# Patient Record
Sex: FEMALE | Race: WHITE | NOT HISPANIC OR LATINO | Employment: FULL TIME
[De-identification: names, ages, dates, MRNs, and addresses within clinical notes are randomized per-mention and may not be internally consistent; named-entity substitution may affect disease eponyms.]

---

## 2017-05-27 ENCOUNTER — HEALTH MAINTENANCE LETTER (OUTPATIENT)
Age: 27
End: 2017-05-27

## 2021-12-24 ENCOUNTER — TRANSFERRED RECORDS (OUTPATIENT)
Dept: HEALTH INFORMATION MANAGEMENT | Facility: CLINIC | Age: 31
End: 2021-12-24

## 2021-12-27 ENCOUNTER — TRANSFERRED RECORDS (OUTPATIENT)
Dept: HEALTH INFORMATION MANAGEMENT | Facility: CLINIC | Age: 31
End: 2021-12-27

## 2022-03-31 ENCOUNTER — TRANSFERRED RECORDS (OUTPATIENT)
Dept: HEALTH INFORMATION MANAGEMENT | Facility: CLINIC | Age: 32
End: 2022-03-31

## 2022-09-29 ENCOUNTER — REFERRAL (OUTPATIENT)
Dept: TRANSPLANT | Facility: CLINIC | Age: 32
End: 2022-09-29

## 2022-09-29 DIAGNOSIS — Z01.818 PRE-TRANSPLANT EVALUATION FOR KIDNEY TRANSPLANT: ICD-10-CM

## 2022-09-29 DIAGNOSIS — N18.6 ESRD (END STAGE RENAL DISEASE) (H): Primary | ICD-10-CM

## 2022-09-29 DIAGNOSIS — Z76.82 ORGAN TRANSPLANT CANDIDATE: ICD-10-CM

## 2022-09-29 DIAGNOSIS — N18.6 END STAGE RENAL DISEASE (H): ICD-10-CM

## 2022-09-29 DIAGNOSIS — I10 ESSENTIAL HYPERTENSION: ICD-10-CM

## 2022-09-29 NOTE — LETTER
Glenna Spears  [address]  [place] [address]                October 6, 2022                                                                                        MEDICAL RECORDS REQUEST    [facility], Kidney Pancreas Transplant Program Records Request                      Facility: [facility]    Thank you for referring your patient to the [facility] Kidney, Kidney Pancreas Program, in order to process the referral we will need the following information;    4. 1910 form       Please call our office at [phone] if you have any questions or concerns.                Please fax all paper records to [phone] within 1-3 business days.      Thank you,   The SOT Referral Intake Team     [facility]  Solid Organ Transplant Office  [address] [facility], [address] [address]

## 2022-09-29 NOTE — LETTER
October 10, 2022    Glenna Spears  [address]  [place] [address]    Dear Glenna,    Thank you for your interest in the Transplant Center at [facility]. We look forward to being a part of your care team and assisting you through the transplant process.    As we discussed, your transplant coordinator is Ilana Dumont (Kidney).  You may call your coordinator at any time with questions or concerns.  Your first scheduled call will be on October 14, 2022.  If this needs to change, call [phone].    Please complete the following.    1. Fill out and return the enclosed forms    Authorization for Electronic Communication    Authorization to Discuss Protected Health Information    Authorization for Release of Protected Health Information    2. Sign up for:    [employer], access to your electronic medical record (see enclosed pamphlet)    [email or web address], a transplant education website    You can use these tools to learn more about your transplant, communicate with your care team, and track your medical details      Sincerely,      Solid Organ Transplant  [facility]    cc: Referring Physician

## 2022-10-05 ENCOUNTER — DOCUMENTATION ONLY (OUTPATIENT)
Dept: TRANSPLANT | Facility: CLINIC | Age: 32
End: 2022-10-05

## 2022-10-05 VITALS — WEIGHT: 293 LBS | HEIGHT: 68 IN | BODY MASS INDEX: 44.41 KG/M2

## 2022-10-05 NOTE — TELEPHONE ENCOUNTER
PCP: currently no PCP- previous @ [facility]   Referring Provider: Cruz Mar MD  Referring Diagnosis: ESRD    Is patient under the age of 65? yes  Is patient diabetic? no  Is patient on insulin? no  Was patient offered a pancreas transplant referral? No    BMI 48.7    Is patient in a group home/assisted living? no  Does patient have a guardian? no    Referral intake process completed.  Patient is aware that after financial approval is received, medical records will be requested.   Patient confirmed for a callback from transplant coordinator on October 14, 2022. (within 2 weeks)  Tentative evaluation date November 2, 2022 (within 4 weeks) if appointment is virtual, does patient have capabilities of setting this up?     Confirmed coordinator will discuss evaluation process in more detail at the time of their call.   Patient is aware of the need to arrange age appropriate cancer screening, vaccinations, and dental care.  Reminded patient to complete questionnaire, complete medical records release, and review packet prior to evaluation visit .  Assessed patient for special needs (ie-wheelchair, assistance, guardian, and ):  none   Patient instructed to call [phone] with questions.     Patient gave verbal consent during intake call to obtain medical records and documents outside of [facility]:  yes

## 2022-10-10 ENCOUNTER — HEALTH MAINTENANCE LETTER (OUTPATIENT)
Age: 32
End: 2022-10-10

## 2022-10-25 NOTE — TELEPHONE ENCOUNTER
Reviewed chart for purpose of pre kidney transplant evaluation planning. Patient has ESRD on dialysis since about 4 months ago. Per Dr. Cruz Mar's clinic note 05/23/2022 patient has chronic kidney disease with longstanding nephrotic range proteinuria with family history of the same. Note also mentions patient has history of illicit drug use, has not used cocaine for a few years, did use a recent stimulant that she does not know which kind and LSD as well. History of hypertension, excessive Excedrin use for many years, smoking x many years, obesity x many years. No mention of chronic heart or lung issues. BMI is 48.66 for a height of 5 foot 8 inches and weight of 320 pounds. Will need to start with tx surg/ nutri/ weight loss management appts only due to high BMI.     Contacted patient and introduced myself as their Transplant Coordinator, also introduced the role of the Transplant Coordinator in the transplant process.  Explained the purpose of this call including reviewing next steps and answering questions.  Patient confirmed she is interested in proceeding with kidney transplant evalulation.  Pt confirmed she understands she needs to lose weight to meet criteria for kidney transplant. I explained our upper BMI limit for kidney transplant is 35 which equals a weight of 230 pounds for her height of 5 foot 8 inches. I explained that we start with tx surg/ nutri/ weight loss management appts first to address weight loss.   Confirmed Referring Provider, Dialysis Center, and Primary Care Physician. Notified patient of the importance of continued communication with referring providers and primary care physicians.    Reviewed components of transplant evaluation process including necessary appointments, tests, and procedures.    Answered questions for patient regarding evaluation, provided my name and contact information and requested they call with any additional questions.    Determined that patient would like  additional information regarding transplant by:     Drop Down choices: Mail, Email, MyChart, Phone Call   Encourage MyChart   Notified patients that they will hear from a Transplant  to schedule mini eval with tx surg/ nutri/ weight loss management and that these are typically on a Monday afternoon.  Patient expressed very good understanding of all and was in good agreement with the plan.     Smart set orders into [facility] today for tx surg/ nutri/ weight loss management appts.

## 2022-11-03 NOTE — TELEPHONE ENCOUNTER
Called patient to schedule Wt Mgmt appts and due to Ibis SALINAS & Bobbi SALINAS being out or full schedules the 1st available opening is Mon, Jan 23, 2023.  Patient confirmed for this date, mycharting & emailing schedule to her.

## 2023-01-20 NOTE — PROGRESS NOTES
"[facility] Solid Organ Transplant  Outpatient MNT: Kidney Transplant Evaluation    Current BMI: 48.7 (HT 69 in,  lbs/150 kg)  BMI is outside recommendation of <35 for kidney transplant  Goal weight for kidney transplant <237 lbs (93 lb loss)     Time Spent: 30 minutes  Visit Type: Initial   Referring Physician: Jose  Pt accompanied by: her partner    History of previous txp: none   Dialysis: yes    Dialysis Info: HD 6/2022 6 am   Protein supplement: is offered one, but declines d/t dislike of taste     Nutrition Assessment  This RD visit can be counted as 1st required RD visit for bariatric surgery/weight management if pt were to proceed with this program    - Appetite: v hungry since she started on dialysis  - Meal prep & grocery shopping: SO cooks; pt uses microwave food    Vitamins, Supplements, Pertinent Meds: velphoro (this is the 3rd binder she has tried; reports taking binders more consistently, including taking to work more regularly)  Herbal Medicines/Supplements: not asked     Edema: yes    Weight hx: increased a few lbs, likely 2/2 fluid status     Diet Recall works 3 pm- 11 pm  Breakfast Not typical   Lunch 11a-12p: Joseph Call BF bowls with 2 pieces toast    Dinner Salad (6 pm- at work) w/ cheese, croutons, ranch + Greek yogurt    Snacks After work (11p-12a)- 1/2 frozen pizza, can of chili; partner may cook- spaghetti, chicken & veggies (40% meals made at home); reports she \"wants to cut that meal out\"    Snacks- when at home but did not disclose what types    Beverages Juice (8 oz or less/d), water, coffee (w/ creamer- reports not taking binder w/ this), was drinking more pop but less appealing w/ dialysis (8 oz or less/day- Mt Dew, but some Coke/Pepsi)   Alcohol None    Dining out 1x/week      Physical Activity  [employer]- plan is to go 3x/week for 1 h w/ sister     Labs  1/9/23 K 5 (per [facility] records)  Phos high in May, no recent labs; pt reports Phos is 11     Nutrition Diagnosis  Obesity " r/t positive energy balance and inadequate physical activity AEB BMI 48.73.    Nutrition Intervention  Nutrition education provided:  Discussed strategies for weight loss. Provided some suggestions, such as including more veggies as able, adding protein to salad (ie chicken- cook up large amount at one time), consider reducing caloric beverages (also required if she goes through weight loss surgery). Reviewed pt's thought to cut out late night meal and to substitute a protein-rich snack, such as a protein drink or bar that she could tolerate.   Reviewed high protein, lower carb diet and how this is helpful for weight loss and also what she will be instructed on if she proceeds with bariatric surgery. Also reviewed need to avoid caloric beverages, caffeine, carbonation, etc after bariatric surgery. Encouraged pt and her partner to think about 1-2 ideas we talked about today and what is most realistic to change given their daily routines.   Reviewed small goal to start at the gym, even 20 min 1x/week vs larger goal of 60 min 3x/week     Discussed sodium intake (low sodium foods and drinks, seasoning food without salt and tips for low sodium diet).  Reviewed that if she were to reduce/change processed food intake, she would lose water weight and this would make dialysis runs better. Reviewed K levels, high phos levels, which are likely high from highly processed food intake. Also encouraged her to take binder with coffee (having creamer).     Patient Understanding: Pt verbalized understanding of education provided.  Expected Engagement: Fair  Follow-Up Plans: PRN     Nutrition Goals  BMI <38 or <257 lbs for full kidney txp savannah Smith, RD, LD, CCTD

## 2023-01-23 ENCOUNTER — TELEPHONE (OUTPATIENT)
Dept: ENDOCRINOLOGY | Facility: CLINIC | Age: 33
End: 2023-01-23

## 2023-01-23 ENCOUNTER — OFFICE VISIT (OUTPATIENT)
Dept: TRANSPLANT | Facility: CLINIC | Age: 33
End: 2023-01-23
Attending: NURSE PRACTITIONER
Payer: COMMERCIAL

## 2023-01-23 VITALS
SYSTOLIC BLOOD PRESSURE: 149 MMHG | BODY MASS INDEX: 43.4 KG/M2 | BODY MASS INDEX: 43.4 KG/M2 | HEIGHT: 69 IN | OXYGEN SATURATION: 100 % | HEART RATE: 84 BPM | WEIGHT: 293 LBS | DIASTOLIC BLOOD PRESSURE: 82 MMHG | OXYGEN SATURATION: 100 % | DIASTOLIC BLOOD PRESSURE: 82 MMHG | WEIGHT: 293 LBS | SYSTOLIC BLOOD PRESSURE: 149 MMHG | HEART RATE: 84 BPM | HEIGHT: 69 IN

## 2023-01-23 DIAGNOSIS — Z01.818 PRE-TRANSPLANT EVALUATION FOR KIDNEY TRANSPLANT: ICD-10-CM

## 2023-01-23 DIAGNOSIS — I10 ESSENTIAL HYPERTENSION: ICD-10-CM

## 2023-01-23 DIAGNOSIS — Z76.82 ORGAN TRANSPLANT CANDIDATE: ICD-10-CM

## 2023-01-23 DIAGNOSIS — N18.6 ESRD (END STAGE RENAL DISEASE) (H): ICD-10-CM

## 2023-01-23 DIAGNOSIS — N18.6 END STAGE RENAL DISEASE (H): ICD-10-CM

## 2023-01-23 DIAGNOSIS — E66.01 CLASS 3 SEVERE OBESITY WITH SERIOUS COMORBIDITY AND BODY MASS INDEX (BMI) OF 50.0 TO 59.9 IN ADULT, UNSPECIFIED OBESITY TYPE (H): Primary | ICD-10-CM

## 2023-01-23 DIAGNOSIS — E66.813 CLASS 3 SEVERE OBESITY WITH SERIOUS COMORBIDITY AND BODY MASS INDEX (BMI) OF 50.0 TO 59.9 IN ADULT, UNSPECIFIED OBESITY TYPE (H): Primary | ICD-10-CM

## 2023-01-23 PROBLEM — E55.9 HYPOVITAMINOSIS D: Status: ACTIVE | Noted: 2023-01-23

## 2023-01-23 PROCEDURE — 99203 OFFICE O/P NEW LOW 30 MIN: CPT | Performed by: PHYSICIAN ASSISTANT

## 2023-01-23 PROCEDURE — 99212 OFFICE O/P EST SF 10 MIN: CPT | Mod: 27 | Performed by: PHYSICIAN ASSISTANT

## 2023-01-23 PROCEDURE — 99203 OFFICE O/P NEW LOW 30 MIN: CPT | Mod: GC | Performed by: SURGERY

## 2023-01-23 PROCEDURE — G0463 HOSPITAL OUTPT CLINIC VISIT: HCPCS | Performed by: SURGERY

## 2023-01-23 RX ORDER — SEMAGLUTIDE 0.5 MG/.5ML
0.5 INJECTION, SOLUTION SUBCUTANEOUS WEEKLY
Qty: 2 ML | Refills: 0 | Status: SHIPPED | OUTPATIENT
Start: 2023-02-23 | End: 2023-03-10

## 2023-01-23 RX ORDER — VARENICLINE TARTRATE 0.5 MG/1
0.5 TABLET, FILM COATED ORAL DAILY
COMMUNITY
Start: 2023-01-05 | End: 2024-02-08

## 2023-01-23 RX ORDER — ACETAMINOPHEN 325 MG/1
325-650 TABLET ORAL EVERY 4 HOURS PRN
COMMUNITY

## 2023-01-23 RX ORDER — FAMOTIDINE 20 MG/1
20 TABLET, FILM COATED ORAL AT BEDTIME
COMMUNITY
Start: 2022-05-23 | End: 2024-02-09 | Stop reason: ALTCHOICE

## 2023-01-23 RX ORDER — LABETALOL 200 MG/1
200 TABLET, FILM COATED ORAL EVERY EVENING
COMMUNITY
Start: 2022-04-15 | End: 2024-02-08

## 2023-01-23 RX ORDER — SEMAGLUTIDE 0.25 MG/.5ML
0.25 INJECTION, SOLUTION SUBCUTANEOUS WEEKLY
Qty: 2 ML | Refills: 0 | Status: SHIPPED | OUTPATIENT
Start: 2023-01-23 | End: 2023-05-03

## 2023-01-23 RX ORDER — ALBUTEROL SULFATE 90 UG/1
2 AEROSOL, METERED RESPIRATORY (INHALATION) PRN
COMMUNITY
Start: 2022-11-13 | End: 2024-02-08

## 2023-01-23 RX ORDER — AMLODIPINE BESYLATE 10 MG/1
1 TABLET ORAL EVERY EVENING
COMMUNITY
Start: 2021-12-24 | End: 2024-02-08

## 2023-01-23 RX ORDER — LIDOCAINE/PRILOCAINE 2.5 %-2.5%
CREAM (GRAM) TOPICAL
COMMUNITY
Start: 2023-01-14 | End: 2023-11-06

## 2023-01-23 RX ORDER — FERROUS SULFATE 325(65) MG
1 TABLET ORAL
COMMUNITY
Start: 2022-07-18 | End: 2023-11-06

## 2023-01-23 RX ORDER — CALCITRIOL 0.5 UG/1
0.5 CAPSULE, LIQUID FILLED ORAL EVERY EVENING
COMMUNITY
Start: 2023-01-17

## 2023-01-23 RX ORDER — FUROSEMIDE 40 MG
1 TABLET ORAL
COMMUNITY
Start: 2022-12-21 | End: 2023-11-06

## 2023-01-23 RX ORDER — SODIUM BICARBONATE 650 MG/1
1950 TABLET ORAL
COMMUNITY
Start: 2022-05-23 | End: 2023-11-06

## 2023-01-23 RX ORDER — SUCROFERRIC OXYHYDROXIDE 500 MG/1
500 TABLET, CHEWABLE ORAL
COMMUNITY
Start: 2023-01-10

## 2023-01-23 NOTE — LETTER
"    2023         RE: Glenna Spears  [address]  [place] [address]        Dear Colleague,    Thank you for referring your patient, Glenna Spears, to the [facility] TRANSPLANT [facility]. Please see a copy of my visit note below.    30 minutes spent on the date of the encounter doing chart review, history and exam, documentation and further activities per the note    New Weight Management Consultation Note    2023    RE: Glenna Spears  MR#: [id number]  : 1990      Referring provider: Dr Garcia transplant    Chief Complaint/Reason for visit: obesity, weight loss prior to kidney transplant    Dear Dr Garcia,    I had the pleasure of seeing your patient, Glenna Spears, to evaluate her obesity and discussed weight management treatment options.  As you know, Glenna Spears is 32 year old.  She has a height of 5' 9\", a weight of 330 lbs 0 oz, and calculated Body mass index is 48.73 kg/m ..  Full intake/assessment was done to determine barriers to weight loss success and develop a treatment plan.    Assessment & Plan   Problem List Items Addressed This Visit    None  Visit Diagnoses     Class 3 severe obesity with serious comorbidity and body mass index (BMI) of 50.0 to 59.9 in adult, unspecified obesity type (H)    -  Primary    Relevant Medications    sodium bicarbonate 650 MG tablet    Semaglutide-Weight Management (WEGOVY) 0.25 MG/0.5ML SOAJ    Semaglutide-Weight Management (WEGOVY) 0.5 MG/0.5ML SOAJ (Start on 2023)    Other Relevant Orders    CBC with platelets    Comprehensive metabolic panel    Hemoglobin A1c    Lipid panel reflex to direct LDL Fasting    Vitamin D Deficiency    Vitamin A    Parathyroid Hormone Intact    ESRD (end stage renal disease) (H)        Pre-transplant evaluation for kidney transplant        End stage renal disease (H)        Organ transplant candidate        Essential hypertension             HISTORY OF PRESENT ILLNESS:  BMI 48 needs to lose " to BMI <35 for kidney transplant. Goal wt <230 lbs (100 lbs weight loss)  ESRD due to longstanding nephrotic range proteinuria and HTN,. Hx of chronic drug use, Excedrin use.  Sober >1 year.  On dialysis x 8 months  Nephrologist: Dr Zaid Pulido    She has had increase in hunger since starting dialysis June 2022  Diabetes: No    Tobacco use: 1/2 ppd working on quitting smoking.     Tried bupropion in the past but caused drowsiness and weight gain    CO-MORBIDITIES OF OBESITY INCLUDE:  Asthma, HTN, Vitamin D Deficicency    PAST SURGICAL HISTORY:  2 C sections    SOCIAL HISTORY:   Works on assembly line    HABITS:  No flowsheet data found.  Currently taking narcotic/opioids No    PSYCHOLOGICAL HISTORY:   Situational depression no meds  No therapist    ROS:  No flowsheet data found.    EATING BEHAVIORS:  No flowsheet data found.    EXERCISE:  No flowsheet data found.    MEDICATIONS:  Current Outpatient Medications   Medication Sig Dispense Refill     acetaminophen (TYLENOL) 325 MG tablet Take 325-650 mg by mouth every 4 hours as needed       albuterol (PROAIR HFA/PROVENTIL HFA/VENTOLIN HFA) 108 (90 Base) MCG/ACT inhaler Inhale 2 puffs into the lungs       amLODIPine (NORVASC) 10 MG tablet Take 1 tablet by mouth daily       calcitRIOL (ROCALTROL) 0.5 MCG capsule        cholecalciferol 25 MCG (1000 UT) TABS Take 2,000 Units by mouth       famotidine (PEPCID) 20 MG tablet        FEROSUL 325 (65 Fe) MG tablet 1 tablet daily at 2 pm Port       furosemide (LASIX) 40 MG tablet Take 1 tablet by mouth daily at 2 pm       labetalol (NORMODYNE) 200 MG tablet Take 200 mg by mouth 2 times daily       levonorgestrel (KYLEENA) 19.5 MG IUD 1 Intra Uterine Device by Intrauterine route       lidocaine-prilocaine (EMLA) 2.5-2.5 % external cream APPLY SMALL AMOUNT TO ACCESS SITE (AVF) 1 HOUR BEFORE DIALYSIS. COVER WITH OCCLUSIVE DRESSING (SARAN WRAP)       Semaglutide-Weight Management (WEGOVY) 0.25 MG/0.5ML SOAJ Inject  0.25 mg Subcutaneous once a week 2 mL 0     [START ON 2/23/2023] Semaglutide-Weight Management (WEGOVY) 0.5 MG/0.5ML SOAJ Inject 0.5 mg Subcutaneous once a week 2 mL 0     sodium bicarbonate 650 MG tablet Take 1,950 mg by mouth       varenicline (CHANTIX) 0.5 MG tablet Take 0.5 mg by mouth       VELPHORO 500 MG CHEW chewable tablet CRUSH OR CHEW AND SWALLOW 1 TABLET 3 TIMES A DAY WITH MEALS         ALLERGIES:  Allergies   Allergen Reactions     Sulfa Drugs Other (See Comments)     Reaction as an infant- unsure of what happened     Nsaids Other (See Comments)     Has nephrotic range proteinuria and see nephrology's note.        PHYSICAL EXAM:  Objective     Physical Exam   GENERAL: Healthy, alert and no distress  EYES: Eyes grossly normal to inspection.  No discharge or erythema, or obvious scleral/conjunctival abnormalities.  RESP: No audible wheeze, cough, or visible cyanosis.  No visible retractions or increased work of breathing.    SKIN: Visible skin clear. No significant rash, abnormal pigmentation or lesions.  NEURO: Cranial nerves grossly intact.  Mentation and speech appropriate for age.  PSYCH: Mentation appears normal, affect normal/bright, judgement and insight intact, normal speech and appearance well-groomed.        In summary, Glenna Spears has Class III obesity with a body mass index of Body mass index is 48.73 kg/m . kg/m2 and the comorbidities stated above. Discussed treatment options today.    Plan for medical and surgical weight management.  Medication plan: Start Wegovy, needs PA and check with nephrologist also  RD plan: RD class 1 month  Food tracking is a very useful tool for weight loss and helpful before RD visits    Follow up plan:  RD class next available  RN class next available  Ibis 2 months return [facility] video  Dr Smith in 1 month video    Fax labs to local lab. [facility] lab    If you have not already watched our online seminar please go to  [email or web address]    Weight loss requirement: 20 lbs prior to surgery. Will have final weight check 2-3 weeks prior to surgery at anesthesia or nurse pre-op teaching visit.  Not required but encouraged    Bariatric labs ordered, call for a lab only appointment at any [facility] lab. To find a lab location near you, please call [phone]. Please let us know if orders need to be faxed to a non [facility] lab.    Schedule bariatric psych eval as soon as possible.  List of psychologists will be sent to you via [facility] or given to you in clinic.     Call Iker Sebastian at [phone] to discuss insurance coverage for bariatric surgery.  Please check with your insurance regarding bariatric surgery coverage also. Iker can also help you with scheduling psych eval if you are having difficulties.    The following clearance letters are needed: Letters will be sent to you via [facility] or given to you in clinic  - Letter of support from primary care provider. Provider can submit through electronic medical record or fax to [phone]  - Letter of clearance from nephrologist    Smoking cessation and nicotine test needed: Yes    Birth control after surgery discussed. Patient instructed that 2 forms of birth control required after surgery and to avoid pregnancy for at least 18 months after surgery: IUD    NEXT VISITS: A  should reach out to you to schedule the following appointments.  If they do not reach you please call [phone] to schedule the following appointments:    -See dietitian in 1 month and monthly for 3 months    -See Ibis in 2 months to follow up on pre-op weight loss and weight loss medications    -See Dr Smith in 1 month for bariatric surgeon visit. Discuss bariatric surgery.       Again, thank you for allowing me to participate in the care of your patient.      Sincerely,    Ibis Guardado PA-C

## 2023-01-23 NOTE — PROGRESS NOTES
Transplant Surgery Consult Note    Medical record number: [id number]  YOB: 1990,       Assessment and Recommendations:   Ms. Spears was referred here for kidney evaluation. The following issues should be addressed:   - BMI 48: we agree with the referral and recommendations from the weight management team. She was referred for surgical weight loss. We agree that patient should work on weight loss before re-evaluation for kidney transplant.   - We strongly recommend patient to find a living donor due to her age  - Will need re-eval after she successfully completed the weight loss    Kurt Jacque Spears MD   Transplant Surgery Fellow     I have reviewed history, examined patient and discussed plan with the fellow/resident/LEWIS.  I concur with the findings in this note.    Risks of the surgical procedure including but not limited to the rare risk of mortality discussed in detail. Patient verbalized good understanding and had several pertinent questions which were answered satisfactorily.     Immunosuppressive regimen, management and long term risks discussed in detail.     Total time 30 min  Counseling time: 15 min                 ---------------------------------------------------------------------------------------------------    HPI: Ms. Spears has End stage renal failure due to hypertension. The patient has had diabetes for 7 years.    The patient is on dialysis. Started dialysis last 6/2022. Had a LUE AVF but matured well. Currently on dialysis via right chest CVC.   Has potential kidney donors:  No.  Interested in participation in paired exchange if a donor is willing: Yes     The patient has the following pertinent history:       No    Yes  Dialysis:    []      [x] via:       Blood Transfusion                  [x]      []  Number of units:   Most recently:  Pregnancy:    []      [x] Number: 2      Previous Transplant:  []      [x] Details:    Cancer    [x]      [] Comment:   Kidney stones   [x]       [] Comment:      Recurrent infections  [x]      []  Type:                  Bladder dysfunction  [x]      [] Cause:    Claudication   [x]      [] Distance:    Previous Amputation  [x]      [] Cause:     Chronic anticoagulation  [x]      [] Indication:  Caodaism  [x]      []     Past Medical History:   Diagnosis Date     Hypertension      No past surgical history on file.  No family history on file.  Social History     Socioeconomic History     Marital status: Single     Spouse name: Not on file     Number of children: Not on file     Years of education: Not on file     Highest education level: Not on file   Occupational History     Not on file   Tobacco Use     Smoking status: Every Day     Types: Cigarettes     Smokeless tobacco: Never     Tobacco comments:     in process of quitting, 1-2 cigs/day   Substance and Sexual Activity     Alcohol use: Not Currently     Drug use: Not Currently     Types: Marijuana     Sexual activity: Not on file   Other Topics Concern     Not on file   Social History Narrative     Not on file     Social Determinants of Health     Financial Resource Strain: Not on file   Food Insecurity: Not on file   Transportation Needs: Not on file   Physical Activity: Not on file   Stress: Not on file   Social Connections: Not on file   Intimate Partner Violence: Not on file   Housing Stability: Not on file           Allergies:   Allergies   Allergen Reactions     Sulfa Drugs Other (See Comments)     Reaction as an infant- unsure of what happened     Nsaids Other (See Comments)     Has nephrotic range proteinuria and see nephrology's note.        Medications:  Prescription Medications as of 1/23/2023       Rx Number Disp Refills Start End Last Dispensed Date Next Fill Date Owning Pharmacy    acetaminophen (TYLENOL) 325 MG tablet            Sig: Take 325-650 mg by mouth every 4 hours as needed    Class: Historical    Route: Oral    albuterol (PROAIR HFA/PROVENTIL HFA/VENTOLIN HFA) 108 (90 Base)  MCG/ACT inhaler    2022       Sig: Inhale 2 puffs into the lungs    Class: Historical    Route: Inhalation    amLODIPine (NORVASC) 10 MG tablet    2021        Sig: Take 1 tablet by mouth daily    Class: Historical    Route: Oral    calcitRIOL (ROCALTROL) 0.5 MCG capsule    2023        Class: Historical    cholecalciferol 25 MCG (1000 UT) TABS    2022        Sig: Take 2,000 Units by mouth    Class: Historical    Route: Oral    famotidine (PEPCID) 20 MG tablet    2022        Class: Historical    FEROSUL 325 (65 Fe) MG tablet    2022        Si tablet daily at 2 pm Port    Class: Historical    furosemide (LASIX) 40 MG tablet    2022        Sig: Take 1 tablet by mouth daily at 2 pm    Class: Historical    Route: Oral    labetalol (NORMODYNE) 200 MG tablet    4/15/2022        Sig: Take 200 mg by mouth 2 times daily    Class: Historical    Route: Oral    levonorgestrel (KYLEENA) 19.5 MG IUD    3/31/2022 3/30/2027       Si Intra Uterine Device by Intrauterine route    Class: Historical    Route: Intrauterine    lidocaine-prilocaine (EMLA) 2.5-2.5 % external cream    2023        Sig: APPLY SMALL AMOUNT TO ACCESS SITE (AVF) 1 HOUR BEFORE DIALYSIS. COVER WITH OCCLUSIVE DRESSING (SARAN WRAP)    Class: Historical    Semaglutide-Weight Management (WEGOVY) 0.25 MG/0.5ML SOAJ  2 mL 0 2023    [id number] - [place], [place] - [address] [facility] AT [address] [place] [address]    Sig: Inject 0.25 mg Subcutaneous once a week    Class: E-Prescribe    Route: Subcutaneous    Prior authorization: Waiting for Payer Response    This order has not been released to its destination.    Semaglutide-Weight Management (WEGOVY) 0.5 MG/0.5ML SOAJ  2 mL 0 2023    [id number] - [place], [place] - [address] [facility] AT [address] [place] [address] & [facility]    Sig: Inject 0.5 mg Subcutaneous once a week    Class: E-Prescribe    Notes to Pharmacy: Start after  completing 0.25mg weekly x 4 weeks.    Route: Subcutaneous    Prior authorization: Waiting for Payer Response    This order has not been released to its destination.    sodium bicarbonate 650 MG tablet    2022        Sig: Take 1,950 mg by mouth    Class: Historical    Route: Oral    varenicline (CHANTIX) 0.5 MG tablet    2023        Sig: Take 0.5 mg by mouth    Class: Historical    Route: Oral    VELPHORO 500 MG CHEW chewable tablet    1/10/2023        Sig: CRUSH OR CHEW AND SWALLOW 1 TABLET 3 TIMES A DAY WITH MEALS    Class: Historical          Exam:   Constitutional - A&O in NAD.   Eyes - no redness or discharge.  Sclera anicteric  Respiratory - no cough, no labored breathing  Musculoskeletal - range of motion normal  Skin - no discoloration, no jaundice  Neurological - no tremors.  No facial droop or dysarthria  Psychiatric - normal mood and affect  Abdomen - large central obesity,  scar

## 2023-01-23 NOTE — PROGRESS NOTES
"30 minutes spent on the date of the encounter doing chart review, history and exam, documentation and further activities per the note    New Weight Management Consultation Note    2023    RE: Glenna Spears  MR#: [id number]  : 1990      Referring provider: Dr Garcia transplant    Chief Complaint/Reason for visit: obesity, weight loss prior to kidney transplant    Dear Dr Garcia,    I had the pleasure of seeing your patient, Glenna Spears, to evaluate her obesity and discussed weight management treatment options.  As you know, Glenna Spears is 32 year old.  She has a height of 5' 9\", a weight of 330 lbs 0 oz, and calculated Body mass index is 48.73 kg/m ..  Full intake/assessment was done to determine barriers to weight loss success and develop a treatment plan.    Assessment & Plan   Problem List Items Addressed This Visit    None  Visit Diagnoses     Class 3 severe obesity with serious comorbidity and body mass index (BMI) of 50.0 to 59.9 in adult, unspecified obesity type (H)    -  Primary    Relevant Medications    sodium bicarbonate 650 MG tablet    Semaglutide-Weight Management (WEGOVY) 0.25 MG/0.5ML SOAJ    Semaglutide-Weight Management (WEGOVY) 0.5 MG/0.5ML SOAJ (Start on 2023)    Other Relevant Orders    CBC with platelets    Comprehensive metabolic panel    Hemoglobin A1c    Lipid panel reflex to direct LDL Fasting    Vitamin D Deficiency    Vitamin A    Parathyroid Hormone Intact    ESRD (end stage renal disease) (H)        Pre-transplant evaluation for kidney transplant        End stage renal disease (H)        Organ transplant candidate        Essential hypertension             HISTORY OF PRESENT ILLNESS:  BMI 48 needs to lose to BMI <35 for kidney transplant. Goal wt <230 lbs (100 lbs weight loss)  ESRD due to longstanding nephrotic range proteinuria and HTN,. Hx of chronic drug use, Excedrin use.  Sober >1 year.  On dialysis x 8 months  Nephrologist: Dr Mar " [facility]    She has had increase in hunger since starting dialysis June 2022  Diabetes: No    Tobacco use: 1/2 ppd working on quitting smoking.     Tried bupropion in the past but caused drowsiness and weight gain    CO-MORBIDITIES OF OBESITY INCLUDE:  Asthma, HTN, Vitamin D Deficicency    PAST SURGICAL HISTORY:  2 C sections    SOCIAL HISTORY:   Works on [employer]    HABITS:  No flowsheet data found.  Currently taking narcotic/opioids No    PSYCHOLOGICAL HISTORY:   Situational depression no meds  No therapist    ROS:  No flowsheet data found.    EATING BEHAVIORS:  No flowsheet data found.    EXERCISE:  No flowsheet data found.    MEDICATIONS:  Current Outpatient Medications   Medication Sig Dispense Refill     acetaminophen (TYLENOL) 325 MG tablet Take 325-650 mg by mouth every 4 hours as needed       albuterol (PROAIR HFA/PROVENTIL HFA/VENTOLIN HFA) 108 (90 Base) MCG/ACT inhaler Inhale 2 puffs into the lungs       amLODIPine (NORVASC) 10 MG tablet Take 1 tablet by mouth daily       calcitRIOL (ROCALTROL) 0.5 MCG capsule        cholecalciferol 25 MCG (1000 UT) TABS Take 2,000 Units by mouth       famotidine (PEPCID) 20 MG tablet        FEROSUL 325 (65 Fe) MG tablet 1 tablet daily at 2 pm Port       furosemide (LASIX) 40 MG tablet Take 1 tablet by mouth daily at 2 pm       labetalol (NORMODYNE) 200 MG tablet Take 200 mg by mouth 2 times daily       levonorgestrel (KYLEENA) 19.5 MG IUD 1 Intra Uterine Device by Intrauterine route       lidocaine-prilocaine (EMLA) 2.5-2.5 % external cream APPLY SMALL AMOUNT TO ACCESS SITE (AVF) 1 HOUR BEFORE DIALYSIS. COVER WITH OCCLUSIVE DRESSING (SARAN WRAP)       Semaglutide-Weight Management (WEGOVY) 0.25 MG/0.5ML SOAJ Inject 0.25 mg Subcutaneous once a week 2 mL 0     [START ON 2/23/2023] Semaglutide-Weight Management (WEGOVY) 0.5 MG/0.5ML SOAJ Inject 0.5 mg Subcutaneous once a week 2 mL 0     sodium bicarbonate 650 MG tablet Take 1,950 mg by mouth        varenicline (CHANTIX) 0.5 MG tablet Take 0.5 mg by mouth       VELPHORO 500 MG CHEW chewable tablet CRUSH OR CHEW AND SWALLOW 1 TABLET 3 TIMES A DAY WITH MEALS         ALLERGIES:  Allergies   Allergen Reactions     Sulfa Drugs Other (See Comments)     Reaction as an infant- unsure of what happened     Nsaids Other (See Comments)     Has nephrotic range proteinuria and see nephrology's note.        PHYSICAL EXAM:  Objective    Physical Exam   GENERAL: Healthy, alert and no distress  EYES: Eyes grossly normal to inspection.  No discharge or erythema, or obvious scleral/conjunctival abnormalities.  RESP: No audible wheeze, cough, or visible cyanosis.  No visible retractions or increased work of breathing.    SKIN: Visible skin clear. No significant rash, abnormal pigmentation or lesions.  NEURO: Cranial nerves grossly intact.  Mentation and speech appropriate for age.  PSYCH: Mentation appears normal, affect normal/bright, judgement and insight intact, normal speech and appearance well-groomed.        In summary, Glenna Spears has Class III obesity with a body mass index of Body mass index is 48.73 kg/m . kg/m2 and the comorbidities stated above. Discussed treatment options today.    Plan for medical and surgical weight management.  Medication plan: Start Wegovy, needs PA and check with nephrologist also  RD plan: RD class 1 month  Food tracking is a very useful tool for weight loss and helpful before RD visits    Follow up plan:  RD class next available  RN class next available  [facility]bis 2 months return MWM video  Dr Smith in 1 month video    Fax labs to local lab. Counts include 234 beds at the [facility] lab    If you have not already watched our online seminar please go to [email or web address]    Weight loss requirement: 20 lbs prior to surgery. Will have final weight check 2-3 weeks prior to surgery at anesthesia or nurse pre-op teaching visit.  Not required but encouraged    Bariatric labs ordered, call for a lab only  appointment at any [facility] lab. To find a lab location near you, please call [phone]. Please let us know if orders need to be faxed to a non [facility] lab.    Schedule bariatric psych eval as soon as possible.  List of psychologists will be sent to you via [employer] or given to you in clinic.     Call Iker Sebastian at [phone] to discuss insurance coverage for bariatric surgery.  Please check with your insurance regarding bariatric surgery coverage also. Iker can also help you with scheduling psych eval if you are having difficulties.    The following clearance letters are needed: Letters will be sent to you via [employer] or given to you in clinic  - Letter of support from primary care provider. Provider can submit through electronic medical record or fax to [phone]  - Letter of clearance from nephrologist    Smoking cessation and nicotine test needed: Yes    Birth control after surgery discussed. Patient instructed that 2 forms of birth control required after surgery and to avoid pregnancy for at least 18 months after surgery: IUD    NEXT VISITS: A  should reach out to you to schedule the following appointments.  If they do not reach you please call [phone] to schedule the following appointments:    -See dietitian in 1 month and monthly for 3 months    -See Ibis in 2 months to follow up on pre-op weight loss and weight loss medications    -See Dr Smith in 1 month for bariatric surgeon visit. Discuss bariatric surgery.         Sincerely,     Ibis Guardado PA-C

## 2023-01-23 NOTE — PATIENT INSTRUCTIONS
"Dear Glenna Spears,     I am sending you some preliminary patient education items to review after your initial visit with us.    View the Weight Loss Surgery Seminar at the following link.    [email or web address]    Go to the bottom of the webpage to view the 6 short videos.     2.  New Bariatric Patient Class and get an in-clinic weight within 30 days of your initial visit.  The class is online.  Call the Scheduling line at [phone] to get scheduled for the class.  You can have your weight done at our clinic or at your own clinic.  Starting November 14, 2022, you can stop into our office between 9 am and 1 pm Monday through Friday for a weigh in.  Please weigh yourself at home in the same clothes that you will weigh in at the clinic.  Do not do a \"dry\" weight - one with no clothes on.     3. Review the following handouts at these links:    Making Your Decision, Understanding Weight Loss Surgery    [email or web address]    A Roadmap to Your Weight Loss Surgery    [email or web address]    GENERAL INFORMATION  Please check with your insurance company to see if weight loss surgery is a covered service.  Your insurance will dictate how many dietician visits are required.  You will be required to meet with your dietician monthly until surgery.  Start making diet changes now!  3 small meals per day, 48-64 ounces of fluids each day, eliminate sugars/soda/pasta/rice/potatoes.  Avoid alcohol.  Start working on your mental health and relationship to food now.  Make changes that will support you positively on your weight loss journey.  Make these changes now, not just before or after surgery.  This is a lifelong change!  Start an exercise program.  Even if you have limited mobility, it is important to start moving and find exercise that fits into your lifestyle.  Our team is here to support you in your efforts.  We have many resources available, all you have to do is ask!    LABS  Labs are to " "be done within 30 days of your initial visit with your provider.  And, the following labs need to be done before weight loss surgery is scheduled: vitamin D, parathormone (PTH), comprehensive metabolic panel (CMP), complete blood count (CBC) with platelets, lipids (fasting) and hemoglobin A1c.  Lab orders have been ordered in your chart.  Please make an appointment to have them drawn at your convenience. Depending on your results, you may be required to have additional labs.    [facility] LAB:  The labs can be drawn at any [facility] Lab and reviewed in your chart.   Please call [phone] to schedule at the [facility] or [phone] at another [facility] Lab.  To schedule the Lab Appointment using Kenguru:  Select \"Schedule an Appointment\"  Select \"Lab Only\"  For \"A couple of questions\", select \"Other\"  For \"Which locations work for you?, select the location and set up the appointment.    OUTSIDE LAB:  The labs can be drawn at an outside facility and faxed to [phone]   or emailed to [email or web address] to be entered into your chart.  If you wish to have your labs drawn at a non-[place] lab, please notify our office so we can fax the orders.  Please let us know the lab name, phone number and fax number if having labs done outside of the [facility] [place] system.      CLEARANCES  Every patient is required to have a psych clearance and a letter of support from their primary care provider.  A list of psych clearance providers from the [facility] will be provided.   Based on your medical history, additional clearances may be required.  A copy of the specialty clearance letter will be sent to you. Clearance letters are the responsibility of the patient to obtain from their providers.  Some providers may require additional visits/tests in order to fully clear a patient for surgery.    MEDICATIONS  After surgery, you will NOT be able to use non-steroidal anti-inflammatory " (NSAID) medications.  These include aspirin, ibuprofen, naproxen, etc.  If you are on NSAIDS for a chronic condition, you will want to discuss this with your provider and primary care provider.    SMOKING CESSATION  You must be smoke-free for at least 3 months before surgery and off nicotine-replacement for at least months before surgery.  You must have a negative nicotine test at least 1 month before surgery.  Do not start smoking after surgery.  This can lead to ulcers in the stomach.    TASK LIST  Please review your task list for the labs, clearance letters and additional appointments that are required.     [facility] CARE COMPANION  Effective 1/24/23, please register for the Weight Loss Surgery Care Companion Pathway through the [employer] mobile char or via web browser after you receive an invite.   Information from this care journey can help you be more successful before and after surgery, for up to one year after your surgical procedure. Your Care Companion Pathway through [employer] can also help answer any questions you might have related to the surgery.  The Pathway has 3 Phases:  Phase 1 starts when you get your Tasklist after your initial consultation with us or when you decide to start preparing for weight loss surgery.  Phase 2 starts when your surgery is scheduled.  Phase 3 starts on the day of discharge from the hospital.  A patient can only be connected to one Care Companion journey at any given time.   You can remove or re-enroll yourself from the Weight Loss Surgery Care Companion Pathway by contacting us at [phone].     SUPPORT GROUPS    COMPREHENSIVE WEIGHT MANAGEMENT PROGRAM  VIRTUAL SUPPORT GROUPS    We offer support groups for patients who are working on weight loss and considering, preparing for or have had weight loss surgery.   There is no cost for this opportunity.  You are invited to attend the?Virtual Support Groups?provided by any of the following locations:    [facility] via Buzzvil  "with Mary Locke RN  2.   [place] via ibeatyou Teams with Drew Farmer, PhD, LP  3.   [place] via ibeatyou Teams with Angela Benedict RN  4.   [facility] via ibeatyou Teams with Angela Saleem, [facility]    The following Support Group information can also be found on our website:  [email or web address]    [email or web address]    [facility] Weight Loss Surgery Support Group    [facility] Weight Loss Surgery Support Group  The support group is a patient-lead forum that meets monthly to share experiences, encouragement and education. It is open to those who have had weight loss surgery, are scheduled for surgery, or are considering surgery.   WHEN: This group meets on the 3rd Wednesday of each month from 5:00PM - 6:00PM virtually using Microsoft Teams.   FACILITATOR: Led by Mary Woods RD, CHAPO, RN, the program's Clinical Coordinator.   TO REGISTER: Please contact the clinic via [employer] Earth or call the nurse line directly at [phone] to inform our staff that you would like an invite sent to you and to let us know the email you would like the invite sent to. Prior to the meeting, a link with directions on how to join the meeting will be sent to you.    2023 Meetings   January 18: \"Let's Talk\" a time for the group to share.  February 15: Guest Speaker, Melissa Marmolejo RD, CHAPO, \"Meal planning\"  March 15: Guest Speaker, Serena Fritz, [facility], [facility], CHES, CPT, Health , \"What it means to Change One's Relationship with the Areas of Well-Being\".  April 19: Guest Speaker, Zac Crane RD, CHAPO, \"Maintaining Weight Loss after Bariatric Surgery\".  May 17: \"Let's Talk\" a time for the group to share.  June 21: \"Let's Talk\" a time for the group to share.  July 19  August 16  September 20  October 18  November 15  December 20    [facility] " "and Specialty [facility] Support Groups    Connections Bariatric Care Support Group?  This is open to all pre- and post- operative bariatric surgery patients as well as their support system.   WHEN: This group meets the 2nd Tuesday of each month from 6:30 PM - 8:00 PM virtually using Microsoft Teams.   FACILITATOR: Led by Drew Farmer, Ph.D who is a Licensed Psychologist with the [facility] Comprehensive Weight Management Program.   TO REGISTER: Please send an email to Drew Farmer, Ph.D.,  [email or web address]?if you would like an invitation to the group and to learn about using Microsoft Teams.    2023 Meetings  January 10: Guest speaker, Cheo Hoover, PharmD, Pharmacy Resident, \"Medications and Bariatric Surgery\"  February 14  March 14  April 11  May 9  Saba 11    Connections Post-Operative Bariatric Surgery Support Group  This is a support group for [facility] bariatric patients (and those external to [facility]) who have had bariatric surgery and are at least 3 months post-surgery.  WHEN: This support group meets the 4th Wednesday of the month from 11:00 AM - 12:00 PM virtually using Microsoft Teams.   FACILITATOR: Led by Certified Bariatric Nurse, Angela Benedict RN.   TO REGISTER: Please send an email to Angela at [email or web address] if you would like an invitation to the group and to learn about using Microsoft Teams.    2023 Meetings  January 25  February 22  March 22  April 26  May 24  Saba 28     RELEASE OF INFORMATION    For Release of Information to transfer info to or from [facility], go to this website for the forms:    [email or web address]    Weight Loss Surgery Center  Our Mailing Address:  [address] [place] [address]  Our Clinic Location:  [address] [place] [address]  Phone: [phone]    Fax: [phone]        Please reach out if you have any additional questions.  Keep us updated as " you obtain each clearance as well as when you have completed your labs. We look forward to helping you on your weight loss journey.        WEGOVY (semaglutide)    What is Wegovy?    Wegovy (semaglutide) injection 2.4 mg is an injectable prescription medicine used for adults with obesity (BMI ?30) or overweight (excess weight) (BMI ?27) who also have weight-related medical problems to help them lose weight and keep the weight off.    1.  Start Wegovy (semaglutide) 0.25 mg once weekly for 4 weeks, then if tolerating increase to 0.5 mg weekly for 4 weeks, then if tolerating increase to 1 mg weekly for 4 weeks, then if tolerating increase to 1.7 mg weekly for 4 weeks, then if tolerating increase to 2.4 mg weekly thereafter.    -Each Wegovy pen is a once weekly single-dose prefilled pen with a pen injector already built within the pen. Discard the Wegovy pen after use in sharps container.     2. Storage: make sure that when you get the prescription that you store the prescription in the refrigerator until it is time to use the Wegovy pen.  Once it is time to use the Wegovy pen, you can keep the pen at room temperature and it is good for up to 28 days at room temperature.     3.  Potential common side effects: nausea, headache, diarrhea, stomach upset.  If these become unmanageable or concerning symptoms, please make sure to call or mychart.      Go to site: Wegovy video to learn more and watch instruction videos.      For any questions or concerns please send a [email or web address] message to our team or call our weight management call center at [phone] during regular business hours. For questions during evenings or weekends your messages will be addressed during the next business day.  For emergencies please call 911 or seek immediate medical care.     Pre-Bariatric Surgery Psychological Assessment Providers    When you set up the appointment, ask for a pre-bariatric surgery evaluation and MMPI testing. Have report faxed to our  office at [phone]. They may be booked several weeks in advance, plan ahead. Check with your insurance to see if the psychologist is covered, if not, ask your insurance company for a referral.    Check for a virtual visit.    [facility]  Rama Mann, PhD,                                  [phone]    Community Providers  Martinez Boyd, PhD,    [phone]  [place]  Laci GillyD,   [phone]  [facility]  Praveena Alvarez, PsyD,   [phone]   Wesley Garrett, PhD,    [phone]  [place]  Vee Gipson, LaciyD, [facility],    [phone]  [facility]  Oli Gallegos PsyD,,[facility]  [phone]  Blooddavid Abebe, PhD,   [phone]  [facility]  Donald Araujo PsyD,   [phone]  [facility] (in person)      [facility] Counseling Centers - Referral needed   Available for pre-bariatric surgery health assessments and pre- and post- therapy.                                                                            [phone]  Martinez Poe, PhD Gilberto Ricks, PhD Katarina Ruggiero PsyD [place] / Bereket Grossman PsyD,       Jael Jimenez, PhD  [place]  Tammi Denise, PhD, [facility]                                    Call updates to TREVA Vasquez     [phone] ( leave a message)  Last updated: 04/02/2020, 02/25/21, 3/25/21,7/27/21, 11/03/21, 5/3/22, 9/1/22

## 2023-01-23 NOTE — LETTER
1/23/2023         RE: Glenna Spears  [address]  [place] [address]        Dear Colleague,    Thank you for referring your patient, Glenna Spears, to the [facility]PL[facility]. Please see a copy of my visit note below.    Transplant Surgery Consult Note    Medical record number: [id number]  YOB: 1990,       Assessment and Recommendations:   Ms. Spears was referred here for kidney evaluation. The following issues should be addressed:   - BMI 48: we agree with the referral and recommendations from the weight management team. She was referred for surgical weight loss. We agree that patient should work on weight loss before re-evaluation for kidney transplant.   - We strongly recommend patient to find a living donor due to her age  - Will need re-eval after she successfully completed the weight loss    Kutr Jacque Spears MD   Transplant Surgery Fellow     I have reviewed history, examined patient and discussed plan with the fellow/resident/LEWIS.  I concur with the findings in this note.    Risks of the surgical procedure including but not limited to the rare risk of mortality discussed in detail. Patient verbalized good understanding and had several pertinent questions which were answered satisfactorily.     Immunosuppressive regimen, management and long term risks discussed in detail.     Total time 30 min  Counseling time: 15 min                 ---------------------------------------------------------------------------------------------------    HPI: Ms. Spears has End stage renal failure due to hypertension. The patient has had diabetes for 7 years.    The patient is on dialysis. Started dialysis last 6/2022. Had a LUE AVF but matured well. Currently on dialysis via right chest CVC.   Has potential kidney donors:  No.  Interested in participation in paired exchange if a donor is willing: Yes     The patient has the following pertinent history:       No    Yes  Dialysis:    []       [x] via:       Blood Transfusion                  [x]      []  Number of units:   Most recently:  Pregnancy:    []      [x] Number: 2      Previous Transplant:  []      [x] Details:    Cancer    [x]      [] Comment:   Kidney stones   [x]      [] Comment:      Recurrent infections  [x]      []  Type:                  Bladder dysfunction  [x]      [] Cause:    Claudication   [x]      [] Distance:    Previous Amputation  [x]      [] Cause:     Chronic anticoagulation  [x]      [] Indication:  Synagogue  [x]      []     Past Medical History:   Diagnosis Date     Hypertension      No past surgical history on file.  No family history on file.  Social History     Socioeconomic History     Marital status: Single     Spouse name: Not on file     Number of children: Not on file     Years of education: Not on file     Highest education level: Not on file   Occupational History     Not on file   Tobacco Use     Smoking status: Every Day     Types: Cigarettes     Smokeless tobacco: Never     Tobacco comments:     in process of quitting, 1-2 cigs/day   Substance and Sexual Activity     Alcohol use: Not Currently     Drug use: Not Currently     Types: Marijuana     Sexual activity: Not on file   Other Topics Concern     Not on file   Social History Narrative     Not on file     Social Determinants of Health     Financial Resource Strain: Not on file   Food Insecurity: Not on file   Transportation Needs: Not on file   Physical Activity: Not on file   Stress: Not on file   Social Connections: Not on file   Intimate Partner Violence: Not on file   Housing Stability: Not on file           Allergies:   Allergies   Allergen Reactions     Sulfa Drugs Other (See Comments)     Reaction as an infant- unsure of what happened     Nsaids Other (See Comments)     Has nephrotic range proteinuria and see nephrology's note.        Medications:  Prescription Medications as of 1/23/2023       Rx Number Disp Refills Start End Last  Dispensed Date Next Fill Date Owning Pharmacy    acetaminophen (TYLENOL) 325 MG tablet            Sig: Take 325-650 mg by mouth every 4 hours as needed    Class: Historical    Route: Oral    albuterol (PROAIR HFA/PROVENTIL HFA/VENTOLIN HFA) 108 (90 Base) MCG/ACT inhaler    2022       Sig: Inhale 2 puffs into the lungs    Class: Historical    Route: Inhalation    amLODIPine (NORVASC) 10 MG tablet    2021        Sig: Take 1 tablet by mouth daily    Class: Historical    Route: Oral    calcitRIOL (ROCALTROL) 0.5 MCG capsule    2023        Class: Historical    cholecalciferol 25 MCG (1000 UT) TABS    2022        Sig: Take 2,000 Units by mouth    Class: Historical    Route: Oral    famotidine (PEPCID) 20 MG tablet    2022        Class: Historical    FEROSUL 325 (65 Fe) MG tablet    2022        Si tablet daily at 2 pm Port    Class: Historical    furosemide (LASIX) 40 MG tablet    2022        Sig: Take 1 tablet by mouth daily at 2 pm    Class: Historical    Route: Oral    labetalol (NORMODYNE) 200 MG tablet    4/15/2022        Sig: Take 200 mg by mouth 2 times daily    Class: Historical    Route: Oral    levonorgestrel (KYLEENA) 19.5 MG IUD    3/31/2022 3/30/2027       Si Intra Uterine Device by Intrauterine route    Class: Historical    Route: Intrauterine    lidocaine-prilocaine (EMLA) 2.5-2.5 % external cream    2023        Sig: APPLY SMALL AMOUNT TO ACCESS SITE (AVF) 1 HOUR BEFORE DIALYSIS. COVER WITH OCCLUSIVE DRESSING (SARAN WRAP)    Class: Historical    Semaglutide-Weight Management (WEGOVY) 0.25 MG/0.5ML SOAJ  2 mL 0 2023    [id number] - [place], [place] - [address]  AT [address] [place] [address] & [facility]    Sig: Inject 0.25 mg Subcutaneous once a week    Class: E-Prescribe    Route: Subcutaneous    Prior authorization: Waiting for Payer Response    This order has not been released to its destination.    Semaglutide-Weight  Management (WEGOVY) 0.5 MG/0.5ML SOAJ  2 mL 0 2023    [id number] - [place], [place] - [address] AT [address] [place] [address] & [facility]    Sig: Inject 0.5 mg Subcutaneous once a week    Class: E-Prescribe    Notes to Pharmacy: Start after completing 0.25mg weekly x 4 weeks.    Route: Subcutaneous    Prior authorization: Waiting for Payer Response    This order has not been released to its destination.    sodium bicarbonate 650 MG tablet    2022        Sig: Take 1,950 mg by mouth    Class: Historical    Route: Oral    varenicline (CHANTIX) 0.5 MG tablet    2023        Sig: Take 0.5 mg by mouth    Class: Historical    Route: Oral    VELPHORO 500 MG CHEW chewable tablet    1/10/2023        Sig: CRUSH OR CHEW AND SWALLOW 1 TABLET 3 TIMES A DAY WITH MEALS    Class: Historical          Exam:   Constitutional - A&O in NAD.   Eyes - no redness or discharge.  Sclera anicteric  Respiratory - no cough, no labored breathing  Musculoskeletal - range of motion normal  Skin - no discoloration, no jaundice  Neurological - no tremors.  No facial droop or dysarthria  Psychiatric - normal mood and affect  Abdomen - large central obesity,  scar          Again, thank you for allowing me to participate in the care of your patient.      Sincerely,    Domenico Garcia MD

## 2023-01-23 NOTE — Clinical Note
This is a pt I saw in transplant clinic who is interested in sleeve.  I gave her info today but can you mail her a packet please? I sent a message to the call center to set up RD and RN classes and visit with Dr Smith. Thanks

## 2023-01-23 NOTE — Clinical Note
NBS I saw in transplant clinic today.  She is interested in sleeve.  Sober from drugs >1 year.  Still smoking but working on quitting.  I'm going to have her follow up for RD and RN class and visit with Dr Smith.

## 2023-01-23 NOTE — Clinical Note
Follow up plan: RD class next available RN class next available Ibis 2 months return MWM video Dr Smith in 1 month video

## 2023-01-23 NOTE — PATIENT INSTRUCTIONS
Kidney Friendly Protein Supplements (lower in potassium and phosphorus)    Protein Bars  Atkins Bar, Atkins Lift Protein Bar, Balance Bar, Rob Builder's Protein Bar, Detour Brand (Lean Muscle Bar, Lower Sugar Bar, Simple Bar, Smart Bar), EAS Advantedge and Myoplex 30 Bars, Fit Amanda, Taylor Protein Bar, Marathon Energy and Protein Bars, Muscle Pharm Combat Crunch, Oh Yeah! One, Orgain Protein Bar, Power Bar Clean Whey Protein Bar, Power Crunch, Premier Protein Bar, Pure Protein Bar, Quest, Slim Fast Meal Replacement, Square Bar Organic Protein Bar, Total Lean Bar, Zone Perfect     Ready-to-Drink Products  Atkins Lift, Bariatric Advantage Meal Replacement Shake, Boost (Calorie Smart, Compact, Glucose Control, Simply Complete), Ensure High Protein, Ensure Light, Glucerna, LiquaCel (1 ounce), Muscle Milk Organic Protein Shake, Nature's Best Isopure Zero Carb Drink, Nepro, Novasource Renal, Orgain Vegan Nutritional Shake, Pro-Stat Sugar Free (1 ounce)    Protein Powders  Advantedge Lean 15, Boost Simply Complete, EAS Advantedge, Integrated Whey Isolate, Orgain, Pure Protein Natural Whey, Slim Fast High Protein Smoothie, Medardo's Whey Grass Fed Organic, Cee One, BeneProtein/[place]/Medardo's Whey (have unflavored varieties)

## 2023-01-23 NOTE — TELEPHONE ENCOUNTER
"Prior Authorization Retail Medication Request    Medication/Dose: Wegovy  ICD code (if different than what is on RX):  Class 3 severe obesity with serious comorbidity and body mass index (BMI) of 50.0 to 59.9 in adult, unspecified obesity type (H) [E66.01, Z68.43]  - Primary     Previously Tried and Failed:  History of diet and exercise  Rationale:  I had the pleasure of seeing your patient, Glenna Spears, to evaluate her obesity and discussed weight management treatment options.  As you know, Glenna Spears is 32 year old.  She has a height of 5' 9\", a weight of 330 lbs 0 oz, and calculated Body mass index is 48.73 kg/m ..  Full intake/assessment was done to determine barriers to weight loss success and develop a treatment plan.     HISTORY OF PRESENT ILLNESS:  BMI 48 needs to lose to BMI <35 for kidney transplant. Goal wt <230 lbs (100 lbs weight loss)  ESRD due to longstanding nephrotic range proteinuria and HTN,. Hx of chronic drug use, Excedrin use.  Sober >1 year.  On dialysis x 8 months  Nephrologist: Dr Zaid Mejia Cloud     She has had increase in hunger since starting dialysis June 2022  Diabetes: No     Tobacco use: 1/2 ppd working on quitting smoking.      Tried bupropion in the past but caused drowsiness and weight gain    CO-MORBIDITIES OF OBESITY INCLUDE:  Asthma, HTN, Vitamin D Deficiency    Insurance Name:    Insurance ID:        Pharmacy Information (if different than what is on RX)  Name:  [id number] - [place], [place] - [address] AT [address] [place] [address] & [facility] [address]  Phone:  [phone]  "

## 2023-01-24 ENCOUNTER — CARE COORDINATION (OUTPATIENT)
Dept: ENDOCRINOLOGY | Facility: CLINIC | Age: 33
End: 2023-01-24
Payer: COMMERCIAL

## 2023-01-24 DIAGNOSIS — E66.9 OBESITY: Primary | ICD-10-CM

## 2023-01-24 NOTE — PROGRESS NOTES
Bariatric Task List updated.  Bariatric information/clearance letters sent to patient via [employer].    Bariatric Task List    Fax:  Please fax all paperwork to: [phone] -     Status:  Is patient a candidate for bariatric surgery?:    -     Cleared to schedule surgeon consult?:    -     Status:  surgery evaluation in process -     Surgeon: Sarah -     Tentative surgery month/year: TBD -        Insurance: Insurance:  Blue Plus [place] -      Contact insurance to discuss coverage: Needed -       Cigna: PCP Recommendation and Medical Clearance:    -     HP Referral:    -      Advanced beneficiary notification (ABN) for Medicare patients for RD visits   and surgery:   -      Weight history:   -     Other:    -        Patient Info: Initial Weight:  330 -     Date of Initial Weight/Height:  1/23/2023 -     Goal Weight (lbs):  310 -     Required Weight Loss:  20 -     Surgery Type:  sleeve gastrectomy -     Multidisciplinary Meeting:    -        Dietician Visits: Structured weight loss required by insurance?:  structured weight loss required -     Dietician Visit 1:  Completed -     Dietician Visit 2:  Needed -     Dietician Visit 3:  Needed -     Dietician Visit 4:    -     Dietician Visit 5:    -     Dietician Visit 6:    -     Dietician Visit additional:  Needed -     Clearance from dietician to see surgeon?:    -     Dietician Notes:    -        Psychological Evaluation: Psych eval:  Needed -     Therapist letter of support:    -     Psychiatrist letter of support:    -     Establish care with therapist:    -     Complete eating disorder evaluation:    -     Letter of clearance from therapist/eating disorder program:    -     Other:    -        Lab Work: Complete Blood Count:  Needed - 1/23/23 RR   Comprehensive Metabolic Panel:  Needed - 1/23/23 RR   Vitamin D:  Needed - 1/23/23 RR   PTH:  Needed - 1/23/23 RR   Hgb A1c:  Needed - 1/23/23 RR    Lipids: Needed - 1/23/23 RR    TSH ([facility], [facility], [place]):   -       Ferritin:   " -       Folate:   -       Testosterone, Total and Free:   -     Thiamine:   -     Vitamin A:   -     Vitamin B12:   -     Zinc:   -     C-peptide:   -     H. pylori:    -     MRSA (2 swabs, minimum 48 hours apart):   -     Nicotine Testing:  Needed - Data deleted   Recheck Vitamin D:   -     Other:    -        Consults/ Clearance Sleep Medicine:    -     Cardiac:    -     Pain:   -     Dental:    -     Endocrine:    -     Gastroenterology:    -     Vascular Medicine:    -     Hematology:    -     Medical Weight Management:   -     Physical Therapy/Exercise:    -     Nephrology:  Needed - letter sent to patient RR   Neurology:    -     Pulmonology:    -     Rheumatology:    -     Other:    -     Other:    -     Other:    -        Testing: UGI:    -     EGD:    -     Sleep Study:   -     Other:   -     Other:    -        PCP: Establish care with PCP:  Completed -     Follow up with PCP:    -     PCP letter of support:  Needed - letter sent to patient RR      Stopping Smoking/ Alcohol Use: Quit tobacco use (3 months smoke free)?:  Needed - smoking cessation information sent RR   Quit date:    -     Quit alcohol use:   -     Quit date:   -     Other:   -     Quit date:   -        Patient Education:  Information Session:  Needed - 1/24/23 RR   Given \"Making your decision\" handout?:  Yes - 1/24/23 RR   Given \"A Roadmap to you Weight Loss Surgery\" handout?: Yes - 1/24/23 RR   Given \"Get Well Loop\" information?:   -     Given support group information?:    -     Attended support group?:  Needed -     Support plan in place?:  Completed -     Research consents signed?:    -     Avoid NSAIDS/ Alternate Plan for Pain:   -        Additional Surgery Requirements: Review Coag plan:    -     HgA1c <8:    -     Inpatient pain consult:    -     Final nicotine screen:    -     Dental work complete:    -     Birth control plan:    -     Gallstone prevention plan (Actigall for 6 months postop):   -     Other:   -     Other:   -      "   Final Tasks:  Before surgery online class:  Needed -     Before surgery online class website link:  [email or web address]   After surgery online class:  Needed -     After surgery online class website link:  [email or web address]   Nurse visit per clinic:  Needed -     History and Physical per clinic:   -     Final labs per clinic: Needed -     Chest xray per clinic:   -     Electrocardiogram (ECG) per clinic:   -     Other:   -        Notes:   -

## 2023-01-26 ENCOUNTER — TELEPHONE (OUTPATIENT)
Dept: ENDOCRINOLOGY | Facility: CLINIC | Age: 33
End: 2023-01-26
Payer: COMMERCIAL

## 2023-01-26 NOTE — TELEPHONE ENCOUNTER
Called patient to check in regarding New S Nutrition Class tomorrow.    Patient is still interested and plans to attend.     She confirmed that she received the email with information regarding class and will be on the video around 11:00 am.    MARISSA Rubin, RD, LD  Clinic #: [phone]

## 2023-01-27 ENCOUNTER — OFFICE VISIT (OUTPATIENT)
Dept: ENDOCRINOLOGY | Facility: CLINIC | Age: 33
End: 2023-01-27
Payer: COMMERCIAL

## 2023-01-27 DIAGNOSIS — Z71.3 NUTRITIONAL COUNSELING: Primary | ICD-10-CM

## 2023-01-27 DIAGNOSIS — Z99.2 ESRD (END STAGE RENAL DISEASE) ON DIALYSIS (H): ICD-10-CM

## 2023-01-27 DIAGNOSIS — E66.9 OBESITY: ICD-10-CM

## 2023-01-27 DIAGNOSIS — N18.6 ESRD (END STAGE RENAL DISEASE) ON DIALYSIS (H): ICD-10-CM

## 2023-01-27 PROCEDURE — 97802 MEDICAL NUTRITION INDIV IN: CPT

## 2023-01-27 NOTE — PROGRESS NOTES
"Glenna Spears is a 32 year old female who is being evaluated via a billable video visit.      The patient has been notified of following:     \"This video visit will be conducted via a call between you and your physician/provider. We have found that certain health care needs can be provided without the need for an in-person physical exam.  This service lets us provide the care you need with a video conversation.  If a prescription is necessary we can send it directly to your pharmacy.  If lab work is needed we can place an order for that and you can then stop by our lab to have the test done at a later time.    Video visits are billed at different rates depending on your insurance coverage.  Please reach out to your insurance provider with any questions.    If during the course of the call the physician/provider feels a video visit is not appropriate, you will not be charged for this service.\"    Patient has given verbal consent for Video visit? Yes  How would you like to obtain your AVS? MyChart  If you are dropped from the video visit, the video invite should be resent to: N/A for todays visit  Will anyone else be joining your video visit? No  {If patient encounters technical issues they should call [phone]      Video-Visit Details    Type of service:  Video Visit    Video Start Time: 11:00 am   Video End Time: 12:05 pm   Total time: 65 minutes    Originating Location (pt. Location): Home    Distant Location (provider location):  Offsite (providers home)     Platform used for Video Visit: Microsoft Teams    During this virtual visit the patient is located in [place], patient verifies this as the location during the entirety of this visit.     New Bariatric Nutrition Consultation Note    Reason For Visit: Nutrition Assessment    Glenna Spears is a 32 year old presenting today for new bariatric nutrition consult.   Pt is interested in laparoscopic sleeve gastrectomy with Dr. Smith.     This is pt's 1st required " "nutrition visits prior to surgery.   - Patient also saw transplant RD 1/23/23    Pt referred by Gustabo Pat January 2023.      CO-MORBIDITIES OF OBESITY INCLUDE:    No flowsheet data found.    SUPPORT:  No flowsheet data found.     Patient reports good support system at home    ANTHROPOMETRICS:  Weight 1/23/23: 330 lbs    Estimated body mass index is 48.73 kg/m  as calculated from the following:    Height as of 1/23/23: 1.753 m (5' 9\").    Weight as of 1/23/23: 149.7 kg (330 lb).     No flowsheet data found.    No flowsheet data found.    SUPPLEMENT INFORMATION:  Phos Binder - is chewable (Velphoro)   Renal labs - High Phos but otherwise looking good per pt    Iron monitored at dialysis - gets infused as needed    NUTRITION HISTORY:  NKFA  Some lactose intolerance     Patient met with Ibis Guardado PA-C and transplant RD 1/23/23. Patient is interested in pursuing bariatric surgery.    Attended nutrition class today, 1/27/23.     Patient will meet with this writer 1:1 next month for first individualized appointment related to surgery       Did not have much time to talk today but patient did share the following information during class.     Very little pop  Alcohol: None     Nicotine use: yes, quit day is tomorrow     Alcohol use: none   ? Hx of misuse per pt. Now turns her off to even think about it. Aware of risks after surgery and does not plan to drink again.     Additional information:  Works starting at 3 pm  Dialysis T/R/Sat     Barriers to lifestyle change: Did not assess today, attended Nutrition class. Will see RD 1:1 next month    No flowsheet data found.    No flowsheet data found.    No flowsheet data found.      EXERCISE:    No flowsheet data found.    NUTRITION DIAGNOSIS:  Obesity r/t long history of positive energy balance aeb BMI >30 kg/m2.    INTERVENTION:  Intervention Provided/Education Provided on post-op diet guidelines, vitamins/minerals essential post-operatively, GI anatomy of " bariatric surgeries, ways to help prepare for post-op diet guidelines pre-operatively, portion/calorie-control, mindful eating and sources of protein.  Patient demonstrates understanding.     Personal barriers to making and continuing required life changes have been identified, and strategies to overcome those barriers have been recommended AND family and social supports have been assessed and strategies to strengthen those supports have been recommended.    Provided pt with list of goals, RD contact information and resources listed below via [employer].       No flowsheet data found.    Expected Engagement: good    GOALS (will individualize next month - these ones pull in for today):  Relating To Eating:  - Eat slowly (20-30 minutes per meal), chewing foods well (25 chews per bite/applesauce consistency)  - Focus on eating smaller portion sizes at meals and snacks    Relating to beverages:  - Reduce caffeine/carbonation/calorie containing beverages  - Separate fluids from meals by 30 minutes before, during, and after eating  - Drink 48-64 ounces of fluid per day. Small, frequent sips between meals.    Relating to activity:  Increase activity as able    Protein Sources for Weight Loss  [email or web address]     Carbohydrates  [email or web address]     Mindful Eating  [email or web address]     Diet Guidelines after Weight Loss Surgery  [email or web address]     Seated Exercises for Arms and Legs (can be done before or after surgery)  [email or web address]    Post-op Diet Handouts:  Diet Guidelines after Weight-loss Surgery  [email or web address]     Your Stage 1 Diet: Clear Liquids  [email or web address]     Your Stage 2 Diet: Low-fat Full Liquids  [email or web address]     Your Stage 3 Diet: Pureed Foods  [email or web address]     Pureed Pleasures  [email or web address]    Your Stage 4 Diet: Soft  Foods  [email or web address]    Your Stage 5 Diet: Regular Foods  [email or web address]    Supplements after Sleeve Gastrectomy, Gastric Bypass or Single Anastomosis Duodenal Switch  [email or web address]://[email or web address]    Keeping Track of Fluids  [email or web address]    Follow up: Monday, Feb 27th at 11:00 am     Time spent with patient: 65 minutes.  MARISSA Gallegos, RD, LD

## 2023-01-27 NOTE — PATIENT INSTRUCTIONS
GOALS (will individualize next month - these ones pull in for today):  Relating To Eating:  - Eat slowly (20-30 minutes per meal), chewing foods well (25 chews per bite/applesauce consistency)  - Focus on eating smaller portion sizes at meals and snacks    Relating to beverages:  - Reduce caffeine/carbonation/calorie containing beverages  - Separate fluids from meals by 30 minutes before, during, and after eating  - Drink 48-64 ounces of fluid per day. Small, frequent sips between meals.    Relating to activity:  Increase activity as able    Protein Sources for Weight Loss  [email or web address]     Carbohydrates  [email or web address]     Mindful Eating  [email or web address]://[email or web address]     Diet Guidelines after Weight Loss Surgery  [email or web address]     Seated Exercises for Arms and Legs (can be done before or after surgery)  [email or web address]    Post-op Diet Handouts:  Diet Guidelines after Weight-loss Surgery  [email or web address]     Your Stage 1 Diet: Clear Liquids  [email or web address]     Your Stage 2 Diet: Low-fat Full Liquids  [email or web address]://[email or web address]     Your Stage 3 Diet: Pureed Foods  [email or web address]     Pureed Pleasures  [email or web address]://[employer]/[email or web address]    Your Stage 4 Diet: Soft Foods  [email or web address]://[email or web address]    Your Stage 5 Diet: Regular Foods  [email or web address]://[email or web address]    Supplements after Sleeve Gastrectomy, Gastric Bypass or Single Anastomosis Duodenal Switch  [email or web address]    Keeping Track of Fluids  [email or web address]    Follow up: Monday, Feb 27th at 11:00 am     MARISSA Rubin, RD, LD  Clinic #: [phone]

## 2023-02-01 ENCOUNTER — COMMITTEE REVIEW (OUTPATIENT)
Dept: TRANSPLANT | Facility: CLINIC | Age: 33
End: 2023-02-01
Payer: COMMERCIAL

## 2023-02-01 NOTE — COMMITTEE REVIEW
Abdominal Committee Review Note     Evaluation Date: 2/1/2023  Committee Review Date: 2/1/2023    Organ being evaluated for: Kidney    Transplant Phase: Evaluation  Transplant Status: Active    Transplant Coordinator: Ilana Dumont  Transplant Surgeon: Domenico Ruby     Referring Physician: Dr. Cruz Mar    Primary Diagnosis: ESRD on dialysis   Secondary Diagnosis:     Committee Review Members:  Deandre, Marisela Smith, ENRIQUE   Nephrology Aj Jim, APRN CNP, Spike Maddox MD, Kevin Aleman MD   Nurse Angela Huitron, TREVA   Pharmacy Kendall Lezn, [facility]    - Clinical [place], [facility], Zaira Muhammad, [facility]   Transplant Christine Youngblood, RN, Cynthia Mendiola, RN, Zaira Julio, TREVA, Kyara Vora, TREVA, lIana Dumont, RN, Ashley Gloria, RN, Yana Crabtree, RN   Transplant Surgery Melissa Sexton MD, MD       Transplant Eligibility: Irreversible chronic kidney disease treated w/dialysis or expected need for dialysis    Committee Review Decision: Approved    Relative Contraindications: None    Absolute Contraindications: None    Committee Chair Melissa Sexton MD verbally attested to the committee's decision.    Committee Discussion Details: Reviewed medical history and evaluation results to date. Noted BMI was 48.66 at last week's appts. Noted pt is already on dialysis. Determined goal BMI to proceed with kidney transplant is 40 per our new policy. Pt's weight will need to be 270 pounds to = BMI of 40. Recommend pt proceeds with weight loss effort, plan to schedule full eval when BMI is at 42-43 which equals weight of 290 pounds. Will not list at this time, pt is already on dialysis.

## 2023-02-07 ENCOUNTER — DOCUMENTATION ONLY (OUTPATIENT)
Dept: ENDOCRINOLOGY | Facility: CLINIC | Age: 33
End: 2023-02-07

## 2023-02-07 ENCOUNTER — ALLIED HEALTH/NURSE VISIT (OUTPATIENT)
Dept: ENDOCRINOLOGY | Facility: CLINIC | Age: 33
End: 2023-02-07
Payer: COMMERCIAL

## 2023-02-07 DIAGNOSIS — E66.01 MORBID OBESITY (H): Primary | ICD-10-CM

## 2023-02-07 PROCEDURE — 99207 PR NO CHARGE NURSE ONLY: CPT

## 2023-02-07 NOTE — PROGRESS NOTES
New Bariatric Patient Class    Glenna INA Tory attended the New Consult WLS class today.     Patient's current comorbidities include:  Patient Active Problem List   Diagnosis     Moderate asthma     Hypovitaminosis D       Current Outpatient Medications   Medication Sig Dispense Refill     acetaminophen (TYLENOL) 325 MG tablet Take 325-650 mg by mouth every 4 hours as needed       albuterol (PROAIR HFA/PROVENTIL HFA/VENTOLIN HFA) 108 (90 Base) MCG/ACT inhaler Inhale 2 puffs into the lungs       amLODIPine (NORVASC) 10 MG tablet Take 1 tablet by mouth daily       calcitRIOL (ROCALTROL) 0.5 MCG capsule        cholecalciferol 25 MCG (1000 UT) TABS Take 2,000 Units by mouth       famotidine (PEPCID) 20 MG tablet        FEROSUL 325 (65 Fe) MG tablet 1 tablet daily at 2 pm Port       furosemide (LASIX) 40 MG tablet Take 1 tablet by mouth daily at 2 pm       labetalol (NORMODYNE) 200 MG tablet Take 200 mg by mouth 2 times daily       levonorgestrel (KYLEENA) 19.5 MG IUD 1 Intra Uterine Device by Intrauterine route       lidocaine-prilocaine (EMLA) 2.5-2.5 % external cream APPLY SMALL AMOUNT TO ACCESS SITE (AVF) 1 HOUR BEFORE DIALYSIS. COVER WITH OCCLUSIVE DRESSING (SARAN WRAP)       Semaglutide-Weight Management (WEGOVY) 0.25 MG/0.5ML SOAJ Inject 0.25 mg Subcutaneous once a week 2 mL 0     [START ON 2/23/2023] Semaglutide-Weight Management (WEGOVY) 0.5 MG/0.5ML SOAJ Inject 0.5 mg Subcutaneous once a week 2 mL 0     sodium bicarbonate 650 MG tablet Take 1,950 mg by mouth       varenicline (CHANTIX) 0.5 MG tablet Take 0.5 mg by mouth       VELPHORO 500 MG CHEW chewable tablet CRUSH OR CHEW AND SWALLOW 1 TABLET 3 TIMES A DAY WITH MEALS         The following items were reviewed and questions answered.  1. Goal weight  2. Labs  3. Psych clearance  4. Specialty Clearances  5. Task list  6. Preop diet and exercise changes  7. Postop diet requirements  8. Postop medication requirements  9. Postop exercise  10. Postop lifetime  behavior and diet changes    Patient will continue to meet with LEWIS, Dietician and Surgeon as required preoperatively.    All questions answered.  Patient instructed to contact the office for any questions and to notify office of any updates/clearances completed.    Bariatric Task List    Fax:  Please fax all paperwork to: [phone] -     Status:  Is patient a candidate for bariatric surgery?:    -     Cleared to schedule surgeon consult?:    -     Status:  surgery evaluation in process -     Surgeon: Sarah -     Tentative surgery month/year: TBD -        Insurance: Insurance:  Blue Plus [place] -      Contact insurance to discuss coverage: Needed -       Cigna: PCP Recommendation and Medical Clearance:    -     HP Referral:    -      Advanced beneficiary notification (ABN) for Medicare patients for RD visits   and surgery:   -      Weight history:   -     Other:    -        Patient Info: Initial Weight:  330 -     Date of Initial Weight/Height:  1/23/2023 -     Goal Weight (lbs):  310 -     Required Weight Loss:  20 -     Surgery Type:  sleeve gastrectomy -     Multidisciplinary Meeting:    -        Dietician Visits: Structured weight loss required by insurance?:  structured weight loss required -     Dietician Visit 1:  Completed -     Dietician Visit 2:  Needed -     Dietician Visit 3:  Needed -     Dietician Visit 4:    -     Dietician Visit 5:    -     Dietician Visit 6:    -     Dietician Visit additional:  Needed -     Clearance from dietician to see surgeon?:    -     Dietician Notes:    -        Psychological Evaluation: Psych eval:  Needed -     Therapist letter of support:    -     Psychiatrist letter of support:    -     Establish care with therapist:    -     Complete eating disorder evaluation:    -     Letter of clearance from therapist/eating disorder program:    -     Other:    -        Lab Work: Complete Blood Count:  Needed - 1/23/23 RR   Comprehensive Metabolic Panel:  Needed - 1/23/23 RR  "  Vitamin D:  Needed - 1/23/23 RR   PTH:  Needed - 1/23/23 RR   Hgb A1c:  Needed - 1/23/23 RR    Lipids: Needed - 1/23/23 RR    TSH (AMPARO, MATTHEW, [place] MA):   -       Ferritin:   -       Folate:   -       Testosterone, Total and Free:   -     Thiamine:   -     Vitamin A:   -     Vitamin B12:   -     Zinc:   -     C-peptide:   -     H. pylori:    -     MRSA (2 swabs, minimum 48 hours apart):   -     Nicotine Testing:  Needed - Data deleted   Recheck Vitamin D:   -     Other:    -        Consults/ Clearance Sleep Medicine:    -     Cardiac:    -     Pain:   -     Dental:    -     Endocrine:    -     Gastroenterology:    -     Vascular Medicine:    -     Hematology:    -     Medical Weight Management:   -     Physical Therapy/Exercise:    -     Nephrology:  Needed - letter sent to patient RR   Neurology:    -     Pulmonology:    -     Rheumatology:    -     Other:    -     Other:    -     Other:    -        Testing: UGI:    -     EGD:    -     Sleep Study:   -     Other:   -     Other:    -        PCP: Establish care with PCP:  Completed -     Follow up with PCP:    -     PCP letter of support:  Needed - letter sent to patient RR      Stopping Smoking/ Alcohol Use: Quit tobacco use (3 months smoke free)?:  Needed - smoking cessation information sent RR   Quit date:    -     Quit alcohol use:   -     Quit date:   -     Other:   -     Quit date:   -        Patient Education:  Information Session:  Needed - 1/24/23 RR   Given \"Making your decision\" handout?:  Yes - 1/24/23 RR   Given \"A Roadmap to you Weight Loss Surgery\" handout?: Yes - 1/24/23 RR   Given \"Get Well Loop\" information?:   -     Given support group information?:    -     Attended support group?:  Needed -     Support plan in place?:  Completed -     Research consents signed?:    -     Avoid NSAIDS/ Alternate Plan for Pain:   -        Additional Surgery Requirements: Review Coag plan:    -     HgA1c <8:    -     Inpatient pain consult:    -     Final nicotine " screen:    -     Dental work complete:    -     Birth control plan:    -     Gallstone prevention plan (Actigall for 6 months postop):   -     Other:   -     Other:   -        Final Tasks:  Before surgery online class:  Needed -     Before surgery online class website link:  [email or web address]   After surgery online class:  Needed -     After surgery online class website link:  [email or web address]   Nurse visit per clinic:  Needed -     History and Physical per clinic:   -     Final labs per clinic: Needed -     Chest xray per clinic:   -     Electrocardiogram (ECG) per clinic:   -     Other:   -        Notes:   -

## 2023-02-09 ENCOUNTER — TELEPHONE (OUTPATIENT)
Dept: TRANSPLANT | Facility: CLINIC | Age: 33
End: 2023-02-09
Payer: COMMERCIAL

## 2023-02-09 NOTE — LETTER
02/09/23        Glenna Spears  [address]  [place] [address]        Dear Glenna,    It was a pleasure to see you recently for consideration of kidney transplantation. Your pre-transplant evaluation results were reviewed at our Multidisciplinary Selection Committee 02/01/2023. The Committee is requesting you complete the following items at this time.     1. Continued weight loss efforts:      - Weight of 290 pounds ( BMI 42-43 ) will schedule full pre kidney transplant evaluation     - Goal weight for kidney transplant is 270 pounds ( BMI 40 )     Please call me and let me know as soon as your weight is at 290 pounds. I will schedule your full pre transplant evaluation at that time.     You have not been added onto the kidney transplant waitlist at this time because you are already on dialysis. In the future when you are added onto the waitlist, your wait time start date will be the same as the start date of your chronic dialysis.     For any questions, please contact myself at  the Transplant Office Main Number at [phone] or my Direct Number at [phone].      Sincerely,  Ialna Dumont, RN, BSN  Pre Kidney Pancreas Transplant Coordinator   [facility]  Solid Organ Transplant Care   [address] [place] [address]  [address] [address]  [email or web address]   Office Number: [phone] Direct Number: [phone]   Fax Number: [phone]  Employed by [employer]     CC's: Dr. Cruz Mar, [facility] [place], Dr. Yaya Solares

## 2023-02-09 NOTE — TELEPHONE ENCOUNTER
Contacted patient to review outcome of selection committee meeting (See selection committee encounter).   Explained to patient that he/she needs to complete all components of the evaluation to be eligible for active status on the waiting list or to proceed with a live donor kidney transplant.   Reviewed next steps based on outcomes: Pt to continue weight loss effort. Pt to call me when BMI is 42-43 ( wt 290 pounds ) as I will then schedule full pre kidney transplant evaluation.  Pt understands goal BMI is 40 for proceeding with kidney transplant, weight = 270 pounds.   Patient will not be listed (patient is on dialysis and evaluation is not complete), patient will receive:    - An Evaluation Summary Letter indicating what is needed to complete evaluation-discussed with patient if they would like to have testing done with [facility] or locally  Confirmed with patient that on successful completion of outstanding components, patient is eligible for active status and they will receive a follow-up call.   Confirmed that patient has contact information for additional questions or concerns. Pt expressed excellent understanding of all and was in good agreement with the plan.   Generated Transplant Evaluation Summary Letter today in Epic - electronically routed to pt/ providers.      Dear ,    I had the pleasure of seeing Geo Hicks at the Pediatric Neurology clinic, [facility].           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 Neuro exam is notable for significant cerebellar ataxia, bulbar weakness and right sided sensory loss to all modalities. Will see him back in 6 months to monitor neurologic status.                 History of Present Illness:     Geo is here with his father. Since the last encounter on 6/13/17, there has not been notable change in his neurologic status. Geo is 12 th grade, participates in school full day except weekly visit to Oncology clinic for research trial. He was admitted for vEEG to characterize his spells of wandering around at night. EEG did not capture the event, did not reveal any epileptiform discharges. Geo continues to have hard time falling asleep, takes 3 mg of melatonin. He sleeps through the night.             Past Medical History:     Past Medical History:   Diagnosis Date     Cranial nerve dysfunction      Dyspepsia      Ependymoma (H)      Gastro-oesophageal reflux disease      Hearing loss      Intracranial hemorrhage (H)      Migraine      Pilonidal cyst     7-2015     Reduced vision      Refractory obstruction of nasal airway     2nd to nasal valve prolapse     Sleep apnea      Strabismus     gaze palsy            Past Surgical History:     Past Surgical History:   Procedure Laterality Date     GRAFT CARTILAGE FROM POSTERIOR AURICLE Left 10/6/2016    Procedure: GRAFT CARTILAGE FROM POSTERIOR AURICLE;  Surgeon: Tyler Richards MD;  [facility]     INCISION AND DRAINAGE PERINEAL, COMBINED Bilateral 7/18/2015    Procedure: COMBINED INCISION AND DRAINAGE PERINEAL;  Surgeon: Dequan Timmons MD;  [facility]     OPTICAL TRACKING SYSTEM CRANIOTOMY,  EXCISE TUMOR, COMBINED N/A 4/13/2015    Procedure: COMBINED OPTICAL TRACKING SYSTEM CRANIOTOMY, EXCISE TUMOR;  Surgeon: Francis Velazquez MD;  [facility]     OPTICAL TRACKING SYSTEM CRANIOTOMY, EXCISE TUMOR, COMBINED N/A 4/16/2015    Procedure: COMBINED OPTICAL TRACKING SYSTEM CRANIOTOMY, EXCISE TUMOR;  Surgeon: Francis Velazquez MD;  [facility]     OPTICAL TRACKING SYSTEM CRANIOTOMY, EXCISE TUMOR, COMBINED Bilateral 5/28/2015    Procedure: COMBINED OPTICAL TRACKING SYSTEM CRANIOTOMY, EXCISE TUMOR;  Surgeon: Francis Velazquez MD;  [facility]     OPTICAL TRACKING SYSTEM CRANIOTOMY, EXCISE TUMOR, COMBINED Bilateral 1/14/2016    Procedure: COMBINED OPTICAL TRACKING SYSTEM CRANIOTOMY, EXCISE TUMOR;  Surgeon: Francis Velazquez MD;  [facility]     OPTICAL TRACKING SYSTEM VENTRICULOSTOMY  4/16/2015    Procedure: OPTICAL TRACKING SYSTEM VENTRICULOSTOMY;  Surgeon: Francis Velazquez MD;  [facility]     REMOVE PORT VASCULAR ACCESS N/A 10/6/2016    Procedure: REMOVE PORT VASCULAR ACCESS;  Surgeon: Bruno Perea MD;  [facility]     RHINOPLASTY N/A 10/6/2016    Procedure: RHINOPLASTY;  Surgeon: Tyler Richards MD;  [facility]     VASCULAR SURGERY  5-2015    single lumen power port            Family History:     Family History   Problem Relation Age of Onset     Circulatory Father      PE/DVT     Hypothyroidism Father 30     DIABETES Maternal Grandmother      DIABETES Paternal Grandmother      DIABETES Paternal Grandfather      C.A.D. Paternal Grandfather      Hypertension Maternal Grandfather      Thyroid Disease Paternal Aunt      unknown whether hypo or hyper           Allergies:     Allergies   Allergen Reactions     Blood Transfusion Related (Informational Only) Swelling     Periorbital swelling post platelet transfusion     No Known Drug Allergies            Medications:     Current Outpatient Prescriptions   Medication     potassium phosphate,  "monobasic, (K-PHOS) 500 MG tablet     methylphenidate (RITALIN) 20 MG tablet     vitamin E (GNP VITAMIN E) 400 UNIT capsule     dexamethasone (DECADRON) 0.75 MG tablet     dexamethasone (DECADRON) 0.5 MG tablet     azithromycin (ZITHROMAX) 250 MG tablet     methylphenidate (METADATE CD) 10 MG CR capsule     melatonin 3 MG tablet     fexofenadine (ALLEGRA) 180 MG tablet     mupirocin (BACTROBAN) 2 % ointment     fluticasone (FLONASE) 50 MCG/ACT spray     pentoxifylline (TRENTAL) 400 MG CR tablet     sulfamethoxazole-trimethoprim (BACTRIM/SEPTRA) 400-80 MG per tablet     omeprazole (PRILOSEC) 20 MG CR capsule     calcium carbonate-vitamin D 600-400 MG-UNIT CHEW     Cholecalciferol 400 UNITS CHEW     polyethylene glycol (MIRALAX/GLYCOLAX) packet     study - entinostat (IDS# 5050) 1 mg tablet     study - entinostat (IDS# 5050) 5 mg tablet     No current facility-administered medications for this visit.              Physical Exam:   /79 (BP Location: Right arm, Patient Position: Chair, Cuff Size: Adult Regular)  Pulse 86  Ht 1.793 m (5' 10.59\")  Wt 73.6 kg (162 lb 4.1 oz)  BMI 22.89 kg/m2  General appearance: Sitting on wheel chair wearing left eye patch     Neurologic:  Mental Status: awake, alert, dysarthric speech, answers to questions appropriately   CN II-XII: Pupils 2 mm in ambient light, dilates to 4 mm in darkness, constricts bilaterally, eyes unconjugated, both eyes limited in ab/adduction more prominent on the left eye, facial sensation intact bilaterally, facial weakness upper and lower, hearing intact, SCM/trapezius strong bilaterally, tongue protrudes midline, uvula elevates symmetrically   Motor: Strength in upper and lower extremities 5/5 proximal and distally and symmetric. Trunk titubation, neck dystonia   Sensation: reduced sensation in right upper and lower extremity to pinprick, can discern vibration vs soft touch in the right side of his body   Coordination: Significant dysmetria on FTN "   Gait: Ataxic, not able to do independent walking   Reflexes: 2+ and symmetric, Babinski positive on the right/ambighous on the left          Data:   Reviewed medical record    CC  Copy to patient  Geo Hicks

## 2023-02-27 ENCOUNTER — VIRTUAL VISIT (OUTPATIENT)
Dept: ENDOCRINOLOGY | Facility: CLINIC | Age: 33
End: 2023-02-27
Payer: COMMERCIAL

## 2023-02-27 DIAGNOSIS — N18.6 ESRD (END STAGE RENAL DISEASE) ON DIALYSIS (H): ICD-10-CM

## 2023-02-27 DIAGNOSIS — E66.9 OBESITY: ICD-10-CM

## 2023-02-27 DIAGNOSIS — Z99.2 ESRD (END STAGE RENAL DISEASE) ON DIALYSIS (H): ICD-10-CM

## 2023-02-27 DIAGNOSIS — Z71.3 NUTRITIONAL COUNSELING: Primary | ICD-10-CM

## 2023-02-27 PROCEDURE — 97803 MED NUTRITION INDIV SUBSEQ: CPT | Mod: VID | Performed by: DIETITIAN, REGISTERED

## 2023-02-27 RX ORDER — CINACALCET 90 MG/1
90 TABLET, FILM COATED ORAL EVERY EVENING
COMMUNITY

## 2023-02-27 NOTE — LETTER
"2/27/2023       RE: Glenna Spears  [address]  [place] [address]     Dear Colleague,    Thank you for referring your patient, Glenna Spears, to the [facility]. Please see a copy of my visit note below.    Glenna Spears is a 32 year old female who is being evaluated via a billable video visit.      The patient has been notified of following:     \"This video visit will be conducted via a call between you and your physician/provider. We have found that certain health care needs can be provided without the need for an in-person physical exam.  This service lets us provide the care you need with a video conversation.  If a prescription is necessary we can send it directly to your pharmacy.  If lab work is needed we can place an order for that and you can then stop by our lab to have the test done at a later time.    Video visits are billed at different rates depending on your insurance coverage.  Please reach out to your insurance provider with any questions.    If during the course of the call the physician/provider feels a video visit is not appropriate, you will not be charged for this service.\"    Patient has given verbal consent for Video visit? Yes  How would you like to obtain your AVS? MyChart  If you are dropped from the video visit, the video invite should be resent to: N/A for todays visit  Will anyone else be joining your video visit? No  {If patient encounters technical issues they should call [phone]      Video-Visit Details    Type of service:  Video Visit    Video Start Time: 11:06 am  Video End Time: 11:34 am    Originating Location (pt. Location): Home    Distant Location (provider location):  Offsite (providers home)     Platform used for Video Visit: DockPHP    During this virtual visit the patient is located in [place], patient verifies this as the location during the entirety of this " "visit.      Bariatric Nutrition Consultation Note    Reason For Visit: Nutrition Assessment    Glenna Spears is a 32 year old presenting today for bariatric nutrition consult.   Pt is interested in laparoscopic sleeve gastrectomy with Dr. Smith.     This is pt's 3rd required nutrition visits prior to surgery.   - Patient also saw transplant RD 1/23/23    Pt referred by Gustabo Pat January 2023.    Coordination note:   Needs baseline labs  Needs Psych eval - in progress  Needs PCP letter of support  20 lb weight loss from initial  Surgeon meet and greet  Obtain clearances     CO-MORBIDITIES OF OBESITY INCLUDE:    No flowsheet data found.    SUPPORT:  No flowsheet data found.     Patient reports good support system at home    ANTHROPOMETRICS:  Weight 1/23/23: 330 lbs    Estimated body mass index is 48.73 kg/m  as calculated from the following:    Height as of 1/23/23: 1.753 m (5' 9\").    Weight as of 1/23/23: 149.7 kg (330 lb).     Current weight: ~323-327 lbs, pt report      No flowsheet data found.    No flowsheet data found.    SUPPLEMENT INFORMATION:  Phos Binder - is chewable (Velphoro)   Renal labs - High Phos but otherwise looking good per pt    Iron monitored at dialysis - gets infused as needed    Labs 2/8/23  Vit A high at 92.2  PTH, Intact high at 1,398.1 (coming down per pt - was really high in November, coming down monthly since)  TG elevated   A1C WNL  GFR 5    Labs 2/16/23 - per pt from [facility]  Albumin 4.2  En CPR 1.02  Pot 4.4   Calcium 9.9  Phos high at 8.8, was at 11.5 - previous phos binder was not helping per pt  PTH, Intact high  Hgb low 10.9   Glucose 75  Creatinine 10.98    NUTRITION HISTORY:  NKFA  Some lactose intolerance     Patient met with Ibis Guardado PA-C and transplant RD 1/23/23. Patient is interested in pursuing bariatric surgery.    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  Breakfast Not typical   Lunch 11a-12p: [employer] BF bowls with 2 pieces toast    Dinner Salad " "(6 pm- at work) w/ cheese, croutons, ranch + Greek yogurt    Snacks After work (11p-12a)- 1/2 frozen pizza, can of chili; partner may cook- spaghetti, chicken & veggies (40% meals made at home); reports she \"wants to cut that meal out\"     Snacks- when at home but did not disclose what types    Beverages Juice (8 oz or less/d), water, coffee (w/ creamer- reports not taking binder w/ this), was drinking more pop but less appealing w/ dialysis (8 oz or less/day- Mt Dew, but some Coke/Pepsi)   Alcohol None    Dining out 1x/week      Attended nutrition class today, 1/27/23.     Patient will meet with this writer 1:1 next month for first individualized appointment related to surgery     Did not have much time to talk today but patient did share the following information during class.     Very little pop  Alcohol: None     Nicotine use: yes, quit day is tomorrow     Alcohol use: none   ? Hx of misuse per pt. Now turns her off to even think about it. Aware of risks after surgery and does not plan to drink again.     Feb 2023:  Reviewed recent labs - monitoring with dialysis team. PTH trending down.    Reports she knows what to eat but is a \"food addict\" which makes it hard.    Trying to slow down - harder if watching TV. Finds she gets distracted and then will \"scarf food down\"    Current fluid restriction - 32 oz/day. Has been chewing on ice. Not  fluids from meal time yet.     Quit smoking a month ago - on one chantix per day currently.     Barriers to lifestyle change: more sugar cravings for food since quitting smoking    Additional information:  Works starting at 3 pm  Dialysis T/R/Sat     No flowsheet data found.    No flowsheet data found.    No flowsheet data found.    EXERCISE:    No flowsheet data found.    NUTRITION DIAGNOSIS:  Obesity r/t long history of positive energy balance aeb BMI >30 kg/m2.    INTERVENTION:  Intervention Provided/Education Provided on post-op diet guidelines, vitamins/minerals " essential post-operatively, GI anatomy of bariatric surgeries, ways to help prepare for post-op diet guidelines pre-operatively, portion/calorie-control, mindful eating and sources of protein.  Patient demonstrates understanding.     Personal barriers to making and continuing required life changes have been identified, and strategies to overcome those barriers have been recommended AND family and social supports have been assessed and strategies to strengthen those supports have been recommended.    Provided pt with list of goals, RD contact information and resources listed below via Cy[employer].       No flowsheet data found.    Expected Engagement: good    GOALS:  1. Decrease dairy consumption per pt. Aim to replace cheese with a different protein source such as chicken    Relating To Eating:  - Eat slowly (20-30 minutes per meal), chewing foods well (25 chews per bite/applesauce consistency)  - Focus on eating smaller portion sizes at meals and snacks    Relating to beverages:  - Reduce caffeine/carbonation/calorie containing beverages  - Separate fluids from meals by 30 minutes before, during, and after eating  - Drink 48-64 ounces of fluid per day. Small, frequent sips between meals.    Relating to activity:  Increase activity as able    Protein Sources for Weight Loss  [email or web address]     Carbohydrates  [email or web address]     Mindful Eating  [email or web address]     Diet Guidelines after Weight Loss Surgery  [email or web address]     Seated Exercises for Arms and Legs (can be done before or after surgery)  [email or web address]    Post-op Diet Handouts:  Diet Guidelines after Weight-loss Surgery  [email or web address]     Your Stage 1 Diet: Clear Liquids  [email or web address]     Your Stage 2 Diet: Low-fat Full Liquids  [email or web address]     Your Stage 3 Diet: Pureed Foods  [email or web address]     Pureed  Pleasures  [email or web address]    Your Stage 4 Diet: Soft Foods  [email or web address]    Your Stage 5 Diet: Regular Foods  [email or web address]    Supplements after Sleeve Gastrectomy, Gastric Bypass or Single Anastomosis Duodenal Switch  [email or web address]    Keeping Track of Fluids  [email or web address]    Follow up: Wednesday, March 29th at 11:30 am    Time spent with patient: 28 minutes.  MARISSA Gallegos, RD, LD            Again, thank you for allowing me to participate in the care of your patient.      Sincerely,    Sydnee Street RD

## 2023-02-27 NOTE — PROGRESS NOTES
"Glenna Spears is a 32 year old female who is being evaluated via a billable video visit.      The patient has been notified of following:     \"This video visit will be conducted via a call between you and your physician/provider. We have found that certain health care needs can be provided without the need for an in-person physical exam.  This service lets us provide the care you need with a video conversation.  If a prescription is necessary we can send it directly to your pharmacy.  If lab work is needed we can place an order for that and you can then stop by our lab to have the test done at a later time.    Video visits are billed at different rates depending on your insurance coverage.  Please reach out to your insurance provider with any questions.    If during the course of the call the physician/provider feels a video visit is not appropriate, you will not be charged for this service.\"    Patient has given verbal consent for Video visit? Yes  How would you like to obtain your AVS? MyChart  If you are dropped from the video visit, the video invite should be resent to: N/A for todays visit  Will anyone else be joining your video visit? No  {If patient encounters technical issues they should call [phone]      Video-Visit Details    Type of service:  Video Visit    Video Start Time: 11:06 am  Video End Time: 11:34 am    Originating Location (pt. Location): Home    Distant Location (provider location):  Offsite (providers home)     Platform used for Video Visit: [employer]    During this virtual visit the patient is located in [place], patient verifies this as the location during the entirety of this visit.      Bariatric Nutrition Consultation Note    Reason For Visit: Nutrition Assessment    Glenna Spears is a 32 year old presenting today for bariatric nutrition consult.   Pt is interested in laparoscopic sleeve gastrectomy with Dr. Smith.     This is pt's 3rd required nutrition visits prior to surgery.   - Patient " "also saw transplant RD 1/23/23    Pt referred by Gustabo Pat January 2023.    Coordination note:   Needs baseline labs  Needs Psych eval - in progress  Needs PCP letter of support  20 lb weight loss from initial  Surgeon meet and greet  Obtain clearances     CO-MORBIDITIES OF OBESITY INCLUDE:    No flowsheet data found.    SUPPORT:  No flowsheet data found.     Patient reports good support system at home    ANTHROPOMETRICS:  Weight 1/23/23: 330 lbs    Estimated body mass index is 48.73 kg/m  as calculated from the following:    Height as of 1/23/23: 1.753 m (5' 9\").    Weight as of 1/23/23: 149.7 kg (330 lb).     Current weight: ~323-327 lbs, pt report      No flowsheet data found.    No flowsheet data found.    SUPPLEMENT INFORMATION:  Phos Binder - is chewable (Velphoro)   Renal labs - High Phos but otherwise looking good per pt    Iron monitored at dialysis - gets infused as needed    Labs 2/8/23  Vit A high at 92.2  PTH, Intact high at 1,398.1 (coming down per pt - was really high in November, coming down monthly since)  TG elevated   A1C WNL  GFR 5    Labs 2/16/23 - per pt from [facility]  Albumin 4.2  En CPR 1.02  Pot 4.4   Calcium 9.9  Phos high at 8.8, was at 11.5 - previous phos binder was not helping per pt  PTH, Intact high  Hgb low 10.9   Glucose 75  Creatinine 10.98    NUTRITION HISTORY:  NKFA  Some lactose intolerance     Patient met with Ibis Guardado PA-C and transplant RD 1/23/23. Patient is interested in pursuing bariatric surgery.    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  Breakfast Not typical   Lunch 11a-12p: Joseph Call BF bowls with 2 pieces toast    Dinner Salad (6 pm- at work) w/ cheese, croutons, ranch + Greek yogurt    Snacks After work (11p-12a)- 1/2 frozen pizza, can of chili; partner may cook- spaghetti, chicken & veggies (40% meals made at home); reports she \"wants to cut that meal out\"     Snacks- when at home but did not disclose what types    Beverages Juice (8 oz or " "less/d), water, coffee (w/ creamer- reports not taking binder w/ this), was drinking more pop but less appealing w/ dialysis (8 oz or less/day- Mt Dew, but some Coke/Pepsi)   Alcohol None    Dining out 1x/week      Attended nutrition class today, 1/27/23.     Patient will meet with this writer 1:1 next month for first individualized appointment related to surgery     Did not have much time to talk today but patient did share the following information during class.     Very little pop  Alcohol: None     Nicotine use: yes, quit day is tomorrow     Alcohol use: none   ? Hx of misuse per pt. Now turns her off to even think about it. Aware of risks after surgery and does not plan to drink again.     Feb 2023:  Reviewed recent labs - monitoring with dialysis team. PTH trending down.    Reports she knows what to eat but is a \"food addict\" which makes it hard.    Trying to slow down - harder if watching TV. Finds she gets distracted and then will \"scarf food down\"    Current fluid restriction - 32 oz/day. Has been chewing on ice. Not  fluids from meal time yet.     Quit smoking a month ago - on one chantix per day currently.     Barriers to lifestyle change: more sugar cravings for food since quitting smoking    Additional information:  Works starting at 3 pm  Dialysis T/R/Sat     No flowsheet data found.    No flowsheet data found.    No flowsheet data found.    EXERCISE:    No flowsheet data found.    NUTRITION DIAGNOSIS:  Obesity r/t long history of positive energy balance aeb BMI >30 kg/m2.    INTERVENTION:  Intervention Provided/Education Provided on post-op diet guidelines, vitamins/minerals essential post-operatively, GI anatomy of bariatric surgeries, ways to help prepare for post-op diet guidelines pre-operatively, portion/calorie-control, mindful eating and sources of protein.  Patient demonstrates understanding.     Personal barriers to making and continuing required life changes have been identified, " and strategies to overcome those barriers have been recommended AND family and social supports have been assessed and strategies to strengthen those supports have been recommended.    Provided pt with list of goals, RD contact information and resources listed below via eOriginal.       No flowsheet data found.    Expected Engagement: good    GOALS:  1. Decrease dairy consumption per pt. Aim to replace cheese with a different protein source such as chicken    Relating To Eating:  - Eat slowly (20-30 minutes per meal), chewing foods well (25 chews per bite/applesauce consistency)  - Focus on eating smaller portion sizes at meals and snacks    Relating to beverages:  - Reduce caffeine/carbonation/calorie containing beverages  - Separate fluids from meals by 30 minutes before, during, and after eating  - Drink 48-64 ounces of fluid per day. Small, frequent sips between meals.    Relating to activity:  Increase activity as able    Protein Sources for Weight Loss  [email or web address]     Carbohydrates  [email or web address]     Mindful Eating  [email or web address]     Diet Guidelines after Weight Loss Surgery  [email or web address]     Seated Exercises for Arms and Legs (can be done before or after surgery)  [email or web address]    Post-op Diet Handouts:  Diet Guidelines after Weight-loss Surgery  [email or web address]     Your Stage 1 Diet: Clear Liquids  [email or web address]     Your Stage 2 Diet: Low-fat Full Liquids  [email or web address]     Your Stage 3 Diet: Pureed Foods  [email or web address]     Pureed Pleasures  [email or web address]    Your Stage 4 Diet: Soft Foods  [email or web address]    Your Stage 5 Diet: Regular Foods  [email or web address]    Supplements after Sleeve Gastrectomy, Gastric Bypass or Single Anastomosis Duodenal Switch  [email or web address]    Keeping Track of  Fluids  [email or web address]    Follow up: Wednesday, March 29th at 11:30 am    Time spent with patient: 28 minutes.  MARISSA Gallegos, RD, LD

## 2023-02-27 NOTE — LETTER
Date:February 28, 2023      Provider requested that no letter be sent. Do not send.       [facility]

## 2023-03-07 DIAGNOSIS — E66.813 CLASS 3 SEVERE OBESITY WITH SERIOUS COMORBIDITY AND BODY MASS INDEX (BMI) OF 50.0 TO 59.9 IN ADULT, UNSPECIFIED OBESITY TYPE (H): ICD-10-CM

## 2023-03-07 DIAGNOSIS — E66.01 CLASS 3 SEVERE OBESITY WITH SERIOUS COMORBIDITY AND BODY MASS INDEX (BMI) OF 50.0 TO 59.9 IN ADULT, UNSPECIFIED OBESITY TYPE (H): ICD-10-CM

## 2023-03-10 RX ORDER — SEMAGLUTIDE 0.5 MG/.5ML
0.5 INJECTION, SOLUTION SUBCUTANEOUS
Qty: 2 ML | Refills: 1 | Status: SHIPPED | OUTPATIENT
Start: 2023-03-10 | End: 2023-05-03

## 2023-03-10 NOTE — TELEPHONE ENCOUNTER
WEGOVY 0.5MG/0.5ML PF PEN INJ  Last Written Prescription Date:   2/23/2023  Last Fill Quantity: 2,   # refills: 0  Last Office Visit :  1/23/2023  Future Office visit:   5/3/2023    Routing refill request to provider for review/approval because:  There is 2 different doses of this med on the med list.   Which dose would the Provider like for Pt care.  Please review and send new order.       Polina Santos RN  Central Triage Red Flags/Med Refills

## 2023-03-23 ENCOUNTER — VIRTUAL VISIT (OUTPATIENT)
Dept: ENDOCRINOLOGY | Facility: CLINIC | Age: 33
End: 2023-03-23
Payer: COMMERCIAL

## 2023-03-23 ENCOUNTER — TELEPHONE (OUTPATIENT)
Dept: ENDOCRINOLOGY | Facility: CLINIC | Age: 33
End: 2023-03-23

## 2023-03-23 ENCOUNTER — CARE COORDINATION (OUTPATIENT)
Dept: ENDOCRINOLOGY | Facility: CLINIC | Age: 33
End: 2023-03-23

## 2023-03-23 VITALS — HEIGHT: 69 IN | BODY MASS INDEX: 43.4 KG/M2 | WEIGHT: 293 LBS

## 2023-03-23 DIAGNOSIS — E66.01 MORBID OBESITY (H): Primary | ICD-10-CM

## 2023-03-23 DIAGNOSIS — Z72.0 TOBACCO ABUSE DISORDER: Primary | ICD-10-CM

## 2023-03-23 PROCEDURE — 99214 OFFICE O/P EST MOD 30 MIN: CPT | Mod: VID | Performed by: SURGERY

## 2023-03-23 RX ORDER — GABAPENTIN 100 MG/1
300 CAPSULE ORAL AT BEDTIME
COMMUNITY
Start: 2023-03-07

## 2023-03-23 ASSESSMENT — PAIN SCALES - GENERAL: PAINLEVEL: NO PAIN (0)

## 2023-03-23 NOTE — TELEPHONE ENCOUNTER
Allyson Yoder, [facility], needs to know more about the approval letter required    [phone]

## 2023-03-23 NOTE — LETTER
"3/23/2023       RE: Glenna Spears  [address]  [place] [address]     Dear Colleague,    Thank you for referring your patient, Glenna Spears, to the [facility] [place] [facility]. Please see a copy of my visit note below.    Glenna Spears is a 32 year old who is being evaluated via a billable video visit.      How would you like to obtain your AVS? MyChart  If the video visit is dropped, the invitation should be resent by: Text to cell phone: [phone]  Will anyone else be joining your video visit? No  If patient encounters technical issues they should call [phone]    During this virtual visit the patient is located in [place], patient verifies this as the location during the entirety of this visit.     Video-Visit Details  Video Start Time: 1240    Type of service:  Video Visit    Video End Time:1255    Originating Location (pt. Location): Home    Distant Location (provider location):  On-site    Platform used for Video Visit: Stacy Hein, QAMAR 3/23/2023      11:17 AM        I had the pleasure of meeting with your patient Glenna Spears in our weight loss surgery office.  This patient is a 32 year old female who has been undergoing our thorough preoperative screening process in anticipation of potential bariatric surgery.    At initial evaluation we recorded Glenna Spears's Height: 175.3 cm (5' 9\"), Initial Weight (lbs): 320 lbs and Initial BMI: 48.66.  The patient has been unsuccessful with other methods of permanent weight loss and suffers from multiple weight related medical conditions.  Due to lack of success in achieving weight loss through other methods, she is interested in undergoing bariatric surgery.    Notes from transplantation evaluation:    \"BMI 48 needs to lose to BMI <35 for kidney transplant. Goal wt <230 lbs (100 lbs weight loss)  ESRD due to longstanding nephrotic range proteinuria and HTN,. Hx " "of chronic drug use, Excedrin use.  Sober >1 year.  On dialysis x 8 months  Nephrologist: Dr Zaid Mejia Cloud     She has had increase in hunger since starting dialysis June 2022  Diabetes: No     Tobacco use: 1/2 ppd working on quitting smoking.      Tried bupropion in the past but caused drowsiness and weight gain     CO-MORBIDITIES OF OBESITY INCLUDE:  Asthma, HTN, Vitamin D Deficicency\"      VITALS:  Ht 1.753 m (5' 9\")   Wt 147.4 kg (325 lb)   BMI 47.99 kg/m      PE:  GENERAL: Alert and oriented x3. NAD  RESPIRATORY: Breathing unlabored  GASTROINTESTINAL: Obese      In summary, she has undergone an appropriate medical evaluation, dietitian evaluation, as well as psychologic screening. The patient appears to be an appropriate candidate for bariatric surgery.    In the office today, I discussed the laparoscopic gastric sleeve surgery.  Risks, benefits and anticipated outcomes were outlined including the risk of death, staple line leak (1-2%), PE, DVT, ulcer, worsening GERD, N/V, stricture, hernia, wound infection, weight regain, and vitamin deficiencies. This patient has a good chance of sustaining permanent weight loss due to this procedure.  This should also allow improvement if not resolution of his/her weight related medical conditions.    We discussed conduct of hemodialysis around the time of sleeve gastrectomy, which will be managed by nephrology teams.    At present we are going to present your patient's file for prior authorization to insurance.  Pending prior authorization, I anticipate a surgical date in the near future.  We will keep you updated on any progress.  If you have questions regarding care please feel free to contact me.  Total time spent in the clinic was 30 minutes with greater than 50% in face-to-face consultation.        Sincerely,    John Smith MD    Please route or send letter to:  Primary Care Provider (PCP) and Referring Provider        Again, thank you for " allowing me to participate in the care of your patient.      Sincerely,    John Smith MD

## 2023-03-23 NOTE — LETTER
Date:March 28, 2023      Provider requested that no letter be sent. Do not send.       [facility]

## 2023-03-23 NOTE — NURSING NOTE
"(   Chief Complaint   Patient presents with     New Patient     Meet and greet    )    ( Weight: 147.4 kg (325 lb) (Pt reported) )  ( Height: 175.3 cm (5' 9\") )  ( BMI (Calculated): 47.99 )  (   )  (   )  (   )  (   )  (   )  (   )    (   )  (   )  (   )  (   )  (   )  (   )  (   )    (   Patient Active Problem List   Diagnosis     Moderate asthma     Hypovitaminosis D    )  (   Current Outpatient Medications   Medication Sig Dispense Refill     acetaminophen (TYLENOL) 325 MG tablet Take 325-650 mg by mouth every 4 hours as needed       albuterol (PROAIR HFA/PROVENTIL HFA/VENTOLIN HFA) 108 (90 Base) MCG/ACT inhaler Inhale 2 puffs into the lungs       amLODIPine (NORVASC) 10 MG tablet Take 1 tablet by mouth daily       calcitRIOL (ROCALTROL) 0.5 MCG capsule        cholecalciferol 25 MCG (1000 UT) TABS Take 2,000 Units by mouth       cinacalcet (SENSIPAR) 60 MG tablet Take 60 mg by mouth three times a week       famotidine (PEPCID) 20 MG tablet        FEROSUL 325 (65 Fe) MG tablet 1 tablet daily at 2 pm Port       furosemide (LASIX) 40 MG tablet Take 1 tablet by mouth daily at 2 pm       gabapentin (NEURONTIN) 100 MG capsule        labetalol (NORMODYNE) 200 MG tablet Take 200 mg by mouth 2 times daily       levonorgestrel (KYLEENA) 19.5 MG IUD 1 Intra Uterine Device by Intrauterine route       lidocaine-prilocaine (EMLA) 2.5-2.5 % external cream APPLY SMALL AMOUNT TO ACCESS SITE (AVF) 1 HOUR BEFORE DIALYSIS. COVER WITH OCCLUSIVE DRESSING (SARAN WRAP)       Semaglutide-Weight Management (WEGOVY) 0.5 MG/0.5ML pen Inject 0.5 mg Subcutaneous every 7 days 2 mL 1     sodium bicarbonate 650 MG tablet Take 1,950 mg by mouth       varenicline (CHANTIX) 0.5 MG tablet Take 0.5 mg by mouth       VELPHORO 500 MG CHEW chewable tablet CRUSH OR CHEW AND SWALLOW 1 TABLET 3 TIMES A DAY WITH MEALS       Semaglutide-Weight Management (WEGOVY) 0.25 MG/0.5ML SOAJ Inject 0.25 mg Subcutaneous once a week (Patient not taking: Reported on " 3/23/2023) 2 mL 0    )  ( Diabetes Eval:    )    ( Pain Eval:  No Pain (0) )    ( Wound Eval:       )    (   History   Smoking Status     Former     Types: Cigarettes     Quit date: 1/31/2023   Smokeless Tobacco     Never    )    ( Signed By:  Ras Hein, EMT; March 23, 2023; 12:04 PM )

## 2023-03-23 NOTE — PROGRESS NOTES
Tasklist updated and sent to patient via [employer].    Bariatric Task List  Fax:  Please fax all paperwork to: [phone] -     Status:  Is patient a candidate for bariatric surgery?:  patient is a candidate for bariatric surgery -     Cleared to schedule surgeon consult?:  cleared to schedule surgeon consult - 3/23/23 Dr Smith appt. bks   Status:  surgery evaluation in process -     Surgeon: Sarah -     Tentative surgery month/year: TBD -        Insurance: Insurance:  [employer] [place] - Will need 6 months of RD visits. bks    Contact insurance to discuss coverage: Needed -          Patient Info: Initial Weight:  330 -     Date of Initial Weight/Height:  1/23/2023 -     Goal Weight (lbs):  310 -     Required Weight Loss:  20 -     Surgery Type:  sleeve gastrectomy -        Dietician Visits: Structured weight loss required by insurance?:  structured weight loss required -     Dietician Visit 1:  Completed - 1/27/23 class. bks   Dietician Visit 2:  Completed - 2/27/23 bks   Dietician Visit 3:  Needed - 3/29/23 appt. bks   Dietician Visit 4:  Needed -     Dietician Visit 5:  Needed -     Dietician Visit 6:  Needed -     Dietician Visit additional:  Needed - Monthly until surgery for weight loss and postop diet teaching. Call [phone] to schedule. bks   Clearance from dietician to see surgeon?:    -     Dietician Notes:  oDrcas Archer RD at [email or web address], ph: [phone]. Ok for Nepro and Boost Glucose Control for full liquids postop. 3/23/23 Current 48 oz/day fluid restriction. No protein restriction. bks -        Psychological Evaluation: Psych eval:  Needed - Donald Araujo 3/3/23 appt - pending. bks      Lab Work: Complete Blood Count:  Completed - 1/23/23 RR   Comprehensive Metabolic Panel:  Completed - 1/23/23 RR   Vitamin D:  Completed - 1/23/23 RR   PTH:  Completed - 1/23/23 RR; elevated PTH and phosphate, on a phosphate binder with meals. bks   Hgb A1c:  Completed - 1/23/23 RR     "Lipids: Completed - 1/23/23 RR   Nicotine Testing:  Needed - Quit: 1/23/23. bks      Consults/ Clearance Nephrology:  Needed - letter sent to patient RR; Dr Mar, [facility].nurse Allyson Youngya ph: [phone]. 3/23/23 pending. bks   Other:    - 1/23/23 Referred by Dr Óscar reyes MD. bks    Other:    -     Other:    -        PCP: Establish care with PCP:  Completed -     PCP letter of support:  Completed - letter sent to patient RR; Support in 2/8/23 clinic note. Yaya Solares MD. [facility]. bks      Stopping Smoking/ Alcohol Use: Quit tobacco use (3 months smoke free)?:  Completed - smoking cessation information sent RR   Quit date:  1/23/2023 -        Patient Education:  Information Session:  Needed - 1/24/23 RR   Given \"Making your decision\" handout?:  Yes - 1/24/23 RR   Given \"A Roadmap to you Weight Loss Surgery\" handout?: Yes - 1/24/23 RR   Given support group information?:    -  Yes.   Attended support group?:  Needed -     Support plan in place?:  Completed -        Additional Surgery Requirements: Other: Call Center to schedule the 1 week and 31+ days postop apptsScot hope -        Final Tasks:  Before surgery preop Nurse online class:  Needed -     Nurse visit per clinic:  Needed -  For weight check, discuss with GrisRN - may be able to do weight check at dialysis.   History and Physical per clinic:   -  PreAssessment Clinic.   Final labs per clinic: Needed -        Notes: Effective in February 2023, please register for the Weight Loss Surgery Care Companion Pathway through the [facility] mobile char or via web browser after you receive an invite.   Information from this care journey can help you be more successful before and after surgery, for up to one year after your surgical procedure. Your Care Companion Pathway through [facility] can also help answer any questions you might have related to the surgery.    The Pathway has 3 Phases:  Phase 1 starts when you get " your Tasklist after your initial consultation with us or when you decide to start preparing for weight loss surgery.  Phase 2 starts when your surgery is scheduled.  Phase 3 starts on the day of discharge from the hospital.    You will be started on Phase 2    A patient can only be connected to one Care Companion journey at any given time.   You can remove or re-enroll yourself from the Weight Loss Surgery Care Companion Pathway by contacting us at [phone].     Your Weight Loss Surgery Team  [facility]  Ph:[phone]    -          Discussed letter of clearance from Dr Mar, nephrologist with Allyson Yoder, ph: [phone] at [facility]. Glenna has dialysis every T, Th, Sat. Can move a run to Monday for a Tuesday Or date.  Anticipate 7/25/23 OR date.

## 2023-03-23 NOTE — PROGRESS NOTES
"Glenna Spears is a 32 year old who is being evaluated via a billable video visit.      How would you like to obtain your AVS? MyChart  If the video visit is dropped, the invitation should be resent by: Text to cell phone: [phone]  Will anyone else be joining your video visit? No  If patient encounters technical issues they should call [phone]    During this virtual visit the patient is located in [place], patient verifies this as the location during the entirety of this visit.     Video-Visit Details  Video Start Time: 1240    Type of service:  Video Visit    Video End Time:1255    Originating Location (pt. Location): Home    Distant Location (provider location):  On-site    Platform used for Video Visit: Stayc Hein, QAMAR 3/23/2023      11:17 AM        I had the pleasure of meeting with your patient Glenna Spears in our weight loss surgery office.  This patient is a 32 year old female who has been undergoing our thorough preoperative screening process in anticipation of potential bariatric surgery.    At initial evaluation we recorded Glenna Spears's Height: 175.3 cm (5' 9\"), Initial Weight (lbs): 320 lbs and Initial BMI: 48.66.  The patient has been unsuccessful with other methods of permanent weight loss and suffers from multiple weight related medical conditions.  Due to lack of success in achieving weight loss through other methods, she is interested in undergoing bariatric surgery.    Notes from transplantation evaluation:    \"BMI 48 needs to lose to BMI <35 for kidney transplant. Goal wt <230 lbs (100 lbs weight loss)  ESRD due to longstanding nephrotic range proteinuria and HTN,. Hx of chronic drug use, Excedrin use.  Sober >1 year.  On dialysis x 8 months  Nephrologist: Dr Zaid Mejia Cloud     She has had increase in hunger since starting dialysis June 2022  Diabetes: No     Tobacco use: 1/2 ppd working on quitting smoking.      Tried bupropion in the past but caused drowsiness and " "weight gain     CO-MORBIDITIES OF OBESITY INCLUDE:  Asthma, HTN, Vitamin D Deficicency\"      VITALS:  Ht 1.753 m (5' 9\")   Wt 147.4 kg (325 lb)   BMI 47.99 kg/m      PE:  GENERAL: Alert and oriented x3. NAD  RESPIRATORY: Breathing unlabored  GASTROINTESTINAL: Obese      In summary, she has undergone an appropriate medical evaluation, dietitian evaluation, as well as psychologic screening. The patient appears to be an appropriate candidate for bariatric surgery.    In the office today, I discussed the laparoscopic gastric sleeve surgery.  Risks, benefits and anticipated outcomes were outlined including the risk of death, staple line leak (1-2%), PE, DVT, ulcer, worsening GERD, N/V, stricture, hernia, wound infection, weight regain, and vitamin deficiencies. This patient has a good chance of sustaining permanent weight loss due to this procedure.  This should also allow improvement if not resolution of his/her weight related medical conditions.    We discussed conduct of hemodialysis around the time of sleeve gastrectomy, which will be managed by nephrology teams.    At present we are going to present your patient's file for prior authorization to insurance.  Pending prior authorization, I anticipate a surgical date in the near future.  We will keep you updated on any progress.  If you have questions regarding care please feel free to contact me.  Total time spent in the clinic was 30 minutes with greater than 50% in face-to-face consultation.        Sincerely,    John Smith MD    Please route or send letter to:  Primary Care Provider (PCP) and Referring Provider    "

## 2023-03-29 ENCOUNTER — VIRTUAL VISIT (OUTPATIENT)
Dept: ENDOCRINOLOGY | Facility: CLINIC | Age: 33
End: 2023-03-29
Payer: COMMERCIAL

## 2023-03-29 DIAGNOSIS — N18.6 ESRD (END STAGE RENAL DISEASE) ON DIALYSIS (H): ICD-10-CM

## 2023-03-29 DIAGNOSIS — Z71.3 NUTRITIONAL COUNSELING: Primary | ICD-10-CM

## 2023-03-29 DIAGNOSIS — Z99.2 ESRD (END STAGE RENAL DISEASE) ON DIALYSIS (H): ICD-10-CM

## 2023-03-29 DIAGNOSIS — E66.9 OBESITY: ICD-10-CM

## 2023-03-29 PROCEDURE — 99207 PR NO CHARGE LOS: CPT | Mod: VID | Performed by: DIETITIAN, REGISTERED

## 2023-03-29 PROCEDURE — 97803 MED NUTRITION INDIV SUBSEQ: CPT | Mod: VID | Performed by: DIETITIAN, REGISTERED

## 2023-03-29 NOTE — LETTER
Date:March 30, 2023      Provider requested that no letter be sent. Do not send.       [facility]

## 2023-03-29 NOTE — PATIENT INSTRUCTIONS
GOALS:  1. Talk with dialysis team about protein options post op   - List below (under surgery goals )   - Two other options I have seen patients use: OWYN Shakes and Beneprotein powder   2. Schedule follow up appt for 1 month from now  3. Consider utilizing a protein shake or powder to help increase protein in diet    Keep up the hard work with nicotine cessation! You got this!    Other surgery goals:  Relating To Eating:  - Eat slowly (20-30 minutes per meal), chewing foods well (25 chews per bite/applesauce consistency)  - Focus on eating smaller portion sizes at meals and snacks    Relating to beverages:  - Reduce caffeine/carbonation/calorie containing beverages  - Separate fluids from meals by 30 minutes before, during, and after eating  - Drink 48-64 ounces of fluid per day. Small, frequent sips between meals.    Relating to activity:  Increase activity as able    Protein powders mixed with water:  Pure Protein whey protein (140 calories, 23 gm protein, 110 mg potassium, 80 mg phos)  Guy Sathish whey protein (vanilla/strawberry)(110 calories, 25 gm protein, 180 mg potassium)  Premiere Protein whey protein (vanilla) (150 calories, 30 gm protein, 160 mg potassium)  Integrated whey protein (vanilla/strawberry) (110-120 calories, 20 gm protein, 140-150 mg potassium, 125 mg phos)    Clear Liquids:  LiquaCel (1 oz/serv) - 100 calories, 16 gm protein, 10 mg potassium, 20 mg phos  Bipro (16.9 oz/serv) - 90 calories, 20 gm protein, 0 mg potassium/phos  Gelatein 20 (4 oz/serv) (lime/orange/fruit punch/grape) - 80-90 calories, 20 gm protein, 120-180 mg potassium, 0 mg phos  Strawberry Banana Proti-15 Gelatin ([employer] e-store: https://[employer]/store) (4 oz/serv) - 70 calories, 16 gm protein, 55 mg potassium    Protein bars:  Pure Protein bars   Balance Bars  Zone protein      Post-op Diet Handouts:  Diet Guidelines after Weight-loss Surgery  [email or web address]     Your Stage 1 Diet: Clear  Liquids  [email or web address]     Your Stage 2 Diet: Low-fat Full Liquids  [email or web address]     Your Stage 3 Diet: Pureed Foods  [email or web address]     Pureed Pleasures  [email or web address]    Your Stage 4 Diet: Soft Foods  [email or web address]    Your Stage 5 Diet: Regular Foods  [email or web address]    Supplements after Sleeve Gastrectomy, Gastric Bypass or Single Anastomosis Duodenal Switch  [email or web address]    Keeping Track of Fluids  [email or web address]    Follow up:   4 weeks    MARISSA Rubin, RD, LD  Clinic #: [phone]

## 2023-03-29 NOTE — PROGRESS NOTES
"Glenna Spears is a 32 year old female who is being evaluated via a billable video visit.      The patient has been notified of following:     \"This video visit will be conducted via a call between you and your physician/provider. We have found that certain health care needs can be provided without the need for an in-person physical exam.  This service lets us provide the care you need with a video conversation.  If a prescription is necessary we can send it directly to your pharmacy.  If lab work is needed we can place an order for that and you can then stop by our lab to have the test done at a later time.    Video visits are billed at different rates depending on your insurance coverage.  Please reach out to your insurance provider with any questions.    If during the course of the call the physician/provider feels a video visit is not appropriate, you will not be charged for this service.\"    Patient has given verbal consent for Video visit? Yes  How would you like to obtain your AVS? MyChart  If you are dropped from the video visit, the video invite should be resent to: N/A for todays visit  Will anyone else be joining your video visit? No  {If patient encounters technical issues they should call [phone]      Video-Visit Details    Type of service:  Video Visit    Video Start Time: 11:33 am  Video End Time:  12:04 pm    Originating Location (pt. Location): Home    Distant Location (provider location):  Offsite (providers home)     Platform used for Video Visit: Jigsaw    During this virtual visit the patient is located in [place], patient verifies this as the location during the entirety of this visit.      Bariatric Nutrition Consultation Note    Reason For Visit: Nutrition Assessment    Glenna Spears is a 32 year old presenting today for bariatric nutrition consult.   Pt is interested in laparoscopic sleeve gastrectomy with Dr. Smith.     This is pt's 4th required nutrition visits prior to surgery.   - " "Patient also saw transplant RD 1/23/23    Pt referred by Gustabo Pat January 2023.    Coordination note:   Needs baseline labs  Needs Psych eval - in progress  Needs PCP letter of support  20 lb weight loss from initial  Surgeon meet and greet  Obtain clearances     CO-MORBIDITIES OF OBESITY INCLUDE:    No flowsheet data found.    SUPPORT:  No flowsheet data found.     Patient reports good support system at home    ANTHROPOMETRICS:  Weight 1/23/23: 330 lbs    Estimated body mass index is 47.99 kg/m  as calculated from the following:    Height as of 3/23/23: 1.753 m (5' 9\").    Weight as of 3/23/23: 147.4 kg (325 lb).     Current weight: ~323-327 lbs, pt report      No flowsheet data found.    No flowsheet data found.    SUPPLEMENT INFORMATION:  Phos Binder - is chewable (Velphoro)   Renal labs - High Phos but otherwise looking good per pt    Iron monitored at dialysis - gets infused as needed    Labs 2/8/23  Vit A high at 92.2  PTH, Intact high at 1,398.1 (coming down per pt - was really high in November, coming down monthly since)  TG elevated   A1C WNL  GFR 5    Labs 2/16/23 - per pt from [facility]  Albumin 4.2  En CPR 1.02  Pot 4.4   Calcium 9.9  Phos high at 8.8, was at 11.5 - previous phos binder was not helping per pt  PTH, Intact high  Hgb low 10.9   Glucose 75  Creatinine 10.98    NUTRITION HISTORY:  NKFA  Some lactose intolerance     Patient met with Ibis Guardado PA-C and transplant RD 1/23/23. Patient is interested in pursuing bariatric surgery.    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  Breakfast Not typical   Lunch 11a-12p: [employer] BF bowls with 2 pieces toast    Dinner Salad (6 pm- at work) w/ cheese, croutons, ranch + Greek yogurt    Snacks After work (11p-12a)- 1/2 frozen pizza, can of chili; partner may cook- spaghetti, chicken & veggies (40% meals made at home); reports she \"wants to cut that meal out\"     Snacks- when at home but did not disclose what types    Beverages Juice (8 " "oz or less/d), water, coffee (w/ creamer- reports not taking binder w/ this), was drinking more pop but less appealing w/ dialysis (8 oz or less/day- Mt Dew, but some Coke/Pepsi)   Alcohol None    Dining out 1x/week      Attended nutrition class today, 1/27/23.     Patient will meet with this writer 1:1 next month for first individualized appointment related to surgery     Did not have much time to talk today but patient did share the following information during class.     Very little pop  Alcohol: None     Nicotine use: yes, quit day is tomorrow     Alcohol use: none   ? Hx of misuse per pt. Now turns her off to even think about it. Aware of risks after surgery and does not plan to drink again.     Feb 2023:  Reviewed recent labs - monitoring with dialysis team. PTH trending down.    Reports she knows what to eat but is a \"food addict\" which makes it hard.    Trying to slow down - harder if watching TV. Finds she gets distracted and then will \"scarf food down\"    Current fluid restriction - 32 oz/day. Has been chewing on ice. Not  fluids from meal time yet.     Quit smoking a month ago - on one chantix per day currently.     Barriers to lifestyle change: more sugar cravings for food since quitting smoking    March 2023:  Phos up a little bit per pt. Hard because a lot of her favorite foods are higher phos (dairy) per pt. Also sometimes forgets to take her binders.     Reviewed options for liquid diet - pt will connect with renal team as well.    Fluid restriction of 32 oz but not sticking to per pt - hard since quitting smoking. Drinking 40-50 oz. Feels addicted to chewing ice now.    Taking Chantix still - prescribed previously by PCP.    Increased appetite since quitting smoke. Reports gaining weight right now instead of losing. Aware she does not have a weight loss goal but really wants to lose weight right now to help before/post op.    Turned off by a lot of protein right now per pt. Does like " chicken and cheese but trying to do less cheese.    Likes vegetables but  does not seek them out .    Barriers: cravings with quitting smoking - feels she is addicted to chewing ice now and is eating more, does not like a lot of meat per pt (hard to get protein needs met), all or nothing (hard to gradually change habits - feels it will be easier after surgery and is determined)     Additional information:  Works starting at 3 pm  Dialysis T/R/Sat     No flowsheet data found.    No flowsheet data found.    No flowsheet data found.    EXERCISE:    No flowsheet data found.    NUTRITION DIAGNOSIS:  Obesity r/t long history of positive energy balance aeb BMI >30 kg/m2.    INTERVENTION:  Intervention Provided/Education Provided on post-op diet guidelines, vitamins/minerals essential post-operatively, GI anatomy of bariatric surgeries, ways to help prepare for post-op diet guidelines pre-operatively, portion/calorie-control, mindful eating and sources of protein.  Patient demonstrates understanding.     Personal barriers to making and continuing required life changes have been identified, and strategies to overcome those barriers have been recommended AND family and social supports have been assessed and strategies to strengthen those supports have been recommended.    Provided pt with list of goals, RD contact information and resources listed below via [employer].       No flowsheet data found.    Expected Engagement: good    GOALS:  1. Talk with dialysis team about protein options post op   - List below (under surgery goals )   - Two other options I have seen patients use: OWYN Shakes and Beneprotein powder   2. Schedule follow up appt for 1 month from now  3. Consider utilizing a protein shake or powder to help increase protein in diet    Keep up the hard work with nicotine cessation! You got this!    Other surgery goals:  Relating To Eating:  - Eat slowly (20-30 minutes per meal), chewing foods well (25 chews per  bite/applesauce consistency)  - Focus on eating smaller portion sizes at meals and snacks    Relating to beverages:  - Reduce caffeine/carbonation/calorie containing beverages  - Separate fluids from meals by 30 minutes before, during, and after eating  - Drink 48-64 ounces of fluid per day. Small, frequent sips between meals.    Relating to activity:  Increase activity as able    Protein powders mixed with water:  Pure Protein whey protein (140 calories, 23 gm protein, 110 mg potassium, 80 mg phos)  Guy Sathish whey protein (vanilla/strawberry)(110 calories, 25 gm protein, 180 mg potassium)  Premiere Protein whey protein (vanilla) (150 calories, 30 gm protein, 160 mg potassium)  Integrated whey protein (vanilla/strawberry) (110-120 calories, 20 gm protein, 140-150 mg potassium, 125 mg phos)    Clear Liquids:  LiquaCel (1 oz/serv) - 100 calories, 16 gm protein, 10 mg potassium, 20 mg phos  Bipro (16.9 oz/serv) - 90 calories, 20 gm protein, 0 mg potassium/phos  Gelatein 20 (4 oz/serv) (lime/orange/fruit punch/grape) - 80-90 calories, 20 gm protein, 120-180 mg potassium, 0 mg phos  Strawberry Banana Proti-15 Gelatin ([employer] e-store: https://[employer]/store) (4 oz/serv) - 70 calories, 16 gm protein, 55 mg potassium    Protein bars:  Pure Protein bars   Balance Bars  Zone protein      Post-op Diet Handouts:  Diet Guidelines after Weight-loss Surgery  [email or web address]     Your Stage 1 Diet: Clear Liquids  [email or web address]     Your Stage 2 Diet: Low-fat Full Liquids  [email or web address]     Your Stage 3 Diet: Pureed Foods  [email or web address]     Pureed Pleasures  [email or web address]    Your Stage 4 Diet: Soft Foods  [email or web address]    Your Stage 5 Diet: Regular Foods  [email or web address]    Supplements after Sleeve Gastrectomy, Gastric Bypass or Single Anastomosis Duodenal Switch  [email or web address]    Keeping Track  of Fluids  [email or web address]    Follow up:   4 weeks    Time spent with patient: 31 minutes.  MARISSA Gallegos, RD, LD

## 2023-03-29 NOTE — LETTER
"3/29/2023       RE: Glenna Spears  [address]  [place] [address]     Dear Colleague,    Thank you for referring your patient, Glenna Spears, to the [facility] [place] [facility]. Please see a copy of my visit note below.    Glenna Spears is a 32 year old female who is being evaluated via a billable video visit.      The patient has been notified of following:     \"This video visit will be conducted via a call between you and your physician/provider. We have found that certain health care needs can be provided without the need for an in-person physical exam.  This service lets us provide the care you need with a video conversation.  If a prescription is necessary we can send it directly to your pharmacy.  If lab work is needed we can place an order for that and you can then stop by our lab to have the test done at a later time.    Video visits are billed at different rates depending on your insurance coverage.  Please reach out to your insurance provider with any questions.    If during the course of the call the physician/provider feels a video visit is not appropriate, you will not be charged for this service.\"    Patient has given verbal consent for Video visit? Yes  How would you like to obtain your AVS? MyChart  If you are dropped from the video visit, the video invite should be resent to: N/A for todays visit  Will anyone else be joining your video visit? No  {If patient encounters technical issues they should call [phone]      Video-Visit Details    Type of service:  Video Visit    Video Start Time: 11:33 am  Video End Time:  12:04 pm    Originating Location (pt. Location): Home    Distant Location (provider location):  Offsite (providers home)     Platform used for Video Visit: Figgu    During this virtual visit the patient is located in [place], patient verifies this as the location during the entirety of this " "visit.      Bariatric Nutrition Consultation Note    Reason For Visit: Nutrition Assessment    Glenna Spears is a 32 year old presenting today for bariatric nutrition consult.   Pt is interested in laparoscopic sleeve gastrectomy with Dr. Smith.     This is pt's 4th required nutrition visits prior to surgery.   - Patient also saw transplant RD 1/23/23    Pt referred by Gustabo Pat January 2023.    Coordination note:   Needs baseline labs  Needs Psych eval - in progress  Needs PCP letter of support  20 lb weight loss from initial  Surgeon meet and greet  Obtain clearances     CO-MORBIDITIES OF OBESITY INCLUDE:    No flowsheet data found.    SUPPORT:  No flowsheet data found.     Patient reports good support system at home    ANTHROPOMETRICS:  Weight 1/23/23: 330 lbs    Estimated body mass index is 47.99 kg/m  as calculated from the following:    Height as of 3/23/23: 1.753 m (5' 9\").    Weight as of 3/23/23: 147.4 kg (325 lb).     Current weight: ~323-327 lbs, pt report      No flowsheet data found.    No flowsheet data found.    SUPPLEMENT INFORMATION:  Phos Binder - is chewable (Velphoro)   Renal labs - High Phos but otherwise looking good per pt    Iron monitored at dialysis - gets infused as needed    Labs 2/8/23  Vit A high at 92.2  PTH, Intact high at 1,398.1 (coming down per pt - was really high in November, coming down monthly since)  TG elevated   A1C WNL  GFR 5    Labs 2/16/23 - per pt from [facility]  Albumin 4.2  En CPR 1.02  Pot 4.4   Calcium 9.9  Phos high at 8.8, was at 11.5 - previous phos binder was not helping per pt  PTH, Intact high  Hgb low 10.9   Glucose 75  Creatinine 10.98    NUTRITION HISTORY:  NKFA  Some lactose intolerance     Patient met with Ibis Guardado PA-C and transplant RD 1/23/23. Patient is interested in pursuing bariatric surgery.    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  Breakfast Not typical   Lunch 11a-12p: [employer] BF bowls with 2 pieces toast    Dinner Salad " "(6 pm- at work) w/ cheese, croutons, ranch + Greek yogurt    Snacks After work (11p-12a)- 1/2 frozen pizza, can of chili; partner may cook- spaghetti, chicken & veggies (40% meals made at home); reports she \"wants to cut that meal out\"     Snacks- when at home but did not disclose what types    Beverages Juice (8 oz or less/d), water, coffee (w/ creamer- reports not taking binder w/ this), was drinking more pop but less appealing w/ dialysis (8 oz or less/day- Mt Dew, but some Coke/Pepsi)   Alcohol None    Dining out 1x/week      Attended nutrition class today, 1/27/23.     Patient will meet with this writer 1:1 next month for first individualized appointment related to surgery     Did not have much time to talk today but patient did share the following information during class.     Very little pop  Alcohol: None     Nicotine use: yes, quit day is tomorrow     Alcohol use: none   ? Hx of misuse per pt. Now turns her off to even think about it. Aware of risks after surgery and does not plan to drink again.     Feb 2023:  Reviewed recent labs - monitoring with dialysis team. PTH trending down.    Reports she knows what to eat but is a \"food addict\" which makes it hard.    Trying to slow down - harder if watching TV. Finds she gets distracted and then will \"scarf food down\"    Current fluid restriction - 32 oz/day. Has been chewing on ice. Not  fluids from meal time yet.     Quit smoking a month ago - on one chantix per day currently.     Barriers to lifestyle change: more sugar cravings for food since quitting smoking    March 2023:  Phos up a little bit per pt. Hard because a lot of her favorite foods are higher phos (dairy) per pt. Also sometimes forgets to take her binders.     Reviewed options for liquid diet - pt will connect with renal team as well.    Fluid restriction of 32 oz but not sticking to per pt - hard since quitting smoking. Drinking 40-50 oz. Feels addicted to chewing ice now.    Taking " Chantix still - prescribed previously by PCP.    Increased appetite since quitting smoke. Reports gaining weight right now instead of losing. Aware she does not have a weight loss goal but really wants to lose weight right now to help before/post op.    Turned off by a lot of protein right now per pt. Does like chicken and cheese but trying to do less cheese.    Likes vegetables but  does not seek them out .    Barriers: cravings with quitting smoking - feels she is addicted to chewing ice now and is eating more, does not like a lot of meat per pt (hard to get protein needs met), all or nothing (hard to gradually change habits - feels it will be easier after surgery and is determined)     Additional information:  Works starting at 3 pm  Dialysis T/R/Sat     No flowsheet data found.    No flowsheet data found.    No flowsheet data found.    EXERCISE:    No flowsheet data found.    NUTRITION DIAGNOSIS:  Obesity r/t long history of positive energy balance aeb BMI >30 kg/m2.    INTERVENTION:  Intervention Provided/Education Provided on post-op diet guidelines, vitamins/minerals essential post-operatively, GI anatomy of bariatric surgeries, ways to help prepare for post-op diet guidelines pre-operatively, portion/calorie-control, mindful eating and sources of protein.  Patient demonstrates understanding.     Personal barriers to making and continuing required life changes have been identified, and strategies to overcome those barriers have been recommended AND family and social supports have been assessed and strategies to strengthen those supports have been recommended.    Provided pt with list of goals, RD contact information and resources listed below via [employer].       No flowsheet data found.    Expected Engagement: good    GOALS:  1. Talk with dialysis team about protein options post op   - List below (under surgery goals )   - Two other options I have seen patients use: OWYN Shakes and Beneprotein powder   2.  Schedule follow up appt for 1 month from now  3. Consider utilizing a protein shake or powder to help increase protein in diet    Keep up the hard work with nicotine cessation! You got this!    Other surgery goals:  Relating To Eating:  - Eat slowly (20-30 minutes per meal), chewing foods well (25 chews per bite/applesauce consistency)  - Focus on eating smaller portion sizes at meals and snacks    Relating to beverages:  - Reduce caffeine/carbonation/calorie containing beverages  - Separate fluids from meals by 30 minutes before, during, and after eating  - Drink 48-64 ounces of fluid per day. Small, frequent sips between meals.    Relating to activity:  Increase activity as able    Protein powders mixed with water:  Pure Protein whey protein (140 calories, 23 gm protein, 110 mg potassium, 80 mg phos)  Guy Sathish whey protein (vanilla/strawberry)(110 calories, 25 gm protein, 180 mg potassium)  Premiere Protein whey protein (vanilla) (150 calories, 30 gm protein, 160 mg potassium)  Integrated whey protein (vanilla/strawberry) (110-120 calories, 20 gm protein, 140-150 mg potassium, 125 mg phos)    Clear Liquids:  LiquaCel (1 oz/serv) - 100 calories, 16 gm protein, 10 mg potassium, 20 mg phos  Bipro (16.9 oz/serv) - 90 calories, 20 gm protein, 0 mg potassium/phos  Gelatein 20 (4 oz/serv) (lime/orange/fruit punch/grape) - 80-90 calories, 20 gm protein, 120-180 mg potassium, 0 mg phos  Strawberry Banana Proti-15 Gelatin ([facility] e-store: https://[employer]/store) (4 oz/serv) - 70 calories, 16 gm protein, 55 mg potassium    Protein bars:  Pure Protein bars   Balance Bars  Zone protein      Post-op Diet Handouts:  Diet Guidelines after Weight-loss Surgery  [email or web address]     Your Stage 1 Diet: Clear Liquids  [email or web address]     Your Stage 2 Diet: Low-fat Full Liquids  [email or web address]     Your Stage 3 Diet: Pureed Foods  [email or web address]     Pureed  Pleasures  [email or web address]    Your Stage 4 Diet: Soft Foods  [email or web address]    Your Stage 5 Diet: Regular Foods  [email or web address]    Supplements after Sleeve Gastrectomy, Gastric Bypass or Single Anastomosis Duodenal Switch  [email or web address]    Keeping Track of Fluids  [email or web address]    Follow up:   4 weeks    Time spent with patient: 31 minutes.  MARISSA Gallegos, RD, LD          Again, thank you for allowing me to participate in the care of your patient.      Sincerely,    Sydnee Street RD

## 2023-04-26 ENCOUNTER — VIRTUAL VISIT (OUTPATIENT)
Dept: ENDOCRINOLOGY | Facility: CLINIC | Age: 33
End: 2023-04-26
Payer: COMMERCIAL

## 2023-04-26 DIAGNOSIS — Z99.2 ESRD (END STAGE RENAL DISEASE) ON DIALYSIS (H): ICD-10-CM

## 2023-04-26 DIAGNOSIS — E66.9 OBESITY: ICD-10-CM

## 2023-04-26 DIAGNOSIS — N18.6 ESRD (END STAGE RENAL DISEASE) ON DIALYSIS (H): ICD-10-CM

## 2023-04-26 DIAGNOSIS — Z71.3 NUTRITIONAL COUNSELING: Primary | ICD-10-CM

## 2023-04-26 PROCEDURE — 97803 MED NUTRITION INDIV SUBSEQ: CPT | Mod: VID | Performed by: DIETITIAN, REGISTERED

## 2023-04-26 PROCEDURE — 99207 PR NO CHARGE LOS: CPT | Mod: VID | Performed by: DIETITIAN, REGISTERED

## 2023-04-26 NOTE — LETTER
"4/26/2023       RE: Glenna Spears  [address]  [place] [address]     Dear Colleague,    Thank you for referring your patient, Glenna Spears, to the [facility] [place] [facility]. Please see a copy of my visit note below.    Glenna Spears is a 32 year old female who is being evaluated via a billable video visit.      The patient has been notified of following:     \"This video visit will be conducted via a call between you and your physician/provider. We have found that certain health care needs can be provided without the need for an in-person physical exam.  This service lets us provide the care you need with a video conversation.  If a prescription is necessary we can send it directly to your pharmacy.  If lab work is needed we can place an order for that and you can then stop by our lab to have the test done at a later time.    Video visits are billed at different rates depending on your insurance coverage.  Please reach out to your insurance provider with any questions.    If during the course of the call the physician/provider feels a video visit is not appropriate, you will not be charged for this service.\"    Patient has given verbal consent for Video visit? Yes  How would you like to obtain your AVS? MyChart  If you are dropped from the video visit, the video invite should be resent to: N/A for todays visit  Will anyone else be joining your video visit? No  {If patient encounters technical issues they should call [phone]      Video-Visit Details    Type of service:  Video Visit    Video Start Time: 11:07 am  Video End Time:  11:32 am    Originating Location (pt. Location): Home    Distant Location (provider location):  Offsite (providers home)     Platform used for Video Visit: [employer]    During this virtual visit the patient is located in [place], patient verifies this as the location during the entirety of this " "visit.      Bariatric Nutrition Consultation Note    Reason For Visit: Nutrition Assessment    Glenna Spears is a 32 year old presenting today for bariatric nutrition consult.   Pt is interested in laparoscopic sleeve gastrectomy with Dr. Smith.     This is pt's 4th required nutrition visits prior to surgery.   - Patient also saw transplant RD 1/23/23    Pt referred by Gustabo Pat January 2023.    Coordination note:   Needs baseline labs  Needs Psych eval - in progress  Needs PCP letter of support  20 lb weight loss from initial  Surgeon meet and greet  Obtain clearances     CO-MORBIDITIES OF OBESITY INCLUDE:         View : No data to display.                SUPPORT:       View : No data to display.                 Patient reports good support system at home    ANTHROPOMETRICS:  Weight 1/23/23: 330 lbs    Estimated body mass index is 47.99 kg/m  as calculated from the following:    Height as of 3/23/23: 1.753 m (5' 9\").    Weight as of 3/23/23: 147.4 kg (325 lb).     Current weight: Dry weight is estimated 148 kg, about 325-326 lbs   - Was 153 kg, ~336 lbs when she got to Dialysis today           View : No data to display.                     View : No data to display.                SUPPLEMENT INFORMATION:  Phos Binder - is chewable (Velphoro)   Renal labs - High Phos but otherwise looking good per pt    Iron monitored at dialysis - gets infused as needed    Labs 2/8/23  Vit A high at 92.2  PTH, Intact high at 1,398.1 (coming down per pt - was really high in November, coming down monthly since)  TG elevated   A1C WNL  GFR 5    Labs 2/16/23 - per pt from [facility]  Albumin 4.2  En CPR 1.02  Pot 4.4   Calcium 9.9  Phos high at 8.8, was at 11.5 - previous phos binder was not helping per pt  PTH, Intact high  Hgb low 10.9   Glucose 75  Creatinine 10.98    NUTRITION HISTORY:  NKFA  \"Hates fish\"   Some lactose intolerance     Patient met with Ibis Guardado PA-C and transplant RD 1/23/23. Patient is interested in " "pursuing bariatric surgery.    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  Breakfast Not typical   Lunch 11a-12p: Joseph Call BF bowls with 2 pieces toast    Dinner Salad (6 pm- at work) w/ cheese, croutons, ranch + Greek yogurt    Snacks After work (11p-12a)- 1/2 frozen pizza, can of chili; partner may cook- spaghetti, chicken & veggies (40% meals made at home); reports she \"wants to cut that meal out\"     Snacks- when at home but did not disclose what types    Beverages Juice (8 oz or less/d), water, coffee (w/ creamer- reports not taking binder w/ this), was drinking more pop but less appealing w/ dialysis (8 oz or less/day- Mt Dew, but some Coke/Pepsi)   Alcohol None    Dining out 1x/week      Attended nutrition class today, 1/27/23.     Patient will meet with this writer 1:1 next month for first individualized appointment related to surgery     Did not have much time to talk today but patient did share the following information during class.     Very little pop  Alcohol: None     Nicotine use: yes, quit day is tomorrow     Alcohol use: none   Hx of misuse per pt. Now turns her off to even think about it. Aware of risks after surgery and does not plan to drink again.     Feb 2023:  Reviewed recent labs - monitoring with dialysis team. PTH trending down.    Reports she knows what to eat but is a \"food addict\" which makes it hard.    Trying to slow down - harder if watching TV. Finds she gets distracted and then will \"scarf food down\"    Current fluid restriction - 32 oz/day. Has been chewing on ice. Not  fluids from meal time yet.     Quit smoking a month ago - on one chantix per day currently.     Barriers to lifestyle change: more sugar cravings for food since quitting smoking    March 2023:  Phos up a little bit per pt. Hard because a lot of her favorite foods are higher phos (dairy) per pt. Also sometimes forgets to take her binders.     Reviewed options for liquid diet - pt will connect with " renal team as well.    Fluid restriction of 32 oz but not sticking to per pt - hard since quitting smoking. Drinking 40-50 oz. Feels addicted to chewing ice now.    Taking Chantix still - prescribed previously by PCP.    Increased appetite since quitting smoke. Reports gaining weight right now instead of losing. Aware she does not have a weight loss goal but really wants to lose weight right now to help before/post op.    Turned off by a lot of protein right now per pt. Does like chicken and cheese but trying to do less cheese.    Likes vegetables but  does not seek them out .    Barriers: cravings with quitting smoking - feels she is addicted to chewing ice now and is eating more, does not like a lot of meat per pt (hard to get protein needs met), all or nothing (hard to gradually change habits - feels it will be easier after surgery and is determined)     April 2023:  Appt started late today - pt forgot and not on video. Joined at 11:10 after text link sent.    At dialysis currently - extra session per pt.     Doing well with nicotine cessation. Has been 3 months now. Replace with eating and ice chips.   Increased water pills. Putting on a lot of liquids between dialysis per pt.     Typically eating pasta salad with canned chicken and falk.     Previous goals:  1. Talk with dialysis team about protein options post op   - List below (under surgery goals )   - Two other options I have seen patients use: OWYN Shakes and Beneprotein powder   2. Schedule follow up appt for 1 month from now  3. Consider utilizing a protein shake or powder to help increase protein in diet      Additional information:  Works starting at 3 pm  Dialysis T/R/Sat          View : No data to display.                     View : No data to display.                     View : No data to display.                EXERCISE:         View : No data to display.                NUTRITION DIAGNOSIS:  Obesity r/t long history of positive energy balance aeb  BMI >30 kg/m2.    INTERVENTION:  Intervention Provided/Education Provided on post-op diet guidelines, vitamins/minerals essential post-operatively, GI anatomy of bariatric surgeries, ways to help prepare for post-op diet guidelines pre-operatively, portion/calorie-control, mindful eating and sources of protein.  Patient demonstrates understanding.     Personal barriers to making and continuing required life changes have been identified, and strategies to overcome those barriers have been recommended AND family and social supports have been assessed and strategies to strengthen those supports have been recommended.    Provided pt with list of goals, RD contact information and resources listed below via [employer].            View : No data to display.                Expected Engagement: good    GOALS:  1. Check in with weight management team at [facility] regarding protein shake coupons.   2. Recommend checking in further with renal RD about fast, easy, renal friendly food ideas   3. Consider Protein + pasta such as Barilla.  4. Could consider plain greek yogurt as alternative to falk or sour cream    Keep up the hard work with nicotine cessation! You got this!    Other surgery goals:  Relating To Eating:  - Eat slowly (20-30 minutes per meal), chewing foods well (25 chews per bite/applesauce consistency)  - Focus on eating smaller portion sizes at meals and snacks    Relating to beverages:  - Reduce caffeine/carbonation/calorie containing beverages  - Separate fluids from meals by 30 minutes before, during, and after eating  - Drink 48-64 ounces of fluid per day. Small, frequent sips between meals.    Relating to activity:  Increase activity as able    Protein powders mixed with water:  Pure Protein whey protein (140 calories, 23 gm protein, 110 mg potassium, 80 mg phos)  Guy Sathish whey protein (vanilla/strawberry)(110 calories, 25 gm protein, 180 mg potassium)  Premiere Protein whey protein (vanilla) (150 calories, 30  gm protein, 160 mg potassium)  Integrated whey protein (vanilla/strawberry) (110-120 calories, 20 gm protein, 140-150 mg potassium, 125 mg phos)    Clear Liquids:  LiquaCel (1 oz/serv) - 100 calories, 16 gm protein, 10 mg potassium, 20 mg phos  Bipro (16.9 oz/serv) - 90 calories, 20 gm protein, 0 mg potassium/phos  Gelatein 20 (4 oz/serv) (lime/orange/fruit punch/grape) - 80-90 calories, 20 gm protein, 120-180 mg potassium, 0 mg phos  Strawberry Banana Proti-15 Gelatin ([employer] e-store: https://[employer] [place]/store) (4 oz/serv) - 70 calories, 16 gm protein, 55 mg potassium    Protein bars:  Pure Protein bars   Balance Bars  Zone protein      Post-op Diet Handouts:  Diet Guidelines after Weight-loss Surgery  [email or web address]     Your Stage 1 Diet: Clear Liquids  [email or web address]     Your Stage 2 Diet: Low-fat Full Liquids  [email or web address]     Your Stage 3 Diet: Pureed Foods  [email or web address]     Pureed Pleasures  [email or web address]    Your Stage 4 Diet: Soft Foods  [email or web address]    Your Stage 5 Diet: Regular Foods  [email or web address]    Supplements after Sleeve Gastrectomy, Gastric Bypass or Single Anastomosis Duodenal Switch  [email or web address]    Keeping Track of Fluids  [email or web address]    Hunger Solutions:   Call the [place] Food Help Line at 1-254.389.1769 to talk with a specialist in community and government food assistance programs. They can help you sign-up for SNAP benefits, find food [place], food shelves and free food in your community and help refer you to any other community assistance programs that you qualify for.   https://www.hungersolutions.org/find-help/    SpendSmart,Eat Smart  Recipe ideas that are suppose to be more affordable  Calculator that allows you to compare product prices   https://spendsmart.extension.[facility]     Fare For All:  Provides discounted  "groceries. Up to 40% off retail pricing. You can preorder and  or buy in person, depending on the site. Visit their website for list of 38 locations in [place].  https://fareforall.the99times.cnmn.org/    Think [employer] Market  Get organic produce and sustainably sourced groceries delivered at up to 40% off grocery store prices.  https://www.[place].[employer]      [place] [facility]:  To find a map of food shelves and food distribution pop-ups. Visit www.[facility]/foodshelves    Market [place] Program: Spend $10 of EBT, get $10 free at [facility].  To find map of farmers markets:  https://www.Postcard & Tag.org/programs/market-bucks/farmersmarkets/    Supplemental Nutrition Assistance Program (SNAP):  https://[place].gov/dhs/people-we-serve/adults/economic-assistance/food-nutrition/programs-and-services/supplemental-nutrition-assistance-program.jsp    [place] [facility] and [employer] Food Shelf:  [address] [place] [address]  Mon-Thurs 10am-4pm  May visit once per month   Items include meat, dairy, bread, and other food, hygiene, cleaning supplies and more. Pet food available upon request.  Additional \"Mini-Market\", parking lot at [address] [place] [address]  o Free fruit, vegetables, salads, and deli items  o Tuesday & Thursday 9:00 a.m  Nutrition Assistance Program for Seniors (NAPS or  the senior box ).  Eligibility: at least 60 years old & 130% Federal Poverty Guidelines.  To apply, call [employer] [phone].  Free Fresh Food Fridays - Summer Outdoor Distribution:  Fruits & vegetables at [place], 2nd & 4th Fridays 9:30 a.m.  May - September, rain or shine  https://[facility] HospitalSafetyPay.org/helping-our-neighbors/support-everyday-life/community-food-shelf      [place] Food Shelves ([place]):  https://ITS Compliance.org/food-shelves/  [place] Food Shelf  [address] [facility] [address] [facility] [address]near [facility], [place] [id number] [phone]    [facility] Food Shelf  [address], [address] (at " [place] [address]  [phone]    Foodmobile - Mobile Food Shelf  Foodmobiles travel throughout [place] and the [facility] to bring nutritious food to areas of high need. See list of locations here: https://[employer].org/events/      Map of [facility] Aid:  https://map.[facility].org/      Follow up:   Friday, May 26th at 10:00 am   Friday, June 23rd at 1:00 pm     Time spent with patient: 25 minutes.  MARISSA Gallegos, RD, LD

## 2023-04-26 NOTE — PROGRESS NOTES
"Glenna Spears is a 32 year old female who is being evaluated via a billable video visit.      The patient has been notified of following:     \"This video visit will be conducted via a call between you and your physician/provider. We have found that certain health care needs can be provided without the need for an in-person physical exam.  This service lets us provide the care you need with a video conversation.  If a prescription is necessary we can send it directly to your pharmacy.  If lab work is needed we can place an order for that and you can then stop by our lab to have the test done at a later time.    Video visits are billed at different rates depending on your insurance coverage.  Please reach out to your insurance provider with any questions.    If during the course of the call the physician/provider feels a video visit is not appropriate, you will not be charged for this service.\"    Patient has given verbal consent for Video visit? Yes  How would you like to obtain your AVS? MyChart  If you are dropped from the video visit, the video invite should be resent to: N/A for todays visit  Will anyone else be joining your video visit? No  {If patient encounters technical issues they should call [phone]      Video-Visit Details    Type of service:  Video Visit    Video Start Time: 11:07 am  Video End Time:  11:32 am    Originating Location (pt. Location): Home    Distant Location (provider location):  Offsite (providers home)     Platform used for Video Visit: MovieLine    During this virtual visit the patient is located in [place], patient verifies this as the location during the entirety of this visit.      Bariatric Nutrition Consultation Note    Reason For Visit: Nutrition Assessment    Glenna Spears is a 32 year old presenting today for bariatric nutrition consult.   Pt is interested in laparoscopic sleeve gastrectomy with Dr. Smith.     This is pt's 4th required nutrition visits prior to surgery.   - " "Patient also saw transplant RD 1/23/23    Pt referred by Gustabo Pat January 2023.    Coordination note:   Needs baseline labs  Needs Psych eval - in progress  Needs PCP letter of support  20 lb weight loss from initial  Surgeon meet and greet  Obtain clearances     CO-MORBIDITIES OF OBESITY INCLUDE:         View : No data to display.                SUPPORT:       View : No data to display.                 Patient reports good support system at home    ANTHROPOMETRICS:  Weight 1/23/23: 330 lbs    Estimated body mass index is 47.99 kg/m  as calculated from the following:    Height as of 3/23/23: 1.753 m (5' 9\").    Weight as of 3/23/23: 147.4 kg (325 lb).     Current weight: Dry weight is estimated 148 kg, about 325-326 lbs   - Was 153 kg, ~336 lbs when she got to Dialysis today           View : No data to display.                     View : No data to display.                SUPPLEMENT INFORMATION:  Phos Binder - is chewable (Velphoro)   Renal labs - High Phos but otherwise looking good per pt    Iron monitored at dialysis - gets infused as needed    Labs 2/8/23  Vit A high at 92.2  PTH, Intact high at 1,398.1 (coming down per pt - was really high in November, coming down monthly since)  TG elevated   A1C WNL  GFR 5    Labs 2/16/23 - per pt from [facility]  Albumin 4.2  En CPR 1.02  Pot 4.4   Calcium 9.9  Phos high at 8.8, was at 11.5 - previous phos binder was not helping per pt  PTH, Intact high  Hgb low 10.9   Glucose 75  Creatinine 10.98    NUTRITION HISTORY:  NKFA  \"Hates fish\"   Some lactose intolerance     Patient met with Ibis Guardado PA-C and transplant RD 1/23/23. Patient is interested in pursuing bariatric surgery.    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  Breakfast Not typical   Lunch 11a-12p: Joseph Call BF bowls with 2 pieces toast    Dinner Salad (6 pm- at work) w/ cheese, croutons, ranch + Greek yogurt    Snacks After work (11p-12a)- 1/2 frozen pizza, can of chili; partner may cook- " "spaghetti, chicken & veggies (40% meals made at home); reports she \"wants to cut that meal out\"     Snacks- when at home but did not disclose what types    Beverages Juice (8 oz or less/d), water, coffee (w/ creamer- reports not taking binder w/ this), was drinking more pop but less appealing w/ dialysis (8 oz or less/day- Mt Dew, but some Coke/Pepsi)   Alcohol None    Dining out 1x/week      Attended nutrition class today, 1/27/23.     Patient will meet with this writer 1:1 next month for first individualized appointment related to surgery     Did not have much time to talk today but patient did share the following information during class.     Very little pop  Alcohol: None     Nicotine use: yes, quit day is tomorrow     Alcohol use: none   ? Hx of misuse per pt. Now turns her off to even think about it. Aware of risks after surgery and does not plan to drink again.     Feb 2023:  Reviewed recent labs - monitoring with dialysis team. PTH trending down.    Reports she knows what to eat but is a \"food addict\" which makes it hard.    Trying to slow down - harder if watching TV. Finds she gets distracted and then will \"scarf food down\"    Current fluid restriction - 32 oz/day. Has been chewing on ice. Not  fluids from meal time yet.     Quit smoking a month ago - on one chantix per day currently.     Barriers to lifestyle change: more sugar cravings for food since quitting smoking    March 2023:  Phos up a little bit per pt. Hard because a lot of her favorite foods are higher phos (dairy) per pt. Also sometimes forgets to take her binders.     Reviewed options for liquid diet - pt will connect with renal team as well.    Fluid restriction of 32 oz but not sticking to per pt - hard since quitting smoking. Drinking 40-50 oz. Feels addicted to chewing ice now.    Taking Chantix still - prescribed previously by PCP.    Increased appetite since quitting smoke. Reports gaining weight right now instead of losing. " Aware she does not have a weight loss goal but really wants to lose weight right now to help before/post op.    Turned off by a lot of protein right now per pt. Does like chicken and cheese but trying to do less cheese.    Likes vegetables but  does not seek them out .    Barriers: cravings with quitting smoking - feels she is addicted to chewing ice now and is eating more, does not like a lot of meat per pt (hard to get protein needs met), all or nothing (hard to gradually change habits - feels it will be easier after surgery and is determined)     April 2023:  Appt started late today - pt forgot and not on video. Joined at 11:10 after text link sent.    At dialysis currently - extra session per pt.     Doing well with nicotine cessation. Has been 3 months now. Replace with eating and ice chips.   Increased water pills. Putting on a lot of liquids between dialysis per pt.     Typically eating pasta salad with canned chicken and falk.     Previous goals:  1. Talk with dialysis team about protein options post op   - List below (under surgery goals )   - Two other options I have seen patients use: OWYN Shakes and Beneprotein powder   2. Schedule follow up appt for 1 month from now  3. Consider utilizing a protein shake or powder to help increase protein in diet      Additional information:  Works starting at 3 pm  Dialysis T/R/Sat          View : No data to display.                     View : No data to display.                     View : No data to display.                EXERCISE:         View : No data to display.                NUTRITION DIAGNOSIS:  Obesity r/t long history of positive energy balance aeb BMI >30 kg/m2.    INTERVENTION:  Intervention Provided/Education Provided on post-op diet guidelines, vitamins/minerals essential post-operatively, GI anatomy of bariatric surgeries, ways to help prepare for post-op diet guidelines pre-operatively, portion/calorie-control, mindful eating and sources of protein.   Patient demonstrates understanding.     Personal barriers to making and continuing required life changes have been identified, and strategies to overcome those barriers have been recommended AND family and social supports have been assessed and strategies to strengthen those supports have been recommended.    Provided pt with list of goals, RD contact information and resources listed below via Disenia.            View : No data to display.                Expected Engagement: good    GOALS:  1. Check in with weight management team at [facility] regarding protein shake coupons.   2. Recommend checking in further with renal RD about fast, easy, renal friendly food ideas   3. Consider Protein + pasta such as Barilla.  4. Could consider plain greek yogurt as alternative to falk or sour cream    Keep up the hard work with nicotine cessation! You got this!    Other surgery goals:  Relating To Eating:  - Eat slowly (20-30 minutes per meal), chewing foods well (25 chews per bite/applesauce consistency)  - Focus on eating smaller portion sizes at meals and snacks    Relating to beverages:  - Reduce caffeine/carbonation/calorie containing beverages  - Separate fluids from meals by 30 minutes before, during, and after eating  - Drink 48-64 ounces of fluid per day. Small, frequent sips between meals.    Relating to activity:  Increase activity as able    Protein powders mixed with water:  Pure Protein whey protein (140 calories, 23 gm protein, 110 mg potassium, 80 mg phos)  Guy Sathish whey protein (vanilla/strawberry)(110 calories, 25 gm protein, 180 mg potassium)  Premiere Protein whey protein (vanilla) (150 calories, 30 gm protein, 160 mg potassium)  Integrated whey protein (vanilla/strawberry) (110-120 calories, 20 gm protein, 140-150 mg potassium, 125 mg phos)    Clear Liquids:  LiquaCel (1 oz/serv) - 100 calories, 16 gm protein, 10 mg potassium, 20 mg phos  Bipro (16.9 oz/serv) - 90 calories, 20 gm protein, 0 mg  potassium/phos  Gelatein 20 (4 oz/serv) (lime/orange/fruit punch/grape) - 80-90 calories, 20 gm protein, 120-180 mg potassium, 0 mg phos  Strawberry Banana Proti-15 Gelatin ([facility] e-store: https://St. John's Riverside HospitalCheapFlightsFinder/store) (4 oz/serv) - 70 calories, 16 gm protein, 55 mg potassium    Protein bars:  Pure Protein bars   Balance Bars  Zone protein      Post-op Diet Handouts:  Diet Guidelines after Weight-loss Surgery  [email or web address]     Your Stage 1 Diet: Clear Liquids  [email or web address]     Your Stage 2 Diet: Low-fat Full Liquids  [email or web address]     Your Stage 3 Diet: Pureed Foods  [email or web address]     Pureed Pleasures  [email or web address]://[email or web address]    Your Stage 4 Diet: Soft Foods  [email or web address]    Your Stage 5 Diet: Regular Foods  [email or web address]    Supplements after Sleeve Gastrectomy, Gastric Bypass or Single Anastomosis Duodenal Switch  [email or web address]://[email or web address]    Keeping Track of Fluids  [email or web address]    Hunger Solutions:   Call the [place] Food Help Line at 1-134.776.5070 to talk with a specialist in community and government food assistance programs. They can help you sign-up for SNAP benefits, find food color[place], food shelves and free food in your community and help refer you to any other community assistance programs that you qualify for.   https://www.hungersolutions.org/find-help/    SpendSmart,Eat Smart  ? Recipe ideas that are suppose to be more affordable  ? Calculator that allows you to compare product prices   https://spendsmart.extension.[facility]     Fare For All:  Provides discounted groceries. Up to 40% off retail pricing. You can preorder and  or buy in person, depending on the site. Visit their website for list of 38 locations in [place].  https://carolal.thefoodgroupmn.org/    Misfits Market  Get organic produce and sustainably sourced groceries delivered at up to 40% off  "grocery store prices.  https://www."[employer].AI Patents      [place] Health Department:  To find a map of food shelves and food distribution pop-ups. Visit www.[facility].gov/foodshelves    Market [place] Program: Spend $10 of EBT, get $10 free at farmers market.  To find map of farmers markets:  https://www.MusicnotessoBe[employer] Moca [employer] Technologyions.org/programs/market-bucks/farmersmarkets/    Supplemental Nutrition Assistance Program (SNAP):  [place]://[place].gov/dhs/people-we-serve/adults/economic-assistance/food-nutrition/programs-and-services/supplemental-nutrition-assistance-program.jsp    [facility] and [employer] Food Shelf:  [address] [place] [address]  Mon-Thurs 10am-4pm    May visit once per month     Items include meat, dairy, bread, and other food, hygiene, cleaning supplies and more. Pet food available upon request.    Additional \"Mini-Market\", parking lot at [facility] [facility]  o Free fruit, vegetables, salads, and deli items  o Tuesday & Thursday 9:00 a.m    Nutrition Assistance Program for Seniors (NAPS or  the senior box ).  o Eligibility: at least 60 years old & 130% Federal Poverty Guidelines.  o To apply, call [employer] [place] [phone].    Free Fresh Food Fridays - Summer Outdoor Distribution:  o Fruits & vegetables at [place], 2nd & 4th Fridays 9:30 a.m.  o May - September, rain or shine  https://[facility].org/helping-our-neighbors/support-everyday-life/community-food-shelf      [place] Food Shelves ([place]):  https://V2contact.org/food-shelves/    [place] Food Shelf  [address] (near [facility]), [place], [place] [id number] [phone]      [address] Food Shelf  [address] (at [facility]), [place], [place] [address] [phone]      Foodmobile - Mobile Food Shelf  Foodmobiles travel throughout [place] and the [facility] to bring nutritious food to areas of high need. See list of locations here: " https://cloudswave.org/events/      Map [facility] Aid:  https://map.[facility].org/      Follow up:   Friday, May 26th at 10:00 am   Friday, June 23rd at 1:00 pm     Time spent with patient: 25 minutes.  MARISSA Gallegos, RD, LD

## 2023-04-26 NOTE — PATIENT INSTRUCTIONS
GOALS:  1. Check in with weight management team at [facility] regarding protein shake coupons.   2. Recommend checking in further with renal RD about fast, easy, renal friendly food ideas   3. Consider Protein + pasta such as Barilla.  4. Could consider plain greek yogurt as alternative to falk or sour cream    Keep up the hard work with nicotine cessation! You got this!    Other surgery goals:  Relating To Eating:  - Eat slowly (20-30 minutes per meal), chewing foods well (25 chews per bite/applesauce consistency)  - Focus on eating smaller portion sizes at meals and snacks    Relating to beverages:  - Reduce caffeine/carbonation/calorie containing beverages  - Separate fluids from meals by 30 minutes before, during, and after eating  - Drink 48-64 ounces of fluid per day. Small, frequent sips between meals.    Relating to activity:  Increase activity as able    Protein powders mixed with water:  Pure Protein whey protein (140 calories, 23 gm protein, 110 mg potassium, 80 mg phos)  Guy Sathish whey protein (vanilla/strawberry)(110 calories, 25 gm protein, 180 mg potassium)  Premiere Protein whey protein (vanilla) (150 calories, 30 gm protein, 160 mg potassium)  Integrated whey protein (vanilla/strawberry) (110-120 calories, 20 gm protein, 140-150 mg potassium, 125 mg phos)    Clear Liquids:  LiquaCel (1 oz/serv) - 100 calories, 16 gm protein, 10 mg potassium, 20 mg phos  Bipro (16.9 oz/serv) - 90 calories, 20 gm protein, 0 mg potassium/phos  Gelatein 20 (4 oz/serv) (lime/orange/fruit punch/grape) - 80-90 calories, 20 gm protein, 120-180 mg potassium, 0 mg phos  Strawberry Banana Proti-15 Gelatin ([employer] e-store: https://BoardBookit/store) (4 oz/serv) - 70 calories, 16 gm protein, 55 mg potassium    Protein bars:  Pure Protein bars   Balance Bars  Zone protein      Post-op Diet Handouts:  Diet Guidelines after Weight-loss Surgery  [email or web address]     Your Stage 1 Diet: Clear  Liquids  [email or web address]     Your Stage 2 Diet: Low-fat Full Liquids  [email or web address]     Your Stage 3 Diet: Pureed Foods  [email or web address]://[email or web address]     Pureed Pleasures  [email or web address]://[email or web address]    Your Stage 4 Diet: Soft Foods  [email or web address]://[email or web address]    Your Stage 5 Diet: Regular Foods  [email or web address]    Supplements after Sleeve Gastrectomy, Gastric Bypass or Single Anastomosis Duodenal Switch  [email or web address]://FedBid/[email or web address]    Keeping Track of Fluids  [email or web address]://[email or web address].[email or web address]    Hunger Solutions:   Call the [place] Food Help Line at 1-761.441.5436 to talk with a specialist in community and government food assistance programs. They can help you sign-up for SNAP benefits, find food color[place], food shelves and free food in your community and help refer you to any other community assistance programs that you qualify for.   https://www.hungersolutions.org/find-help/    SpendSmart,Eat Smart  Recipe ideas that are suppose to be more affordable  Calculator that allows you to compare product prices   https://spendsmart.extension.[facility]     Fare For All:  Provides discounted groceries. Up to 40% off retail pricing. You can preorder and  or buy in person, depending on the site. Visit their website for list of 38 locations in [place].  https://The Thoughtful Bread Companyeforall.thefoodCarlsbad Medical Centermn.org/    [employer]  Get organic produce and sustainably sourced groceries delivered at up to 40% off grocery store prices.  https://www.KarmaKey.Yabbedoo      [place] Health Department:  To find a map of food shelves and food distribution pop-ups. Visit www.[facility].gov/foodshelves    Market [place] Program: Spend $10 of EBT, get $10 free at [employer].  To find map of farmers markets:  https://www.hungersolutions.org/programs/market-bucks/farmersmarkets/    Supplemental Nutrition Assistance Program  "(SNAP):  [place]://[place]/dhs/people-we-serve/adults/economic-assistance/food-nutrition/programs-and-services/supplemental-nutrition-assistance-program.jsp    [facility] Food Shelf:  [address]  Mon-Thurs 10am-4pm  May visit once per month   Items include meat, dairy, bread, and other food, hygiene, cleaning supplies and more. Pet food available upon request.  Additional \"Mini-Market\", parking lot at [address] [facility]  o Free fruit, vegetables, salads, and deli items  o Tuesday & Thursday 9:00 a.m  Nutrition Assistance Program for Seniors (NAPS or  the senior box ).  Eligibility: at least 60 years old & 130% Federal Poverty Guidelines.  To apply, call Second [place] [phone].  Free Fresh Food Fridays - Summer Outdoor Distribution:  Fruits & vegetables at [place], 2nd & 4th Fridays 9:30 a.m.  May - September, rain or shine  https://[facility].org/helping-our-neighbors/support-everyday-life/community-food-shelf      [place] Food Shelves ([place]):  https://QPD.org/food-shelves/  [place] Food Shelf  [address] (near [facility])[place] [address]  [phone]    [facility] Food Shelf  [address] (at [facility], [place] [id number] [phone]    Foodmobile - Mobile Food Shelf  Foodmobiles travel throughout [place] and the northern [facility] [facility] to bring nutritious food to areas of high need. See list of locations here: https://QPD.org/events/      Map of [facility] Aid:  https://map.[facility].org/      Follow up:   Friday, May 26th at 10:00 am   Friday, June 23rd at 1:00 pm     MARISSA Rubin, RD, LD  Clinic #: [phone]    "

## 2023-05-01 DIAGNOSIS — E66.01 CLASS 3 SEVERE OBESITY WITH SERIOUS COMORBIDITY AND BODY MASS INDEX (BMI) OF 50.0 TO 59.9 IN ADULT, UNSPECIFIED OBESITY TYPE (H): ICD-10-CM

## 2023-05-01 DIAGNOSIS — E66.813 CLASS 3 SEVERE OBESITY WITH SERIOUS COMORBIDITY AND BODY MASS INDEX (BMI) OF 50.0 TO 59.9 IN ADULT, UNSPECIFIED OBESITY TYPE (H): ICD-10-CM

## 2023-05-02 NOTE — PROGRESS NOTES
Virtual Visit Check-In    During this virtual visit the patient is located in [place], patient verifies this as the location during the entirety of this visit.     Glenna is a 32 year old who is being evaluated via a billable video visit.      How would you like to obtain your AVS? MyChart  If the video visit is dropped, the invitation should be resent by: Text to cell phone: [phone]  Will anyone else be joining your video visit? No      Video-Visit Details    Originating Location (pt. Location): Home  Distant Location (provider location):  Off-site  Platform used for Video Visit: Stacy Bunch, EMT

## 2023-05-03 ENCOUNTER — VIRTUAL VISIT (OUTPATIENT)
Dept: ENDOCRINOLOGY | Facility: CLINIC | Age: 33
End: 2023-05-03
Payer: COMMERCIAL

## 2023-05-03 VITALS — BODY MASS INDEX: 48.73 KG/M2 | WEIGHT: 293 LBS

## 2023-05-03 DIAGNOSIS — E66.01 MORBID OBESITY (H): Primary | ICD-10-CM

## 2023-05-03 DIAGNOSIS — E67.0 HIGH VITAMIN A LEVEL: ICD-10-CM

## 2023-05-03 PROCEDURE — 99214 OFFICE O/P EST MOD 30 MIN: CPT | Mod: VID | Performed by: PHYSICIAN ASSISTANT

## 2023-05-03 RX ORDER — SEMAGLUTIDE 0.25 MG/.5ML
INJECTION, SOLUTION SUBCUTANEOUS
Qty: 2 ML | Refills: 0 | OUTPATIENT
Start: 2023-05-03

## 2023-05-03 RX ORDER — SEMAGLUTIDE 1 MG/.5ML
1 INJECTION, SOLUTION SUBCUTANEOUS WEEKLY
Qty: 2 ML | Refills: 0 | Status: SHIPPED | OUTPATIENT
Start: 2023-05-03 | End: 2023-10-30 | Stop reason: DRUGHIGH

## 2023-05-03 NOTE — Clinical Note
Can you please see if we have received a fax from Donald Araujo with psych eval for this patient?  Is there a way to check rightfax and search by her name?  Thanks

## 2023-05-03 NOTE — NURSING NOTE
Chief Complaint   Patient presents with     Follow Up     Return weight management.         Vitals:    05/03/23 0823   Weight: 330 lb       Body mass index is 48.73 kg/m .      Mario Lorenzo, EMT  Surgery Clinic

## 2023-05-03 NOTE — Clinical Note
I increased pt to 1mg.  Her pharmacy is telling her she needs a new PA? Can you check into this please?  Thanks

## 2023-05-03 NOTE — LETTER
5/3/2023       RE: Glenna Spears  [address]  [place] [address]     Dear Colleague,    Thank you for referring your patient, Glenna Spears, to the [facility] WEIGHT MANAGEMENT [facility]INIC [place] at [facility]. Please see a copy of my visit note below.    Virtual Visit Check-In    During this virtual visit the patient is located in [place], patient verifies this as the location during the entirety of this visit.     Glenna is a 32 year old who is being evaluated via a billable video visit.      How would you like to obtain your AVS? MyChart  If the video visit is dropped, the invitation should be resent by: Text to cell phone: [phone]  Will anyone else be joining your video visit? No      Video-Visit Details    Originating Location (pt. Location): Home  Distant Location (provider location):  Off-site  Platform used for Video Visit: QAMAR Aldrich            Pre-Bariatric Surgery Note    No Ref-Primary, Physician    Date: 5/3/2023     RE: Glenna Spears    MR#: [id number]   : 1990   Date of Visit: May 3, 2023    REASON FOR VISIT: Preoperative evaluation for possible weight loss surgery    Dear Dr. Young Ref-Primary, Physician,    I had the pleasure of seeing your patient, Glenna Spears, in my preoperative bariatric clinic.    As you know, she has morbid obesity and is considering weight loss surgery to treat obesity in association with her medical conditions of obesity.  Her consult weight was 330 lbs.  She has lost 0 pounds since her consult weight. She has met her required pre-surgery weight. Please refer to initial consult note from date 23 for patient's weight history and co-morbidities.    ESRD on dialysis.  Pre sleeve with Dr Smith possibly July    Weight stable  Quit smoking  Off Wegovy for 2 weeks due to need PA Did well on 0.5mg dose  Completed 4  visits, next visit May and   Met Dr Smith 3/23/23  Completed RD  and RN online group   Letter from PCP and nephrologist complete. Dr Mar, [facility]   Nicotine level negative. Quit smoking Jan 2023    Assessment & Plan   Problem List Items Addressed This Visit          Endocrine Diagnoses    Morbid obesity (H) - Primary    Relevant Medications    Semaglutide-Weight Management (WEGOVY) 1 MG/0.5ML pen        Reviewed tasklist with patient yes    Most recent weights:  Wt Readings from Last 4 Encounters:   05/03/23 149.7 kg (330 lb)   03/23/23 147.4 kg (325 lb)   01/23/23 149.7 kg (330 lb)   01/23/23 149.7 kg (330 lb)         ROS    Past Medical History:   Diagnosis Date    Hypertension        No past surgical history on file.    Current Outpatient Medications   Medication    acetaminophen (TYLENOL) 325 MG tablet    albuterol (PROAIR HFA/PROVENTIL HFA/VENTOLIN HFA) 108 (90 Base) MCG/ACT inhaler    amLODIPine (NORVASC) 10 MG tablet    calcitRIOL (ROCALTROL) 0.5 MCG capsule    cholecalciferol 25 MCG (1000 UT) TABS    cinacalcet (SENSIPAR) 60 MG tablet    famotidine (PEPCID) 20 MG tablet    FEROSUL 325 (65 Fe) MG tablet    furosemide (LASIX) 40 MG tablet    gabapentin (NEURONTIN) 100 MG capsule    labetalol (NORMODYNE) 200 MG tablet    levonorgestrel (KYLEENA) 19.5 MG IUD    lidocaine-prilocaine (EMLA) 2.5-2.5 % external cream    Semaglutide-Weight Management (WEGOVY) 1 MG/0.5ML pen    sodium bicarbonate 650 MG tablet    varenicline (CHANTIX) 0.5 MG tablet    VELPHORO 500 MG CHEW chewable tablet     No current facility-administered medications for this visit.       Allergies   Allergen Reactions    Sulfa Antibiotics Other (See Comments)     Reaction as an infant- unsure of what happened    Nsaids Other (See Comments)     Has nephrotic range proteinuria and see nephrology's note.        External Order Results on 03/30/2023   Component Date Value Ref Range Status    Scan Lab Results (External) 03/30/2023 See Scanned Report   Final    Nicotine and Metabolites, Urine       PHYSICAL  EXAM:  Objective    Wt 149.7 kg (330 lb)   BMI 48.73 kg/m           Vitals:  No vitals were obtained today due to virtual visit.    Physical Exam   GENERAL: Healthy, alert and no distress  EYES: Eyes grossly normal to inspection.  No discharge or erythema, or obvious scleral/conjunctival abnormalities.  RESP: No audible wheeze, cough, or visible cyanosis.  No visible retractions or increased work of breathing.    SKIN: Visible skin clear. No significant rash, abnormal pigmentation or lesions.  NEURO: Cranial nerves grossly intact.  Mentation and speech appropriate for age.  PSYCH: Mentation appears normal, affect normal/bright, judgement and insight intact, normal speech and appearance well-groomed.    Sleeve Gastrectomy: Risks and Side Effects    The complications or risks of surgery include but are not limited to: death, heart attack, infection in the surgical site (wound infection), abdomen (abscess), bladder (urinary tract infection), lungs (pneumonia), clots in legs (deep vein thrombosis) or lungs (pulmonary emboli),  injury to the bowels or other organs, bowel obstruction, hernia at the incision and gastrointestional bleeding.    More specific risks related to vertical sleeve gastrectomy were detailed at the bariatric informational seminar and include the following: leak at the vertical sleeve staple line, leak at the anastomoses,  nausea, vomiting, and dehydration for several months,  adhesions causing bowel obstruction, rapid weight loss causing a higher rate of gallstone formation during the first 6 months after surgery, decreased absorption of vitamins and protein because of the reduced stomach size, weight regain if inappropriate food intake occurs, stricture, injury to other organs, hernia,  and ulcers.       Side effects of bariatric surgery include but are not limited to: abdominal pain, cramping, bloating, constipation, nausea, vomiting, diarrhea, difficulty swallowing,  dehydration, hair loss,  excess skin, protein, iron and vitamin deficiencies, heartburn, transfer of addictions, increased anxiety and worsening depression.       I emphasized exercise and activity behavior along with appropriate food choice as the main foundation for weight loss with surgery providing surgical reinforcement of the appropriate behavior set.        Review of general surgery weight loss process    1. Complete preoperative requirements, including weight loss.  Final weight check to confirm MANDATORY weight loss requirement must be documented on a clinic scale.    2. Discuss prior authorization with .    3. History and physical evaluation by PCP of PAC clinic within 30 days of surgery date, preoperative class, and weight check (weigh-in visit) to be scheduled by patient.  Pre-anesthesia clinic for risk evaluation to be scheduled by anesthesia clinic.    4. We cannot guarantee that patient will qualify for surgery unless all preoperative requirements are met, prior authorization from primary insurance company is granted, and insurance changes do not occur.    5. It is possible for patients to regain all weight after weight loss surgery unless they follow guidelines prescribed by our bariatric center.    6. All patients with gastrointestinal complaints after weight loss surgery must have complaints conveyed to the bariatric team for appropriate treatment.    7. Vitamin deficiencies may develop post-bariatric surgery and annual laboratory testing should be performed.    8. Persistent nausea/vomiting after bariatric surgery entails risk of thiamine deficiency and should be treated early.  Vitamin B12 deficiency may develop, especially after gastric bypass surgery and must be recognized.        If you have any questions about our plans please don't hesitate to contact me.    Sincerely,    Ibis Guardado PA-C      30 minutes spent by me on the date of the encounter doing chart review, history and exam,  documentation and further activities per the note

## 2023-05-09 ENCOUNTER — TELEPHONE (OUTPATIENT)
Dept: ENDOCRINOLOGY | Facility: CLINIC | Age: 33
End: 2023-05-09
Payer: COMMERCIAL

## 2023-05-09 NOTE — TELEPHONE ENCOUNTER
Returned phone call from pt's RD at dialysis, Dorcas.    Dorcas wanted to touch based regarding dietary changes and high phos level/PTH level. Patient has been taking her phos binders and coming to dialysis regularly. Due for monthly labs next week.     Dorcas reports pt appears to be struggling with making dietary changes - pt reports being someone that prefers to give up something cold turkey. Dorcas is concerned that habits might be hard to establish post op if not done prior. She is wondering how to best support patient.     Will send handouts of patient education to Dorcas so she can help support Glenna.     MARISSA Rubin, RD, LD  Clinic #: [phone]

## 2023-05-09 NOTE — TELEPHONE ENCOUNTER
Dorcas dialysis RN called and stated patient doesn't feel like her Wegovy is curbing her appetite enough, and she is not changing her diet and has not planned her diet enough around her phosphorus levels. Dorcas is concerned that patient does not understand enough about diet modifications pre/post surgery.     Patient scheduled to see Sydnee Street on 5/26. Will inform Sydnee of above and have her reach out to Dorcas regarding concerns.     Dorcas agrees with plan.

## 2023-05-23 DIAGNOSIS — E66.01 MORBID OBESITY (H): ICD-10-CM

## 2023-05-26 ENCOUNTER — VIRTUAL VISIT (OUTPATIENT)
Dept: ENDOCRINOLOGY | Facility: CLINIC | Age: 33
End: 2023-05-26
Payer: COMMERCIAL

## 2023-05-26 VITALS — BODY MASS INDEX: 44.41 KG/M2 | WEIGHT: 293 LBS | HEIGHT: 68 IN

## 2023-05-26 DIAGNOSIS — Z99.2 ESRD (END STAGE RENAL DISEASE) ON DIALYSIS (H): ICD-10-CM

## 2023-05-26 DIAGNOSIS — N18.6 ESRD (END STAGE RENAL DISEASE) ON DIALYSIS (H): ICD-10-CM

## 2023-05-26 DIAGNOSIS — Z71.3 NUTRITIONAL COUNSELING: Primary | ICD-10-CM

## 2023-05-26 DIAGNOSIS — E66.9 OBESITY: ICD-10-CM

## 2023-05-26 PROCEDURE — 99207 PR NO CHARGE LOS: CPT | Mod: GT | Performed by: DIETITIAN, REGISTERED

## 2023-05-26 PROCEDURE — 97803 MED NUTRITION INDIV SUBSEQ: CPT | Mod: GT | Performed by: DIETITIAN, REGISTERED

## 2023-05-26 RX ORDER — SEMAGLUTIDE 1 MG/.5ML
1 INJECTION, SOLUTION SUBCUTANEOUS WEEKLY
Qty: 2 ML | OUTPATIENT
Start: 2023-05-26

## 2023-05-26 ASSESSMENT — PAIN SCALES - GENERAL: PAINLEVEL: NO PAIN (0)

## 2023-05-26 ASSESSMENT — PATIENT HEALTH QUESTIONNAIRE - PHQ9: SUM OF ALL RESPONSES TO PHQ QUESTIONS 1-9: 10

## 2023-05-26 NOTE — LETTER
"5/26/2023       RE: Glenna Spears  [address]  [place] [address]     Dear Colleague,    Thank you for referring your patient, Glenna Spears, to the [facility] WEIGHT MANAGEMENT CLINIC [place] at [facility]. Please see a copy of my visit note below.    Glenna Spears is a 32 year old female who is being evaluated via a billable video visit.      The patient has been notified of following:     \"This video visit will be conducted via a call between you and your physician/provider. We have found that certain health care needs can be provided without the need for an in-person physical exam.  This service lets us provide the care you need with a video conversation.  If a prescription is necessary we can send it directly to your pharmacy.  If lab work is needed we can place an order for that and you can then stop by our lab to have the test done at a later time.    Video visits are billed at different rates depending on your insurance coverage.  Please reach out to your insurance provider with any questions.    If during the course of the call the physician/provider feels a video visit is not appropriate, you will not be charged for this service.\"    Patient has given verbal consent for Video visit? Yes  How would you like to obtain your AVS? MyChart  If you are dropped from the video visit, the video invite should be resent to: N/A for todays visit  Will anyone else be joining your video visit? No  {If patient encounters technical issues they should call [phone]      Video-Visit Details    Type of service:  Video Visit    Video Start Time: 10:08 am  Video End Time:  10:47 am    Originating Location (pt. Location): Home    Distant Location (provider location):  Offsite (providers home)     Platform used for Video Visit: Jibbigo    During this virtual visit the patient is located in [place], patient verifies this as the location during the entirety of this " "visit.      Bariatric Nutrition Consultation Note    Reason For Visit: Nutrition Assessment    Glenna Spears is a 32 year old presenting today for bariatric nutrition consult.   Pt is interested in laparoscopic sleeve gastrectomy with Dr. Smith.     This is pt's 5th required nutrition visits prior to surgery.   - Patient also saw transplant RD 1/23/23    Pt referred by ANAMARIA Pat on January 2023.    Coordination note:   Needs baseline labs  Needs Psych eval - in progress  Needs PCP letter of support - Done   20 lb weight loss from initial  Surgeon meet and greet - Done, Met Dr Smith 3/23/23  Obtain clearances     CO-MORBIDITIES OF OBESITY INCLUDE:         View : No data to display.                SUPPORT:       View : No data to display.                 Patient reports good support system at home    ANTHROPOMETRICS:  Weight 1/23/23: 330 lbs    Estimated body mass index is 49.42 kg/m  as calculated from the following:    Height as of this encounter: 1.727 m (5' 8\").    Weight as of this encounter: 147.4 kg (325 lb).     Current weight: Dry weight is estimated 148 kg, about 325-326 lbs          View : No data to display.                     View : No data to display.                SUPPLEMENT INFORMATION:  Phos Binder - is chewable (Velphoro)   Renal labs - High Phos but otherwise looking good per pt    Iron monitored at dialysis - gets infused as needed    Labs 2/8/23  Vit A high at 92.2  PTH, Intact high at 1,398.1 (coming down per pt - was really high in November, coming down monthly since)  TG elevated   A1C WNL  GFR 5    Labs 2/16/23 - per pt from [facility]  Albumin 4.2  En CPR 1.02  Pot 4.4   Calcium 9.9  Phos high at 8.8, was at 11.5 - previous phos binder was not helping per pt  PTH, Intact high  Hgb low 10.9   Glucose 75  Creatinine 10.98    NUTRITION HISTORY:  NKFA  \"Hates fish\"   Some lactose intolerance     Patient met with Ibis Guardado PA-C and transplant RD 1/23/23. Patient is interested in " "pursuing bariatric surgery.    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  Breakfast Not typical   Lunch 11a-12p: Joseph Call BF bowls with 2 pieces toast    Dinner Salad (6 pm- at work) w/ cheese, croutons, ranch + Greek yogurt    Snacks After work (11p-12a)- 1/2 frozen pizza, can of chili; partner may cook- spaghetti, chicken & veggies (40% meals made at home); reports she \"wants to cut that meal out\"     Snacks- when at home but did not disclose what types    Beverages Juice (8 oz or less/d), water, coffee (w/ creamer- reports not taking binder w/ this), was drinking more pop but less appealing w/ dialysis (8 oz or less/day- Mt Dew, but some Coke/Pepsi)   Alcohol None    Dining out 1x/week      Attended nutrition class 1/27/23.     Very little pop  Alcohol: None   Nicotine use: quit     Alcohol use: none   Hx of misuse per pt. Now turns her off to even think about it. Aware of risks after surgery and does not plan to drink again.     Feb 2023:  Reviewed recent labs - monitoring with dialysis team. PTH trending down.    Reports she knows what to eat but is a \"food addict\" which makes it hard.    Trying to slow down - harder if watching TV. Finds she gets distracted and then will \"scarf food down\"    Current fluid restriction - 32 oz/day. Has been chewing on ice. Not  fluids from meal time yet.     Quit smoking a month ago - on one chantix per day currently.     Barriers to lifestyle change: more sugar cravings for food since quitting smoking    March 2023:  Phos up a little bit per pt. Hard because a lot of her favorite foods are higher phos (dairy) per pt. Also sometimes forgets to take her binders.     Reviewed options for liquid diet - pt will connect with renal team as well.    Fluid restriction of 32 oz but not sticking to per pt - hard since quitting smoking. Drinking 40-50 oz. Feels addicted to chewing ice now.    Taking Chantix still - prescribed previously by PCP.    Increased appetite since " "quitting smoke. Reports gaining weight right now instead of losing. Aware she does not have a weight loss goal but really wants to lose weight right now to help before/post op.    Turned off by a lot of protein right now per pt. Does like chicken and cheese but trying to do less cheese.    Likes vegetables but  does not seek them out .    Barriers: cravings with quitting smoking - feels she is addicted to chewing ice now and is eating more, does not like a lot of meat per pt (hard to get protein needs met), all or nothing (hard to gradually change habits - feels it will be easier after surgery and is determined)     April 2023:  Appt started late today - pt forgot and not on video. Joined at 11:10 after text link sent.    At dialysis currently - extra session per pt.     Doing well with nicotine cessation. Has been 3 months now. Replace with eating and ice chips.   Increased water pills. Putting on a lot of liquids between dialysis per pt.     Typically eating pasta salad with canned chicken and falk.     May 2023:  Call from Dialysis RD on 5/9. \"Dorcas wanted to touch based regarding dietary changes and high phos level/PTH level. Due for monthly labs next week. Pt appears to be struggling with making dietary changes. Dorcas is concerned that habits might be hard to establish post op if not done prior. She is wondering how to best support patient.\"   - Taking binders regularly but food choices can be a barrier to lowering phos. Feels cheese is the biggest contributor in addition to processed food. Previously was addicted to dark pop, has cut out aside from occ sip.    Informed patient of conversation with Dorcas. Pt reports she is working on mindset changes right now but actual habit changes have been harder. Biggest barrier is time/energy in addition to addiction per pt. She feels food is her crutch - use to be addicted to nicotine. Also a hx of using marijuana and other drugs per pt. Discussed healthy coping mechanisms. " Pt wants to use exercise and music - swimming (once catheter is out), gym, walking.     Lower appetite with wegovy increase - has helped with smaller portions.     Aware her pace/chewing can be a concern. Wants to work on this.     Additional information:  Works starting at 3 pm  Dialysis T/R/Sat          View : No data to display.                     View : No data to display.                     View : No data to display.                EXERCISE:         View : No data to display.                NUTRITION DIAGNOSIS:  Obesity r/t long history of positive energy balance aeb BMI >30 kg/m2.    INTERVENTION:  Intervention Provided/Education Provided on post-op diet guidelines, vitamins/minerals essential post-operatively, GI anatomy of bariatric surgeries, ways to help prepare for post-op diet guidelines pre-operatively, portion/calorie-control, mindful eating and sources of protein.  Patient demonstrates understanding.     Personal barriers to making and continuing required life changes have been identified, and strategies to overcome those barriers have been recommended AND family and social supports have been assessed and strategies to strengthen those supports have been recommended.    Provided pt with list of goals, RD contact information and resources listed below via Avocadoâ„¢.            View : No data to display.                Expected Engagement: good    GOALS:  1. Journal intake for a a few days to a week to help identify sources of phosphorus in diet  2. Continue taking binders with food/fluids as approrpirate  3. Check in on chewing/pace. Aim for chewing to applesauce like consistency.    Other surgery goals:  Relating To Eating:  - Eat slowly (20-30 minutes per meal), chewing foods well (25 chews per bite/applesauce consistency)  - Focus on eating smaller portion sizes at meals and snacks    Relating to beverages:  - Reduce caffeine/carbonation/calorie containing beverages  - Separate fluids from meals by  30 minutes before, during, and after eating  - Drink 48-64 ounces of fluid per day. Small, frequent sips between meals.    Relating to activity:  Increase activity as able    Protein powders mixed with water:  Pure Protein whey protein (140 calories, 23 gm protein, 110 mg potassium, 80 mg phos)  Guy Sathish whey protein (vanilla/strawberry)(110 calories, 25 gm protein, 180 mg potassium)  Premiere Protein whey protein (vanilla) (150 calories, 30 gm protein, 160 mg potassium)  Integrated whey protein (vanilla/strawberry) (110-120 calories, 20 gm protein, 140-150 mg potassium, 125 mg phos)    Clear Liquids:  LiquaCel (1 oz/serv) - 100 calories, 16 gm protein, 10 mg potassium, 20 mg phos  Bipro (16.9 oz/serv) - 90 calories, 20 gm protein, 0 mg potassium/phos  Gelatein 20 (4 oz/serv) (lime/orange/fruit punch/grape) - 80-90 calories, 20 gm protein, 120-180 mg potassium, 0 mg phos  Strawberry Banana Proti-15 Gelatin ([facility] e-store: https://Gyros/store) (4 oz/serv) - 70 calories, 16 gm protein, 55 mg potassium    Protein bars:  Pure Protein bars   Balance Bars  Zone protein      Post-op Diet Handouts:  Diet Guidelines after Weight-loss Surgery  [email or web address]     Your Stage 1 Diet: Clear Liquids  [email or web address]     Your Stage 2 Diet: Low-fat Full Liquids  [email or web address]     Your Stage 3 Diet: Pureed Foods  [email or web address]     Pureed Pleasures  [email or web address]    Your Stage 4 Diet: Soft Foods  [email or web address]    Your Stage 5 Diet: Regular Foods  [email or web address]    Supplements after Sleeve Gastrectomy, Gastric Bypass or Single Anastomosis Duodenal Switch  [email or web address]    Keeping Track of Fluids  [email or web address]    Hunger Solutions:   Call the [place] Food Help Line at 1-312.926.8489 to talk with a specialist in community and government food assistance programs. They can  "help you sign-up for SNAP benefits, find food [place], food shelves and free food in your community and help refer you to any other community assistance programs that you qualify for.   https://www.AttributorsoVinveli.org/find-help/    SpendSmart,Eat Smart  Recipe ideas that are suppose to be more affordable  Calculator that allows you to compare product prices   https://spendsmart.extension.[facility]     Fare For All:  Provides discounted groceries. Up to 40% off retail pricing. You can preorder and  or buy in person, depending on the site. Visit their website for list of 38 locations in [place].  https://[employer].Geniuzz.org/    [employer] Market  Get organic produce and sustainably sourced groceries delivered at up to 40% off grocery store prices.  https://www.[employer]      [place] [facility]:  To find a map of food shelves and food distribution pop-ups. Visit www.[facility]/foodshelves    Market [place] Program: Spend $10 of EBT, get $10 free at [employer].  To find map of farmers markets:  https://www.Entelo.org/programs/market-bucks/farmersmarkets/    Supplemental Nutrition Assistance Program (SNAP):  https://[place].gov/dhs/people-we-serve/adults/economic-assistance/food-nutrition/programs-and-services/supplemental-nutrition-assistance-program.jsp    [facility] Food Shelf:  [address] [place] [address]  Mon-Thurs 10am-4pm  May visit once per month   Items include meat, dairy, bread, and other food, hygiene, cleaning supplies and more. Pet food available upon request.  Additional \"Mini-Market\", parking lot at [address] [place] [address]  o Free fruit, vegetables, salads, and deli items  o Tuesday & Thursday 9:00 a.m  Nutrition Assistance Program for Seniors (NAPS or  the senior box ).  Eligibility: at least 60 years old & 130% Federal Poverty Guidelines.  To apply, call [employer] [place] [phone].  Free Fresh Food Fridays - Summer Outdoor " Distribution:  Fruits & vegetables at [place], 2nd & 4th Fridays 9:30 a.m.  May - September, rain or shine  https://[facility].org/helping-our-neighbors/support-everyday-life/community-food-shelf      [place] Food Shelves ([place]):  https://DanceOn.org/food-shelves/  [place] Food Shelf  [address] (near [address] [facility])[place] [address]  [phone]    [facility] Food Shelf  [address] (at [facility] [place] [address]  [phone]    Foodmobile - Mobile Food Shelf  Foodmobiles travel throughout [place] and the Community [facility] to bring nutritious food to areas of high need. See list of locations here: https://DanceOn.org/events/      Map of [facility] Aid:  https://map.[facility].org/      Follow up:   Friday, June 23rd at 1:00 pm     Time spent with patient: 39 minutes.  MARISSA Gallegos, RD, LD

## 2023-05-26 NOTE — NURSING NOTE
Is the patient currently in the state of [place]? YES    Visit mode:VIDEO    If the visit is dropped, the patient can be reconnected by: VIDEO VISIT: Text to cell phone: [phone]    Will anyone else be joining the visit? NO      How would you like to obtain your AVS? MyChart    Are changes needed to the allergy or medication list? NO    Reason for visit: CATHERINE Hung, VF

## 2023-05-26 NOTE — TELEPHONE ENCOUNTER
Semaglutide-Weight Management (WEGOVY) 1 MG/0.5ML pen      Last Written Prescription Date:  5/3/23  Last Fill Quantity: 2ml,   # refills: 0  Last Office Visit : 5/3/23  Future Office visit:  none      Labs per Care Everywhere, 2/8/23:  Creatinine 0.57 - 1.11 mg/dL 9.88 High Panic         Resulting Agency  [facility]   Specimen Collected: 02/08/23 12:11 PM Last Resulted: 02/08/23 12:58 PM   Received From: [facility]  Result Received: 02/27/23 10:53 AM         Routing to provider because:  Abnormal creatinine     No future appt scheduled but was seen in the last 30 days. Scheduling has been notified to contact the pt for appointment.

## 2023-05-26 NOTE — PROGRESS NOTES
"Glenna Spears is a 32 year old female who is being evaluated via a billable video visit.      The patient has been notified of following:     \"This video visit will be conducted via a call between you and your physician/provider. We have found that certain health care needs can be provided without the need for an in-person physical exam.  This service lets us provide the care you need with a video conversation.  If a prescription is necessary we can send it directly to your pharmacy.  If lab work is needed we can place an order for that and you can then stop by our lab to have the test done at a later time.    Video visits are billed at different rates depending on your insurance coverage.  Please reach out to your insurance provider with any questions.    If during the course of the call the physician/provider feels a video visit is not appropriate, you will not be charged for this service.\"    Patient has given verbal consent for Video visit? Yes  How would you like to obtain your AVS? MyChart  If you are dropped from the video visit, the video invite should be resent to: N/A for todays visit  Will anyone else be joining your video visit? No  {If patient encounters technical issues they should call [phone]      Video-Visit Details    Type of service:  Video Visit    Video Start Time: 10:08 am  Video End Time:  10:47 am    Originating Location (pt. Location): Home    Distant Location (provider location):  Offsite (providers home)     Platform used for Video Visit: Avaz    During this virtual visit the patient is located in [place], patient verifies this as the location during the entirety of this visit.      Bariatric Nutrition Consultation Note    Reason For Visit: Nutrition Assessment    Glenna Spears is a 32 year old presenting today for bariatric nutrition consult.   Pt is interested in laparoscopic sleeve gastrectomy with Dr. Smith.     This is pt's 5th required nutrition visits prior to surgery.   - " "Patient also saw transplant RD 1/23/23    Pt referred by ANAMARIA Pat on January 2023.    Coordination note:   Needs baseline labs  Needs Psych eval - in progress  Needs PCP letter of support - Done   20 lb weight loss from initial  Surgeon meet and greet - Done, Met Dr Smith 3/23/23  Obtain clearances     CO-MORBIDITIES OF OBESITY INCLUDE:         View : No data to display.                SUPPORT:       View : No data to display.                 Patient reports good support system at home    ANTHROPOMETRICS:  Weight 1/23/23: 330 lbs    Estimated body mass index is 49.42 kg/m  as calculated from the following:    Height as of this encounter: 1.727 m (5' 8\").    Weight as of this encounter: 147.4 kg (325 lb).     Current weight: Dry weight is estimated 148 kg, about 325-326 lbs          View : No data to display.                     View : No data to display.                SUPPLEMENT INFORMATION:  Phos Binder - is chewable (Velphoro)   Renal labs - High Phos but otherwise looking good per pt    Iron monitored at dialysis - gets infused as needed    Labs 2/8/23  Vit A high at 92.2  PTH, Intact high at 1,398.1 (coming down per pt - was really high in November, coming down monthly since)  TG elevated   A1C WNL  GFR 5    Labs 2/16/23 - per pt from [facility]  Albumin 4.2  En CPR 1.02  Pot 4.4   Calcium 9.9  Phos high at 8.8, was at 11.5 - previous phos binder was not helping per pt  PTH, Intact high  Hgb low 10.9   Glucose 75  Creatinine 10.98    NUTRITION HISTORY:  NKFA  \"Hates fish\"   Some lactose intolerance     Patient met with Ibis Guardado PA-C and transplant RD 1/23/23. Patient is interested in pursuing bariatric surgery.    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  Breakfast Not typical   Lunch 11a-12p: Joseph Call BF bowls with 2 pieces toast    Dinner Salad (6 pm- at work) w/ cheese, croutons, ranch + Greek yogurt    Snacks After work (11p-12a)- 1/2 frozen pizza, can of chili; partner may cook- " "spaghetti, chicken & veggies (40% meals made at home); reports she \"wants to cut that meal out\"     Snacks- when at home but did not disclose what types    Beverages Juice (8 oz or less/d), water, coffee (w/ creamer- reports not taking binder w/ this), was drinking more pop but less appealing w/ dialysis (8 oz or less/day- Mt Dew, but some Coke/Pepsi)   Alcohol None    Dining out 1x/week      Attended nutrition class 1/27/23.     Very little pop  Alcohol: None   Nicotine use: quit     Alcohol use: none   ? Hx of misuse per pt. Now turns her off to even think about it. Aware of risks after surgery and does not plan to drink again.     Feb 2023:  Reviewed recent labs - monitoring with dialysis team. PTH trending down.    Reports she knows what to eat but is a \"food addict\" which makes it hard.    Trying to slow down - harder if watching TV. Finds she gets distracted and then will \"scarf food down\"    Current fluid restriction - 32 oz/day. Has been chewing on ice. Not  fluids from meal time yet.     Quit smoking a month ago - on one chantix per day currently.     Barriers to lifestyle change: more sugar cravings for food since quitting smoking    March 2023:  Phos up a little bit per pt. Hard because a lot of her favorite foods are higher phos (dairy) per pt. Also sometimes forgets to take her binders.     Reviewed options for liquid diet - pt will connect with renal team as well.    Fluid restriction of 32 oz but not sticking to per pt - hard since quitting smoking. Drinking 40-50 oz. Feels addicted to chewing ice now.    Taking Chantix still - prescribed previously by PCP.    Increased appetite since quitting smoke. Reports gaining weight right now instead of losing. Aware she does not have a weight loss goal but really wants to lose weight right now to help before/post op.    Turned off by a lot of protein right now per pt. Does like chicken and cheese but trying to do less cheese.    Likes vegetables but " " does not seek them out .    Barriers: cravings with quitting smoking - feels she is addicted to chewing ice now and is eating more, does not like a lot of meat per pt (hard to get protein needs met), all or nothing (hard to gradually change habits - feels it will be easier after surgery and is determined)     April 2023:  Appt started late today - pt forgot and not on video. Joined at 11:10 after text link sent.    At dialysis currently - extra session per pt.     Doing well with nicotine cessation. Has been 3 months now. Replace with eating and ice chips.   Increased water pills. Putting on a lot of liquids between dialysis per pt.     Typically eating pasta salad with canned chicken and falk.     May 2023:  Call from Dialysis RD on 5/9. \"Dorcas wanted to touch based regarding dietary changes and high phos level/PTH level. Due for monthly labs next week. Pt appears to be struggling with making dietary changes. Dorcas is concerned that habits might be hard to establish post op if not done prior. She is wondering how to best support patient.\"   - Taking binders regularly but food choices can be a barrier to lowering phos. Feels cheese is the biggest contributor in addition to processed food. Previously was addicted to dark pop, has cut out aside from occ sip.    Informed patient of conversation with Dorcas. Pt reports she is working on mindset changes right now but actual habit changes have been harder. Biggest barrier is time/energy in addition to addiction per pt. She feels food is her crutch - use to be addicted to nicotine. Also a hx of using marijuana and other drugs per pt. Discussed healthy coping mechanisms. Pt wants to use exercise and music - swimming (once catheter is out), gym, walking.     Lower appetite with wegovy increase - has helped with smaller portions.     Aware her pace/chewing can be a concern. Wants to work on this.     Additional information:  Works starting at 3 pm  Dialysis T/R/Sat          View " : No data to display.                     View : No data to display.                     View : No data to display.                EXERCISE:         View : No data to display.                NUTRITION DIAGNOSIS:  Obesity r/t long history of positive energy balance aeb BMI >30 kg/m2.    INTERVENTION:  Intervention Provided/Education Provided on post-op diet guidelines, vitamins/minerals essential post-operatively, GI anatomy of bariatric surgeries, ways to help prepare for post-op diet guidelines pre-operatively, portion/calorie-control, mindful eating and sources of protein.  Patient demonstrates understanding.     Personal barriers to making and continuing required life changes have been identified, and strategies to overcome those barriers have been recommended AND family and social supports have been assessed and strategies to strengthen those supports have been recommended.    Provided pt with list of goals, RD contact information and resources listed below via Tailster.            View : No data to display.                Expected Engagement: good    GOALS:  1. Journal intake for a a few days to a week to help identify sources of phosphorus in diet  2. Continue taking binders with food/fluids as approrpirate  3. Check in on chewing/pace. Aim for chewing to applesauce like consistency.    Other surgery goals:  Relating To Eating:  - Eat slowly (20-30 minutes per meal), chewing foods well (25 chews per bite/applesauce consistency)  - Focus on eating smaller portion sizes at meals and snacks    Relating to beverages:  - Reduce caffeine/carbonation/calorie containing beverages  - Separate fluids from meals by 30 minutes before, during, and after eating  - Drink 48-64 ounces of fluid per day. Small, frequent sips between meals.    Relating to activity:  Increase activity as able    Protein powders mixed with water:  Pure Protein whey protein (140 calories, 23 gm protein, 110 mg potassium, 80 mg phos)  Guy bah  protein (vanilla/strawberry)(110 calories, 25 gm protein, 180 mg potassium)  Premiere Protein whey protein (vanilla) (150 calories, 30 gm protein, 160 mg potassium)  Integrated whey protein (vanilla/strawberry) (110-120 calories, 20 gm protein, 140-150 mg potassium, 125 mg phos)    Clear Liquids:  LiquaCel (1 oz/serv) - 100 calories, 16 gm protein, 10 mg potassium, 20 mg phos  Bipro (16.9 oz/serv) - 90 calories, 20 gm protein, 0 mg potassium/phos  Gelatein 20 (4 oz/serv) (lime/orange/fruit punch/grape) - 80-90 calories, 20 gm protein, 120-180 mg potassium, 0 mg phos  Strawberry Banana Proti-15 Gelatin ( "[employer] [place] e-store: https://[facility]/store) (4 oz/serv) - 70 calories, 16 gm protein, 55 mg potassium    Protein bars:  Pure Protein bars   Balance Bars  Zone protein      Post-op Diet Handouts:  Diet Guidelines after Weight-loss Surgery  [email or web address]     Your Stage 1 Diet: Clear Liquids  [email or web address]     Your Stage 2 Diet: Low-fat Full Liquids  [email or web address]     Your Stage 3 Diet: Pureed Foods  [email or web address]     Pureed Pleasures  [email or web address]    Your Stage 4 Diet: Soft Foods  [email or web address]    Your Stage 5 Diet: Regular Foods  [email or web address]    Supplements after Sleeve Gastrectomy, Gastric Bypass or Single Anastomosis Duodenal Switch  [email or web address]    Keeping Track of Fluids  [email or web address]    Hunger Solutions:   Call the [place] Food Help Line at 1-469.181.2186 to talk with a specialist in community and government food assistance programs. They can help you sign-up for SNAP benefits, find food colorado, food shelves and free food in your community and help refer you to any other community assistance programs that you qualify for.   https://www.hungersolutions.org/find-help/    SpendSmart,Eat Smart  ? Recipe ideas that are suppose to be more  "affordable  ? Calculator that allows you to compare product prices   https://spendsmart.extension.[facility]     Fare For All:  Provides discounted groceries. Up to 40% off retail pricing. You can preorder and  or buy in person, depending on the site. Visit their website for list of 38 locations in [place].  https://fareforall.thefood"[employer]"[place].org/    [employer] Market  Get organic produce and sustainably sourced groceries delivered at up to 40% off grocery store prices.  https://www.[employer]      [facility]:  To find a map of food shelves and food distribution pop-ups. Visit www.[facility].gov/foodshelves    Market [place] Program: Spend $10 of EBT, get $10 free at [employer].  To find map of farmers markets:  https://www.Clipboard.org/programs/market-bucks/farmersmarkets/    Supplemental Nutrition Assistance Program (SNAP):  [place]://[place].gov/dhs/people-we-serve/adults/economic-assistance/food-nutrition/programs-and-services/supplemental-nutrition-assistance-program.jsp    [employer] Food Shelf:  [address] [place] [address]  Mon-Thurs 10am-4pm    May visit once per month     Items include meat, dairy, bread, and other food, hygiene, cleaning supplies and more. Pet food available upon request.    Additional \"Mini-Market\", parking lot at [address] [place] [address]  o Free fruit, vegetables, salads, and deli items  o Tuesday & Thursday 9:00 a.m    Nutrition Assistance Program for Seniors (NAPS or  the senior box ).  o Eligibility: at least 60 years old & 130% Federal Poverty Guidelines.  o To apply, call [employer] [place] [phone].    Free Fresh Food Fridays - Summer Outdoor Distribution:  o Fruits & vegetables at [place]/Phuc, 2nd & 4th Fridays 9:30 a.m.  o May - September, rain or shine  https://[employer]Express.org/helping-our-neighbors/support-everyday-life/community-food-shelf      [place] Food Shelves ([place]):  https://[employer].org/food-shelves/    [place] Food Shelf  [address] (near [facility]), [place] [address]  [phone]      [facility] Food Shelf  [address] [facility] (at [facility] [place] [address]  [phone]      Foodmobile - Mobile Food Shelf  Foodmobiles travel throughout [place] and the northern [facility] [place] to bring nutritious food to areas of high need. See list of locations here: https://[employer].org/events/      Map of [facility] Aid:  https://map.[facility].org/      Follow up:   Friday, June 23rd at 1:00 pm     Time spent with patient: 39 minutes.  MARISSA Gallegos, RD, LD

## 2023-05-29 NOTE — PATIENT INSTRUCTIONS
GOALS:  1. Journal intake for a a few days to a week to help identify sources of phosphorus in diet  2. Continue taking binders with food/fluids as approrpirate  3. Check in on chewing/pace. Aim for chewing to applesauce like consistency.    Other surgery goals:  Relating To Eating:  - Eat slowly (20-30 minutes per meal), chewing foods well (25 chews per bite/applesauce consistency)  - Focus on eating smaller portion sizes at meals and snacks    Relating to beverages:  - Reduce caffeine/carbonation/calorie containing beverages  - Separate fluids from meals by 30 minutes before, during, and after eating  - Drink 48-64 ounces of fluid per day. Small, frequent sips between meals.    Relating to activity:  Increase activity as able    Protein powders mixed with water:  Pure Protein whey protein (140 calories, 23 gm protein, 110 mg potassium, 80 mg phos)  Guy Sathish whey protein (vanilla/strawberry)(110 calories, 25 gm protein, 180 mg potassium)  Premiere Protein whey protein (vanilla) (150 calories, 30 gm protein, 160 mg potassium)  Integrated whey protein (vanilla/strawberry) (110-120 calories, 20 gm protein, 140-150 mg potassium, 125 mg phos)    Clear Liquids:  LiquaCel (1 oz/serv) - 100 calories, 16 gm protein, 10 mg potassium, 20 mg phos  Bipro (16.9 oz/serv) - 90 calories, 20 gm protein, 0 mg potassium/phos  Gelatein 20 (4 oz/serv) (lime/orange/fruit punch/grape) - 80-90 calories, 20 gm protein, 120-180 mg potassium, 0 mg phos  Strawberry Banana Proti-15 Gelatin ([facility] e-store: https://[employer] management/store) (4 oz/serv) - 70 calories, 16 gm protein, 55 mg potassium    Protein bars:  Pure Protein bars   Balance Bars  Zone protein      Post-op Diet Handouts:  Diet Guidelines after Weight-loss Surgery  [email or web address]     Your Stage 1 Diet: Clear Liquids  [email or web address]     Your Stage 2 Diet: Low-fat Full Liquids  [email or web address]     Your Stage 3 Diet: Pureed  Foods  [email or web address]     Pureed Pleasures  http://Physician Referral Network (PRN)/[email or web address]    Your Stage 4 Diet: Soft Foods  [email or web address]    Your Stage 5 Diet: Regular Foods  [email or web address]    Supplements after Sleeve Gastrectomy, Gastric Bypass or Single Anastomosis Duodenal Switch  https://Physician Referral Network (PRN)/777388.pdf    Keeping Track of Fluids  [email or web address]://[email or web address]    Hunger Solutions:   Call the [place] Food Help Line at 1-610.864.2640 to talk with a specialist in community and government food assistance programs. They can help you sign-up for SNAP benefits, find food colorado, food shelves and free food in your community and help refer you to any other community assistance programs that you qualify for.   https://www.MindJolt.org/find-help/    SpendSmart,Eat Smart  Recipe ideas that are suppose to be more affordable  Calculator that allows you to compare product prices   https://spendsmart.extension.[facility]     Fare For All:  Provides discounted groceries. Up to 40% off retail pricing. You can preorder and  or buy in person, depending on the site. Visit their website for list of 38 locations in [place].  https://GreenCage Securityeforall.thefoodUniversity of New Mexico Hospitalsmn.org/    [employer] Market  Get organic produce and sustainably sourced groceries delivered at up to 40% off grocery store prices.  https://www.Supercell.[employer]DAQ      [facility] Department:  To find a map of food shelves and food distribution pop-ups. Visit www.[facility].gov/foodshelves    Market [place] Program: Spend $10 of EBT, get $10 free at [employer].  To find map of farmers markets:  https://www.MindJolt.org/programs/market-bucks/farmersmarkets/    Supplemental Nutrition Assistance Program (SNAP):  [place]://[place]/dhs/people-we-serve/adults/economic-assistance/food-nutrition/programs-and-services/supplemental-nutrition-assistance-program.jsp    [facility] Food Shelf:  [address] [facility]  "[facility]  Mon-Thurs 10am-4pm  May visit once per month   Items include meat, dairy, bread, and other food, hygiene, cleaning supplies and more. Pet food available upon request.  Additional \"Mini-Market\", parking lot at [address] [facility]  o Free fruit, vegetables, salads, and deli items  o Tuesday & Thursday 9:00 a.m  Nutrition Assistance Program for Seniors (NAPS or  the senior box ).  Eligibility: at least 60 years old & 130% Federal Poverty Guidelines.  To apply, call [employer] [phone].  Free Fresh Food Fridays - Summer Outdoor Distribution:  Fruits & vegetables at [place]/Phuc, 2nd & 4th Fridays 9:30 a.m.  May - September, rain or shine  https://[facility] Hospital[facility]reak.org/helping-our-neighbors/support-everyday-life/community-food-shelf      [place] Food Shelves ([place]):  https://[employer].org/food-shelves/  [place] Food Shelf  [address] [facility] [address] (near [facility])[place] [address]  [phone]    [facility] Food Shelf  [address] [facility] (at [facility])[place], [place] [address] [phone]    Foodmobile - Mobile Food Shelf  Foodmobiles travel throughout [place] and the northern [facility] to bring nutritious food to areas of high need. See list of locations here: https://[employer].org/events/      Map of [facility] Aid:  https://map.[facility].org/      Follow up:   Friday, June 23rd at 1:00 pm     MARISSA Rubin, RD, LD  Clinic #: [phone]    "

## 2023-06-02 DIAGNOSIS — E66.01 MORBID OBESITY (H): Primary | ICD-10-CM

## 2023-06-02 NOTE — TELEPHONE ENCOUNTER
Per Ibis Guardado PA-C, ok to increase Wegovy to 1.7mg. Creatinine level is her normal baseline. Routed to provider for sign off.

## 2023-06-22 NOTE — PROGRESS NOTES
"Glenna Spears is a 32 year old female who is being evaluated via a billable video visit.      The patient has been notified of following:     \"This video visit will be conducted via a call between you and your physician/provider. We have found that certain health care needs can be provided without the need for an in-person physical exam.  This service lets us provide the care you need with a video conversation.  If a prescription is necessary we can send it directly to your pharmacy.  If lab work is needed we can place an order for that and you can then stop by our lab to have the test done at a later time.    Video visits are billed at different rates depending on your insurance coverage.  Please reach out to your insurance provider with any questions.    If during the course of the call the physician/provider feels a video visit is not appropriate, you will not be charged for this service.\"    Patient has given verbal consent for Video visit? Yes  How would you like to obtain your AVS? MyChart  If you are dropped from the video visit, the video invite should be resent to: N/A for todays visit  Will anyone else be joining your video visit? No  {If patient encounters technical issues they should call [phone]      Video-Visit Details    Type of service:  Video Visit    Video Start Time: 1:00 pm  Video End Time:  1:24 pm    Originating Location (pt. Location): Home    Distant Location (provider location):  Offsite (providers home)     Platform used for Video Visit: Qello    During this virtual visit the patient is located in [place], patient verifies this as the location during the entirety of this visit.      Bariatric Nutrition Consultation Note    Reason For Visit: Nutrition Assessment    Glenna Spears is a 32 year old presenting today for bariatric nutrition consult.   Pt is interested in laparoscopic sleeve gastrectomy with Dr. Smith.     This is pt's 6th required nutrition visits prior to surgery.   - Patient " "also saw transplant RD 1/23/23    Pt referred by ANAMARIA Pat on January 2023.    Coordination note:   Needs baseline labs  Needs Psych eval - Done  Needs PCP letter of support - Done   20 lb weight loss from initial  Surgeon meet and jovon - Done, Met Dr Smith 3/23/23  Obtain clearances   Nicotine cessation - Met    CO-MORBIDITIES OF OBESITY INCLUDE:         No data to display                SUPPORT:       No data to display                 Patient reports good support system at home    ANTHROPOMETRICS:  Weight 1/23/23: 330 lbs    Estimated body mass index is 49.26 kg/m  as calculated from the following:    Height as of this encounter: 1.727 m (5' 8\").    Weight as of this encounter: 147 kg (324 lb).     Current weight: 324 lbs, pt report         No data to display                     No data to display                SUPPLEMENT INFORMATION:  Phos Binder - is chewable (Velphoro)   Renal labs - High Phos but otherwise looking good per pt    Iron monitored at dialysis - gets infused as needed    Labs 2/8/23  Vit A high at 92.2  PTH, Intact high at 1,398.1 (coming down per pt - was really high in November, coming down monthly since)  TG elevated   A1C WNL  GFR 5    Labs 2/16/23 - per pt from [facility]  Albumin 4.2  En CPR 1.02  Pot 4.4   Calcium 9.9  Phos high at 8.8, was at 11.5 - previous phos binder was not helping per pt  PTH, Intact high  Hgb low 10.9   Glucose 75  Creatinine 10.98    Labs 6/22/23  Phos 8.0   Pot 4.0  Albumin 4.2  Calcium 10.1  PTH high but continues to be lower than usual  Hgb 11.3    NUTRITION HISTORY:  NKFA  \"Hates fish\"   Some lactose intolerance     Patient met with Ibis Guardado PA-C and transplant RD 1/23/23. Patient is interested in pursuing bariatric surgery.    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  Breakfast Not typical   Lunch 11a-12p: Joseph [employer] BF bowls with 2 pieces toast    Dinner Salad (6 pm- at work) w/ cheese, croutons, ranch + Greek yogurt    Snacks After work " "(11p-12a)- 1/2 frozen pizza, can of chili; partner may cook- spaghetti, chicken & veggies (40% meals made at home); reports she \"wants to cut that meal out\"     Snacks- when at home but did not disclose what types    Beverages Juice (8 oz or less/d), water, coffee (w/ creamer- reports not taking binder w/ this), was drinking more pop but less appealing w/ dialysis (8 oz or less/day- Mt Dew, but some Coke/Pepsi)   Alcohol None    Dining out 1x/week      Attended nutrition class 1/27/23.     Very little pop  Alcohol: None   Nicotine use: quit     Alcohol use: none   ? Hx of misuse per pt. Now turns her off to even think about it. Aware of risks after surgery and does not plan to drink again.     Feb 2023:  Reviewed recent labs - monitoring with dialysis team. PTH trending down.    Reports she knows what to eat but is a \"food addict\" which makes it hard.    Trying to slow down - harder if watching TV. Finds she gets distracted and then will \"scarf food down\"    Current fluid restriction - 32 oz/day. Has been chewing on ice. Not  fluids from meal time yet.     Quit smoking a month ago - on one chantix per day currently.     Barriers to lifestyle change: more sugar cravings for food since quitting smoking    March 2023:  Phos up a little bit per pt. Hard because a lot of her favorite foods are higher phos (dairy) per pt. Also sometimes forgets to take her binders.     Reviewed options for liquid diet - pt will connect with renal team as well.    Fluid restriction of 32 oz but not sticking to per pt - hard since quitting smoking. Drinking 40-50 oz. Feels addicted to chewing ice now.    Taking Chantix still - prescribed previously by PCP.    Increased appetite since quitting smoke. Reports gaining weight right now instead of losing. Aware she does not have a weight loss goal but really wants to lose weight right now to help before/post op.    Turned off by a lot of protein right now per pt. Does like chicken and " "cheese but trying to do less cheese.    Likes vegetables but  does not seek them out .    Barriers: cravings with quitting smoking - feels she is addicted to chewing ice now and is eating more, does not like a lot of meat per pt (hard to get protein needs met), all or nothing (hard to gradually change habits - feels it will be easier after surgery and is determined)     April 2023:  Appt started late today - pt forgot and not on video. Joined at 11:10 after text link sent.    At dialysis currently - extra session per pt.     Doing well with nicotine cessation. Has been 3 months now. Replace with eating and ice chips.   Increased water pills. Putting on a lot of liquids between dialysis per pt.     Typically eating pasta salad with canned chicken and falk.     May 2023:  Call from Dialysis RD on 5/9. \"Dorcas wanted to touch based regarding dietary changes and high phos level/PTH level. Due for monthly labs next week. Pt appears to be struggling with making dietary changes. Dorcas is concerned that habits might be hard to establish post op if not done prior. She is wondering how to best support patient.\"   - Taking binders regularly but food choices can be a barrier to lowering phos. Feels cheese is the biggest contributor in addition to processed food. Previously was addicted to dark pop, has cut out aside from occ sip.    Informed patient of conversation with Dorcas. Pt reports she is working on mindset changes right now but actual habit changes have been harder. Biggest barrier is time/energy in addition to addiction per pt. She feels food is her crutch - use to be addicted to nicotine. Also a hx of using marijuana and other drugs per pt. Discussed healthy coping mechanisms. Pt wants to use exercise and music - swimming (once catheter is out), gym, walking.     Lower appetite with wegovy increase - has helped with smaller portions.     Aware her pace/chewing can be a concern. Wants to work on this.     June 2023:  Things " going well overall per pt.  Increased dose of wegovy to 1.7 and is working well now. Some nausea.  Smaller portions.Trying to eat nutrient dense foods to help her feel better.     Phos lowest it has been in a long time. Decreasing cheese and chocolate intake in particular.    Eating Greek yogurt, nutrigrain bars, chicken salad, salads. Also doing more fruit      Working on decreasing fluids per fluid restriction.   Drinks water and occ juice.    Stressors: possible insurance changes    [facility] [place] while son rides hover board    Previous goals:  1. Journal intake for a a few days to a week to help identify sources of phosphorus in diet - Discussed. Tracked a couple days  2. Continue taking binders with food/fluids as appropriate - Discussed. Taking with most meals. Forgetting to take with coffee.  3. Check in on chewing/pace. Aim for chewing to applesauce like consistency. - Met, easier with wegovy due to earlier satiety and less hunger as whole.    Additional information:  Works starting at 3 pm  Dialysis T/R/Sat          No data to display                     No data to display                     No data to display                EXERCISE:         No data to display                NUTRITION DIAGNOSIS:  Obesity r/t long history of positive energy balance aeb BMI >30 kg/m2.    INTERVENTION:  Intervention Provided/Education Provided on post-op diet guidelines, vitamins/minerals essential post-operatively, GI anatomy of bariatric surgeries, ways to help prepare for post-op diet guidelines pre-operatively, portion/calorie-control, mindful eating and sources of protein.  Patient demonstrates understanding.     Personal barriers to making and continuing required life changes have been identified, and strategies to overcome those barriers have been recommended AND family and social supports have been assessed and strategies to strengthen those supports have  been recommended.    Provided pt with list of goals, RD contact information and resources listed below via E-Blinkt.            No data to display                Expected Engagement: good    GOALS:  1. Aim to try to take phos binders with coffee beverage    Could consider Greek yogurt instead of falk/sour cream. Could consider Barilla protein+ pasta instead of regular pasta.    Other surgery goals:  Relating To Eating:  - Eat slowly (20-30 minutes per meal), chewing foods well (25 chews per bite/applesauce consistency)  - Focus on eating smaller portion sizes at meals and snacks    Relating to beverages:  - Reduce caffeine/carbonation/calorie containing beverages  - Separate fluids from meals by 30 minutes before, during, and after eating  - Drink 48-64 ounces of fluid per day. Small, frequent sips between meals.    Relating to activity:  Increase activity as able    Protein powders mixed with water:  Pure Protein whey protein (140 calories, 23 gm protein, 110 mg potassium, 80 mg phos)  Guy Sathish whey protein (vanilla/strawberry)(110 calories, 25 gm protein, 180 mg potassium)  Premiere Protein whey protein (vanilla) (150 calories, 30 gm protein, 160 mg potassium)  Integrated whey protein (vanilla/strawberry) (110-120 calories, 20 gm protein, 140-150 mg potassium, 125 mg phos)    Clear Liquids:  LiquaCel (1 oz/serv) - 100 calories, 16 gm protein, 10 mg potassium, 20 mg phos  Bipro (16.9 oz/serv) - 90 calories, 20 gm protein, 0 mg potassium/phos  Gelatein 20 (4 oz/serv) (lime/orange/fruit punch/grape) - 80-90 calories, 20 gm protein, 120-180 mg potassium, 0 mg phos  Strawberry Banana Proti-15 Gelatin (Queryday e-store: https://[employer]store) (4 oz/serv) - 70 calories, 16 gm protein, 55 mg potassium    Protein bars:  Pure Protein bars   Balance Bars  Zone protein      Post-op Diet Handouts:  Diet Guidelines after Weight-loss Surgery  [email or web address]     Your Stage 1 Diet: Clear  Liquids  [email or web address]     Your Stage 2 Diet: Low-fat Full Liquids  [email or web address]     Your Stage 3 Diet: Pureed Foods  [email or web address]     Pureed Pleasures  [email or web address]    Your Stage 4 Diet: Soft Foods  [email or web address]    Your Stage 5 Diet: Regular Foods  [email or web address]    Supplements after Sleeve Gastrectomy, Gastric Bypass or Single Anastomosis Duodenal Switch  [email or web address]    Keeping Track of Fluids  [email or web address]    Hunger Solutions:   Call the [place] Food Help Line at 1-240.886.7158 to talk with a specialist in community and government food assistance programs. They can help you sign-up for SNAP benefits, find food [place], food shelves and free food in your community and help refer you to any other community assistance programs that you qualify for.   https://www.Euroceptions.org/find-help/    SpendSmart,Eat Smart  ? Recipe ideas that are suppose to be more affordable  ? Calculator that allows you to compare product prices   https://spendsmart.extension.[facility]     Fare For All:  Provides discounted groceries. Up to 40% off retail pricing. You can preorder and  or buy in person, depending on the site. Visit their website for list of 38 locations in [place].  https://[employer] Corporationeforall.thefoodTuba City Regional Health Care Corporationmn.org/    [employer]  Get organic produce and sustainably sourced groceries delivered at up to 40% off grocery store prices.  https://www.WiDaPeople.Can'tWait      [facility] Department:  To find a map of food shelves and food distribution pop-ups. Visit www.[facility].gov/foodshelves    Market [place] Program: Spend $10 of EBT, get $10 free at [employer].  To find map of farmers markets:  https://www.hungersoQ-Botions.org/programs/market-bucks/farmersPlasticellets/    Supplemental Nutrition Assistance Program  "(SNAP):  https://[place].gov/dhs/people-we-serve/adults/economic-assistance/food-nutrition/programs-and-services/supplemental-nutrition-assistance-program.jsp    [facility] Food Shelf:  [address]  Mon-Thurs 10am-4pm    May visit once per month     Items include meat, dairy, bread, and other food, hygiene, cleaning supplies and more. Pet food available upon request.    Additional \"Mini-Market\", parking lot at [facility]  o Free fruit, vegetables, salads, and deli items  o Tuesday & Thursday 9:00 a.m    Nutrition Assistance Program for Seniors (NAPS or  the senior box ).  o Eligibility: at least 60 years old & 130% Federal Poverty Guidelines.  o To apply, call [facility] [place] [phone].    Free Fresh Food Fridays - Summer Outdoor Distribution:  o Fruits & vegetables at [place], 2nd & 4th Fridays 9:30 a.m.  o May - September, rain or shine  https://[facility] Genesis.org/helping-our-neighbors/support-everyday-life/community-food-shelf      [place] Food Shelves ([place]):  https://Momox.org/food-shelves/    [place] Food Shelf  [address] (near [facility] [address]  [phone]      [facility] Food Shelf  [address] (at [facility] [address]  [phone]      Foodmobile - Mobile Food Shelf  Foodmobiles travel throughout [place] and the northern [facility] to bring nutritious food to areas of high need. See list of locations here: https://Momox.org/events/      Map of [facility] Aid:  https://map.[facility].org/      Follow up:   Friday, July 28th at 1:00 pm    Time spent with patient: 24 minutes.  Sydnee Street, GEORGID, RD, LD  "

## 2023-06-23 ENCOUNTER — VIRTUAL VISIT (OUTPATIENT)
Dept: ENDOCRINOLOGY | Facility: CLINIC | Age: 33
End: 2023-06-23
Payer: COMMERCIAL

## 2023-06-23 VITALS — WEIGHT: 293 LBS | HEIGHT: 68 IN | BODY MASS INDEX: 44.41 KG/M2

## 2023-06-23 DIAGNOSIS — E66.9 OBESITY: ICD-10-CM

## 2023-06-23 DIAGNOSIS — Z71.3 NUTRITIONAL COUNSELING: Primary | ICD-10-CM

## 2023-06-23 DIAGNOSIS — N18.6 ESRD (END STAGE RENAL DISEASE) ON DIALYSIS (H): ICD-10-CM

## 2023-06-23 DIAGNOSIS — Z99.2 ESRD (END STAGE RENAL DISEASE) ON DIALYSIS (H): ICD-10-CM

## 2023-06-23 PROCEDURE — 99207 PR NO CHARGE LOS: CPT | Mod: GT | Performed by: DIETITIAN, REGISTERED

## 2023-06-23 PROCEDURE — 97803 MED NUTRITION INDIV SUBSEQ: CPT | Mod: GT | Performed by: DIETITIAN, REGISTERED

## 2023-06-23 ASSESSMENT — PAIN SCALES - GENERAL: PAINLEVEL: NO PAIN (0)

## 2023-06-23 NOTE — LETTER
"6/23/2023       RE: Glenna Spears  [address]  [place] [address]     Dear Colleague,    Thank you for referring your patient, Glenna Spears, to the [facility] [place] [facility]. Please see a copy of my visit note below.    Glenna Spears is a 32 year old female who is being evaluated via a billable video visit.      The patient has been notified of following:     \"This video visit will be conducted via a call between you and your physician/provider. We have found that certain health care needs can be provided without the need for an in-person physical exam.  This service lets us provide the care you need with a video conversation.  If a prescription is necessary we can send it directly to your pharmacy.  If lab work is needed we can place an order for that and you can then stop by our lab to have the test done at a later time.    Video visits are billed at different rates depending on your insurance coverage.  Please reach out to your insurance provider with any questions.    If during the course of the call the physician/provider feels a video visit is not appropriate, you will not be charged for this service.\"    Patient has given verbal consent for Video visit? Yes  How would you like to obtain your AVS? MyChart  If you are dropped from the video visit, the video invite should be resent to: N/A for todays visit  Will anyone else be joining your video visit? No  {If patient encounters technical issues they should call [phone]      Video-Visit Details    Type of service:  Video Visit    Video Start Time: 1:00 pm  Video End Time:  1:24 pm    Originating Location (pt. Location): Home    Distant Location (provider location):  Offsite (providers home)     Platform used for Video Visit: Abeelo    During this virtual visit the patient is located in [place], patient verifies this as the location during the entirety of this visit. " "     Bariatric Nutrition Consultation Note    Reason For Visit: Nutrition Assessment    Glenna Spears is a 32 year old presenting today for bariatric nutrition consult.   Pt is interested in laparoscopic sleeve gastrectomy with Dr. Smith.     This is pt's 6th required nutrition visits prior to surgery.   - Patient also saw transplant RD 1/23/23    Pt referred by ANAMARIA Pat on January 2023.    Coordination note:   Needs baseline labs  Needs Psych eval - Done  Needs PCP letter of support - Done   20 lb weight loss from initial  Surgeon meet and greet - Done, Met Dr Smith 3/23/23  Obtain clearances   Nicotine cessation - Met    CO-MORBIDITIES OF OBESITY INCLUDE:         No data to display                SUPPORT:       No data to display                 Patient reports good support system at home    ANTHROPOMETRICS:  Weight 1/23/23: 330 lbs    Estimated body mass index is 49.26 kg/m  as calculated from the following:    Height as of this encounter: 1.727 m (5' 8\").    Weight as of this encounter: 147 kg (324 lb).     Current weight: 324 lbs, pt report         No data to display                     No data to display                SUPPLEMENT INFORMATION:  Phos Binder - is chewable (Velphoro)   Renal labs - High Phos but otherwise looking good per pt    Iron monitored at dialysis - gets infused as needed    Labs 2/8/23  Vit A high at 92.2  PTH, Intact high at 1,398.1 (coming down per pt - was really high in November, coming down monthly since)  TG elevated   A1C WNL  GFR 5    Labs 2/16/23 - per pt from [facility]  Albumin 4.2  En CPR 1.02  Pot 4.4   Calcium 9.9  Phos high at 8.8, was at 11.5 - previous phos binder was not helping per pt  PTH, Intact high  Hgb low 10.9   Glucose 75  Creatinine 10.98    Labs 6/22/23  Phos 8.0   Pot 4.0  Albumin 4.2  Calcium 10.1  PTH high but continues to be lower than usual  Hgb 11.3    NUTRITION HISTORY:  NKFA  \"Hates fish\"   Some lactose intolerance     Patient met with [facility] " "JULIO Guardado and transplant RD 1/23/23. Patient is interested in pursuing bariatric surgery.    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  Breakfast Not typical   Lunch 11a-12p: Joseph Call BF bowls with 2 pieces toast    Dinner Salad (6 pm- at work) w/ cheese, croutons, ranch + Greek yogurt    Snacks After work (11p-12a)- 1/2 frozen pizza, can of chili; partner may cook- spaghetti, chicken & veggies (40% meals made at home); reports she \"wants to cut that meal out\"     Snacks- when at home but did not disclose what types    Beverages Juice (8 oz or less/d), water, coffee (w/ creamer- reports not taking binder w/ this), was drinking more pop but less appealing w/ dialysis (8 oz or less/day- Mt Dew, but some Coke/Pepsi)   Alcohol None    Dining out 1x/week      Attended nutrition class 1/27/23.     Very little pop  Alcohol: None   Nicotine use: quit     Alcohol use: none   Hx of misuse per pt. Now turns her off to even think about it. Aware of risks after surgery and does not plan to drink again.     Feb 2023:  Reviewed recent labs - monitoring with dialysis team. PTH trending down.    Reports she knows what to eat but is a \"food addict\" which makes it hard.    Trying to slow down - harder if watching TV. Finds she gets distracted and then will \"scarf food down\"    Current fluid restriction - 32 oz/day. Has been chewing on ice. Not  fluids from meal time yet.     Quit smoking a month ago - on one chantix per day currently.     Barriers to lifestyle change: more sugar cravings for food since quitting smoking    March 2023:  Phos up a little bit per pt. Hard because a lot of her favorite foods are higher phos (dairy) per pt. Also sometimes forgets to take her binders.     Reviewed options for liquid diet - pt will connect with renal team as well.    Fluid restriction of 32 oz but not sticking to per pt - hard since quitting smoking. Drinking 40-50 oz. Feels addicted to chewing ice now.    Taking Chantix " "still - prescribed previously by PCP.    Increased appetite since quitting smoke. Reports gaining weight right now instead of losing. Aware she does not have a weight loss goal but really wants to lose weight right now to help before/post op.    Turned off by a lot of protein right now per pt. Does like chicken and cheese but trying to do less cheese.    Likes vegetables but  does not seek them out .    Barriers: cravings with quitting smoking - feels she is addicted to chewing ice now and is eating more, does not like a lot of meat per pt (hard to get protein needs met), all or nothing (hard to gradually change habits - feels it will be easier after surgery and is determined)     April 2023:  Appt started late today - pt forgot and not on video. Joined at 11:10 after text link sent.    At dialysis currently - extra session per pt.     Doing well with nicotine cessation. Has been 3 months now. Replace with eating and ice chips.   Increased water pills. Putting on a lot of liquids between dialysis per pt.     Typically eating pasta salad with canned chicken and falk.     May 2023:  Call from Dialysis RD on 5/9. \"Dorcas wanted to touch based regarding dietary changes and high phos level/PTH level. Due for monthly labs next week. Pt appears to be struggling with making dietary changes. Dorcas is concerned that habits might be hard to establish post op if not done prior. She is wondering how to best support patient.\"   - Taking binders regularly but food choices can be a barrier to lowering phos. Feels cheese is the biggest contributor in addition to processed food. Previously was addicted to dark pop, has cut out aside from occ sip.    Informed patient of conversation with Dorcas. Pt reports she is working on mindset changes right now but actual habit changes have been harder. Biggest barrier is time/energy in addition to addiction per pt. She feels food is her crutch - use to be addicted to nicotine. Also a hx of using " marijuana and other drugs per pt. Discussed healthy coping mechanisms. Pt wants to use exercise and music - swimming (once catheter is out), gym, walking.     Lower appetite with wegovy increase - has helped with smaller portions.     Aware her pace/chewing can be a concern. Wants to work on this.     June 2023:  Things going well overall per pt.  Increased dose of wegovy to 1.7 and is working well now. Some nausea.  Smaller portions.Trying to eat nutrient dense foods to help her feel better.     Phos lowest it has been in a long time. Decreasing cheese and chocolate intake in particular.    Eating Greek yogurt, nutrigrain bars, chicken salad, salads. Also doing more fruit      Working on decreasing fluids per fluid restriction.   Drinks water and occ juice.    Stressors: possible insurance changes    [facility] [place] while son rides hover board    Previous goals:  1. Journal intake for a a few days to a week to help identify sources of phosphorus in diet - Discussed. Tracked a couple days  2. Continue taking binders with food/fluids as appropriate - Discussed. Taking with most meals. Forgetting to take with coffee.  3. Check in on chewing/pace. Aim for chewing to applesauce like consistency. - Met, easier with wegovy due to earlier satiety and less hunger as whole.    Additional information:  Works starting at 3 pm  Dialysis T/R/Sat          No data to display                     No data to display                     No data to display                EXERCISE:         No data to display                NUTRITION DIAGNOSIS:  Obesity r/t long history of positive energy balance aeb BMI >30 kg/m2.    INTERVENTION:  Intervention Provided/Education Provided on post-op diet guidelines, vitamins/minerals essential post-operatively, GI anatomy of bariatric surgeries, ways to help prepare for post-op diet guidelines pre-operatively, portion/calorie-control, mindful  eating and sources of protein.  Patient demonstrates understanding.     Personal barriers to making and continuing required life changes have been identified, and strategies to overcome those barriers have been recommended AND family and social supports have been assessed and strategies to strengthen those supports have been recommended.    Provided pt with list of goals, RD contact information and resources listed below via OG-Vegast.            No data to display                Expected Engagement: good    GOALS:  1. Aim to try to take phos binders with coffee beverage    Could consider Greek yogurt instead of falk/sour cream. Could consider Barilla protein+ pasta instead of regular pasta.    Other surgery goals:  Relating To Eating:  - Eat slowly (20-30 minutes per meal), chewing foods well (25 chews per bite/applesauce consistency)  - Focus on eating smaller portion sizes at meals and snacks    Relating to beverages:  - Reduce caffeine/carbonation/calorie containing beverages  - Separate fluids from meals by 30 minutes before, during, and after eating  - Drink 48-64 ounces of fluid per day. Small, frequent sips between meals.    Relating to activity:  Increase activity as able    Protein powders mixed with water:  Pure Protein whey protein (140 calories, 23 gm protein, 110 mg potassium, 80 mg phos)  Guy Sathish whey protein (vanilla/strawberry)(110 calories, 25 gm protein, 180 mg potassium)  Premiere Protein whey protein (vanilla) (150 calories, 30 gm protein, 160 mg potassium)  Integrated whey protein (vanilla/strawberry) (110-120 calories, 20 gm protein, 140-150 mg potassium, 125 mg phos)    Clear Liquids:  LiquaCel (1 oz/serv) - 100 calories, 16 gm protein, 10 mg potassium, 20 mg phos  Bipro (16.9 oz/serv) - 90 calories, 20 gm protein, 0 mg potassium/phos  Gelatein 20 (4 oz/serv) (lime/orange/fruit punch/grape) - 80-90 calories, 20 gm protein, 120-180 mg potassium, 0 mg phos  Strawberry Banana Proti-15 Gelatin (M  [employer] [place] e-store: https://[facility] CenterEnSol/store) (4 oz/serv) - 70 calories, 16 gm protein, 55 mg potassium    Protein bars:  Pure Protein bars   Balance Bars  Zone protein      Post-op Diet Handouts:  Diet Guidelines after Weight-loss Surgery  [email or web address]     Your Stage 1 Diet: Clear Liquids  [email or web address]     Your Stage 2 Diet: Low-fat Full Liquids  [email or web address]     Your Stage 3 Diet: Pureed Foods  [email or web address]     Pureed Pleasures  [email or web address]    Your Stage 4 Diet: Soft Foods  [email or web address]    Your Stage 5 Diet: Regular Foods  [email or web address]    Supplements after Sleeve Gastrectomy, Gastric Bypass or Single Anastomosis Duodenal Switch  [email or web address]    Keeping Track of Fluids  [email or web address]    Hunger Solutions:   Call the [place] Food Help Line at 1-862.315.6270 to talk with a specialist in community and government food assistance programs. They can help you sign-up for SNAP benefits, find food color[place], food shelves and free food in your community and help refer you to any other community assistance programs that you qualify for.   https://www.hungersolutions.org/find-help/    SpendSmart,Eat Smart  Recipe ideas that are suppose to be more affordable  Calculator that allows you to compare product prices   https://spendsmart.extension.[facility]     Fare For All:  Provides discounted groceries. Up to 40% off retail pricing. You can preorder and  or buy in person, depending on the site. Visit their website for list of 38 locations in [place].  https://eMindfuleforall.theIQcardodEventomn.org/    MetaSolv Market  Get organic produce and sustainably sourced groceries delivered at up to 40% off grocery store prices.  https://www.Samba Ads.Lore      [facility]:  To find a map of food shelves and food distribution pop-ups. Visit  "www.[facility].gov/foodshelves    Market [place] Program: Spend $10 of EBT, get $10 free at [place] market.  To find map of farmers markets:  https://www.Siminarssolutions.org/programs/market-bucks/farmersmarkets/    Supplemental Nutrition Assistance Program (SNAP):  [place]://[place].gov/dhs/people-we-serve/adults/economic-assistance/food-nutrition/programs-and-services/supplemental-nutrition-assistance-program.jsp    [facility] Health and Wellness Food Shelf:  [address] [place] [address]  Mon-Thurs 10am-4pm  May visit once per month   Items include meat, dairy, bread, and other food, hygiene, cleaning supplies and more. Pet food available upon request.  Additional \"Mini-Market\", parking lot at [address] [place] [address]  o Free fruit, vegetables, salads, and deli items  o Tuesday & Thursday 9:00 a.m  Nutrition Assistance Program for Seniors (NAPS or  the senior box ).  Eligibility: at least 60 years old & 130% Federal Poverty Guidelines.  To apply, call [facility] [phone].  Free Fresh Food Fridays - Summer Outdoor Distribution:  Fruits & vegetables at [place], 2nd & 4th Fridays 9:30 a.m.  May - September, rain or shine  https://[facility].org/helping-our-neighbors/support-everyday-life/community-food-shelf      [place] Food Shelves ([place]):  https://Bluebridge Digital.org/food-shelves/  [place] Food Shelf  [address] (near [facility]), [place] [address]  [phone]    [facility] Food Shelf  [address] (at [facility]), [place], [place] [id number] [phone]    Foodmobile - Mobile Food Shelf  Foodmobiles travel throughout [place] and the northern suburbs of [facility] to bring nutritious food to areas of high need. See list of locations here: https://Bluebridge [employer].org/events/      Map of [facility] [place] Aid:  https://map.[facility].org/      Follow up:   Friday, July 28th at 1:00 pm    Time spent with patient: 24 minutes.  S[address], MARISSA, RD, LD    "

## 2023-06-23 NOTE — NURSING NOTE
Is the patient currently in the state of [place]? YES    Visit mode:VIDEO    If the visit is dropped, the patient can be reconnected by: VIDEO VISIT: Text to cell phone: [phone]    Will anyone else be joining the visit? NO      How would you like to obtain your AVS? MyChart    Are changes needed to the allergy or medication list? NO    Reason for visit: RECHECK (John f/u)        Nyasia Hung, VF

## 2023-06-23 NOTE — PATIENT INSTRUCTIONS
GOALS:  1. Aim to try to take phos binders with coffee beverage    Could consider Greek yogurt instead of falk/sour cream. Could consider Barilla protein+ pasta instead of regular pasta.    Other surgery goals:  Relating To Eating:  - Eat slowly (20-30 minutes per meal), chewing foods well (25 chews per bite/applesauce consistency)  - Focus on eating smaller portion sizes at meals and snacks    Relating to beverages:  - Reduce caffeine/carbonation/calorie containing beverages  - Separate fluids from meals by 30 minutes before, during, and after eating  - Drink 48-64 ounces of fluid per day. Small, frequent sips between meals.    Relating to activity:  Increase activity as able    Protein powders mixed with water:  Pure Protein whey protein (140 calories, 23 gm protein, 110 mg potassium, 80 mg phos)  Guy Sathish whey protein (vanilla/strawberry)(110 calories, 25 gm protein, 180 mg potassium)  Premiere Protein whey protein (vanilla) (150 calories, 30 gm protein, 160 mg potassium)  Integrated whey protein (vanilla/strawberry) (110-120 calories, 20 gm protein, 140-150 mg potassium, 125 mg phos)    Clear Liquids:  LiquaCel (1 oz/serv) - 100 calories, 16 gm protein, 10 mg potassium, 20 mg phos  Bipro (16.9 oz/serv) - 90 calories, 20 gm protein, 0 mg potassium/phos  Gelatein 20 (4 oz/serv) (lime/orange/fruit punch/grape) - 80-90 calories, 20 gm protein, 120-180 mg potassium, 0 mg phos  Strawberry Banana Proti-15 Gelatin ([facility] e-store: https://[employer] Solutions/store) (4 oz/serv) - 70 calories, 16 gm protein, 55 mg potassium    Protein bars:  Pure Protein bars   Balance Bars  Zone protein      Post-op Diet Handouts:  Diet Guidelines after Weight-loss Surgery  [email or web address]     Your Stage 1 Diet: Clear Liquids  [email or web address]     Your Stage 2 Diet: Low-fat Full Liquids  [email or web address]     Your Stage 3 Diet: Pureed Foods  [email or web address]     Pureed  Pleasures  [email or web address]    Your Stage 4 Diet: Soft Foods  [email or web address]    Your Stage 5 Diet: Regular Foods  [email or web address]    Supplements after Sleeve Gastrectomy, Gastric Bypass or Single Anastomosis Duodenal Switch  [email or web address]    Keeping Track of Fluids  [email or web address]    Hunger Solutions:   Call the [place] Food Help Line at 1-207.692.7227 to talk with a specialist in community and government food assistance programs. They can help you sign-up for SNAP benefits, find food color[place], food shelves and free food in your community and help refer you to any other community assistance programs that you qualify for.   https://www.[employer]..org/find-help/    SpendSmart,Eat Smart  Recipe ideas that are suppose to be more affordable  Calculator that allows you to compare product prices   https://spendsmart.extension.[facility]     Fare For All:  Provides discounted groceries. Up to 40% off retail pricing. You can preorder and  or buy in person, depending on the site. Visit their website for list of 38 locations in [place].  https://retsCloudeforall.theLishang.comodMountain View Regional Medical Centermn.org/    Inkshares Market  Get organic produce and sustainably sourced groceries delivered at up to 40% off grocery store prices.  https://www.SportsBlogs.Hedge Community      [facility]:  To find a map of food shelves and food distribution pop-ups. Visit www.[facility].gov/foodshelves    Market [place] Program: Spend $10 of EBT, get $10 free at [place].  To find map of farmers markets:  https://www.Skyroboticions.org/programs/market-bucks/[place] [employer]ets/    Supplemental Nutrition Assistance Program (SNAP):  [place]://[place]/dhs/people-we-serve/adults/economic-assistance/food-nutrition/programs-and-services/supplemental-nutrition-assistance-program.jsp    [facility] Health and Wellness Food Shelf:  [address]  Mon-Thurs 10am-4pm  May visit  "once per month   Items include meat, dairy, bread, and other food, hygiene, cleaning supplies and more. Pet food available upon request.  Additional \"Mini-Market\", parking lot at [address]  o Free fruit, vegetables, salads, and deli items  o Tuesday & Thursday 9:00 a.m  Nutrition Assistance Program for Seniors (NAPS or  the senior box ).  Eligibility: at least 60 years old & 130% Federal Poverty Guidelines.  To apply, call [facility] [place] [phone].  Free Fresh Food Fridays - Summer Outdoor Distribution:  Fruits & vegetables at [place], 2nd & 4th Fridays 9:30 a.m.  May - September, rain or shine  https://[facility].org/helping-our-neighbors/support-everyday-life/community-food-shelf      [place] Food Shelves ([place]):  https://X2 Biosystems.org/food-shelves/  [place] Food Shelf  [address] (near [facility])[place] [address]  [phone]    [facility] Food Shelf  [address] [facility] [address] (at [facility]), [place], [place] [id number] [phone]    Foodmobile - Mobile Food Shelf  Foodmobiles travel throughout [place] and the northern [facility] to bring nutritious food to areas of high need. See list of locations here: https://X[employer] Biosystems.org/events/      Map of [facility] [place] Aid:  https://map.[facility].org/      Follow up:   Friday, July 28th at 1:00 pm    MARISSA Rubin, RD, LD  Clinic #: [phone]    "

## 2023-07-01 ENCOUNTER — MYC MEDICAL ADVICE (OUTPATIENT)
Dept: ENDOCRINOLOGY | Facility: CLINIC | Age: 33
End: 2023-07-01
Payer: COMMERCIAL

## 2023-07-01 DIAGNOSIS — E66.01 MORBID OBESITY (H): Primary | ICD-10-CM

## 2023-07-03 RX ORDER — SEMAGLUTIDE 2.4 MG/.75ML
2.4 INJECTION, SOLUTION SUBCUTANEOUS WEEKLY
Qty: 3 ML | Refills: 3 | Status: SHIPPED | OUTPATIENT
Start: 2023-07-03 | End: 2023-10-30

## 2023-07-28 ENCOUNTER — VIRTUAL VISIT (OUTPATIENT)
Dept: ENDOCRINOLOGY | Facility: CLINIC | Age: 33
End: 2023-07-28
Payer: COMMERCIAL

## 2023-07-28 VITALS — WEIGHT: 293 LBS | BODY MASS INDEX: 44.41 KG/M2 | HEIGHT: 68 IN

## 2023-07-28 DIAGNOSIS — Z99.2 ESRD (END STAGE RENAL DISEASE) ON DIALYSIS (H): ICD-10-CM

## 2023-07-28 DIAGNOSIS — E66.9 OBESITY: ICD-10-CM

## 2023-07-28 DIAGNOSIS — Z71.3 NUTRITIONAL COUNSELING: Primary | ICD-10-CM

## 2023-07-28 DIAGNOSIS — N18.6 ESRD (END STAGE RENAL DISEASE) ON DIALYSIS (H): ICD-10-CM

## 2023-07-28 PROCEDURE — 97803 MED NUTRITION INDIV SUBSEQ: CPT | Mod: GT | Performed by: DIETITIAN, REGISTERED

## 2023-07-28 PROCEDURE — 99207 PR NO CHARGE LOS: CPT | Mod: GT | Performed by: DIETITIAN, REGISTERED

## 2023-07-28 ASSESSMENT — PAIN SCALES - GENERAL: PAINLEVEL: NO PAIN (0)

## 2023-07-28 NOTE — PROGRESS NOTES
"Glenna Spears is a 32 year old female who is being evaluated via a billable video visit.      The patient has been notified of following:     \"This video visit will be conducted via a call between you and your physician/provider. We have found that certain health care needs can be provided without the need for an in-person physical exam.  This service lets us provide the care you need with a video conversation.  If a prescription is necessary we can send it directly to your pharmacy.  If lab work is needed we can place an order for that and you can then stop by our lab to have the test done at a later time.    Video visits are billed at different rates depending on your insurance coverage.  Please reach out to your insurance provider with any questions.    If during the course of the call the physician/provider feels a video visit is not appropriate, you will not be charged for this service.\"    Patient has given verbal consent for Video visit? Yes  How would you like to obtain your AVS? MyChart  If you are dropped from the video visit, the video invite should be resent to: N/A for todays visit  Will anyone else be joining your video visit? No  {If patient encounters technical issues they should call [phone]      Video-Visit Details    Type of service:  Video Visit    Video Start Time: 1:00 pm  Video End Time:  1:21 pm    Originating Location (pt. Location): Home    Distant Location (provider location):  Offsite (providers home)     Platform used for Video Visit: [employer]    During this virtual visit the patient is located in [place], patient verifies this as the location during the entirety of this visit.      Bariatric Nutrition Consultation Note    Reason For Visit: Nutrition Assessment    Glenna Spears is a 32 year old presenting today for bariatric nutrition consult.   Pt is interested in laparoscopic sleeve gastrectomy with Dr. Smith.     This is pt's 7th required nutrition visits prior to surgery.   - Patient " "also saw transplant RD 1/23/23    Pt referred by ANAMARIA Pat on January 2023.    Coordination note:   Needs baseline labs  Needs Psych eval - Done  Needs PCP letter of support - Done   20 lb weight loss from initial  Surgeon meet and jovon - Done, Met Dr Smith 3/23/23  Obtain clearances   Nicotine cessation - Met    CO-MORBIDITIES OF OBESITY INCLUDE:         No data to display                SUPPORT:       No data to display                 Patient reports good support system at home    ANTHROPOMETRICS:  Weight 1/23/23: 330 lbs    Estimated body mass index is 48.61 kg/m  as calculated from the following:    Height as of this encounter: 1.727 m (5' 8\").    Weight as of this encounter: 145 kg (319 lb 10.7 oz).     Current weight: 319 lbs, pt report         No data to display                     No data to display                SUPPLEMENT INFORMATION:  Phos Binder - is chewable (Velphoro)   Renal labs - High Phos but otherwise looking good per pt    Iron monitored at dialysis - gets infused as needed    Labs 6/22/23  Phos 8.0   Pot 4.0  Albumin 4.2  Calcium 10.1  PTH high but continues to be lower than usual  Hgb 11.3    Labs 7/20/23  Albumin 4.0   Phos 7.2  Pot 4.4   Calcium 9.7   (has significantly come down)  Glucose 87 (fasting)  NPCR .68     Labs 7/27/23  Hgb 10.9     NUTRITION HISTORY:  NKFA  \"Hates fish\"   Some lactose intolerance     Patient met with Ibis Guardado PA-C and transplant RD 1/23/23. Patient is interested in pursuing bariatric surgery.    Attended nutrition class 1/27/23.     Please see previous RD notes for more information.     June 2023:  Things going well overall per pt.  Increased dose of wegovy to 1.7 and is working well now. Some nausea.  Smaller portions.Trying to eat nutrient dense foods to help her feel better.     Phos lowest it has been in a long time. Decreasing cheese and chocolate intake in particular.    Eating Greek yogurt, nutrigrain bars, chicken salad, salads. " Also doing more fruit      Working on decreasing fluids per fluid restriction.   Drinks water and occ juice.    Stressors: possible insurance changes    [facility]    July 2023:    Continues to work on changes for surgery. Noticing weight loss and improvements in labs overall. Phos going down - at 7.2 last check per pt. Being mindful of dietary sources of phos and taking binders (might forget if out and about)    Trying to eat more home cooked food.   Feels she could do better with protein intake. Recent NPCR was low.    Barriers: cost of food, food limitations due to dialysis/kidneys, occ forgetting to take phos binders, fluid restriction, nausea with wegovy, fatigue with meds and with dialysis   - Sent list of resources to help with food. Working on finding renal friendly food options - discussing in our appointments and with RD at Dialysis. Has been working on decreasing fluid intake - pulling less this past month. Weogvy has been helping with appetite, recently increased to 2.4 mg. Pushing throat nausea - trying to take small sips. Has anti-nausea meds but tries to avoid taking them.     Previous goals:  1. Aim to try to take phos binders with coffee beverage- Met    Additional information:  Works starting at 3 pm  Dialysis T/R/Sat          No data to display                     No data to display                     No data to display                EXERCISE:         No data to display                NUTRITION DIAGNOSIS:  Obesity r/t long history of positive energy balance aeb BMI >30 kg/m2.    INTERVENTION:  Intervention Provided/Education Provided on post-op diet guidelines, vitamins/minerals essential post-operatively, GI anatomy of bariatric surgeries, ways to help prepare for post-op diet guidelines pre-operatively, portion/calorie-control, mindful eating and sources of protein.  Patient demonstrates  understanding.     Personal barriers to making and continuing required life changes have been identified, and strategies to overcome those barriers have been recommended AND family and social supports have been assessed and strategies to strengthen those supports have been recommended.    Provided pt with list of goals, RD contact information and resources listed below via fitmobt.            No data to display                Expected Engagement: good    GOALS:  1. Focus on ensuring adequate protein this next month. Will check in next month and monitor NPCR.   2. Start looking into protein shake options for post    Could consider Greek yogurt instead of falk/sour cream. Could consider Barilla protein+ pasta instead of regular pasta.    Other surgery goals:  Relating To Eating:  - Eat slowly (20-30 minutes per meal), chewing foods well (25 chews per bite/applesauce consistency)  - Focus on eating smaller portion sizes at meals and snacks    Relating to beverages:  - Reduce caffeine/carbonation/calorie containing beverages  - Separate fluids from meals by 30 minutes before, during, and after eating  - Drink 48-64 ounces of fluid per day. Small, frequent sips between meals.    Relating to activity:  Increase activity as able    Protein powders mixed with water:  Pure Protein whey protein (140 calories, 23 gm protein, 110 mg potassium, 80 mg phos)  Guy Sathish whey protein (vanilla/strawberry)(110 calories, 25 gm protein, 180 mg potassium)  Premiere Protein whey protein (vanilla) (150 calories, 30 gm protein, 160 mg potassium)  Integrated whey protein (vanilla/strawberry) (110-120 calories, 20 gm protein, 140-150 mg potassium, 125 mg phos)    Clear Liquids:  LiquaCel (1 oz/serv) - 100 calories, 16 gm protein, 10 mg potassium, 20 mg phos  Bipro (16.9 oz/serv) - 90 calories, 20 gm protein, 0 mg potassium/phos  Gelatein 20 (4 oz/serv) (lime/orange/fruit punch/grape) - 80-90 calories, 20 gm protein, 120-180 mg potassium, 0 mg  phos  Strawberry Banana Proti-15 Gelatin ([facility] e-store: [email or web address]/store) (4 oz/serv) - 70 calories, 16 gm protein, 55 mg potassium    Protein bars:  Pure Protein bars   Balance Bars  Zone protein      Post-op Diet Handouts:  Diet Guidelines after Weight-loss Surgery  [email or web address]     Your Stage 1 Diet: Clear Liquids  [email or web address]     Your Stage 2 Diet: Low-fat Full Liquids  [email or web address]     Your Stage 3 Diet: Pureed Foods  [email or web address]     Pureed Pleasures  [email or web address]    Your Stage 4 Diet: Soft Foods  [email or web address]    Your Stage 5 Diet: Regular Foods  [email or web address]    Supplements after Sleeve Gastrectomy, Gastric Bypass or Single Anastomosis Duodenal Switch  [email or web address]    Keeping Track of Fluids  [email or web address]    Hunger Solutions:   Call the [place] Food Help Line at 1-857.384.5447 to talk with a specialist in community and government food assistance programs. They can help you sign-up for SNAP benefits, find food [place], food shelves and free food in your community and help refer you to any other community assistance programs that you qualify for.   https://www.hungersolutions.org/find-help/    SpendSmart,Eat Smart  ? Recipe ideas that are suppose to be more affordable  ? Calculator that allows you to compare product prices   https://spendsmart.extension.[facility]     Fare For All:  Provides discounted groceries. Up to 40% off retail pricing. You can preorder and  or buy in person, depending on the site. Visit their website for list of 38 locations in [place].  https://fareforall.thefoodRehabilitation Hospital of Southern New Mexicomn.org/    [employer] Market  Get organic produce and sustainably sourced groceries delivered at up to 40% off grocery store prices.  https://www.EyeScribes.USEREADY      [facility]:  To find a map of food shelves and food  "distribution pop-ups. Visit www.[facility]/foodshelves    Market [place] Program: Spend $10 of EBT, get $10 free at farmers market.  To find map of farmers markets:  https://www.InflowControlsolutions.org/programs/market-bucks/farmersmarkets/    Supplemental Nutrition Assistance Program (SNAP):  https://[place].gov/dhs/people-we-serve/adults/economic-assistance/food-nutrition/programs-and-services/supplemental-nutrition-assistance-program.jsp    [facility] Food Shelf:  [address] [place] [address]  Mon-Thurs 10am-4pm    May visit once per month     Items include meat, dairy, bread, and other food, hygiene, cleaning supplies and more. Pet food available upon request.    Additional \"Mini-Market\", parking lot at [address] [place] [address]  o Free fruit, vegetables, salads, and deli items  o Tuesday & Thursday 9:00 a.m    Nutrition Assistance Program for Seniors (NAPS or  the senior box ).  o Eligibility: at least 60 years old & 130% Federal Poverty Guidelines.  o To apply, call [employer] [place] [phone].    Free Fresh Food Fridays - Summer Outdoor Distribution:  o Fruits & vegetables at [place], 2nd & 4th Fridays 9:30 a.m.  o May - September, rain or shine  https://[facility] Hospital[facility]raiser.org/helping-our-neighbors/support-everyday-life/community-food-shelf      [place] Food Shelves ([place]):  https://[employer].org/food-shelves/    [place] Food Shelf  [address] (near [address]), [place] [address]  [phone]      [address] Food Shelf  [address] (at [facility]), [place], [place] [id number] [phone]      Foodmobile - Mobile Food Shelf  Foodmobiles travel throughout [place] and the northern [facility] to bring nutritious food to areas of high need. See list of locations here: https://[employer].org/events/      Map of [facility] Aid:  https://map.[facility] Center[facility].org/      Follow up:   Friday, August 25th at 10:00 am  Friday, September " 22nd at 11:30 am - With Gely due to me being out of the office   Monday, October 23rd at 11:00 am    Time spent with patient:  21 minutes.  [address], MARISSA, RD, LD

## 2023-07-28 NOTE — LETTER
"7/28/2023       RE: Glenna Spears  [address]  [place] [address]     Dear Colleague,    Thank you for referring your patient, Glenna Spears, to the [facility] [place] [facility]. Please see a copy of my visit note below.    Glenna Spears is a 32 year old female who is being evaluated via a billable video visit.      The patient has been notified of following:     \"This video visit will be conducted via a call between you and your physician/provider. We have found that certain health care needs can be provided without the need for an in-person physical exam.  This service lets us provide the care you need with a video conversation.  If a prescription is necessary we can send it directly to your pharmacy.  If lab work is needed we can place an order for that and you can then stop by our lab to have the test done at a later time.    Video visits are billed at different rates depending on your insurance coverage.  Please reach out to your insurance provider with any questions.    If during the course of the call the physician/provider feels a video visit is not appropriate, you will not be charged for this service.\"    Patient has given verbal consent for Video visit? Yes  How would you like to obtain your AVS? MyChart  If you are dropped from the video visit, the video invite should be resent to: N/A for todays visit  Will anyone else be joining your video visit? No  {If patient encounters technical issues they should call [phone]      Video-Visit Details    Type of service:  Video Visit    Video Start Time: 1:00 pm  Video End Time:  1:21 pm    Originating Location (pt. Location): Home    Distant Location (provider location):  Offsite (providers home)     Platform used for Video Visit: Park.com    During this virtual visit the patient is located in [place], patient verifies this as the location during the entirety of this visit. " "     Bariatric Nutrition Consultation Note    Reason For Visit: Nutrition Assessment    Glenna Spears is a 32 year old presenting today for bariatric nutrition consult.   Pt is interested in laparoscopic sleeve gastrectomy with Dr. Smith.     This is pt's 7th required nutrition visits prior to surgery.   - Patient also saw transplant RD 1/23/23    Pt referred by ANAMARIA Pat on January 2023.    Coordination note:   Needs baseline labs  Needs Psych eval - Done  Needs PCP letter of support - Done   20 lb weight loss from initial  Surgeon meet and greet - Done, Met Dr Smith 3/23/23  Obtain clearances   Nicotine cessation - Met    CO-MORBIDITIES OF OBESITY INCLUDE:         No data to display                SUPPORT:       No data to display                 Patient reports good support system at home    ANTHROPOMETRICS:  Weight 1/23/23: 330 lbs    Estimated body mass index is 48.61 kg/m  as calculated from the following:    Height as of this encounter: 1.727 m (5' 8\").    Weight as of this encounter: 145 kg (319 lb 10.7 oz).     Current weight: 319 lbs, pt report         No data to display                     No data to display                SUPPLEMENT INFORMATION:  Phos Binder - is chewable (Velphoro)   Renal labs - High Phos but otherwise looking good per pt    Iron monitored at dialysis - gets infused as needed    Labs 6/22/23  Phos 8.0   Pot 4.0  Albumin 4.2  Calcium 10.1  PTH high but continues to be lower than usual  Hgb 11.3    Labs 7/20/23  Albumin 4.0   Phos 7.2  Pot 4.4   Calcium 9.7   (has significantly come down)  Glucose 87 (fasting)  NPCR .68     Labs 7/27/23  Hgb 10.9     NUTRITION HISTORY:  NKFA  \"Hates fish\"   Some lactose intolerance     Patient met with Ibis Guardado PA-C and transplant RD 1/23/23. Patient is interested in pursuing bariatric surgery.    Attended nutrition class 1/27/23.     Please see previous RD notes for more information.     June 2023:  Things going well overall per " pt.  Increased dose of wegovy to 1.7 and is working well now. Some nausea.  Smaller portions.Trying to eat nutrient dense foods to help her feel better.     Phos lowest it has been in a long time. Decreasing cheese and chocolate intake in particular.    Eating Greek yogurt, nutrigrain bars, chicken salad, salads. Also doing more fruit      Working on decreasing fluids per fluid restriction.   Drinks water and occ juice.    Stressors: possible insurance changes    [facility] [place] around [place] while son rides hover board    July 2023:    Continues to work on changes for surgery. Noticing weight loss and improvements in labs overall. Phos going down - at 7.2 last check per pt. Being mindful of dietary sources of phos and taking binders (might forget if out and about)    Trying to eat more home cooked food.   Feels she could do better with protein intake. Recent NPCR was low.    Barriers: cost of food, food limitations due to dialysis/kidneys, occ forgetting to take phos binders, fluid restriction, nausea with wegovy, fatigue with meds and with dialysis   - Sent list of resources to help with food. Working on finding renal friendly food options - discussing in our appointments and with RD at Dialysis. Has been working on decreasing fluid intake - pulling less this past month. Weogvy has been helping with appetite, recently increased to 2.4 mg. Pushing throat nausea - trying to take small sips. Has anti-nausea meds but tries to avoid taking them.     Previous goals:  1. Aim to try to take phos binders with coffee beverage- Met    Additional information:  Works starting at 3 pm  Dialysis T/R/Sat          No data to display                     No data to display                     No data to display                EXERCISE:         No data to display                NUTRITION DIAGNOSIS:  Obesity r/t long history of positive energy balance aeb BMI >30  kg/m2.    INTERVENTION:  Intervention Provided/Education Provided on post-op diet guidelines, vitamins/minerals essential post-operatively, GI anatomy of bariatric surgeries, ways to help prepare for post-op diet guidelines pre-operatively, portion/calorie-control, mindful eating and sources of protein.  Patient demonstrates understanding.     Personal barriers to making and continuing required life changes have been identified, and strategies to overcome those barriers have been recommended AND family and social supports have been assessed and strategies to strengthen those supports have been recommended.    Provided pt with list of goals, RD contact information and resources listed below via [email or web address].            No data to display                Expected Engagement: good    GOALS:  1. Focus on ensuring adequate protein this next month. Will check in next month and monitor NPCR.   2. Start looking into protein shake options for post    Could consider Greek yogurt instead of falk/sour cream. Could consider Barilla protein+ pasta instead of regular pasta.    Other surgery goals:  Relating To Eating:  - Eat slowly (20-30 minutes per meal), chewing foods well (25 chews per bite/applesauce consistency)  - Focus on eating smaller portion sizes at meals and snacks    Relating to beverages:  - Reduce caffeine/carbonation/calorie containing beverages  - Separate fluids from meals by 30 minutes before, during, and after eating  - Drink 48-64 ounces of fluid per day. Small, frequent sips between meals.    Relating to activity:  Increase activity as able    Protein powders mixed with water:  Pure Protein whey protein (140 calories, 23 gm protein, 110 mg potassium, 80 mg phos)  Guy Sathish whey protein (vanilla/strawberry)(110 calories, 25 gm protein, 180 mg potassium)  Premiere Protein whey protein (vanilla) (150 calories, 30 gm protein, 160 mg potassium)  Integrated whey protein (vanilla/strawberry) (110-120 calories, 20 gm  protein, 140-150 mg potassium, 125 mg phos)    Clear Liquids:  LiquaCel (1 oz/serv) - 100 calories, 16 gm protein, 10 mg potassium, 20 mg phos  Bipro (16.9 oz/serv) - 90 calories, 20 gm protein, 0 mg potassium/phos  Gelatein 20 (4 oz/serv) (lime/orange/fruit punch/grape) - 80-90 calories, 20 gm protein, 120-180 mg potassium, 0 mg phos  Strawberry Banana Proti-15 Gelatin ([facility] e-store: https://ClassWallet/store) (4 oz/serv) - 70 calories, 16 gm protein, 55 mg potassium    Protein bars:  Pure Protein bars   Balance Bars  Zone protein      Post-op Diet Handouts:  Diet Guidelines after Weight-loss Surgery  [email or web address]     Your Stage 1 Diet: Clear Liquids  [email or web address]     Your Stage 2 Diet: Low-fat Full Liquids  [email or web address]     Your Stage 3 Diet: Pureed Foods  [email or web address]     Pureed Pleasures  [email or web address]://Innov Analysis [email or web address]    Your Stage 4 Diet: Soft Foods  [email or web address]    Your Stage 5 Diet: Regular Foods  [email or web address]    Supplements after Sleeve Gastrectomy, Gastric Bypass or Single Anastomosis Duodenal Switch  [email or web address]://Innov Analysis [email or web address]    Keeping Track of Fluids  [email or web address]://[email or web address]    Hunger Solutions:   Call the [place] Food Help Line at [phone] to talk with a specialist in community and government food assistance programs. They can help you sign-up for SNAP benefits, find food color[place], food shelves and free food in your community and help refer you to any other community assistance programs that you qualify for.   https://www.hungersolutions.org/find-help/    SpendSmart,Eat Smart  Recipe ideas that are suppose to be more affordable  Calculator that allows you to compare product prices   https://spendsmart.extension.[facility]     Fare For All:  Provides discounted groceries. Up to 40% off retail pricing. You can preorder and  or buy in person, depending on the  "site. Visit their website for list of 38 locations in [place].  https://fareforall.thefoodgroupmn.org/    Misfits Market  Get organic produce and sustainably sourced groceries delivered at up to 40% off grocery store prices.  https://www.TeachStreet.Deer[employer] Energy      [place] [facility]:  To find a map of food shelves and food distribution pop-ups. Visit www.[facility].gov/foodshelves    Market [place] Program: Spend $10 of EBT, get $10 free at [employer].  To find map of farmers markets:  https://www.Teach 'n Gososalgomedions.org/programs/market-bucks/farmersmarkets/    Supplemental Nutrition Assistance Program (SNAP):  [place]://[place].gov/dhs/people-we-serve/adults/economic-assistance/food-nutrition/programs-and-services/supplemental-nutrition-assistance-program.jsp    [facility] Food Shelf:  [address] [place] [address]  Mon-Thurs 10am-4pm  May visit once per month   Items include meat, dairy, bread, and other food, hygiene, cleaning supplies and more. Pet food available upon request.  Additional \"Mini-Market\", parking lot at [address] [place] [address]  o Free fruit, vegetables, salads, and deli items  o Tuesday & Thursday 9:00 a.m  Nutrition Assistance Program for Seniors (NAPS or  the senior box ).  Eligibility: at least 60 years old & 130% Federal Poverty Guidelines.  To apply, call Second [place] [phone].  Free Fresh Food Fridays - Summer Outdoor Distribution:  Fruits & vegetables at [place], 2nd & 4th Fridays 9:30 a.m.  May - September, rain or shine  https://[facility] Center[facility]viceme Cosmetics.org/helping-our-neighbors/support-everyday-life/community-food-shelf      [place] Food Shelves ([place]):  https://CYBERHAWK Innovations.org/food-shelves/  [place] Food Shelf  [address] (near [facility]), [place] [address]  [phone]    [facility] Food Shelf  [address] (at [facility] [place], [place] [id number] [phone]    Foodmobile - Mobile Food Shelf  Foodmobiles travel " throughout [place] and the [facility] to bring nutritious food to areas of high need. See list of locations here: https://Magma Global.org/events/      Map of [facility] Weed Aid:  https://map.[facility].org/      Follow up:   Friday, August 25th at 10:00 am  Friday, September 22nd at 11:30 am - With Gely due to me being out of the office   Monday, October 23rd at 11:00 am    Time spent with patient:  21 minutes.  [address], MARISSA, RD, LD

## 2023-07-28 NOTE — PATIENT INSTRUCTIONS
GOALS:  1. Focus on ensuring adequate protein this next month. Will check in next month and monitor NPCR.   2. Start looking into protein shake options for post    Could consider Greek yogurt instead of falk/sour cream. Could consider Barilla protein+ pasta instead of regular pasta.    Other surgery goals:  Relating To Eating:  - Eat slowly (20-30 minutes per meal), chewing foods well (25 chews per bite/applesauce consistency)  - Focus on eating smaller portion sizes at meals and snacks    Relating to beverages:  - Reduce caffeine/carbonation/calorie containing beverages  - Separate fluids from meals by 30 minutes before, during, and after eating  - Drink 48-64 ounces of fluid per day. Small, frequent sips between meals.    Relating to activity:  Increase activity as able    Protein powders mixed with water:  Pure Protein whey protein (140 calories, 23 gm protein, 110 mg potassium, 80 mg phos)  Guy Sathish whey protein (vanilla/strawberry)(110 calories, 25 gm protein, 180 mg potassium)  Premiere Protein whey protein (vanilla) (150 calories, 30 gm protein, 160 mg potassium)  Integrated whey protein (vanilla/strawberry) (110-120 calories, 20 gm protein, 140-150 mg potassium, 125 mg phos)    Clear Liquids:  LiquaCel (1 oz/serv) - 100 calories, 16 gm protein, 10 mg potassium, 20 mg phos  Bipro (16.9 oz/serv) - 90 calories, 20 gm protein, 0 mg potassium/phos  Gelatein 20 (4 oz/serv) (lime/orange/fruit punch/grape) - 80-90 calories, 20 gm protein, 120-180 mg potassium, 0 mg phos  Strawberry Banana Proti-15 Gelatin ([facility] e-store: https://Pokelabo/store) (4 oz/serv) - 70 calories, 16 gm protein, 55 mg potassium    Protein bars:  Pure Protein bars   Balance Bars  Zone protein      Post-op Diet Handouts:  Diet Guidelines after Weight-loss Surgery  [email or web address]://[email or web address]     Your Stage 1 Diet: Clear Liquids  [email or web address]://[email or web address]     Your Stage 2 Diet: Low-fat Full  Liquids  [email or web address]://[email or web address]     Your Stage 3 Diet: Pureed Foods  [email or web address]://[email or web address]     Pureed Pleasures  [email or web address]://[email or web address]    Your Stage 4 Diet: Soft Foods  [email or web address]://[email or web address].com/[email or web address]    Your Stage 5 Diet: Regular Foods  [email or web address]://[email or web address]/[email or web address]    Supplements after Sleeve Gastrectomy, Gastric Bypass or Single Anastomosis Duodenal Switch  https://Ticketfly/[email or web address]    Keeping Track of Fluids  [email or web address]://www.[email or web address]    Hunger Solutions:   Call the [place] Food Help Line at [phone] to talk with a specialist in community and government food assistance programs. They can help you sign-up for SNAP benefits, find food colorado, food shelves and free food in your community and help refer you to any other community assistance programs that you qualify for.   https://www.Supremex.org/find-help/    SpendSmart,Eat Smart  Recipe ideas that are suppose to be more affordable  Calculator that allows you to compare product prices   https://spendsmart.extension.[facility]     Fare For All:  Provides discounted groceries. Up to 40% off retail pricing. You can preorder and  or buy in person, depending on the site. Visit their website for list of 38 locations in [place].  https://fareforall.thefoodUNM Cancer Centermn.org/    Ztail  Get organic produce and sustainably sourced groceries delivered at up to 40% off grocery store prices.  https://www.BoatsGo.Radar Networks      [facility] Department:  To find a map of food shelves and food distribution pop-ups. Visit www.[facility].gov/foodshelves    Market [place] Program: Spend $10 of EBT, get $10 free at [employer].  To find map of farmers markets:  https://www.TripleseatsoConvergent.io Technologiesions.org/programs/market-bucks/farmersmarkets/    Supplemental Nutrition Assistance Program  "(SNAP):  [place]://[place].gov/dhs/people-we-serve/adults/economic-assistance/food-nutrition/programs-and-services/supplemental-nutrition-assistance-program.jsp    [facility] Food Shelf:  [address]  Mon-Thurs 10am-4pm  May visit once per month   Items include meat, dairy, bread, and other food, hygiene, cleaning supplies and more. Pet food available upon request.  Additional \"Mini-Market\", parking lot at [facility] [facility]  o Free fruit, vegetables, salads, and deli items  o Tuesday & Thursday 9:00 a.m  Nutrition Assistance Program for Seniors (NAPS or  the senior box ).  Eligibility: at least 60 years old & 130% Federal Poverty Guidelines.  To apply, call Second [place] [phone].  Free Fresh Food Fridays - Summer Outdoor Distribution:  Fruits & vegetables at [place], 2nd & 4th Fridays 9:30 a.m.  May - September, rain or shine  https://[facility].org/helping-our-neighbors/support-everyday-life/community-food-shelf      [place] Food Shelves ([place]):  https://Fed Playbook.org/food-shelves/  [place] Food Shelf  [address] ([address] [facility] [address]  [phone]    [facility] Food Shelf  [address] (at [facility])[place], [place] [id number] [phone]    Foodmobile - Mobile Food Shelf  Foodmobiles travel throughout [place] and the northern sub[place] [facility] Hospitals [facility] to bring nutritious food to areas of high need. See list of locations here: https://Fed Playbook.org/events/      Map of [facility] Aid:  https://map.[facility].org/      Follow up:   Friday, August 25th at 10:00 am  Friday, September 22nd at 11:30 am - With Gely due to me being out of the office   Monday, October 23rd at 11:00 am    MARISSA Rubin, RD, LD  Clinic #: [phone]    "

## 2023-07-28 NOTE — NURSING NOTE
Is the patient currently in the state of [place]? YES    Visit mode:VIDEO    If the visit is dropped, the patient can be reconnected by: VIDEO VISIT: Text to cell phone: [phone]    Will anyone else be joining the visit? NO      How would you like to obtain your AVS? MyChart    Are changes needed to the allergy or medication list? NO    Reason for visit: RECHECK (Bariatric Nutrition)      Nyasia Hung, VF   NAUSEA/VOMITING/PAIN

## 2023-08-02 ENCOUNTER — TRANSFERRED RECORDS (OUTPATIENT)
Dept: HEALTH INFORMATION MANAGEMENT | Facility: CLINIC | Age: 33
End: 2023-08-02

## 2023-08-08 ENCOUNTER — TRANSFERRED RECORDS (OUTPATIENT)
Dept: HEALTH INFORMATION MANAGEMENT | Facility: CLINIC | Age: 33
End: 2023-08-08

## 2023-08-31 ENCOUNTER — TELEPHONE (OUTPATIENT)
Dept: ENDOCRINOLOGY | Facility: CLINIC | Age: 33
End: 2023-08-31
Payer: COMMERCIAL

## 2023-08-31 NOTE — TELEPHONE ENCOUNTER
Called patient to check in regarding RD appointments.  Pt was suppose to see this provider Friday, August 25th at 10:00 am but the appointment was never scheduled, error on our end.     Will try to get patient in ASAP. LVM. Held spot at 11 am tomorrow with Gely Saini if she would like to be seen.    MARISSA Rubin, RD, LD  Clinic #: [phone]

## 2023-09-01 ENCOUNTER — VIRTUAL VISIT (OUTPATIENT)
Dept: ENDOCRINOLOGY | Facility: CLINIC | Age: 33
End: 2023-09-01
Payer: COMMERCIAL

## 2023-09-01 VITALS — BODY MASS INDEX: 44.41 KG/M2 | HEIGHT: 68 IN | WEIGHT: 293 LBS

## 2023-09-01 DIAGNOSIS — N18.6 ESRD (END STAGE RENAL DISEASE) ON DIALYSIS (H): ICD-10-CM

## 2023-09-01 DIAGNOSIS — Z99.2 ESRD (END STAGE RENAL DISEASE) ON DIALYSIS (H): ICD-10-CM

## 2023-09-01 DIAGNOSIS — E66.01 MORBID OBESITY (H): ICD-10-CM

## 2023-09-01 DIAGNOSIS — Z71.3 NUTRITIONAL COUNSELING: Primary | ICD-10-CM

## 2023-09-01 PROCEDURE — 99207 PR NO CHARGE LOS: CPT | Mod: GT | Performed by: DIETITIAN, REGISTERED

## 2023-09-01 PROCEDURE — 97803 MED NUTRITION INDIV SUBSEQ: CPT | Mod: GT | Performed by: DIETITIAN, REGISTERED

## 2023-09-01 ASSESSMENT — PAIN SCALES - GENERAL: PAINLEVEL: NO PAIN (0)

## 2023-09-01 NOTE — PATIENT INSTRUCTIONS
Audie Manzanares,    Follow-up with RD on 9/22    Thank you,    Gely Saini, RD, LD  If you would like to schedule or reschedule an appointment with the RD, please call [phone]    Nutrition Goals  1. Focus on ensuring adequate protein this next month. Will check in next month and monitor nPCR.   2. Start looking into protein shake options for post-op (see list below)   3. Switch to decaf coffee  4. Eliminate pop. May consider sugar-free water enhancers instead or diet juice.       Other surgery goals:  Relating To Eating:  - Eat slowly (20-30 minutes per meal), chewing foods well (25 chews per bite/applesauce consistency)  - Focus on eating smaller portion sizes at meals and snacks    Relating to beverages:  - Separate fluids from meals by 30 minutes before, during, and after eating  -  Small, frequent sips of fluids between meals.     Relating to activity:  - Increase activity as able    Lower Potassium Fruits:  - Watermelon (1 cup = 85 mg potassium)  - Tangerines (1 each = 140 mg)  - Strawberries (  cup = 125 mg)  - Raspberries (  cup = 90 mg)  - Plums (1 each = 105 mg)  - Pineapple, fresh or canned (  cup = 100 mg)  - Pears, canned (  cup = 120 mg)  - Peaches, canned (  cup = 120 mg)  - Peach, fresh (1 each = 185 mg)  - Dagoberto, limes (1 each = 80 mg)  - Grapes (  cup = 155 mg)     Lower Potassium Vegetables:  - Swiss chard, raw (1 cup = 135 mg)  -  Spinach, raw (1 cup = 170 mg)  - Summer squash (  cup = 175-200 mg)  - Peppers, red (  cup = 160 mg)  - Peppers, green (  cup = 130 mg)  - Onions, raw (  cup = 120 mg)  - Okra (  cup = 110 mg)  - Mushrooms, raw (  cup = 110 mg)  - Mixed vegetables (  cup = 150 mg)  - Lettuce, all types (1 cup = 100 mg)  - Greens: kale, turnip, wilian (  cup = 110-150 mg)  - Eggplant (  cup = 60 mg)  - Endive, raw (  cup = 80 mg)  - Cucumbers (  cup = 80 mg)      Protein powders mixed with water:  Pure Protein whey protein (140 calories, 23 gm protein, 110 mg potassium, 80 mg phos)  Guy  Sathish whey protein (vanilla/strawberry)(110 calories, 25 gm protein, 180 mg potassium)  Premiere Protein whey protein (vanilla) (150 calories, 30 gm protein, 160 mg potassium)  Integrated whey protein (vanilla/strawberry) (110-120 calories, 20 gm protein, 140-150 mg potassium, 125 mg phos)    Clear Liquids:  LiquaCel (1 oz/serv) - 100 calories, 16 gm protein, 10 mg potassium, 20 mg phos  Bipro (16.9 oz/serv) - 90 calories, 20 gm protein, 0 mg potassium/phos  Gelatein 20 (4 oz/serv) (lime/orange/fruit punch/grape) - 80-90 calories, 20 gm protein, 120-180 mg potassium, 0 mg phos  Strawberry Banana Proti-15 Gelatin ( [employer] e-store: [email or web address]://[email or web address]/store) (4 oz/serv) - 70 calories, 16 gm protein, 55 mg potassium    Protein bars:  Pure Protein bars   Balance Bars  Zone protein      Post-op Diet Handouts:  Diet Guidelines after Weight-loss Surgery  [email or web address]     Your Stage 1 Diet: Clear Liquids  [email or web address]     Your Stage 2 Diet: Low-fat Full Liquids  [email or web address]     Your Stage 3 Diet: Pureed Foods  [email or web address]     Pureed Pleasures  [email or web address]://[email or web address]    Your Stage 4 Diet: Soft Foods  [email or web address]    Your Stage 5 Diet: Regular Foods  [email or web address]    Supplements after Sleeve Gastrectomy, Gastric Bypass or Single Anastomosis Duodenal Switch  [email or web address]://[email or web address]    Keeping Track of Fluids  [email or web address]        COMPREHENSIVE WEIGHT MANAGEMENT PROGRAM  VIRTUAL SUPPORT GROUPS    For Support Group Information:      We offer support groups for patients who are working on weight loss and considering, preparing for or have had weight loss surgery.   There is no cost for this opportunity.  You are invited to attend the?Virtual Support Groups?provided by any of the following locations:    [facility] via [employer] Teams with Mary Locke RN  2.   [place] via  ""[employer]" Teams with Drew Farmer, PhD, LP  3.   [place] via "[employer]" Teams with Angela Benedict RN  4.   [facility] via "[employer]" Teams with Angela Saleem [facility]    The following Support Group information can also be found on our website:  [email or web address]    [email or web address]    [facility] Weight Loss Surgery Support Group    [facility] Weight Loss Surgery Support Group  The support group is a patient-lead forum that meets monthly to share experiences, encouragement and education. It is open to those who have had weight loss surgery, are scheduled for surgery, or are considering surgery.   WHEN: This group meets on the 3rd Wednesday of each month from 5:00PM - 6:00PM virtually using Microsoft Teams.   FACILITATOR: Led by Mary Woods RD, CHAPO, RN, the program's Clinical Coordinator.   TO REGISTER: Please contact the clinic via [employer] or call the nurse line directly at [phone] to inform our staff that you would like an invite sent to you and to let us know the email you would like the invite sent to. Prior to the meeting, a link with directions on how to join the meeting will be sent to you.    2023 Meetings   April 19: Guest Speaker, Zac Crane RD, LD, \"Maintaining Weight Loss after Bariatric Surgery\".  May 17: \"Let's Talk\" a time for the group to share.  June 21: \"Let's Talk\" a time for the group to share.  July 19  August 16  September 20  October 18  November 15  December 20    [facility] Support Groups    [facility] Bariatric Care Support Group?  This is open to all pre- and post- operative bariatric surgery patients as well as their support system.   WHEN: This group meets the 2nd Tuesday of each month from 6:30 PM - 8:00 PM virtually using Microsoft Teams.   FACILITATOR: Led by Drew" "Marleny, Ph.D who is a Licensed Psychologist with the [facility] Comprehensive Weight Management Program.   TO REGISTER: Please send an email to Drew Farmer, Ph.D.,  at?[email or web address]?if you would like an invitation to the group and to learn about using Microsoft Teams.    2023 Meetings  April 11: Guest speaker, Cele Schulte MD, Bariatrician, [facility] Comprehensive Weight Management Program, \"Injectable Weight Loss Medications\".  May 9  Saba 13  July 11  August 8  September 12  October 10  November 14  December 12    [facility] Post-Operative Bariatric Surgery Support Group  This is a support group for [facility] bariatric patients (and those external to [facility]) who have had bariatric surgery and are at least 3 months post-surgery.  WHEN: This support group meets the 4th Wednesday of the month from 11:00 AM - 12:00 PM virtually using Microsoft Teams.   FACILITATOR: Led by Certified Bariatric Nurse, Angela Benedict RN.   TO REGISTER: Please send an email to Angela at [email or web address] if you would like an invitation to the group and to learn about using Microsoft Teams.    2023 Meetings  April 26  May 24  Saba 28  July 26  August 23  September 27  October 25  November 22  December 27    [facility]   Healthy Lifestyle Virtual Support Group    Healthy Lifestyle Virtual Support Group?  This is 60 minutes of small group guided discussion, support and resources. All are welcome who want a healthy lifestyle.  WHEN: This group meets monthly on a Friday from 12:30 PM - 1:30 PM virtually using Microsoft Teams.  This group will meet the first Friday of the month beginning In July  FACILITATOR: Led by National Board Certified Health and , Angela Saleem [facility].   TO REGISTER: Please send an email to Angela [email or web address] to receive monthly invites to the group or if you have any questions about having a health " .  Prior to the meeting, a link with directions on how to join the meeting will be sent to you.    2023 Meetings  May 19: Let's Talk  June 9: Create Your Coaching Toolkit: Learn How to  Yourself  July 7: Let's Talk  August 4: Benefits of Fiber with ENRIQUE Frank  September 1: Show and Tell (share your aps, podcasts, recipes, hacks, books)  October 6 :Let's Talk  November 3: Introduction to Mindfulness   December 1: Let's Talk

## 2023-09-01 NOTE — NURSING NOTE
Is the patient currently in the state of [place]? YES    Visit mode:VIDEO    If the visit is dropped, the patient can be reconnected by: VIDEO VISIT: Text to cell phone:   Telephone Information:   Mobile [phone]       Will anyone else be joining the visit? NO  (If patient encounters technical issues they should call [phone]    How would you like to obtain your AVS? MyChart    Are changes needed to the allergy or medication list? No, Pt stated no changes to allergies, and Pt stated no med changes    Reason for visit: RECHECK ([facility])    [place]

## 2023-09-01 NOTE — LETTER
"9/1/2023       RE: Glenna Spears  [address]  [place] [address]     Dear Colleague,    Thank you for referring your patient, Glenna Spears, to the [facility] [employer] at [facility]. Please see a copy of my visit note below.    Video-Visit Details    Type of service:  Video Visit    Video Start Time: 11:04 AM  Video End Time: 11:22 AM    Originating Location (pt. Location): Home    Distant Location (provider location):  Offsite (providers home) [facility] [employer]     Platform used for Video Visit: [employer]       Bariatric Nutrition Consultation Note    Reason For Visit: Nutrition Assessment    Glenna Spears is a 32 year old presenting today for bariatric nutrition consult.   Pt is interested in laparoscopic sleeve gastrectomy with Dr. Smith.     This is pt's 8th required nutrition visits prior to surgery. Missed dietitian appt in August d/t scheduling error on behalf of the clinician. Today's visit to serve as August visit, and 9/22 appt as Sept visit.     Pt referred by ANAMARIA Pat on January 2023.    Coordination note:   Needs baseline labs  Needs Psych eval - Done  Needs PCP letter of support - Done   20 lb weight loss from initial  Surgeon meet and greet - Done, Met Dr Smith 3/23/23  Obtain clearances   Nicotine cessation - Met    CO-MORBIDITIES OF OBESITY INCLUDE:  ESRD on dialysis     SUPPORT:  Patient reports good support system at home    ANTHROPOMETRICS:  Weight 1/23/23: 330 lbs      Current weight:  Estimated body mass index is 47.9 kg/m  as calculated from the following:    Height as of this encounter: 1.727 m (5' 8\").    Weight as of this encounter: 142.9 kg (315 lb).  (-4 lbs this month, -15 lbs from initial)     SUPPLEMENT INFORMATION:  Phos Binder - is chewable (Velphoro)   Vitamin D 2500 units daily (5000 unit tablet every other day)  Dialyvite renal MVI   Vitamin E 1000 " "mg every other day    Iron monitored at dialysis - gets infused as needed    Labs 6/22/23  Phos 8.0   Pot 4.0  Albumin 4.2  Calcium 10.1  PTH high but continues to be lower than usual  Hgb 11.3    Labs 7/20/23  Albumin 4.0   Phos 7.2  Pot 4.4   Calcium 9.7   (has significantly come down)  Glucose 87 (fasting)  NPCR .68     Labs 7/27/23  Hgb 10.9     Dialysis monthly labs 8/8/23 - pt reported  nPCR 1.05 (goal 1.1) - improving  Phos: 6.7 - improving  PTH: 1302 - elevated pt reports d/t ran out of med d/t issue with [facility]   Potassium: 5.4 - a little higher but still wnl, pt relates to increase in fresh fruit intake. Knows which to limit for potassium restriction.   Albumin: 3.9  Calcium 8.7   Hgb 9.8       NUTRITION HISTORY:  NKFA  \"Hates fish\"   Some lactose intolerance     Patient met with Ibis Guardado PA-C and transplant RD 1/23/23. Patient is interested in pursuing bariatric surgery.    Attended nutrition class 1/27/23.     Please see previous RD notes for more information.     June 2023:  Things going well overall per pt.  Increased dose of wegovy to 1.7 and is working well now. Some nausea.  Smaller portions.Trying to eat nutrient dense foods to help her feel better.     Phos lowest it has been in a long time. Decreasing cheese and chocolate intake in particular.    Eating Greek yogurt, nutrigrain bars, chicken salad, salads. Also doing more fruit      Working on decreasing fluids per fluid restriction.   Drinks water and occ juice.    Stressors: possible insurance changes    [place] [place] around [place] while son rides [employer] board    July 2023:  Continues to work on changes for surgery. Noticing weight loss and improvements in labs overall. Phos going down - at 7.2 last check per pt. Being mindful of dietary sources of phos and taking binders (might forget if out and about)    Trying to eat more home cooked food.   Feels she could do better with " protein intake. Recent NPCR was low.    Barriers: cost of food, food limitations due to dialysis/kidneys, occ forgetting to take phos binders, fluid restriction, nausea with wegovy, fatigue with meds and with dialysis   - Sent list of resources to help with food. Working on finding renal friendly food options - discussing in our appointments and with RD at Dialysis. Has been working on decreasing fluid intake - pulling less this past month. Weogvy has been helping with appetite, recently increased to 2.4 mg. Pushing through nausea - trying to take small sips. Has anti-nausea meds but tries to avoid taking them.     Today:   Phos labs continue to improve.   Changes: healthier food choices (more fruit), smaller portions, chewing more thoroughly. Working on increasing protein has been eating more steak. nPCR improved.   Just now starting to feel at baseline for activity, had a respiratory infection that she was hospitalized for recently.    Reports she does not have a specific fluid restriction that she can recall, just advised to drink less.   Aiming to keep to 30-60 oz, but some days goes over. Water, regular Mt Dew (1 can daily), coffee in the morning.      Previous goals:  1. Focus on ensuring adequate protein this next month. Will check in next month and monitor nPCR. - Improving   2. Start looking into protein shake options for post - Has yet to find shakes     Other surgery goals:  Relating To Eating:  - Eat slowly (20-30 minutes per meal), chewing foods well (25 chews per bite/applesauce consistency) - Improving  - Focus on eating smaller portion sizes at meals and snacks - Improving    Relating to beverages:  - Reduce caffeine/carbonation/calorie containing beverages - Improving, does not enjoy pop anymore but drinking   - Separate fluids from meals by 30 minutes before, during, and after eating - Improving  - Drink 48-64 ounces of fluid per day. Small, frequent sips between meals. - Met, continues      Relating to activity:  Increase activity as able - Met, continues     Additional information:  Works starting at 3 pm  Dialysis T/R/Sat     NUTRITION DIAGNOSIS:  Obesity r/t long history of positive energy balance aeb BMI >30 kg/m2.    INTERVENTION:  Intervention Provided/Education Provided on post-op diet guidelines, vitamins/minerals essential post-operatively, GI anatomy of bariatric surgeries, ways to help prepare for post-op diet guidelines pre-operatively, portion/calorie-control, mindful eating and sources of protein.  Patient demonstrates understanding.     Provided pt with list of low potassium fruit/vegetables. Encouraged elimination of pop and regular coffee intake. Pt very amenable to recommendations.     Personal barriers to making and continuing required life changes have been identified, and strategies to overcome those barriers have been recommended AND family and social supports have been assessed and strategies to strengthen those supports have been recommended.    Provided pt with list of goals, RD contact information and resources listed below via [employer].            No data to display                Expected Engagement: good    GOALS:  1. Focus on ensuring adequate protein this next month. Will check in next month and monitor nPCR.   2. Start looking into protein shake options for post-op (see list below)   3. Switch to decaf coffee  4. Eliminate pop. May consider sugar-free water enhancers instead or diet juice.       Other surgery goals:  Relating To Eating:  - Eat slowly (20-30 minutes per meal), chewing foods well (25 chews per bite/applesauce consistency)  - Focus on eating smaller portion sizes at meals and snacks    Relating to beverages:  - Separate fluids from meals by 30 minutes before, during, and after eating  -  Small, frequent sips of fluids between meals.     Relating to activity:  - Increase activity as able    Lower Potassium Fruits:  - Watermelon (1 cup = 85 mg  potassium)  - Tangerines (1 each = 140 mg)  - Strawberries (  cup = 125 mg)  - Raspberries (  cup = 90 mg)  - Plums (1 each = 105 mg)  - Pineapple, fresh or canned (  cup = 100 mg)  - Pears, canned (  cup = 120 mg)  - Peaches, canned (  cup = 120 mg)  - Peach, fresh (1 each = 185 mg)  - Dagoberto, limes (1 each = 80 mg)  - Grapes (  cup = 155 mg)     Lower Potassium Vegetables:  - Swiss chard, raw (1 cup = 135 mg)  -  Spinach, raw (1 cup = 170 mg)  - Summer squash (  cup = 175-200 mg)  - Peppers, red (  cup = 160 mg)  - Peppers, green (  cup = 130 mg)  - Onions, raw (  cup = 120 mg)  - Okra (  cup = 110 mg)  - Mushrooms, raw (  cup = 110 mg)  - Mixed vegetables (  cup = 150 mg)  - Lettuce, all types (1 cup = 100 mg)  - Greens: kale, turnip, wilian (  cup = 110-150 mg)  - Eggplant (  cup = 60 mg)  - Endive, raw (  cup = 80 mg)  - Cucumbers (  cup = 80 mg)      Protein powders mixed with water:  Pure Protein whey protein (140 calories, 23 gm protein, 110 mg potassium, 80 mg phos)  Guy Sathish whey protein (vanilla/strawberry)(110 calories, 25 gm protein, 180 mg potassium)  Premiere Protein whey protein (vanilla) (150 calories, 30 gm protein, 160 mg potassium)  Integrated whey protein (vanilla/strawberry) (110-120 calories, 20 gm protein, 140-150 mg potassium, 125 mg phos)    Clear Liquids:  LiquaCel (1 oz/serv) - 100 calories, 16 gm protein, 10 mg potassium, 20 mg phos  Bipro (16.9 oz/serv) - 90 calories, 20 gm protein, 0 mg potassium/phos  Gelatein 20 (4 oz/serv) (lime/orange/fruit punch/grape) - 80-90 calories, 20 gm protein, 120-180 mg potassium, 0 mg phos  Strawberry Banana Proti-15 Gelatin ([facility] e-store: [email or web address]/store) (4 oz/serv) - 70 calories, 16 gm protein, 55 mg potassium    Protein bars:  Pure Protein bars   Balance Bars  Zone protein      Post-op Diet Handouts:  Diet Guidelines after Weight-loss Surgery  [email or web address]     Your Stage 1 Diet: Clear  Liquids  [email or web address]     Your Stage 2 Diet: Low-fat Full Liquids  [email or web address]     Your Stage 3 Diet: Pureed Foods  [email or web address]     Pureed Pleasures  [email or web address]://[email or web address]    Your Stage 4 Diet: Soft Foods  [email or web address]    Your Stage 5 Diet: Regular Foods  [email or web address]    Supplements after Sleeve Gastrectomy, Gastric Bypass or Single Anastomosis Duodenal Switch  [email or web address]    Keeping Track of Fluids  [email or web address]      Follow up:   Friday, September 22nd at 11:30 am - With Gely due to me being out of the office   Monday, October 23rd at 11:00 am    Time spent with patient:  18 minutes.  Gely Saini, ENRIQUE, LD

## 2023-09-01 NOTE — PROGRESS NOTES
"Video-Visit Details    Type of service:  Video Visit    Video Start Time: 11:04 AM  Video End Time: 11:22 AM    Originating Location (pt. Location): Home    Distant Location (provider location):  Offsite (providers home) [facility] [place]     Platform used for Video Visit: Stacy       Bariatric Nutrition Consultation Note    Reason For Visit: Nutrition Assessment    Glenna Spears is a 32 year old presenting today for bariatric nutrition consult.   Pt is interested in laparoscopic sleeve gastrectomy with Dr. Smith.     This is pt's 8th required nutrition visits prior to surgery. Missed dietitian appt in August d/t scheduling error on behalf of the clinician. Today's visit to serve as August visit, and 9/22 appt as Sept visit.     Pt referred by ANAMARIA Pat on January 2023.    Coordination note:   Needs baseline labs  Needs Psych eval - Done  Needs PCP letter of support - Done   20 lb weight loss from initial  Surgeon meet and greet - Done, Met Dr Smith 3/23/23  Obtain clearances   Nicotine cessation - Met    CO-MORBIDITIES OF OBESITY INCLUDE:  ESRD on dialysis     SUPPORT:  Patient reports good support system at home    ANTHROPOMETRICS:  Weight 1/23/23: 330 lbs      Current weight:  Estimated body mass index is 47.9 kg/m  as calculated from the following:    Height as of this encounter: 1.727 m (5' 8\").    Weight as of this encounter: 142.9 kg (315 lb).  (-4 lbs this month, -15 lbs from initial)     SUPPLEMENT INFORMATION:  Phos Binder - is chewable (Velphoro)   Vitamin D 2500 units daily (5000 unit tablet every other day)  Dialyvite renal MVI   Vitamin E 1000 mg every other day    Iron monitored at dialysis - gets infused as needed    Labs 6/22/23  Phos 8.0   Pot 4.0  Albumin 4.2  Calcium 10.1  PTH high but continues to be lower than usual  Hgb 11.3    Labs 7/20/23  Albumin 4.0   Phos 7.2  Pot 4.4   Calcium 9.7   (has significantly come down)  Glucose 87 " "(fasting)  NPCR .68     Labs 7/27/23  Hgb 10.9     Dialysis monthly labs 8/8/23 - pt reported  nPCR 1.05 (goal 1.1) - improving  Phos: 6.7 - improving  PTH: 1302 - elevated pt reports d/t ran out of med d/t issue with [facility]   Potassium: 5.4 - a little higher but still wnl, pt relates to increase in fresh fruit intake. Knows which to limit for potassium restriction.   Albumin: 3.9  Calcium 8.7   Hgb 9.8       NUTRITION HISTORY:  NKFA  \"Hates fish\"   Some lactose intolerance     Patient met with Ibis Guardado PA-C and transplant RD 1/23/23. Patient is interested in pursuing bariatric surgery.    Attended nutrition class 1/27/23.     Please see previous RD notes for more information.     June 2023:  Things going well overall per pt.  Increased dose of wegovy to 1.7 and is working well now. Some nausea.  Smaller portions.Trying to eat nutrient dense foods to help her feel better.     Phos lowest it has been in a long time. Decreasing cheese and chocolate intake in particular.    Eating Greek yogurt, nutrigrain bars, chicken salad, salads. Also doing more fruit      Working on decreasing fluids per fluid restriction.   Drinks water and occ juice.    Stressors: possible insurance changes    [facility] around [facility] while son rides hover board    July 2023:  Continues to work on changes for surgery. Noticing weight loss and improvements in labs overall. Phos going down - at 7.2 last check per pt. Being mindful of dietary sources of phos and taking binders (might forget if out and about)    Trying to eat more home cooked food.   Feels she could do better with protein intake. Recent NPCR was low.    Barriers: cost of food, food limitations due to dialysis/kidneys, occ forgetting to take phos binders, fluid restriction, nausea with wegovy, fatigue with meds and with dialysis   - Sent list of resources to help with food. Working on finding renal friendly food options - " discussing in our appointments and with RD at Dialysis. Has been working on decreasing fluid intake - pulling less this past month. Weogvy has been helping with appetite, recently increased to 2.4 mg. Pushing through nausea - trying to take small sips. Has anti-nausea meds but tries to avoid taking them.     Today:   Phos labs continue to improve.   Changes: healthier food choices (more fruit), smaller portions, chewing more thoroughly. Working on increasing protein has been eating more steak. nPCR improved.   Just now starting to feel at baseline for activity, had a respiratory infection that she was hospitalized for recently.    Reports she does not have a specific fluid restriction that she can recall, just advised to drink less.   Aiming to keep to 30-60 oz, but some days goes over. Water, regular Mt Dew (1 can daily), coffee in the morning.      Previous goals:  1. Focus on ensuring adequate protein this next month. Will check in next month and monitor nPCR. - Improving   2. Start looking into protein shake options for post - Has yet to find shakes     Other surgery goals:  Relating To Eating:  - Eat slowly (20-30 minutes per meal), chewing foods well (25 chews per bite/applesauce consistency) - Improving  - Focus on eating smaller portion sizes at meals and snacks - Improving    Relating to beverages:  - Reduce caffeine/carbonation/calorie containing beverages - Improving, does not enjoy pop anymore but drinking   - Separate fluids from meals by 30 minutes before, during, and after eating - Improving  - Drink 48-64 ounces of fluid per day. Small, frequent sips between meals. - Met, continues     Relating to activity:  Increase activity as able - Met, continues     Additional information:  Works starting at 3 pm  Dialysis T/R/Sat     NUTRITION DIAGNOSIS:  Obesity r/t long history of positive energy balance aeb BMI >30 kg/m2.    INTERVENTION:  Intervention Provided/Education Provided on post-op diet guidelines,  vitamins/minerals essential post-operatively, GI anatomy of bariatric surgeries, ways to help prepare for post-op diet guidelines pre-operatively, portion/calorie-control, mindful eating and sources of protein.  Patient demonstrates understanding.     Provided pt with list of low potassium fruit/vegetables. Encouraged elimination of pop and regular coffee intake. Pt very amenable to recommendations.     Personal barriers to making and continuing required life changes have been identified, and strategies to overcome those barriers have been recommended AND family and social supports have been assessed and strategies to strengthen those supports have been recommended.    Provided pt with list of goals, RD contact information and resources listed below via Data [employer]metry.            No data to display                Expected Engagement: good    GOALS:  1. Focus on ensuring adequate protein this next month. Will check in next month and monitor nPCR.   2. Start looking into protein shake options for post-op (see list below)   3. Switch to decaf coffee  4. Eliminate pop. May consider sugar-free water enhancers instead or diet juice.       Other surgery goals:  Relating To Eating:  - Eat slowly (20-30 minutes per meal), chewing foods well (25 chews per bite/applesauce consistency)  - Focus on eating smaller portion sizes at meals and snacks    Relating to beverages:  - Separate fluids from meals by 30 minutes before, during, and after eating  -  Small, frequent sips of fluids between meals.     Relating to activity:  - Increase activity as able    Lower Potassium Fruits:  - Watermelon (1 cup = 85 mg potassium)  - Tangerines (1 each = 140 mg)  - Strawberries (  cup = 125 mg)  - Raspberries (  cup = 90 mg)  - Plums (1 each = 105 mg)  - Pineapple, fresh or canned (  cup = 100 mg)  - Pears, canned (  cup = 120 mg)  - Peaches, canned (  cup = 120 mg)  - Peach, fresh (1 each = 185 mg)  - Dagoberto, limes (1 each = 80 mg)  - Grapes (  cup  = 155 mg)     Lower Potassium Vegetables:  - Swiss chard, raw (1 cup = 135 mg)  -  Spinach, raw (1 cup = 170 mg)  - Summer squash (  cup = 175-200 mg)  - Peppers, red (  cup = 160 mg)  - Peppers, green (  cup = 130 mg)  - Onions, raw (  cup = 120 mg)  - Okra (  cup = 110 mg)  - Mushrooms, raw (  cup = 110 mg)  - Mixed vegetables (  cup = 150 mg)  - Lettuce, all types (1 cup = 100 mg)  - Greens: kale, turnip, wilian (  cup = 110-150 mg)  - Eggplant (  cup = 60 mg)  - Endive, raw (  cup = 80 mg)  - Cucumbers (  cup = 80 mg)      Protein powders mixed with water:  Pure Protein whey protein (140 calories, 23 gm protein, 110 mg potassium, 80 mg phos)  Guy Sathish whey protein (vanilla/strawberry)(110 calories, 25 gm protein, 180 mg potassium)  Premiere Protein whey protein (vanilla) (150 calories, 30 gm protein, 160 mg potassium)  Integrated whey protein (vanilla/strawberry) (110-120 calories, 20 gm protein, 140-150 mg potassium, 125 mg phos)    Clear Liquids:  LiquaCel (1 oz/serv) - 100 calories, 16 gm protein, 10 mg potassium, 20 mg phos  Bipro (16.9 oz/serv) - 90 calories, 20 gm protein, 0 mg potassium/phos  Gelatein 20 (4 oz/serv) (lime/orange/fruit punch/grape) - 80-90 calories, 20 gm protein, 120-180 mg potassium, 0 mg phos  Strawberry Banana Proti-15 Gelatin ([facility] e-store: [email or web address]) (4 oz/serv) - 70 calories, 16 gm protein, 55 mg potassium    Protein bars:  Pure Protein bars   Balance Bars  Zone protein      Post-op Diet Handouts:  Diet Guidelines after Weight-loss Surgery  [email or web address]     Your Stage 1 Diet: Clear Liquids  [email or web address]     Your Stage 2 Diet: Low-fat Full Liquids  [email or web address]     Your Stage 3 Diet: Pureed Foods  [email or web address]     Pureed Pleasures  [email or web address]    Your Stage 4 Diet: Soft Foods  [email or web address]    Your Stage 5 Diet: Regular  Foods  [email or web address]    Supplements after Sleeve Gastrectomy, Gastric Bypass or Single Anastomosis Duodenal Switch  [email or web address]    Keeping Track of Fluids  [email or web address]      Follow up:   Friday, September 22nd at 11:30 am - With Gely due to me being out of the office   Monday, October 23rd at 11:00 am    Time spent with patient:  18 minutes.  Gely Saini, ENRIQUE, LD

## 2023-09-22 ENCOUNTER — VIRTUAL VISIT (OUTPATIENT)
Dept: ENDOCRINOLOGY | Facility: CLINIC | Age: 33
End: 2023-09-22
Payer: COMMERCIAL

## 2023-09-22 VITALS — BODY MASS INDEX: 44.41 KG/M2 | WEIGHT: 293 LBS | HEIGHT: 68 IN

## 2023-09-22 DIAGNOSIS — Z99.2 ESRD (END STAGE RENAL DISEASE) ON DIALYSIS (H): ICD-10-CM

## 2023-09-22 DIAGNOSIS — Z71.3 NUTRITIONAL COUNSELING: Primary | ICD-10-CM

## 2023-09-22 DIAGNOSIS — N18.6 ESRD (END STAGE RENAL DISEASE) ON DIALYSIS (H): ICD-10-CM

## 2023-09-22 DIAGNOSIS — E66.01 MORBID OBESITY (H): ICD-10-CM

## 2023-09-22 PROCEDURE — 99207 PR NO CHARGE LOS: CPT | Mod: 95 | Performed by: DIETITIAN, REGISTERED

## 2023-09-22 PROCEDURE — 97803 MED NUTRITION INDIV SUBSEQ: CPT | Mod: 95 | Performed by: DIETITIAN, REGISTERED

## 2023-09-22 ASSESSMENT — PAIN SCALES - GENERAL: PAINLEVEL: NO PAIN (0)

## 2023-09-22 NOTE — PROGRESS NOTES
"Video-Visit Details    Type of service:  Video Visit    Video Start Time: 11:30 AM   Video End Time: 12:09 AM    Originating Location (pt. Location): Home    Distant Location (provider location):  Offsite (providers home) [facility] [place]     Platform used for Video Visit: Well       Bariatric Nutrition Consultation Note    Reason For Visit: Nutrition Assessment    Glenna Spears is a 32 year old presenting today for bariatric nutrition consult.   Pt is interested in laparoscopic sleeve gastrectomy with Dr. Smith.     This is pt's 9th required nutrition visits prior to surgery.     Pt referred by ANAMARIA Pat on January 2023.    Coordination note:   Needs baseline labs  Needs Psych eval - Done  Needs PCP letter of support - Done   20 lb weight loss from initial  Surgeon meet and greet - Done, Met Dr Smith 3/23/23  Obtain clearances   Nicotine cessation - Met    CO-MORBIDITIES OF OBESITY INCLUDE:  ESRD on dialysis     SUPPORT:  Patient reports good support system at home    ANTHROPOMETRICS:  Weight 1/23/23: 330 lbs    Current weight:  Estimated body mass index is 46.38 kg/m  as calculated from the following:    Height as of this encounter: 1.727 m (5' 8\").    Weight as of this encounter: 138.3 kg (305 lb).  (-10 lbs this month, -25 lbs from initial)     SUPPLEMENT INFORMATION:  Phos Binder - is chewable (Velphoro)   Vitamin D 2500 units daily (5000 unit tablet every other day)  Dialyvite renal MVI   Vitamin E 1000 mg every other day  Iron monitored at dialysis - gets infused as needed    Dialysis monthly labs:  6/22/23:  Phos 8.0   Pot 4.0  Albumin 4.2  Calcium 10.1  PTH high but continues to be lower than usual  Hgb 11.3    Labs 7/20/23  Albumin 4.0   Phos 7.2  Pot 4.4   Calcium 9.7   (has significantly come down)  Glucose 87 (fasting)  NPCR .68     Labs 7/27/23  Hgb 10.9     8/8/23 - pt reported  nPCR 1.05 (goal 1.1) - improving  Phos: 6.7 - improving  PTH: 1302 " "- elevated pt reports d/t ran out of med d/t issue with [facility]   Potassium: 5.4 - a little higher but still wnl, pt relates to increase in fresh fruit intake. Knows which to limit for potassium restriction.   Albumin: 3.9  Calcium 8.7   Hgb 9.8     9/22/23 - pt reported  nPCR - 0.73 - down from last month, possibly related to smaller portions  Phos: 7.2 - up from last month, she thinks may be related to eating more phos foods. Is working hard to maintain consistency with phos binder intake.   Hgb: 9.9  Calcium: 9.6   Potassium: 4.3   Albumin: 4.1      NUTRITION HISTORY:  NKFA  \"Hates fish\"   Some lactose intolerance     Patient met with Ibis Guardado PA-C and transplant RD 1/23/23. Patient is interested in pursuing bariatric surgery.    Attended nutrition class 1/27/23.     Please see previous RD notes for more information.     June 2023:  Things going well overall per pt.  Increased dose of wegovy to 1.7 and is working well now. Some nausea.  Smaller portions.Trying to eat nutrient dense foods to help her feel better.     Phos lowest it has been in a long time. Decreasing cheese and chocolate intake in particular.    Eating Greek yogurt, nutrigrain bars, chicken salad, salads. Also doing more fruit      Working on decreasing fluids per fluid restriction.   Drinks water and occ juice.    Stressors: possible insurance changes    PA[facility] around [facility] while son rides hover board    July 2023:  Continues to work on changes for surgery. Noticing weight loss and improvements in labs overall. Phos going down - at 7.2 last check per pt. Being mindful of dietary sources of phos and taking binders (might forget if out and about)    Trying to eat more home cooked food.   Feels she could do better with protein intake. Recent NPCR was low.    Barriers: cost of food, food limitations due to dialysis/kidneys, occ forgetting to take phos binders, fluid restriction, nausea " with wegovy, fatigue with meds and with dialysis   - Sent list of resources to help with food. Working on finding renal friendly food options - discussing in our appointments and with RD at Dialysis. Has been working on decreasing fluid intake - pulling less this past month. Abilioogvalessio has been helping with appetite, recently increased to 2.4 mg. Pushing through nausea - trying to take small sips. Has anti-nausea meds but tries to avoid taking them.     9/1/22:   Phos labs continue to improve.   Changes: healthier food choices (more fruit), smaller portions, chewing more thoroughly. Working on increasing protein has been eating more steak. nPCR improved.   Just now starting to feel at baseline for activity, had a respiratory infection that she was hospitalized for recently.    Reports she does not have a specific fluid restriction that she can recall, just advised to drink less.   Aiming to keep to 30-60 oz, but some days goes over. Water, regular Mt Dew (1 can daily), coffee in the morning.      Today -   Chewing well  Smaller portions - finds Wegovy most helpful with this.   Increasing nutritional value of her meals.    Diet Recall:  Breakfast: coffee with creamer   Lunch: sandwich with deli meat and swiss cheese with falk   Dinner: steak, potato, veggies  Beverages: Water mostly. Katiuska Sun or Juicy-juice on occasion.      Previous goals:  1. Focus on ensuring adequate protein this next month. Will check in next month and monitor nPCR. - Improved in terms of food chcoies, is trying to pick more high-protein food choices, but since portions are now smaller may not be getting enough grams of protein daily.   2. Start looking into protein shake options for post-op (see list below) - Not met, spent a large portion of today's visit looking at different options.   3. Switch to decaf coffee - Not met  4. Eliminate pop. May consider sugar-free water enhancers instead or diet juice. - Improving, not buying for herself, may  have a sip of her BFs soda form time to time.    Additional information:  Works starting at 3 pm  Dialysis T/R/Sat     NUTRITION DIAGNOSIS:  Obesity r/t long history of positive energy balance aeb BMI >30 kg/m2.    INTERVENTION:  Intervention Provided/Education Provided on post-op diet guidelines, vitamins/minerals essential post-operatively, GI anatomy of bariatric surgeries, ways to help prepare for post-op diet guidelines pre-operatively, portion/calorie-control, mindful eating and sources of protein.  Patient demonstrates understanding.     Provided pt with list of low potassium fruit/vegetables. Encouraged elimination of pop and regular coffee intake. Pt very amenable to recommendations.     Personal barriers to making and continuing required life changes have been identified, and strategies to overcome those barriers have been recommended AND family and social supports have been assessed and strategies to strengthen those supports have been recommended.    Provided pt with list of goals, RD contact information and resources listed below via Fligoo.            No data to display                Expected Engagement: good    GOALS:  1. Focus on ensuring adequate protein this next month. Will check in next month and monitor nPCR.   2. Replace one meal daily with protein smoothie (Vanilla Premiere Protein Powder, 1/2 c fruit, and water).    3. Switch to decaf coffee. Less creamer in coffee.   4. Eliminate pop. May consider sugar-free water enhancers instead or diet juice.       Other surgery goals:  Relating To Eating:  - Eat slowly (20-30 minutes per meal), chewing foods well (25 chews per bite/applesauce consistency)  - Focus on eating smaller portion sizes at meals and snacks    Relating to beverages:  - Separate fluids from meals by 30 minutes before, during, and after eating  -  Small, frequent sips of fluids between meals.     Relating to activity:  - Increase activity as able    Establish with therapist,  Daisha Floyd: [phone]      Lower Potassium Fruits:  - Watermelon (1 cup = 85 mg potassium)  - Tangerines (1 each = 140 mg)  - Strawberries (  cup = 125 mg)  - Raspberries (  cup = 90 mg)  - Plums (1 each = 105 mg)  - Pineapple, fresh or canned (  cup = 100 mg)  - Pears, canned (  cup = 120 mg)  - Peaches, canned (  cup = 120 mg)  - Peach, fresh (1 each = 185 mg)  - Dagoberto, limes (1 each = 80 mg)  - Grapes (  cup = 155 mg)     Lower Potassium Vegetables:  - Swiss chard, raw (1 cup = 135 mg)  -  Spinach, raw (1 cup = 170 mg)  - Summer squash (  cup = 175-200 mg)  - Peppers, red (  cup = 160 mg)  - Peppers, green (  cup = 130 mg)  - Onions, raw (  cup = 120 mg)  - Okra (  cup = 110 mg)  - Mushrooms, raw (  cup = 110 mg)  - Mixed vegetables (  cup = 150 mg)  - Lettuce, all types (1 cup = 100 mg)  - Greens: kale, turnip, wilian (  cup = 110-150 mg)  - Eggplant (  cup = 60 mg)  - Endive, raw (  cup = 80 mg)  - Cucumbers (  cup = 80 mg)      Kidney-Friendly Protein Supplements:  Pure Protein whey protein (140 calories, 23 gm protein, 110 mg potassium, 80 mg phos)  Premiere Protein whey protein (vanilla) (150 calories, 30 gm protein, 160 mg potassium)  Bipro Protein Water (16.9 oz/serv) - 90 calories, 20 gm protein, 0 mg potassium/phos  Vital Protein Collagen Powder       Post-op Diet Handouts:  Diet Guidelines after Weight-loss Surgery  [email or web address]     Your Stage 1 Diet: Clear Liquids  [email or web address]     Your Stage 2 Diet: Low-fat Full Liquids  [email or web address]     Your Stage 3 Diet: Pureed Foods  [email or web address]     Pureed Pleasures  [email or web address]    Your Stage 4 Diet: Soft Foods  [email or web address]    Your Stage 5 Diet: Regular Foods  [email or web address]    Supplements after Sleeve Gastrectomy, Gastric Bypass or Single Anastomosis Duodenal Switch  [email or web address]    Keeping Track of  Fluids  [email or web address]      Follow up:   Monday, October 23rd at 11:00 am    Time spent with patient:  39 minutes.  Gely Saini RD, LD

## 2023-09-22 NOTE — LETTER
"9/22/2023       RE: Glenna Spears  [address]  [place] [address]     Dear Colleague,    Thank you for referring your patient, Glenna Spears, to the [facility] [place] at [facility]. Please see a copy of my visit note below.    Video-Visit Details    Type of service:  Video Visit    Video Start Time: 11:30 AM   Video End Time: 12:09 AM    Originating Location (pt. Location): Home    Distant Location (provider location):  Offsite (providers home) [facility] [place]     Platform used for Video Visit: Thermedical       Bariatric Nutrition Consultation Note    Reason For Visit: Nutrition Assessment    Glenna Spears is a 32 year old presenting today for bariatric nutrition consult.   Pt is interested in laparoscopic sleeve gastrectomy with Dr. Smith.     This is pt's 9th required nutrition visits prior to surgery.     Pt referred by ANAMARIA Pat on January 2023.    Coordination note:   Needs baseline labs  Needs Psych eval - Done  Needs PCP letter of support - Done   20 lb weight loss from initial  Surgeon meet and greet - Done, Met Dr Smith 3/23/23  Obtain clearances   Nicotine cessation - Met    CO-MORBIDITIES OF OBESITY INCLUDE:  ESRD on dialysis     SUPPORT:  Patient reports good support system at home    ANTHROPOMETRICS:  Weight 1/23/23: 330 lbs    Current weight:  Estimated body mass index is 46.38 kg/m  as calculated from the following:    Height as of this encounter: 1.727 m (5' 8\").    Weight as of this encounter: 138.3 kg (305 lb).  (-10 lbs this month, -25 lbs from initial)     SUPPLEMENT INFORMATION:  Phos Binder - is chewable (Velphoro)   Vitamin D 2500 units daily (5000 unit tablet every other day)  Dialyvite renal MVI   Vitamin E 1000 mg every other day  Iron monitored at dialysis - gets infused as needed    Dialysis monthly labs:  6/22/23:  Phos 8.0   Pot 4.0  Albumin " "4.2  Calcium 10.1  PTH high but continues to be lower than usual  Hgb 11.3    Labs 7/20/23  Albumin 4.0   Phos 7.2  Pot 4.4   Calcium 9.7   (has significantly come down)  Glucose 87 (fasting)  NPCR .68     Labs 7/27/23  Hgb 10.9     8/8/23 - pt reported  nPCR 1.05 (goal 1.1) - improving  Phos: 6.7 - improving  PTH: 1302 - elevated pt reports d/t ran out of med d/t issue with [facility]   Potassium: 5.4 - a little higher but still wnl, pt relates to increase in fresh fruit intake. Knows which to limit for potassium restriction.   Albumin: 3.9  Calcium 8.7   Hgb 9.8     9/22/23 - pt reported  nPCR - 0.73 - down from last month, possibly related to smaller portions  Phos: 7.2 - up from last month, she thinks may be related to eating more phos foods. Is working hard to maintain consistency with phos binder intake.   Hgb: 9.9  Calcium: 9.6   Potassium: 4.3   Albumin: 4.1      NUTRITION HISTORY:  NKFA  \"Hates fish\"   Some lactose intolerance     Patient met with Ibis Guardado PA-C and transplant RD 1/23/23. Patient is interested in pursuing bariatric surgery.    Attended nutrition class 1/27/23.     Please see previous RD notes for more information.     June 2023:  Things going well overall per pt.  Increased dose of wegovy to 1.7 and is working well now. Some nausea.  Smaller portions.Trying to eat nutrient dense foods to help her feel better.     Phos lowest it has been in a long time. Decreasing cheese and chocolate intake in particular.    Eating Greek yogurt, nutrigrain bars, chicken salad, salads. Also doing more fruit      Working on decreasing fluids per fluid restriction.   Drinks water and occ juice.    Stressors: possible insurance changes    [facility] [place] around [place] while son rides [employer] board    July 2023:  Continues to work on changes for surgery. Noticing weight loss and improvements in labs overall. Phos going down - at 7.2 last check per pt. Being " mindful of dietary sources of phos and taking binders (might forget if out and about)    Trying to eat more home cooked food.   Feels she could do better with protein intake. Recent NPCR was low.    Barriers: cost of food, food limitations due to dialysis/kidneys, occ forgetting to take phos binders, fluid restriction, nausea with wegovy, fatigue with meds and with dialysis   - Sent list of resources to help with food. Working on finding renal friendly food options - discussing in our appointments and with RD at Dialysis. Has been working on decreasing fluid intake - pulling less this past month. Weogvy has been helping with appetite, recently increased to 2.4 mg. Pushing through nausea - trying to take small sips. Has anti-nausea meds but tries to avoid taking them.     9/1/22:   Phos labs continue to improve.   Changes: healthier food choices (more fruit), smaller portions, chewing more thoroughly. Working on increasing protein has been eating more steak. nPCR improved.   Just now starting to feel at baseline for activity, had a respiratory infection that she was hospitalized for recently.    Reports she does not have a specific fluid restriction that she can recall, just advised to drink less.   Aiming to keep to 30-60 oz, but some days goes over. Water, regular Mt Dew (1 can daily), coffee in the morning.      Today -   Chewing well  Smaller portions - finds Wegovy most helpful with this.   Increasing nutritional value of her meals.    Diet Recall:  Breakfast: coffee with creamer   Lunch: sandwich with deli meat and swiss cheese with falk   Dinner: steak, potato, veggies  Beverages: Water mostly. Katiuska Sun or Juicy-juice on occasion.      Previous goals:  1. Focus on ensuring adequate protein this next month. Will check in next month and monitor nPCR. - Improved in terms of food chcoies, is trying to pick more high-protein food choices, but since portions are now smaller may not be getting enough grams of  protein daily.   2. Start looking into protein shake options for post-op (see list below) - Not met, spent a large portion of today's visit looking at different options.   3. Switch to decaf coffee - Not met  4. Eliminate pop. May consider sugar-free water enhancers instead or diet juice. - Improving, not buying for herself, may have a sip of her BFs soda form time to time.    Additional information:  Works starting at 3 pm  Dialysis T/R/Sat     NUTRITION DIAGNOSIS:  Obesity r/t long history of positive energy balance aeb BMI >30 kg/m2.    INTERVENTION:  Intervention Provided/Education Provided on post-op diet guidelines, vitamins/minerals essential post-operatively, GI anatomy of bariatric surgeries, ways to help prepare for post-op diet guidelines pre-operatively, portion/calorie-control, mindful eating and sources of protein.  Patient demonstrates understanding.     Provided pt with list of low potassium fruit/vegetables. Encouraged elimination of pop and regular coffee intake. Pt very amenable to recommendations.     Personal barriers to making and continuing required life changes have been identified, and strategies to overcome those barriers have been recommended AND family and social supports have been assessed and strategies to strengthen those supports have been recommended.    Provided pt with list of goals, RD contact information and resources listed below via [employer].            No data to display                Expected Engagement: good    GOALS:  1. Focus on ensuring adequate protein this next month. Will check in next month and monitor nPCR.   2. Replace one meal daily with protein smoothie (Vanilla Premiere Protein Powder, 1/2 c fruit, and water).    3. Switch to decaf coffee. Less creamer in coffee.   4. Eliminate pop. May consider sugar-free water enhancers instead or diet juice.       Other surgery goals:  Relating To Eating:  - Eat slowly (20-30 minutes per meal), chewing foods well (25 chews per  bite/applesauce consistency)  - Focus on eating smaller portion sizes at meals and snacks    Relating to beverages:  - Separate fluids from meals by 30 minutes before, during, and after eating  -  Small, frequent sips of fluids between meals.     Relating to activity:  - Increase activity as able    Establish with therapist, Daisha Everett: [phone]      Lower Potassium Fruits:  - Watermelon (1 cup = 85 mg potassium)  - Tangerines (1 each = 140 mg)  - Strawberries (  cup = 125 mg)  - Raspberries (  cup = 90 mg)  - Plums (1 each = 105 mg)  - Pineapple, fresh or canned (  cup = 100 mg)  - Pears, canned (  cup = 120 mg)  - Peaches, canned (  cup = 120 mg)  - Peach, fresh (1 each = 185 mg)  - Dagoberto, limes (1 each = 80 mg)  - Grapes (  cup = 155 mg)     Lower Potassium Vegetables:  - Swiss chard, raw (1 cup = 135 mg)  -  Spinach, raw (1 cup = 170 mg)  - Summer squash (  cup = 175-200 mg)  - Peppers, red (  cup = 160 mg)  - Peppers, green (  cup = 130 mg)  - Onions, raw (  cup = 120 mg)  - Okra (  cup = 110 mg)  - Mushrooms, raw (  cup = 110 mg)  - Mixed vegetables (  cup = 150 mg)  - Lettuce, all types (1 cup = 100 mg)  - Greens: kale, turnip, wilian (  cup = 110-150 mg)  - Eggplant (  cup = 60 mg)  - Endive, raw (  cup = 80 mg)  - Cucumbers (  cup = 80 mg)      Kidney-Friendly Protein Supplements:  Pure Protein whey protein (140 calories, 23 gm protein, 110 mg potassium, 80 mg phos)  Premiere Protein whey protein (vanilla) (150 calories, 30 gm protein, 160 mg potassium)  Bipro Protein Water (16.9 oz/serv) - 90 calories, 20 gm protein, 0 mg potassium/phos  Vital Protein Collagen Powder       Post-op Diet Handouts:  Diet Guidelines after Weight-loss Surgery  [email or web address]     Your Stage 1 Diet: Clear Liquids  [email or web address]     Your Stage 2 Diet: Low-fat Full Liquids  [email or web address]     Your Stage 3 Diet: Pureed Foods  [email or web address]     Pureed  Pleasures  [email or web address]    Your Stage 4 Diet: Soft Foods  [email or web address]    Your Stage 5 Diet: Regular Foods  [email or web address]    Supplements after Sleeve Gastrectomy, Gastric Bypass or Single Anastomosis Duodenal Switch  [email or web address]    Keeping Track of Fluids  [email or web address]      Follow up:   Monday, October 23rd at 11:00 am    Time spent with patient:  39 minutes.  Gely Saini, ENRIQUE, LD

## 2023-09-22 NOTE — NURSING NOTE
Is the patient currently in the state of [place]? YES    Visit mode:VIDEO    If the visit is dropped, the patient can be reconnected by: VIDEO VISIT: Text to cell phone:   Telephone Information:   Mobile [phone]       Will anyone else be joining the visit? NO  (If patient encounters technical issues they should call [phone]    How would you like to obtain your AVS? MyChart    Are changes needed to the allergy or medication list? No    Reason for visit: RECHECK    [place]

## 2023-09-22 NOTE — PATIENT INSTRUCTIONS
Audie Manzanares,    Follow-up with RD on 10/23    Thank you,    Gely Saini, RD, LD  If you would like to schedule or reschedule an appointment with the RD, please call [phone]    Nutrition Goals  1. Focus on ensuring adequate protein this next month. Will check in next month and monitor nPCR.   2. Replace one meal daily with protein smoothie (Vanilla Premiere Protein Powder, 1/2 c fruit, and water).    3. Switch to decaf coffee. Less creamer in coffee.   4. Eliminate pop. May consider sugar-free water enhancers instead or diet juice.       Other surgery goals:  Relating To Eating:  - Eat slowly (20-30 minutes per meal), chewing foods well (25 chews per bite/applesauce consistency)  - Focus on eating smaller portion sizes at meals and snacks    Relating to beverages:  - Separate fluids from meals by 30 minutes before, during, and after eating  -  Small, frequent sips of fluids between meals.     Relating to activity:  - Increase activity as able    Establish with therapist, Daisha Floyd: [phone]      Lower Potassium Fruits:  - Watermelon (1 cup = 85 mg potassium)  - Tangerines (1 each = 140 mg)  - Strawberries (  cup = 125 mg)  - Raspberries (  cup = 90 mg)  - Plums (1 each = 105 mg)  - Pineapple, fresh or canned (  cup = 100 mg)  - Pears, canned (  cup = 120 mg)  - Peaches, canned (  cup = 120 mg)  - Peach, fresh (1 each = 185 mg)  - Dagoberto, limes (1 each = 80 mg)  - Grapes (  cup = 155 mg)     Lower Potassium Vegetables:  - Swiss chard, raw (1 cup = 135 mg)  -  Spinach, raw (1 cup = 170 mg)  - Summer squash (  cup = 175-200 mg)  - Peppers, red (  cup = 160 mg)  - Peppers, green (  cup = 130 mg)  - Onions, raw (  cup = 120 mg)  - Okra (  cup = 110 mg)  - Mushrooms, raw (  cup = 110 mg)  - Mixed vegetables (  cup = 150 mg)  - Lettuce, all types (1 cup = 100 mg)  - Greens: kale, turnip, wilian (  cup = 110-150 mg)  - Eggplant (  cup = 60 mg)  - Endive, raw (  cup = 80 mg)  - Cucumbers (  cup = 80  mg)      Kidney-Friendly Protein Supplements:  Pure Protein whey protein (140 calories, 23 gm protein, 110 mg potassium, 80 mg phos)  Premiere Protein whey protein (vanilla) (150 calories, 30 gm protein, 160 mg potassium)  Bipro Protein Water (16.9 oz/serv) - 90 calories, 20 gm protein, 0 mg potassium/phos  Vital Protein Collagen Powder           COMPREHENSIVE WEIGHT MANAGEMENT PROGRAM  VIRTUAL SUPPORT GROUPS    For Support Group Information:      We offer support groups for patients who are working on weight loss and considering, preparing for or have had weight loss surgery.   There is no cost for this opportunity.  You are invited to attend the?Virtual Support Groups?provided by any of the following locations:    [facility] via Microsoft Teams with Mary Locke RN  2.   [place] via [place] with Drew Farmer, PhD, LP  3.   [place] via [place] with Angela Benedict RN  4.   [facility] via Microsoft Teams with Angela Saleem [facility]    The following Support Group information can also be found on our website:  [email or web address]    [email or web address]    [facility] Weight Loss Surgery Support Group    [facility] Weight Loss Surgery Support Group  The support group is a patient-lead forum that meets monthly to share experiences, encouragement and education. It is open to those who have had weight loss surgery, are scheduled for surgery, or are considering surgery.   WHEN: This group meets on the 3rd Wednesday of each month from 5:00PM - 6:00PM virtually using Microsoft Teams.   FACILITATOR: Led by Mary Woods, RD, LD, RN, the program's Clinical Coordinator.   TO REGISTER: Please contact the clinic via [employer] technology or call the nurse line directly at [phone] to inform our staff that you would like an invite sent to you and to let  "us know the email you would like the invite sent to. Prior to the meeting, a link with directions on how to join the meeting will be sent to you.    2023 Meetings   April 19: Guest Speaker, Zac Crane RD, LD, \"Maintaining Weight Loss after Bariatric Surgery\".  May 17: \"Let's Talk\" a time for the group to share.  June 21: \"Let's Talk\" a time for the group to share.  July 19  August 16  September 20  October 18  November 15  December 20    [facility] Support Groups    Connections Bariatric Care Support Group?  This is open to all pre- and post- operative bariatric surgery patients as well as their support system.   WHEN: This group meets the 2nd Tuesday of each month from 6:30 PM - 8:00 PM virtually using Microsoft Teams.   FACILITATOR: Led by Drew Farmer, Ph.D who is a Licensed Psychologist with the [facility] Comprehensive Weight Management Program.   TO REGISTER: Please send an email to Drew Farmer, Ph.D., LP at?[email or web address]?if you would like an invitation to the group and to learn about using Microsoft Teams.    2023 Meetings  April 11: Guest speaker, Cele Schulte MD, Bariatrician, [facility] Weight Management Program, \"Injectable Weight Loss Medications\".  May 9  Saba 13  July 11  August 8  September 12  October 10  November 14  December 12    Connections Post-Operative Bariatric Surgery Support Group  This is a support group for [facility] bariatric patients (and those external to [facility]) who have had bariatric surgery and are at least 3 months post-surgery.  WHEN: This support group meets the 4th Wednesday of the month from 11:00 AM - 12:00 PM virtually using Microsoft Teams.   FACILITATOR: Led by Certified Bariatric Nurse, Angela Benedict RN.   TO REGISTER: Please send an email to Angela at [email or web address] if you would like an invitation to the group and to learn about using Microsoft Teams.    2023 " Meetings  April 26  May 24  Saba 28  July 26  August 23  September 27  October 25  November 22  December 27    [facility]   Healthy Lifestyle Virtual Support Group    Healthy Lifestyle Virtual Support Group?  This is 60 minutes of small group guided discussion, support and resources. All are welcome who want a healthy lifestyle.  WHEN: This group meets monthly on a Friday from 12:30 PM - 1:30 PM virtually using Microsoft Teams.  This group will meet the first Friday of the month beginning In July  FACILITATOR: Led by National Board Certified Health and , Angela Saleem [facility].   TO REGISTER: Please send an email to Angela [email or web address] to receive monthly invites to the group or if you have any questions about having a health .  Prior to the meeting, a link with directions on how to join the meeting will be sent to you.    2023 Meetings  May 19: Let's Talk  June 9: Create Your Coaching Toolkit: Learn How to  Yourself  July 7: Let's Talk  August 4: Benefits of Fiber with ENRIQUE Frank  September 1: Show and Tell (share your aps, podcasts, recipes, hacks, books)  October 6 :Let's Talk  November 3: Introduction to Mindfulness   December 1: Let's Talk

## 2023-10-05 ENCOUNTER — TELEPHONE (OUTPATIENT)
Dept: ENDOCRINOLOGY | Facility: CLINIC | Age: 33
End: 2023-10-05
Payer: COMMERCIAL

## 2023-10-05 NOTE — TELEPHONE ENCOUNTER
Informed patient that we are still waiting to get a letter of support from her therapist.  Dr Araujo had recommended she see a therapist for mood management and support for kidney transplant and gave a referral to Daisha Floyd.  Patient states that she has tried to reach Daisha Floyd and has not been able to contact her.  Patient states she is busy with working and going to dialysis 3 times a week and does not see a need for a therapist.  Informed patient that when a therapist is recommended, we need documentation to show that care has been established.  If she is not able to establish care with a therapist and feels like she does not need that support, she can go back to Dr Araujo and ask for clearance to proceed with surgery without seeing a therapist.  Patient had to cancel her 9/28/23 appt with Dr Smith,  will cancel her 11/14/23 surgery date until after she sees Dr Smith and we have a followup to the psych eval and need for a therapist.  Given Call Center number to reschedule with Dr Smith.  Patient states she has also lost a lot of weight with Wegovy and may cancel surgery altogether.  Encouraged her to discuss with Dr Smith.

## 2023-10-12 ENCOUNTER — VIRTUAL VISIT (OUTPATIENT)
Dept: SURGERY | Facility: CLINIC | Age: 33
End: 2023-10-12
Payer: COMMERCIAL

## 2023-10-12 VITALS — WEIGHT: 293 LBS | BODY MASS INDEX: 44.41 KG/M2 | HEIGHT: 68 IN

## 2023-10-12 DIAGNOSIS — E66.01 MORBID OBESITY (H): Primary | ICD-10-CM

## 2023-10-12 PROCEDURE — 99213 OFFICE O/P EST LOW 20 MIN: CPT | Mod: 95 | Performed by: SURGERY

## 2023-10-12 ASSESSMENT — PAIN SCALES - GENERAL: PAINLEVEL: NO PAIN (0)

## 2023-10-12 NOTE — PROGRESS NOTES
"Glenna Spears is a 32 year old who is being evaluated via a billable video visit.      How would you like to obtain your AVS? MyChart  If the video visit is dropped, the invitation should be resent by: Text to cell phone: [phone]  Will anyone else be joining your video visit? No  If patient encounters technical issues they should call [phone]    During this virtual visit the patient is located in [place], patient verifies this as the location during the entirety of this visit.     Video-Visit Details  Video Start Time:  1002    Type of service:  Video Visit    Video End Time:10:10 AM    Originating Location (pt. Location): at dialysis center    Distant Location (provider location):  On-site    Platform used for Video Visit: QAMAR Clements 10/12/2023      9:59 AM        I had the pleasure of meeting with your patient Glenna Spears in our weight loss surgery office.  This patient is a 32 year old female who has been undergoing our thorough preoperative screening process in anticipation of potential bariatric surgery.    She requires dialysis for ESRD secondary to hypertension and nephrotic range proteinuria; has been on dialysis for just over a year; nephrology in [facility] system.    Has had psych evaluation and now following with therapy.    Wt Readings from Last 10 Encounters:   10/12/23 134.3 kg (296 lb)   09/22/23 138.3 kg (305 lb)   09/01/23 142.9 kg (315 lb)   07/28/23 145 kg (319 lb 10.7 oz)   06/23/23 147 kg (324 lb)   05/26/23 147.4 kg (325 lb)   05/03/23 149.7 kg (330 lb)   03/23/23 147.4 kg (325 lb)   01/23/23 149.7 kg (330 lb)   01/23/23 149.7 kg (330 lb)     Doing well with wegovy currently.    PREVIOUS WEIGHT LOSS ATTEMPTS:  Reviewed.    CO-MORBIDITIES OF OBESITY INCLUDE:       No data to display                VITALS:  Ht 1.727 m (5' 8\")   Wt 134.3 kg (296 lb)   BMI 45.01 kg/m      PE:  GENERAL: Alert and oriented x3. NAD    RESPIRATORY: Breathing unlabored    At dialysis center " currently receiving dialysis.    In summary, she has undergone an appropriate medical evaluation, dietitian evaluation, as well as psychologic screening. The patient appears to be an appropriate candidate for bariatric surgery.    In the office today, I discussed the laparoscopic gastric sleeve surgery.  Risks, benefits and anticipated outcomes were outlined including the risk of death, staple line leak (1-2%), PE, DVT, ulcer, worsening GERD, N/V, stricture, hernia, wound infection, weight regain, and vitamin deficiencies. This patient has a good chance of sustaining permanent weight loss due to this procedure.  This should also allow improvement if not resolution of his/her weight related medical conditions.    At present we are going to present your patient's file for prior authorization to insurance.  Pending prior authorization, I anticipate a surgical date in the near future.  We will keep you updated on any progress.  If you have questions regarding care please feel free to contact me.  Total time spent in the clinic with Glenna and on chart review and documentation was 20 minutes with greater than 50% in face-to-face consultation.        Sincerely,    John Smith MD    Please route or send letter to:  Primary Care Provider (PCP) and Referring Provider

## 2023-10-12 NOTE — NURSING NOTE
"( No chief complaint on file.   )    ( Weight: 134.3 kg (296 lb) )  ( Height: 172.7 cm (5' 8\") )  ( BMI (Calculated): 45.01 )  (   )  (   )  (   )  (   )  (   )  (   )    (   )  (   )  (   )  (   )  (   )  (   )  (   )    (   Patient Active Problem List   Diagnosis    Moderate asthma    Hypovitaminosis D    Morbid obesity (H)    )  (   Current Outpatient Medications   Medication Sig Dispense Refill    acetaminophen (TYLENOL) 325 MG tablet Take 325-650 mg by mouth every 4 hours as needed      albuterol (PROAIR HFA/PROVENTIL HFA/VENTOLIN HFA) 108 (90 Base) MCG/ACT inhaler Inhale 2 puffs into the lungs      amLODIPine (NORVASC) 10 MG tablet Take 1 tablet by mouth daily      calcitRIOL (ROCALTROL) 0.5 MCG capsule       cholecalciferol 25 MCG (1000 UT) TABS Take 2,000 Units by mouth      cinacalcet (SENSIPAR) 60 MG tablet Take 60 mg by mouth three times a week      famotidine (PEPCID) 20 MG tablet       furosemide (LASIX) 40 MG tablet Take 1 tablet by mouth daily at 2 pm      gabapentin (NEURONTIN) 100 MG capsule       labetalol (NORMODYNE) 200 MG tablet Take 200 mg by mouth 2 times daily      levonorgestrel (KYLEENA) 19.5 MG IUD 1 Intra Uterine Device by Intrauterine route      Semaglutide-Weight Management (WEGOVY) 2.4 MG/0.75ML pen Inject 2.4 mg Subcutaneous once a week 3 mL 3    varenicline (CHANTIX) 0.5 MG tablet Take 0.5 mg by mouth      VELPHORO 500 MG CHEW chewable tablet CRUSH OR CHEW AND SWALLOW 1 TABLET 3 TIMES A DAY WITH MEALS      FEROSUL 325 (65 Fe) MG tablet 1 tablet daily at 2 pm Port (Patient not taking: Reported on 9/1/2023)      lidocaine-prilocaine (EMLA) 2.5-2.5 % external cream APPLY SMALL AMOUNT TO ACCESS SITE (AVF) 1 HOUR BEFORE DIALYSIS. COVER WITH OCCLUSIVE DRESSING (SARAN WRAP) (Patient not taking: Reported on 5/26/2023)      Semaglutide-Weight Management (WEGOVY) 1 MG/0.5ML pen Inject 1 mg Subcutaneous once a week (Patient not taking: Reported on 10/12/2023) 2 mL 0    Semaglutide-Weight " Management (WEGOVY) 1.7 MG/0.75ML pen Inject 1.7 mg Subcutaneous once a week (Patient not taking: Reported on 10/12/2023) 3 mL 0    sodium bicarbonate 650 MG tablet Take 1,950 mg by mouth (Patient not taking: Reported on 9/1/2023)      )  ( Diabetes Eval:    )    ( Pain Eval:  No Pain (0) )    ( Wound Eval:       )    (   History   Smoking Status    Former    Types: Cigarettes    Quit date: 1/31/2023   Smokeless Tobacco    Never    )    ( Signed By:  Ras Hein, EMT; October 12, 2023; 9:58 AM )

## 2023-10-12 NOTE — LETTER
"10/12/2023       RE: Glenna Spears  [address]  [place] [address]     Dear Colleague,    Thank you for referring your patient, Glenna Spears, to the [facility] SURGERY [facility] [place] at [facility]. Please see a copy of my visit note below.    Glenna Spears is a 32 year old who is being evaluated via a billable video visit.      How would you like to obtain your AVS? MyChart  If the video visit is dropped, the invitation should be resent by: Text to cell phone: [phone]  Will anyone else be joining your video visit? No  If patient encounters technical issues they should call [phone]    During this virtual visit the patient is located in [place], patient verifies this as the location during the entirety of this visit.     I had the pleasure of meeting with your patient Glenna Spears in our weight loss surgery office.  This patient is a 32 year old female who has been undergoing our thorough preoperative screening process in anticipation of potential bariatric surgery.    She requires dialysis for ESRD secondary to hypertension and nephrotic range proteinuria; has been on dialysis for just over a year; [facility].    Has had psych evaluation and now following with therapy.    Wt Readings from Last 10 Encounters:   10/12/23 134.3 kg (296 lb)   09/22/23 138.3 kg (305 lb)   09/01/23 142.9 kg (315 lb)   07/28/23 145 kg (319 lb 10.7 oz)   06/23/23 147 kg (324 lb)   05/26/23 147.4 kg (325 lb)   05/03/23 149.7 kg (330 lb)   03/23/23 147.4 kg (325 lb)   01/23/23 149.7 kg (330 lb)   01/23/23 149.7 kg (330 lb)     Doing well with wegovy currently.    PREVIOUS WEIGHT LOSS ATTEMPTS:  Reviewed.    CO-MORBIDITIES OF OBESITY INCLUDE:       No data to display                VITALS:  Ht 1.727 m (5' 8\")   Wt 134.3 kg (296 lb)   BMI 45.01 kg/m      PE:  GENERAL: Alert and oriented x3. NAD    RESPIRATORY: Breathing unlabored    At dialysis " center currently receiving dialysis.    In summary, she has undergone an appropriate medical evaluation, dietitian evaluation, as well as psychologic screening. The patient appears to be an appropriate candidate for bariatric surgery.    In the office today, I discussed the laparoscopic gastric sleeve surgery.  Risks, benefits and anticipated outcomes were outlined including the risk of death, staple line leak (1-2%), PE, DVT, ulcer, worsening GERD, N/V, stricture, hernia, wound infection, weight regain, and vitamin deficiencies. This patient has a good chance of sustaining permanent weight loss due to this procedure.  This should also allow improvement if not resolution of his/her weight related medical conditions.    At present we are going to present your patient's file for prior authorization to insurance.  Pending prior authorization, I anticipate a surgical date in the near future.  We will keep you updated on any progress.  If you have questions regarding care please feel free to contact me.  Total time spent in the clinic with Glenna and on chart review and documentation was 20 minutes with greater than 50% in face-to-face consultation.          Again, thank you for allowing me to participate in the care of your patient.      Sincerely,    John Smith MD

## 2023-10-23 ENCOUNTER — TELEPHONE (OUTPATIENT)
Dept: ENDOCRINOLOGY | Facility: CLINIC | Age: 33
End: 2023-10-23

## 2023-10-26 DIAGNOSIS — E66.01 MORBID OBESITY (H): ICD-10-CM

## 2023-10-26 NOTE — PROGRESS NOTES
"Video-Visit Details    Type of service:  Video Visit    Video Start Time: 11:00 AM   Video End Time:  11:30 AM     Originating Location (pt. Location): Home    Distant Location (provider location):  Offsite (providers home)    Platform used for Video Visit: [facility]       Bariatric Nutrition Consultation Note    Reason For Visit: Nutrition Assessment    Glenna Spears is a 32 year old presenting today for bariatric nutrition consult.   Pt is interested in laparoscopic sleeve gastrectomy with Dr. Smith.     This is pt's 10th required nutrition visits prior to surgery. Seen 1/27, 2/27, 3/29, 4/26, 5/29, 6/23, 7/29, 9/1, 9/22, and today 10/27/23. Visit on 9/1 to count as August 2023 visit due to scheduling error on providers end.     Pt referred by ANAMARIA Pat on January 2023.    CO-MORBIDITIES OF OBESITY INCLUDE:  ESRD on dialysis     SUPPORT:  Patient reports good support system at home    ANTHROPOMETRICS:  Weight 1/23/23: 330 lbs    Estimated body mass index is 44.85 kg/m  as calculated from the following:    Height as of this encounter: 1.727 m (5' 8\").    Weight as of this encounter: 133.8 kg (295 lb). (-10 lbs over last month, -35 lbs from initial)     Medication for weight loss:  Wegovy    SUPPLEMENT INFORMATION:  Phos Binder - is chewable (Velphoro)   Vitamin D 2500 units daily (5000 unit tablet every other day)  Dialyvite renal MVI   Vitamin E 1000 mg every other day  Iron monitored at dialysis - gets infused as needed    Dialysis labs:   9/22/23 - pt reported  nPCR - 0.73 - down from last month, possibly related to smaller portions  Phos: 7.2 - up from last month, she thinks may be related to eating more phos foods. Is working hard to maintain consistency with phos binder intake.   Hgb: 9.9  Calcium: 9.6   Potassium: 4.3   Albumin: 4.1      10/19/23: - pt reported   Phos: 8.5  Hgb: 10.6   Calcium: 10.6 - on calcitriol, pt reports discussed lowering with her dialysis nephrologist  Potassium: 4.5   Albumin: " "4.2   Pt states she is going to request they check nPCR again.     NUTRITION HISTORY:  NKFA. Some lactose intolerance. \"Hates fish.\"     Attended Weight Loss Surgery Nutrition Class 1/27/23.     Please see previous RD notes for more information.     9/1/22:   Phos labs continue to improve.   Changes: healthier food choices (more fruit), smaller portions, chewing more thoroughly. Working on increasing protein has been eating more steak. nPCR improved.   Just now starting to feel at baseline for activity, had a respiratory infection that she was hospitalized for recently.    Reports she does not have a specific fluid restriction that she can recall, just advised to drink less.   Aiming to keep to 30-60 oz, but some days goes over. Water, regular Mt Dew (1 can daily), coffee in the morning.      9/22/23:  Chewing well  Smaller portions - finds Wegovy most helpful with this.   Increasing nutritional value of her meals.    Oct 2023:  Was sick a couple times this past month.   Notes phos labs up, had not been taking phos as well when sick.  Eating two meals daily. Working on smaller meals, but higher protein intake. Occasionally gets a protein bar at dialysis. Has yet to  kidney friendly protein shake. Reviewed options today.   Met with therapist this week, has follow-up next week.   Eliminated pop. Working on reducing caffeine/coffee.     Diet Recall:  Breakfast: skip  Lunch: yogurt   Dinner: steak, potato, veggies  Snack: protein bar at dialysis   Beverages: Water mostly. Kat[place]ka Sun or Juicy-juice on occasion.      Previous goals:  1. Focus on ensuring adequate protein this next month. Will check in next month and monitor nPCR. - Ongoing, focuses on baked chicken and yogurt at meals.   2. Replace one meal daily with protein smoothie (Vanilla Premier Protein Powder, 1/2 c fruit, and water).  - Not met, but intends on   3. Switch to decaf coffee. Less creamer in coffee. -  Still drinking one cup daily, but only on " week days, not on weekends.   4. Eliminate pop. May consider sugar-free water enhancers instead or diet juice. - Met, continues     Additional information:  Works starting at 3 pm  Dialysis T/R/Sat     NUTRITION DIAGNOSIS:  Obesity r/t long history of positive energy balance aeb BMI >30 kg/m2.    INTERVENTION:  Intervention Provided/Education Provided on post-op diet guidelines, vitamins/minerals essential post-operatively, GI anatomy of bariatric surgeries, ways to help prepare for post-op diet guidelines pre-operatively, portion/calorie-control, mindful eating and sources of protein.  Patient demonstrates understanding.     Reviewed low potassium/phosphorus protein shake options. Pt plans to  the Premier Protein Vanilla protein powder. Encouraged continued reduction in caffeine/coffee intake. Pt amenable to recommendations.     Personal barriers to making and continuing required life changes have been identified, and strategies to overcome those barriers have been recommended AND family and social supports have been assessed and strategies to strengthen those supports have been recommended.    Provided pt with list of goals, RD contact information and resources listed below via [employer].            No data to display                Expected Engagement: good    GOALS:   the Premier Protein Vanilla Protein Powder  Continue to reduce coffee.       Other surgery goals:  Relating To Eating:  - Eat slowly (20-30 minutes per meal), chewing foods well (25 chews per bite/applesauce consistency)  - Focus on eating smaller portion sizes at meals and snacks    Relating to beverages:  - Separate fluids from meals by 30 minutes before, during, and after eating  -  Small, frequent sips of fluids between meals.     Relating to activity:  - Increase activity as able    Lower Potassium Fruits:  - Watermelon (1 cup = 85 mg potassium)  - Tangerines (1 each = 140 mg)  - Strawberries (  cup = 125 mg)  - Raspberries (  cup  = 90 mg)  - Plums (1 each = 105 mg)  - Pineapple, fresh or canned (  cup = 100 mg)  - Pears, canned (  cup = 120 mg)  - Peaches, canned (  cup = 120 mg)  - Peach, fresh (1 each = 185 mg)  - Dagoberto, limes (1 each = 80 mg)  - Grapes (  cup = 155 mg)     Lower Potassium Vegetables:  - Swiss chard, raw (1 cup = 135 mg)  -  Spinach, raw (1 cup = 170 mg)  - Summer squash (  cup = 175-200 mg)  - Peppers, red (  cup = 160 mg)  - Peppers, green (  cup = 130 mg)  - Onions, raw (  cup = 120 mg)  - Okra (  cup = 110 mg)  - Mushrooms, raw (  cup = 110 mg)  - Mixed vegetables (  cup = 150 mg)  - Lettuce, all types (1 cup = 100 mg)  - Greens: kale, turnip, wilian (  cup = 110-150 mg)  - Eggplant (  cup = 60 mg)  - Endive, raw (  cup = 80 mg)  - Cucumbers (  cup = 80 mg)      Kidney-Friendly Protein Supplements:  Pure Protein whey protein (140 calories, 23 gm protein, 110 mg potassium, 80 mg phos)  Premiere Protein whey protein (vanilla) (150 calories, 30 gm protein, 160 mg potassium)  Bipro Protein Water (16.9 oz/serv) - 90 calories, 20 gm protein, 0 mg potassium/phos  Vital Protein Collagen Powder       Post-op Diet Handouts:  Diet Guidelines after Weight-loss Surgery  [email or web address]     Your Stage 1 Diet: Clear Liquids  [email or web address]     Your Stage 2 Diet: Low-fat Full Liquids  [email or web address]     Your Stage 3 Diet: Pureed Foods  [email or web address]     Pureed Pleasures  [email or web address]    Your Stage 4 Diet: Soft Foods  [email or web address]    Your Stage 5 Diet: Regular Foods  [email or web address]    Supplements after Sleeve Gastrectomy, Gastric Bypass or Single Anastomosis Duodenal Switch  [email or web address]    Keeping Track of Fluids  [email or web address]      Follow up:   11/30/23    Time spent with patient:  30 minutes.  Gely Saini, RD, LD

## 2023-10-27 ENCOUNTER — VIRTUAL VISIT (OUTPATIENT)
Dept: ENDOCRINOLOGY | Facility: CLINIC | Age: 33
End: 2023-10-27
Payer: COMMERCIAL

## 2023-10-27 VITALS — BODY MASS INDEX: 44.41 KG/M2 | WEIGHT: 293 LBS | HEIGHT: 68 IN

## 2023-10-27 DIAGNOSIS — Z99.2 ESRD (END STAGE RENAL DISEASE) ON DIALYSIS (H): ICD-10-CM

## 2023-10-27 DIAGNOSIS — N18.6 ESRD (END STAGE RENAL DISEASE) ON DIALYSIS (H): ICD-10-CM

## 2023-10-27 DIAGNOSIS — E66.01 MORBID OBESITY (H): ICD-10-CM

## 2023-10-27 DIAGNOSIS — Z71.3 NUTRITIONAL COUNSELING: Primary | ICD-10-CM

## 2023-10-27 PROCEDURE — 97803 MED NUTRITION INDIV SUBSEQ: CPT | Mod: 95 | Performed by: DIETITIAN, REGISTERED

## 2023-10-27 PROCEDURE — 99207 PR NO CHARGE LOS: CPT | Mod: 95 | Performed by: DIETITIAN, REGISTERED

## 2023-10-27 ASSESSMENT — PAIN SCALES - GENERAL: PAINLEVEL: NO PAIN (0)

## 2023-10-27 NOTE — LETTER
"10/27/2023       RE: Glenna Spears  [address]  [place] [address]     Dear Colleague,    Thank you for referring your patient, Glenna Spears, to the [facility] [place] at [facility]. Please see a copy of my visit note below.    Video-Visit Details    Type of service:  Video Visit    Video Start Time: 11:00 AM   Video End Time:  11:30 AM     Originating Location (pt. Location): Home    Distant Location (provider location):  Offsite (providers home)    Platform used for Video Visit: [facility]       Bariatric Nutrition Consultation Note    Reason For Visit: Nutrition Assessment    Glenna Spears is a 32 year old presenting today for bariatric nutrition consult.   Pt is interested in laparoscopic sleeve gastrectomy with Dr. Smith.     This is pt's 10th required nutrition visits prior to surgery. Seen 1/27, 2/27, 3/29, 4/26, 5/29, 6/23, 7/29, 9/1, 9/22, and today 10/27/23. Visit on 9/1 to count as August 2023 visit due to scheduling error on providers end.     Pt referred by ANAMARIA Pat on January 2023.    CO-MORBIDITIES OF OBESITY INCLUDE:  ESRD on dialysis     SUPPORT:  Patient reports good support system at home    ANTHROPOMETRICS:  Weight 1/23/23: 330 lbs    Estimated body mass index is 44.85 kg/m  as calculated from the following:    Height as of this encounter: 1.727 m (5' 8\").    Weight as of this encounter: 133.8 kg (295 lb). (-10 lbs over last month, -35 lbs from initial)     Medication for weight loss:  Wegovy    SUPPLEMENT INFORMATION:  Phos Binder - is chewable (Velphoro)   Vitamin D 2500 units daily (5000 unit tablet every other day)  Dialyvite renal MVI   Vitamin E 1000 mg every other day  Iron monitored at dialysis - gets infused as needed    Dialysis labs:   9/22/23 - pt reported  nPCR - 0.73 - down from last month, possibly related to smaller portions  Phos: 7.2 - up from last month, she thinks may be related to " "eating more phos foods. Is working hard to maintain consistency with phos binder intake.   Hgb: 9.9  Calcium: 9.6   Potassium: 4.3   Albumin: 4.1      10/19/23: - pt reported   Phos: 8.5  Hgb: 10.6   Calcium: 10.6 - on calcitriol, pt reports discussed lowering with her dialysis nephrologist  Potassium: 4.5   Albumin: 4.2   Pt states she is going to request they check nPCR again.     NUTRITION HISTORY:  NKFA. Some lactose intolerance. \"Hates fish.\"     Attended Weight Loss Surgery Nutrition Class 1/27/23.     Please see previous RD notes for more information.     9/1/22:   Phos labs continue to improve.   Changes: healthier food choices (more fruit), smaller portions, chewing more thoroughly. Working on increasing protein has been eating more steak. nPCR improved.   Just now starting to feel at baseline for activity, had a respiratory infection that she was hospitalized for recently.    Reports she does not have a specific fluid restriction that she can recall, just advised to drink less.   Aiming to keep to 30-60 oz, but some days goes over. Water, regular Mt Dew (1 can daily), coffee in the morning.      9/22/23:  Chewing well  Smaller portions - finds Wegovy most helpful with this.   Increasing nutritional value of her meals.    Oct 2023:  Was sick a couple times this past month.   Notes phos labs up, had not been taking phos as well when sick.  Eating two meals daily. Working on smaller meals, but higher protein intake. Occasionally gets a protein bar at dialysis. Has yet to  kidney friendly protein shake. Reviewed options today.   Met with therapist this week, has follow-up next week.   Eliminated pop. Working on reducing caffeine/coffee.     Diet Recall:  Breakfast: skip  Lunch: yogurt   Dinner: steak, potato, veggies  Snack: protein bar at dialysis   Beverages: Water mostly. Katiuska Sun or Juicy-juice on occasion.      Previous goals:  1. Focus on ensuring adequate protein this next month. Will check in " next month and monitor nPCR. - Ongoing, focuses on baked chicken and yogurt at meals.   2. Replace one meal daily with protein smoothie (Vanilla Premier Protein Powder, 1/2 c fruit, and water).  - Not met, but intends on   3. Switch to decaf coffee. Less creamer in coffee. -  Still drinking one cup daily, but only on week days, not on weekends.   4. Eliminate pop. May consider sugar-free water enhancers instead or diet juice. - Met, continues     Additional information:  Works starting at 3 pm  Dialysis T/R/Sat     NUTRITION DIAGNOSIS:  Obesity r/t long history of positive energy balance aeb BMI >30 kg/m2.    INTERVENTION:  Intervention Provided/Education Provided on post-op diet guidelines, vitamins/minerals essential post-operatively, GI anatomy of bariatric surgeries, ways to help prepare for post-op diet guidelines pre-operatively, portion/calorie-control, mindful eating and sources of protein.  Patient demonstrates understanding.     Reviewed low potassium/phosphorus protein shake options. Pt plans to  the Premier Protein Vanilla protein powder. Encouraged continued reduction in caffeine/coffee intake. Pt amenable to recommendations.     Personal barriers to making and continuing required life changes have been identified, and strategies to overcome those barriers have been recommended AND family and social supports have been assessed and strategies to strengthen those supports have been recommended.    Provided pt with list of goals, RD contact information and resources listed below via [employer].            No data to display                Expected Engagement: good    GOALS:   the Premier Protein Vanilla Protein Powder  Continue to reduce coffee.       Other surgery goals:  Relating To Eating:  - Eat slowly (20-30 minutes per meal), chewing foods well (25 chews per bite/applesauce consistency)  - Focus on eating smaller portion sizes at meals and snacks    Relating to beverages:  - Separate  fluids from meals by 30 minutes before, during, and after eating  -  Small, frequent sips of fluids between meals.     Relating to activity:  - Increase activity as able    Lower Potassium Fruits:  - Watermelon (1 cup = 85 mg potassium)  - Tangerines (1 each = 140 mg)  - Strawberries (  cup = 125 mg)  - Raspberries (  cup = 90 mg)  - Plums (1 each = 105 mg)  - Pineapple, fresh or canned (  cup = 100 mg)  - Pears, canned (  cup = 120 mg)  - Peaches, canned (  cup = 120 mg)  - Peach, fresh (1 each = 185 mg)  - Dagoberto, limes (1 each = 80 mg)  - Grapes (  cup = 155 mg)     Lower Potassium Vegetables:  - Swiss chard, raw (1 cup = 135 mg)  -  Spinach, raw (1 cup = 170 mg)  - Summer squash (  cup = 175-200 mg)  - Peppers, red (  cup = 160 mg)  - Peppers, green (  cup = 130 mg)  - Onions, raw (  cup = 120 mg)  - Okra (  cup = 110 mg)  - Mushrooms, raw (  cup = 110 mg)  - Mixed vegetables (  cup = 150 mg)  - Lettuce, all types (1 cup = 100 mg)  - Greens: kale, turnip, wilian (  cup = 110-150 mg)  - Eggplant (  cup = 60 mg)  - Endive, raw (  cup = 80 mg)  - Cucumbers (  cup = 80 mg)      Kidney-Friendly Protein Supplements:  Pure Protein whey protein (140 calories, 23 gm protein, 110 mg potassium, 80 mg phos)  Premiere Protein whey protein (vanilla) (150 calories, 30 gm protein, 160 mg potassium)  Bipro Protein Water (16.9 oz/serv) - 90 calories, 20 gm protein, 0 mg potassium/phos  Vital Protein Collagen Powder       Post-op Diet Handouts:  Diet Guidelines after Weight-loss Surgery  [email or web address]     Your Stage 1 Diet: Clear Liquids  [email or web address]     Your Stage 2 Diet: Low-fat Full Liquids  [email or web address]     Your Stage 3 Diet: Pureed Foods  [email or web address]     Pureed Pleasures  [email or web address]    Your Stage 4 Diet: Soft Foods  [email or web address]    Your Stage 5 Diet: Regular Foods  [email or web address]    Supplements after Sleeve  Gastrectomy, Gastric Bypass or Single Anastomosis Duodenal Switch  [email or web address]    Keeping Track of Fluids  [email or web address]    Follow up:   11/30/23    Time spent with patient:  30 minutes.  Gely Saini, RD, LD

## 2023-10-27 NOTE — TELEPHONE ENCOUNTER
"    WEGOVY 2.4MG/0.75ML PF PEN INJ     Last Written Prescription Date:  7/3/23  Last Fill Quantity: 3 ml ,   # refills: 3  Last Office Visit : Sarah 10/12/23  Future Office visit:  None    Routing refill request to provider for review/approval because:  Last saw Dr Smith 10/12/23> \"At present we are going to present your patient's file for prior authorization to insurance.  Pending prior authorization, I anticipate a surgical date in the near future \" Last Kopacz was 5/3/23 with many appts with Fransisco Saini and Jad meanwhile, no specific time frame for weight management return noted except of course, planned surgery.Ok to Refill?      "

## 2023-10-27 NOTE — PATIENT INSTRUCTIONS
Audie Manzanares,    Follow-up with RD in 11/30    Thank you,    Gely Saini, RD, LD  If you would like to schedule or reschedule an appointment with the RD, please call [phone]    Nutrition Goals   the Premier Protein Vanilla Protein Powder  Continue to reduce coffee.       Other surgery goals:  Relating To Eating:  - Eat slowly (20-30 minutes per meal), chewing foods well (25 chews per bite/applesauce consistency)  - Focus on eating smaller portion sizes at meals and snacks    Relating to beverages:  - Separate fluids from meals by 30 minutes before, during, and after eating  -  Small, frequent sips of fluids between meals.     Relating to activity:  - Increase activity as able    Lower Potassium Fruits:  - Watermelon (1 cup = 85 mg potassium)  - Tangerines (1 each = 140 mg)  - Strawberries (  cup = 125 mg)  - Raspberries (  cup = 90 mg)  - Plums (1 each = 105 mg)  - Pineapple, fresh or canned (  cup = 100 mg)  - Pears, canned (  cup = 120 mg)  - Peaches, canned (  cup = 120 mg)  - Peach, fresh (1 each = 185 mg)  - Dagoberto, limes (1 each = 80 mg)  - Grapes (  cup = 155 mg)     Lower Potassium Vegetables:  - Swiss chard, raw (1 cup = 135 mg)  -  Spinach, raw (1 cup = 170 mg)  - Summer squash (  cup = 175-200 mg)  - Peppers, red (  cup = 160 mg)  - Peppers, green (  cup = 130 mg)  - Onions, raw (  cup = 120 mg)  - Okra (  cup = 110 mg)  - Mushrooms, raw (  cup = 110 mg)  - Mixed vegetables (  cup = 150 mg)  - Lettuce, all types (1 cup = 100 mg)  - Greens: kale, turnip, wilian (  cup = 110-150 mg)  - Eggplant (  cup = 60 mg)  - Endive, raw (  cup = 80 mg)  - Cucumbers (  cup = 80 mg)      Kidney-Friendly Protein Supplements:  Pure Protein whey protein (140 calories, 23 gm protein, 110 mg potassium, 80 mg phos)  Premiere Protein whey protein (vanilla) (150 calories, 30 gm protein, 160 mg potassium)  Bipro Protein Water (16.9 oz/serv) - 90 calories, 20 gm protein, 0 mg potassium/phos  Vital Protein Collagen Powder        Post-op Diet Handouts:  Diet Guidelines after Weight-loss Surgery  [email or web address]     Your Stage 1 Diet: Clear Liquids  [email or web address]     Your Stage 2 Diet: Low-fat Full Liquids  [email or web address]     Your Stage 3 Diet: Pureed Foods  [email or web address]     Pureed Pleasures  [email or web address]    Your Stage 4 Diet: Soft Foods  [email or web address]    Your Stage 5 Diet: Regular Foods  [email or web address]    Supplements after Sleeve Gastrectomy, Gastric Bypass or Single Anastomosis Duodenal Switch  [email or web address]    Keeping Track of Fluids  [email or web address]          COMPREHENSIVE WEIGHT MANAGEMENT PROGRAM  VIRTUAL SUPPORT GROUPS    For Support Group Information:      We offer support groups for patients who are working on weight loss and considering, preparing for or have had weight loss surgery.   There is no cost for this opportunity.  You are invited to attend the?Virtual Support Groups?provided by any of the following locations:    [facility] via [email or web address] Teams with Mary Locke RN  2.   [place] via [employer] Teams with Drew Farmer, PhD, LP  3.   [place] via [employer] Teams with Angela Benedict RN  4.   [facility] via [employer] Teams with Angela Saleem [facility]    The following Support Group information can also be found on our website:  [email or web address]    [email or web address]      [facility] Weight Loss Surgery Support Group    [facility] Weight Loss Surgery Support Group  The support group is a patient-lead forum that meets monthly to share experiences, encouragement and education. It is open to those who have had weight loss surgery, are scheduled for surgery, or are considering surgery.   WHEN: This group meets on  "the 3rd Wednesday of each month from 5:00PM - 6:00PM virtually using Microsoft Teams.   FACILITATOR: Led by Mary Woods RD, LD, RN, the program's Clinical Coordinator.   TO REGISTER: Please contact the clinic via [employer] or call the nurse line directly at [phone] to inform our staff that you would like an invite sent to you and to let us know the email you would like the invite sent to. Prior to the meeting, a link with directions on how to join the meeting will be sent to you.    2023 Meetings April 19: Guest Speaker, Zac Crane, ENRIQUE, LD, \"Maintaining Weight Loss after Bariatric Surgery\".  May 17: \"Let's Talk\" a time for the group to share.  June 21: \"Let's Talk\" a time for the group to share.  July 19  August 16  September 20  October 18  November 15  December 20    [facility] Support Groups    Connections Bariatric Care Support Group?  This is open to all pre- and post- operative bariatric surgery patients as well as their support system.   WHEN: This group meets the 2nd Tuesday of each month from 6:30 PM - 8:00 PM virtually using Microsoft Teams.   FACILITATOR: Led by Drew Framer, Ph.D who is a Licensed Psychologist with the [facility] Comprehensive Weight Management Program.   TO REGISTER: Please send an email to Drew Farmer, Ph.D., LP at?[email or web address]?if you would like an invitation to the group and to learn about using Microsoft Teams.    2023 Meetings April 11: Guest speaker, Cele Schulte MD, Bariatrician, [facility] Comprehensive Weight Management Program, \"Injectable Weight Loss Medications\".  May 9  Saba 13  July 11  August 8  September 12  October 10  November 14  December 12    Connections Post-Operative Bariatric Surgery Support Group  This is a support group for [facility] bariatric patients (and those external to [facility]) who have had bariatric surgery and are at least 3 months post-surgery.  WHEN: " This support group meets the 4th Wednesday of the month from 11:00 AM - 12:00 PM virtually using Microsoft Teams.   FACILITATOR: Led by Certified Bariatric Nurse, Angela Benedict RN.   TO REGISTER: Please send an email to Angela at [email or web address] if you would like an invitation to the group and to learn about using Microsoft Teams.    2023 Meetings  April 26  May 24  Saba 28  July 26  August 23  September 27  October 25  November 22  December 27      [facility]   Healthy Lifestyle Group    Healthy Lifestyle Group?  This is a 60 minute virtual coaching group for those who want to lead a healthier lifestyle. Come together to set goals and overcome barriers in a supportive group environment. We will address the four pillars of health--nutrition, exercise, sleep and emotional well-being.  This group is highly recommended for those who are participating in the 24 week Healthy Lifestyle Plan and our Health Coaching sessions.    WHEN: This group meets the first Friday of the month, 12:30 PM - 1:30 PM online, via a zoom meeting.      FACILITATOR: Led by National Board Certified Health and , Angela Saleem, [facility].     TO REGISTER: Please call the Call Center at [phone] to register. You will get an appointment to attend in [place] [employer] [place]. Fifteen minutes prior to the meeting, complete the e-check in and you will get the link to join the meeting.  There is no charge to attend this group and space is limited.       2023 and 2024 Meeting Topics and Dates:  November 3: Introduction to Mindfulness (Learn simple and effective mindfulness practices and how it can benefit you)  December 8: Let's Talk (guided discussion on our wins and challenges)  January 5: New Years Vision: Manifest your Best 2024! (Guided imagery,  journaling and discussion)  February 2: Let's Talk  March 1: 10 Percent Happier by Zelalem Armstrong (Book Bites; a guided discussion on the nuggets of wisdom from  "favorite wellness books; no need to read the book but highly encouraged)  April 5: Let's Talk  May 3: \"Essentialism; The Disciplined Pursuit of Less by Giles Gutierrez (book bites discussion)  June 7: Let's Talk  July 5: NO MEETING, off for the 4th of July Holiday  August 2: The Blue Zones, Secrets for Living a Longer Life by Zelalem No (book bites discussion)                         "

## 2023-10-27 NOTE — NURSING NOTE
Is the patient currently in the state of [place]? YES    Visit mode:VIDEO    If the visit is dropped, the patient can be reconnected by: VIDEO VISIT: Text to cell phone:   Telephone Information:   Mobile [phone]    and VIDEO VISIT: Send to e-mail at: [email or web address]    Will anyone else be joining the visit? NO  (If patient encounters technical issues they should call [phone])    How would you like to obtain your AVS? MyChart    Are changes needed to the allergy or medication list? No    Reason for visit: [place]

## 2023-10-30 RX ORDER — SEMAGLUTIDE 2.4 MG/.75ML
INJECTION, SOLUTION SUBCUTANEOUS
Qty: 3 ML | Refills: 3 | OUTPATIENT
Start: 2023-10-30

## 2023-10-30 RX ORDER — SEMAGLUTIDE 2.4 MG/.75ML
2.4 INJECTION, SOLUTION SUBCUTANEOUS WEEKLY
Qty: 3 ML | Refills: 3 | Status: ON HOLD | OUTPATIENT
Start: 2023-10-30 | End: 2024-01-24

## 2023-10-30 NOTE — TELEPHONE ENCOUNTER
Refill denied at this time. Patient needs appointment with Ibis Guardado PA-C. Giulia message sent to patient to schedule appointment.

## 2023-10-30 NOTE — TELEPHONE ENCOUNTER
Patient has appointment with Ibis Guardado PA-C on 11/6/23. Needs refill by Wednesday 11/1/23. Routed to RN/LEWIS for sign off.

## 2023-11-06 ENCOUNTER — DOCUMENTATION ONLY (OUTPATIENT)
Dept: ENDOCRINOLOGY | Facility: CLINIC | Age: 33
End: 2023-11-06

## 2023-11-06 ENCOUNTER — VIRTUAL VISIT (OUTPATIENT)
Dept: ENDOCRINOLOGY | Facility: CLINIC | Age: 33
End: 2023-11-06
Payer: COMMERCIAL

## 2023-11-06 VITALS — WEIGHT: 291 LBS | HEIGHT: 69 IN | BODY MASS INDEX: 43.1 KG/M2

## 2023-11-06 DIAGNOSIS — E66.813 CLASS 3 SEVERE OBESITY WITH SERIOUS COMORBIDITY AND BODY MASS INDEX (BMI) OF 45.0 TO 49.9 IN ADULT, UNSPECIFIED OBESITY TYPE (H): Primary | ICD-10-CM

## 2023-11-06 DIAGNOSIS — E66.01 CLASS 3 SEVERE OBESITY WITH SERIOUS COMORBIDITY AND BODY MASS INDEX (BMI) OF 45.0 TO 49.9 IN ADULT, UNSPECIFIED OBESITY TYPE (H): Primary | ICD-10-CM

## 2023-11-06 PROCEDURE — 99213 OFFICE O/P EST LOW 20 MIN: CPT | Mod: 95 | Performed by: PHYSICIAN ASSISTANT

## 2023-11-06 ASSESSMENT — PAIN SCALES - GENERAL: PAINLEVEL: NO PAIN (0)

## 2023-11-06 NOTE — LETTER
2023       RE: Glenna Spears  [address]  [place] [address]     Dear Colleague,    Thank you for referring your patient, Glenna Spears, to the [facility] WEIGHT MANAGEMENT CLINIC [place] [facility]. Please see a copy of my visit note below.      Return Medical Weight Management Note     Glenna Spears  MRN:  [id number]  :  1990  [place]:  2023    Dear Physician No Ref-Primary,    I had the pleasure of seeing your patient Glenna Spears. She is a 32 year old female who I am continuing to see for treatment of obesity related to:      Assessment & Plan  Problem List Items Addressed This Visit    None  Visit Diagnoses       Class 3 severe obesity with serious comorbidity and body mass index (BMI) of 45.0 to 49.9 in adult, unspecified obesity type (H)    -  Primary    Relevant Orders    CBC with platelets    Comprehensive metabolic panel    Hemoglobin A1c    Lipid panel reflex to direct LDL Fasting    Parathyroid Hormone Intact    Vitamin D Deficiency    Vitamin A    TSH with free T4 reflex    Vitamin B12           Pre sleeve gastrectomy    Continue Wegovy 2.4mg weekly.  Stop 1 week prior to surgery.  After cleared by therapist work with Christina for surgery date    Meet with FER lopez.    INTERVAL HISTORY:  ESRD on dialysis.  Pre sleeve with Dr Sarah nuñez recommended therapy prior to bariatric surgery. She has seen therapist 2 times and waiting for letter to be sent to us.     Saw Dr Smith again 10/12/23 to update visit closer to surgery  Quit smoking 2023. Negative nicotine done.    Anti-obesity medication history    Current:   Wegovy 2.4mg weekly.  Mild nausea.       CURRENT WEIGHT:   291 lbs 0 oz    Initial Weight (lbs): 320 lbs     Cumulative weight loss (lbs): 29  Weight Loss Percentage: 9.06%        2023     8:24 AM   Changes and Difficulties   I have made the following changes to my diet since my last visit:  "Eating less.   With regards to my diet, I am still struggling with: No struggles since last visit.   I have made the following changes to my activity/exercise since my last visit: None.   With regards to my activity/exercise, I am still struggling with: Hard to motivate myself to get out and exercise.         MEDICATIONS:   Current Outpatient Medications   Medication Sig Dispense Refill    acetaminophen (TYLENOL) 325 MG tablet Take 325-650 mg by mouth every 4 hours as needed      albuterol (PROAIR HFA/PROVENTIL HFA/VENTOLIN HFA) 108 (90 Base) MCG/ACT inhaler Inhale 2 puffs into the lungs      amLODIPine (NORVASC) 10 MG tablet Take 1 tablet by mouth daily      calcitRIOL (ROCALTROL) 0.5 MCG capsule       cholecalciferol 25 MCG (1000 UT) TABS Take 2,000 Units by mouth      cinacalcet (SENSIPAR) 60 MG tablet Take 60 mg by mouth three times a week      famotidine (PEPCID) 20 MG tablet       gabapentin (NEURONTIN) 100 MG capsule       labetalol (NORMODYNE) 200 MG tablet Take 200 mg by mouth 2 times daily      levonorgestrel (KYLEENA) 19.5 MG IUD 1 Intra Uterine Device by Intrauterine route      Semaglutide-Weight Management (WEGOVY) 2.4 MG/0.75ML pen Inject 2.4 mg Subcutaneous once a week 3 mL 3    varenicline (CHANTIX) 0.5 MG tablet Take 0.5 mg by mouth      VELPHORO 500 MG CHEW chewable tablet CRUSH OR CHEW AND SWALLOW 1 TABLET 3 TIMES A DAY WITH MEALS             11/6/2023     8:24 AM   Weight Loss Medication History Reviewed With Patient   Which weight loss medications are you currently taking on a regular basis? Wegovy   Are you having any side effects from the weight loss medication that we have prescribed you? Yes   If you are having side effects please describe: Nausea.       External Order Results on 03/30/2023   Component Date Value Ref Range Status    Scan Lab Results (External) 03/30/2023 See Scanned Report   Final    Nicotine and Metabolites, Urine         PHYSICAL EXAM:  Objective   Ht 1.753 m (5' 9.02\")   " Wt 132 kg (291 lb)   BMI 42.95 kg/m               GENERAL: Healthy, alert and no distress  EYES: Eyes grossly normal to inspection.  No discharge or erythema, or obvious scleral/conjunctival abnormalities.  RESP: No audible wheeze, cough, or visible cyanosis.  No visible retractions or increased work of breathing.    SKIN: Visible skin clear. No significant rash, abnormal pigmentation or lesions.  NEURO: Cranial nerves grossly intact.  Mentation and speech appropriate for age.  PSYCH: Mentation appears normal, affect normal/bright, judgement and insight intact, normal speech and appearance well-groomed.        Sincerely,    Ibis Guardado PA-C      10 minutes spent by me on the date of the encounter doing chart review, history and exam, documentation and further activities per the note      Virtual Visit Details    Type of service:  Video Visit     Originating Location (pt. Location): Home    Distant Location (provider location):  Off-site  Platform used for Video Visit: Stacy

## 2023-11-06 NOTE — NURSING NOTE
Is the patient currently in the state of [place]? YES    Visit mode:VIDEO    If the visit is dropped, the patient can be reconnected by: VIDEO VISIT: Text to cell phone:   Telephone Information:   Mobile [phone]       Will anyone else be joining the visit? NO     (If patient encounters technical issues they should call [phone])    How would you like to obtain your AVS? MyChart    Are changes needed to the allergy or medication list? No    Reason for visit: Follow Up    [place]

## 2023-11-06 NOTE — Clinical Note
See MTM to discuss preop and postop med plan virtual See Ibis 3-4 months return MWM virtual pre efren surgery

## 2023-11-06 NOTE — PROGRESS NOTES
Return Medical Weight Management Note     Glenna Spears  MRN:  [id number]  :  1990  [place]:  2023    Dear Physician No Ref-Primary,    I had the pleasure of seeing your patient Glenna Spears. She is a 32 year old female who I am continuing to see for treatment of obesity related to:      Assessment & Plan   Problem List Items Addressed This Visit    None  Visit Diagnoses       Class 3 severe obesity with serious comorbidity and body mass index (BMI) of 45.0 to 49.9 in adult, unspecified obesity type (H)    -  Primary    Relevant Orders    CBC with platelets    Comprehensive metabolic panel    Hemoglobin A1c    Lipid panel reflex to direct LDL Fasting    Parathyroid Hormone Intact    Vitamin D Deficiency    Vitamin A    TSH with free T4 reflex    Vitamin B12           Pre sleeve gastrectomy    Continue Wegovy 2.4mg weekly.  Stop 1 week prior to surgery.  After cleared by therapist work with Christina for surgery date    Meet with FER lopez.    INTERVAL HISTORY:  ESRD on dialysis.  Pre sleeve with Dr Sarah nuñez recommended therapy prior to bariatric surgery. She has seen therapist 2 times and waiting for letter to be sent to us.     Saw Dr Smith again 10/12/23 to update visit closer to surgery  Quit smoking 2023. Negative nicotine done.    Anti-obesity medication history    Current:   Wegovy 2.4mg weekly.  Mild nausea.       CURRENT WEIGHT:   291 lbs 0 oz    Initial Weight (lbs): 320 lbs     Cumulative weight loss (lbs): 29  Weight Loss Percentage: 9.06%        2023     8:24 AM   Changes and Difficulties   I have made the following changes to my diet since my last visit: Eating less.   With regards to my diet, I am still struggling with: No struggles since last visit.   I have made the following changes to my activity/exercise since my last visit: None.   With regards to my activity/exercise, I am still struggling with: Hard to motivate myself to get out and exercise.  "        MEDICATIONS:   Current Outpatient Medications   Medication Sig Dispense Refill    acetaminophen (TYLENOL) 325 MG tablet Take 325-650 mg by mouth every 4 hours as needed      albuterol (PROAIR HFA/PROVENTIL HFA/VENTOLIN HFA) 108 (90 Base) MCG/ACT inhaler Inhale 2 puffs into the lungs      amLODIPine (NORVASC) 10 MG tablet Take 1 tablet by mouth daily      calcitRIOL (ROCALTROL) 0.5 MCG capsule       cholecalciferol 25 MCG (1000 UT) TABS Take 2,000 Units by mouth      cinacalcet (SENSIPAR) 60 MG tablet Take 60 mg by mouth three times a week      famotidine (PEPCID) 20 MG tablet       gabapentin (NEURONTIN) 100 MG capsule       labetalol (NORMODYNE) 200 MG tablet Take 200 mg by mouth 2 times daily      levonorgestrel (KYLEENA) 19.5 MG IUD 1 Intra Uterine Device by Intrauterine route      Semaglutide-Weight Management (WEGOVY) 2.4 MG/0.75ML pen Inject 2.4 mg Subcutaneous once a week 3 mL 3    varenicline (CHANTIX) 0.5 MG tablet Take 0.5 mg by mouth      VELPHORO 500 MG CHEW chewable tablet CRUSH OR CHEW AND SWALLOW 1 TABLET 3 TIMES A DAY WITH MEALS             11/6/2023     8:24 AM   Weight Loss Medication History Reviewed With Patient   Which weight loss medications are you currently taking on a regular basis? Wegovy   Are you having any side effects from the weight loss medication that we have prescribed you? Yes   If you are having side effects please describe: Nausea.       External Order Results on 03/30/2023   Component Date Value Ref Range Status    Scan Lab Results (External) 03/30/2023 See Scanned Report   Final    Nicotine and Metabolites, Urine         PHYSICAL EXAM:  Objective    Ht 1.753 m (5' 9.02\")   Wt 132 kg (291 lb)   BMI 42.95 kg/m               GENERAL: Healthy, alert and no distress  EYES: Eyes grossly normal to inspection.  No discharge or erythema, or obvious scleral/conjunctival abnormalities.  RESP: No audible wheeze, cough, or visible cyanosis.  No visible retractions or increased " work of breathing.    SKIN: Visible skin clear. No significant rash, abnormal pigmentation or lesions.  NEURO: Cranial nerves grossly intact.  Mentation and speech appropriate for age.  PSYCH: Mentation appears normal, affect normal/bright, judgement and insight intact, normal speech and appearance well-groomed.        Sincerely,    Ibis Guardado PA-C      10 minutes spent by me on the date of the encounter doing chart review, history and exam, documentation and further activities per the note      Virtual Visit Details    Type of service:  Video Visit     Originating Location (pt. Location): Home    Distant Location (provider location):  Off-site  Platform used for Video Visit: Stacy

## 2023-11-09 NOTE — PROGRESS NOTES
Pre-Bariatric Surgery Note    No Ref-Primary, Physician    Date: 5/3/2023     RE: Glenna Spears    MR#: [id number]   : 1990   Date of Visit: May 3, 2023    REASON FOR VISIT: Preoperative evaluation for possible weight loss surgery    Dear Dr. Young Ref-Primary, Physician,    I had the pleasure of seeing your patient, Glenna Spears, in my preoperative bariatric clinic.    As you know, she has morbid obesity and is considering weight loss surgery to treat obesity in association with her medical conditions of obesity.  Her consult weight was 330 lbs.  She has lost 0 pounds since her consult weight. She has met her required pre-surgery weight. Please refer to initial consult note from date 23 for patient's weight history and co-morbidities.    ESRD on dialysis.  Pre sleeve with Dr Smith possibly July    Weight stable  Quit smoking  Off Wegovy for 2 weeks due to need PA Did well on 0.5mg dose  Completed 4 RD visits, next visit May and   Met Dr Smith 3/23/23  Completed RD and RN online group   Letter from PCP and nephrologist complete. Dr Mar, [facility]   Nicotine level negative. Quit smoking 2023    Assessment & Plan   Problem List Items Addressed This Visit        Endocrine Diagnoses    Morbid obesity (H) - Primary    Relevant Medications    Semaglutide-Weight Management (WEGOVY) 1 MG/0.5ML pen        Reviewed tasklist with patient yes    Most recent weights:  Wt Readings from Last 4 Encounters:   23 149.7 kg (330 lb)   23 147.4 kg (325 lb)   23 149.7 kg (330 lb)   23 149.7 kg (330 lb)         ROS    Past Medical History:   Diagnosis Date     Hypertension        No past surgical history on file.    Current Outpatient Medications   Medication     acetaminophen (TYLENOL) 325 MG tablet     albuterol (PROAIR HFA/PROVENTIL HFA/VENTOLIN HFA) 108 (90 Base) MCG/ACT inhaler     amLODIPine (NORVASC) 10 MG tablet     calcitRIOL (ROCALTROL) 0.5 MCG capsule     cholecalciferol 25 MCG  Medication: Atorvastatin and Losartan  Last office visit date: 10/10/23  Next appointment scheduled?: Yes   Number of refills given: 3     (1000 UT) TABS     cinacalcet (SENSIPAR) 60 MG tablet     famotidine (PEPCID) 20 MG tablet     FEROSUL 325 (65 Fe) MG tablet     furosemide (LASIX) 40 MG tablet     gabapentin (NEURONTIN) 100 MG capsule     labetalol (NORMODYNE) 200 MG tablet     levonorgestrel (KYLEENA) 19.5 MG IUD     lidocaine-prilocaine (EMLA) 2.5-2.5 % external cream     Semaglutide-Weight Management (WEGOVY) 1 MG/0.5ML pen     sodium bicarbonate 650 MG tablet     varenicline (CHANTIX) 0.5 MG tablet     VELPHORO 500 MG CHEW chewable tablet     No current facility-administered medications for this visit.       Allergies   Allergen Reactions     Sulfa Antibiotics Other (See Comments)     Reaction as an infant- unsure of what happened     Nsaids Other (See Comments)     Has nephrotic range proteinuria and see nephrology's note.        External Order Results on 03/30/2023   Component Date Value Ref Range Status     Scan Lab Results (External) 03/30/2023 See Scanned Report   Final    Nicotine and Metabolites, Urine       PHYSICAL EXAM:  Objective    Wt 149.7 kg (330 lb)   BMI 48.73 kg/m           Vitals:  No vitals were obtained today due to virtual visit.    Physical Exam   GENERAL: Healthy, alert and no distress  EYES: Eyes grossly normal to inspection.  No discharge or erythema, or obvious scleral/conjunctival abnormalities.  RESP: No audible wheeze, cough, or visible cyanosis.  No visible retractions or increased work of breathing.    SKIN: Visible skin clear. No significant rash, abnormal pigmentation or lesions.  NEURO: Cranial nerves grossly intact.  Mentation and speech appropriate for age.  PSYCH: Mentation appears normal, affect normal/bright, judgement and insight intact, normal speech and appearance well-groomed.    Sleeve Gastrectomy: Risks and Side Effects    The complications or risks of surgery include but are not limited to: death, heart attack, infection in the surgical site (wound infection), abdomen (abscess), bladder (urinary  tract infection), lungs (pneumonia), clots in legs (deep vein thrombosis) or lungs (pulmonary emboli),  injury to the bowels or other organs, bowel obstruction, hernia at the incision and gastrointestional bleeding.    More specific risks related to vertical sleeve gastrectomy were detailed at the bariatric informational seminar and include the following: leak at the vertical sleeve staple line, leak at the anastomoses,  nausea, vomiting, and dehydration for several months,  adhesions causing bowel obstruction, rapid weight loss causing a higher rate of gallstone formation during the first 6 months after surgery, decreased absorption of vitamins and protein because of the reduced stomach size, weight regain if inappropriate food intake occurs, stricture, injury to other organs, hernia,  and ulcers.       Side effects of bariatric surgery include but are not limited to: abdominal pain, cramping, bloating, constipation, nausea, vomiting, diarrhea, difficulty swallowing,  dehydration, hair loss, excess skin, protein, iron and vitamin deficiencies, heartburn, transfer of addictions, increased anxiety and worsening depression.       I emphasized exercise and activity behavior along with appropriate food choice as the main foundation for weight loss with surgery providing surgical reinforcement of the appropriate behavior set.        Review of general surgery weight loss process    1. Complete preoperative requirements, including weight loss.  Final weight check to confirm MANDATORY weight loss requirement must be documented on a clinic scale.    2. Discuss prior authorization with .    3. History and physical evaluation by PCP of PAC clinic within 30 days of surgery date, preoperative class, and weight check (weigh-in visit) to be scheduled by patient.  Pre-anesthesia clinic for risk evaluation to be scheduled by anesthesia clinic.    4. We cannot guarantee that patient will qualify for surgery unless  all preoperative requirements are met, prior authorization from primary insurance company is granted, and insurance changes do not occur.    5. It is possible for patients to regain all weight after weight loss surgery unless they follow guidelines prescribed by our bariatric center.    6. All patients with gastrointestinal complaints after weight loss surgery must have complaints conveyed to the bariatric team for appropriate treatment.    7. Vitamin deficiencies may develop post-bariatric surgery and annual laboratory testing should be performed.    8. Persistent nausea/vomiting after bariatric surgery entails risk of thiamine deficiency and should be treated early.  Vitamin B12 deficiency may develop, especially after gastric bypass surgery and must be recognized.        If you have any questions about our plans please don't hesitate to contact me.    Sincerely,    Ibis Guardado PA-C      30 minutes spent by me on the date of the encounter doing chart review, history and exam, documentation and further activities per the note

## 2023-11-15 ENCOUNTER — TRANSFERRED RECORDS (OUTPATIENT)
Dept: HEALTH INFORMATION MANAGEMENT | Facility: CLINIC | Age: 33
End: 2023-11-15
Payer: COMMERCIAL

## 2023-11-22 ENCOUNTER — PREP FOR PROCEDURE (OUTPATIENT)
Dept: ENDOCRINOLOGY | Facility: CLINIC | Age: 33
End: 2023-11-22
Payer: COMMERCIAL

## 2023-11-22 ENCOUNTER — CARE COORDINATION (OUTPATIENT)
Dept: ENDOCRINOLOGY | Facility: CLINIC | Age: 33
End: 2023-11-22
Payer: COMMERCIAL

## 2023-11-22 DIAGNOSIS — E66.01 MORBID OBESITY (H): Primary | ICD-10-CM

## 2023-11-22 RX ORDER — CEFAZOLIN SODIUM IN 0.9 % NACL 3 G/100 ML
3 INTRAVENOUS SOLUTION, PIGGYBACK (ML) INTRAVENOUS
Status: CANCELLED | OUTPATIENT
Start: 2023-11-22

## 2023-11-22 RX ORDER — ACETAMINOPHEN 325 MG/1
975 TABLET ORAL ONCE
Status: CANCELLED | OUTPATIENT
Start: 2023-11-22 | End: 2023-11-22

## 2023-11-22 RX ORDER — CEFAZOLIN SODIUM IN 0.9 % NACL 3 G/100 ML
3 INTRAVENOUS SOLUTION, PIGGYBACK (ML) INTRAVENOUS SEE ADMIN INSTRUCTIONS
Status: CANCELLED | OUTPATIENT
Start: 2023-11-22

## 2023-11-22 RX ORDER — ONDANSETRON 2 MG/ML
4 INJECTION INTRAMUSCULAR; INTRAVENOUS
Status: CANCELLED | OUTPATIENT
Start: 2023-11-22

## 2023-11-22 RX ORDER — ENOXAPARIN SODIUM 100 MG/ML
40 INJECTION SUBCUTANEOUS
Status: CANCELLED | OUTPATIENT
Start: 2023-11-22

## 2023-11-22 NOTE — PROGRESS NOTES
Tasklist updated and sent to patient via [employer]    Bariatric Task List  Fax:  Please fax all paperwork to: [phone] -     Status:  Is patient a candidate for bariatric surgery?:  patient is a candidate for bariatric surgery -     Cleared to schedule surgeon consult?:  cleared to schedule surgeon consult - 3/23/23, 10/12/23 Dr Sarah curtis. bks   Status:  surgery evaluation in process -     Surgeon: Sarah -     Tentative surgery month/year: Jan or Feb 2024 -        Insurance: Insurance:  [employer] [place] - Will need 6 months of RD visits. bks    Contact insurance to discuss coverage: Completed -          Patient Info: Initial Weight:  330 -     Date of Initial Weight/Height:  1/23/2023 -     Goal Weight (lbs):  330 -     Required Weight Loss:  0 -     Surgery Type:  sleeve gastrectomy -        Dietician Visits: Structured weight loss required by insurance?:  structured weight loss required -     Dietician Visit 1:  Completed - 1/27/23 class. bks   Dietician Visit 2:  Completed - 2/27/23 bks   Dietician Visit 3:  Completed - 3/29/23 appt. bks   Dietician Visit 4:  Completed - 4/26/23 bks   Dietician Visit 5:  Completed - 5/26/23 bks   Dietician Visit 6:  Needed - 6/23/23, 7/28/23, 9/22/23, 10/23/23, 11/30/23 appt, 12/22/23 appt. s   Dietician Visit additional:  Needed - Monthly until surgery for weight loss and postop diet teaching. Call [phone] to schedule. [facility]   Dietician Notes:  Dorcas Archer RD at [email or web address], ph: [phone]. Ok for Nepro and Boost Glucose Control for full liquids postop. 3/23/23 Current 48 oz/day fluid restriction. No protein restriction. s -        Psychological Evaluation: Psych eval:  Completed - Donald Araujo 3/3/23 appt - pending. s   Therapist letter of support:  Completed -        Lab Work: Complete Blood Count:  Completed - 1/23/23 RR   Comprehensive Metabolic Panel:  Needed - 1/23/23 RR   Vitamin D:  Needed - 1/23/23 RR   PTH:  Needed -  "1/23/23 RR; elevated PTH and phosphate, on a phosphate binder with meals. bks   Hgb A1c:  Needed - 1/23/23 RR    Lipids: Needed - 1/23/23 RR    TSH (AMPARO, [facility], [place] MA): Needed -     Vitamin A: Needed - need recheck   Vitamin B12: Needed -     Nicotine Testing:  Completed - Quit: 1/23/23. bks      Consults/ Clearance Nephrology:  Completed - letter sent to patient RR; Dr Mar, [facility].nurse Allyson Yoder ph: [phone]. 3/23/23 pending. bks      PCP: Establish care with PCP:  Completed -     PCP letter of support:  Completed - letter sent to patient RR; Support in 2/8/23 clinic note. Yaya Solares MD. [facility]. bks      Stopping Smoking/ Alcohol Use/Cannabis Use: Quit tobacco use (3 months smoke free)?:  Completed - smoking cessation information sent RR   Quit date:  1/23/2023 -        Patient Education:  Information Session:  Needed - 1/24/23 RR;[email or web address]. bks   Attended New Consult Class?: Completed - 2/7/23 bks   Given \"Making your decision\" handout?:  Yes - 1/24/23 RR   Given \"A Roadmap to you Weight Loss Surgery\" handout?: Yes - 1/24/23 RR   Given \"Epic Care Companion\" information?: Yes -     Attended support group?:  Needed -     Support plan in place?:  Completed -        Additional Surgery Requirements: Other: Call Center to schedule the 1 week and 31+ days postop appts. bks -     Other:   -        Final Tasks:  Before surgery online preop class:  Needed -     Nurse visit for information:  Needed -     Weight Check: Needed -     History and Physical: Pre-Assessment Clinic (PAC) -   Needed -     Final labs per clinic: Needed -     See MTM Pharmacist for medication review and plan for after surgery (if DM, transplant hx, greater than 10 meds): Needed -     Schedule postop appointments: Needed -     Other:   -   -        Notes: Please register for the Weight Loss Surgery Care Companion Pathway through " "the [employer] mobile char or via web browser. You register by answering \"yes\" to the following question, \"Do you agree to take part in this free, online program?\"  Information from this Pathway can help you be more successful before surgery and for up to one year after surgery.  This Pathway through [employer] can also answer questions you may have about your surgery.   The Pathway will start when you get your Tasklist after your initial consultation or when your surgery is scheduled.  Your pathway is activated. bks    -            "

## 2023-11-30 ENCOUNTER — VIRTUAL VISIT (OUTPATIENT)
Dept: ENDOCRINOLOGY | Facility: CLINIC | Age: 33
End: 2023-11-30
Payer: COMMERCIAL

## 2023-11-30 VITALS — WEIGHT: 286 LBS | BODY MASS INDEX: 42.22 KG/M2

## 2023-11-30 DIAGNOSIS — Z71.3 NUTRITIONAL COUNSELING: Primary | ICD-10-CM

## 2023-11-30 DIAGNOSIS — E66.9 OBESITY: ICD-10-CM

## 2023-11-30 DIAGNOSIS — Z99.2 ESRD (END STAGE RENAL DISEASE) ON DIALYSIS (H): ICD-10-CM

## 2023-11-30 DIAGNOSIS — N18.6 ESRD (END STAGE RENAL DISEASE) ON DIALYSIS (H): ICD-10-CM

## 2023-11-30 PROCEDURE — 99207 PR NO CHARGE LOS: CPT | Mod: 95 | Performed by: DIETITIAN, REGISTERED

## 2023-11-30 PROCEDURE — 97803 MED NUTRITION INDIV SUBSEQ: CPT | Mod: 95 | Performed by: DIETITIAN, REGISTERED

## 2023-11-30 NOTE — LETTER
"11/30/2023       RE: Glenna Spears  [address]  [place] [address]     Dear Colleague,    Thank you for referring your patient, Glenna Spears, to the [facility] [place] at [facility]. Please see a copy of my visit note below.    Video-Visit Details    Type of service:  Video Visit    Video Start Time: 1:27 pm  Video End Time:  1:47 pm    Originating Location (pt. Location): Home    Distant Location (provider location):  Offsite (providers home)    Platform used for Video Visit: [email or web address] Ã¨â€¹Â±Ã¨â€¦Â¾Ã§Â½â€˜       Bariatric Nutrition Consultation Note    Reason For Visit: Nutrition Assessment    Glenna Spears is a 32 year old presenting today for bariatric nutrition consult.   Pt is interested in laparoscopic sleeve gastrectomy with Dr. Smith.     This is pt's 10th required nutrition visits prior to surgery. Seen 1/27, 2/27, 3/29, 4/26, 5/29, 6/23, 7/29, 9/1, 9/22, 10/27/23, and today 11/30/23. Visit on 9/1 to count as August 2023 visit due to scheduling error on providers end.     Pt referred by ANAMARIA Pat on January 2023.    CO-MORBIDITIES OF OBESITY INCLUDE:  ESRD on dialysis     SUPPORT:  Patient reports good support system at home    ANTHROPOMETRICS:  Weight 1/23/23: 330 lbs    Estimated body mass index is 42.95 kg/m  as calculated from the following:    Height as of 11/6/23: 1.753 m (5' 9.02\").    Weight as of 11/6/23: 132 kg (291 lb).     Current weight: 286 lbs, pt report    Medication for weight loss:  Wegovy    SUPPLEMENT INFORMATION:  Phos Binder - is chewable (Velphoro) - Occ missing doses  Vitamin D 2500 units daily (5000 unit tablet every other day)  Dialyvite renal MVI   Vitamin E 1000 mg every other day  Iron monitored at dialysis - gets infused as needed    Dialysis labs:     10/19/23: - pt reported   Phos: 8.5  Hgb: 10.6   Calcium: 10.6 - on calcitriol, pt reports discussed lowering with her dialysis nephrologist  Potassium: " "4.5   Albumin: 4.2     11/16/23 - pt reported  Phos 8.2 - Occ missing doses of her phos binder  Hgb 10.4  Calcium 10  Potassium:  4.8  Albumin 4.1    NUTRITION HISTORY:  NKFA. Some lactose intolerance. \"Hates fish.\"     Attended Weight Loss Surgery Nutrition Class 1/27/23.     Please see previous RD notes for more information.     9/1/22:   Phos labs continue to improve.   Changes: healthier food choices (more fruit), smaller portions, chewing more thoroughly. Working on increasing protein has been eating more steak. nPCR improved.   Just now starting to feel at baseline for activity, had a respiratory infection that she was hospitalized for recently.    Reports she does not have a specific fluid restriction that she can recall, just advised to drink less.   Aiming to keep to 30-60 oz, but some days goes over. Water, regular Mt Dew (1 can daily), coffee in the morning.      9/22/23:  Chewing well  Smaller portions - finds Wegovy most helpful with this.   Increasing nutritional value of her meals.    Oct 2023:  Was sick a couple times this past month.   Notes phos labs up, had not been taking phos as well when sick.  Eating two meals daily. Working on smaller meals, but higher protein intake. Occasionally gets a protein bar at dialysis. Has yet to  kidney friendly protein shake. Reviewed options today.   Met with therapist this week, has follow-up next week.   Eliminated pop. Working on reducing caffeine/coffee.     Diet Recall:  Breakfast: skip  Lunch: yogurt   Dinner: steak, potato, veggies  Snack: protein bar at dialysis   Beverages: Water mostly. Katiuska Sun or Juicy-juice on occasion.      Nov 2023:  Continues to progress with dietary goals.    Smaller portions. Protein centric.     No fluid restriction currently. Drinking water, coffee, regular powerade, Katiuska Sun, Juicy-juice.   Caffeine: 1 cup/day   No carbonation/alcohol.    Barriers: not feeling well occ due to Wegovy/dialysis - has been motivating pt " to make better choices with food to help her feel better    making it to all appts (hard with two kids, dialysis and working FT)  - missed appt this morning and reached out right away to get rescheduled.     Previous goals:   the Premier Protein Vanilla Protein Powder - Plans to  before next appt.   Continue to reduce coffee. - Met/continues    Additional information:  Works starting at 3 pm  Dialysis T/R/Sat     NUTRITION DIAGNOSIS:  Obesity r/t long history of positive energy balance aeb BMI >30 kg/m2.    INTERVENTION:  Intervention Provided/Education Provided on post-op diet guidelines, vitamins/minerals essential post-operatively, GI anatomy of bariatric surgeries, ways to help prepare for post-op diet guidelines pre-operatively, portion/calorie-control, mindful eating and sources of protein.  Patient demonstrates understanding.     Reviewed low potassium/phosphorus protein shake options. Pt plans to  the Premier Protein Vanilla protein powder. Encouraged continued reduction in caffeine/coffee intake. Pt amenable to recommendations.     Personal barriers to making and continuing required life changes have been identified, and strategies to overcome those barriers have been recommended AND family and social supports have been assessed and strategies to strengthen those supports have been recommended.    Provided pt with list of goals, RD contact information and resources listed below via [employer].            No data to display                Expected Engagement: good    GOALS:   the Premier Vanilla Protein Powder by next appt    Other surgery goals:  Relating To Eating:  - Eat slowly (20-30 minutes per meal), chewing foods well (25 chews per bite/applesauce consistency)  - Focus on eating smaller portion sizes at meals and snacks    Relating to beverages:  - Separate fluids from meals by 30 minutes before, during, and after eating  -  Small, frequent sips of fluids between meals.      Relating to activity:  - Increase activity as able    Lower Potassium Fruits:  - Watermelon (1 cup = 85 mg potassium)  - Tangerines (1 each = 140 mg)  - Strawberries (  cup = 125 mg)  - Raspberries (  cup = 90 mg)  - Plums (1 each = 105 mg)  - Pineapple, fresh or canned (  cup = 100 mg)  - Pears, canned (  cup = 120 mg)  - Peaches, canned (  cup = 120 mg)  - Peach, fresh (1 each = 185 mg)  - Dagoberto, limes (1 each = 80 mg)  - Grapes (  cup = 155 mg)     Lower Potassium Vegetables:  - Swiss chard, raw (1 cup = 135 mg)  -  Spinach, raw (1 cup = 170 mg)  - Summer squash (  cup = 175-200 mg)  - Peppers, red (  cup = 160 mg)  - Peppers, green (  cup = 130 mg)  - Onions, raw (  cup = 120 mg)  - Okra (  cup = 110 mg)  - Mushrooms, raw (  cup = 110 mg)  - Mixed vegetables (  cup = 150 mg)  - Lettuce, all types (1 cup = 100 mg)  - Greens: kale, turnip, wilian (  cup = 110-150 mg)  - Eggplant (  cup = 60 mg)  - Endive, raw (  cup = 80 mg)  - Cucumbers (  cup = 80 mg)      Kidney-Friendly Protein Supplements:  Pure Protein whey protein (140 calories, 23 gm protein, 110 mg potassium, 80 mg phos)  Premiere Protein whey protein (vanilla) (150 calories, 30 gm protein, 160 mg potassium)  Bipro Protein Water (16.9 oz/serv) - 90 calories, 20 gm protein, 0 mg potassium/phos  Vital Protein Collagen Powder       Post-op Diet Handouts:  Diet Guidelines after Weight-loss Surgery  [email or web address]     Your Stage 1 Diet: Clear Liquids  [email or web address]     Your Stage 2 Diet: Low-fat Full Liquids  [email or web address]     Your Stage 3 Diet: Pureed Foods  [email or web address]     Pureed Pleasures  [email or web address]    Your Stage 4 Diet: Soft Foods  [email or web address]    Your Stage 5 Diet: Regular Foods  [email or web address]    Supplements after Sleeve Gastrectomy, Gastric Bypass or Single Anastomosis Duodenal Switch  [email or web address]    Keeping Track  of Fluids  [email or web address]      Follow up:   Friday, December 22nd at 9:00 am    Time spent with patient:  20 minutes.  MARISSA Garcia, RD, LD

## 2023-11-30 NOTE — PATIENT INSTRUCTIONS
GOALS:   the Premier Vanilla Protein Powder by next appt    Other surgery goals:  Relating To Eating:  - Eat slowly (20-30 minutes per meal), chewing foods well (25 chews per bite/applesauce consistency)  - Focus on eating smaller portion sizes at meals and snacks    Relating to beverages:  - Separate fluids from meals by 30 minutes before, during, and after eating  -  Small, frequent sips of fluids between meals.     Relating to activity:  - Increase activity as able    Lower Potassium Fruits:  - Watermelon (1 cup = 85 mg potassium)  - Tangerines (1 each = 140 mg)  - Strawberries (  cup = 125 mg)  - Raspberries (  cup = 90 mg)  - Plums (1 each = 105 mg)  - Pineapple, fresh or canned (  cup = 100 mg)  - Pears, canned (  cup = 120 mg)  - Peaches, canned (  cup = 120 mg)  - Peach, fresh (1 each = 185 mg)  - Dagoberto, limes (1 each = 80 mg)  - Grapes (  cup = 155 mg)     Lower Potassium Vegetables:  - Swiss chard, raw (1 cup = 135 mg)  -  Spinach, raw (1 cup = 170 mg)  - Summer squash (  cup = 175-200 mg)  - Peppers, red (  cup = 160 mg)  - Peppers, green (  cup = 130 mg)  - Onions, raw (  cup = 120 mg)  - Okra (  cup = 110 mg)  - Mushrooms, raw (  cup = 110 mg)  - Mixed vegetables (  cup = 150 mg)  - Lettuce, all types (1 cup = 100 mg)  - Greens: kale, turnip, wilian (  cup = 110-150 mg)  - Eggplant (  cup = 60 mg)  - Endive, raw (  cup = 80 mg)  - Cucumbers (  cup = 80 mg)      Kidney-Friendly Protein Supplements:  Pure Protein whey protein (140 calories, 23 gm protein, 110 mg potassium, 80 mg phos)  Premiere Protein whey protein (vanilla) (150 calories, 30 gm protein, 160 mg potassium)  Bipro Protein Water (16.9 oz/serv) - 90 calories, 20 gm protein, 0 mg potassium/phos  Vital Protein Collagen Powder       Post-op Diet Handouts:  Diet Guidelines after Weight-loss Surgery  [email or web address]     Your Stage 1 Diet: Clear Liquids  [email or web address]     Your Stage 2 Diet: Low-fat Full  Liquids  [email or web address]     Your Stage 3 Diet: Pureed Foods  [email or web address]     Pureed Pleasures  [email or web address]    Your Stage 4 Diet: Soft Foods  [email or web address]    Your Stage 5 Diet: Regular Foods  [email or web address]    Supplements after Sleeve Gastrectomy, Gastric Bypass or Single Anastomosis Duodenal Switch  [email or web address]    Keeping Track of Fluids  [email or web address]      Follow up:   Friday, December 22nd at 9:00 am    MARISSA Rubin ([place]), RD, LD  Clinic #: [phone]

## 2023-11-30 NOTE — PROGRESS NOTES
"Video-Visit Details    Type of service:  Video Visit    Video Start Time: 1:27 pm  Video End Time:  1:47 pm    Originating Location (pt. Location): Home    Distant Location (provider location):  Offsite (providers home)    Platform used for Video Visit: [facility]       Bariatric Nutrition Consultation Note    Reason For Visit: Nutrition Assessment    Glenna Spears is a 32 year old presenting today for bariatric nutrition consult.   Pt is interested in laparoscopic sleeve gastrectomy with Dr. Smith.     This is pt's 10th required nutrition visits prior to surgery. Seen 1/27, 2/27, 3/29, 4/26, 5/29, 6/23, 7/29, 9/1, 9/22, 10/27/23, and today 11/30/23. Visit on 9/1 to count as August 2023 visit due to scheduling error on providers end.     Pt referred by ANAMARIA Pat on January 2023.    CO-MORBIDITIES OF OBESITY INCLUDE:  ESRD on dialysis     SUPPORT:  Patient reports good support system at home    ANTHROPOMETRICS:  Weight 1/23/23: 330 lbs    Estimated body mass index is 42.95 kg/m  as calculated from the following:    Height as of 11/6/23: 1.753 m (5' 9.02\").    Weight as of 11/6/23: 132 kg (291 lb).     Current weight: 286 lbs, pt report    Medication for weight loss:  Wegovy    SUPPLEMENT INFORMATION:  Phos Binder - is chewable (Velphoro) - Occ missing doses  Vitamin D 2500 units daily (5000 unit tablet every other day)  Dialyvite renal MVI   Vitamin E 1000 mg every other day  Iron monitored at dialysis - gets infused as needed    Dialysis labs:     10/19/23: - pt reported   Phos: 8.5  Hgb: 10.6   Calcium: 10.6 - on calcitriol, pt reports discussed lowering with her dialysis nephrologist  Potassium: 4.5   Albumin: 4.2     11/16/23 - pt reported  Phos 8.2 - Occ missing doses of her phos binder  Hgb 10.4  Calcium 10  Potassium:  4.8  Albumin 4.1    NUTRITION HISTORY:  NKFA. Some lactose intolerance. \"Hates fish.\"     Attended Weight Loss Surgery Nutrition Class 1/27/23.     Please see previous RD notes for more " information.     9/1/22:   Phos labs continue to improve.   Changes: healthier food choices (more fruit), smaller portions, chewing more thoroughly. Working on increasing protein has been eating more steak. nPCR improved.   Just now starting to feel at baseline for activity, had a respiratory infection that she was hospitalized for recently.    Reports she does not have a specific fluid restriction that she can recall, just advised to drink less.   Aiming to keep to 30-60 oz, but some days goes over. Water, regular Mt Dew (1 can daily), coffee in the morning.      9/22/23:  Chewing well  Smaller portions - finds Wegovy most helpful with this.   Increasing nutritional value of her meals.    Oct 2023:  Was sick a couple times this past month.   Notes phos labs up, had not been taking phos as well when sick.  Eating two meals daily. Working on smaller meals, but higher protein intake. Occasionally gets a protein bar at dialysis. Has yet to  kidney friendly protein shake. Reviewed options today.   Met with therapist this week, has follow-up next week.   Eliminated pop. Working on reducing caffeine/coffee.     Diet Recall:  Breakfast: skip  Lunch: yogurt   Dinner: steak, potato, veggies  Snack: protein bar at dialysis   Beverages: Water mostly. Katiuska Sun or Juicy-juice on occasion.      Nov 2023:  Continues to progress with dietary goals.    Smaller portions. Protein centric.     No fluid restriction currently. Drinking water, coffee, regular powerade, Katiuska Sun, Juicy-juice.   Caffeine: 1 cup/day   No carbonation/alcohol.    Barriers: not feeling well occ due to Wegovy/dialysis - has been motivating pt to make better choices with food to help her feel better    making it to all appts (hard with two kids, dialysis and working FT)  - missed appt this morning and reached out right away to get rescheduled.     Previous goals:   the Premier Protein Vanilla Protein Powder - Plans to  before next appt.    Continue to reduce coffee. - Met/continues    Additional information:  Works starting at 3 pm  Dialysis T/R/Sat     NUTRITION DIAGNOSIS:  Obesity r/t long history of positive energy balance aeb BMI >30 kg/m2.    INTERVENTION:  Intervention Provided/Education Provided on post-op diet guidelines, vitamins/minerals essential post-operatively, GI anatomy of bariatric surgeries, ways to help prepare for post-op diet guidelines pre-operatively, portion/calorie-control, mindful eating and sources of protein.  Patient demonstrates understanding.     Reviewed low potassium/phosphorus protein shake options. Pt plans to  the Premier Protein Vanilla protein powder. Encouraged continued reduction in caffeine/coffee intake. Pt amenable to recommendations.     Personal barriers to making and continuing required life changes have been identified, and strategies to overcome those barriers have been recommended AND family and social supports have been assessed and strategies to strengthen those supports have been recommended.    Provided pt with list of goals, RD contact information and resources listed below via [employer].            No data to display                Expected Engagement: good    GOALS:   the Premier Vanilla Protein Powder by next appt    Other surgery goals:  Relating To Eating:  - Eat slowly (20-30 minutes per meal), chewing foods well (25 chews per bite/applesauce consistency)  - Focus on eating smaller portion sizes at meals and snacks    Relating to beverages:  - Separate fluids from meals by 30 minutes before, during, and after eating  -  Small, frequent sips of fluids between meals.     Relating to activity:  - Increase activity as able    Lower Potassium Fruits:  - Watermelon (1 cup = 85 mg potassium)  - Tangerines (1 each = 140 mg)  - Strawberries (  cup = 125 mg)  - Raspberries (  cup = 90 mg)  - Plums (1 each = 105 mg)  - Pineapple, fresh or canned (  cup = 100 mg)  - Pears, canned (  cup =  120 mg)  - Peaches, canned (  cup = 120 mg)  - Peach, fresh (1 each = 185 mg)  - Dagoberto, limes (1 each = 80 mg)  - Grapes (  cup = 155 mg)     Lower Potassium Vegetables:  - Swiss chard, raw (1 cup = 135 mg)  -  Spinach, raw (1 cup = 170 mg)  - Summer squash (  cup = 175-200 mg)  - Peppers, red (  cup = 160 mg)  - Peppers, green (  cup = 130 mg)  - Onions, raw (  cup = 120 mg)  - Okra (  cup = 110 mg)  - Mushrooms, raw (  cup = 110 mg)  - Mixed vegetables (  cup = 150 mg)  - Lettuce, all types (1 cup = 100 mg)  - Greens: kale, turnip, wilian (  cup = 110-150 mg)  - Eggplant (  cup = 60 mg)  - Endive, raw (  cup = 80 mg)  - Cucumbers (  cup = 80 mg)      Kidney-Friendly Protein Supplements:  Pure Protein whey protein (140 calories, 23 gm protein, 110 mg potassium, 80 mg phos)  Premiere Protein whey protein (vanilla) (150 calories, 30 gm protein, 160 mg potassium)  Bipro Protein Water (16.9 oz/serv) - 90 calories, 20 gm protein, 0 mg potassium/phos  Vital Protein Collagen Powder       Post-op Diet Handouts:  Diet Guidelines after Weight-loss Surgery  [email or web address]     Your Stage 1 Diet: Clear Liquids  [email or web address]     Your Stage 2 Diet: Low-fat Full Liquids  [email or web address]     Your Stage 3 Diet: Pureed Foods  [email or web address]     Pureed Pleasures  [email or web address]    Your Stage 4 Diet: Soft Foods  [email or web address]    Your Stage 5 Diet: Regular Foods  [email or web address]    Supplements after Sleeve Gastrectomy, Gastric Bypass or Single Anastomosis Duodenal Switch  [email or web address]    Keeping Track of Fluids  [email or web address]      Follow up:   Friday, December 22nd at 9:00 am    Time spent with patient:  20 minutes.  MARISSA Garcia, RD, LD

## 2023-12-14 ENCOUNTER — TELEPHONE (OUTPATIENT)
Dept: ENDOCRINOLOGY | Facility: CLINIC | Age: 33
End: 2023-12-14
Payer: COMMERCIAL

## 2023-12-14 NOTE — TELEPHONE ENCOUNTER
Left VM message asking patient to call me to schedule a PAC appointment as I need to send this in with the clinicals for prior authorization to [employer]. My direct call back number left. Also left the number to the PAC clinic for scheduling, this will need to be done next week to get the prior authorization in time for her 1/9/24 surgery date.

## 2023-12-18 ENCOUNTER — TELEPHONE (OUTPATIENT)
Dept: ENDOCRINOLOGY | Facility: CLINIC | Age: 33
End: 2023-12-18
Payer: COMMERCIAL

## 2023-12-18 NOTE — TELEPHONE ENCOUNTER
FUTURE VISIT INFORMATION      SURGERY INFORMATION:  Date: 24  Location: [facility] OR   Surgeon:  John Smith MD  Anesthesia Type:  General  Procedure: GASTRECTOMY, SLEEVE, LAPAROSCOPIC, HERNIORRHAPHY, HIATAL, LAPAROSCOPIC  Consult: 10/12/23    RECORDS REQUESTED FROM:       Primary Care Provider: No PCP     Pertinent Medical History: Acute hypoxemic respiratory failure; Stage 5 CKD on dialysis; Hyperparathyroidism;     Most recent EKG+ Tracin23 - [facility]

## 2023-12-18 NOTE — TELEPHONE ENCOUNTER
Left VM message asking patient to call PAC ([phone]) or me at [phone] to schedule pre-op appointment as I need to submit the PAC appointment with the clinicals to [facility] for prior authorization for the sleeve scheduled 1/9/24. Did state that if this is not done in the next day or so would have to move her 6/9 surgery date out.

## 2023-12-20 ENCOUNTER — PRE VISIT (OUTPATIENT)
Dept: SURGERY | Facility: CLINIC | Age: 33
End: 2023-12-20

## 2023-12-20 ENCOUNTER — VIRTUAL VISIT (OUTPATIENT)
Dept: SURGERY | Facility: CLINIC | Age: 33
End: 2023-12-20
Payer: COMMERCIAL

## 2023-12-20 ENCOUNTER — ANESTHESIA EVENT (OUTPATIENT)
Dept: SURGERY | Facility: CLINIC | Age: 33
End: 2023-12-20
Payer: COMMERCIAL

## 2023-12-20 DIAGNOSIS — Z01.818 PRE-OP EVALUATION: Primary | ICD-10-CM

## 2023-12-20 PROCEDURE — 99213 OFFICE O/P EST LOW 20 MIN: CPT | Mod: 95 | Performed by: PHYSICIAN ASSISTANT

## 2023-12-20 RX ORDER — ASCORBIC ACID, THIAMINE, RIBOFLAVIN, NIACINAMIDE, PYRIDOXINE, FOLIC ACID, COBALAMIN, BIOTIN, PANTOTHENIC ACID, ZINC 100; 1.5; 1.7; 20; 10; 1; 6; 300; 10; 5 MG/1; MG/1; MG/1; MG/1; MG/1; MG/1; UG/1; UG/1; MG/1; MG/1
1 TABLET, COATED ORAL EVERY EVENING
Status: ON HOLD | COMMUNITY
Start: 2023-11-25 | End: 2024-01-24

## 2023-12-20 RX ORDER — IPRATROPIUM BROMIDE AND ALBUTEROL SULFATE 2.5; .5 MG/3ML; MG/3ML
3 SOLUTION RESPIRATORY (INHALATION) PRN
COMMUNITY
Start: 2023-08-09 | End: 2024-02-08

## 2023-12-20 ASSESSMENT — ENCOUNTER SYMPTOMS: SEIZURES: 0

## 2023-12-20 ASSESSMENT — LIFESTYLE VARIABLES: TOBACCO_USE: 1

## 2023-12-20 NOTE — PROGRESS NOTES
Glenna is a 33 year old who is being evaluated via a billable video visit.      How would you like to obtain your AVS? MyChart    Subjective   Glenna is a 33 year old, presenting for the following health issues:  Pre-Op Exam        KAITLYNN Laurent LPN

## 2023-12-20 NOTE — H&P
Pre-Operative H & P     CC:  Preoperative exam to assess for increased cardiopulmonary risk while undergoing surgery and anesthesia.    Date of Encounter: 2023  Primary Care Physician:  No Ref-Primary, Physician     Reason for visit:   Encounter Diagnosis   Name Primary?    Pre-op evaluation Yes       HPI  Glenna Spears is a 33 year old female who presents for pre-operative H & P in preparation for  Procedure Information       Case: [id number] Date/Time: 24 0845    Procedures:       GASTRECTOMY, SLEEVE, LAPAROSCOPIC (Abdomen)      possible HERNIORRHAPHY, HIATAL, LAPAROSCOPIC (Abdomen)    Anesthesia type: General    Diagnosis: Morbid obesity (H) [E66.01]    Pre-op diagnosis: Morbid obesity (H) [E66.01]    Location:  OR [address] [place] [address] OR    Providers: John Smith MD            Patient is being evaluated for comorbid conditions of asthma, hypertension, ESRD on dialysis    Ms. Spears has a history obesity. She has ESRD on dialysis. Goal for future kidney transplant, but needs to lose weight before proceeding with transplant. She has been following with the bariatric clinic. She is now scheduled for surgery as above.     History is obtained from the patient and chart review    Hx of abnormal bleeding or anti-platelet use: denies    Menstrual history: No LMP recorded. (Menstrual status: IUD).     Past Medical History  Past Medical History:   Diagnosis Date    Hypertension        Past Surgical History  Past Surgical History:   Procedure Laterality Date    AV FISTULA REPAIR       SECTION         Prior to Admission Medications  Current Outpatient Medications   Medication Sig Dispense Refill    acetaminophen (TYLENOL) 325 MG tablet Take 325-650 mg by mouth every 4 hours as needed      albuterol (PROAIR HFA/PROVENTIL HFA/VENTOLIN HFA) 108 (90 Base) MCG/ACT inhaler Inhale 2 puffs into the lungs as needed      amLODIPine (NORVASC) 10 MG tablet Take 1 tablet by mouth every evening      B Complex-C-Zn-Folic  Acid (DIALYVITE/ZINC) TABS Take 1 tablet by mouth every evening      calcitRIOL (ROCALTROL) 0.5 MCG capsule Take 0.5 mcg by mouth every evening      cholecalciferol 25 MCG (1000 UT) TABS Take 2,000 Units by mouth every evening      cinacalcet (SENSIPAR) 60 MG tablet Take 90 mg by mouth every evening      famotidine (PEPCID) 20 MG tablet Take 20 mg by mouth as needed      gabapentin (NEURONTIN) 100 MG capsule Take 300 mg by mouth at bedtime      ipratropium - albuterol 0.5 mg/2.5 mg/3 mL (DUONEB) 0.5-2.5 (3) MG/3ML neb solution Inhale 3 mLs into the lungs as needed      labetalol (NORMODYNE) 200 MG tablet Take 200 mg by mouth every evening      levonorgestrel (KYLEENA) 19.5 MG IUD 1 Intra Uterine Device by Intrauterine route once      Semaglutide-Weight Management (WEGOVY) 2.4 MG/0.75ML pen Inject 2.4 mg Subcutaneous once a week (Patient taking differently: Inject 2.4 mg Subcutaneous once a week Sat) 3 mL 3    varenicline (CHANTIX) 0.5 MG tablet Take 0.5 mg by mouth daily      VELPHORO 500 MG CHEW chewable tablet Take 500 mg by mouth 3 times daily (with meals)         Allergies  Allergies   Allergen Reactions    Sulfa Antibiotics Other (See Comments)     Reaction as an infant- unsure of what happened    Nsaids Other (See Comments)     Has nephrotic range proteinuria and see nephrology's note.        Social History  Social History     Socioeconomic History    Marital status: Single     Spouse name: Not on file    Number of children: Not on file    Years of education: Not on file    Highest education level: Not on file   Occupational History    Not on file   Tobacco Use    Smoking status: Former     Types: Cigarettes     Quit date: 2023     Years since quittin.8     Passive exposure: Past    Smokeless tobacco: Never    Tobacco comments:     quit   Vaping Use    Vaping Use: Never used   Substance and Sexual Activity    Alcohol use: Not Currently    Drug use: Not Currently     Types: Marijuana    Sexual  activity: Not on file   Other Topics Concern    Not on file   Social History Narrative    Not on file     Social Determinants of Health     Financial Resource Strain: Not on file   Food Insecurity: Not on file   Transportation Needs: Not on file   Physical Activity: Not on file   Stress: Not on file   Social Connections: Not on file   Interpersonal Safety: Not on file   Housing Stability: Not on file       Family History  Family History   Problem Relation Age of Onset    Anesthesia Reaction No family hx of     Deep Vein Thrombosis (DVT) No family hx of        Review of Systems  The complete review of systems is negative other than noted in the HPI or here.   Anesthesia Evaluation   Pt has had prior anesthetic.     No history of anesthetic complications       ROS/MED HX  ENT/Pulmonary:     (+)     STEFAN risk factors, snores loudly, hypertension, obese,        tobacco use, Past use,    Intermittent, asthma  Treatment: Inhaler prn,                 Neurologic:  - neg neurologic ROS  (-) no seizures and no CVA   Cardiovascular:     (+)  hypertension- -   -  - -                                 Previous cardiac testing   Echo: Date: Results:    Stress Test:  Date: Results:    ECG Reviewed:  Date: 1/2023 Results:  Sinus rhythm with occasional Premature ventricular complexes   Otherwise normal ECG   Cath:  Date: Results:   (-) taking anticoagulants/antiplatelets   METS/Exercise Tolerance: >4 METS Comment: Walks distances and ascends stairs without exertional symptoms.   Hematologic:  - neg hematologic  ROS  (-) history of blood clots and history of blood transfusion   Musculoskeletal:  - neg musculoskeletal ROS     GI/Hepatic:  - neg GI/hepatic ROS  (-) GERD   Renal/Genitourinary:     (+) renal disease, type: ESRD, Pt requires dialysis, type: Hemodialysis,          Endo:     (+)               Obesity (BMI 42),    (-) chronic steroid usage   Psychiatric/Substance Use:  - neg psychiatric ROS     Infectious Disease:  - neg  infectious disease ROS     Malignancy:  - neg malignancy ROS     Other:            Virtual visit -  No vitals were obtained    Physical Exam  Constitutional: Awake, alert, cooperative, no apparent distress, and appears stated age.  HENT: Normocephalic  Respiratory: non labored breathing   Neurologic: Awake, alert, oriented to name, place and time.   Neuropsychiatric: Calm, cooperative. Normal affect.      Prior Labs/Diagnostic Studies   All labs and imaging personally reviewed     EKG/ stress test - if available please see in ROS above   No results found.       No data to display                The patient's records and results personally reviewed by this provider.     Outside records reviewed from: Care Everywhere      Lab orders: multiple per surgery team. I have added CBC with iron studies. Will fax orders to [facility] per aptient request    Assessment    Glenna Spears is a 33 year old female seen as a PAC referral for risk assessment and optimization for anesthesia.    Plan/Recommendations  Pt will be optimized for the proposed procedure.  See below for details on the assessment, risk, and preoperative recommendations    NEUROLOGY  - No history of TIA, CVA or seizure  -Post Op delirium risk factors:  No risk identified    ENT  - No current airway concerns.  Will need to be reassessed day of surgery.  Mallampati: Unable to assess  TM: Unable to assess    CARDIAC  - No history of CAD and Afib  -hypertension using amlodipine, labetolol  -denies cardiac symptoms  - METS (Metabolic Equivalents)  Patient performs 4 or more METS exercise without symptoms            Total Score: 0      RCRI-Moderate risk: Class 3  6.6% complication rate            Total Score: 2    RCRI: High Risk Surgery    RCRI: Elevated Creatinine        PULMONARY  STEFAN Medium Risk            Total Score: 3    STEFAN: Snores loudly    STEFAN: Hypertension    STEFAN: BMI over 35 kg/m2      - Asthma  Well controlled  -reports recent cold over past  "week that is improving. Dry cough. Denies fevers or other symptoms. She continues to improve. She has >2 weeks prior to surgery. Educated her to go in for treatment if she has symptoms of a secondary infection. She will contact surgery team if symptoms have not resolved prior to surgery.   - Tobacco History    History   Smoking Status    Former    Types: Cigarettes    Quit date: 1/31/2023   Smokeless Tobacco    Never       GI  PONV High Risk  Total Score: 3           1 AN PONV: Pt is Female    1 AN PONV: Patient is not a current smoker    1 AN PONV: Intended Post Op Opioids        /RENAL  - ESRD on T, Th, Sat dialysis. She will call her dialysis clinic as surgery is scheduled on a Tuesday. She will move dialysis to Monday prior to surgery.     ENDOCRINE    - BMI: Estimated body mass index is 42.22 kg/m  as calculated from the following:    Height as of 11/6/23: 1.753 m (5' 9.02\").    Weight as of 11/30/23: 129.7 kg (286 lb).  Class 3 Obesity (BMI > 40)  - No history of Diabetes Mellitus    HEME  VTE Low Risk 0.26%            Total Score: 1    VTE: BMI greater than 39      - No history of abnormal bleeding or antiplatelet use.  -will update CBC with reflex iron studies     Different anesthesia methods/types have been discussed with the patient, but they are aware that the final plan will be decided by the assigned anesthesia provider on the date of service.    The patient is optimized for their procedure. AVS with information on surgery time/arrival time, meds and NPO status given by nursing staff. No further diagnostic testing indicated.    Please refer to the physical examination documented by the anesthesiologist in the anesthesia record on the day of surgery.    Video-Visit Details    Type of service:  Video Visit    Provider received verbal consent for a Video Visit from the patient? Yes     Originating Location (pt. Location): Home    Distant Location (provider location):  Off-site  Mode of Communication:  " Video Conference via [employer]  On the day of service:     Prep time: 15 minutes  Visit time: 8 minutes  Documentation time: 9 minutes  ------------------------------------------  Total time: 32 minutes      Ivonne Ann PA-C  Preoperative Assessment Center  [facility]  [facility]  Phone: [phone]  Fax: [phone]

## 2023-12-20 NOTE — PATIENT INSTRUCTIONS
Preparing for Your Surgery      Name:  Glenna Spears   MRN:  [id number]   :  1990   Today's Date:  2023       Arriving for surgery:  Surgery date:  24  Arrival time:  6:45 am    Please come to:  [facility] [address]  [address] [place]  [address]      The [facility] Patient /Visitor Ramp is located at [address]. Patients and visitors who self-park will receive the reduced hospital parking rate. If the Patient /Visitor Ramp is full, please follow the signs to the [place] parking located at the main hospital entrance.     parking is available ( 24 hours/ 7 days a week)    Discounted parking pass options are available for patients and visitors. They can be purchased at the [employer] desk at the main hospital entrance.    -  Stop at the security desk and they will direct surgery patients to Registration, and then the 3rd floor Surgery Waiting Room. [phone]     -  If you are in need of directions, wheelchair or escort please stop at the Information/security desk in the lobby.       What can I eat or drink?  -  Follow Weight Management instructions for starting Clear Liquid diet.  -  You may have clear liquids until 12 midnight, the day of surgery.  -  From 12 midnight until 4:45 am the day of surgery, sips of water only.  -  Nothing by mouth after 4:45 am, the day of surgery.    Examples of clear liquids:  Water  Clear broth  Juices (apple, white grape, white cranberry  and cider) without pulp  Noncarbonated, powder based beverages  (lemonade and Dionte-Aid)  Coffee or tea (without milk or cream)  Gatorade- no red    -  No Alcohol or cannabis products for at least 24 hours before surgery.     Which medicines can I take?  Hold Aspirin for 7 days before surgery.   Hold Multivitamins for 7 days before surgery.  Hold Supplements for 7 days before surgery.  Hold Ibuprofen (Advil, Motrin) for 1 day before  surgery--unless otherwise directed by surgeon.  Hold Naproxen (Aleve) for 4 days before surgery.  Acetaminophen (Tylenol) is okay to take if needed.    Hold Semaglutied (Wegovy) for 7 days prior to surgery. Reports Semaglutide is taken on Saturdays. Hold Semaglutide on Saturday, Jan. 6th.    -  DO NOT take these medications the day of surgery:  Varenicline (Chantix)  Velphoro    -  PLEASE TAKE these medications the day of surgery:  Acetaminophen (Tylenol) if needed  Albuterol inhaler if needed  Famotidine (Pepcid) if needed  Ipratropium-Albuterol (DUOneb) neb if needed    Bring Albuterol inhaler to hospital.    How do I prepare myself?  - Please take 2 showers (one the night prior to surgery and one the morning of surgery) using Scrubcare or Hibiclens soap.    Use this soap only from the neck to your toes.     Leave the soap on your skin for one minute--then rinse thoroughly.      You may use your own shampoo and conditioner. No other hair products.   - Please remove all jewelry and body piercings.  - No lotions, deodorants or fragrance.  - No makeup or fingernail polish.   - Bring your ID and insurance card.    -If you use a CPAP machine, please bring the CPAP machine, tubing, and mask to hospital.    -If you have a Deep Brain Stimulator, Spinal Cord Stimulator, or any Neuro Stimulator device---you must bring the remote control to the hospital.      ALL PATIENTS GOING HOME THE SAME DAY OF SURGERY ARE REQUIRED TO HAVE A RESPONSIBLE ADULT TO DRIVE AND BE IN ATTENDANCE WITH THEM FOR 24 HOURS FOLLOWING SURGERY.    Covid testing policy as of 12/06/2022  Your surgeon will notify and schedule you for a COVID test if one is needed before surgery--please direct any questions or COVID symptoms to your surgeon      Questions or Concerns:    - For any questions regarding the day of surgery or your hospital stay, please contact the Pre Admission Nursing Office at [phone].   - If you have health changes between today and  your surgery, please call your surgeon.   - For questions after surgery, please call your surgeons office.       Current Visitor Guidelines    You may have 2 visitors in the pre op area.    Visiting hours: 8 a.m. to 8:30 p.m.    You may have four visitors during your inpatient hospital stay.    Patients confirmed or suspected to have symptoms of COVID 19 or flu:     No visitors allowed for adult patients.   Children (under age 18) can have 1 named visitor.     People who are sick or showing symptoms of COVID 19 or flu:    Are not allowed to visit patients--we can only make exceptions in special situations.       Please follow these guidelines for your visit:          Please maintain social distance          Masking is optional--however at times you may be asked to wear a mask for the safety of yourself and others     Clean your hands with alcohol hand . Do this when you arrive at and leave the building and patient room,    And again after you touch your mask or anything in the room.     Go directly to and from the room you are visiting.     Stay in the patient s room during your visit. Limit going to other places in the hospital as much as possible     Leave bags and jackets at home or in the car.     For everyone s health, please don t come and go during your visit. That includes for smoking   during your visit.

## 2023-12-22 ENCOUNTER — VIRTUAL VISIT (OUTPATIENT)
Dept: ENDOCRINOLOGY | Facility: CLINIC | Age: 33
End: 2023-12-22
Payer: COMMERCIAL

## 2023-12-22 VITALS — HEIGHT: 68 IN | BODY MASS INDEX: 42.74 KG/M2 | WEIGHT: 282 LBS

## 2023-12-22 DIAGNOSIS — Z71.3 NUTRITIONAL COUNSELING: Primary | ICD-10-CM

## 2023-12-22 DIAGNOSIS — Z99.2 ESRD (END STAGE RENAL DISEASE) ON DIALYSIS (H): ICD-10-CM

## 2023-12-22 DIAGNOSIS — N18.6 ESRD (END STAGE RENAL DISEASE) ON DIALYSIS (H): ICD-10-CM

## 2023-12-22 DIAGNOSIS — E66.9 OBESITY: ICD-10-CM

## 2023-12-22 PROCEDURE — 99207 PR NO CHARGE LOS: CPT | Mod: 95

## 2023-12-22 PROCEDURE — 97803 MED NUTRITION INDIV SUBSEQ: CPT | Mod: 95

## 2023-12-22 ASSESSMENT — PAIN SCALES - GENERAL: PAINLEVEL: NO PAIN (0)

## 2023-12-22 NOTE — LETTER
"12/22/2023       RE: Glenna Spears  [address]  [place] [address]     Dear Colleague,    Thank you for referring your patient, Glenna Spears, to the [facility] [place] [facility]. Please see a copy of my visit note below.    Video-Visit Details    Type of service:  Video Visit    Video Start Time: 11:34 AM  Video End Time:  11:45 AM     Originating Location (pt. Location): Home    Distant Location (provider location):  Offsite (providers home)    Platform used for Video Visit: Well       Bariatric Nutrition Consultation Note    Reason For Visit: Nutrition Assessment    Glenna Spears is a 32 year old presenting today for bariatric nutrition consult and nutrition education regarding clear and low-fat full liquid diet for post-bariatric surgery. Pt is interested in laparoscopic sleeve gastrectomy with Dr. Smith scheduled on 1/9/24.    This is pt's 10th required nutrition visits prior to surgery. Seen 1/27, 2/27, 3/29, 4/26, 5/29, 6/23, 7/29, 9/1, 9/22, 10/27/23, 11/30/23 and 12/22/23. Visit on 9/1 to count as August 2023 visit due to scheduling error on providers end.     Pt referred by ANAMARIA Pat on January 2023.    CO-MORBIDITIES OF OBESITY INCLUDE:  ESRD on dialysis     SUPPORT:  Patient reports good support system at home    ANTHROPOMETRICS:  Weight 1/23/23: 330 lbs    Estimated body mass index is 42.88 kg/m  as calculated from the following:    Height as of this encounter: 1.727 m (5' 8\").    Weight as of this encounter: 127.9 kg (282 lb).     Current weight: 282 lbs, pt report    Medication for weight loss:  Wegovy     SUPPLEMENT INFORMATION:  Phos Binder - is chewable (Velphoro) - Occ missing doses  Vitamin D 2500 units daily (5000 unit tablet every other day)  Dialyvite renal MVI   Vitamin E 1000 mg every other day  Iron monitored at dialysis - gets infused as needed    Dialysis labs:     10/19/23: - pt " "reported   Phos: 8.5  Hgb: 10.6   Calcium: 10.6 - on calcitriol, pt reports discussed lowering with her dialysis nephrologist  Potassium: 4.5   Albumin: 4.2     11/16/23 - pt reported  Phos 8.2 - Occ missing doses of her phos binder  Hgb 10.4  Calcium 10  Potassium:  4.8  Albumin 4.1    NUTRITION HISTORY:  NKFA. Some lactose intolerance. \"Hates fish.\"     Attended Weight Loss Surgery Nutrition Class 1/27/23.     Please see previous RD notes for more information.     9/1/22:   Phos labs continue to improve.   Changes: healthier food choices (more fruit), smaller portions, chewing more thoroughly. Working on increasing protein has been eating more steak. nPCR improved.   Just now starting to feel at baseline for activity, had a respiratory infection that she was hospitalized for recently.    Reports she does not have a specific fluid restriction that she can recall, just advised to drink less.   Aiming to keep to 30-60 oz, but some days goes over. Water, regular Mt Dew (1 can daily), coffee in the morning.      9/22/23:  Chewing well  Smaller portions - finds Wegovy most helpful with this.   Increasing nutritional value of her meals.    Oct 2023:  Was sick a couple times this past month.   Notes phos labs up, had not been taking phos as well when sick.  Eating two meals daily. Working on smaller meals, but higher protein intake. Occasionally gets a protein bar at dialysis. Has yet to  kidney friendly protein shake. Reviewed options today.   Met with therapist this week, has follow-up next week.   Eliminated pop. Working on reducing caffeine/coffee.     Diet Recall:  Breakfast: skip  Lunch: yogurt   Dinner: steak, potato, veggies  Snack: protein bar at dialysis   Beverages: Water mostly. Katiuska Sun or Juicy-juice on occasion.      Nov 2023:  Continues to progress with dietary goals.    Smaller portions. Protein centric.     No fluid restriction currently. Drinking water, coffee, regular powerade, Katiuska Sun, " Juicy-juice.   Caffeine: 1 cup/day   No carbonation/alcohol.    Barriers: not feeling well occ due to Wegovy/dialysis - has been motivating pt to make better choices with food to help her feel better    making it to all appts (hard with two kids, dialysis and working FT)  - missed appt this morning and reached out right away to get rescheduled.     Dec 2023: Continues to work on goals for surgery. Needs to discuss with dialysis dietitian about the most appropriate  MVI for after surgery. Review diet post op diet guidelines with patient.     Previous goals:   the Premier Protein Vanilla Protein Powder - Plans to  before next appt. - met  Continue to reduce coffee. - Met/continues    Additional information:  Works starting at 3 pm  Dialysis T/R/Sat     NUTRITION DIAGNOSIS:  Food- and Nutrition-related knowledge deficit r/t lack of prior exposure to information AEB pt scheduled for upcoming bariatric surgery and pt interest in diet education/review    INTERVENTION:  Intervention Provided/Education Provided/Reviewed previous goals and encouraged patient to continue goals prior to surgery.     Provided instruction on bariatric clear and low-fat full liquid diets.    Provided the following handouts: Diet Guidelines for Bariatric Surgery, Your Stage 1-5 Diet, Keeping Track of Your Fluids, list of recommended vitamin/mineral supplementation after sleeve gastrectomy surgery and RD contact information.     Expected Engagement: good    Goals after surgery:   1. Follow the bariatric post-op diet advancement schedule (see below)  2. Sip on 48-64 oz (or greater) fluids daily, recording intake to help stay on-track.  - Drink at least 1-2 oz of fluid every 15-30 min.  3. Stop vitamins/minerals 1 week before surgery. We will discuss starting a chewable multivitamin at the one week post-op visit.   4. Work towards consuming 60 gm protein daily.     Post-op Diet Advancement Schedule:  Clear Liquid Diet (stage 1): start  on 1/8  Low-Fat Full Liquid Diet (stage 2): start on 1/10  Pureed Diet (stage 3): start 1/23   Soft Diet (stage 4): start 2/6  Regular Diet (stage 5): start 3/5     Post-op Diet Handouts:  Diet Guidelines after Weight-loss Surgery  [email or web address]     Your Stage 1 Diet: Clear Liquids  [email or web address]     Your Stage 2 Diet: Low-fat Full Liquids  [email or web address]     Your Stage 3 Diet: Pureed Foods  [email or web address]     Pureed Recipes  [email or web address]    Your Stage 4 Diet: Soft Foods  [email or web address]    Your Stage 5 Diet: Regular Foods  [email or web address]    Supplements after Sleeve Gastrectomy, Gastric Bypass or Single Anastomosis Duodenal Switch  [email or web address]    Keeping Track of Fluids  [email or web address]    Follow up: January 17.     Time spent with patient: 12 minutes.    Debbie Pettit RD, LD  Clinic # [phone]

## 2023-12-22 NOTE — NURSING NOTE
Is the patient currently in the state of [place]? YES    Visit mode:VIDEO    If the visit is dropped, the patient can be reconnected by: VIDEO VISIT: Text to cell phone:   Telephone Information:   Mobile [phone]       Will anyone else be joining the visit? NO  (If patient encounters technical issues they should call [phone])    How would you like to obtain your AVS? MyChart    Are changes needed to the allergy or medication list? No    Reason for visit: [place]

## 2023-12-22 NOTE — PROGRESS NOTES
"Video-Visit Details    Type of service:  Video Visit    Video Start Time: 11:34 AM  Video End Time:  11:45 AM     Originating Location (pt. Location): Home    Distant Location (provider location):  Offsite (providers home)    Platform used for Video Visit: [facility]       Bariatric Nutrition Consultation Note    Reason For Visit: Nutrition Assessment    Glenna Spears is a 32 year old presenting today for bariatric nutrition consult and nutrition education regarding clear and low-fat full liquid diet for post-bariatric surgery. Pt is interested in laparoscopic sleeve gastrectomy with Dr. Smith scheduled on 1/9/24.    This is pt's 10th required nutrition visits prior to surgery. Seen 1/27, 2/27, 3/29, 4/26, 5/29, 6/23, 7/29, 9/1, 9/22, 10/27/23, 11/30/23 and 12/22/23. Visit on 9/1 to count as August 2023 visit due to scheduling error on providers end.     Pt referred by ANAMARIA Pat on January 2023.    CO-MORBIDITIES OF OBESITY INCLUDE:  ESRD on dialysis     SUPPORT:  Patient reports good support system at home    ANTHROPOMETRICS:  Weight 1/23/23: 330 lbs    Estimated body mass index is 42.88 kg/m  as calculated from the following:    Height as of this encounter: 1.727 m (5' 8\").    Weight as of this encounter: 127.9 kg (282 lb).     Current weight: 282 lbs, pt report    Medication for weight loss:  Wegovy     SUPPLEMENT INFORMATION:  Phos Binder - is chewable (Velphoro) - Occ missing doses  Vitamin D 2500 units daily (5000 unit tablet every other day)  Dialyvite renal MVI   Vitamin E 1000 mg every other day  Iron monitored at dialysis - gets infused as needed    Dialysis labs:     10/19/23: - pt reported   Phos: 8.5  Hgb: 10.6   Calcium: 10.6 - on calcitriol, pt reports discussed lowering with her dialysis nephrologist  Potassium: 4.5   Albumin: 4.2     11/16/23 - pt reported  Phos 8.2 - Occ missing doses of her phos binder  Hgb 10.4  Calcium 10  Potassium:  4.8  Albumin 4.1    NUTRITION HISTORY:  NKFA. Some " "lactose intolerance. \"Hates fish.\"     Attended Weight Loss Surgery Nutrition Class 1/27/23.     Please see previous RD notes for more information.     9/1/22:   Phos labs continue to improve.   Changes: healthier food choices (more fruit), smaller portions, chewing more thoroughly. Working on increasing protein has been eating more steak. nPCR improved.   Just now starting to feel at baseline for activity, had a respiratory infection that she was hospitalized for recently.    Reports she does not have a specific fluid restriction that she can recall, just advised to drink less.   Aiming to keep to 30-60 oz, but some days goes over. Water, regular Mt Dew (1 can daily), coffee in the morning.      9/22/23:  Chewing well  Smaller portions - finds Wegovy most helpful with this.   Increasing nutritional value of her meals.    Oct 2023:  Was sick a couple times this past month.   Notes phos labs up, had not been taking phos as well when sick.  Eating two meals daily. Working on smaller meals, but higher protein intake. Occasionally gets a protein bar at dialysis. Has yet to  kidney friendly protein shake. Reviewed options today.   Met with therapist this week, has follow-up next week.   Eliminated pop. Working on reducing caffeine/coffee.     Diet Recall:  Breakfast: skip  Lunch: yogurt   Dinner: steak, potato, veggies  Snack: protein bar at dialysis   Beverages: Water mostly. Katiuska Sun or Juicy-juice on occasion.      Nov 2023:  Continues to progress with dietary goals.    Smaller portions. Protein centric.     No fluid restriction currently. Drinking water, coffee, regular powerade, Katiuska Sun, Juicy-juice.   Caffeine: 1 cup/day   No carbonation/alcohol.    Barriers: not feeling well occ due to Wegovy/dialysis - has been motivating pt to make better choices with food to help her feel better    making it to all appts (hard with two kids, dialysis and working FT)  - missed appt this morning and reached out right " away to get rescheduled.     Dec 2023: Continues to work on goals for surgery. Needs to discuss with dialysis dietitian about the most appropriate  MVI for after surgery. Review diet post op diet guidelines with patient.     Previous goals:   the Premier Protein Vanilla Protein Powder - Plans to  before next appt. - met  Continue to reduce coffee. - Met/continues    Additional information:  Works starting at 3 pm  Dialysis T/R/Sat     NUTRITION DIAGNOSIS:  Food- and Nutrition-related knowledge deficit r/t lack of prior exposure to information AEB pt scheduled for upcoming bariatric surgery and pt interest in diet education/review    INTERVENTION:  Intervention Provided/Education Provided/Reviewed previous goals and encouraged patient to continue goals prior to surgery.     Provided instruction on bariatric clear and low-fat full liquid diets.    Provided the following handouts: Diet Guidelines for Bariatric Surgery, Your Stage 1-5 Diet, Keeping Track of Your Fluids, list of recommended vitamin/mineral supplementation after sleeve gastrectomy surgery and RD contact information.     Expected Engagement: good    Goals after surgery:   1. Follow the bariatric post-op diet advancement schedule (see below)  2. Sip on 48-64 oz (or greater) fluids daily, recording intake to help stay on-track.  - Drink at least 1-2 oz of fluid every 15-30 min.  3. Stop vitamins/minerals 1 week before surgery. We will discuss starting a chewable multivitamin at the one week post-op visit.   4. Work towards consuming 60 gm protein daily.     Post-op Diet Advancement Schedule:  Clear Liquid Diet (stage 1): start on 1/8  Low-Fat Full Liquid Diet (stage 2): start on 1/10  Pureed Diet (stage 3): start 1/23   Soft Diet (stage 4): start 2/6  Regular Diet (stage 5): start 3/5     Post-op Diet Handouts:  Diet Guidelines after Weight-loss Surgery  [email or web address]     Your Stage 1 Diet: Clear  Liquids  [email or web address]     Your Stage 2 Diet: Low-fat Full Liquids  [email or web address]     Your Stage 3 Diet: Pureed Foods  [email or web address]     Pureed Recipes  [email or web address]    Your Stage 4 Diet: Soft Foods  [email or web address]    Your Stage 5 Diet: Regular Foods  [email or web address]    Supplements after Sleeve Gastrectomy, Gastric Bypass or Single Anastomosis Duodenal Switch  [email or web address]    Keeping Track of Fluids  [email or web address]    Follow up: January 17.     Time spent with patient: 12 minutes.    Debbie Pettit RD, LD  Clinic # [phone]

## 2023-12-22 NOTE — PATIENT INSTRUCTIONS
Audie Manzanares,     Follow-up with RD on January 17.     Thank you,    Debbie Pettit, RD, LD  If you would like to schedule or reschedule an appointment with the RD, please call [phone]    Nutrition Goals    Goals after surgery:   1. Follow the bariatric post-op diet advancement schedule (see below)  2. Sip on 48-64 oz (or greater) fluids daily, recording intake to help stay on-track.  - Drink at least 1-2 oz of fluid every 15-30 min.  3. Stop vitamins/minerals 1 week before surgery. We will discuss starting a chewable multivitamin at the one week post-op visit.   4. Work towards consuming 60 gm protein daily.     Post-op Diet Advancement Schedule:  Clear Liquid Diet (stage 1): start on 1/8  Low-Fat Full Liquid Diet (stage 2): start on 1/10  Pureed Diet (stage 3): start 1/23   Soft Diet (stage 4): start 2/6  Regular Diet (stage 5): start 3/5     Post-op Diet Handouts:  Diet Guidelines after Weight-loss Surgery  [email or web address]     Your Stage 1 Diet: Clear Liquids  [email or web address]     Your Stage 2 Diet: Low-fat Full Liquids  [email or web address]     Your Stage 3 Diet: Pureed Foods  [email or web address]     Pureed Recipes  [email or web address]    Your Stage 4 Diet: Soft Foods  [email or web address]    Your Stage 5 Diet: Regular Foods  [email or web address]    Supplements after Sleeve Gastrectomy, Gastric Bypass or Single Anastomosis Duodenal Switch  [email or web address]    Keeping Track of Fluids  [email or web address]    COMPREHENSIVE WEIGHT MANAGEMENT PROGRAM  VIRTUAL SUPPORT GROUPS    At [facility], our Comprehensive Weight Management program offers on-line support groups for patients who are working on weight loss and considering, preparing for, or have had weight loss surgery.     There is no cost for this opportunity.  You are invited to attend the?Virtual Support Groups?provided by any of the following  locations:    [facility] via Microsoft Teams with Mary Locke RN  2.   [place] via Ensenda with Drew Farmer, PhD, LP  3.   [place] via Ensenda with Angela Benedict RN  4.   [facility] via a Zoom Meeting with DAR Goodwin    The following Support Group information can also be found on our website:  [email or web address]      [facility] Weight Loss Surgery Support Group  The support group is a patient-lead forum that meets monthly to share experiences, encouragement and education. It is open to those who have had weight loss surgery, are scheduled for surgery, or are considering surgery.   WHEN: This group meets on the 3rd Wednesday of each month from 5:00PM - 6:00PM virtually using Microsoft Teams.   FACILITATOR: Led by Mary Woods RD, CHAPO, RN, the program's Clinical Coordinator.   TO REGISTER: Please contact the clinic via netTALK or call the nurse line directly at [phone] to inform our staff that you would like an invite sent to you and to let us know the email you would like the invite sent to. Prior to the meeting, a link with directions on how to join the meeting will be sent to you.    2023 and 2024 Meetings   December 20  January 17  February 21  March 20  April 17  May 15  Saba 19      [facility] Bariatric Care Support Group?  This is open to all pre- and post- operative bariatric surgery patients as well as their support system.   WHEN: This group meets the 3rd Tuesday of each month from 6:30 PM - 8:00 PM virtually using Microsoft Teams.   FACILITATOR: Led by Drew Farmer, Ph.D who is a Licensed Psychologist with the [facility] Comprehensive Weight Management Program.   TO REGISTER: Please send an email to Drew Farmer, Ph.D., LP at?[email or web address]?if you would like an invitation to the group.  "Prior to the meeting, a link with directions on how to join the meeting will be sent to you.    2023 and 2024 Meetings  December 19 January 16: \"Medication Management and Bariatric Surgery\", Catina Antoine, PharmD, Pharmacy Resident at [facility]  February 20: \"A Bariatric Surgery Patient's Perspective\", KASSIE Conner, [facility], Behavioral Health Clinician at [facility]  March 19  April 16  May 21  Saba 18: \"Nutritional Labeling\", Dietitian speaker to be announced.  November 19: \"Holiday Eating\", Dietitian speaker to be announced.    [facility] Post-Operative Bariatric Surgery Support Group  This is a support group for [facility] bariatric patients (and those external to [facility]) who have had bariatric surgery and are at least 3 months post-surgery.  WHEN: This support group meets the 4th Wednesday of the month from 11:00 AM - 12:00 PM virtually using Microsoft Teams.   FACILITATOR: Led by Certified Bariatric Nurse, Angela Benedict RN.   TO REGISTER: Please send an email to Angela at [email or web address] if you would like an invitation to the group.  Prior to the meeting, a link with directions on how to join the meeting will be sent to you.    2023 and 2024 Meetings  December 27  January 24  February 28  March 27  April 24  May 22  Saba 26    [facility] Healthy Lifestyle Group?  This is a 60 minute virtual coaching group for those who want to lead a healthier lifestyle. Come together to set goals and overcome barriers in a supportive group environment. We will address the four pillars of health: nutrition, exercise, sleep and emotional well-being.  This group is highly recommended for those who are participating in the 24 week Healthy Lifestyle Plan and our Health Coaching sessions.  WHEN: This group meets the 1st Friday of the month, 12:30 PM - 1:30 " "PM online, via a zoom meeting.    FACILITATOR: Led by National Board Certified Health and , Angela Saleem, [facility].   TO REGISTER: Please call the Call Center at [phone] to register. You will get an appointment to attend in [facility]. Fifteen minutes prior to the meeting, complete the e-check in and you will get the link to join the meeting.    There is no charge to attend this group and space is limited.     2023 and 2024 Meetings  December 1: \"Let's Talk\" (guided discussion on our wins and challenges)  January 5: \"New Years Vision: Manifest your Best 2024!\" (guided imagery,  journaling and discussion)  February 2: \"Let's Talk\"  March 1: \"10 Percent Happier\" by Zelalem Armstrong (Book Bites - a guided discussion on the nuggets of wisdom from favorite wellness books, no need to read the book but highly encouraged)  April 5: \"Let's Talk\"  May 3: \"Essentialism: The Disciplined Pursuit of Less\" by Giles Augustine (Book Bites discussion)  June 7: \"Let's Talk\"  July 5: NO MEETING, off for the 4th of July Holiday  August 2: \"The Blue Zones, Secrets for Living a Longer Life\" by Zelalem No (Book Bites discussion)        "

## 2023-12-27 ENCOUNTER — VIRTUAL VISIT (OUTPATIENT)
Dept: ENDOCRINOLOGY | Facility: CLINIC | Age: 33
End: 2023-12-27
Payer: COMMERCIAL

## 2023-12-27 VITALS — WEIGHT: 282 LBS | BODY MASS INDEX: 42.74 KG/M2 | HEIGHT: 68 IN

## 2023-12-27 DIAGNOSIS — E66.01 MORBID OBESITY (H): Primary | ICD-10-CM

## 2023-12-27 DIAGNOSIS — Z91.89 AT HIGH RISK FOR POSTOPERATIVE COMPLICATIONS: ICD-10-CM

## 2023-12-27 PROCEDURE — 99207 PR NO CHARGE NURSE ONLY: CPT | Mod: VID

## 2023-12-27 RX ORDER — HYOSCYAMINE SULFATE 0.125 MG
0.12 TABLET ORAL EVERY 4 HOURS PRN
Qty: 30 TABLET | Refills: 1 | Status: SHIPPED | OUTPATIENT
Start: 2023-12-27 | End: 2024-04-30

## 2023-12-27 RX ORDER — AMOXICILLIN 250 MG
2 CAPSULE ORAL DAILY PRN
Qty: 30 TABLET | Refills: 1 | Status: SHIPPED | OUTPATIENT
Start: 2023-12-27

## 2023-12-27 RX ORDER — ONDANSETRON 4 MG/1
4 TABLET, ORALLY DISINTEGRATING ORAL EVERY 6 HOURS PRN
Qty: 15 TABLET | Refills: 0 | Status: SHIPPED | OUTPATIENT
Start: 2023-12-27 | End: 2024-02-08

## 2023-12-27 ASSESSMENT — PAIN SCALES - GENERAL: PAINLEVEL: NO PAIN (0)

## 2023-12-27 NOTE — LETTER
12/27/2023       RE: Glenna Spears  [address]  [place] [address]     Dear Colleague,    Thank you for referring your patient, Glenna Spears, to the [facility] WEIGHT MANAGEMENT CLINIC [place] [facility]. Please see a copy of my visit note below.    PREOP NURSE VISIT     This patient is having laparoscopic gastric sleeve with Dr. Zelalem Smith.    The following handouts were reviewed with the patient:  Before Your Surgery, Patient Checklist, Weight Loss Surgery Pre-operative Class, Preop Recommendations Quick Reference Guide, History and Physical, Medications to Avoid, Shower or Bathing Before Surgery, Bowel Preparation, Powerful Choices, and [place] [facility] Directive.  Questions were addressed and understanding of content was verbalized.  Contact information was provided.    WEIGHT CHECK:  Patient goal weight: 330    Weight today: 282    Surgery Date and Time: 1/9/24 at 8:45 am   Call [phone] Iker Sebastian to confirm surgery date.     PAC Visit: 12/20/23  Call [phone] to schedule your pre-operative history and physical with our PAC clinic.    PROBLEM LIST:  Patient Active Problem List   Diagnosis    Moderate asthma    Hypovitaminosis D    Morbid obesity (H)         MEDICAL CONDITIONS REVIEW:  Diabetic? No.   Diabetics who are on medications instructed to monitor blood sugar levels closely after surgery and contact prescribing provider if levels run low as they may need their medications adjusted.    On high blood pressure medications? Yes.   Patients on high blood pressure medications instructed to monitor blood pressure levels closely after surgery and contact prescribing provider if pressure are running low as they may need their medications adjusted.    CURRENT MEDICATIONS:   Current Outpatient Medications   Medication Sig Dispense Refill    acetaminophen (TYLENOL) 325 MG tablet Take 325-650 mg by mouth every 4 hours as needed       amLODIPine (NORVASC) 10 MG tablet Take 1 tablet by mouth every evening      B Complex-C-Zn-Folic Acid (DIALYVITE/ZINC) TABS Take 1 tablet by mouth every evening      calcitRIOL (ROCALTROL) 0.5 MCG capsule Take 0.5 mcg by mouth every evening      cholecalciferol 25 MCG (1000 UT) TABS Take 2,000 Units by mouth every evening      cinacalcet (SENSIPAR) 60 MG tablet Take 90 mg by mouth every evening      famotidine (PEPCID) 20 MG tablet Take 20 mg by mouth as needed      gabapentin (NEURONTIN) 100 MG capsule Take 300 mg by mouth at bedtime      ipratropium - albuterol 0.5 mg/2.5 mg/3 mL (DUONEB) 0.5-2.5 (3) MG/3ML neb solution Inhale 3 mLs into the lungs as needed      labetalol (NORMODYNE) 200 MG tablet Take 200 mg by mouth every evening      levonorgestrel (KYLEENA) 19.5 MG IUD 1 Intra Uterine Device by Intrauterine route once      Semaglutide-Weight Management (WEGOVY) 2.4 MG/0.75ML pen Inject 2.4 mg Subcutaneous once a week (Patient taking differently: Inject 2.4 mg Subcutaneous once a week Sat) 3 mL 3    varenicline (CHANTIX) 0.5 MG tablet Take 0.5 mg by mouth daily      VELPHORO 500 MG CHEW chewable tablet Take 500 mg by mouth 3 times daily (with meals)      albuterol (PROAIR HFA/PROVENTIL HFA/VENTOLIN HFA) 108 (90 Base) MCG/ACT inhaler Inhale 2 puffs into the lungs as needed         Patient currently taking opioid/narcotic pain medications? No.    CURRENT ALLERGIES:     Allergies   Allergen Reactions    Sulfa Antibiotics Other (See Comments)     Reaction as an infant- unsure of what happened    Nsaids Other (See Comments)     Has nephrotic range proteinuria and see nephrology's note.        POSTOP MEDICATIONS:  The following postop medications were sent to the patient's pharmacy.  Patient was instructed to  medications before surgery and to have them at home for discharge.    OMEPRAZOLE (PRILOSEC), HYSCYAMINE (LEVSIN), ODANSETRON (ZOFRAN), and SENNA (SENOKOT)    Patient having dialysis day before  surgery.    LOGISITICS:  Patient lives greater than 3 hours from hospital?  No.  If patient lives greater than 3 hours away, is patient planning on staying in the area after surgery?  No.   Patients instructed to take breaks each hour on car ride home.  To be out of vehicle, stretch and walk for at least 10 minutes.  To do ankle pump exercises in car.      SUPPORT SYSTEM  Boyfriend will be helping patient with postop care.    PAPERWORK  FMLA/Time OFF:  Patient is anticipating taking 4 weeks off after surgery.    Patient instructed to have paperwork faxed to [phone] at the attention of the surgeon.    Bariatric Task List    Fax:  Please fax all paperwork to: [phone] -     Status:  Is patient a candidate for bariatric surgery?:  patient is a candidate for bariatric surgery -     Cleared to schedule surgeon consult?:  cleared to schedule surgeon consult - 3/23/23, 10/12/23 Dr Sarah curtis. bks   Status:  surgery evaluation in process -     Surgeon: Sarah -     Tentative surgery month/year: Jan or Feb 2024 -        Insurance: Insurance:  [employer] [place] - Will need 6 months of RD visits. bks    Contact insurance to discuss coverage: Completed -       C[employer]: PCP Recommendation and Medical Clearance:    -     HP Referral:    -      Advanced beneficiary notification (ABN) for Medicare patients for RD visits   and surgery:   -      Weight history:   -     Other:    -        Patient Info: Initial Weight:  330 -     Date of Initial Weight/Height:  1/23/2023 -     Goal Weight (lbs):  330 -     Required Weight Loss:  0 -     Surgery Type:  sleeve gastrectomy -     Multidisciplinary Meeting:    -        Dietician Visits: Structured weight loss required by insurance?:  structured weight loss required -     Dietician Visit 1:  Completed - 1/27/23 class. bks   Dietician Visit 2:  Completed - 2/27/23 bks   Dietician Visit 3:  Completed - 3/29/23 apptScot bks   Dietician Visit 4:  Completed - 4/26/23 bks   Dietician Visit 5:   Completed - 5/26/23 bks   Dietician Visit 6:  Needed - 6/23/23, 7/28/23, 9/22/23, 10/23/23, 11/30/23 appt, 12/22/23 appt. bks   Dietician Visit additional:  Needed - Monthly until surgery for weight loss and postop diet teaching. Call [phone] to schedule. bks   Clearance from dietician to see surgeon?:    -     Dietician Notes:  Dorcas Archer RD at [email or web address], ph: [phone]. Ok for Nepro and Boost Glucose Control for full liquids postop. 3/23/23 Current 48 oz/day fluid restriction. No protein restriction. bks -        Psychological Evaluation: Psych eval:  Completed - Donald Araujo 3/3/23 appt - pending. bks   Therapist letter of support:  Completed -     Psychiatrist letter of support:    -     Establish care with therapist:    -     Complete eating disorder evaluation:    -     Letter of clearance from therapist/eating disorder program:    -     Other:    -        Lab Work: Complete Blood Count:  Completed - 1/23/23 RR   Comprehensive Metabolic Panel:  Needed - 1/23/23 RR   Vitamin D:  Needed - 1/23/23 RR   PTH:  Needed - 1/23/23 RR; elevated PTH and phosphate, on a phosphate binder with meals. bks   Hgb A1c:  Needed - 1/23/23 RR    Lipids: Needed - 1/23/23 RR    TSH (UCARE, [place], [place]): Needed -       Ferritin:   -       Folate:   -       Testosterone, Total and Free:   -     Thiamine:   -     Vitamin A: Needed - need recheck   Vitamin B12: Needed -     Zinc:   -     C-peptide:   -     H. pylori:    -     MRSA (2 swabs, minimum 48 hours apart):   -     Nicotine Testing:  Completed - Quit: 1/23/23. bks   Recheck Vitamin D:   -     Other:    -        Consults/ Clearance Sleep Medicine:    -     Cardiac:    -     Pain:   -     Dental:    -     Endocrine:    -     Gastroenterology:    -     Vascular Medicine:    -     Hematology:    -     Medical Weight Management:   -     Physical Therapy/Exercise:    -     Nephrology:  Completed - letter sent to patient RR; Dr Mar,  "[facility].nurse Allyson Yoder ph: [phone]. 3/23/23 pending. bks   Neurology:    -     Pulmonology:    -     Rheumatology:    -     Other:    -     Other:    -     Other:    -        Testing: UGI:    -     EGD:    -     Sleep Study:   -     Other:   -     Other:    -        PCP: Establish care with PCP:  Completed -     Follow up with PCP:    -     PCP letter of support:  Completed - letter sent to patient RR; Support in 2/8/23 clinic note. Yaya Solares MD. [facility]. bks      Health Maintenance: Colonoscopy(> 50 yrs or family hx):    -     Mammogram (> 40 yrs or family hx):    -     Pap Smear (women):   -     Other:   -     Other:    -        Stopping Smoking/ Alcohol Use/Cannabis Use: Quit tobacco use (3 months smoke free)?:  Completed - smoking cessation information sent RR   Quit date:  1/23/2023 -     Quit alcohol use:   -     Quit date:   -     Other:   -     Quit date:   -        Patient Education:  Information Session:  Needed - 1/24/23 RR;[email or web address]. bks   Attended New Consult Class?: Completed - 2/7/23 bks   Given \"Making your decision\" handout?:  Yes - 1/24/23 RR   Given \"A Roadmap to you Weight Loss Surgery\" handout?: Yes - 1/24/23 RR   Given \"Epic Care Companion\" information?: Yes -     Attended support group?:  Needed -     Support plan in place?:  Completed -     Research consents signed?:    -     Avoid NSAIDS/ Alternate Plan for Pain:   -        Additional Surgery Requirements: Review Coag plan:    -     HgA1c <8:    -     Inpatient pain consult:    -     Final nicotine screen:    -     Dental work complete:    -     Birth control plan:    -     Gallstone prevention plan (Actigall for 6 months postop):   -     Other: Call Center to schedule the 1 week and 31+ days postop appts. herminia -     Other:   -        Final Tasks:  Before surgery online preop class:  Needed -     After surgery online class: " "   -     Nurse visit for information:  Needed -     Weight Check: Needed -     History and Physical: Pre-Assessment Clinic (PAC) -   Needed -     Final labs per clinic: Needed -     See MTM Pharmacist for medication review and plan for after surgery (if DM, transplant hx, greater than 10 meds): Needed -     Chest xray per clinic:   -     Electrocardiogram (ECG) per clinic:   -     Schedule postop appointments: Needed -     Other:   -   -        Notes: Please register for the Weight Loss Surgery Care Companion Pathway through the [employer] mobile char or via web browser. You register by answering \"yes\" to the following question, \"Do you agree to take part in this free, online program?\"  Information from this Pathway can help you be more successful before surgery and for up to one year after surgery.  This Pathway through [employer] can also answer questions you may have about your surgery.   The Pathway will start when you get your Tasklist after your initial consultation or when your surgery is scheduled.      -               Again, thank you for allowing me to participate in the care of your patient.      Sincerely,    Gris Mobley RN    "

## 2023-12-27 NOTE — PROGRESS NOTES
PREOP NURSE VISIT     This patient is having laparoscopic gastric sleeve with Dr. Zelalem Smith.    The following handouts were reviewed with the patient:  Before Your Surgery, Patient Checklist, Weight Loss Surgery Pre-operative Class, Preop Recommendations Quick Reference Guide, History and Physical, Medications to Avoid, Shower or Bathing Before Surgery, Bowel Preparation, Powerful Choices, and [place] [employer] [facility] Directive.  Questions were addressed and understanding of content was verbalized.  Contact information was provided.    WEIGHT CHECK:  Patient goal weight: 330    Weight today: 282    Surgery Date and Time: 1/9/24 at 8:45 am   Call [phone] Iker Sebastian to confirm surgery date.     PAC Visit: 12/20/23  Call [phone] to schedule your pre-operative history and physical with our PAC clinic.    PROBLEM LIST:  Patient Active Problem List   Diagnosis    Moderate asthma    Hypovitaminosis D    Morbid obesity (H)         MEDICAL CONDITIONS REVIEW:  Diabetic? No.   Diabetics who are on medications instructed to monitor blood sugar levels closely after surgery and contact prescribing provider if levels run low as they may need their medications adjusted.    On high blood pressure medications? Yes.   Patients on high blood pressure medications instructed to monitor blood pressure levels closely after surgery and contact prescribing provider if pressure are running low as they may need their medications adjusted.    CURRENT MEDICATIONS:   Current Outpatient Medications   Medication Sig Dispense Refill    acetaminophen (TYLENOL) 325 MG tablet Take 325-650 mg by mouth every 4 hours as needed      amLODIPine (NORVASC) 10 MG tablet Take 1 tablet by mouth every evening      B Complex-C-Zn-Folic Acid (DIALYVITE/ZINC) TABS Take 1 tablet by mouth every evening      calcitRIOL (ROCALTROL) 0.5 MCG capsule Take 0.5 mcg by mouth every evening      cholecalciferol 25 MCG (1000 UT) TABS Take 2,000 Units by mouth  every evening      cinacalcet (SENSIPAR) 60 MG tablet Take 90 mg by mouth every evening      famotidine (PEPCID) 20 MG tablet Take 20 mg by mouth as needed      gabapentin (NEURONTIN) 100 MG capsule Take 300 mg by mouth at bedtime      ipratropium - albuterol 0.5 mg/2.5 mg/3 mL (DUONEB) 0.5-2.5 (3) MG/3ML neb solution Inhale 3 mLs into the lungs as needed      labetalol (NORMODYNE) 200 MG tablet Take 200 mg by mouth every evening      levonorgestrel (KYLEENA) 19.5 MG IUD 1 Intra Uterine Device by Intrauterine route once      Semaglutide-Weight Management (WEGOVY) 2.4 MG/0.75ML pen Inject 2.4 mg Subcutaneous once a week (Patient taking differently: Inject 2.4 mg Subcutaneous once a week Sat) 3 mL 3    varenicline (CHANTIX) 0.5 MG tablet Take 0.5 mg by mouth daily      VELPHORO 500 MG CHEW chewable tablet Take 500 mg by mouth 3 times daily (with meals)      albuterol (PROAIR HFA/PROVENTIL HFA/VENTOLIN HFA) 108 (90 Base) MCG/ACT inhaler Inhale 2 puffs into the lungs as needed         Patient currently taking opioid/narcotic pain medications? No.    CURRENT ALLERGIES:     Allergies   Allergen Reactions    Sulfa Antibiotics Other (See Comments)     Reaction as an infant- unsure of what happened    Nsaids Other (See Comments)     Has nephrotic range proteinuria and see nephrology's note.        POSTOP MEDICATIONS:  The following postop medications were sent to the patient's pharmacy.  Patient was instructed to  medications before surgery and to have them at home for discharge.    OMEPRAZOLE (PRILOSEC), HYSCYAMINE (LEVSIN), ODANSETRON (ZOFRAN), and SENNA (SENOKOT)    Patient having dialysis day before surgery.    LOGISITICS:  Patient lives greater than 3 hours from hospital?  No.  If patient lives greater than 3 hours away, is patient planning on staying in the area after surgery?  No.   Patients instructed to take breaks each hour on car ride home.  To be out of vehicle, stretch and walk for at least 10 minutes.   To do ankle pump exercises in car.      SUPPORT SYSTEM  Boyfriend will be helping patient with postop care.    PAPERWORK  FMLA/Time OFF:  Patient is anticipating taking 4 weeks off after surgery.    Patient instructed to have paperwork faxed to [phone] at the attention of the surgeon.    Bariatric Task List    Fax:  Please fax all paperwork to: [phone] -     Status:  Is patient a candidate for bariatric surgery?:  patient is a candidate for bariatric surgery -     Cleared to schedule surgeon consult?:  cleared to schedule surgeon consult - 3/23/23, 10/12/23 Dr Sarah curtis. bks   Status:  surgery evaluation in process -     Surgeon: Sarah -     Tentative surgery month/year: Jan or Feb 2024 -        Insurance: Insurance:  [employer] [place] - Will need 6 months of RD visits. bks    Contact insurance to discuss coverage: Completed -       [employer]: PCP Recommendation and Medical Clearance:    -     HP Referral:    -      Advanced beneficiary notification (ABN) for Medicare patients for RD visits   and surgery:   -      Weight history:   -     Other:    -        Patient Info: Initial Weight:  330 -     Date of Initial Weight/Height:  1/23/2023 -     Goal Weight (lbs):  330 -     Required Weight Loss:  0 -     Surgery Type:  sleeve gastrectomy -     Multidisciplinary Meeting:    -        Dietician Visits: Structured weight loss required by insurance?:  structured weight loss required -     Dietician Visit 1:  Completed - 1/27/23 class. bks   Dietician Visit 2:  Completed - 2/27/23 bks   Dietician Visit 3:  Completed - 3/29/23 appt. bks   Dietician Visit 4:  Completed - 4/26/23 bks   Dietician Visit 5:  Completed - 5/26/23 bks   Dietician Visit 6:  Needed - 6/23/23, 7/28/23, 9/22/23, 10/23/23, 11/30/23 appt, 12/22/23 appt. bks   Dietician Visit additional:  Needed - Monthly until surgery for weight loss and postop diet teaching. Call [phone] to schedule. bks   Clearance from dietician to see surgeon?:    -      Dietician Notes:  Dorcas Archer RD at [email or web address], ph: [phone]. Ok for Nepro and Boost Glucose Control for full liquids postop. 3/23/23 Current 48 oz/day fluid restriction. No protein restriction. bks -        Psychological Evaluation: Psych eval:  Completed - Donald Araujo 3/3/23 appt - pending. bks   Therapist letter of support:  Completed -     Psychiatrist letter of support:    -     Establish care with therapist:    -     Complete eating disorder evaluation:    -     Letter of clearance from therapist/eating disorder program:    -     Other:    -        Lab Work: Complete Blood Count:  Completed - 1/23/23 RR   Comprehensive Metabolic Panel:  Needed - 1/23/23 RR   Vitamin D:  Needed - 1/23/23 RR   PTH:  Needed - 1/23/23 RR; elevated PTH and phosphate, on a phosphate binder with meals. bks   Hgb A1c:  Needed - 1/23/23 RR    Lipids: Needed - 1/23/23 RR    TSH (UCARE, [facility], [place]): Needed -       Ferritin:   -       Folate:   -       Testosterone, Total and Free:   -     Thiamine:   -     Vitamin A: Needed - need recheck   Vitamin B12: Needed -     Zinc:   -     C-peptide:   -     H. pylori:    -     MRSA (2 swabs, minimum 48 hours apart):   -     Nicotine Testing:  Completed - Quit: 1/23/23. bks   Recheck Vitamin D:   -     Other:    -        Consults/ Clearance Sleep Medicine:    -     Cardiac:    -     Pain:   -     Dental:    -     Endocrine:    -     Gastroenterology:    -     Vascular Medicine:    -     Hematology:    -     Medical Weight Management:   -     Physical Therapy/Exercise:    -     Nephrology:  Completed - letter sent to patient RR; Dr Mar, [facility].nurse Allyson Yoder ph: [phone]. 3/23/23 pending. bks   Neurology:    -     Pulmonology:    -     Rheumatology:    -     Other:    -     Other:    -     Other:    -        Testing: UGI:    -     EGD:    -     Sleep Study:   -     Other:   -     Other:    -        PCP: Establish care  "with PCP:  Completed -     Follow up with PCP:    -     PCP letter of support:  Completed - letter sent to patient RR; Support in 2/8/23 clinic note. Yaya Solares MD. [facility]. bks      Health Maintenance: Colonoscopy(> 50 yrs or family hx):    -     Mammogram (> 40 yrs or family hx):    -     Pap Smear (women):   -     Other:   -     Other:    -        Stopping Smoking/ Alcohol Use/Cannabis Use: Quit tobacco use (3 months smoke free)?:  Completed - smoking cessation information sent RR   Quit date:  1/23/2023 -     Quit alcohol use:   -     Quit date:   -     Other:   -     Quit date:   -        Patient Education:  Information Session:  Needed - 1/24/23 RR;[email or web address]. bks   Attended New Consult Class?: Completed - 2/7/23 bks   Given \"Making your decision\" handout?:  Yes - 1/24/23 RR   Given \"A Roadmap to you Weight Loss Surgery\" handout?: Yes - 1/24/23 RR   Given \"Epic Care Companion\" information?: Yes -     Attended support group?:  Needed -     Support plan in place?:  Completed -     Research consents signed?:    -     Avoid NSAIDS/ Alternate Plan for Pain:   -        Additional Surgery Requirements: Review Coag plan:    -     HgA1c <8:    -     Inpatient pain consult:    -     Final nicotine screen:    -     Dental work complete:    -     Birth control plan:    -     Gallstone prevention plan (Actigall for 6 months postop):   -     Other: Call Center to schedule the 1 week and 31+ days postop [facility] -     Other:   -        Final Tasks:  Before surgery online preop class:  Needed -     After surgery online class:    -     Nurse visit for information:  Needed -     Weight Check: Needed -     History and Physical: Pre-Assessment Clinic (PAC) -   Needed -     Final labs per clinic: Needed -     See MTM Pharmacist for medication review and plan for after surgery (if DM, transplant hx, greater than 10 meds): Needed -     Chest " "xray per clinic:   -     Electrocardiogram (ECG) per clinic:   -     Schedule postop appointments: Needed -     Other:   -   -        Notes: Please register for the Weight Loss Surgery Care Companion Pathway through the [employer] mobile char or via web browser. You register by answering \"yes\" to the following question, \"Do you agree to take part in this free, online program?\"  Information from this Pathway can help you be more successful before surgery and for up to one year after surgery.  This Pathway through [employer] can also answer questions you may have about your surgery.   The Pathway will start when you get your Tasklist after your initial consultation or when your surgery is scheduled.      -             "

## 2023-12-27 NOTE — PATIENT INSTRUCTIONS
Below is a recap of our conversation regarding your upcoming Sleeve Gastrectomy on 1/9/24  at 8:45 AM with Dr. Zelalem Smith.  You should receive a call from the hospital surgery department 1-2 days before surgery confirming you surgery time and arrival time.  You should arrive at the hospital 2 hours prior to your surgery time.    SURGERY CANCELLATION  If something occurs in which you need to cancel your surgery, please contact [facility] at [phone], as soon as possible.      BEFORE SURGERY    MEDICATIONS TO STOP BEFORE SURGERY  ASPIRIN  - stop 7 days before surgery.  BLOOD THINNERS - Need a bridging plan with your prescribing provider.  NSAIDS - stop 7 days before surgery and do not restart after surgery.  CHRONIC NARCOTIC PAIN MEDICATION  - need a plan with pain provider and surgeon.  HERBAL SUPPLEMENTS - Stop 7 days before surgery.  VITAMINS - Stop 7 days before surgery.    ANTIOBESITY MEDICATIONS TO STOP BEFORE SURGERY  TOPIRAMATE - take the day of surgery in the morning.  NALTREXONE - Stop 4 days before surgery. Do not restart.  CONTRAVE - Taper with 1 tablet twice a day for 1 week, then stop 4 days before surgery.  Do not restart.  QSYMIA (7.5 mg/46 mg) - Stop 7 days before surgery.  If on a larger dose, will need a plan for tapering.  Do not restart.  PHENTERMINE - Stop 7 days before surgery.  Do not restart.  GLP-1 (Victoza, Saxenda)  - Last dose the day before surgery.  Do not restart.  GLP-1 (Ozempic, Wegovy, Trulicity, Mounjaro) - Last dose no later than 1 week before surgery.  Do not restart.  METFORMIN - Last dose the day before surgery.  Do not restart.    MEDICATION REVIEW  FIRST 4 WEEKS POSTOP - Medications should be liquid, chewable, crushed or pills smaller than 1/4 inch.    AFTER 4 WEEKS - Take small pills or cut up larger pills to be smaller than 1/2 inch.  Do not crush or open medications that are long acting or extended relief. Check with your prescribing provider to see if there is a different  form or option.    DAY BEFORE YOUR SURGERY  Starting in the morning, follow a clear liquid diet.  Stay away from red liquids and liquids with pulp.  Ensure you are well hydrated all day!  Strive for at least 64 ounces.  Nothing after midnight except water!  You may have sips of water up to 3 hours before your surgery.   Take your medications as directed from the PAC clinic.  You may have approved medications up to 3 hours before your surgery time.    SHOWER  You will take a shower the night before surgery and a shower the morning of surgery.  Follow instructions for pre-op shower using the required soap (4% CHG,    Hibiclens, Exidine, chlorhexidine).  Let the soap sit on your skin for a minute and then rinse.  After your evening shower, dry off with a clean towel, put on clean pajamas and get into a bed with clean sheets.  After your morning shower, dry off with a clean towel and put on clean comfortable clothes.  The soap can be very drying.  Do not put any deodorant, lotion or powder on after your shower.    TRANSPORTATION & POSTOP CARE  You will need a  to take you to the hospital the day of surgery.  You will need a  to pick you up from the hospital the day of discharge (usually the afternoon/evening the day after surgery).  You will need someone to stay with you for the first couple of days after surgery.  If you live greater than an hour from the hospital, you will need to stop every 50-60 minutes to get out of the car and walk around.         PRESCRIPTIONS FROM YOUR PHARMACY:   Please plan to pick these up before surgery and have them at home for when you are discharged. Do not start taking them until after surgery. Do not bring them to the hospital with you!    Prilosec (omeprazole) OR Alternative:   This medication is for control of stomach acid.  You will take this medication daily for the first three months after surgery.  TO TAKE: Open the capsule and put the beads in applesauce.  Take  every morning.  If you are currently on a medication for GERD or Reflux, you will just continue that medication.    Levsin (hyoscyamine) or Alternative:   This medication is to help control spasms/cramping in the stomach and intestines.    Take one tablet every 4 hours as needed for cramping.    It may be helpful to take in the morning before taking any other medications.    Zofran (ondansetron) or Alternative:    This medication is for nausea.    To Take: Place one tablet on the tongue and let it dissolve.  You can take this every 6 hours, as needed.    Senna:   This medication is a stool softener:  Take as directed to help avoid constipation.  It is important to take this medication if you are taking a narcotic pain medicine as the pain medication can be constipating.    If you experience diarrhea, please stop taking the Senna.      IN THE HOSPITAL  Use your Incentive Spirometer  Get up. You should be up walking at least 3 times (or more) each day while in the hospital.  Trips to the bathroom are great but they are not a long enough distance.  Ask for an abdominal binder for extra abdominal support.  Wear your abdominal as needed, for comfort.  Sip through your fluids!  Frequent sips - about 1 tsp every couple of minutes.  Drinking more than that at a time can contribute to pain and cramping.   Ask for some medicine cups to take home to help you measure your fluids.  Remember to take your abdominal binder and incentive spirometer home with you when you are discharged!!      AFTER SURGERY    REGULARLY SCHEDULED MEDICATIONS    Depending on the size and number of medications you take, you may need to space/change the time you take your medications, so you do not overfill your stomach.  Make sure you follow-up with your primary provider to make medication changes needed.  DIABETES: If you have diabetes, monitor your blood sugars more frequently after surgery.  Your blood sugars may normalize quickly.  Please contact  your prescribing provider if you need your medication adjusted.  HIGH BLOOD PRESSURE: If you have high blood pressure, monitor your blood pressure after surgery.  Your blood pressure may normalize quickly.  Please contact your prescribing provider if you need your medication adjusted.      POSTOPERATIVE MEDICATIONS  Take your postop medications as prescribed.  You can start a chewable Multivitamin when you get home.  Do not start any additional vitamins until you meet with your provider and dietician to receive further instructions.        PAIN CONTROL  Your pain medication prescription at discharge is a different dosage and timing than what you were taking in the hospital!  Follow the new directions.Take your pain medicine as needed, as directed.  Start with the minimal amount prescribed and supplement with Tylenol or Extra Strength Tylenol.  Take the minimal amount as directed for severe pain. The pain medicine can cause constipation.  Do not drive while taking narcotic pain medication.    For mild to moderate pain, you can take Tylenol or Extra Strength Tylenol per the bottle instructions.  DO NOT TAKE NSAIDS: IBUPROFEN, ASPIRIN OR NAPROXEN.  Change positions frequently.  Walking around for 10-15 minutes once an hour will also help.ICE: Use an ice pack on the incisions for the first 24 - 48 hours:  Use a barrier between the ice pack and the skin.  Do not leave the ice on longer than 20 minutes at each time.    HEAT: You may also try a heating pad on your abdomen to help soothe cramping.  Use a barrier between the heating pad and your skin.  Do not leave on longer than 20 minutes at each time.  Wear your abdominal binder as needed.  You will need someone to stay with you for the first 1-2 days after surgery, especially if you are taking prescription pain medicine.  Please share the information regarding fluid intake and activity with your caregiver so they can help support you in your efforts.  No driving until  you have been off narcotic pain medications for at least 24 hours.    DIET  For Dr. Smith Patients:  You will be following the Stage 2 (Full Liquid Diet) - upon discharge.  Ensure you have a variety of proper full liquids at home to support you in taking in the proper nutrition.  Please refer to the Stage 2 - Full Liquid diet handout provided by the Dietician.  Stage 3 (Pureed) Diet Start Date: 1/23/24  Stage 4 (Soft Foods) Diet Start Date: 2/6/24  Stage 5 (Regular) Diet Start Date: 3/6/24     HYDRATION  Your goal is 48 to 64 ounces each day (4-6 ounces each hour).  This amount will help prevent dehydration.    It is important to measure and keep a tally sheet of your intake for the first month after surgery.  Keep your tally sheet next to you and eva each time you drink one ounce of fluid.  You should track your fluids for the first month after surgery and if you are ill.  All fluids count in your fluid intake!  Keep a water bottle or thermal bottle by your bed.  Drink a little before going to sleep, if you wake in the middle of the night, and in the morning before getting out of bed.  Keep an eye on your urine.  It is a good indicator of your hydration status.  Your urine should be clear yellow or clear light yellow.    You will learn about advancing to a pureed diet at your first postop Dietician appointment.     BOWELS/CONSTIPATION   Movement is important to keep your bowels working.  Get up and walk at least each hour while you are awake.  It is not unusual to not have a bowel movement for a few days after surgery.  You should be passing gas each day.   Keep taking the SENNA until you have had a regular bowel movement and are off the narcotic pain medication.   If you haven't had a bowel movement by the 4th day after surgery, take one dose of Miralax (according to package directions).  Repeat dose if no results.  If you still don't have any results, use a Fleet Enema.  If still no results, call your  provider's office.   It is normal to have a little blood in your first couple of bowel movements.  If the blood continues, please contact our office.  If you are having gas pains and bloating, you can try Gas-X.    BREATHING EXERCISES  Use your incentive spirometer each hour while awake.  Sitting up or standing, take 5 - 10 slow, deep breaths each time, focusing on expanding your lungs.  This should be done for the first couple of weeks after surgery.  These exercises will help eliminate gases from your system (anesthesia and surgical).      ACTIVITY & EXERCISE  Get up and walk around each hour while you are awake - at least 10 minutes.  Walking will help with circulation, bowel regulation and will help you feel better.  Do not lift anything greater than 10-15 lb for the first 4 weeks.  The only exercise you can start right after surgery is WALKING.  Walking is good for the gut, the circulation and your breathing!   Seated Exercises for Arms and Legs (can be done before or after surgery) [email or web address]  Exercise Guidelines after Weight Loss Surgery (1st 4-6 weeks): [email or web address]  Exercises after Weight Loss Surgery (strengthening, when no weightlifting restrictions - after 4-6 weeks) :  [email or web address]       WOUND CARE  You will have steri-strips over your incision after surgery. They will fall off over the first 7-10 days after surgery.  If the steri strips fall off before your incisions are healed, replace them with a bandaid to pull the incision together.   You may have bruising around your wounds. This is normal. It will go away on its own. The skin around your incisions may be a little red. This is normal, too.   Showering: you may shower the day you get home unless your surgeon tells you differently.  Wash gently around incisions with warm soapy water, rinse well, and gently pat dry.    DO NOT wear tight clothing that rubs against your incisions while  they heal.   DO NOT soak in a bathtub, swimming pool, or hot tub until your incisions are healed completely, usually around 7-14 days. No tub baths or swimming until all incisions are healed.    CARE COMPANION  Track your fluid intake!  Report total fluids, not just water intake.  Reminders to set up follow up appointments.  Notifications of frequently asked questions.  Please use [employer]Customer for any concerns regarding your health.    FOLLOW-UP CALL  You will receive a post-op phone call two to three days after surgery to check in and see how you are doing (If your surgery is on a Friday, your phone call will be on the Monday following your surgery).    You can always send us a message via LocalCustomer if you have any questions or concerns.      CALL YOUR PROVIDER IF:      You have more redness, pain, warmth, swelling, or bleeding around your incision.     The wound is larger or deeper or looks dark or dried out.     The drainage from your incision does not decrease in 3 to 5 days or increases.     The drainage becomes thick, tan or yellow and has a bad smell (pus).     Your temperature is above 100 F (37.7 C) for more than 4 hours.     You have pain that your pain medicine is not helping.     You have chest and/or belly pain.     You have trouble breathing.     You have a cough that does not go away.     You cannot drink or eat.     You have a fast heartbeat.     You are dizzy or lightheaded.     You are not passing gas and have not had a bowel movement after following instructions above.     Your stools are loose, or you have diarrhea.     You are vomiting after eating.         Please let us know if you have any additional questions.    Sincerely,    Gris Mobley RN, BSN  RN Care Coordinator  Bariatric Surgery and Comprehensive Weight Management  Phone: [phone]

## 2023-12-27 NOTE — NURSING NOTE
Is the patient currently in the state of [place]? YES    Visit mode:VIDEO    If the visit is dropped, the patient can be reconnected by: VIDEO VISIT: Text to cell phone:   Telephone Information:   Mobile [phone]       Will anyone else be joining the visit? NO  (If patient encounters technical issues they should call [phone]    How would you like to obtain your AVS? MyChart    Are changes needed to the allergy or medication list? No, Pt stated no changes to allergies, and Pt stated no med changes    Reason for visit: Nurse Visit    [place]

## 2023-12-29 ENCOUNTER — TELEPHONE (OUTPATIENT)
Dept: ENDOCRINOLOGY | Facility: CLINIC | Age: 33
End: 2023-12-29
Payer: COMMERCIAL

## 2023-12-29 NOTE — TELEPHONE ENCOUNTER
Divya Eden informed Pt. that she needed to schedule an MTM visit with Bhakti Walker and that Bhakti had open spots next week. The first available appointment with either Lauren Bloch Turner or Bhakti Walker is January 16th and Pt.'s surgery date is January 9th. Please advise Pt.. [phone].

## 2024-01-04 ENCOUNTER — TELEPHONE (OUTPATIENT)
Dept: ENDOCRINOLOGY | Facility: CLINIC | Age: 34
End: 2024-01-04
Payer: COMMERCIAL

## 2024-01-04 NOTE — TELEPHONE ENCOUNTER
Left VM/ sent myc msg 1/4    brandyn INA Chilelen is having a sleeve gastrectomy with    Dr Smith on 1/23/24 (this is a change from 1/9/24)       Please call to RE-schedule:      POSTOP APPTS     ___Post-op appointments for 1 week, 31+ days and 3 months after surgery with their dietitian.   ___Post-op appointments for 1 week, 31+ days and 3 months after surgery with a provider.

## 2024-01-10 ENCOUNTER — VIRTUAL VISIT (OUTPATIENT)
Dept: SURGERY | Facility: CLINIC | Age: 34
End: 2024-01-10
Payer: COMMERCIAL

## 2024-01-10 ENCOUNTER — PRE VISIT (OUTPATIENT)
Dept: SURGERY | Facility: CLINIC | Age: 34
End: 2024-01-10

## 2024-01-10 DIAGNOSIS — E66.01 MORBID OBESITY (H): ICD-10-CM

## 2024-01-10 DIAGNOSIS — Z01.818 PRE-OP EVALUATION: Primary | ICD-10-CM

## 2024-01-10 PROCEDURE — 99214 OFFICE O/P EST MOD 30 MIN: CPT | Mod: 95 | Performed by: PHYSICIAN ASSISTANT

## 2024-01-10 RX ORDER — APREPITANT 40 MG/1
40 CAPSULE ORAL ONCE
Status: CANCELLED | OUTPATIENT
Start: 2024-01-10 | End: 2024-01-10

## 2024-01-10 ASSESSMENT — LIFESTYLE VARIABLES: TOBACCO_USE: 1

## 2024-01-10 NOTE — PROGRESS NOTES
Glenna is a 33 year old who is being evaluated via a billable video visit.      How would you like to obtain your AVS? Joe Laurent LPN

## 2024-01-10 NOTE — H&P
Pre-Operative H & P     CC:  Preoperative exam to assess for increased cardiopulmonary risk while undergoing surgery and anesthesia.    Date of Encounter: 1/10/2024  Primary Care Physician:  No Ref-Primary, Physician     Reason for visit:   Encounter Diagnoses   Name Primary?    Pre-op evaluation Yes    Morbid obesity (H)        HPI  Glenna Spears is a 33 year old female who presents for pre-operative H & P in preparation for  Procedure Information       Case: [id number] Date/Time: 24 0840    Procedures:       GASTRECTOMY, SLEEVE, LAPAROSCOPIC (Abdomen)      possible HERNIORRHAPHY, HIATAL, LAPAROSCOPIC (Abdomen)    Anesthesia type: General    Diagnosis: Morbid obesity (H) [E66.01]    Pre-op diagnosis: Morbid obesity (H) [E66.01]    Location:  OR [address] [place] [address] OR    Providers: John Smith MD            Patient is being evaluated for comorbid conditions of HTN, asthma, GERD, and ESRD on dialysis.    She is working toward future kidney transplant, but needs to lose weight before proceeding. She has been working with the bariatric clinic and the above surgery has been recommended for further management.     History is obtained from the patient and chart review    Hx of abnormal bleeding or anti-platelet use: NOne    Menstrual history: No LMP recorded. (Menstrual status: IUD).     Past Medical History  Past Medical History:   Diagnosis Date    ESRD (end stage renal disease) (H)     GERD (gastroesophageal reflux disease)     Hypertension     Obesity        Past Surgical History  Past Surgical History:   Procedure Laterality Date    AV FISTULA REPAIR       SECTION         Prior to Admission Medications  Current Outpatient Medications   Medication Sig Dispense Refill    acetaminophen (TYLENOL) 325 MG tablet Take 325-650 mg by mouth every 4 hours as needed      albuterol (PROAIR HFA/PROVENTIL HFA/VENTOLIN HFA) 108 (90 Base) MCG/ACT inhaler Inhale 2 puffs into the lungs as needed      amLODIPine (NORVASC)  10 MG tablet Take 1 tablet by mouth every evening      B Complex-C-Zn-Folic Acid (DIALYVITE/ZINC) TABS Take 1 tablet by mouth every evening      calcitRIOL (ROCALTROL) 0.5 MCG capsule Take 0.5 mcg by mouth every evening      cholecalciferol 25 MCG (1000 UT) TABS Take 2,000 Units by mouth every evening      cinacalcet (SENSIPAR) 60 MG tablet Take 90 mg by mouth every evening      famotidine (PEPCID) 20 MG tablet Take 20 mg by mouth as needed      gabapentin (NEURONTIN) 100 MG capsule Take 300 mg by mouth at bedtime      hyoscyamine (LEVSIN) 0.125 MG tablet Take 1 tablet (125 mcg) by mouth every 4 hours as needed for cramping (Patient taking differently: Take 0.125 mg by mouth every 4 hours as needed for cramping Post-op) 30 tablet 1    ipratropium - albuterol 0.5 mg/2.5 mg/3 mL (DUONEB) 0.5-2.5 (3) MG/3ML neb solution Inhale 3 mLs into the lungs as needed      labetalol (NORMODYNE) 200 MG tablet Take 200 mg by mouth every evening      levonorgestrel (KYLEENA) 19.5 MG IUD 1 Intra Uterine Device by Intrauterine route once      Semaglutide-Weight Management (WEGOVY) 2.4 MG/0.75ML pen Inject 2.4 mg Subcutaneous once a week (Patient taking differently: Inject 2.4 mg Subcutaneous once a week Sat) 3 mL 3    varenicline (CHANTIX) 0.5 MG tablet Take 0.5 mg by mouth daily      VELPHORO 500 MG CHEW chewable tablet Take 500 mg by mouth 3 times daily (with meals)      omeprazole (PRILOSEC) 20 MG DR capsule Take 1 capsule (20 mg) by mouth daily (Patient taking differently: Take 20 mg by mouth daily Post -op) 30 capsule 3    ondansetron (ZOFRAN ODT) 4 MG ODT tab Take 1 tablet (4 mg) by mouth every 6 hours as needed for nausea (Patient taking differently: Take 4 mg by mouth every 6 hours as needed for nausea Post-op) 15 tablet 0    senna-docusate (SENOKOT-S/PERICOLACE) 8.6-50 MG tablet Take 2 tablets by mouth daily as needed for constipation (While taking narcotic pain medications.  Stop taking if having loose stools.) (Patient  taking differently: Take 2 tablets by mouth daily as needed for constipation (While taking narcotic pain medications.  Stop taking if having loose stools.) Post-op) 30 tablet 1       Allergies  Allergies   Allergen Reactions    Sulfa Antibiotics Other (See Comments)     Reaction as an infant- unsure of what happened    Nsaids Other (See Comments)     Has nephrotic range proteinuria and see nephrology's note.        Social History  Social History     Socioeconomic History    Marital status: Single     Spouse name: Not on file    Number of children: Not on file    Years of education: Not on file    Highest education level: Not on file   Occupational History    Not on file   Tobacco Use    Smoking status: Former     Types: Cigarettes     Quit date: 2023     Years since quittin.9     Passive exposure: Past    Smokeless tobacco: Never    Tobacco comments:     quit   Vaping Use    Vaping Use: Never used   Substance and Sexual Activity    Alcohol use: Not Currently    Drug use: Not Currently     Types: Marijuana    Sexual activity: Not on file   Other Topics Concern    Not on file   Social History Narrative    Not on file     Social Determinants of Health     Financial Resource Strain: Not on file   Food Insecurity: Not on file   Transportation Needs: Not on file   Physical Activity: Not on file   Stress: Not on file   Social Connections: Not on file   Interpersonal Safety: Not on file   Housing Stability: Not on file       Family History  Family History   Problem Relation Age of Onset    Anesthesia Reaction No family hx of     Deep Vein Thrombosis (DVT) No family hx of        Review of Systems  The complete review of systems is negative other than noted in the HPI or here.   Anesthesia Evaluation   Pt has had prior anesthetic.     No history of anesthetic complications       ROS/MED HX  ENT/Pulmonary:     (+)     STEFAN risk factors, snores loudly, hypertension, obese,        tobacco use, Past use,    Intermittent,  asthma  Treatment: Inhaler prn,              (-) recent URI   Neurologic:  - neg neurologic ROS     Cardiovascular:     (+)  hypertension- -   -  - -                                 Previous cardiac testing   Echo: Date: Results:    Stress Test:  Date: Results:    ECG Reviewed:  Date: 1/2023 Results:  Sinus rhythm with occasional Premature ventricular complexes   Otherwise normal ECG   Cath:  Date: Results:   (-) taking anticoagulants/antiplatelets   METS/Exercise Tolerance: 4 - Raking leaves, gardening Comment: Does not purposefully exercise but states that she is able to walk a few blocks and can ascend a flight of stairs without exertional symptoms.    Hematologic:  - neg hematologic  ROS     Musculoskeletal:  - neg musculoskeletal ROS     GI/Hepatic:  - neg GI/hepatic ROS   (+) GERD, Asymptomatic on medication,               (-) liver disease   Renal/Genitourinary:     (+) renal disease, type: ESRD, Pt requires dialysis, type: Hemodialysis,          Endo:     (+)               Obesity (BMI 42),    (-) Type II DM, thyroid disease and chronic steroid usage   Psychiatric/Substance Use:  - neg psychiatric ROS     Infectious Disease:  - neg infectious disease ROS     Malignancy:  - neg malignancy ROS     Other:  - neg other ROS          Virtual visit -  No vitals were obtained    Physical Exam  Constitutional: Awake, alert, cooperative, no apparent distress, and appears stated age.  Eyes: Pupils equal  HENT: Normocephalic  Respiratory: non labored breathing   Neurologic: Awake, alert, oriented to name, place and time.   Neuropsychiatric: Calm, cooperative. Normal affect.      Prior Labs/Diagnostic Studies   All labs and imaging personally reviewed     EKG/ stress test - if available please see in ROS above   No results found.    The patient's records and results personally reviewed by this provider.     Outside records reviewed from: Care Everywhere      Assessment  Glenna Spears is a 33 year old female seen as a PAC  "referral for risk assessment and optimization for anesthesia.    Plan/Recommendations  Pt will be optimized for the proposed procedure.  See below for details on the assessment, risk, and preoperative recommendations    NEUROLOGY  - No history of TIA, CVA or seizure    -Post Op delirium risk factors:  No risk identified    ENT  - No current airway concerns.  Will need to be reassessed day of surgery.  Mallampati: Unable to assess  TM: Unable to assess    CARDIAC  - No history of CAD and Afib  - Hypertension  Well controlled on labetalol and amlodipine  - Denies chest pain/pressure, JOHNSON, orthopnea, dizziness, palpitations, edema.     - METS (Metabolic Equivalents)  Patient performs 4 or more METS exercise without symptoms            Total Score: 0      RCRI-Low risk: Class 2 0.9% complication rate            Total Score: 1    RCRI: Elevated Creatinine        PULMONARY    STEFAN Medium Risk            Total Score: 3    STEFAN: Snores loudly    STEFAN: Hypertension    STEFAN: BMI over 35 kg/m2      - Asthma  Intermittent with PRN inhaler use. Patient reports that she uses her inhaler 1 time or less per week. Denies respiratory concerns today.    - Tobacco History    History   Smoking Status    Former    Types: Cigarettes    Quit date: 1/31/2023   Smokeless Tobacco    Never       GI  - GERD  Controlled on medications: Proton Pump Inhibitor  PONV High Risk  Total Score: 3           1 AN PONV: Pt is Female    1 AN PONV: Patient is not a current smoker    1 AN PONV: Intended Post Op Opioids        /RENAL  - ESRD on hemodialysis (T/Th/Sat)  Patient does not yet have a plan for dialysis (surgery on a Tuesday) but states that she will call her dialysis clinic today to set up a plan.  Baseline Creatinine  ~9-10    ENDOCRINE    - BMI: Estimated body mass index is 42.89 kg/m  as calculated from the following:    Height as of 12/27/23: 1.727 m (5' 7.99\").    Weight as of 12/27/23: 127.9 kg (282 lb).  Class 3 Obesity (BMI > 40). Patient is " prescribed Wegovy for weight loss (takes on Saturdays), last dose before surgery 1/13.  - No history of Diabetes Mellitus    HEME  VTE Low Risk 0.26%            Total Score: 1    VTE: BMI greater than 39      - No history of abnormal bleeding or antiplatelet use.    Different anesthesia methods/types have been discussed with the patient, but they are aware that the final plan will be decided by the assigned anesthesia provider on the date of service.    The patient is optimized for their procedure. AVS with information on surgery time/arrival time, meds and NPO status given by nursing staff. No further diagnostic testing indicated.    Please refer to the physical examination documented by the anesthesiologist in the anesthesia record on the day of surgery.    Video-Visit Details    Type of service:  Video Visit    Provider received verbal consent for a Video Visit from the patient? Yes   Video Start Time:  0807  Video End Time: 0813    Originating Location (pt. Location): [facility]    Distant Location (provider location):  Off-site  Mode of Communication:  Video Conference via [employer]  On the day of service:     Prep time: 14 minutes  Visit time: 6 minutes  Documentation time: 7 minutes  ------------------------------------------  Total time: 27 minutes      Glenna Dupont PA-C  Preoperative Assessment Center  [facility]  Cl[facility] and [facility]  Phone: [phone]  Fax: [phone]

## 2024-01-10 NOTE — PATIENT INSTRUCTIONS
Preparing for Your Surgery      Name:  Glenna Spears   MRN:  [id number]   :  1990   Today's Date:  1/10/2024       Arriving for surgery:  Surgery date:  2024  Arrival time:  6:30 am    Please come to:     Please come to:      [facility] [address]  [address] [place]  [address]      The [facility] Patient /Visitor Ramp is located at [address]. Patients and visitors who self-park will receive the reduced hospital parking rate. If the Patient /Visitor Ramp is full, please follow the signs to the [employer] parking located at the main hospital entrance.     parking is available ( 24 hours/ 7 days a week)    Discounted parking pass options are available for patients and visitors. They can be purchased at the [employer] desk at the main hospital entrance.    -    Stop at the security desk and they will direct surgery patients to the 3rd floor Surgery Waiting Room. [phone]     -  If you are in need of directions, wheelchair or escort please stop at the Information/security desk in the lobby.       What can I eat or drink?  -  Follow diet restrictions as directed by surgeon   -  You may have clear liquids until 2 hours prior to arrival time. (Until 4:30 am)      -  No Alcohol or cannabis products for at least 24 hours before surgery.     Which medicines can I take?    Hold Aspirin for 7 days before surgery.   Hold Multivitamins for 7 days before surgery.  Hold Supplements for 7 days before surgery.  Hold Ibuprofen (Advil, Motrin) for 1 day(s) before surgery--unless otherwise directed by surgeon.  Hold Naproxen (Aleve) for 4 days before surgery.    Hold Semaglutide (Wegovy) for 7 days before surgery  (Take last dose on 24)    -  DO NOT take these medications the day of surgery:  Velphoro chewables  Chantix      -  PLEASE TAKE these medications the day of surgery:  Inhalers per your routine  Famotidine if  needed          How do I prepare myself?  - Please take 2 showers (one the night prior to surgery and one the morning of surgery) using Scrubcare or Hibiclens soap.    Use this soap only from the neck to your toes.     Leave the soap on your skin for one minute--then rinse thoroughly.      You may use your own shampoo and conditioner. No other hair products.   - Please remove all jewelry and body piercings.  - No lotions, deodorants or fragrance.  - No makeup or fingernail polish.   - Bring your ID and insurance card.    -If you use a CPAP machine, please bring the CPAP machine, tubing, and mask to hospital.    -If you have a Deep Brain Stimulator, Spinal Cord Stimulator, or any Neuro Stimulator device---you must bring the remote control to the hospital.      ALL PATIENTS GOING HOME THE SAME DAY OF SURGERY ARE REQUIRED TO HAVE A RESPONSIBLE ADULT TO DRIVE AND BE IN ATTENDANCE WITH THEM FOR 24 HOURS FOLLOWING SURGERY.    Covid testing policy as of 12/06/2022  Your surgeon will notify and schedule you for a COVID test if one is needed before surgery--please direct any questions or COVID symptoms to your surgeon      Questions or Concerns:    - For any questions regarding the day of surgery or your hospital stay, please contact the Pre Admission Nursing Office at [phone].       - If you have health changes between today and your surgery, please call your surgeon.       - For questions after surgery, please call your surgeons office.           Current Visitor Guidelines    You may have 2 visitors in the pre op area.    Visiting hours: 8 a.m. to 8:30 p.m.    Patients confirmed or suspected to have symptoms of COVID 19 or flu:     No visitors allowed for adult patients.   Children (under age 18) can have 1 named visitor.     People who are sick or showing symptoms of COVID 19 or flu:    Are not allowed to visit patients--we can only make exceptions in special situations.       Please follow these guidelines for your  visit:          Please maintain social distance          Masking is optional--however at times you may be asked to wear a mask for the safety of yourself and others     Clean your hands with alcohol hand . Do this when you arrive at and leave the building and patient room,    And again after you touch your mask or anything in the room.     Go directly to and from the room you are visiting.     Stay in the patient s room during your visit. Limit going to other places in the hospital as much as possible     Leave bags and jackets at home or in the car.     For everyone s health, please don t come and go during your visit. That includes for smoking   during your visit.

## 2024-01-16 ENCOUNTER — VIRTUAL VISIT (OUTPATIENT)
Dept: CARDIOLOGY | Facility: CLINIC | Age: 34
End: 2024-01-16
Attending: NURSE PRACTITIONER
Payer: COMMERCIAL

## 2024-01-16 VITALS — HEIGHT: 68 IN | BODY MASS INDEX: 42.1 KG/M2 | WEIGHT: 277.78 LBS

## 2024-01-16 DIAGNOSIS — M79.2 NERVE PAIN: ICD-10-CM

## 2024-01-16 DIAGNOSIS — I12.0: ICD-10-CM

## 2024-01-16 DIAGNOSIS — E66.01 CLASS 3 SEVERE OBESITY DUE TO EXCESS CALORIES IN ADULT, UNSPECIFIED BMI, UNSPECIFIED WHETHER SERIOUS COMORBIDITY PRESENT (H): ICD-10-CM

## 2024-01-16 DIAGNOSIS — E66.813 CLASS 3 SEVERE OBESITY DUE TO EXCESS CALORIES IN ADULT, UNSPECIFIED BMI, UNSPECIFIED WHETHER SERIOUS COMORBIDITY PRESENT (H): ICD-10-CM

## 2024-01-16 DIAGNOSIS — Z87.891 HISTORY OF TOBACCO USE DISORDER: ICD-10-CM

## 2024-01-16 DIAGNOSIS — N25.81 HYPERPARATHYROIDISM DUE TO RENAL INSUFFICIENCY (H): Primary | ICD-10-CM

## 2024-01-16 DIAGNOSIS — N18.6: ICD-10-CM

## 2024-01-16 DIAGNOSIS — I15.1 HYPERTENSION SECONDARY TO OTHER RENAL DISORDERS: ICD-10-CM

## 2024-01-16 DIAGNOSIS — K21.9 GERD WITHOUT ESOPHAGITIS: ICD-10-CM

## 2024-01-16 ASSESSMENT — PAIN SCALES - GENERAL: PAINLEVEL: NO PAIN (0)

## 2024-01-16 NOTE — PROGRESS NOTES
"Virtual Visit Details    Type of service:  Video Visit   Video Start Time: {video visit start/end time for provider to select:305328}  Video End Time:{video visit start/end time for provider to select:702179}    Originating Location (pt. Location): {video visit patient location:064243::\"Home\"}  {PROVIDER LOCATION On-site should be selected for visits conducted from your clinic location or adjoining [facility] hospital, academic office, or other nearby [facility] building. Off-site should be selected for all other provider locations, including home:[id number]}  Distant Location (provider location):  {virtual location provider:270624}  Platform used for Video Visit: {Virtual Visit Platforms:668810::\"[employer]\"}  " Yes

## 2024-01-16 NOTE — PROGRESS NOTES
"Medication Therapy Management (MTM) Encounter    ASSESSMENT:                            Medication Adherence/Access: No issues identified    Assessed and discussed potential administration changes of medications and potential holding/adjustment of medications post sleeve gastrectomy.  An alternative administration plan was formulated for medications that were greater than approximately ~6-8 mm for the first 3 months post Sleeve Gastrectomy.   Medication sizes were identified utilizing the medication's NDC # or drug identifier. Otherwise if medication size was not able to be identified, medications were compared to that of \"size of m&m\" for easy comparison for patient.  Medications found to be larger than specified below (plan below in plan section):   Sensipar  Evidence regarding medication plan for medications post-bariatric surgery is largely centralized around Alex-en-Y gastric bypass per guidelines recommendations. Guideline recommendations are less known for medication adjustments for sleeve gastrectomy specifically.  As the sleeve gastrectomy includes solely removing a portion of stomach and does not impact small intestinal tract (compared to other bariatric surgeries like the Alex-en-Y Gastric Bypass), medication adjustments may vary from guidelines and follow study data/expert recommendations/center protocol. Therefore, extended release or sustained release medications may be continued post-operatively if the benefit was found to outweigh the risk.   Discussed no NSAIDs post op, can use acetaminophen post-operatively   Per post-bariatric protocol, patient to hold all supplements 1 week before surgery. Supplements that are larger than allowable post op, should be held for at least 3 months post op. Post-bariatric vitamin regimen start will be discussed at 1 week post op dietitian follow up appointment.   Multivitamin and Calcium supplementation should be in chewable formulation    Weight Management:   Stable. "  Will have to chat with nephrology to see what multivitamin and calcium options that they are comfortable with, will reach out to dietitian.    Smoking cessation:    Stable.  To discuss Chantix stop sometime postoperatively.  Do not want to stop right now as there may be greater stress postoperatively and do not want to risk her having cravings for smoking immediately after surgery.    Nerve Pain:   Stable.    Hypertension:   Blood pressure remains elevated, difficult to see dialysis blood pressures.  Would benefit from getting blood pressure down to less than 130/80.  Working to get to transplant therefore look to the nephrology and SOT team.    GERD:   Stable.    ESRD/hyperparathyroidism:  Defer to his dialysis team.    PLAN:                            Options for bariatric vitamins sent via [employer] as soon as we verify with nephrology what their thoughts are on the post bariatric surgery vitamins.  Adjust administration of the following medications for approx 3 months post Sleeve Gastrectomy:   Sensipar: cut in half and take both halves by mouth daily   Gabapentin 300 mg capsule: Open capsule and sprinkle contents over 1 tablespoon of applesauce, yogurt or sugar free pudding and swallow immediately (do not chew).   If not tolerated can switch to liquid.     Follow-up: Return in about 4 weeks (around 2/13/2024) for Medication Therapy Management Pharmacist Visit.    SUBJECTIVE/OBJECTIVE:                          Glenna Spears is a 33 year old female contacted via secure video for an initial visit. She was referred to me from Dr. Smith.      Reason for visit: discuss plans for medications after sleeve gastrectomy.    Allergies/ADRs: Reviewed in chart  Past Medical History: Reviewed in chart  Tobacco: She reports that she quit smoking about a year ago. Her smoking use included cigarettes. She has been exposed to tobacco smoke. She has never used smokeless tobacco.  Alcohol: not currently using  Caffeine: 1 cup daily  "    Medication Adherence/Access: Patient uses pill box(es).   Patient takes medications 1 time(s) per day.   Per patient, misses medication 1 times per week.     Weight Management:   Wegovy 2.4 mg once weekly     Followed by Ericka Beverly NP, seen for Pre-Bariatric Surgery Consult. Patient is experiencing the follow side effects: None.  She finds that the Wegovy has been very helpful with helping her to lose weight prior to surgery.  Helped with portions and not thinking about food is much.  Patient has already stopped the Wegovy to prepare for surgery.  She is aware that she should not restart after surgery.  Diet/Eating Habits: Due to ESRD her nutrition requires for less protein wear as she does feel some back-and-forth with trying to lose weight and finding balance within meals.  But generally she gets in 3 meals per day and does report that they are smaller than they used to be.  Exercise/Activity: Patient reports limited.     Current weight today: 277 lbs 12.47 oz    Wt Readings from Last 4 Encounters:   01/23/24 125.9 kg (277 lb 9 oz)   01/16/24 126 kg (277 lb 12.5 oz)   12/27/23 127.9 kg (282 lb)   12/22/23 127.9 kg (282 lb)     Estimated body mass index is 42.24 kg/m  as calculated from the following:    Height as of this encounter: 1.727 m (5' 8\").    Weight as of this encounter: 126 kg (277 lb 12.5 oz).    Smoking cessation:    Chantix 0.5 mg daily bedtime.     Patient reports things going well.  Has remained off smoking.     Nerve Pain:   Gabapentin 300 mg bedtime      Generally gabapentin is controlling nerve pain. Feels she has trouble sleeping because of the pain and gabapentin helpful.  No medication side effects that she is aware of.    Hypertension:   Amlodipine 10 mg daily  Labetalol 100 mg daily     Patient reports no current medication side effects. History of ESRD secondary to HTN, long standing nephrotic range proteinuria, and drug use (sober >1 year).    Patient does not self-monitor blood " "pressure.  Does get blood pressure checked at dialysis.  She reports that blood pressure is around 130s over 80s after dialysis, sometimes lower as of recently  BP Readings from Last 3 Encounters:   01/24/24 (!) 140/78   01/23/23 (!) 149/82   01/23/23 (!) 149/82     Pulse Readings from Last 3 Encounters:   01/24/24 101   01/23/23 84   01/23/23 84     GERD:   Famotidine 20 mg daily as needed     Patient reports no current symptoms.   Patient feels that current regimen is effective.  The patient does not have a history of GI bleed.   Patient will notice heartburn when laying on right side and triggerrsome foods     ESRD/hyperparathyroidism:  Velphoro 500 mg chew 1 tablet three times daily  Dialyvite 1 tablet daily   Calcitriol 0.5 mc daily  Cholecalciferol 10,000 international unit(s) every other daily   Sensipar 90 mg daily    Continues dialysis 3 times weekly. History of ESRD secondary to HTN, long standing nephrotic range proteinuria, and drug use (sober >1 year).  Referred to bariatric surgery by Kidney Transplant team. Needs BMI <35 for transplant consideration. Started with team 1/2023, high weight 330lb (BMI 49). Plans for dialysis starting 1/25 at home dialysis unit after Sleeve Gastrectomy.  She reports that she is generally tolerating the above medications.  No concerns.  Does feel general fatigue most days some dizziness after dialysis that quickly improves.   Vitamin D level: 56.1 on 12/22/2023.  Parathyroid hormone level: 1024.4 on 12/22/2023  Calcium: 10.2 on 12/22/2023    Today's Vitals: Ht 1.727 m (5' 8\")   Wt 126 kg (277 lb 12.5 oz)   BMI 42.24 kg/m    ----------------      I spent 30 minutes with this patient today. All changes were made via collaborative practice agreement with Ericka Beverly CNP . A copy of the visit note was provided to the patient's provider(s).    A summary of these recommendations was sent via clinic portal.    Lauren Bloch, PharmD, BCACP   Medication Therapy Management " Pharmacist   [facility]      Telemedicine Visit Details  Type of service:  Telephone visit  Start Time:  3:00 PM  End Time:  3:30 PM     Medication Therapy Recommendations  ESRD due to hypertension (H)    Current Medication: cinacalcet (SENSIPAR) 90 MG tablet   Rationale: Cannot swallow/administer medication - Adherence - Adherence   Recommendation: Provide Education   Status: Accepted - no CPA Needed

## 2024-01-16 NOTE — LETTER
"1/16/2024      RE: Glenna Spears  [address]  [place] [address]       Dear Colleague,    Thank you for the opportunity to participate in the care of your patient, Glenna Spears, at the [facility] HEART [facility]. Please see a copy of my visit note below.    Medication Therapy Management (MTM) Encounter    ASSESSMENT:                            Medication Adherence/Access: No issues identified    Assessed and discussed potential administration changes of medications and potential holding/adjustment of medications post sleeve gastrectomy.  An alternative administration plan was formulated for medications that were greater than approximately ~6-8 mm for the first 3 months post Sleeve Gastrectomy.   Medication sizes were identified utilizing the medication's NDC # or drug identifier. Otherwise if medication size was not able to be identified, medications were compared to that of \"size of m&m\" for easy comparison for patient.  Medications found to be larger than specified below (plan below in plan section):   Sensipar  Evidence regarding medication plan for medications post-bariatric surgery is largely centralized around Alex-en-Y gastric bypass per guidelines recommendations. Guideline recommendations are less known for medication adjustments for sleeve gastrectomy specifically.  As the sleeve gastrectomy includes solely removing a portion of stomach and does not impact small intestinal tract (compared to other bariatric surgeries like the Alex-en-Y Gastric Bypass), medication adjustments may vary from guidelines and follow study data/expert recommendations/center protocol. Therefore, extended release or sustained release medications may be continued post-operatively if the benefit was found to outweigh the risk.   Discussed no NSAIDs post op, can use acetaminophen post-operatively   Per post-bariatric protocol, patient to hold all " supplements 1 week before surgery. Supplements that are larger than allowable post op, should be held for at least 3 months post op. Post-bariatric vitamin regimen start will be discussed at 1 week post op dietitian follow up appointment.   Multivitamin and Calcium supplementation should be in chewable formulation    Weight Management:   Stable.  Will have to chat with nephrology to see what multivitamin and calcium options that they are comfortable with, will reach out to dietitian.    Smoking cessation:    Stable.  To discuss Chantix stop sometime postoperatively.  Do not want to stop right now as there may be greater stress postoperatively and do not want to risk her having cravings for smoking immediately after surgery.    Nerve Pain:   Stable.    Hypertension:   Blood pressure remains elevated, difficult to see dialysis blood pressures.  Would benefit from getting blood pressure down to less than 130/80.  Working to get to transplant therefore look to the nephrology and SOT team.    GERD:   Stable.    ESRD/hyperparathyroidism:  Defer to his dialysis team.    PLAN:                            Options for bariatric vitamins sent via [employer] as soon as we verify with nephrology what their thoughts are on the post bariatric surgery vitamins.  Adjust administration of the following medications for approx 3 months post Sleeve Gastrectomy:   Sensipar: cut in half and take both halves by mouth daily   Gabapentin 300 mg capsule: Open capsule and sprinkle contents over 1 tablespoon of applesauce, yogurt or sugar free pudding and swallow immediately (do not chew).   If not tolerated can switch to liquid.     Follow-up: Return in about 4 weeks (around 2/13/2024) for Medication Therapy Management Pharmacist Visit.    SUBJECTIVE/OBJECTIVE:                          Glenna Spears is a 33 year old female contacted via secure video for an initial visit. She was referred to me from Dr. Smith.      Reason for visit: discuss plans  "for medications after sleeve gastrectomy.    Allergies/ADRs: Reviewed in chart  Past Medical History: Reviewed in chart  Tobacco: She reports that she quit smoking about a year ago. Her smoking use included cigarettes. She has been exposed to tobacco smoke. She has never used smokeless tobacco.  Alcohol: not currently using  Caffeine: 1 cup daily     Medication Adherence/Access: Patient uses pill box(es).   Patient takes medications 1 time(s) per day.   Per patient, misses medication 1 times per week.     Weight Management:   Wegovy 2.4 mg once weekly     Followed by Ericka Beverly NP, seen for Pre-Bariatric Surgery Consult. Patient is experiencing the follow side effects: None.  She finds that the Wegovy has been very helpful with helping her to lose weight prior to surgery.  Helped with portions and not thinking about food is much.  Patient has already stopped the Wegovy to prepare for surgery.  She is aware that she should not restart after surgery.  Diet/Eating Habits: Due to ESRD her nutrition requires for less protein wear as she does feel some back-and-forth with trying to lose weight and finding balance within meals.  But generally she gets in 3 meals per day and does report that they are smaller than they used to be.  Exercise/Activity: Patient reports limited.     Current weight today: 277 lbs 12.47 oz    Wt Readings from Last 4 Encounters:   01/23/24 125.9 kg (277 lb 9 oz)   01/16/24 126 kg (277 lb 12.5 oz)   12/27/23 127.9 kg (282 lb)   12/22/23 127.9 kg (282 lb)     Estimated body mass index is 42.24 kg/m  as calculated from the following:    Height as of this encounter: 1.727 m (5' 8\").    Weight as of this encounter: 126 kg (277 lb 12.5 oz).    Smoking cessation:    Chantix 0.5 mg daily bedtime.     Patient reports things going well.  Has remained off smoking.     Nerve Pain:   Gabapentin 300 mg bedtime      Generally gabapentin is controlling nerve pain. Feels she has trouble sleeping because of the " "pain and gabapentin helpful.  No medication side effects that she is aware of.    Hypertension:   Amlodipine 10 mg daily  Labetalol 100 mg daily     Patient reports no current medication side effects. History of ESRD secondary to HTN, long standing nephrotic range proteinuria, and drug use (sober >1 year).    Patient does not self-monitor blood pressure.  Does get blood pressure checked at dialysis.  She reports that blood pressure is around 130s over 80s after dialysis, sometimes lower as of recently  BP Readings from Last 3 Encounters:   01/24/24 (!) 140/78   01/23/23 (!) 149/82   01/23/23 (!) 149/82     Pulse Readings from Last 3 Encounters:   01/24/24 101   01/23/23 84   01/23/23 84     GERD:   Famotidine 20 mg daily as needed     Patient reports no current symptoms.   Patient feels that current regimen is effective.  The patient does not have a history of GI bleed.   Patient will notice heartburn when laying on right side and triggerrsome foods     ESRD/hyperparathyroidism:  Velphoro 500 mg chew 1 tablet three times daily  Dialyvite 1 tablet daily   Calcitriol 0.5 mc daily  Cholecalciferol 10,000 international unit(s) every other daily   Sensipar 90 mg daily    Continues dialysis 3 times weekly. History of ESRD secondary to HTN, long standing nephrotic range proteinuria, and drug use (sober >1 year).  Referred to bariatric surgery by Kidney Transplant team. Needs BMI <35 for transplant consideration. Started with team 1/2023, high weight 330lb (BMI 49). Plans for dialysis starting 1/25 at home dialysis unit after Sleeve Gastrectomy.  She reports that she is generally tolerating the above medications.  No concerns.  Does feel general fatigue most days some dizziness after dialysis that quickly improves.   Vitamin D level: 56.1 on 12/22/2023.  Parathyroid hormone level: 1024.4 on 12/22/2023  Calcium: 10.2 on 12/22/2023    Today's Vitals: Ht 1.727 m (5' 8\")   Wt 126 kg (277 lb 12.5 oz)   BMI 42.24 kg/m  "   ----------------      I spent 30 minutes with this patient today. All changes were made via collaborative practice agreement with Ericka Beverly CNP . A copy of the visit note was provided to the patient's provider(s).    A summary of these recommendations was sent via clinic portal.    Lauren Bloch, PharmD, BCACP   Medication Therapy Management Pharmacist   [facility]      Telemedicine Visit Details  Type of service:  Telephone visit  Start Time:  3:00 PM  End Time:  3:30 PM     Medication Therapy Recommendations  ESRD due to hypertension (H)    Current Medication: cinacalcet (SENSIPAR) 90 MG tablet   Rationale: Cannot swallow/administer medication - Adherence - Adherence   Recommendation: Provide Education   Status: Accepted - no CPA Needed                Please do not hesitate to contact me if you have any questions/concerns.     Sincerely,     Lauren T. Bloch, TYSON

## 2024-01-16 NOTE — Clinical Note
Galilea Santillanah, I just need to remember this - did you dietians have an encompassing list of multivitamins post Sleeve Gastrectomy for ESRD patients specifically? I was just going to call Nephrology as I know it depends on status of patient. Thoughts or reachedd out to someone?

## 2024-01-16 NOTE — NURSING NOTE
Is the patient currently in the state of [place]? YES    Visit mode:VIDEO    If the visit is dropped, the patient can be reconnected by: VIDEO VISIT: Text to cell phone:   Telephone Information:   Mobile [phone]       Will anyone else be joining the visit? NO  (If patient encounters technical issues they should call [phone])    How would you like to obtain your AVS? MyChart    Are changes needed to the allergy or medication list? No    Reason for visit: Consult    [place]

## 2024-01-22 ENCOUNTER — TELEPHONE (OUTPATIENT)
Dept: ENDOCRINOLOGY | Facility: CLINIC | Age: 34
End: 2024-01-22
Payer: COMMERCIAL

## 2024-01-22 NOTE — TELEPHONE ENCOUNTER
Received a phone call from Karime in [place] [employer]. She received the prior authorization from [facility] for sleeve scheduled 1/23/24 with Dr. Smith. Auth ref # [id number] effective 01/23/24 - 07/21/24.

## 2024-01-23 ENCOUNTER — HOSPITAL ENCOUNTER (INPATIENT)
Facility: CLINIC | Age: 34
LOS: 1 days | Discharge: HOME | End: 2024-01-24
Attending: SURGERY | Admitting: SURGERY
Payer: COMMERCIAL

## 2024-01-23 ENCOUNTER — ANESTHESIA (OUTPATIENT)
Dept: SURGERY | Facility: CLINIC | Age: 34
End: 2024-01-23
Payer: COMMERCIAL

## 2024-01-23 DIAGNOSIS — Z98.84 S/P LAPAROSCOPIC SLEEVE GASTRECTOMY: Primary | ICD-10-CM

## 2024-01-23 DIAGNOSIS — E66.01 MORBID OBESITY (H): ICD-10-CM

## 2024-01-23 LAB
CREAT SERPL-MCNC: 9.47 MG/DL (ref 0.51–0.95)
EGFRCR SERPLBLD CKD-EPI 2021: 5 ML/MIN/1.73M2
POTASSIUM SERPL-SCNC: 4.5 MMOL/L (ref 3.4–5.3)
TROPONIN T SERPL HS-MCNC: 23 NG/L
TROPONIN T SERPL HS-MCNC: 23 NG/L

## 2024-01-23 PROCEDURE — 250N000011 HC RX IP 250 OP 636: Performed by: ANESTHESIOLOGY

## 2024-01-23 PROCEDURE — 250N000012 HC RX MED GY IP 250 OP 636 PS 637: Performed by: PHYSICIAN ASSISTANT

## 2024-01-23 PROCEDURE — 370N000017 HC ANESTHESIA TECHNICAL FEE, PER MIN: Performed by: SURGERY

## 2024-01-23 PROCEDURE — 272N000001 HC OR GENERAL SUPPLY STERILE: Performed by: SURGERY

## 2024-01-23 PROCEDURE — 84132 ASSAY OF SERUM POTASSIUM: CPT | Performed by: ANESTHESIOLOGY

## 2024-01-23 PROCEDURE — 250N000013 HC RX MED GY IP 250 OP 250 PS 637: Performed by: ANESTHESIOLOGY

## 2024-01-23 PROCEDURE — 250N000009 HC RX 250: Performed by: ANESTHESIOLOGY

## 2024-01-23 PROCEDURE — 0DB64Z3 EXCISION OF STOMACH, PERCUTANEOUS ENDOSCOPIC APPROACH, VERTICAL: ICD-10-PCS | Performed by: SURGERY

## 2024-01-23 PROCEDURE — 999N000141 HC STATISTIC PRE-PROCEDURE NURSING ASSESSMENT: Performed by: SURGERY

## 2024-01-23 PROCEDURE — 88305 TISSUE EXAM BY PATHOLOGIST: CPT | Mod: 26 | Performed by: PATHOLOGY

## 2024-01-23 PROCEDURE — 258N000003 HC RX IP 258 OP 636: Performed by: ANESTHESIOLOGY

## 2024-01-23 PROCEDURE — 250N000013 HC RX MED GY IP 250 OP 250 PS 637: Performed by: NURSE PRACTITIONER

## 2024-01-23 PROCEDURE — 82565 ASSAY OF CREATININE: CPT | Performed by: NURSE PRACTITIONER

## 2024-01-23 PROCEDURE — 250N000011 HC RX IP 250 OP 636: Performed by: SURGERY

## 2024-01-23 PROCEDURE — 36415 COLL VENOUS BLD VENIPUNCTURE: CPT | Performed by: NURSE PRACTITIONER

## 2024-01-23 PROCEDURE — 250N000011 HC RX IP 250 OP 636: Mod: JZ

## 2024-01-23 PROCEDURE — 250N000011 HC RX IP 250 OP 636: Performed by: NURSE PRACTITIONER

## 2024-01-23 PROCEDURE — 250N000011 HC RX IP 250 OP 636: Performed by: REGISTERED NURSE

## 2024-01-23 PROCEDURE — 360N000077 HC SURGERY LEVEL 4, PER MIN: Performed by: SURGERY

## 2024-01-23 PROCEDURE — 36415 COLL VENOUS BLD VENIPUNCTURE: CPT | Performed by: ANESTHESIOLOGY

## 2024-01-23 PROCEDURE — 93010 ELECTROCARDIOGRAM REPORT: CPT | Performed by: INTERNAL MEDICINE

## 2024-01-23 PROCEDURE — 43775 LAP SLEEVE GASTRECTOMY: CPT | Performed by: SURGERY

## 2024-01-23 PROCEDURE — 36415 COLL VENOUS BLD VENIPUNCTURE: CPT

## 2024-01-23 PROCEDURE — 710N000010 HC RECOVERY PHASE 1, LEVEL 2, PER MIN: Performed by: SURGERY

## 2024-01-23 PROCEDURE — 250N000009 HC RX 250: Performed by: NURSE PRACTITIONER

## 2024-01-23 PROCEDURE — 250N000013 HC RX MED GY IP 250 OP 250 PS 637: Performed by: SURGERY

## 2024-01-23 PROCEDURE — 250N000025 HC SEVOFLURANE, PER MIN: Performed by: SURGERY

## 2024-01-23 PROCEDURE — 88305 TISSUE EXAM BY PATHOLOGIST: CPT | Mod: TC | Performed by: SURGERY

## 2024-01-23 PROCEDURE — 93005 ELECTROCARDIOGRAM TRACING: CPT

## 2024-01-23 PROCEDURE — 120N000002 HC R&B MED SURG/OB UMMC

## 2024-01-23 PROCEDURE — 84484 ASSAY OF TROPONIN QUANT: CPT

## 2024-01-23 PROCEDURE — 258N000003 HC RX IP 258 OP 636: Performed by: SURGERY

## 2024-01-23 RX ORDER — FAMOTIDINE 20 MG/1
20 TABLET, FILM COATED ORAL DAILY PRN
Status: DISCONTINUED | OUTPATIENT
Start: 2024-01-23 | End: 2024-01-24 | Stop reason: HOSPADM

## 2024-01-23 RX ORDER — ONDANSETRON 2 MG/ML
4 INJECTION INTRAMUSCULAR; INTRAVENOUS EVERY 6 HOURS PRN
Status: DISCONTINUED | OUTPATIENT
Start: 2024-01-23 | End: 2024-01-24 | Stop reason: HOSPADM

## 2024-01-23 RX ORDER — HALOPERIDOL 5 MG/ML
1 INJECTION INTRAMUSCULAR
Status: DISCONTINUED | OUTPATIENT
Start: 2024-01-23 | End: 2024-01-23 | Stop reason: HOSPADM

## 2024-01-23 RX ORDER — HYDROMORPHONE HCL IN WATER/PF 6 MG/30 ML
0.4 PATIENT CONTROLLED ANALGESIA SYRINGE INTRAVENOUS EVERY 5 MIN PRN
Status: DISCONTINUED | OUTPATIENT
Start: 2024-01-23 | End: 2024-01-23

## 2024-01-23 RX ORDER — SCOLOPAMINE TRANSDERMAL SYSTEM 1 MG/1
1 PATCH, EXTENDED RELEASE TRANSDERMAL
Status: DISCONTINUED | OUTPATIENT
Start: 2024-01-23 | End: 2024-01-24 | Stop reason: HOSPADM

## 2024-01-23 RX ORDER — SODIUM CHLORIDE, SODIUM LACTATE, POTASSIUM CHLORIDE, CALCIUM CHLORIDE 600; 310; 30; 20 MG/100ML; MG/100ML; MG/100ML; MG/100ML
INJECTION, SOLUTION INTRAVENOUS CONTINUOUS PRN
Status: DISCONTINUED | OUTPATIENT
Start: 2024-01-23 | End: 2024-01-23

## 2024-01-23 RX ORDER — ESMOLOL HYDROCHLORIDE 10 MG/ML
INJECTION INTRAVENOUS PRN
Status: DISCONTINUED | OUTPATIENT
Start: 2024-01-23 | End: 2024-01-23

## 2024-01-23 RX ORDER — ONDANSETRON 4 MG/1
4 TABLET, ORALLY DISINTEGRATING ORAL EVERY 6 HOURS PRN
Status: DISCONTINUED | OUTPATIENT
Start: 2024-01-23 | End: 2024-01-24 | Stop reason: HOSPADM

## 2024-01-23 RX ORDER — NALOXONE HYDROCHLORIDE 0.4 MG/ML
0.4 INJECTION, SOLUTION INTRAMUSCULAR; INTRAVENOUS; SUBCUTANEOUS
Status: DISCONTINUED | OUTPATIENT
Start: 2024-01-23 | End: 2024-01-24 | Stop reason: HOSPADM

## 2024-01-23 RX ORDER — VARENICLINE TARTRATE 0.5 MG/1
0.5 TABLET, FILM COATED ORAL DAILY
Status: DISCONTINUED | OUTPATIENT
Start: 2024-01-23 | End: 2024-01-24 | Stop reason: HOSPADM

## 2024-01-23 RX ORDER — SODIUM CHLORIDE, SODIUM LACTATE, POTASSIUM CHLORIDE, CALCIUM CHLORIDE 600; 310; 30; 20 MG/100ML; MG/100ML; MG/100ML; MG/100ML
INJECTION, SOLUTION INTRAVENOUS CONTINUOUS
Status: DISCONTINUED | OUTPATIENT
Start: 2024-01-23 | End: 2024-01-23 | Stop reason: HOSPADM

## 2024-01-23 RX ORDER — ONDANSETRON 4 MG/1
4 TABLET, ORALLY DISINTEGRATING ORAL EVERY 30 MIN PRN
Status: DISCONTINUED | OUTPATIENT
Start: 2024-01-23 | End: 2024-01-23 | Stop reason: HOSPADM

## 2024-01-23 RX ORDER — CEFAZOLIN SODIUM/WATER 3 G/30 ML
3 SYRINGE (ML) INTRAVENOUS
Status: COMPLETED | OUTPATIENT
Start: 2024-01-23 | End: 2024-01-23

## 2024-01-23 RX ORDER — PROCHLORPERAZINE MALEATE 5 MG
10 TABLET ORAL EVERY 6 HOURS PRN
Status: DISCONTINUED | OUTPATIENT
Start: 2024-01-23 | End: 2024-01-24 | Stop reason: HOSPADM

## 2024-01-23 RX ORDER — ENALAPRILAT 1.25 MG/ML
1.25 INJECTION INTRAVENOUS EVERY 6 HOURS PRN
Status: DISCONTINUED | OUTPATIENT
Start: 2024-01-23 | End: 2024-01-24 | Stop reason: HOSPADM

## 2024-01-23 RX ORDER — LIDOCAINE 40 MG/G
CREAM TOPICAL
Status: DISCONTINUED | OUTPATIENT
Start: 2024-01-23 | End: 2024-01-24 | Stop reason: HOSPADM

## 2024-01-23 RX ORDER — ALBUTEROL SULFATE 90 UG/1
AEROSOL, METERED RESPIRATORY (INHALATION) PRN
Status: DISCONTINUED | OUTPATIENT
Start: 2024-01-23 | End: 2024-01-23

## 2024-01-23 RX ORDER — OXYCODONE HYDROCHLORIDE 10 MG/1
10 TABLET ORAL
Status: DISCONTINUED | OUTPATIENT
Start: 2024-01-23 | End: 2024-01-24 | Stop reason: HOSPADM

## 2024-01-23 RX ORDER — PROPOFOL 10 MG/ML
INJECTION, EMULSION INTRAVENOUS PRN
Status: DISCONTINUED | OUTPATIENT
Start: 2024-01-23 | End: 2024-01-23

## 2024-01-23 RX ORDER — ACETAZOLAMIDE 500 MG/5ML
250 INJECTION, POWDER, LYOPHILIZED, FOR SOLUTION INTRAVENOUS ONCE
Status: COMPLETED | OUTPATIENT
Start: 2024-01-23 | End: 2024-01-23

## 2024-01-23 RX ORDER — NALOXONE HYDROCHLORIDE 0.4 MG/ML
0.2 INJECTION, SOLUTION INTRAMUSCULAR; INTRAVENOUS; SUBCUTANEOUS
Status: DISCONTINUED | OUTPATIENT
Start: 2024-01-23 | End: 2024-01-24 | Stop reason: HOSPADM

## 2024-01-23 RX ORDER — LIDOCAINE HYDROCHLORIDE 20 MG/ML
INJECTION, SOLUTION INFILTRATION; PERINEURAL PRN
Status: DISCONTINUED | OUTPATIENT
Start: 2024-01-23 | End: 2024-01-23

## 2024-01-23 RX ORDER — AMOXICILLIN 250 MG
2 CAPSULE ORAL 2 TIMES DAILY
Status: DISCONTINUED | OUTPATIENT
Start: 2024-01-23 | End: 2024-01-24 | Stop reason: HOSPADM

## 2024-01-23 RX ORDER — AMLODIPINE BESYLATE 10 MG/1
10 TABLET ORAL EVERY EVENING
Status: DISCONTINUED | OUTPATIENT
Start: 2024-01-23 | End: 2024-01-24 | Stop reason: HOSPADM

## 2024-01-23 RX ORDER — DEXAMETHASONE SODIUM PHOSPHATE 4 MG/ML
INJECTION, SOLUTION INTRA-ARTICULAR; INTRALESIONAL; INTRAMUSCULAR; INTRAVENOUS; SOFT TISSUE PRN
Status: DISCONTINUED | OUTPATIENT
Start: 2024-01-23 | End: 2024-01-23

## 2024-01-23 RX ORDER — HYDROMORPHONE HCL IN WATER/PF 6 MG/30 ML
0.2 PATIENT CONTROLLED ANALGESIA SYRINGE INTRAVENOUS
Status: DISCONTINUED | OUTPATIENT
Start: 2024-01-23 | End: 2024-01-24 | Stop reason: HOSPADM

## 2024-01-23 RX ORDER — ENOXAPARIN SODIUM 100 MG/ML
30 INJECTION SUBCUTANEOUS EVERY 24 HOURS
Status: DISCONTINUED | OUTPATIENT
Start: 2024-01-24 | End: 2024-01-23

## 2024-01-23 RX ORDER — DIPHENHYDRAMINE HYDROCHLORIDE 50 MG/ML
25 INJECTION INTRAMUSCULAR; INTRAVENOUS EVERY 6 HOURS PRN
Status: DISCONTINUED | OUTPATIENT
Start: 2024-01-23 | End: 2024-01-24 | Stop reason: HOSPADM

## 2024-01-23 RX ORDER — DROPERIDOL 2.5 MG/ML
0.62 INJECTION, SOLUTION INTRAMUSCULAR; INTRAVENOUS ONCE
Status: COMPLETED | OUTPATIENT
Start: 2024-01-23 | End: 2024-01-23

## 2024-01-23 RX ORDER — FENTANYL CITRATE 50 UG/ML
50 INJECTION, SOLUTION INTRAMUSCULAR; INTRAVENOUS EVERY 5 MIN PRN
Status: DISCONTINUED | OUTPATIENT
Start: 2024-01-23 | End: 2024-01-23 | Stop reason: HOSPADM

## 2024-01-23 RX ORDER — ONDANSETRON 2 MG/ML
4 INJECTION INTRAMUSCULAR; INTRAVENOUS EVERY 30 MIN PRN
Status: DISCONTINUED | OUTPATIENT
Start: 2024-01-23 | End: 2024-01-23 | Stop reason: HOSPADM

## 2024-01-23 RX ORDER — LABETALOL 200 MG/1
200 TABLET, FILM COATED ORAL EVERY EVENING
Status: DISCONTINUED | OUTPATIENT
Start: 2024-01-23 | End: 2024-01-24 | Stop reason: HOSPADM

## 2024-01-23 RX ORDER — ACETAMINOPHEN 325 MG/1
975 TABLET ORAL ONCE
Status: COMPLETED | OUTPATIENT
Start: 2024-01-23 | End: 2024-01-23

## 2024-01-23 RX ORDER — HYDROMORPHONE HCL IN WATER/PF 6 MG/30 ML
0.2 PATIENT CONTROLLED ANALGESIA SYRINGE INTRAVENOUS EVERY 5 MIN PRN
Status: DISCONTINUED | OUTPATIENT
Start: 2024-01-23 | End: 2024-01-23

## 2024-01-23 RX ORDER — IPRATROPIUM BROMIDE AND ALBUTEROL SULFATE 2.5; .5 MG/3ML; MG/3ML
3 SOLUTION RESPIRATORY (INHALATION) EVERY 4 HOURS PRN
Status: DISCONTINUED | OUTPATIENT
Start: 2024-01-23 | End: 2024-01-24 | Stop reason: HOSPADM

## 2024-01-23 RX ORDER — FENTANYL CITRATE 50 UG/ML
INJECTION, SOLUTION INTRAMUSCULAR; INTRAVENOUS PRN
Status: DISCONTINUED | OUTPATIENT
Start: 2024-01-23 | End: 2024-01-23

## 2024-01-23 RX ORDER — FENTANYL CITRATE 50 UG/ML
25 INJECTION, SOLUTION INTRAMUSCULAR; INTRAVENOUS EVERY 5 MIN PRN
Status: DISCONTINUED | OUTPATIENT
Start: 2024-01-23 | End: 2024-01-23 | Stop reason: HOSPADM

## 2024-01-23 RX ORDER — ACETAMINOPHEN 325 MG/1
650 TABLET ORAL EVERY 4 HOURS PRN
Status: DISCONTINUED | OUTPATIENT
Start: 2024-01-23 | End: 2024-01-24 | Stop reason: HOSPADM

## 2024-01-23 RX ORDER — ONDANSETRON 2 MG/ML
4 INJECTION INTRAMUSCULAR; INTRAVENOUS
Status: DISCONTINUED | OUTPATIENT
Start: 2024-01-23 | End: 2024-01-23 | Stop reason: HOSPADM

## 2024-01-23 RX ORDER — ENOXAPARIN SODIUM 100 MG/ML
40 INJECTION SUBCUTANEOUS
Status: COMPLETED | OUTPATIENT
Start: 2024-01-23 | End: 2024-01-23

## 2024-01-23 RX ORDER — APREPITANT 40 MG/1
40 CAPSULE ORAL ONCE
Status: COMPLETED | OUTPATIENT
Start: 2024-01-23 | End: 2024-01-23

## 2024-01-23 RX ORDER — GABAPENTIN 300 MG/1
300 CAPSULE ORAL AT BEDTIME
Status: DISCONTINUED | OUTPATIENT
Start: 2024-01-23 | End: 2024-01-24 | Stop reason: HOSPADM

## 2024-01-23 RX ORDER — DIPHENHYDRAMINE HCL 25 MG
25 CAPSULE ORAL EVERY 6 HOURS PRN
Status: DISCONTINUED | OUTPATIENT
Start: 2024-01-23 | End: 2024-01-24 | Stop reason: HOSPADM

## 2024-01-23 RX ORDER — CEFAZOLIN SODIUM/WATER 3 G/30 ML
3 SYRINGE (ML) INTRAVENOUS SEE ADMIN INSTRUCTIONS
Status: DISCONTINUED | OUTPATIENT
Start: 2024-01-23 | End: 2024-01-23 | Stop reason: HOSPADM

## 2024-01-23 RX ORDER — ONDANSETRON 2 MG/ML
INJECTION INTRAMUSCULAR; INTRAVENOUS PRN
Status: DISCONTINUED | OUTPATIENT
Start: 2024-01-23 | End: 2024-01-23

## 2024-01-23 RX ORDER — CINACALCET 90 MG/1
90 TABLET, FILM COATED ORAL EVERY EVENING
Status: DISCONTINUED | OUTPATIENT
Start: 2024-01-23 | End: 2024-01-24 | Stop reason: HOSPADM

## 2024-01-23 RX ORDER — OXYCODONE HYDROCHLORIDE 5 MG/1
5 TABLET ORAL
Status: DISCONTINUED | OUTPATIENT
Start: 2024-01-23 | End: 2024-01-24 | Stop reason: HOSPADM

## 2024-01-23 RX ADMIN — DOCUSATE SODIUM 50 MG AND SENNOSIDES 8.6 MG 2 TABLET: 8.6; 5 TABLET, FILM COATED ORAL at 21:27

## 2024-01-23 RX ADMIN — ESMOLOL HYDROCHLORIDE 100 MG: 10 INJECTION, SOLUTION INTRAVENOUS at 08:55

## 2024-01-23 RX ADMIN — Medication 3 G: at 08:56

## 2024-01-23 RX ADMIN — PROPOFOL 40 MG: 10 INJECTION, EMULSION INTRAVENOUS at 10:01

## 2024-01-23 RX ADMIN — ONDANSETRON 4 MG: 2 INJECTION INTRAMUSCULAR; INTRAVENOUS at 10:00

## 2024-01-23 RX ADMIN — DEXMEDETOMIDINE HYDROCHLORIDE 20 MCG: 100 INJECTION, SOLUTION INTRAVENOUS at 09:08

## 2024-01-23 RX ADMIN — ENOXAPARIN SODIUM 40 MG: 40 INJECTION SUBCUTANEOUS at 07:22

## 2024-01-23 RX ADMIN — HYDROMORPHONE HYDROCHLORIDE 0.4 MG: 0.2 INJECTION, SOLUTION INTRAMUSCULAR; INTRAVENOUS; SUBCUTANEOUS at 11:45

## 2024-01-23 RX ADMIN — OXYCODONE HYDROCHLORIDE 10 MG: 10 TABLET ORAL at 18:18

## 2024-01-23 RX ADMIN — VARENICLINE 0.5 MG: 0.5 TABLET, FILM COATED ORAL at 15:33

## 2024-01-23 RX ADMIN — HYOSCYAMINE SULFATE 125 MCG: 0.12 TABLET SUBLINGUAL at 18:23

## 2024-01-23 RX ADMIN — Medication 10 MG: at 09:34

## 2024-01-23 RX ADMIN — FENTANYL CITRATE 50 MCG: 50 INJECTION INTRAMUSCULAR; INTRAVENOUS at 09:04

## 2024-01-23 RX ADMIN — GABAPENTIN 300 MG: 300 CAPSULE ORAL at 21:26

## 2024-01-23 RX ADMIN — HYDROMORPHONE HYDROCHLORIDE 0.5 MG: 1 INJECTION, SOLUTION INTRAMUSCULAR; INTRAVENOUS; SUBCUTANEOUS at 10:08

## 2024-01-23 RX ADMIN — ALBUTEROL SULFATE 8 PUFF: 108 INHALANT RESPIRATORY (INHALATION) at 09:35

## 2024-01-23 RX ADMIN — FENTANYL CITRATE 50 MCG: 50 INJECTION, SOLUTION INTRAMUSCULAR; INTRAVENOUS at 10:28

## 2024-01-23 RX ADMIN — OXYCODONE HYDROCHLORIDE 10 MG: 10 TABLET ORAL at 15:33

## 2024-01-23 RX ADMIN — LABETALOL HYDROCHLORIDE 200 MG: 200 TABLET ORAL at 21:27

## 2024-01-23 RX ADMIN — OXYCODONE HYDROCHLORIDE 10 MG: 10 TABLET ORAL at 12:02

## 2024-01-23 RX ADMIN — HYDROMORPHONE HYDROCHLORIDE 0.4 MG: 0.2 INJECTION, SOLUTION INTRAMUSCULAR; INTRAVENOUS; SUBCUTANEOUS at 10:43

## 2024-01-23 RX ADMIN — FENTANYL CITRATE 50 MCG: 50 INJECTION INTRAMUSCULAR; INTRAVENOUS at 10:08

## 2024-01-23 RX ADMIN — LIDOCAINE HYDROCHLORIDE 100 MG: 20 INJECTION, SOLUTION INFILTRATION; PERINEURAL at 08:53

## 2024-01-23 RX ADMIN — Medication 50 MG: at 08:55

## 2024-01-23 RX ADMIN — FENTANYL CITRATE 50 MCG: 50 INJECTION, SOLUTION INTRAMUSCULAR; INTRAVENOUS at 10:22

## 2024-01-23 RX ADMIN — PROPOFOL 150 MG: 10 INJECTION, EMULSION INTRAVENOUS at 08:53

## 2024-01-23 RX ADMIN — ONDANSETRON 4 MG: 2 INJECTION INTRAMUSCULAR; INTRAVENOUS at 10:22

## 2024-01-23 RX ADMIN — SODIUM CHLORIDE, POTASSIUM CHLORIDE, SODIUM LACTATE AND CALCIUM CHLORIDE: 600; 310; 30; 20 INJECTION, SOLUTION INTRAVENOUS at 08:48

## 2024-01-23 RX ADMIN — HYDROMORPHONE HYDROCHLORIDE 0.4 MG: 0.2 INJECTION, SOLUTION INTRAMUSCULAR; INTRAVENOUS; SUBCUTANEOUS at 11:01

## 2024-01-23 RX ADMIN — HYDROMORPHONE HYDROCHLORIDE 0.4 MG: 0.2 INJECTION, SOLUTION INTRAMUSCULAR; INTRAVENOUS; SUBCUTANEOUS at 10:54

## 2024-01-23 RX ADMIN — MIDAZOLAM 2 MG: 1 INJECTION INTRAMUSCULAR; INTRAVENOUS at 08:47

## 2024-01-23 RX ADMIN — HYDROMORPHONE HYDROCHLORIDE 0.2 MG: 0.2 INJECTION, SOLUTION INTRAMUSCULAR; INTRAVENOUS; SUBCUTANEOUS at 14:42

## 2024-01-23 RX ADMIN — DEXAMETHASONE SODIUM PHOSPHATE 8 MG: 4 INJECTION, SOLUTION INTRA-ARTICULAR; INTRALESIONAL; INTRAMUSCULAR; INTRAVENOUS; SOFT TISSUE at 09:04

## 2024-01-23 RX ADMIN — APREPITANT 40 MG: 40 CAPSULE ORAL at 07:21

## 2024-01-23 RX ADMIN — SCOPALAMINE 1 PATCH: 1 PATCH, EXTENDED RELEASE TRANSDERMAL at 16:01

## 2024-01-23 RX ADMIN — ONDANSETRON 4 MG: 4 TABLET, ORALLY DISINTEGRATING ORAL at 20:01

## 2024-01-23 RX ADMIN — FAMOTIDINE 20 MG: 20 TABLET ORAL at 20:01

## 2024-01-23 RX ADMIN — OXYCODONE HYDROCHLORIDE 10 MG: 10 TABLET ORAL at 21:27

## 2024-01-23 RX ADMIN — FAMOTIDINE 20 MG: 10 INJECTION, SOLUTION INTRAVENOUS at 08:21

## 2024-01-23 RX ADMIN — HYDROMORPHONE HYDROCHLORIDE 0.4 MG: 0.2 INJECTION, SOLUTION INTRAMUSCULAR; INTRAVENOUS; SUBCUTANEOUS at 10:35

## 2024-01-23 RX ADMIN — SUGAMMADEX 200 MG: 100 INJECTION, SOLUTION INTRAVENOUS at 09:59

## 2024-01-23 RX ADMIN — PHENYLEPHRINE HYDROCHLORIDE 100 MCG: 10 INJECTION INTRAVENOUS at 09:14

## 2024-01-23 RX ADMIN — DROPERIDOL 0.62 MG: 2.5 INJECTION, SOLUTION INTRAMUSCULAR; INTRAVENOUS at 10:43

## 2024-01-23 RX ADMIN — ACETAMINOPHEN 975 MG: 325 TABLET, FILM COATED ORAL at 07:21

## 2024-01-23 ASSESSMENT — ACTIVITIES OF DAILY LIVING (ADL)
ADLS_ACUITY_SCORE: 20

## 2024-01-23 NOTE — OR NURSING
PAR doc at bedside in PACU collaborating to treat post op pain patient restless and frustrated nothing is working. See MAR for orders.

## 2024-01-23 NOTE — PROGRESS NOTES
Brief Nephrology Note:    Labs stable. Pt states she dialyzed yesterday at her outpatient unit and plans to dialyze there on Thursday per her usual TTS schedule. Please page if needed, but no plans for dialysis while inpatient.    Jessica Vila PA-C  [phone]

## 2024-01-23 NOTE — PROGRESS NOTES
Anesthesia at bedside in PACU, patient reported chest pain, but improved, reports same pain as when came out of OR. Patient states pain is tolerable and much better. Orders for EKG and troponin, done and anesthesia notified. Patient is okay to transfer out of PACU to floor, relayed to RN that they would like troponin in two hours, order placed by anesthesia for trop at 1500.

## 2024-01-23 NOTE — ANESTHESIA CARE TRANSFER NOTE
Patient: Glenna Spears    Procedure: Procedure(s):  GASTRECTOMY, SLEEVE, LAPAROSCOPIC       Diagnosis: Morbid obesity (H) [E66.01]  Diagnosis Additional Information: No value filed.    Anesthesia Type:   General     Note:    Oropharynx: oropharynx clear of all foreign objects and spontaneously breathing  Level of Consciousness: awake  Oxygen Supplementation: face mask  Level of Supplemental Oxygen (L/min / FiO2): 6  Independent Airway: airway patency satisfactory and stable  Dentition: dentition unchanged  Vital Signs Stable: post-procedure vital signs reviewed and stable  Report to RN Given: handoff report given  Patient transferred to: PACU  Comments: Patient awake and breathing on her own. Oxygenating adequately on 6L facemask  Handoff Report: Identifed the Patient, Identified the Reponsible Provider, Reviewed the pertinent medical history, Discussed the surgical course, Reviewed Intra-OP anesthesia mangement and issues during anesthesia, Set expectations for post-procedure period and Allowed opportunity for questions and acknowledgement of understanding  Vitals:  Vitals Value Taken Time   /40 01/23/24 1014   Temp     Pulse 84 01/23/24 1018   Resp 12 01/23/24 1018   SpO2 94 % 01/23/24 1018   Vitals shown include unfiled device data.    Electronically Signed By: RAFAEL Hamilton CRNA  January 23, 2024  10:20 AM

## 2024-01-23 NOTE — ANESTHESIA PREPROCEDURE EVALUATION
Anesthesia Pre-Procedure Evaluation    Patient: Glenna Spears   MRN: [id number] : 1990        Procedure : Procedure(s):  GASTRECTOMY, SLEEVE, LAPAROSCOPIC  possible HERNIORRHAPHY, HIATAL, LAPAROSCOPIC          Past Medical History:   Diagnosis Date    ESRD (end stage renal disease) (H)     GERD (gastroesophageal reflux disease)     Hypertension     Obesity       Past Surgical History:   Procedure Laterality Date    AV FISTULA REPAIR       SECTION        Allergies   Allergen Reactions    Sulfa Antibiotics Other (See Comments)     Reaction as an infant- unsure of what happened    Nsaids Other (See Comments)     Has nephrotic range proteinuria and see nephrology's note.       Social History     Tobacco Use    Smoking status: Former     Types: Cigarettes     Quit date: 2023     Years since quittin.9     Passive exposure: Past    Smokeless tobacco: Never    Tobacco comments:     quit   Substance Use Topics    Alcohol use: Not Currently      Wt Readings from Last 1 Encounters:   24 125.9 kg (277 lb 9 oz)        Anesthesia Evaluation   Pt has had prior anesthetic. Type: General.    No history of anesthetic complications       ROS/MED HX  ENT/Pulmonary:     (+)     STEFAN risk factors,  hypertension, obese,             Intermittent, asthma                  Neurologic:  - neg neurologic ROS     Cardiovascular:     (+)  hypertension- -   -  - -                                      METS/Exercise Tolerance:     Hematologic:  - neg hematologic  ROS     Musculoskeletal:  - neg musculoskeletal ROS     GI/Hepatic:     (+) GERD, Asymptomatic on medication,                  Renal/Genitourinary: Comment: (Last HD 24)    (+) renal disease, type: ESRD, Pt requires dialysis, type: Hemodialysis,          Endo:     (+)               Obesity,       Psychiatric/Substance Use:  - neg psychiatric ROS     Infectious Disease:  - neg infectious disease ROS     Malignancy:  - neg malignancy ROS     Other:  - neg  "other ROS          Physical Exam    Airway        Mallampati: II   TM distance: > 3 FB   Neck ROM: full   Mouth opening: > 3 cm    Respiratory Devices and Support         Dental       (+) Minor Abnormalities - some fillings, tiny chips      Cardiovascular   cardiovascular exam normal          Pulmonary   pulmonary exam normal                OUTSIDE LABS:  CBC: No results found for: \"WBC\", \"HGB\", \"HCT\", \"PLT\"  BMP:   Lab Results   Component Value Date    CHLORIDE 100 12/22/2023     COAGS: No results found for: \"PTT\", \"INR\", \"FIBR\"  POC: No results found for: \"BGM\", \"HCG\", \"HCGS\"  HEPATIC: No results found for: \"ALBUMIN\", \"PROTTOTAL\", \"ALT\", \"AST\", \"GGT\", \"ALKPHOS\", \"BILITOTAL\", \"BILIDIRECT\", \"JOSY\"  OTHER: No results found for: \"PH\", \"LACT\", \"A1C\", \"KARTIK\", \"PHOS\", \"MAG\", \"LIPASE\", \"AMYLASE\", \"TSH\", \"T4\", \"T3\", \"CRP\", \"SED\"    Anesthesia Plan    ASA Status:  4    NPO Status:  NPO Appropriate    Anesthesia Type: General.     - Airway: ETT   Induction: Intravenous.   Maintenance: Balanced.        Consents    Anesthesia Plan(s) and associated risks, benefits, and realistic alternatives discussed. Questions answered and patient/representative(s) expressed understanding.     - Discussed: Risks, Benefits and Alternatives for BOTH SEDATION and the PROCEDURE were discussed     - Discussed with:  Patient      - Extended Intubation/Ventilatory Support Discussed: No.      - Patient is DNR/DNI Status: No     Use of blood products discussed: No .     Postoperative Care    Pain management: Multi-modal analgesia.   PONV prophylaxis: Ondansetron (or other 5HT-3), Dexamethasone or Solumedrol     Comments:               Conrad Kerns MD    I have reviewed the pertinent notes and labs in the chart from the past 30 days and (re)examined the patient.  Any updates or changes from those notes are reflected in this note.              # Severe Obesity: Estimated body mass index is 42.2 kg/m  as calculated from the following:    Height as of " "this encounter: 1.727 m (5' 8\").    Weight as of this encounter: 125.9 kg (277 lb 9 oz).      "

## 2024-01-23 NOTE — ANESTHESIA PROCEDURE NOTES
Airway       Patient location during procedure: OR       Procedure Start/Stop Times: 1/23/2024 8:58 AM  Staff -        CRNA: Ivonne Chacon APRN CRNA       Performed By: CRNAIndications and Patient Condition       Indications for airway management: saloni-procedural       Induction type:intravenous       Mask difficulty assessment: 2 - vent by mask + OA or adjuvant +/- NMBA    Final Airway Details       Final airway type: endotracheal airway       Successful airway: ETT - single  Endotracheal Airway Details        ETT size (mm): 7.5       Cuffed: yes       Successful intubation technique: video laryngoscopy       VL Blade Size: Glidescope 4       Grade View of Cords: 1       Adjucts: stylet       Position: Right       Measured from: gums/teeth       Secured at (cm): 22       Bite block used: None    Post intubation assessment        Placement verified by: capnometry, equal breath sounds and chest rise        Number of attempts at approach: 1       Number of other approaches attempted: 0       Secured with: tape       Ease of procedure: easy       Dentition: Intact and Unchanged    Medication(s) Administered   Medication Administration Time: 1/23/2024 8:58 AM

## 2024-01-23 NOTE — BRIEF OP NOTE
[facility]    Brief Operative Note    Pre-operative diagnosis: Morbid obesity (H) [E66.01]  Post-operative diagnosis Same as pre-operative diagnosis    Procedure: GASTRECTOMY, SLEEVE, LAPAROSCOPIC, N/A - Abdomen  possible HERNIORRHAPHY, HIATAL, LAPAROSCOPIC, N/A - Abdomen    Surgeon: Surgeon(s) and Role:     * John Smith MD - Primary  Anesthesia: General   Estimated Blood Loss:  5ml    Drains: None  Specimens:   ID Type Source Tests Collected by Time Destination   1 : Partial gastrectomy Tissue Stomach, Greater Curvature SURGICAL PATHOLOGY EXAM John Smith MD 1/23/2024  9:51 AM      Findings:   None.  Complications: None.  Implants: * No implants in log *

## 2024-01-23 NOTE — PROGRESS NOTES
Admitted/transferred from:   2 RN full   skin assessment completed by Acacia Justice, TREVA and Lisa ROSE RN.  Skin assessment finding: issues found 5 lap sites from surgery.     Interventions/actions: N/A      Will continue to monitor.

## 2024-01-23 NOTE — ANESTHESIA POSTPROCEDURE EVALUATION
Patient: Glenna Spears    Procedure: Procedure(s):  GASTRECTOMY, SLEEVE, LAPAROSCOPIC       Anesthesia Type:  General    Note:  Disposition: Admission; Inpatient   Postop Pain Control: Uneventful            Sign Out: Well controlled pain   PONV: No   Neuro/Psych: Uneventful            Sign Out: Acceptable/Baseline neuro status   Airway/Respiratory: Uneventful            Sign Out: Acceptable/Baseline resp. status   CV/Hemodynamics: Uneventful            Sign Out: Acceptable CV status; No obvious hypovolemia; No obvious fluid overload   Other NRE: NONE   DID A NON-ROUTINE EVENT OCCUR? YES    Event details/Postop Comments:  Desatted in the OR, mucus plug was suctioned out. Did well in PACU from a respiratory standpoint.           Last vitals:  Vitals Value Taken Time   /89 01/23/24 1145   Temp 36.2  C (97.1  F) 01/23/24 1015   Pulse 81 01/23/24 1158   Resp 8 01/23/24 1158   SpO2 99 % 01/23/24 1158   Vitals shown include unfiled device data.    Electronically Signed By: Paul Melgar MD  January 23, 2024  12:00 PM

## 2024-01-24 VITALS
TEMPERATURE: 99.1 F | OXYGEN SATURATION: 96 % | RESPIRATION RATE: 16 BRPM | HEIGHT: 68 IN | HEART RATE: 101 BPM | BODY MASS INDEX: 42.07 KG/M2 | SYSTOLIC BLOOD PRESSURE: 140 MMHG | WEIGHT: 277.56 LBS | DIASTOLIC BLOOD PRESSURE: 78 MMHG

## 2024-01-24 PROBLEM — N18.6 END STAGE RENAL DISEASE (H): Status: ACTIVE | Noted: 2021-12-25

## 2024-01-24 PROBLEM — N18.5 CKD (CHRONIC KIDNEY DISEASE) STAGE 5, GFR LESS THAN 15 ML/MIN (H): Status: ACTIVE | Noted: 2021-12-24

## 2024-01-24 PROBLEM — D64.9 NORMOCYTIC ANEMIA: Status: ACTIVE | Noted: 2021-12-25

## 2024-01-24 PROBLEM — N25.81 HYPERPARATHYROIDISM DUE TO RENAL INSUFFICIENCY (H): Status: ACTIVE | Noted: 2021-12-25

## 2024-01-24 PROBLEM — T82.590A DIALYSIS AV FISTULA MALFUNCTION (H): Status: ACTIVE | Noted: 2022-06-14

## 2024-01-24 LAB
ATRIAL RATE - MUSE: 86 BPM
DIASTOLIC BLOOD PRESSURE - MUSE: NORMAL MMHG
GLUCOSE BLDC GLUCOMTR-MCNC: 97 MG/DL (ref 70–99)
HGB BLD-MCNC: 12.3 G/DL (ref 11.7–15.7)
INTERPRETATION ECG - MUSE: NORMAL
P AXIS - MUSE: 73 DEGREES
PR INTERVAL - MUSE: 170 MS
QRS DURATION - MUSE: 102 MS
QT - MUSE: 392 MS
QTC - MUSE: 469 MS
R AXIS - MUSE: 28 DEGREES
SYSTOLIC BLOOD PRESSURE - MUSE: NORMAL MMHG
T AXIS - MUSE: 46 DEGREES
TROPONIN T SERPL HS-MCNC: 25 NG/L
VENTRICULAR RATE- MUSE: 86 BPM

## 2024-01-24 PROCEDURE — 250N000011 HC RX IP 250 OP 636: Performed by: NURSE PRACTITIONER

## 2024-01-24 PROCEDURE — 85018 HEMOGLOBIN: CPT | Performed by: STUDENT IN AN ORGANIZED HEALTH CARE EDUCATION/TRAINING PROGRAM

## 2024-01-24 PROCEDURE — 36415 COLL VENOUS BLD VENIPUNCTURE: CPT | Performed by: STUDENT IN AN ORGANIZED HEALTH CARE EDUCATION/TRAINING PROGRAM

## 2024-01-24 PROCEDURE — 84484 ASSAY OF TROPONIN QUANT: CPT | Performed by: STUDENT IN AN ORGANIZED HEALTH CARE EDUCATION/TRAINING PROGRAM

## 2024-01-24 PROCEDURE — 250N000013 HC RX MED GY IP 250 OP 250 PS 637: Performed by: NURSE PRACTITIONER

## 2024-01-24 RX ORDER — OXYCODONE HYDROCHLORIDE 5 MG/1
5 TABLET ORAL EVERY 6 HOURS PRN
Qty: 12 TABLET | Refills: 0 | Status: SHIPPED | OUTPATIENT
Start: 2024-01-24 | End: 2024-02-08

## 2024-01-24 RX ORDER — LABETALOL HYDROCHLORIDE 5 MG/ML
10 INJECTION, SOLUTION INTRAVENOUS EVERY 6 HOURS PRN
Status: DISCONTINUED | OUTPATIENT
Start: 2024-01-24 | End: 2024-01-24 | Stop reason: HOSPADM

## 2024-01-24 RX ADMIN — SUCROFERRIC OXYHYDROXIDE 500 MG: 500 TABLET, CHEWABLE ORAL at 09:16

## 2024-01-24 RX ADMIN — OXYCODONE HYDROCHLORIDE 10 MG: 10 TABLET ORAL at 06:32

## 2024-01-24 RX ADMIN — DOCUSATE SODIUM 50 MG AND SENNOSIDES 8.6 MG 2 TABLET: 8.6; 5 TABLET, FILM COATED ORAL at 09:16

## 2024-01-24 RX ADMIN — HYOSCYAMINE SULFATE 125 MCG: 0.12 TABLET SUBLINGUAL at 02:36

## 2024-01-24 RX ADMIN — OXYCODONE HYDROCHLORIDE 10 MG: 10 TABLET ORAL at 02:31

## 2024-01-24 RX ADMIN — ONDANSETRON 4 MG: 4 TABLET, ORALLY DISINTEGRATING ORAL at 06:37

## 2024-01-24 RX ADMIN — OMEPRAZOLE 20 MG: 20 CAPSULE, DELAYED RELEASE ORAL at 09:16

## 2024-01-24 RX ADMIN — OXYCODONE HYDROCHLORIDE 10 MG: 10 TABLET ORAL at 11:19

## 2024-01-24 ASSESSMENT — ACTIVITIES OF DAILY LIVING (ADL)
ADLS_ACUITY_SCORE: 22
ADLS_ACUITY_SCORE: 20

## 2024-01-24 NOTE — OP NOTE
[facility]    Operative Note    Pre-operative diagnosis: Morbid obesity (H) [E66.01]   Post-operative diagnosis * No post-op diagnosis entered *   Procedure: Procedure(s):  GASTRECTOMY, SLEEVE, LAPAROSCOPIC   Surgeon: Surgeon(s) and Role:     * John Smith MD - Primary   Assistant Surgeon      Anesthesia: The assistance of Ericka Beverly NP was required in this case due to advanced laparoscopy technique; she assisted by performing port assistance and providing exposure to patient bedside while I was dissecting.    I attest that no qualified resident or fellow was available to assist for this surgery due adequate training and skills to assist with this technically challenging case and instrumentation; as a consequence, I attest that Ericka performed these needed skills.    General    Estimated blood loss: Less than 50 ml   Drains: None   Specimens: ID Type Source Tests Collected by Time Destination   1 : Partial gastrectomy Tissue Stomach, Greater Curvature SURGICAL PATHOLOGY EXAM John Smith MD 1/23/2024  9:51 AM    ; partial gastrectomy   Findings: Normal liver; no cysts visible; no hiatal hernia   Complications: None   Implants: None         BOUGIE SIZE: 40 FR  DISTANCE FROM PYLORUS: 10 CM  STAPLE LINE REINFORCEMENT: NO  STAPLE LINE OVERSEW: NO  COMORBIDITIES:   Past Medical History:   Diagnosis Date    ESRD (end stage renal disease) (H)     GERD (gastroesophageal reflux disease)     Hypertension     Obesity        INDICATIONS FOR PROCEDURE  Glenna Spears is a 33 year old female who is morbidly obese.  Numerous weight loss attempts without surgery have been without success.     After understanding the risks and benefits of proceeding with a laparoscopic vertical sleeve gastrectomy, she agreed to an operation as outlined by me.    I reviewed the risks of surgery with Glenna Spears.    These include, but are not limited to, death, myocardial infarction,  "pneumonia, urinary tract infection, deep venous thrombosis with or without pulmonary embolus, abdominal infection from bowel injury or abscess, bowel obstruction, wound infection, and bleeding; furthermore, there is risk that the intended approach of the surgery (for example, laparoscopic) would need to be changed to open (laparotomy) if laparoscopy does not continue to be safe  Finally, there is also potential that the intended procedure will NOT provide benefit.    More specific risks related to vertical sleeve gastrectomy were detailed at the bariatric informational seminar and include the followin.) leak at the vertical sleeve staple line, 2.) stricture in the sleeve, 3.) nausea, vomiting, and dehydration for several months, 4.) adhesions causing bowel obstruction, 5.) rapid weight loss causing a higher rate of gallstone formation during the first 6 months after surgery, 6.) decreased absorption of vitamins because of the reduced stomach size, 7.) weight regain if inappropriate food intake occurs.    The BMI that we are treating this patient for was measured at the initial consultation visit in our bariatric program and it was: 48.7 kg/m2 (as calculated just below).      The initial consult height, weight, and BMI are as follows:    Height: 5' 8\"      2023    10:39 AM    BARIATRIC WEIGHT TRACKING   Initial BMI 48.66       Our weight loss surgery program requires weight loss prior to bariatric surgery and currently the height, weight, and BMI are as follows:    Height: 172.7 cm (5' 8\"), Weight: 125.9 kg (277 lb 9 oz), and currently the Body mass index is 42.2 kg/m .    Due to the patient's comorbidity conditions of ESRD requiring current dialysis, GERD, and hypertension, in association with elevated body mass index, bariatric surgery has been recommended and is being performed today.    Moreover, as the surgeon performing this procedure, I certify that the following are true in regards to this patient " at or prior to the day of surgery:    1. The patient's body mass index (BMI) is or has been greater than or equal to 35 kg/m2.  2. The patient has at least one co-morbidity related to obesity (as outlined above).  3. The patient has been previously unsuccessful with medical treatment for obesity.  Please note that some of this information has been documented as part of a comprehensive pre-bariatric surgery process in the outpatient clinic and is NOT immediately available in the inpatient encounter for this bariatric operation.      OPERATIVE PROCEDURE:     Glenna Spears was brought to the operating room and prepared in routine fashion. Under the benefits of general anesthesia, a left upper quadrant Veress needle was inserted and pneumoperitoneum was established using carbon dioxide gas to a maximum pressure of 15 mmHg. A total of five ports were placed into the abdomen.     A liver retractor was placed through the rightmost port and this provided a view of the upper stomach. The operation was started by dividing the short gastric vessels off the greater curvature of the stomach. This dissection was carried up to the angle of His, and ligasure dissector was used for hemostasis.     A bougie (size noted above) was passed into the stomach and I used 3 blue loads and 1 white load of the [employer] [place] flex linear cutter stapler device to create a vertical sleeve gastrectomy with the bougie as a template. The bougie was removed.    A transabdominal properitoneal anesthetic block was performed using 30 ml 0.5% bupivicaine diluted with 90 ml saline (120 mL total); this was injected using 22gauge spinal needle into the properitoneal planes around the ports and laterally along the anterior axillary line under direct optical guidance. Some of the mixture was used to inject local anesthetic at the skin of each incision as well.    The sleeve gastrectomy specimen (partial gastrectomy) was now removed from the abdomen through  the 15 mm port.    Hemostasis was secured, and the liver retractor was removed.    All ports were then removed from the abdomen.    All needle and sponge counts were correct x2 at the end of the operation, and I was present for all critical components of the procedure.     Skin incisions were closed using skin staples, and sterile dressings were placed.     John Smith MD  Surgery  [phone] (hospital )  [phone] (clinic nurses)

## 2024-01-24 NOTE — PROGRESS NOTES
Patient discharged home following hospital stay for:    Vitals stable.  Oxygen status: on room air  Patient tolerating diet: bariatric full liquid     Belongings sent with patient. IV site discontinued. Discharge meds to discharge pharmacy. AVS and education gone over with patient. Pt to follow up as outpatient and with PCP. Pt verbalized understanding of all education and information.

## 2024-01-24 NOTE — PHARMACY-CONSULT NOTE
Bariatric Consult    Medications evaluated as requested per Bariatric Consult. Patient may swallow tablets/capsules ONLY if less than   inch.  Larger tablets/capsules should be broken, crushed or split.    No medication changes were needed based on the Bariatric Medication Management Policy.    The pharmacist will continue to follow as new medications are ordered.    Gretta Cosby, PharmD       PAST MEDICAL HISTORY:  Attention deficit disorder     HLD (hyperlipidemia)     Hypothyroidism     Unilateral inguinal hernia with obstruction and without gangrene, recurrence not specified

## 2024-01-24 NOTE — PLAN OF CARE
Goal Outcome Evaluation:      Plan of Care Reviewed With: patient    Overall Patient Progress: no changeOverall Patient Progress: no change     Temp: 97.8  F (36.6  C) Temp src: Oral BP: (!) 160/81 Pulse: 91   Resp: (!) 9 (CAPNO) SpO2: 95 % O2 Device: Oxymask Oxygen Delivery: 4 LPM

## 2024-01-24 NOTE — PHARMACY-ADMISSION MEDICATION HISTORY
Pharmacist Admission Medication History    Admission medication history is complete. The information provided in this note is only as accurate as the sources available at the time of the update.    Information Source(s): Patient and CareEverywhere/SureScripts via in-person    Pertinent Information: Patient was able to recall all medications, as well as medications that they have not started yet that were prescribed prior to admission for after the patient discharges    Changes made to PTA medication list:  Added: None  Deleted: None  Changed: Updated cinacalcet to correct strength patient takes at home      Allergies reviewed with patient and updates made in EHR: yes    Medication History Completed By: Claudio Roach [facility] 1/24/2024 9:48 AM    PTA Med List   Medication Sig Last Dose    acetaminophen (TYLENOL) 325 MG tablet Take 325-650 mg by mouth every 4 hours as needed Past Week    albuterol (PROAIR HFA/PROVENTIL HFA/VENTOLIN HFA) 108 (90 Base) MCG/ACT inhaler Inhale 2 puffs into the lungs as needed Past Month    amLODIPine (NORVASC) 10 MG tablet Take 1 tablet by mouth every evening 1/22/2024 at PM    calcitRIOL (ROCALTROL) 0.5 MCG capsule Take 0.5 mcg by mouth every evening 1/22/2024 at PM    cinacalcet (SENSIPAR) 90 MG tablet Take 90 mg by mouth every evening 1/22/2024 at PM    famotidine (PEPCID) 20 MG tablet Take 20 mg by mouth as needed 1/20/2024 at PM    gabapentin (NEURONTIN) 100 MG capsule Take 300 mg by mouth at bedtime 1/22/2024 at PM    ipratropium - albuterol 0.5 mg/2.5 mg/3 mL (DUONEB) 0.5-2.5 (3) MG/3ML neb solution Inhale 3 mLs into the lungs as needed Past Month    labetalol (NORMODYNE) 200 MG tablet Take 200 mg by mouth every evening 1/22/2024 at PM    levonorgestrel (KYLEENA) 19.5 MG IUD 1 Intra Uterine Device by Intrauterine route once     omeprazole (PRILOSEC) 20 MG DR capsule Take 1 capsule (20 mg) by mouth daily 1/22/2024    oxyCODONE (ROXICODONE) 5 MG tablet Take 1 tablet (5 mg) by mouth every  6 hours as needed for moderate to severe pain     varenicline (CHANTIX) 0.5 MG tablet Take 0.5 mg by mouth daily Past Week    VELPHORO 500 MG CHEW chewable tablet Take 500 mg by mouth 3 times daily (with meals) Past Week    [DISCONTINUED] B Complex-C-Zn-Folic Acid (DIALYVITE/ZINC) TABS Take 1 tablet by mouth every evening Past Week

## 2024-01-24 NOTE — PROGRESS NOTES
Surgery Note   01/24/24     No acute events. Denies chest pain. Has ambulated and voided. Having pain with PO - hasn't yet taken much. Will try to take more fluids this morning. She is planning for dialysis at her home center tomorrow. Pain is controlled.     Vitals reviewed,     GEN: NAD  CV: Non-cyanotic   RESP: Nonlabored breathing on RA   ABD: soft, non-tender, without guarding or rebound tenderness, incisions c/d/I with primapore dressings   PSYCH: cooperative     Labs reviewed   Hgb 12.3  Troponin stable     No new imaging     AP: 33 year old F with history of class III obesity and ESRD on dialysis s/p laparoscopic sleeve gastrectomy with Dr. Smith 1/23/2024.    Mild tachycardia - stable hemoglobin   PO pain control   Bariatric clears   Dialysis tomorrow   RN to remove staples prior to discharge   Disposition: Likely home today     Karmen Meyer   Surgery Resident   [phone]

## 2024-01-24 NOTE — PLAN OF CARE
"BP (!) 160/81 (BP Location: Right arm)   Pulse 91   Temp 97.8  F (36.6  C) (Oral)   Resp (!) 9   Ht 1.727 m (5' 8\")   Wt 125.9 kg (277 lb 9 oz)   SpO2 95%   BMI 42.20 kg/m       Problem: Adult Inpatient Plan of Care  Goal: Plan of Care Review  Description: The Plan of Care Review/Shift note should be completed every shift.  The Outcome Evaluation is a brief statement about your assessment that the patient is improving, declining, or no change.  This information will be displayed automatically on your shift  note.  Outcome: Progressing     Goal Outcome Evaluation: Pt. A&Ox4- HTN. VSS on 3L oxy mask- destats when sleeping. Clears efren diet- poor intake. SBA- up walking in halls this shift. Intermittent nausea w/ upset stomach- Zofran, Pepcid & Levsin given. L AV fistula. PIV SL. Oxy given for pain.         "

## 2024-01-24 NOTE — DISCHARGE INSTRUCTIONS
After Bariatric Surgery Discharge Instructions  [facility]     Note: Ask your nurse to order your medications from the pharmacy. Be sure you have your medications with you when you leave.  Diet on discharge: bariatric full liquid.    How much fluid should I drink?  Strive for 48-64 ounces daily.  Carry a water bottle with you without a straw or sports top. Drink from it often.  Keep track of how much fluid you drink in a day.  Remember:  -Do not use straws, chew gum or suck on hard candies. They may cause painful gas.    -Sip, don't slurp when you drink.    -Practice small sips using a medicine cup for the first week postop.   -No ice or cold drinks. This could cause gas or spasms.   -No coffee, soda pop or drinks with caffeine. These may cause stomach pain.   -No alcohol. It is bad for your liver and will cause stomach pain.    How often should I do my deep breathing and coughing?  Use your incentive spirometer (small plastic breathing device) every hour while awake after you get home. Using the incentive spirometer helps you deep breath. Continuing to cough and deep breath will help prevent fevers and pneumonia.   If you do not have a fever after one week, you can stop using the incentive spirometer and discard it.     You can continue to take deep breaths without the incentive spirometer every one to two hours while awake for the month after surgery.    What kinds of activity can I do?  Get plenty of rest the first few days after surgery and try to balance rest and activity. You will need some time to recover - you may be more tired than you realize at first. You'll feel better and heal faster if you take good care of yourself.  For 4 weeks after surgery (Please review restrictions at your one week visit, they could change based on how well you are doing):  -Don't lift more than 20 pounds.   -Take 4-5 short walks every day.  -Don't jog, run, or do belly exercises.  Don't  swim, bathe or use a hot tub until your cuts are healed (scabs are gone).  You may shower  Don't plan to fly or take a road trip within the first 1 to 3 months after surgery.  You could get a blood clot in your legs. If you must travel, get up and move around every hour for at least 5 minutes before continuing on your journey.  Your care team can help you decide when it's safe for you to travel.     What can I do for pain control?  You had major belly surgery that involves all layers of your belly muscles. Pain is expected, even for some as far out as 6-8 weeks after surgery. Moving, sneezing, coughing, and breathing will cause discomfort because these activities use your belly muscles.   Please see your after visit summary medication review for what pain medication will be continued, discontinued and newly started for you.    You can take opioid pain medicine if prescribed and if needed. Try to wean off from it as soon as you feel comfortable.   Do not drive while you are taking opioid pain medicine. This is dangerous.  You can take acetaminophen (Tylenol) between your prescribed pain medicine on a scheduled basis OR take it scheduled every 6 hours (check with your care team for specifics).  -Acetaminophen formulation options:    -Liquid   -Caplet (Cut caplet in half before taking)   -Do not take more than 3000 mg Tylenol in a 24 hour period.  -You may also take Tylenol for pain in place of the opioid pain medicine (check with your care team for specifics).   You can apply ice or heat to the affected area(s). Just remember to wrap the ice in something and limit icing sessions to 20 minutes. Excessive icing can irritate the skin or cause skin damage.  You can apply heat with a hot, wet towel or heating pad. Just like cold therapy, limit heat application to 20 minutes. Never sleep with a heating pad on. It could cause severe burns to your skin.  Wear your binder to support your belly muscles if you have one.  Take  this off a little more each day and try to be off completely by 2 weeks after surgery. If you don't need your binder for comfort or support, you don't need to wear it.   You may not be able to sleep in a comfortable position for a few weeks after surgery. This is normal. You may be more comfortable sleeping in a recliner or propped up with 3 pillows for the first couple of weeks after surgery.  Do not take NSAID's (Non-Steroidal Anti-Inflammatory Drugs) (examples: ibuprofen, Motrin, Advil, Aleve, and Naproxen), aspirin, or use pain patches with NSAID's. They will increase your risk of bleeding or getting an ulcer.    Please call the clinic for any of the following pain concerns, we would like to talk to you:  -pain that does not improve with rest  -pain that gets worse and worse  -pain that is not controlled by your pain medicine  -a sudden severe increase in pain    What medications will I need to take after surgery?  You may be discharged with the following types of medications: omeprazole 20 mg once daily for heartburn symptom prevention, zofran as needed for nausea and a medication called hyoscyamine (levsin) as needed for cramping.Please see your after visit summary medication review for what will be continued, discontinued and newly started.  It is important to reduce the amount of acid in your new stomach for a couple of months after surgery while it is still healing. We will prescribe an acid reducer or antacid. Take it as directed. This will help prevent ulcers, heartburn and acid reflux.  If you took an acid reducer before you had surgery, your care team will let you know what acid reducer you will take after surgery.   It is okay to swallow any medications smaller than   inch in size.   -If it is larger than   inch, it may need to be cut, crushed, or in a liquid form. Check with your care team about which way is most appropriate for you and your medications.    What should I know about my incisions  (cuts)?  Your incisions are covered with white steri strips or butterfly tape and have band aids or gauze over the top. If you have gauze or band aids, they can come off in the hospital.  Leave your steri strips on until they fall off on their own. If the steri strips don't fall off after 1 to 2 weeks, you can take them off. If they fall off earlier, replace them with clean band aids trying to avoid touching the incision itself.   If you have gauze covered by a clear dressing, remove 2-3 days after surgery or as directed by your care team.  You may shower in the hospital after surgery and can get your incision coverings wet.  Do not submerge in water (e.g. No baths, swimming pools, hot tubs) until your care team tells you it is okay and your incisions are completely healed.    Call the clinic if you have any of these signs or symptoms of infection:  -Redness around the site.  -Drainage that smells bad.  -Drainage that is thick yellow or green.  -An increase in pain around the incision site  -An increase in swelling around the incision site  -Heat or warmth around incision site   -Fever of 101.5  F (38.3  C) or higher when taken under the tongue.    -Chills    Will my urine or bowel movements change?   Your first bowel movements (stools) will likely be liquid. You may also notice old blood or a darker color (black or maroon color) in your bowel movements.  This is not unusual and usually goes away after the first week, if not sooner. You may not have a bowel movement for a week.   If you have not had a bowel movement for at least three days after your surgery date and are passing gas, you can use over the counter stool softeners.  Please stop taking the stool softeners and laxatives if your stools are loose.  Increasing fluids and activity as well as getting off narcotics will help prevent constipation.    Call the clinic if:   -You have stomach pain.   -You continue to have constipation.  -You have excessive  "bloating after walking and passing gas.    How can I prevent dehydration if I feel nauseated (sick to my stomach) and vomit (throw up)?   Vomiting is not normal after surgery. If you continue to have nausea and vomiting, call the clinic.   Nausea can be a sign of dehydration. That is why it is very important to track your fluids.  Do not nap more than one hour during the day. Set a timer to wake yourself up, if needed. Too much sleep will keep you from drinking enough fluid during the day and lead to dehydration.  No outside activity in hot, humid weather until you can drink 48 to 64 ounces of fluid in 24 hours. If you sweat a lot, your body may lose too much water.  Try to take a 1 ounce sip of water (one medicine cup) every 15 minutes.  Set a timer to remind yourself.    Call the clinic if you have any of these signs or symptoms of dehydration:  -Dark colored urine  -Urinating (pass water) less than 2-3 times per day  -Lack of energy  -Nausea  -Dizziness  -Headache    Call the clinic ANY TIME at [phone] if:  -Your pain medicine is not working.  -You have a fever ? 101.5 F.  -You have belly or left shoulder pain that gets worse and worse.  -You have a swollen leg with redness, warmth, or pain behind the knee or calf.  -You have chest pain   -You feel very short of breath.  -You have a sudden severe increase in heart rate.  -You have vomiting that gets worse and worse.  -You have constant nausea (feeling sick to your stomach) that does not go away with medication.  -You have trouble swallowing.  -You have an increasing feeling that \"something is not right\".  -You have hiccups that do not stop.  -You have any questions or concerns.    AFTER HOURS QUESTIONS OR CONCERNS: Call [phone] and ask to speak with surgery resident if you are having troubles in the evenings, at night, or on weekends. Please call if you experience increasing abdominal pain, nausea, vomiting, increasing drainage from your wounds, chills, " or fever >101.5    If you have to go to the Emergency Room, we prefer you go to the hospital that did your surgery. Please let them know that you had bariatric surgery and to notify your surgeon.    When should I go back to the clinic?  Follow up with your care team in 1-2 weeks.   If this appointment was not already made, please call: [phone]    Appointments located at [facility] clinic:  [facility] [place] [place]    [address]  [place] [address]

## 2024-01-24 NOTE — DISCHARGE SUMMARY
"[facility]   MIS Discharge Summary    Date of Admission: 1/23/2024  Date of Discharge: 1/24/2024    Admission Diagnosis:  1. Morbid Obesity   2. ESRD on hemodialysis     Discharge Diagnosis:  1. Same as above  2. S/p Laparoscopic sleeve gastrectomy      Consultations:  IP pharmacy   IP nephrology     Procedures:  1. Laparoscopic sleeve gastrectomy by Dr Sarah Coleman HPI:  History of ESRD secondary to HTN, long standing nephrotic range proteinuria, and drug use (sober >1 year). Referred to bariatric surgery by Kidney Transplant team. Needs BMI <35 for transplant consideration. Started with team 1/2023, high weight 330lb (BMI 49). Started semaglutide while working on smoking cessation and bariatric clearances. Continues Dialysis three times a week. Has chosen to undergo Laparoscopic sleeve gastrectomy.     Hospital Course:  The patient was admitted and underwent the above procedure. The patient tolerated the procedure well. There were no complications. The patient's diet was slowly advanced as bowel function returned. Pain was controlled with oral pain medication and the patient was able to ambulate and void without difficulty. The patient received appropriate education post operatively. On POD #1 the patient was discharged to home. Plans for dialysis 1/25 at home dialysis unit.     Discharge Physical Exam:  BP (!) 140/78 (BP Location: Right arm)   Pulse 101   Temp 99.1  F (37.3  C) (Oral)   Resp 16   Ht 1.727 m (5' 8\")   Wt 125.9 kg (277 lb 9 oz)   SpO2 96%   BMI 42.20 kg/m      Gen: NAD  Lungs: non-labored breathing  CV: regular rhythm, normal rate   Abd: obese, soft, nondistended, appropriately tender, incisions are c/d/i  Ext: no peripheral edema  Neuro: AOx3    Meds:       Review of your medicines      START taking      Dose / Directions   hyoscyamine 0.125 MG tablet  Commonly known as: LEVSIN  Used for: Morbid obesity (H), At high risk for postoperative " complications      Dose: 125 mcg  Take 1 tablet (125 mcg) by mouth every 4 hours as needed for cramping  Quantity: 30 tablet  Refills: 1     omeprazole 20 MG DR capsule  Commonly known as: PriLOSEC  Used for: Morbid obesity (H), At high risk for postoperative complications      Dose: 20 mg  Take 1 capsule (20 mg) by mouth daily  Quantity: 30 capsule  Refills: 3     ondansetron 4 MG ODT tab  Commonly known as: ZOFRAN ODT  Used for: Morbid obesity (H), At high risk for postoperative complications      Dose: 4 mg  Take 1 tablet (4 mg) by mouth every 6 hours as needed for nausea  Quantity: 15 tablet  Refills: 0     oxyCODONE 5 MG tablet  Commonly known as: ROXICODONE  Used for: Morbid obesity (H), S/P laparoscopic sleeve gastrectomy      Dose: 5 mg  Take 1 tablet (5 mg) by mouth every 6 hours as needed for moderate to severe pain  Quantity: 12 tablet  Refills: 0     senna-docusate 8.6-50 MG tablet  Commonly known as: SENOKOT-S/PERICOLACE  Used for: Morbid obesity (H), At high risk for postoperative complications      Dose: 2 tablet  Take 2 tablets by mouth daily as needed for constipation (While taking narcotic pain medications.  Stop taking if having loose stools.)  Quantity: 30 tablet  Refills: 1        CONTINUE these medicines which have NOT CHANGED      Dose / Directions   acetaminophen 325 MG tablet  Commonly known as: TYLENOL      Dose: 325-650 mg  Take 325-650 mg by mouth every 4 hours as needed  Refills: 0     albuterol 108 (90 Base) MCG/ACT inhaler  Commonly known as: PROAIR HFA/PROVENTIL HFA/VENTOLIN HFA      Dose: 2 puff  Inhale 2 puffs into the lungs as needed  Refills: 0     amLODIPine 10 MG tablet  Commonly known as: NORVASC      Dose: 1 tablet  Take 1 tablet by mouth every evening  Refills: 0     calcitRIOL 0.5 MCG capsule  Commonly known as: ROCALTROL      Dose: 0.5 mcg  Take 0.5 mcg by mouth every evening  Refills: 0     cinacalcet 90 MG tablet  Commonly known as: SENSIPAR      Dose: 90 mg  Take 90 mg  by mouth every evening  Refills: 0     famotidine 20 MG tablet  Commonly known as: PEPCID      Dose: 20 mg  Take 20 mg by mouth as needed  Refills: 0     gabapentin 100 MG capsule  Commonly known as: NEURONTIN      Dose: 300 mg  Take 300 mg by mouth at bedtime  Refills: 0     ipratropium - albuterol 0.5 mg/2.5 mg/3 mL 0.5-2.5 (3) MG/3ML neb solution  Commonly known as: DUONEB      Dose: 3 mL  Inhale 3 mLs into the lungs as needed  Refills: 0     labetalol 200 MG tablet  Commonly known as: NORMODYNE      Dose: 200 mg  Take 200 mg by mouth every evening  Refills: 0     levonorgestrel 19.5 MG IUD  Commonly known as: KYLEENA      Dose: 1 Intra Uterine Device  1 Intra Uterine Device by Intrauterine route once  Refills: 0     varenicline 0.5 MG tablet  Commonly known as: CHANTIX      Dose: 0.5 mg  Take 0.5 mg by mouth daily  Refills: 0     Velphoro 500 MG Chew chewable tablet  Generic drug: sucroferric oxyhydroxide      Dose: 500 mg  Take 500 mg by mouth 3 times daily (with meals)  Refills: 0        STOP taking    Dialyvite/Zinc Tabs        vitamin D3 250 mcg (69412 units) capsule  Commonly known as: CHOLECALCIFEROL        Wegovy 2.4 MG/0.75ML pen  Generic drug: Semaglutide-Weight Management              Where to get your medicines      These medications were sent to [facility] - [place], [place] - [address]  [address] [address]    Phone: [phone]     oxyCODONE 5 MG tablet         Additional instructions:  After Care     Future Labs/Procedures    Activity     Comments:    Your activity upon discharge: activity as tolerated and no lifting of more than 20lb for 4 weeks    Contact Surgeon     Comments:    Contact your Surgeon IF:  ~  Your pain is not controlled by pain medication or pain that suddenly increases;  ~  You develop a fever greater than 101 and/or chills 24 hours after surgery;  ~  You notice redness or bad smelling drainage at the surgery site;  ~  You notice  a large amount of bleeding from the surgery site that does not stop when moderate pressure is applied;  ~  You are unable to pass urine within 8-10 hours after surgery;  ~  You have an upset stomach or vomiting lasting more than a day;  ~  You have a skin reaction to tape or dressing such as redness, itching or rash at the surgery site.    Diet     Comments:    Follow this diet upon discharge: bariatric full liquids    Discharge diet     Comments:    Phase II: Full liquid diet, room temperature with one protein shake per day. Goal water intake: 40oz per day by post-operative day #4. Meet with Bariatric Dietitian in 1 week to discuss dietary changes.    Do not drive     Comments:    Do NOT drive until after you have been completely off narcotics for a minimum of 24 hours.    Follow-up Care - Dietician     Comments:    Follow-up with your bariatric dietitian in 1 week.    Return to Work     Comments:    May return to work in 2 weeks if cleared by Bariatric surgeon.    Shower/Bathing     Comments:    Okay to shower. Do NOT soak in tub for 2-4 weeks.    Surgical Site Care     Comments:    If you have skin tapes on your surgical site(s), leave them on the surgical site(s) until they fall off on their own or 1 week after surgery date. May remove Band-Aid one day after surgery.    Wash your hands     Comments:    Wash your hands with soap and warm water before you touch the site of surgery.    Weight Restrictions     Comments:    Do not lift over 20 pounds for 2 weeks.              Follow Up:  -Follow up with Ericka Beverly NP in clinic 2/6/2024      BARIATRIC PATIENTS:  Call [phone] to schedule or to reach your care team    Ericka Beverly CNP  [facility] [place]

## 2024-01-25 ENCOUNTER — PATIENT OUTREACH (OUTPATIENT)
Dept: ENDOCRINOLOGY | Facility: CLINIC | Age: 34
End: 2024-01-25
Payer: COMMERCIAL

## 2024-01-25 NOTE — PROGRESS NOTES
"RN Post-Op/Post-Discharge Care Coordination Note    Ms. Glenna Spears is a 33 year old female who underwent laparoscopic gastric sleeve on 1/23 with John Smith MD.  Spoke with Patient.    Support  Patient able to care for self independently     Health Status  Fevers/chills: Patient denies any fever or chills.    Pain: Rates pain a 9 on a 10 Scale when drinking.  Alternating Narcotic Analgesic with Tylenol.  Encouraged non-medication management modalities: Heating pad, with barrier, to abdomen, Frequent position changes, and Walking    Nausea/Vomiting: Patient reports nausea and vomiting.    Eating/drinking:  \"Not much\" . Reminded to drink small sips slowly.  Encouraged room temperature/warm fluids. Advised to contact us immediately if she is unable to meet fluid goals. Abdominal binder wearing as needed    Fluid Ounces per day: don't really know how much fluid I am getting. Patient advised to record her intake daily of both fluid and protein.     Cramping: yes. Will start taking Levsin every 4 hours around the clock for cramping    Bowel/Bladder habits: Patient reports no bowel movement since surgery. Last bowel movement 1/22.  Urine feels thick coming out.    New Medications:  Omeprazole (opening capsules), Levsin, Zofran, Oxycodone, Tylenol, Senna, and patient was reminded not to take NSAIDS    Activity: walking    Breathing: Did the patient receive an incentive spirometer? yes.  Reminded patient to use incentive spirometer- 5 to 10 deep breaths each hour while awake.    Other symptoms: Tachycardia: yes has been happening last 6 months or so is still ongoing, Dizziness/lightheadedness: no, Shortness of breath, dyspnea with exertion, leg pain/swelling: no.      Drains (RANDALL): N/A    Incisions: Patient denies any signs and symptoms of infection..  Wound closure:  Skin Sealant  Steri-strips    Pathology reviewed with patient:  N/A      Activity/Restrictions  No lifting in excess of 20 pounds for 4 " weeks    Forms/Letters  Yes. All forms should be faxed to [phone].  Return to Work Date: 2/20/24    Postop Appointment Reminder  January 29 at 9 AM confirmed with the patient:  Yes.    Additional Questions: All of her questions were answered including reviewing diet, restrictions, and wound care.  She will call this office if she has any further questions and/or concerns.        Whom and When to Call  Nurses: [phone]  Fax: [phone]     Patient advised if any concerns outside of clinic hours, to call hospital at [phone] and ask to speak to on-call bariatric resident. They should present to ED at [facility] to be assessed if urgency arises.    Risk for:  urinary tract infection, pneumonia, leg clots (deep vein thrombosis), lung clots (pulmonary emboli), injury to the bowels or other organs, bowel obstruction, hernia, and gastrointestinal bleeding.    Weight loss surgery side effects:  abdominal pain, cramping, bloating, difficulty swallowing, constipation, nausea, vomiting, diarrhea, frequent loose stools, dehydration, hair loss, excess skin, protein, iron and vitamin deficiencies, malnutrition, fatigue, and heartburn.  Bariatric surgery risks may include:  an internal leak at the staple line, narrowing of the esophagus, stomach or intestines (strictures), gallstones, bowel obstruction, inflammation of the esophagus or stomach, ulcers with or without bleeding, injuries to other organs, hernias, and iron deficiency.    Sleeve gastrectomy side effects:  leaks are more common after this procedure.  Risks include:  internal leak at the vertical sleeve staple line; nausea, vomiting, and dehydration for several months; bowel obstruction; gallstones during the first 6 months related to rapid weight loss; decreased absorption of vitamins and protein because of the reduced stomach size; weight regain if you increase food intake; narrowing of stomach, esophagus or intestines (stricture); injury to other  organs; hernia; and ulcers.

## 2024-01-26 LAB
PATH REPORT.COMMENTS IMP SPEC: NORMAL
PATH REPORT.COMMENTS IMP SPEC: NORMAL
PATH REPORT.FINAL DX SPEC: NORMAL
PATH REPORT.GROSS SPEC: NORMAL
PATH REPORT.MICROSCOPIC SPEC OTHER STN: NORMAL
PATH REPORT.RELEVANT HX SPEC: NORMAL
PHOTO IMAGE: NORMAL

## 2024-01-26 NOTE — PATIENT INSTRUCTIONS
"Recommendations from today's MTM visit:                                                      Options for bariatric vitamins sent via [employer] as soon as we verify with nephrology what their thoughts are on the post bariatric surgery vitamins.  Adjust administration of the following medications for approx 3 months post Sleeve Gastrectomy:   Sensipar: cut in half and take both halves by mouth daily   Gabapentin 300 mg capsule: Open capsule and sprinkle contents over 1 tablespoon of applesauce, yogurt or sugar free pudding and swallow immediately (do not chew).   If not tolerated can switch to liquid.   Follow-up: Return in about 4 weeks (around 2/13/2024) for Medication Therapy Management Pharmacist Visit.    It was great speaking with you today.  I value your experience and would be very thankful for your time in providing feedback in our clinic survey. In the next few days, you may receive an email or text message from [employer] with a link to a survey related to your  clinical pharmacist.\"     To schedule another MTM appointment, please call the clinic directly or you may call the MTM scheduling line at [phone] or toll-free at [phone].     My Clinical Pharmacist's contact information:                                                      Please feel free to contact me with any questions or concerns you have.      Lauren Bloch, PharmD, BCACP   Medication Therapy Management Pharmacist   [facility]       "

## 2024-01-29 ENCOUNTER — VIRTUAL VISIT (OUTPATIENT)
Dept: ENDOCRINOLOGY | Facility: CLINIC | Age: 34
End: 2024-01-29
Payer: COMMERCIAL

## 2024-01-29 ENCOUNTER — TELEPHONE (OUTPATIENT)
Dept: PHARMACY | Facility: CLINIC | Age: 34
End: 2024-01-29

## 2024-01-29 VITALS — BODY MASS INDEX: 40.47 KG/M2 | WEIGHT: 267 LBS | HEIGHT: 68 IN

## 2024-01-29 DIAGNOSIS — K21.9 GASTROESOPHAGEAL REFLUX DISEASE, UNSPECIFIED WHETHER ESOPHAGITIS PRESENT: ICD-10-CM

## 2024-01-29 DIAGNOSIS — K59.00 CONSTIPATION, UNSPECIFIED CONSTIPATION TYPE: Primary | ICD-10-CM

## 2024-01-29 DIAGNOSIS — E66.01 CLASS 3 SEVERE OBESITY WITH SERIOUS COMORBIDITY AND BODY MASS INDEX (BMI) OF 45.0 TO 49.9 IN ADULT, UNSPECIFIED OBESITY TYPE (H): Primary | ICD-10-CM

## 2024-01-29 DIAGNOSIS — K59.00 CONSTIPATION, UNSPECIFIED CONSTIPATION TYPE: ICD-10-CM

## 2024-01-29 DIAGNOSIS — E66.813 CLASS 3 SEVERE OBESITY WITH SERIOUS COMORBIDITY AND BODY MASS INDEX (BMI) OF 45.0 TO 49.9 IN ADULT, UNSPECIFIED OBESITY TYPE (H): Primary | ICD-10-CM

## 2024-01-29 DIAGNOSIS — Z98.84 S/P LAPAROSCOPIC SLEEVE GASTRECTOMY: ICD-10-CM

## 2024-01-29 PROCEDURE — 99024 POSTOP FOLLOW-UP VISIT: CPT | Mod: 95

## 2024-01-29 RX ORDER — OMEPRAZOLE 40 MG/1
40 CAPSULE, DELAYED RELEASE ORAL DAILY
Qty: 30 CAPSULE | Refills: 1 | Status: SHIPPED | OUTPATIENT
Start: 2024-01-29 | End: 2024-02-08

## 2024-01-29 RX ORDER — LACTULOSE 10 G/15ML
15 SOLUTION ORAL 2 TIMES DAILY PRN
Qty: 300 ML | Refills: 1 | Status: SHIPPED | OUTPATIENT
Start: 2024-01-29 | End: 2024-02-08

## 2024-01-29 NOTE — PROGRESS NOTES
"Virtual Visit Details    Type of service:  Video Visit   Video Start Time: {video visit start/end time for provider to select:874079}  Video End Time:{video visit start/end time for provider to select:124243}    Originating Location (pt. Location): {video visit patient location:440344::\"Home\"}  {PROVIDER LOCATION On-site should be selected for visits conducted from your clinic location or adjoining [facility] hospital, academic office, or other nearby [facility] building. Off-site should be selected for all other provider locations, including home:850917}  Distant Location (provider location):  {virtual location provider:357072}  Platform used for Video Visit: {Virtual Visit Platforms:962513::\"[employer]\"}    "

## 2024-01-29 NOTE — LETTER
1/29/2024       RE: Glenna Spears  [address]  [place] [address]     Dear Colleague,    Thank you for referring your patient, Glenna Spears, to the [facility] [place] [facility]. Please see a copy of my visit note below.    Virtual Visit Details    Type of service:  Video Visit   Video Start Time: 9:05  Video End Time:9:30AM    Originating Location (pt. Location): Home    Distant Location (provider location):  On-site  Platform used for Video Visit: [facility]    Postoperative bariatric surgery visit.    Patient underwent sleeve gastrectomy 1 weeks ago, on 1/23/24 with Dr. Smith.      Tolerating liquids: Unsure amount of fluids and protein as she does not have a way to count. Drinking water, apple juice, protein shakes, propel. Nausea especially after pills and drinking, taking zofran 1xdaily, has been hard to take the pills due to abdominal pain. Is unsure if helpful. Has had 4 episodes of vomiting since surgery. Last episode was yesterday morning. Typically will happen once nauseated after drinking or taking pills. No dysphagia.   Lightheadedness: No, no dry mouth   Abdominal pain: having cramping epigastric pain when drinking. Worse with pills. Only will radiate to the back when taking pills. Feels like it has improved overall since.  Taking Levsin 1xday, as it will abdominal pain   Bowel movements: Has been trying to take Senna, but only taking 1 tablet. Has not had a BM since surgery. Took 1 cap Miralax 2 days ago.   Fevers/shakes/chills: No  GERD: Symptoms all the time. Taking omeprazole daily.   Leg/calf pain: No  Chest pain/SOB: No, no palpitations   Walking: has been active around the house.   Work: Return to work Feb 19th     Dialysis - T, Th, Sat. Has not been able to take dialysis medications due to abdominal pain and nausea     How many opioid pain medications used after surgery? 6 What did you do with extra  "pills? home  Were any opioid pain medication refills provided after surgery? No  Were any opioid pain medications needed after 30 days postop? N/A    Ht 1.727 m (5' 7.99\")   Wt 121.1 kg (267 lb)   BMI 40.61 kg/m    Pulse 79, O297%     GENERAL: alert and no distress  EYES: Eyes grossly normal to inspection.  No discharge or erythema, or obvious scleral/conjunctival abnormalities.  RESP: No audible wheeze, cough, or visible cyanosis.    SKIN: Visible skin clear. No significant rash, abnormal pigmentation or lesions.  NEURO: Cranial nerves grossly intact.  Mentation and speech appropriate for age.  PSYCH: Appropriate affect, tone, and pace of words    Incisions are healing well     Wt Readings from Last 5 Encounters:   01/29/24 121.1 kg (267 lb)   01/23/24 125.9 kg (277 lb 9 oz)   01/16/24 126 kg (277 lb 12.5 oz)   12/27/23 127.9 kg (282 lb)   12/22/23 127.9 kg (282 lb)         Pathology:  Final Diagnosis   Stomach, Partial gastrectomy:  - Gastric wall with no significant histologic abnormality        Plan:  1. RD visit tomorrow      - Start vitamin supplements per RD directions.      - Advance diet per RD directions.  2. Follow-up: Kelli Joshi PA-C on 2/28/2024   3. Actigall prescription - to be discussed at one month   4. B12 SL or injection - to be discussed at one month  5. Pathology reviewed - No concerns   6. Weight loss medications - was on wegovy prior to surgery   7. Need to restart statin - N/A   8. Discussed restrictions of no lifting, pushing pulling >20lbs till 4weeks post op.  9. Discussed no water submersion till incisions are completely healed.  10. Constipation - Continue Miralax. Start lactulose as per discussion with Lauren Bloch, MTM pharmacist.   11. Abdominal pain, nausea, and vomiting - epigastric cramping pain made worse with drinking and eating. Nausea and vomiting after pills. Pills are hard to take due to this. Is not taking dialysis medications. Discussed with Lauren Bloch, MTM " pharmacist who reached out to patient for further discussion. BP showed HTN, but is stable from previous BP prior to surgery. No tachycardia or palpitations. Most likely made worse by GERD symptoms. Will increase omeprazole to 40mg in the morning. Continue working on small sips of fluids continuously. Will work on counting fluids an protein more.     Katey Victor PA-C

## 2024-01-29 NOTE — PROGRESS NOTES
"Virtual Visit Details    Type of service:  Video Visit   Video Start Time: 9:05  Video End Time:9:30AM    Originating Location (pt. Location): Home    Distant Location (provider location):  On-site  Platform used for Video Visit: [facility]    Postoperative bariatric surgery visit.    Patient underwent sleeve gastrectomy 1 weeks ago, on 1/23/24 with Dr. Smith.      Tolerating liquids: Unsure amount of fluids and protein as she does not have a way to count. Drinking water, apple juice, protein shakes, propel. Nausea especially after pills and drinking, taking zofran 1xdaily, has been hard to take the pills due to abdominal pain. Is unsure if helpful. Has had 4 episodes of vomiting since surgery. Last episode was yesterday morning. Typically will happen once nauseated after drinking or taking pills. No dysphagia.   Lightheadedness: No, no dry mouth   Abdominal pain: having cramping epigastric pain when drinking. Worse with pills. Only will radiate to the back when taking pills. Feels like it has improved overall since.  Taking Levsin 1xday, as it will abdominal pain   Bowel movements: Has been trying to take Senna, but only taking 1 tablet. Has not had a BM since surgery. Took 1 cap Miralax 2 days ago.   Fevers/shakes/chills: No  GERD: Symptoms all the time. Taking omeprazole daily.   Leg/calf pain: No  Chest pain/SOB: No, no palpitations   Walking: has been active around the house.   Work: Return to work Feb 19th     Dialysis - T, Th, Sat. Has not been able to take dialysis medications due to abdominal pain and nausea     How many opioid pain medications used after surgery? 6 What did you do with extra pills? home  Were any opioid pain medication refills provided after surgery? No  Were any opioid pain medications needed after 30 days postop? N/A    Ht 1.727 m (5' 7.99\")   Wt 121.1 kg (267 lb)   BMI 40.61 kg/m    Pulse 79, O297%     GENERAL: alert and no distress  EYES: Eyes grossly normal to inspection.  No discharge " or erythema, or obvious scleral/conjunctival abnormalities.  RESP: No audible wheeze, cough, or visible cyanosis.    SKIN: Visible skin clear. No significant rash, abnormal pigmentation or lesions.  NEURO: Cranial nerves grossly intact.  Mentation and speech appropriate for age.  PSYCH: Appropriate affect, tone, and pace of words    Incisions are healing well     Wt Readings from Last 5 Encounters:   01/29/24 121.1 kg (267 lb)   01/23/24 125.9 kg (277 lb 9 oz)   01/16/24 126 kg (277 lb 12.5 oz)   12/27/23 127.9 kg (282 lb)   12/22/23 127.9 kg (282 lb)         Pathology:  Final Diagnosis   Stomach, Partial gastrectomy:  - Gastric wall with no significant histologic abnormality        Plan:  1. RD visit tomorrow      - Start vitamin supplements per RD directions.      - Advance diet per RD directions.  2. Follow-up: Kelli Joshi PA-C on 2/28/2024   3. Actigall prescription - to be discussed at one month   4. B12 SL or injection - to be discussed at one month  5. Pathology reviewed - No concerns   6. Weight loss medications - was on wegovy prior to surgery   7. Need to restart statin - N/A   8. Discussed restrictions of no lifting, pushing pulling >20lbs till 4weeks post op.  9. Discussed no water submersion till incisions are completely healed.  10. Constipation - Continue Miralax. Start lactulose as per discussion with Lauren Bloch, MTM pharmacist.   11. Abdominal pain, nausea, and vomiting - epigastric cramping pain made worse with drinking and eating. Nausea and vomiting after pills. Pills are hard to take due to this. Is not taking dialysis medications. Discussed with Lauren Bloch, MTM pharmacist who reached out to patient for further discussion. BP showed HTN, but is stable from previous BP prior to surgery. No tachycardia or palpitations. Most likely made worse by GERD symptoms. Will increase omeprazole to 40mg in the morning. Continue working on small sips of fluids continuously. Will work on counting  fluids an protein more.     Katey Victor PA-C

## 2024-01-29 NOTE — PATIENT INSTRUCTIONS
Plan:  1. RD visit tomorrow      - Start vitamin supplements per RD directions.      - Advance diet per RD directions.  2. Follow-up: Kelli Joshi PA-C on 2/28/2024   3. Actigall prescription - to be discussed at one month   4. B12 SL or injection - to be discussed at one month  5. Pathology reviewed - No concerns   6. Weight loss medications - was on wegovy prior to surgery   7. Need to restart statin - N/A   8. Discussed restrictions of no lifting, pushing pulling >20lbs till 4weeks post op.  9. Discussed no water submersion till incisions are completely healed.  10. Constipation - Continue Miralax. Start lactulose as per discussion with Lauren Bloch, [facility] pharmacist.   11. Abdominal pain, nausea, and vomiting - Increase omeprazole to 40mg in the morning. Take zofran and levsin prior to pills morning and night. Continue working on small sips of fluids continuously. Continue to work on counting fluids an protein more.

## 2024-01-29 NOTE — TELEPHONE ENCOUNTER
Provider asked MTM pharmacist to call patient to follow up on post Sleeve Gastrectomy side effects that are affecting taking pills. Patient is experiencing constipation, heartburn and nausea. Last bowel movement 1 week ago 2 days before Sleeve Gastrectomy.      Has tried miralax 1 dose, tried 1 dose milk of magnesia and threw it up today. Senna-docusate 1 tablet daily since out of hospital.     Anything she tries swallowing, doesn't settle right, and will vomit portion back up. Currently chest hurts after eating and drinking.Pills or food can come back up.  Getting easier, but still occurring. Wondering next steps.     Katey Victor PA-C Omeprazole increased from 20 mg daily to Omeprazole 40 mg once daily due to the indigestion. See remainder of plan discussed on phone:     Plan:   Take hyoscyamine, ondansetron, and omeprazole before in AM 30 minutes before pill time and take hyoscyamine, ondansetron 30 min before evening pills.  Take miralax 1 capful daily until more regulated bowel movements.  Lactulose 10 g/15 mL twice daily as needed, stop Lactulose after having bowel movement. Then use Miralax as your go to as needed for constipation.   Follow up with MTM next week.     Patient understands and will proceed.     Lauren Bloch, PharmD, BCACP   Medication Therapy Management Pharmacist   [facility]

## 2024-01-29 NOTE — NURSING NOTE
Is the patient currently in the state of [place]? YES    Visit mode:VIDEO    If the visit is dropped, the patient can be reconnected by: VIDEO VISIT: Text to cell phone:   Telephone Information:   Mobile [phone]       Will anyone else be joining the visit? NO  (If patient encounters technical issues they should call [phone]    How would you like to obtain your AVS? MyChart    Are changes needed to the allergy or medication list? Pt stated no changes to allergies and Pt stated no med changes    Reason for visit: Follow Up    Marielle [place]

## 2024-01-31 ENCOUNTER — CARE COORDINATION (OUTPATIENT)
Dept: ENDOCRINOLOGY | Facility: CLINIC | Age: 34
End: 2024-01-31
Payer: COMMERCIAL

## 2024-01-31 ENCOUNTER — TRANSFERRED RECORDS (OUTPATIENT)
Dept: HEALTH INFORMATION MANAGEMENT | Facility: CLINIC | Age: 34
End: 2024-01-31

## 2024-01-31 NOTE — PROGRESS NOTES
"Per Katey Victor PA-C called patient back to see if she increased her omeprazole.  When I asked patient if she increased the Omeprazole she got very upset. \"You guys prescribe pills and then change everything aqnd just expect me to get up and go to the store and pick them up.\" I told her if she didn't want to pick them up she didn't have to but the pills should help her heartburn and she replied, \"you guys just keep prescriing more pills and the pills are what is making me sick.\" I told her I would let you know she doesn't want to take anymore pills and she said \"thank you.\"     Katey Victor informed of above  "

## 2024-01-31 NOTE — PROGRESS NOTES
Patient states she is getting 30 grams protein and 40 ounces of fluid, had dialysis yesterday. Nauseated on and off vomited yesterday after dialysis. Had BM 2 days ago still taking Senna daily.   Feels like she is struggling to get fluids down because it gives her heartburn and things sometimes don't seem to be going down.   Has dialysis tomorrow.   Denies abdominal pain, feels like she is having indigestion.   Will inform Katey Martines of above.

## 2024-02-08 ENCOUNTER — VIRTUAL VISIT (OUTPATIENT)
Dept: CARDIOLOGY | Facility: CLINIC | Age: 34
End: 2024-02-08
Payer: COMMERCIAL

## 2024-02-08 VITALS — HEIGHT: 68 IN | WEIGHT: 250 LBS | BODY MASS INDEX: 37.89 KG/M2

## 2024-02-08 DIAGNOSIS — N25.81 HYPERPARATHYROIDISM DUE TO RENAL INSUFFICIENCY (H): ICD-10-CM

## 2024-02-08 DIAGNOSIS — M79.2 NERVE PAIN: ICD-10-CM

## 2024-02-08 DIAGNOSIS — K21.9 GERD WITHOUT ESOPHAGITIS: ICD-10-CM

## 2024-02-08 DIAGNOSIS — Z98.84 S/P LAPAROSCOPIC SLEEVE GASTRECTOMY: Primary | ICD-10-CM

## 2024-02-08 DIAGNOSIS — I12.0: ICD-10-CM

## 2024-02-08 DIAGNOSIS — I15.1 HYPERTENSION SECONDARY TO OTHER RENAL DISORDERS: ICD-10-CM

## 2024-02-08 DIAGNOSIS — N18.6: ICD-10-CM

## 2024-02-08 ASSESSMENT — PAIN SCALES - GENERAL: PAINLEVEL: SEVERE PAIN (6)

## 2024-02-08 NOTE — PROGRESS NOTES
Medication Therapy Management (MTM) Encounter    ASSESSMENT:                            Medication Adherence/Access: No issues identified    S/P Sleeve Gastrectomy:   Needs improvement discussed status with Ibis Guardado PA-C. Patient would benefit from IV fluids and improvement of protein intake. Do believe that much of issues are related to heartburn/reflux. Due to omeprazole not being effective will go to pantoprazole 40 mg daily. As of right now as has famotidine at home, will increase to twice daily. Will also give RX for zofran as well.      Hypertension:   Needs improvement, patient to remain off blood pressure medications as of now due to reported hypotension.     ESRD/hyperparathyroidism:  Needs improvement. Patient would benefit from restarting medications due to necessity of medications. Discussed transitioning to liquid if necessary, but due to the changes above to improve symptoms, will see how patient tolerates with the above changes.     Nerve Pain:   Stable off gabapentin, so to continue off for now.     PLAN:                            Increase famotidine to 20 mg twice daily for now. Sent in RX for pantoprzole 40 mg daily to transition to if continues to have greater heartburn symptoms  Use ondansetron ODT 4 mg as needed for nausea - take before administration of medications.   Get back to taking necessary medications - medications for ESRD/dialysis - sensipar, velphoro, calcitriol   Ibis Guardado PA-C to work with Nephrology regarding IV fluids  Nurse will be calling tomorrow to schedule follow up with surgeon, Dr. Smith for next week for evaluation. Patient aware.   Sip protein shakes throughout the day to improve fluid and protein intake.     Follow-up: Return in about 4 weeks (around 3/7/2024) for Medication Therapy Management Pharmacist Visit.    SUBJECTIVE/OBJECTIVE:                          Glenna Spears is a 33 year old female called for a transitions of care visit. She was discharged from  "[facility] on 1/24/2024 for Sleeve Gastrectomy.      Reason for visit: comprehensive review of medications post Sleeve Gastrectomy .    Allergies/ADRs: Reviewed in chart  Past Medical History: Reviewed in chart  Tobacco: She reports that she quit smoking about 13 months ago. Her smoking use included cigarettes. She has been exposed to tobacco smoke. She has never used smokeless tobacco.  Alcohol: not currently using    Medication Adherence/Access: no issues reported. Stopped taking all medications 3 days ago. Historical non-adherence with ESRD medications per Nephrology.     S/P Sleeve Gastrectomy:   Post-Op Medications:   Famotidine 20 mg daily bedtime   Ondansetron 4 mg as needed for nausea  Senna-S 8.6-50 mg as needed for constipation     Surgery completed by Dr. Smith on 1/23/2024. She reports that when she tried omeprazole a consistently was having turning stomach. So then switched to famotidine and finding better. Prior to today was vomiting 1-2 times per day. Most of the time when it happened was when she was taking medication or getting car sick. Now having bowel movements 2 times per day. Stopped senna-docusate and MOM as started having regular bowel movements now since 1/29/2024, now not taking anything for bowel movements. Unclear fluid intake. Patient isn't tracking. Drinking small amount of fluid, ~ 20 oz fluid daily. Protein intake continues to be an issue. Drinking 3 yogurt smoothies per day, 10 grams protein. Now since doing this more, feeling better.  Finds thicker items easier to swallow. Reports no fever.     Hemoglobin   Date Value Ref Range Status   01/24/2024 12.3 11.7 - 15.7 g/dL Final     Wt Readings from Last 4 Encounters:   02/28/24 110.7 kg (244 lb)   02/15/24 112.7 kg (248 lb 6.4 oz)   02/08/24 113.4 kg (250 lb)   01/29/24 121.1 kg (267 lb)     Estimated body mass index is 38.01 kg/m  as calculated from the following:    Height as of this encounter: 1.727 m (5' 8\").    Weight as of this " "encounter: 113.4 kg (250 lb).    Hypertension:     Reports that blood pressure has substantially decreased after Sleeve Gastrectomy, especially after dialysis.   Pulse has remained elevated, pulse high when standing and walking around, consistently low 100s-110 at rest and upwards to 130s.     BP Readings from Last 3 Encounters:   02/15/24 105/69   01/24/24 (!) 140/78   01/23/23 (!) 149/82     Pulse Readings from Last 3 Encounters:   02/15/24 90   01/24/24 101   01/23/23 84     ESRD/hyperparathyroidism:  Prescribed but not taking any of the below medications as of now:   Velphoro 500 mg chew 1 tablet three times daily  Dialyvite 1 tablet daily  Calcitriol 0.5 mcg daily  Cholecalciferol 10,000 international unit(s) every other daily   Sensipar 90 mg daily    Continues dialysis 3 times weekly. History of ESRD secondary to HTN, long standing nephrotic range proteinuria, and drug use (sober >1 year).  Before Sleeve Gastrectomy, having issues of nausea when taking sensipar. Now she hasn't been taking due to intense nausea. Dizziness after dialysis currently.     Referred to bariatric surgery by Kidney Transplant team. Needs BMI <35 for transplant consideration. Started with team 1/2023, high weight 330lb (BMI 49).    Vitamin D level: 56.1 on 12/22/2023.  Parathyroid hormone level: 1024.4 on 12/22/2023  Calcium: 10.2 on 12/22/2023.    Nerve Pain:   Gabapentin 300 mg bedtime - not taking      Generally gabapentin was controlling nerve pain. However, she hasn't been taking gabapentin now since surgery and finding that she is not having nerve pain.     Today's Vitals: Ht 1.727 m (5' 8\")   Wt 113.4 kg (250 lb)   BMI 38.01 kg/m    ----------------    MED REC REQUIRED  Post Medication Reconciliation Status: discharge medications reconciled, continue medications without change    I spent 30 minutes with this patient today. All changes were made via collaborative practice agreement with Katey Victor PA-C. A copy of the " visit note was provided to the patient's provider(s).    A summary of these recommendations was sent via [employer].    Lauren Bloch, PharmD, BCACP   Medication Therapy Management Pharmacist   [facility]    Telemedicine Visit Details  Type of service:  Telephone visit  Start Time:  3:30 PM  End Time:  4:00 PM     Medication Therapy Recommendations  ESRD due to hypertension (H)    Current Medication: cinacalcet (SENSIPAR) 90 MG tablet   Rationale: Patient prefers not to take - Adherence - Adherence   Recommendation: Provide Adherence Intervention   Status: Accepted - no CPA Needed         GERD without esophagitis    Current Medication: famotidine (PEPCID) 20 MG tablet   Rationale: Synergistic therapy - Needs additional medication therapy - Indication   Recommendation: Start Medication - pantoprazole 40 MG EC tablet   Status: Accepted per Provider          Current Medication: famotidine (PEPCID) 20 MG tablet (Discontinued)   Rationale: Dose too low - Dosage too low - Effectiveness   Recommendation: Increase Dose - famotidine 20 MG tablet   Status: Accepted per Provider         S/P laparoscopic sleeve gastrectomy    Rationale: Untreated condition - Needs additional medication therapy - Indication   Recommendation: Start Medication - ondansetron 4 MG ODT tab   Status: Accepted per CPA

## 2024-02-08 NOTE — NURSING NOTE
Is the patient currently in the state of [place]? YES    Visit mode:TELEPHONE    If the visit is dropped, the patient can be reconnected by: TELEPHONE VISIT: Phone number: [phone]    Will anyone else be joining the visit? NO  (If patient encounters technical issues they should call [phone] :[phone])    How would you like to obtain your AVS? MyChart    Are changes needed to the allergy or medication list? Pt stated no med changes  Please remove any meds marked not taking and any flagged for removal.    Reason for visit: RECHECK    Wt/ht other than 24 hrs:    Pain more than one location:  6 chest, esophagus, and lower.   Cynthia STACYF

## 2024-02-08 NOTE — LETTER
2/8/2024      RE: Glenna Spears  [address]  [place] [address]       Dear Colleague,    Thank you for the opportunity to participate in the care of your patient, Glenna Spears, at the [facility] at [facility]. Please see a copy of my visit note below.    Medication Therapy Management (MTM) Encounter    ASSESSMENT:                            Medication Adherence/Access: No issues identified    S/P Sleeve Gastrectomy:   Needs improvement discussed status with Ibis Guardado PA-C. Patient would benefit from IV fluids and improvement of protein intake. Do believe that much of issues are related to heartburn/reflux. Due to omeprazole not being effective will go to pantoprazole 40 mg daily. As of right now as has famotidine at home, will increase to twice daily. Will also give RX for zofran as well.      Hypertension:   Needs improvement, patient to remain off blood pressure medications as of now due to reported hypotension.     ESRD/hyperparathyroidism:  Needs improvement. Patient would benefit from restarting medications due to necessity of medications. Discussed transitioning to liquid if necessary, but due to the changes above to improve symptoms, will see how patient tolerates with the above changes.     Nerve Pain:   Stable off gabapentin, so to continue off for now.     PLAN:                            Increase famotidine to 20 mg twice daily for now. Sent in RX for pantoprzole 40 mg daily to transition to if continues to have greater heartburn symptoms  Use ondansetron ODT 4 mg as needed for nausea - take before administration of medications.   Get back to taking necessary medications - medications for ESRD/dialysis - sensipar, velphoro, calcitriol   Ibis Guardado PA-C to work with Nephrology regarding IV fluids  Nurse will be calling tomorrow to schedule follow up with surgeon, Dr. Smith for next week for evaluation. Patient  aware.   Sip protein shakes throughout the day to improve fluid and protein intake.     Follow-up: Return in about 4 weeks (around 3/7/2024) for Medication Therapy Management Pharmacist Visit.    SUBJECTIVE/OBJECTIVE:                          Glenna Spears is a 33 year old female called for a transitions of care visit. She was discharged from [facility] on 1/24/2024 for Sleeve Gastrectomy.      Reason for visit: comprehensive review of medications post Sleeve Gastrectomy .    Allergies/ADRs: Reviewed in chart  Past Medical History: Reviewed in chart  Tobacco: She reports that she quit smoking about a year ago. Her smoking use included cigarettes. She has been exposed to tobacco smoke. She has never used smokeless tobacco.  Alcohol: not currently using    Medication Adherence/Access: no issues reported. Stopped taking all medications 3 days ago. Historical non-adherence with ESRD medications per Nephrology.     S/P Sleeve Gastrectomy:   Post-Op Medications:   Famotidine 20 mg daily bedtime   Ondansetron 4 mg as needed for nausea  Senna-S 8.6-50 mg as needed for constipation     Surgery completed by Dr. Smith on 1/23/2024. She reports that when she tried omeprazole a consistently was having turning stomach. So then switched to famotidine and finding better. Prior to today was vomiting 1-2 times per day. Most of the time when it happened was when she was taking medication or getting car sick. Now having bowel movements 2 times per day. Stopped senna-docusate and MOM as started having regular bowel movements now since 1/29/2024, now not taking anything for bowel movements. Unclear fluid intake. Patient isn't tracking. Drinking small amount of fluid, ~ 20 oz fluid daily. Protein intake continues to be an issue. Drinking 3 yogurt smoothies per day, 10 grams protein. Now since doing this more, feeling better.  Finds thicker items easier to swallow. Reports no fever.     Hemoglobin   Date Value Ref Range Status   01/24/2024 12.3  "11.7 - 15.7 g/dL Final     Wt Readings from Last 4 Encounters:   02/08/24 113.4 kg (250 lb)   01/29/24 121.1 kg (267 lb)   01/23/24 125.9 kg (277 lb 9 oz)   01/16/24 126 kg (277 lb 12.5 oz)     Estimated body mass index is 38.01 kg/m  as calculated from the following:    Height as of this encounter: 1.727 m (5' 8\").    Weight as of this encounter: 113.4 kg (250 lb).    Hypertension:     Reports that blood pressure has substantially decreased after Sleeve Gastrectomy, especially after dialysis.   Pulse has remained elevated, pulse high when standing and walking around, consistently low 100s-110 at rest and upwards to 130s.     BP Readings from Last 3 Encounters:   01/24/24 (!) 140/78   01/23/23 (!) 149/82   01/23/23 (!) 149/82     Pulse Readings from Last 3 Encounters:   01/24/24 101   01/23/23 84   01/23/23 84     ESRD/hyperparathyroidism:  Prescribed but not taking any of the below medications as of now:   Velphoro 500 mg chew 1 tablet three times daily  Dialyvite 1 tablet daily  Calcitriol 0.5 mcg daily  Cholecalciferol 10,000 international unit(s) every other daily   Sensipar 90 mg daily    Continues dialysis 3 times weekly. History of ESRD secondary to HTN, long standing nephrotic range proteinuria, and drug use (sober >1 year).  Before Sleeve Gastrectomy, having issues of nausea when taking sensipar. Now she hasn't been taking due to intense nausea. Dizziness after dialysis currently.     Referred to bariatric surgery by Kidney Transplant team. Needs BMI <35 for transplant consideration. Started with team 1/2023, high weight 330lb (BMI 49).    Vitamin D level: 56.1 on 12/22/2023.  Parathyroid hormone level: 1024.4 on 12/22/2023  Calcium: 10.2 on 12/22/2023.    Nerve Pain:   Gabapentin 300 mg bedtime - not taking      Generally gabapentin was controlling nerve pain. However, she hasn't been taking gabapentin now since surgery and finding that she is not having nerve pain.     Today's Vitals: Ht 1.727 m (5' 8\")  "  Wt 113.4 kg (250 lb)   BMI 38.01 kg/m    ----------------    I spent 30 minutes with this patient today. All changes were made via collaborative practice agreement with Katey Victor PA-C. A copy of the visit note was provided to the patient's provider(s).    A summary of these recommendations was sent via [employer].    Lauren Bloch, PharmD, BCACP   Medication Therapy Management Pharmacist   [facility]    Telemedicine Visit Details  Type of service:  Telephone visit  Start Time:  3:30 PM  End Time:  4:00 PM     Medication Therapy Recommendations  ESRD due to hypertension (H)    Current Medication: cinacalcet (SENSIPAR) 90 MG tablet   Rationale: Patient prefers not to take - Adherence - Adherence   Recommendation: Provide Adherence Intervention   Status: Accepted - no CPA Needed         GERD without esophagitis    Current Medication: famotidine (PEPCID) 20 MG tablet   Rationale: Synergistic therapy - Needs additional medication therapy - Indication   Recommendation: Start Medication - pantoprazole 40 MG EC tablet   Status: Accepted per Provider          Current Medication: famotidine (PEPCID) 20 MG tablet (Discontinued)   Rationale: Dose too low - Dosage too low - Effectiveness   Recommendation: Increase Dose - famotidine 20 MG tablet   Status: Accepted per Provider         S/P laparoscopic sleeve gastrectomy    Rationale: Untreated condition - Needs additional medication therapy - Indication   Recommendation: Start Medication - ondansetron 4 MG ODT tab   Status: Accepted per CPA                Please do not hesitate to contact me if you have any questions/concerns.     Sincerely,     Lauren T. Bloch, [facility]

## 2024-02-09 ENCOUNTER — TELEPHONE (OUTPATIENT)
Dept: SURGERY | Facility: CLINIC | Age: 34
End: 2024-02-09
Payer: COMMERCIAL

## 2024-02-09 DIAGNOSIS — Z98.84 S/P LAPAROSCOPIC SLEEVE GASTRECTOMY: Primary | ICD-10-CM

## 2024-02-09 RX ORDER — FAMOTIDINE 20 MG/1
20 TABLET, FILM COATED ORAL 2 TIMES DAILY
Qty: 60 TABLET | Refills: 2 | Status: SHIPPED | OUTPATIENT
Start: 2024-02-09 | End: 2024-05-23

## 2024-02-09 RX ORDER — ONDANSETRON 4 MG/1
4 TABLET, ORALLY DISINTEGRATING ORAL EVERY 6 HOURS PRN
Qty: 15 TABLET | Refills: 0 | Status: SHIPPED | OUTPATIENT
Start: 2024-02-09

## 2024-02-09 RX ORDER — PANTOPRAZOLE SODIUM 40 MG/1
40 TABLET, DELAYED RELEASE ORAL DAILY
Qty: 30 TABLET | Refills: 1 | Status: SHIPPED | OUTPATIENT
Start: 2024-02-09 | End: 2024-02-28

## 2024-02-09 NOTE — PATIENT INSTRUCTIONS
"Recommendations from today's MTM visit:                                                       Increase famotidine to 20 mg twice daily for now. Sent in RX for pantoprzole 40 mg daily to transition to if continues to have greater heartburn symptoms  Use ondansetron ODT 4 mg as needed for nausea - take before administration of medications.   Get back to taking necessary medications - medications for ESRD/dialysis - sensipar, velphoro, calcitriol   Ibis Guardado PA-C to work with Nephrology regarding IV fluids  Nurse will be calling tomorrow to schedule follow up with surgeon, Dr. Smith for next week for evaluation. Patient aware.   Sip protein shakes throughout the day to improve fluid and protein intake.     Follow-up: Return in about 4 weeks (around 3/7/2024) for Medication Therapy Management Pharmacist Visit.    It was great speaking with you today.  I value your experience and would be very thankful for your time in providing feedback in our clinic survey. In the next few days, you may receive an email or text message from [employer] with a link to a survey related to your  clinical pharmacist.\"     To schedule another MTM appointment, please call the clinic directly or you may call the MTM scheduling line at [phone] or toll-free at [phone].     My Clinical Pharmacist's contact information:                                                      Please feel free to contact me with any questions or concerns you have.      Lauren Bloch, PharmD, BCACP   Medication Therapy Management Pharmacist   [facility]       "

## 2024-02-09 NOTE — TELEPHONE ENCOUNTER
MTM referral placed due to [facility] Medication Therapy Management (MTM) practice shift. CPA with Ibis Gaurdado PA-C.

## 2024-02-15 ENCOUNTER — OFFICE VISIT (OUTPATIENT)
Dept: SURGERY | Facility: CLINIC | Age: 34
End: 2024-02-15
Payer: COMMERCIAL

## 2024-02-15 VITALS
HEART RATE: 90 BPM | HEIGHT: 68 IN | OXYGEN SATURATION: 95 % | DIASTOLIC BLOOD PRESSURE: 69 MMHG | SYSTOLIC BLOOD PRESSURE: 105 MMHG | BODY MASS INDEX: 37.65 KG/M2 | WEIGHT: 248.4 LBS

## 2024-02-15 DIAGNOSIS — Z98.84 S/P LAPAROSCOPIC SLEEVE GASTRECTOMY: Primary | ICD-10-CM

## 2024-02-15 PROCEDURE — 99024 POSTOP FOLLOW-UP VISIT: CPT | Performed by: SURGERY

## 2024-02-15 ASSESSMENT — PAIN SCALES - GENERAL: PAINLEVEL: MILD PAIN (3)

## 2024-02-15 NOTE — LETTER
"2/15/2024       RE: Glenna Spears  [address]  [place] [address]     Dear Colleague,    Thank you for referring your patient, Glenna Spears, to the [facility]. Please see a copy of my visit note below.    Postoperative bariatric surgery visit.    Patient underwent sleeve gastrectomy 2 weeks ago.      Tolerating liquids: yes  Lightheadedness: some; improving  Abdominal pain: none  Bowel movements: none  Fevers/shakes/chills: none.    Some 'gut rot' feeling.    /69 (BP Location: Right arm, Patient Position: Sitting, Cuff Size: Adult Large)   Pulse 90   Ht 1.727 m (5' 8\")   Wt 112.7 kg (248 lb 6.4 oz)   SpO2 95%   BMI 37.77 kg/m    NAD  Overall looks much better  Incisions c/d/i    Plan:  1. RD visit today.  2. Start vitamin supplements per RD directions.  3. Advance diet per RD directions.  4. Follow-up: 2 weeks.                    Again, thank you for allowing me to participate in the care of your patient.      Sincerely,    John Smith MD    "

## 2024-02-15 NOTE — PROGRESS NOTES
"Postoperative bariatric surgery visit.    Patient underwent sleeve gastrectomy 2 weeks ago.      Tolerating liquids: yes  Lightheadedness: some; improving  Abdominal pain: none  Bowel movements: none  Fevers/shakes/chills: none.    Some 'gut rot' feeling.    /69 (BP Location: Right arm, Patient Position: Sitting, Cuff Size: Adult Large)   Pulse 90   Ht 1.727 m (5' 8\")   Wt 112.7 kg (248 lb 6.4 oz)   SpO2 95%   BMI 37.77 kg/m    NAD  Overall looks much better  Incisions c/d/i    Plan:  1. RD visit today.  2. Start vitamin supplements per RD directions.  3. Advance diet per RD directions.  4. Follow-up: 2 weeks.    John Smith MD  Surgery  [phone] (hospital )  [phone] (clinic nurses)                "

## 2024-02-15 NOTE — NURSING NOTE
"(   Chief Complaint   Patient presents with    RECHECK     Sleeve 1/28    )    ( Weight: 112.7 kg (248 lb 6.4 oz) )  ( Height: 172.7 cm (5' 8\") )  ( BMI (Calculated): 37.77 )  (   )  (   )  (   )  (   )  (   )  (   )    ( BP: 105/69 )  (   )  (   )  (   )  ( Pulse: 90 )  (   )  ( SpO2: 95 % )    (   Patient Active Problem List   Diagnosis    Moderate asthma    Hypovitaminosis D    Morbid obesity (H)    Anxiety    CKD (chronic kidney disease) stage 5, GFR less than 15 ml/min (H)    Depressive disorder    Dialysis AV fistula malfunction (H24)    End stage renal disease (H)    Hyperparathyroidism due to renal insufficiency (H24)    Nephrotic range proteinuria    Normocytic anemia    )  (   Current Outpatient Medications   Medication Sig Dispense Refill    calcitRIOL (ROCALTROL) 0.5 MCG capsule Take 0.5 mcg by mouth every evening      cinacalcet (SENSIPAR) 90 MG tablet Take 90 mg by mouth every evening      famotidine (PEPCID) 20 MG tablet Take 1 tablet (20 mg) by mouth 2 times daily 60 tablet 2    gabapentin (NEURONTIN) 100 MG capsule Take 300 mg by mouth at bedtime      hyoscyamine (LEVSIN) 0.125 MG tablet Take 1 tablet (125 mcg) by mouth every 4 hours as needed for cramping 30 tablet 1    levonorgestrel (KYLEENA) 19.5 MG IUD 1 Intra Uterine Device by Intrauterine route once      ondansetron (ZOFRAN ODT) 4 MG ODT tab Take 1 tablet (4 mg) by mouth every 6 hours as needed for nausea 15 tablet 0    pantoprazole (PROTONIX) 40 MG EC tablet Take 1 tablet (40 mg) by mouth daily 30 tablet 1    senna-docusate (SENOKOT-S/PERICOLACE) 8.6-50 MG tablet Take 2 tablets by mouth daily as needed for constipation (While taking narcotic pain medications.  Stop taking if having loose stools.) 30 tablet 1    VELPHORO 500 MG CHEW chewable tablet Take 500 mg by mouth 3 times daily (with meals)      acetaminophen (TYLENOL) 325 MG tablet Take 325-650 mg by mouth every 4 hours as needed (Patient not taking: Reported on 2/8/2024)   "    )  ( Diabetes Eval:    )    ( Pain Eval:  Mild Pain (3) )    ( Wound Eval:       )    (   History   Smoking Status    Former    Types: Cigarettes    Quit date: 1/31/2023   Smokeless Tobacco    Never    )    ( Signed By:  Ras Hein, EMT; February 15, 2024; 1:09 PM )

## 2024-02-26 NOTE — PROGRESS NOTES
"Video-Visit Details    Type of service:  Video Visit    Video Start Time: 9:13 am   Video End Time: 9:31 am    Originating Location (pt. Location): Home    Distant Location (provider location): Offsite (providers home)     Platform used for Video Visit: 2U    Nutrition Assessment  Reason For Visit:  Glenna Spears is a 33 year old female presenting today for nutrition follow-up, 1 month s/p laparoscopic sleeve gastrectomy on 1/23/24 with Dr Smith.     Patient referred by Katey CONRAD on January 29, 2024.    Anthropometrics  Initial Consult Weight: 330 lbs  Day of Surgery Weight(1/23/24): 277 lbs    Estimated body mass index is 37.11 kg/m  as calculated from the following:    Height as of an earlier encounter on 2/28/24: 1.727 m (5' 7.99\").    Weight as of an earlier encounter on 2/28/24: 110.7 kg (244 lb).    Current Weight: 244 lbs    Weight loss: -86 lbs from initial consult; -33 lbs from day of surgery    Current Vitamins/Minerals:   [place] MVI  B12 SL prescribed today    Hasn't had labs done at dialysis in a while per pt - not sure why. Admits she has missed some sessions and is showing late.  Hx gets itchy with high Phos and hasn't noticed this lately.      Nutrition History  Not seen by RD since prior to surgery - no showed 1/30 1 week post op appointment.     Pt reports the first couple of weeks were really rough. [place] like she was dying per report- super hungry, vomiting, nausea, constipation, etc.   Felt better when she started eating more and stopping her dialysis meds (were making her nauseous) per pt. Aware she needs to be taking her meds as recommended - is going to check in with her team on decreasing some of her doses.     Feels really good today. Does not feel as well on dialysis days.     Eating lots of chicken, eggs and protein shakes. Admits she jumped ahead in the diet progression schedule sooner than recommended. Chewing food really well. Hard to get protein shakes in but aiming " for 2 per day. Also doing small amounts of mashed potatoes. Finding food is easier to tolerate than liquids right now which is why she jumped ahead. Reminded patient of soft diet only until March 19th.     Hydration: water, propel, ensure (aware there are more renal friendly options), and occ diluted juice.    - Not measuring but reports constantly sipping    Going back to work 3/10.     Nutrition Prescription:  Grams Protein: 60 (minimum)  Amount of Fluid: 48-64 oz      Nutrition Diagnosis  Food and nutrition-related knowledge deficit r/t lack of prior exposure to diet advancements beyond bariatric low-fat full liquid diet aeb recent bariatric surgery and pt interest in diet education/review    Intervention  Intervention At Appointment:  Materials/education provided on bariatric pureed and soft diets, protein intake, fluid intake, eating pace, portion control, avoiding excess sugar and fat, recommended vitamin/mineral supplements. Patient demonstrates understanding.     Expected Engagement: fair-good    Goals:  1) Follow diet advancement schedule below.  2) Work towards 60 gm protein/day.  3) Consume 48-64+ oz fluids daily- between meals only once on puree diet  4) Eat slowly (>20 min/meal), chewing well to smooth consistency once on the bariatric soft diet.  5) Limit portions to ~1/4 to 1/2 cup/meal.  6) Continue chewable/liquid multivitamin/minerals daily    Regular diet - Start 3/19    Post-op Diet Handouts:  Diet Guidelines after Weight-loss Surgery  [email or web address]     Your Stage 4 Diet: Soft Foods  [email or web address]    Your Stage 5 Diet: Regular Foods  [email or web address]    Supplements after Sleeve Gastrectomy, Gastric Bypass or Single Anastomosis Duodenal Switch  [email or web address]    Keeping Track of Fluids  [email or web address]    Exercise Guidelines after Weight Loss Surgery (1st 4-6 weeks)  [email or web address]      Follow-Up:  scheduled April 24th     Time spent with patient: 18 minutes.  MARISSA Garcia, RD, LD

## 2024-02-28 ENCOUNTER — VIRTUAL VISIT (OUTPATIENT)
Dept: ENDOCRINOLOGY | Facility: CLINIC | Age: 34
End: 2024-02-28
Payer: COMMERCIAL

## 2024-02-28 VITALS — BODY MASS INDEX: 36.98 KG/M2 | HEIGHT: 68 IN | WEIGHT: 244 LBS

## 2024-02-28 DIAGNOSIS — Z71.3 NUTRITIONAL COUNSELING: Primary | ICD-10-CM

## 2024-02-28 DIAGNOSIS — E66.812 CLASS 2 SEVERE OBESITY WITH SERIOUS COMORBIDITY AND BODY MASS INDEX (BMI) OF 37.0 TO 37.9 IN ADULT, UNSPECIFIED OBESITY TYPE (H): ICD-10-CM

## 2024-02-28 DIAGNOSIS — Z99.2 ESRD (END STAGE RENAL DISEASE) ON DIALYSIS (H): ICD-10-CM

## 2024-02-28 DIAGNOSIS — E66.01 CLASS 2 SEVERE OBESITY WITH SERIOUS COMORBIDITY AND BODY MASS INDEX (BMI) OF 37.0 TO 37.9 IN ADULT, UNSPECIFIED OBESITY TYPE (H): ICD-10-CM

## 2024-02-28 DIAGNOSIS — Z98.84 S/P LAPAROSCOPIC SLEEVE GASTRECTOMY: ICD-10-CM

## 2024-02-28 DIAGNOSIS — K21.9 GERD WITHOUT ESOPHAGITIS: ICD-10-CM

## 2024-02-28 DIAGNOSIS — Z98.84 S/P LAPAROSCOPIC SLEEVE GASTRECTOMY: Primary | ICD-10-CM

## 2024-02-28 DIAGNOSIS — N18.6 ESRD (END STAGE RENAL DISEASE) ON DIALYSIS (H): ICD-10-CM

## 2024-02-28 PROCEDURE — 97803 MED NUTRITION INDIV SUBSEQ: CPT | Mod: 95 | Performed by: DIETITIAN, REGISTERED

## 2024-02-28 PROCEDURE — 99207 PR NO CHARGE LOS: CPT | Mod: 95 | Performed by: DIETITIAN, REGISTERED

## 2024-02-28 PROCEDURE — 99024 POSTOP FOLLOW-UP VISIT: CPT | Mod: 95

## 2024-02-28 RX ORDER — URSODIOL 300 MG/1
300 CAPSULE ORAL 2 TIMES DAILY
Qty: 60 CAPSULE | Refills: 5 | Status: SHIPPED | OUTPATIENT
Start: 2024-02-28

## 2024-02-28 RX ORDER — PANTOPRAZOLE SODIUM 40 MG/1
40 TABLET, DELAYED RELEASE ORAL DAILY
Qty: 30 TABLET | Refills: 1 | Status: SHIPPED | OUTPATIENT
Start: 2024-02-28 | End: 2024-05-23 | Stop reason: SINTOL

## 2024-02-28 RX ORDER — CYANOCOBALAMIN (VITAMIN B-12) 2500 MCG
2500 TABLET, SUBLINGUAL SUBLINGUAL DAILY
Qty: 30 TABLET | Refills: 5 | Status: SHIPPED | OUTPATIENT
Start: 2024-02-28

## 2024-02-28 ASSESSMENT — PAIN SCALES - GENERAL
PAINLEVEL: NO PAIN (0)
PAINLEVEL: NO PAIN (0)

## 2024-02-28 NOTE — PATIENT INSTRUCTIONS
Goals:  1) Follow diet advancement schedule below.  2) Work towards 60 gm protein/day.  3) Consume 48-64+ oz fluids daily- between meals only once on puree diet  4) Eat slowly (>20 min/meal), chewing well to smooth consistency once on the bariatric soft diet.  5) Limit portions to ~1/4 to 1/2 cup/meal.  6) Continue chewable/liquid multivitamin/minerals daily    Regular diet - Start 3/19    Post-op Diet Handouts:  Diet Guidelines after Weight-loss Surgery  [email or web address]     Your Stage 4 Diet: Soft Foods  [email or web address]    Your Stage 5 Diet: Regular Foods  [email or web address]    Supplements after Sleeve Gastrectomy, Gastric Bypass or Single Anastomosis Duodenal Switch  [email or web address]    Keeping Track of Fluids  [email or web address]    Exercise Guidelines after Weight Loss Surgery (1st 4-6 weeks)  [email or web address]      Follow-Up: scheduled April 24th     [address]nee [address] ([place]), MARISSA, RD, LD  Clinic #: [phone]

## 2024-02-28 NOTE — LETTER
"2/28/2024       RE: Glenna Spears  [address]  [place] [address]     Dear Colleague,    Thank you for referring your patient, Glenna Spears, to the [facility] [place] [facility]. Please see a copy of my visit note below.    Video-Visit Details    Type of service:  Video Visit    Video Start Time: 9:13 am   Video End Time: 9:31 am    Originating Location (pt. Location): Home    Distant Location (provider location): Offsite (providers home)     Platform used for Video Visit: [employer]    Nutrition Assessment  Reason For Visit:  Glenna Spears is a 33 year old female presenting today for nutrition follow-up, 1 month s/p laparoscopic sleeve gastrectomy on 1/23/24 with Dr Smith.     Patient referred by Katey CONRAD on January 29, 2024.    Anthropometrics  Initial Consult Weight: 330 lbs  Day of Surgery Weight(1/23/24): 277 lbs    Estimated body mass index is 37.11 kg/m  as calculated from the following:    Height as of an earlier encounter on 2/28/24: 1.727 m (5' 7.99\").    Weight as of an earlier encounter on 2/28/24: 110.7 kg (244 lb).    Current Weight: 244 lbs    Weight loss: -86 lbs from initial consult; -33 lbs from day of surgery    Current Vitamins/Minerals:   [place] MVI  B12 SL prescribed today    Hasn't had labs done at dialysis in a while per pt - not sure why. Admits she has missed some sessions and is showing late.  Hx gets itchy with high Phos and hasn't noticed this lately.      Nutrition History  Not seen by RD since prior to surgery - no showed 1/30 1 week post op appointment.     Pt reports the first couple of weeks were really rough. [place] like she was dying per report- super hungry, vomiting, nausea, constipation, etc.   Felt better when she started eating more and stopping her dialysis meds (were making her nauseous) per pt. Aware she needs to be taking her meds as recommended - is going to check in " with her team on decreasing some of her doses.     Feels really good today. Does not feel as well on dialysis days.     Eating lots of chicken, eggs and protein shakes. Admits she jumped ahead in the diet progression schedule sooner than recommended. Chewing food really well. Hard to get protein shakes in but aiming for 2 per day. Also doing small amounts of mashed potatoes. Finding food is easier to tolerate than liquids right now which is why she jumped ahead. Reminded patient of soft diet only until March 19th.     Hydration: water, propel, ensure (aware there are more renal friendly options), and occ diluted juice.    - Not measuring but reports constantly sipping    Going back to work 3/10.     Nutrition Prescription:  Grams Protein: 60 (minimum)  Amount of Fluid: 48-64 oz      Nutrition Diagnosis  Food and nutrition-related knowledge deficit r/t lack of prior exposure to diet advancements beyond bariatric low-fat full liquid diet aeb recent bariatric surgery and pt interest in diet education/review    Intervention  Intervention At Appointment:  Materials/education provided on bariatric pureed and soft diets, protein intake, fluid intake, eating pace, portion control, avoiding excess sugar and fat, recommended vitamin/mineral supplements. Patient demonstrates understanding.     Expected Engagement: fair-good    Goals:  1) Follow diet advancement schedule below.  2) Work towards 60 gm protein/day.  3) Consume 48-64+ oz fluids daily- between meals only once on puree diet  4) Eat slowly (>20 min/meal), chewing well to smooth consistency once on the bariatric soft diet.  5) Limit portions to ~1/4 to 1/2 cup/meal.  6) Continue chewable/liquid multivitamin/minerals daily    Regular diet - Start 3/19    Post-op Diet Handouts:  Diet Guidelines after Weight-loss Surgery  [email or web address]     Your Stage 4 Diet: Soft Foods  [email or web address]    Your Stage 5 Diet: Regular  Foods  [email or web address]    Supplements after Sleeve Gastrectomy, Gastric Bypass or Single Anastomosis Duodenal Switch  [email or web address]    Keeping Track of Fluids  [email or web address]    Exercise Guidelines after Weight Loss Surgery (1st 4-6 weeks)  [email or web address]      Follow-Up: scheduled April 24th     Time spent with patient: 18 minutes.  MARISSA Garcia, RD, LD
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
## 2024-02-28 NOTE — NURSING NOTE
Is the patient currently in the state of [place]? YES    Visit mode:VIDEO    If the visit is dropped, the patient can be reconnected by: VIDEO VISIT: Text to cell phone:   Telephone Information:   Mobile [phone]       Will anyone else be joining the visit? NO  (If patient encounters technical issues they should call [phone])    How would you like to obtain your AVS? MyChart    Are changes needed to the allergy or medication list? N/A    Reason for visit: RECHECK ([facility])    [place]

## 2024-02-28 NOTE — PROGRESS NOTES
Virtual Visit Details    Type of service:  Video Visit   Video Start Time:  8:02am  Video End Time: 8:31am    Originating Location (pt. Location): Home    Distant Location (provider location):  Off-site  Platform used for Video Visit: Stacy
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
## 2024-02-28 NOTE — NURSING NOTE
Is the patient currently in the state of [place]? YES    Visit mode:VIDEO    If the visit is dropped, the patient can be reconnected by: VIDEO VISIT: Text to cell phone:   Telephone Information:   Mobile [phone]       Will anyone else be joining the visit? NO  (If patient encounters technical issues they should call [phone])    How would you like to obtain your AVS? MyChart    Are changes needed to the allergy or medication list? No, Pt stated no changes to allergies, and Pt stated no med changes    Reason for visit: RECHECK (Post- op)    [place]

## 2024-03-16 ENCOUNTER — HEALTH MAINTENANCE LETTER (OUTPATIENT)
Age: 34
End: 2024-03-16

## 2024-03-29 ENCOUNTER — REFERRAL (OUTPATIENT)
Dept: TRANSPLANT | Facility: CLINIC | Age: 34
End: 2024-03-29
Payer: COMMERCIAL

## 2024-03-29 DIAGNOSIS — Z01.818 PRE-TRANSPLANT EVALUATION FOR KIDNEY TRANSPLANT: Primary | ICD-10-CM

## 2024-03-29 DIAGNOSIS — I10 ESSENTIAL HYPERTENSION: ICD-10-CM

## 2024-03-29 DIAGNOSIS — N18.6 END STAGE RENAL DISEASE (H): ICD-10-CM

## 2024-03-29 DIAGNOSIS — Z76.82 ORGAN TRANSPLANT CANDIDATE: ICD-10-CM

## 2024-03-29 NOTE — TELEPHONE ENCOUNTER
Contacted patient and introduced myself as their Transplant Coordinator, also introduced the role of the Transplant Coordinator in the transplant process.  Explained the purpose of this call including reviewing next steps and answering questions.    Confirmed Referring Provider, Dialysis Center, and Primary Care Physician. Notified patient of the importance of continued communication with referring providers and primary care physicians.    Reviewed components of transplant evaluation process including necessary appointments, tests, and procedures.    Answered questions for patient regarding evaluation, provided my name and contact information and requested they call with any additional questions.    Determined that patient would like additional information regarding transplant by:     Drop Down choices: Mail, Email, MyChart, Phone Call   Encourage MyChart   Notified patients that they will hear from a Transplant  to schedule evaluation.      Reviewed medical records and interviewed the patient. She had a mini evaluation 1/23/2023 for BMI of 48. She had a gastric sleeve and now her BMI is 37.7. She meets criteria to start a full evaluation. ESRD on HD since 6/21/2022. Presumption is due to HTN. GERD. Depression/anxiety controlled with therapist. Surgeries include C section. D&C and sleeve. She was recently seen by her PCP for urinary urgency and referral to urology has been made. Appointment is 4/19 at [facility]. Never received blood, casual smoker now, no etoh and no recreational drugs. Lives with significant other and family members. They are all willing to help. Independent in ADL's. No potential donors at this time. Dental is due.   We discussed the scheduled evaluation day of 5/20/2024. She will arrive by 0730 and was instructed to eat breakfast and take morning medications. Encouraged her to bring someone with her. Reviewed the goals of an evaluation and the approval process. Discuss the list of  providers she will see and their role and general format of the day. She is aware she will receive electronic communication via [employer] with her schedule and map. She is also scheduled for virtual MTP on 5/13 and will receive a link electronically.She was given her assigned coordinator Ilana Dumont' contact information.  Smart set routed to scheduling.

## 2024-04-08 ENCOUNTER — TELEPHONE (OUTPATIENT)
Dept: CARDIOLOGY | Facility: CLINIC | Age: 34
End: 2024-04-08
Payer: COMMERCIAL

## 2024-04-08 NOTE — TELEPHONE ENCOUNTER
Sent Mychart (1st Attempt) and letter, no vm box, for the patient to call back and schedule the following:    Appointment type: RET-MTM  Provider: Lauren Bloch  Return date: first available  Specialty phone number: [phone]  Additional appointment(s) needed: n/a  Additonal Notes: n/a

## 2024-04-30 ENCOUNTER — VIRTUAL VISIT (OUTPATIENT)
Dept: ENDOCRINOLOGY | Facility: CLINIC | Age: 34
End: 2024-04-30
Payer: COMMERCIAL

## 2024-04-30 ENCOUNTER — TELEPHONE (OUTPATIENT)
Dept: ENDOCRINOLOGY | Facility: CLINIC | Age: 34
End: 2024-04-30

## 2024-04-30 VITALS — WEIGHT: 230.16 LBS | BODY MASS INDEX: 35 KG/M2

## 2024-04-30 DIAGNOSIS — N18.5 CKD (CHRONIC KIDNEY DISEASE) STAGE 5, GFR LESS THAN 15 ML/MIN (H): ICD-10-CM

## 2024-04-30 DIAGNOSIS — Z98.84 S/P LAPAROSCOPIC SLEEVE GASTRECTOMY: Primary | ICD-10-CM

## 2024-04-30 DIAGNOSIS — K21.9 GASTROESOPHAGEAL REFLUX DISEASE, UNSPECIFIED WHETHER ESOPHAGITIS PRESENT: ICD-10-CM

## 2024-04-30 DIAGNOSIS — K21.9 GERD WITHOUT ESOPHAGITIS: ICD-10-CM

## 2024-04-30 PROCEDURE — 99214 OFFICE O/P EST MOD 30 MIN: CPT | Mod: 95

## 2024-04-30 RX ORDER — SUCRALFATE 1 G/1
1 TABLET ORAL 4 TIMES DAILY
Qty: 120 TABLET | Refills: 3 | Status: SHIPPED | OUTPATIENT
Start: 2024-04-30 | End: 2024-05-23

## 2024-04-30 ASSESSMENT — PAIN SCALES - GENERAL: PAINLEVEL: NO PAIN (0)

## 2024-04-30 NOTE — TELEPHONE ENCOUNTER
"  General Call    Contacts         Type Contact Phone/Fax    04/30/2024 02:55 PM CDT Phone (Incoming) Glenna Spears (Self) [phone] (H)          Reason for Call:  pt wants to speak to 'clinic mgr'.  \"I want to know why I have to speak to clinic mgr before I can schedule my next appts?      Could we send this information to you in "[employer] or would you prefer to receive a phone call?:   Patient would prefer a phone call   Okay to leave a detailed message?: Yes at Cell number on file:    Telephone Information:   Mobile [phone]       "

## 2024-04-30 NOTE — LETTER
2024       RE: Glenna Spears  [address]  [place] [address]     Dear Colleague,    Thank you for referring your patient, Glenna Spears, to the [facility]. Please see a copy of my visit note below.    Virtual Visit Details    Type of service:  Video Visit   Video Start Time:  8:04am  Video End Time: 8:31am    Originating Location (pt. Location): Home    Distant Location (provider location):  Off-site  Platform used for Video Visit: [facility] Bariatric Surgery Note    RE: Glenna Spears  MR#: [id number]  : 1990  VISIT DATE: 2024      Dear No Ref-Primary, Physician,    I had the pleasure of seeing your patient, Glenna Spears, in my post-bariatric surgery assessment clinic.    Assessment & Plan  Problem List Items Addressed This Visit       CKD (chronic kidney disease) stage 5, GFR less than 15 ml/min (H)    Relevant Medications    sucralfate (CARAFATE) 1 GM tablet     Other Visit Diagnoses       S/P laparoscopic sleeve gastrectomy    -  Primary    Relevant Medications    sucralfate (CARAFATE) 1 GM tablet    GERD without esophagitis        Relevant Medications    sucralfate (CARAFATE) 1 GM tablet    Gastroesophageal reflux disease, unspecified whether esophagitis present        Relevant Medications    sucralfate (CARAFATE) 1 GM tablet               32 minutes spent by me on the date of the encounter doing chart review, history and exam, documentation and further activities per the note    CHIEF COMPLAINT: Post-bariatric surgery follow-up. 3 months s/p sleeve on 24 with Dr. Smith     HISTORY OF PRESENT ILLNESS:      2024     7:41 AM   Questions Regarding Prior Weight Loss Surgery Reviewed With Patient   I had the following weight loss procedure Sleeve Gastrectomy   What year was your surgery?    How has your weight changed since your last visit? I have lost weight   Do  you currently have any of the following Heartburn, acid reflux, or GERD (acid reflux disease)?   Do you have any concerns today? No         Plan  Start carafate 1g daily before all meals for heart burn   Continue famotidine 20mg twice daily   Contact clinic if your heart burn is not resolving or getting worse, we can try an alternative PPI (such as esomeprazole) as you did not feel that famotidine or omeprzole were helpful, and declined starting an alternative PPI today   Will contact your dialysis center to assess fluid status given your frequent vomiting, may need to give you more fluids at dialysis sessions.    Labs ordered today: 2 months post op labs   Follow up with char in 2 months to evaluate heart burn symptoms   [facility] pharmacist Lauren Bloch 4 weeks to check in on heart burn  Dietician appointment to be scheduled asap   Seek emergency care if your pain is significantly worsening, if you are unable to keep food or liquids down, or for any related concerns         Interval History:   Needs to lose to BMI <35 for kidney transplant. Goal wt <230 lbs (100 lbs weight loss)  ESRD due to longstanding nephrotic range proteinuria and HTN,. Hx of chronic drug use, Excedrin use.  Sober >1 year. Quit smoking January 2023   On dialysis x 1.5 years, Tuesdays Thursdays and Saturdays   Nephrologist: Dr Zaid Pulido     -Sleeve gastrectomy 1/23/24  -Was dealing with nausea, vomiting, abdominal pain, constipation and dehydration over the first 2 weeks post surgery.       -At 1 month post op was feeling much better, once able to eat more variety of foods (was still struggling to tolerate the taste of protein shakes)  - At 1 month post op: had not been taking any of her dialysis medications lately, planned to restart the sensipar the velphoro, calcitriol shortly. Stated that they were making her nauseous and she's felt better since stopping them. At this appointment discussed the importance of restarting these  "medications for her kidney health, and that we can improve her nausea by taking heart burn medication regularly (pantoprazole 40mg) regularly.      Since last visit:   Continuing to experiencing heart burn., nausea vomiting . Self discontinued pantoprazole yesterday as she felt it was making her heart burn worse, stated that pepcid and tums were all she needed for heart burn management. We discussed that tums were not an appropriate chronic treatment for heart burn, and that PPIs (pantoparzole, omeprazole) have been found to be the most effective at heart burn treatment. Glenna declined trialling an alternative PPI at this time due to perceived lack of efficacy with omeprazole and pantoprazole, we discussed the risks and benefits of this decision and Glenna expressed understanding.     Glenna is open to continuing famotidine 20mg BID, and is open to trialing carafate to see if this helps heart burn symptoms.   Will continue to check in with Glenna and offer to try esomeprazole in the future to help with heart burn symptoms.           Not taking most of her medications, currently only taking pepcid, velphoro, cincalcit. Discussed the importance of taking all of her dialysis medications, patient expressed frustration with all of the medications she was taking.  \"The more meds I take, the more horrible I feel. The less meds I take the more human I feel.\"    -not taking ursodiol, discussed the reasoning behind why this medication is helpful and important to take, Glenna says she will start this medication    Recent diet changes: Hoping to eat more protein bars. Eating \"normal food\" but small portions. Describes she has to keep something in her stomach to feel comfortable, otherwise her heartburn is exacerbated.  Continues to not tolerate protein shakes.      Vitamins/Labs: 3 month labs already faxed     GERD symptoms: Has stopped taking pantoprazole because she felt that it was making her heart burn worse, feels that heart burn " is better since stopping 2 days ago. Otherwise taking famotidine as needed. Is open to starting carafate.    Nausea : some in the morning, worsened after taking medication.     Vomiting : describes throwing up daily after taking medications. Also throws up after eating too fast. No hematemesis. Undergoing dialysis.     Dysphagia: endorses issues with swallowing medications related to heart burn symptoms.       Weight History:      4/30/2024     7:41 AM   --   What is your highest lifetime weight? 330   What is your lowest weight since surgery? (In pounds) 230        Weight: 104.4 kg (230 lb 2.6 oz)                 4/30/2024     7:41 AM   Questions Regarding Co-Morbidities and Health Concerns Reviewed With Patient   Pre-diabetes Never   Diabetes II Never   High Blood Pressure Improved   High cholesterol Never   Heartburn/Reflux Worsened   Sleep apnea Stayed the same   PCOS Never   Back pain Stayed the same   Joint pain Stayed the same   Lower leg swelling Stayed the same           4/30/2024     7:41 AM   Eating Habits   How many meals do you eat per day? 2   Do you snack between meals? Yes   How much food are you eating at each meal? Less than 1/2 cup   Are you able to separate your meals and liquids by at least 30 minutes? Yes   Are you able to avoid liquid calories? Sometimes           4/30/2024     7:41 AM   Exercise Questions Reviewed With Patient   How often do you exercise? Less than 1 time per week   What is the duration of your exercise (in minutes)? 10 Minutes   What types of exercise do you do? walking   What keeps you from being more active? I should be more active but I just have not gotten around to it    Lack of Time    Too tired       Social History:      4/30/2024     7:41 AM   --   Are you smoking? No   Are you drinking alcohol? No       Medications:  Current Outpatient Medications   Medication Sig Dispense Refill    cinacalcet (SENSIPAR) 90 MG tablet Take 90 mg by mouth every evening      famotidine  (PEPCID) 20 MG tablet Take 1 tablet (20 mg) by mouth 2 times daily 60 tablet 2    levonorgestrel (KYLEENA) 19.5 MG IUD 1 Intra Uterine Device by Intrauterine route once      sucralfate (CARAFATE) 1 GM tablet Take 1 tablet (1 g) by mouth 4 times daily 120 tablet 3    acetaminophen (TYLENOL) 325 MG tablet Take 325-650 mg by mouth every 4 hours as needed (Patient not taking: Reported on 2/8/2024)      calcitRIOL (ROCALTROL) 0.5 MCG capsule Take 0.5 mcg by mouth every evening      gabapentin (NEURONTIN) 100 MG capsule Take 300 mg by mouth at bedtime      ondansetron (ZOFRAN ODT) 4 MG ODT tab Take 1 tablet (4 mg) by mouth every 6 hours as needed for nausea 15 tablet 0    pantoprazole (PROTONIX) 40 MG EC tablet Take 1 tablet (40 mg) by mouth daily 30 tablet 1    senna-docusate (SENOKOT-S/PERICOLACE) 8.6-50 MG tablet Take 2 tablets by mouth daily as needed for constipation (While taking narcotic pain medications.  Stop taking if having loose stools.) 30 tablet 1    ursodiol (ACTIGALL) 300 MG capsule Take 1 capsule (300 mg) by mouth 2 times daily 60 capsule 5    VELPHORO 500 MG CHEW chewable tablet Take 500 mg by mouth 3 times daily (with meals)      vitamin B-12 (CYANOCOBALAMIN) 2500 MCG sublingual tablet Take 1 tablet (2,500 mcg) by mouth daily 30 tablet 5     No current facility-administered medications for this visit.         4/30/2024     7:41 AM   --   Do you avoid NSAIDs such as (Ibuprofen, Aleve, Naproxen, Advil)? Yes       ROS:  GI:       4/30/2024     7:41 AM   --   Vomiting Yes   Diarrhea Yes   Constipation Yes   Swallowing trouble No   Abdominal pain Yes   Heartburn Yes     Skin:       4/30/2024     7:41 AM   BAR RBS ROS - SKIN   Rash in skin folds No     Psych:       4/30/2024     7:41 AM   --   Depression Yes   Anxiety Yes     Female Only:       4/30/2024     7:41 AM   BAR RBS ROS -    Female only None of the above   Stress urinary incontinence No             1/22/2023     5:08 PM   GABBY Score (Last Two)    GABBY Raw Score 32    32   Activation Score 62.6    62.6   GABBY Level 3    3     PHYSICAL EXAM:  Objective   Wt 104.4 kg (230 lb 2.6 oz)   BMI 35.00 kg/m       Vitals:  No vitals were obtained today due to virtual visit.    Physical Exam   GENERAL: alert and no distress  EYES: Eyes grossly normal to inspection.  No discharge or erythema, or obvious scleral/conjunctival abnormalities.  RESP: No audible wheeze, cough, or visible cyanosis.    SKIN: Visible skin clear. No significant rash, abnormal pigmentation or lesions.  NEURO: Cranial nerves grossly intact.  Mentation and speech appropriate for age.  PSYCH: Appropriate affect, tone, and pace of words    Sincerely,    Kelli Joshi PA-C

## 2024-04-30 NOTE — PROGRESS NOTES
Virtual Visit Details    Type of service:  Video Visit   Video Start Time:  8:04am  Video End Time: 8:31am    Originating Location (pt. Location): Home    Distant Location (provider location):  Off-site  Platform used for Video Visit: [facility] Bariatric Surgery Note    RE: Glenna Spears  MR#: [id number]  : 1990  VISIT DATE: 2024      Dear No Ref-Primary, Physician,    I had the pleasure of seeing your patient, Glenna Spears, in my post-bariatric surgery assessment clinic.    Assessment & Plan   Problem List Items Addressed This Visit       CKD (chronic kidney disease) stage 5, GFR less than 15 ml/min (H)    Relevant Medications    sucralfate (CARAFATE) 1 GM tablet     Other Visit Diagnoses       S/P laparoscopic sleeve gastrectomy    -  Primary    Relevant Medications    sucralfate (CARAFATE) 1 GM tablet    GERD without esophagitis        Relevant Medications    sucralfate (CARAFATE) 1 GM tablet    Gastroesophageal reflux disease, unspecified whether esophagitis present        Relevant Medications    sucralfate (CARAFATE) 1 GM tablet               32 minutes spent by me on the date of the encounter doing chart review, history and exam, documentation and further activities per the note    CHIEF COMPLAINT: Post-bariatric surgery follow-up. 3 months s/p sleeve on 24 with Dr. Smith     HISTORY OF PRESENT ILLNESS:      2024     7:41 AM   Questions Regarding Prior Weight Loss Surgery Reviewed With Patient   I had the following weight loss procedure Sleeve Gastrectomy   What year was your surgery?    How has your weight changed since your last visit? I have lost weight   Do you currently have any of the following Heartburn, acid reflux, or GERD (acid reflux disease)?   Do you have any concerns today? No         Plan  Start carafate 1g daily before all meals for heart burn   Continue famotidine 20mg twice daily   Contact clinic if your heart burn is not resolving or getting worse, we  can try an alternative PPI (such as esomeprazole) as you did not feel that famotidine or omeprzole were helpful, and declined starting an alternative PPI today   Will contact your dialysis center to assess fluid status given your frequent vomiting, may need to give you more fluids at dialysis sessions.    Labs ordered today: 2 months post op labs   Follow up with char in 2 months to evaluate heart burn symptoms   [facility] pharmacist Lauren Bloch 4 weeks to check in on heart burn  Dietician appointment to be scheduled asap   Seek emergency care if your pain is significantly worsening, if you are unable to keep food or liquids down, or for any related concerns         Interval History:   Needs to lose to BMI <35 for kidney transplant. Goal wt <230 lbs (100 lbs weight loss)  ESRD due to longstanding nephrotic range proteinuria and HTN,. Hx of chronic drug use, Excedrin use.  Sober >1 year. Quit smoking January 2023   On dialysis x 1.5 years, Tuesdays Thursdays and Saturdays   Nephrologist: Dr Zaid Pulido     -Sleeve gastrectomy 1/23/24  -Was dealing with nausea, vomiting, abdominal pain, constipation and dehydration over the first 2 weeks post surgery.       -At 1 month post op was feeling much better, once able to eat more variety of foods (was still struggling to tolerate the taste of protein shakes)  - At 1 month post op: had not been taking any of her dialysis medications lately, planned to restart the sensipar the velphoro, calcitriol shortly. Stated that they were making her nauseous and she's felt better since stopping them. At this appointment discussed the importance of restarting these medications for her kidney health, and that we can improve her nausea by taking heart burn medication regularly (pantoprazole 40mg) regularly.      Since last visit:   Continuing to experiencing heart burn., nausea vomiting . Self discontinued pantoprazole yesterday as she felt it was making her heart burn worse,  "stated that pepcid and tums were all she needed for heart burn management. We discussed that tums were not an appropriate chronic treatment for heart burn, and that PPIs (pantoparzole, omeprazole) have been found to be the most effective at heart burn treatment. Glenna declined trialling an alternative PPI at this time due to perceived lack of efficacy with omeprazole and pantoprazole, we discussed the risks and benefits of this decision and Glenna expressed understanding.     Glenna is open to continuing famotidine 20mg BID, and is open to trialing carafate to see if this helps heart burn symptoms.   Will continue to check in with Glenna and offer to try esomeprazole in the future to help with heart burn symptoms.           Not taking most of her medications, currently only taking pepcid, velphoro, cincalcit. Discussed the importance of taking all of her dialysis medications, patient expressed frustration with all of the medications she was taking.  \"The more meds I take, the more horrible I feel. The less meds I take the more human I feel.\"    -not taking ursodiol, discussed the reasoning behind why this medication is helpful and important to take, Glenna says she will start this medication    Recent diet changes: Hoping to eat more protein bars. Eating \"normal food\" but small portions. Describes she has to keep something in her stomach to feel comfortable, otherwise her heartburn is exacerbated.  Continues to not tolerate protein shakes.      Vitamins/Labs: 3 month labs already faxed     GERD symptoms: Has stopped taking pantoprazole because she felt that it was making her heart burn worse, feels that heart burn is better since stopping 2 days ago. Otherwise taking famotidine as needed. Is open to starting carafate.    Nausea : some in the morning, worsened after taking medication.     Vomiting : describes throwing up daily after taking medications. Also throws up after eating too fast. No hematemesis. Undergoing dialysis. "     Dysphagia: endorses issues with swallowing medications related to heart burn symptoms.       Weight History:      4/30/2024     7:41 AM   --   What is your highest lifetime weight? 330   What is your lowest weight since surgery? (In pounds) 230        Weight: 104.4 kg (230 lb 2.6 oz)                 4/30/2024     7:41 AM   Questions Regarding Co-Morbidities and Health Concerns Reviewed With Patient   Pre-diabetes Never   Diabetes II Never   High Blood Pressure Improved   High cholesterol Never   Heartburn/Reflux Worsened   Sleep apnea Stayed the same   PCOS Never   Back pain Stayed the same   Joint pain Stayed the same   Lower leg swelling Stayed the same           4/30/2024     7:41 AM   Eating Habits   How many meals do you eat per day? 2   Do you snack between meals? Yes   How much food are you eating at each meal? Less than 1/2 cup   Are you able to separate your meals and liquids by at least 30 minutes? Yes   Are you able to avoid liquid calories? Sometimes           4/30/2024     7:41 AM   Exercise Questions Reviewed With Patient   How often do you exercise? Less than 1 time per week   What is the duration of your exercise (in minutes)? 10 Minutes   What types of exercise do you do? walking   What keeps you from being more active? I should be more active but I just have not gotten around to it    Lack of Time    Too tired       Social History:      4/30/2024     7:41 AM   --   Are you smoking? No   Are you drinking alcohol? No       Medications:  Current Outpatient Medications   Medication Sig Dispense Refill    cinacalcet (SENSIPAR) 90 MG tablet Take 90 mg by mouth every evening      famotidine (PEPCID) 20 MG tablet Take 1 tablet (20 mg) by mouth 2 times daily 60 tablet 2    levonorgestrel (KYLEENA) 19.5 MG IUD 1 Intra Uterine Device by Intrauterine route once      sucralfate (CARAFATE) 1 GM tablet Take 1 tablet (1 g) by mouth 4 times daily 120 tablet 3    acetaminophen (TYLENOL) 325 MG tablet Take  325-650 mg by mouth every 4 hours as needed (Patient not taking: Reported on 2/8/2024)      calcitRIOL (ROCALTROL) 0.5 MCG capsule Take 0.5 mcg by mouth every evening      gabapentin (NEURONTIN) 100 MG capsule Take 300 mg by mouth at bedtime      ondansetron (ZOFRAN ODT) 4 MG ODT tab Take 1 tablet (4 mg) by mouth every 6 hours as needed for nausea 15 tablet 0    pantoprazole (PROTONIX) 40 MG EC tablet Take 1 tablet (40 mg) by mouth daily 30 tablet 1    senna-docusate (SENOKOT-S/PERICOLACE) 8.6-50 MG tablet Take 2 tablets by mouth daily as needed for constipation (While taking narcotic pain medications.  Stop taking if having loose stools.) 30 tablet 1    ursodiol (ACTIGALL) 300 MG capsule Take 1 capsule (300 mg) by mouth 2 times daily 60 capsule 5    VELPHORO 500 MG CHEW chewable tablet Take 500 mg by mouth 3 times daily (with meals)      vitamin B-12 (CYANOCOBALAMIN) 2500 MCG sublingual tablet Take 1 tablet (2,500 mcg) by mouth daily 30 tablet 5     No current facility-administered medications for this visit.         4/30/2024     7:41 AM   --   Do you avoid NSAIDs such as (Ibuprofen, Aleve, Naproxen, Advil)? Yes       ROS:  GI:       4/30/2024     7:41 AM   --   Vomiting Yes   Diarrhea Yes   Constipation Yes   Swallowing trouble No   Abdominal pain Yes   Heartburn Yes     Skin:       4/30/2024     7:41 AM   BAR RBS ROS - SKIN   Rash in skin folds No     Psych:       4/30/2024     7:41 AM   --   Depression Yes   Anxiety Yes     Female Only:       4/30/2024     7:41 AM   BAR RBS ROS -    Female only None of the above   Stress urinary incontinence No             1/22/2023     5:08 PM   GABBY Score (Last Two)   GABBY Raw Score 32    32   Activation Score 62.6    62.6   GABBY Level 3    3         PHYSICAL EXAM:  Objective    Wt 104.4 kg (230 lb 2.6 oz)   BMI 35.00 kg/m           Vitals:  No vitals were obtained today due to virtual visit.    Physical Exam   GENERAL: alert and no distress  EYES: Eyes grossly normal to  inspection.  No discharge or erythema, or obvious scleral/conjunctival abnormalities.  RESP: No audible wheeze, cough, or visible cyanosis.    SKIN: Visible skin clear. No significant rash, abnormal pigmentation or lesions.  NEURO: Cranial nerves grossly intact.  Mentation and speech appropriate for age.  PSYCH: Appropriate affect, tone, and pace of words        Sincerely,    Kelli Joshi PA-C

## 2024-04-30 NOTE — NURSING NOTE
Is the patient currently in the state of [place]? YES    Visit mode:VIDEO    If the visit is dropped, the patient can be reconnected by: VIDEO VISIT: Send to e-mail at: [email or web address]    Will anyone else be joining the visit? NO  (If patient encounters technical issues they should call [phone]    How would you like to obtain your AVS? MyChart    Are changes needed to the allergy or medication list? No    Are refills needed on medications prescribed by this physician? NO    Reason for visit: CATHERINE Segovia VVF    Pt received mail from clinic about speaking with someone in regards to missed appts.

## 2024-05-02 NOTE — PATIENT INSTRUCTIONS
"Thank you for allowing us the privilege of caring for you. We hope we provided you with the excellent service you deserve.   Please let us know if there is anything else we can do for you so that we can be sure you are completely satisfied with your care experience.    To ensure the quality of our services you may be receiving a patient satisfaction survey from an independent patient satisfaction monitoring company.    The greatest compliment you can give is a \"Likely to Recommend\"    Your visit was with Kelli Joshi PA-C today.    Instructions per today's visit:     Audie Spears, it was great to visit with you today.  Here is a review of our visit.  If our clinic scheduler is not able to reach you please call [phone] to schedule your next appointments.    Plan  Start carafate 1g daily before all meals for heart burn   Continue famotidine 20mg twice daily   Contact clinic if your heart burn is not resolving or getting worse, we can try an alternative PPI (such as esomeprazole) as you did not feel that famotidine or omeprzole were helpful, and declined starting an alternative PPI today   Will contact your dialysis center to assess fluid status given your frequent vomiting, may need to give you more fluids at dialysis sessions.    Labs ordered today: 2 months post op labs   Follow up with char in 2 months to evaluate heart burn symptoms   [facility] pharmacist Lauren Bloch 4 weeks to check in on heart burn  Dietician appointment to be scheduled asap   Seek emergency care if your pain is significantly worsening, if you are unable to keep food or liquids down, or for any related concerns       Information about Video Visits with [employer]: video visit information  _________________________________________________________________________________________________________________________________________________________  If you are asked by your clinic team to have your blood pressure checked:  [facility] do offer several locations for blood pressure checks. Please follow the below link to schedule an appointment. Scheduling an appointment at the pharmacy for a blood pressure check is now preferred.    Appointment Plus (appointment-plus.Groupsite)  _________________________________________________________________________________________________________________________________________________________  Important contact and scheduling information:  Please call our contact center at [phone] to schedule your next appointments.  To find a lab location near you, please call [phone].  For any nursing questions or concerns call Brynn Larson LPN at [phone] or Gris Mix RN at [phone]  Please call during clinic hours Monday through Friday 8:00a - 4:00p if you have questions or you can contact us via [employer] at anytime and we will reply during clinic hours.    Lab results will be communicated through My Chart or letter (if My Chart not used). Please call the clinic if you have not received communication after 1 week or if you have any questions.?  Clinic Fax: [phone]    _________________________________________________________________________________________________________________________________________________________  Meal Replacement Products:    Here is the link to our new e-store where you can purchase our meal replacement products    [facility] E-Store  [facility].[employer]/store    The one week starter kit is a great way to sample a variety of products and see what works for you.    If you want more information about the product go to: [employer]    If you are an employee or [facility] or [facility] please contact your care team for a 10% estore discount    Free Shipping for orders over $75     Benefits of meal replacements products:    Portion and calorie control  Improved nutrition  Structured eating  Simplified food  choices  Avoid contact with trigger foods  _________________________________________________________________________________________________________________________________________________________  Interested in working with a health ?  Health coaches work with you to improve your overall health and wellbeing.  They look at the whole person, and may involve discussion of different areas of life, including, but not limited to the four pillars of health (sleep, exercise, nutrition, and stress management). Discuss with your care team if you would like to start working a health .  Health Coaching-3 Pack: Schedule by calling [phone]    $99 for three health coaching visits    Visits may be done in person or via phone    Coaching is a partnership between the  and the client; Coaches do not prescribe or diagnose    Coaching helps inspire the client to reach his/her personal goals   _________________________________________________________________________________________________________________________________________________________  24 Week Healthy Lifestyle Plan:    Our mission in the 24-week Healthy Lifestyle Plan is to provide you with individualized care by giving you the tools, education and support you need to lose weight and maintain a healthy lifestyle. In your 24-week journey, you ll be supported by a dedicated weight loss team that includes registered dietitians, medical weight management providers, health coaches, and nurses -- all with special expertise in weight loss -- to help you every step of the way.     Monthly meetings with your registered dietician or medical weight management provider help to review your progress, update your care plan, and make any adjustments needed to ensure success. Between these visits, weekly and bi-weekly health  visits will help you focus on the four pillars of weight loss -- stress, sleep, nutrition, and exercise -- and how you can best adapt each to  achieve sustainable weight loss results.    In addition, you will be given exclusive access to online wellbeing classes through [employer].  Your initial visit will be with a medical weight management provider who will help to understand your weight loss goals and ensure this program is the right fit for you. Please let our team know if you are interested in the 24 week plan by sending a message to your care team or calling [phone] to schedule.  _________________________________________________________________________________________________________________________________________________________  __________  [place] [facility] Get Moving Program  Our team of physical therapists is trained to help you understand and take control of your condition. They will perform a thorough evaluation to determine your ability for activity and develop a customized plan to fit your goals and physical ability.  Scheduling: Unsure if the Get Moving program is right for you? Discuss the program with your medical provider or diabetes educator. You can also call us at [phone] to ask questions or schedule an appointment.   [facility] Get Moving Program  ____________________________________________________________________________________________________________________________________________________________________________  [facility] Diabetes Prevention Program (DPP)  If you have prediabetes and Medicare please contact us via [employer] to learn more about the Diabetes Prevention Program (DPP)  Program Details:  [facility] offers the year-long Diabetes Prevention Program (DPP). The program helps you to make lifestyle changes that prevent or delay type 2 diabetes by supporting healthy eating, increased physical activity, stress reduction and use of coping skills.   On average, previous [facility] DPP cohorts have lost and maintained at least 5% of their starting weight throughout the program and averaged  more than 150 minutes of physical activity per week.  Participants meet weekly for one-hour group sessions over sixteen weeks, every other week for the next 8 weeks, and monthly for the last six months.   A year-long maintenance program is also available for participants who complete the first year.   Location & Cost:   During the COVID-19 Public Health Emergency, the program is offered virtually. When in-person classes can resume, they will be held at [facility].  For people with Medicare, the program is covered in full. A self-pay option will also be available for those with non-Medicare insurance plans.   ______________________________________________________________________________________________________________________________________________________________________________________________________________________________    To work with a Behavioral Health Psychologist:    Call to schedule:    Scooby Griggs - [phone]  Dilia Esparza - [phone]  Tammi Denise - [phone]  Jenifer Pennington - [phone]   Justa Jimenez PhD (cannot accept Medicare) [phone]        Thank you,   [facility] Comprehensive Weight Management Team

## 2024-05-13 ENCOUNTER — TELEPHONE (OUTPATIENT)
Dept: TRANSPLANT | Facility: CLINIC | Age: 34
End: 2024-05-13
Payer: COMMERCIAL

## 2024-05-13 NOTE — TELEPHONE ENCOUNTER
Spoke with patient. Confirmed upcoming PKE appointments at [facility] on 5/20/24 starting at 0730. Instructed patient may eat breakfast and take regularly scheduled medications.  Patient encouraged to bring family member/support person to appointments if possible.  Patient states she has contact information for any further questions/concerns/need to reschedule.

## 2024-05-13 NOTE — TELEPHONE ENCOUNTER
Called pt to reschedule transplant class - she joined late today and needed to reschedule. She is now signed up for 5/16/24, she had no further questions.

## 2024-05-16 ENCOUNTER — VIRTUAL VISIT (OUTPATIENT)
Dept: TRANSPLANT | Facility: CLINIC | Age: 34
End: 2024-05-16
Attending: NURSE PRACTITIONER
Payer: COMMERCIAL

## 2024-05-16 DIAGNOSIS — Z01.818 PRE-TRANSPLANT EVALUATION FOR KIDNEY TRANSPLANT: Primary | ICD-10-CM

## 2024-05-16 NOTE — LETTER
"    2024         RE: Glenna Spears  [address]  [place] [address]        Dear Colleague,    Thank you for referring your patient, Glenna Spears, to the [facility] TRANSPLANT [facility]. Please see a copy of my visit note below.    Kidney Transplant Referral   Glenna attended the pre-transplant patient education class today.   Video content reviewed:  The Evaluation Process  Our Immune System  Finding a donor   donation  How organs are allocated  Multiple listings  Disease transmission  While you wait  Surgical risks    Participants were informed of the benefits of transplant as well as potential risks such as infection, cancer, and death.  The need for total adherence with immunosuppression medications and following transplant regimens was stressed.       The patient was provided with the following documents:  What You Need to Know About a Kidney Transplant  Adult Kidney Transplant - A Guide for Patients  SRTR Data Sheet - Kidney  Brochure - Kidney Allocation  What You Need to Know About a Pancreas Transplant  Brochure - Multiple Listing and Waiting Time Transfer  What Every Patient Needs to Know (UNOS)  UNOS Facts and Figures  Finding a Donor  KDPI Consent  Receipt of Information form    Glenna will sign the  Receipt of Information for Organ Transplant Recipient.\" She was instructed to call coordinator with additional questions.        Again, thank you for allowing me to participate in the care of your patient.        Sincerely,        Transplant Class  "

## 2024-05-16 NOTE — PROGRESS NOTES
"Kidney Transplant Referral   Glenna attended the pre-transplant patient education class today.   Video content reviewed:  The Evaluation Process  Our Immune System  Finding a donor   donation  How organs are allocated  Multiple listings  Disease transmission  While you wait  Surgical risks    Participants were informed of the benefits of transplant as well as potential risks such as infection, cancer, and death.  The need for total adherence with immunosuppression medications and following transplant regimens was stressed.       The patient was provided with the following documents:  What You Need to Know About a Kidney Transplant  Adult Kidney Transplant - A Guide for Patients  SRTR Data Sheet - Kidney  Brochure - Kidney Allocation  What You Need to Know About a Pancreas Transplant  Brochure - Multiple Listing and Waiting Time Transfer  What Every Patient Needs to Know (UNOS)  UNOS Facts and Figures  Finding a Donor  KDPI Consent  Receipt of Information form    Glenna will sign the  Receipt of Information for Organ Transplant Recipient.\" She was instructed to call coordinator with additional questions.      "

## 2024-05-17 NOTE — PROGRESS NOTES
TRANSPLANT NEPHROLOGY RECIPIENT EVALUATION NOTE    Recommendations:  - surgery input on weight  - PFTs  - exercise stress test  - ID   - repeat cardiolipins  - urology follow up for persistent urinary frequency and does she need further evaluation for microscopic hematuria?  - pap smear   - social work input on drug use history     Assessment and Plan:  # Kidney Transplant Evaluation: Patient is a good candidate overall. Benefits of a living donor transplant were discussed.    # ESKD from unknown etiology (no kidney biopsy): doing OK on hemodialysis since 6/2022, but would likely benefit from a kidney transplant.     # Obesity s/p gastric sleeve 1/23/24: current BMI of ~ 32 with central obesity. Appreciate surgery input.     # Former Smoker: 22-pack-year, quit 1 year ago. Will get PFTs.     # History of Illicit Drug use: remote LSD /cocaine use in her 20s, + marijuana. Drug screen pending. Appreciate social work input.     # Cardiac Risk: no known history of kidney disease. ECHO today with LVEF 60-65%. Given risk factors, will get exercise stress testing.     # Positive quant gold: needs ID.     # Weak positive cardiolipins: will repeat.      # Increased Urinary frequency: seen by outside urology in 4/2024 with negative urine culture. If no improvement with dietary changes, beta3 agonist medication such as Myrbetriq to be  considered. Recommend follow up with urology as symptoms are still persistent. Will need input from urology regarding microscopic hematuria.     # Health Maintenance: 3/2022 PAP (NILM, HR HPV +) : Not up to date and Dental: Up to date    - Discussed the risks and benefits of a transplant, including the risk of surgery and immunosuppression medications.  Patient's overall evaluation will be discussed in the Transplant Program's regular meeting with a final recommendation on the patients suitability for transplant to be made at that time.    Pending completion of the full evaluation, patient  presently appears to be enough of an acceptable kidney transplant recipient candidate to have any potential kidney donors start the evaluation process.      Evaluation:  Glenna Spears was seen in consultation at the request of Dr. Lukas Jean for evaluation as a potential kidney transplant recipient.    Reason for Visit:  Glenna Spears is a 33 year old female with ESKD from unknown etiology (no kidney biopsy), who presents for kidney transplant evaluation.    History of Present Illness:           Kidney Disease Hx:      ESKD from unclear etiology. History of longstanding uncontrolled hypertension, nephrotic range proteinuria, obesity, microscopic hematuria, preeclampsia in 2016,  illicit drug use ( remote LSD / cocaine in her 20s), family history of kidney disease including mother and sisters  Seen by nephrology in 2016 with a plan for  kidney biopsy, but she was lost to follow up. In 12/2021 she was hospitalized with DEEP on CKD with serum creatinine in the 8-9 range with 3.6 g/g of proteinuria.  She had nephrology follow up with Dr. Mar and completed negative serologic work up including complements, ANCA, viral hepatitis studies.  Started hemodialysis in 6/2022. She has a mini evaluation here on 1/23/2023 for BMI of 48. Now S/p gastric sleeve with BMI ~ 32.            Kidney Disease Dx: Unknown etiology (no kidney biopsy)       Biopsy Proven: No         On Dialysis: Yes, Date initiated: 6/2022 and Dialysis Type: [facility];,        Primary Nephrologist: Dr. Christianson        H/o Kidney Stones: No       H/o Recurrent/Frequent UTI: No         Diabetic Hx: None           Cardiac/Vascular Disease Risk Factors:        Cardiac Risk Factors: Hypertension and CKD       Known CAD: No       Known PAD/Caludication Symptoms: No       Known Heart Failure: No       Arrhythmia: No       Pulmonary Hypertension: No       Valvular Disease: No       Other: None         Viral Serology Status       CMV IgG Antibody: Unknown        EBV IgG Antibody: Unknown         Volume Status/Weight:        Volume status: Euvolemic       Weight:  Acceptable BMI       BMI: There is no height or weight on file to calculate BMI.         Functional Capacity/Frailty:        Works in  eye glass assembly, walk a lot at work.  Stays active at home with cleaning/yard work. Started doing sit ups for extra exercise.  No chest pains or shortness of breath with stairs.     Fatigue/Decreased Energy: [] No [x] Yes    Chest Pain or SOB with Exertion: [x] No [] Yes    Significant Weight Change: [] No [x] Yes    Nausea, Vomiting or Diarrhea: [x] No [] Yes    Fever, Sweats or Chills:  [x] No [] Yes    Leg Swelling [x] No [] Yes        History of Cancer: None       Allergy Testing Questions:   Medication that caused a reaction Sufla Drugs (Sufla/TMP or Bactrim) - as a child, does not recall sxs.    Antibiotics used that didn't give an allergic reaction?  Patient doesn't know    COVID Vaccination Up To Date: Not asked    Potential Living Kidney Donors: No    Review of Systems:  A comprehensive review of systems was obtained and negative, except as noted in the HPI or PMH.    Past Medical History:   Medical record was reviewed and PMH was discussed with patient and noted below.  Past Medical History:   Diagnosis Date    ESRD (end stage renal disease) (H)     GERD (gastroesophageal reflux disease)     Hypertension     Obesity        Past Social History:   Past Surgical History:   Procedure Laterality Date    AV FISTULA REPAIR       SECTION      LAPAROSCOPIC GASTRIC SLEEVE N/A 2024    Procedure: GASTRECTOMY, SLEEVE, LAPAROSCOPIC;  Surgeon: John Smith MD;  Location:  OR     Personal history of bleeding or anesthesia problems: No    Family History:  Family History   Problem Relation Age of Onset    Anesthesia Reaction No family hx of     Deep Vein Thrombosis (DVT) No family hx of        Personal History:   Social History     Socioeconomic History     Marital status: Single     Spouse name: Not on file    Number of children: Not on file    Years of education: Not on file    Highest education level: Not on file   Occupational History    Not on file   Tobacco Use    Smoking status: Former     Current packs/day: 0.00     Types: Cigarettes     Quit date: 2023     Years since quittin.2     Passive exposure: Past    Smokeless tobacco: Never    Tobacco comments:     quit   Vaping Use    Vaping status: Never Used   Substance and Sexual Activity    Alcohol use: Not Currently    Drug use: Not Currently     Types: Marijuana    Sexual activity: Not on file   Other Topics Concern    Not on file   Social History Narrative    Not on file     Social Determinants of Health     Financial Resource Strain: High Risk (2023)    [facility]    Overall Financial Resource Strain (CARDIA)     Difficulty of Paying Living Expenses: Hard   Food Insecurity: Food Insecurity Present (2023)    [facility]    Hunger Vital Sign     Worried About Running Out of Food in the Last Year: Sometimes true     Ran Out of Food in the Last Year: Never true   Transportation Needs: No Transportation Needs (2023)    [facility]    PRAPARE - Transportation     Lack of Transportation (Medical): No     Lack of Transportation (Non-Medical): No   Physical Activity: Insufficiently Active (2023)    [facility]    Exercise Vital Sign     Days of Exercise per Week: 1 day     Minutes of Exercise per Session: 40 min   Stress: Stress Concern Present (2023)    Received from [facility], [facility]    [place] [employer] - Occupational Stress Questionnaire      Feeling of Stress : To some extent   Social Connections: Socially Isolated (2/7/2023)    [facility] [employer] [facility]    Social Connection and Isolation Panel [NHANES]     Frequency of Communication with Friends and Family: Never     Frequency of Social Gatherings with Friends and Family: Never     Attends Quaker Services: Never     Active Member of Clubs or Organizations: No     Attends Club or Organization Meetings: Never     Marital Status: Living with partner   Interpersonal Safety: Not At Risk (1/1/2024)    Received from [facility] [employer] [facility]    Intimate Partner Violence     Physically hurt, threatened and/or made to feel afraid: No   Housing Stability: High Risk (2/7/2023)    [facility] [employer] [facility]    Housing Stability     In the last 12 months, was there a time when you were not able to pay the mortgage or rent on time?: Yes     In the last 12 months, how many places have you lived?: 1     Number of Places Lived in the Last Year (Outpatient): Not on file     Number of Places Lived in the Last Year (Inpatient): Not on file     In the last 12 months, was there a time when you did not have a steady place to sleep or slept in a shelter (including now)?: No       Allergies:  Allergies   Allergen Reactions    Sulfa Antibiotics Other (See Comments)     Reaction as an infant- unsure of what happened    Nsaids Other (See Comments)     Has nephrotic range proteinuria and see nephrology's note.        Medications:  Current Outpatient Medications   Medication Sig Dispense Refill    acetaminophen (TYLENOL) 325 MG tablet Take 325-650 mg by mouth every 4 hours as needed (Patient not taking: Reported on 2/8/2024)      calcitRIOL (ROCALTROL) 0.5 MCG capsule Take 0.5 mcg by mouth every evening      cinacalcet (SENSIPAR) 90 MG tablet Take 90 mg by mouth every evening      famotidine (PEPCID) 20 MG tablet  Take 1 tablet (20 mg) by mouth 2 times daily 60 tablet 2    gabapentin (NEURONTIN) 100 MG capsule Take 300 mg by mouth at bedtime      levonorgestrel (KYLEENA) 19.5 MG IUD 1 Intra Uterine Device by Intrauterine route once      ondansetron (ZOFRAN ODT) 4 MG ODT tab Take 1 tablet (4 mg) by mouth every 6 hours as needed for nausea 15 tablet 0    pantoprazole (PROTONIX) 40 MG EC tablet Take 1 tablet (40 mg) by mouth daily 30 tablet 1    senna-docusate (SENOKOT-S/PERICOLACE) 8.6-50 MG tablet Take 2 tablets by mouth daily as needed for constipation (While taking narcotic pain medications.  Stop taking if having loose stools.) 30 tablet 1    sucralfate (CARAFATE) 1 GM tablet Take 1 tablet (1 g) by mouth 4 times daily 120 tablet 3    ursodiol (ACTIGALL) 300 MG capsule Take 1 capsule (300 mg) by mouth 2 times daily 60 capsule 5    VELPHORO 500 MG CHEW chewable tablet Take 500 mg by mouth 3 times daily (with meals)      vitamin B-12 (CYANOCOBALAMIN) 2500 MCG sublingual tablet Take 1 tablet (2,500 mcg) by mouth daily 30 tablet 5     No current facility-administered medications for this visit.       Vitals:  There were no vitals taken for this visit.    Exam:  GENERAL APPEARANCE: alert and no distress  HENT: mouth without ulcers or lesions  RESP: lungs clear to auscultation - no rales, rhonchi or wheezes  CV: regular rhythm, normal rate, no rub, no murmur  EDEMA: no LE edema bilaterally  ABDOMEN: soft, nondistended, nontender, bowel sounds normal  MS: extremities normal - no gross deformities noted, no evidence of inflammation in joints, no muscle tenderness  SKIN: no rash    Results:   No results found for this or any previous visit (from the past 336 hour(s)).

## 2024-05-17 NOTE — PROGRESS NOTES
"[facility] SOLID ORGAN TRANSPLANT  OUTPATIENT MNT: KIDNEY TRANSPLANT EVALUATION    Current BMI: 32.7 (HT 69 in,  lbs/101 kg)  BMI guideline for kidney transplant up to a BMI of 40 / per surgeon discretion     Frailty Assessment-- Not Frail (1/5 points)  Handgrip Strength: 30    Reference:  Score of 0-2 = Not Frail  Score of 3-5 = Frail      TIME SPENT: 30 minutes  VISIT TYPE: follow up (saw pt 1/2023 for high BMI)  REFERRING PHYSICIAN: Finger  PT ACCOMPANIED BY: her mom     History of previous txp: none   Dialysis: HD 6/2022 720 am-1015a     NUTRITION ASSESSMENT  Pt underwent gastric sleeve 1/2024  Per efren RD note 2/2024:  \"Felt better when she started eating more and stopping her dialysis meds (were making her nauseous) per pt.   Not seen by RD since prior to surgery - no showed 1/30 1 week post op appointment\"    Pt admits to jumping ahead in diet progression a lot / not per typical direction of bariatric team. She tells me she was not tolerating the protein drinks and was starting solids sooner than she was supposed to.   Prior to wt loss surgery she was taking Wegovy and reports success with this in regard to weight loss and helping her think about food less.   Now she tells me struggling with some behaviors post SG with food intake, etc. \"I know I shouldn't be eating chips, but I only have a small amount\". She reports somewhat of a food addiction. She tells me she used to go to therapy (not necessarily for this), but that therapy was a lot given her dialysis appointments and other medical obligations.     She tells me has heartburn, which is worse now since SG. She takes Pepcid.     - Meal prep & grocery shopping: pt's boyfriend   - Previous RD education: yes  - Appetite: reduced with SG, but states \"I can eat more than I should\"  - Food allergies/intolerances: no  - Issues chewing or swallowing: no  - N/V/D/C: vomiting until 1 month ago - feels this may be from some meds + eating fast/scarfing " food at work due to short break; occasional diarrhea   - Food access concerns: not asked     Vitamins, Supplements, Pertinent Meds: dialyvite, vit B12, velphoro (since SG, more sporadic intake- takes 1/day at least but not with all food due to grazing a lot)  Herbal Medicines/Supplements: none   Protein Supplements: none     Weight hx // fluid retention: Glenna tells me her dry wt is ~216  - 330 lbs/BMI 48.7 1/2023  - no overt JAMMIE    Wt Readings from Last 10 Encounters:   04/30/24 104.4 kg (230 lb 2.6 oz)   02/28/24 110.7 kg (244 lb)   02/15/24 112.7 kg (248 lb 6.4 oz)   02/08/24 113.4 kg (250 lb)   01/29/24 121.1 kg (267 lb)   01/23/24 125.9 kg (277 lb 9 oz)   01/16/24 126 kg (277 lb 12.5 oz)   12/27/23 127.9 kg (282 lb)   12/22/23 127.9 kg (282 lb)   11/30/23 129.7 kg (286 lb)     PHYSICAL ACTIVITY   On feet at work sometimes, up/down a lot   Energy level good, more active in general with weight loss    DIET RECALL  Breakfast None    Lunch 11a & 6 p- Rice, meat; chicken salad w/ pasta; yogurt -- a few bites worth    Dinner 11 p- Boyfriend may make meat- chicken, pork + starch + veggie - protein first    Snacks Chips    Beverages Coffee (w/ creamer), water, juice (a few sips/day), Powerade (4 oz/day)   Alcohol None    Dining out Takes a few bites      LABS  Per pt, Phos trending down 10-->7  4/30/24 K 4.2    NUTRITION DIAGNOSIS   No nutrition diagnosis identified at this time     NUTRITION INTERVENTION   Nutrition education provided:  Discussed sodium intake (low sodium foods and drinks, seasoning food without salt and tips for low sodium diet).  Reviewed k/Phos levels. Discussed importance of focusing on protein first s/p SG (and how this is a long term recommendation). Had a long discussion with Glenna on 'being lax' with diet only a few months post surgery and how this may undo her weight loss, especially if she may have to wait several years for a transplant.   Discussed that although she had surgery to help with  weight loss, she also needs to be mindful of diet to help from both ends. She may be interested in taking a weight loss med (ie wegovy) if approved post SG to help lessen cravings. I will reach out to bariatric team.     Reviewed post txp diet guidelines in brief (will review in further detail post txp):  (1) Review of proper food safety measures d/t immunosuppressant therapy post-op and increased risk for food-borne illness    (2) Avoid the following post txp d/t risk for rejection, unknown effects on the organs, and/or potential interactions with immunosuppressants:  - Herbal, Chinese, holistic, chiropractic, natural, alternative medicines and supplements  - Detoxes and cleanses  - Weight loss pills  - Protein powders or other products with extracts or herbs (ie green tea extract)    (3) Med regimen and possible side effects    Patient Understanding: Pt verbalized understanding of education provided.  Expected Engagement: Fair  Follow-Up Plans: PRN     NUTRITION GOALS   No nutrition goals identified at this time     Latoya Smith, RD, LD, CCTD

## 2024-05-19 LAB
A1 AG RBC QL: POSITIVE
ABO/RH(D): NORMAL
ABO/RH(D): NORMAL
ANTIBODY SCREEN: NEGATIVE
ANTIBODY TITER IGM SCREEN: NEGATIVE
B IGG TITR SERPL: 128 {TITER}
B IGM TITR SERPL: 128 {TITER}
SPECIMEN EXPIRATION DATE: NORMAL

## 2024-05-20 ENCOUNTER — OFFICE VISIT (OUTPATIENT)
Dept: TRANSPLANT | Facility: CLINIC | Age: 34
End: 2024-05-20
Attending: NURSE PRACTITIONER
Payer: COMMERCIAL

## 2024-05-20 ENCOUNTER — ANCILLARY PROCEDURE (OUTPATIENT)
Dept: CARDIOLOGY | Facility: CLINIC | Age: 34
End: 2024-05-20
Attending: NURSE PRACTITIONER
Payer: COMMERCIAL

## 2024-05-20 ENCOUNTER — LAB (OUTPATIENT)
Dept: LAB | Facility: CLINIC | Age: 34
End: 2024-05-20
Attending: NURSE PRACTITIONER
Payer: COMMERCIAL

## 2024-05-20 ENCOUNTER — ANCILLARY PROCEDURE (OUTPATIENT)
Dept: GENERAL RADIOLOGY | Facility: CLINIC | Age: 34
End: 2024-05-20
Attending: NURSE PRACTITIONER
Payer: COMMERCIAL

## 2024-05-20 ENCOUNTER — OFFICE VISIT (OUTPATIENT)
Dept: PULMONOLOGY | Facility: CLINIC | Age: 34
End: 2024-05-20
Attending: NURSE PRACTITIONER
Payer: COMMERCIAL

## 2024-05-20 VITALS
WEIGHT: 221.9 LBS | BODY MASS INDEX: 32.87 KG/M2 | DIASTOLIC BLOOD PRESSURE: 78 MMHG | HEART RATE: 79 BPM | HEIGHT: 69 IN | SYSTOLIC BLOOD PRESSURE: 118 MMHG | OXYGEN SATURATION: 99 %

## 2024-05-20 DIAGNOSIS — I15.1 HYPERTENSION SECONDARY TO OTHER RENAL DISORDERS: ICD-10-CM

## 2024-05-20 DIAGNOSIS — Z01.818 PRE-TRANSPLANT EVALUATION FOR KIDNEY TRANSPLANT: ICD-10-CM

## 2024-05-20 DIAGNOSIS — Z76.82 ORGAN TRANSPLANT CANDIDATE: ICD-10-CM

## 2024-05-20 DIAGNOSIS — I10 ESSENTIAL HYPERTENSION: ICD-10-CM

## 2024-05-20 DIAGNOSIS — N18.6 END STAGE RENAL DISEASE (H): Primary | ICD-10-CM

## 2024-05-20 DIAGNOSIS — N18.6 END STAGE RENAL DISEASE (H): ICD-10-CM

## 2024-05-20 DIAGNOSIS — Z76.82 AWAITING ORGAN TRANSPLANT: Primary | ICD-10-CM

## 2024-05-20 DIAGNOSIS — R76.12 POSITIVE QUANTIFERON-TB GOLD TEST: ICD-10-CM

## 2024-05-20 DIAGNOSIS — N18.6 ESRD (END STAGE RENAL DISEASE) (H): ICD-10-CM

## 2024-05-20 DIAGNOSIS — Z87.891 FORMER SMOKER: ICD-10-CM

## 2024-05-20 DIAGNOSIS — Z01.818 PRE-TRANSPLANT EVALUATION FOR ESRD (END STAGE RENAL DISEASE): Primary | ICD-10-CM

## 2024-05-20 DIAGNOSIS — Z76.82 ORGAN TRANSPLANT CANDIDATE: Primary | ICD-10-CM

## 2024-05-20 LAB
ALBUMIN SERPL BCG-MCNC: 4.1 G/DL (ref 3.5–5.2)
ALBUMIN UR-MCNC: 30 MG/DL
ALP SERPL-CCNC: 50 U/L (ref 40–150)
ALT SERPL W P-5'-P-CCNC: 8 U/L (ref 0–50)
AMPHETAMINES UR QL SCN: ABNORMAL
ANION GAP SERPL CALCULATED.3IONS-SCNC: 14 MMOL/L (ref 7–15)
APPEARANCE UR: CLEAR
APTT PPP: 30 SECONDS (ref 22–38)
AST SERPL W P-5'-P-CCNC: 9 U/L (ref 0–45)
BARBITURATES UR QL SCN: ABNORMAL
BASOPHILS # BLD AUTO: 0 10E3/UL (ref 0–0.2)
BASOPHILS NFR BLD AUTO: 1 %
BENZODIAZ UR QL SCN: ABNORMAL
BILIRUB SERPL-MCNC: 0.3 MG/DL
BILIRUB UR QL STRIP: NEGATIVE
BUN SERPL-MCNC: 29.4 MG/DL (ref 6–20)
BZE UR QL SCN: ABNORMAL
CALCIUM SERPL-MCNC: 9.4 MG/DL (ref 8.6–10)
CANNABINOIDS UR QL SCN: ABNORMAL
CHLORIDE SERPL-SCNC: 101 MMOL/L (ref 98–107)
COLOR UR AUTO: ABNORMAL
CREAT SERPL-MCNC: 11.4 MG/DL (ref 0.51–0.95)
DEPRECATED HCO3 PLAS-SCNC: 23 MMOL/L (ref 22–29)
EGFRCR SERPLBLD CKD-EPI 2021: 4 ML/MIN/1.73M2
EOSINOPHIL # BLD AUTO: 0.2 10E3/UL (ref 0–0.7)
EOSINOPHIL NFR BLD AUTO: 3 %
ERYTHROCYTE [DISTWIDTH] IN BLOOD BY AUTOMATED COUNT: 12.9 % (ref 10–15)
FACTOR 2 INTERPRETATION: NORMAL
FACTOR V INTERPRETATION: NORMAL
FENTANYL UR QL: ABNORMAL
GLUCOSE SERPL-MCNC: 131 MG/DL (ref 70–99)
GLUCOSE UR STRIP-MCNC: 200 MG/DL
HBV CORE AB SERPL QL IA: NONREACTIVE
HBV SURFACE AB SERPL IA-ACNC: 60.5 M[IU]/ML
HBV SURFACE AB SERPL IA-ACNC: REACTIVE M[IU]/ML
HBV SURFACE AG SERPL QL IA: NONREACTIVE
HCG SERPL QL: NEGATIVE
HCT VFR BLD AUTO: 33 % (ref 35–47)
HCV AB SERPL QL IA: NONREACTIVE
HGB BLD-MCNC: 10.7 G/DL (ref 11.7–15.7)
HGB UR QL STRIP: ABNORMAL
IMM GRANULOCYTES # BLD: 0 10E3/UL
IMM GRANULOCYTES NFR BLD: 0 %
INR PPP: 1.12 (ref 0.85–1.15)
KETONES UR STRIP-MCNC: NEGATIVE MG/DL
LAB DIRECTOR COMMENTS: NORMAL
LAB DIRECTOR DISCLAIMER: NORMAL
LAB DIRECTOR INTERPRETATION: NORMAL
LAB DIRECTOR METHODOLOGY: NORMAL
LAB DIRECTOR RESULTS: NORMAL
LEUKOCYTE ESTERASE UR QL STRIP: NEGATIVE
LVEF ECHO: NORMAL
LYMPHOCYTES # BLD AUTO: 2.4 10E3/UL (ref 0.8–5.3)
LYMPHOCYTES NFR BLD AUTO: 36 %
MCH RBC QN AUTO: 30.7 PG (ref 26.5–33)
MCHC RBC AUTO-ENTMCNC: 32.4 G/DL (ref 31.5–36.5)
MCV RBC AUTO: 95 FL (ref 78–100)
MONOCYTES # BLD AUTO: 0.5 10E3/UL (ref 0–1.3)
MONOCYTES NFR BLD AUTO: 8 %
NEUTROPHILS # BLD AUTO: 3.6 10E3/UL (ref 1.6–8.3)
NEUTROPHILS NFR BLD AUTO: 52 %
NITRATE UR QL: NEGATIVE
NRBC # BLD AUTO: 0 10E3/UL
NRBC BLD AUTO-RTO: 0 /100
OPIATES UR QL SCN: ABNORMAL
PCP QUAL URINE (ROCHE): ABNORMAL
PH UR STRIP: 7.5 [PH] (ref 5–7)
PLATELET # BLD AUTO: 214 10E3/UL (ref 150–450)
POTASSIUM SERPL-SCNC: 4 MMOL/L (ref 3.4–5.3)
PROT SERPL-MCNC: 6.7 G/DL (ref 6.4–8.3)
RBC # BLD AUTO: 3.49 10E6/UL (ref 3.8–5.2)
RBC URINE: 3 /HPF
SODIUM SERPL-SCNC: 138 MMOL/L (ref 135–145)
SP GR UR STRIP: 1.01 (ref 1–1.03)
SPECIMEN DESCRIPTION: NORMAL
SQUAMOUS EPITHELIAL: 8 /HPF
T PALLIDUM AB SER QL: NONREACTIVE
UROBILINOGEN UR STRIP-MCNC: NORMAL MG/DL
WBC # BLD AUTO: 6.8 10E3/UL (ref 4–11)
WBC URINE: 3 /HPF

## 2024-05-20 PROCEDURE — 93306 TTE W/DOPPLER COMPLETE: CPT | Performed by: INTERNAL MEDICINE

## 2024-05-20 PROCEDURE — 81240 F2 GENE: CPT | Performed by: NURSE PRACTITIONER

## 2024-05-20 PROCEDURE — 85390 FIBRINOLYSINS SCREEN I&R: CPT | Mod: 26 | Performed by: PATHOLOGY

## 2024-05-20 PROCEDURE — 86780 TREPONEMA PALLIDUM: CPT | Performed by: NURSE PRACTITIONER

## 2024-05-20 PROCEDURE — G0452 MOLECULAR PATHOLOGY INTERPR: HCPCS | Mod: 26 | Performed by: PATHOLOGY

## 2024-05-20 PROCEDURE — 94729 DIFFUSING CAPACITY: CPT | Performed by: INTERNAL MEDICINE

## 2024-05-20 PROCEDURE — 36415 COLL VENOUS BLD VENIPUNCTURE: CPT | Performed by: NURSE PRACTITIONER

## 2024-05-20 PROCEDURE — 86704 HEP B CORE ANTIBODY TOTAL: CPT | Performed by: NURSE PRACTITIONER

## 2024-05-20 PROCEDURE — 93000 ELECTROCARDIOGRAM COMPLETE: CPT | Performed by: INTERNAL MEDICINE

## 2024-05-20 PROCEDURE — 85670 THROMBIN TIME PLASMA: CPT | Performed by: NURSE PRACTITIONER

## 2024-05-20 PROCEDURE — 86787 VARICELLA-ZOSTER ANTIBODY: CPT | Performed by: NURSE PRACTITIONER

## 2024-05-20 PROCEDURE — 86850 RBC ANTIBODY SCREEN: CPT | Performed by: NURSE PRACTITIONER

## 2024-05-20 PROCEDURE — 86833 HLA CLASS II HIGH DEFIN QUAL: CPT | Performed by: NURSE PRACTITIONER

## 2024-05-20 PROCEDURE — 94060 EVALUATION OF WHEEZING: CPT | Performed by: INTERNAL MEDICINE

## 2024-05-20 PROCEDURE — 99215 OFFICE O/P EST HI 40 MIN: CPT | Performed by: NURSE PRACTITIONER

## 2024-05-20 PROCEDURE — 86147 CARDIOLIPIN ANTIBODY EA IG: CPT | Performed by: NURSE PRACTITIONER

## 2024-05-20 PROCEDURE — 86481 TB AG RESPONSE T-CELL SUSP: CPT | Performed by: NURSE PRACTITIONER

## 2024-05-20 PROCEDURE — 86886 COOMBS TEST INDIRECT TITER: CPT | Performed by: NURSE PRACTITIONER

## 2024-05-20 PROCEDURE — 85610 PROTHROMBIN TIME: CPT | Performed by: NURSE PRACTITIONER

## 2024-05-20 PROCEDURE — 85048 AUTOMATED LEUKOCYTE COUNT: CPT | Performed by: NURSE PRACTITIONER

## 2024-05-20 PROCEDURE — 86905 BLOOD TYPING RBC ANTIGENS: CPT | Performed by: NURSE PRACTITIONER

## 2024-05-20 PROCEDURE — 86832 HLA CLASS I HIGH DEFIN QUAL: CPT | Performed by: NURSE PRACTITIONER

## 2024-05-20 PROCEDURE — 85613 RUSSELL VIPER VENOM DILUTED: CPT | Performed by: NURSE PRACTITIONER

## 2024-05-20 PROCEDURE — 94726 PLETHYSMOGRAPHY LUNG VOLUMES: CPT | Performed by: INTERNAL MEDICINE

## 2024-05-20 PROCEDURE — 86706 HEP B SURFACE ANTIBODY: CPT | Performed by: NURSE PRACTITIONER

## 2024-05-20 PROCEDURE — 86803 HEPATITIS C AB TEST: CPT | Performed by: NURSE PRACTITIONER

## 2024-05-20 PROCEDURE — 85730 THROMBOPLASTIN TIME PARTIAL: CPT | Performed by: NURSE PRACTITIONER

## 2024-05-20 PROCEDURE — 86644 CMV ANTIBODY: CPT | Performed by: NURSE PRACTITIONER

## 2024-05-20 PROCEDURE — 87340 HEPATITIS B SURFACE AG IA: CPT | Performed by: NURSE PRACTITIONER

## 2024-05-20 PROCEDURE — 99214 OFFICE O/P EST MOD 30 MIN: CPT | Performed by: TRANSPLANT SURGERY

## 2024-05-20 PROCEDURE — 81378 HLA I & II TYPING HR: CPT | Performed by: NURSE PRACTITIONER

## 2024-05-20 PROCEDURE — 86665 EPSTEIN-BARR CAPSID VCA: CPT | Performed by: NURSE PRACTITIONER

## 2024-05-20 PROCEDURE — 80053 COMPREHEN METABOLIC PANEL: CPT | Performed by: NURSE PRACTITIONER

## 2024-05-20 PROCEDURE — 86900 BLOOD TYPING SEROLOGIC ABO: CPT | Performed by: NURSE PRACTITIONER

## 2024-05-20 PROCEDURE — G0463 HOSPITAL OUTPT CLINIC VISIT: HCPCS | Performed by: TRANSPLANT SURGERY

## 2024-05-20 PROCEDURE — 81001 URINALYSIS AUTO W/SCOPE: CPT | Performed by: NURSE PRACTITIONER

## 2024-05-20 PROCEDURE — 80307 DRUG TEST PRSMV CHEM ANLYZR: CPT | Performed by: NURSE PRACTITIONER

## 2024-05-20 PROCEDURE — 84703 CHORIONIC GONADOTROPIN ASSAY: CPT | Performed by: PATHOLOGY

## 2024-05-20 PROCEDURE — 71046 X-RAY EXAM CHEST 2 VIEWS: CPT | Performed by: RADIOLOGY

## 2024-05-20 RX ORDER — ALBUTEROL SULFATE 90 UG/1
AEROSOL, METERED RESPIRATORY (INHALATION)
COMMUNITY

## 2024-05-20 RX ORDER — ASCORBIC ACID, THIAMINE, RIBOFLAVIN, NIACINAMIDE, PYRIDOXINE, FOLIC ACID, COBALAMIN, BIOTIN, PANTOTHENIC ACID, ZINC 100; 1.5; 1.7; 20; 10; 1; 6; 300; 10; 5 MG/1; MG/1; MG/1; MG/1; MG/1; MG/1; UG/1; UG/1; MG/1; MG/1
1 TABLET, COATED ORAL EVERY EVENING
COMMUNITY
Start: 2024-02-24

## 2024-05-20 NOTE — LETTER
5/20/2024         RE: Glenna Spears  [address]  [place] [address]        Dear Colleague,    Thank you for referring your patient, Glenna Spears, to the [facility]. Please see a copy of my visit note below.    TRANSPLANT NEPHROLOGY RECIPIENT EVALUATION NOTE    Recommendations:  - surgery input on weight  - PFTs  - exercise stress test  - ID   - repeat cardiolipins  - urology follow up for persistent urinary frequency and does she need further evaluation for microscopic hematuria?  - pap smear   - social work input on drug use history     Assessment and Plan:  # Kidney Transplant Evaluation: Patient is a good candidate overall. Benefits of a living donor transplant were discussed.    # ESKD from unknown etiology (no kidney biopsy): doing OK on hemodialysis since 6/2022, but would likely benefit from a kidney transplant.     # Obesity s/p gastric sleeve 1/23/24: current BMI of ~ 32 with central obesity. Appreciate surgery input.     # Former Smoker: 22-pack-year, quit 1 year ago. Will get PFTs.     # History of Illicit Drug use: remote LSD /cocaine use in her 20s, + marijuana. Drug screen pending. Appreciate social work input.     # Cardiac Risk: no known history of kidney disease. ECHO today with LVEF 60-65%. Given risk factors, will get exercise stress testing.     # Positive quant gold: needs ID.     # Weak positive cardiolipins: will repeat.      # Increased Urinary frequency: seen by outside urology in 4/2024 with negative urine culture. If no improvement with dietary changes, beta3 agonist medication such as Myrbetriq to be  considered. Recommend follow up with urology as symptoms are still persistent. Will need input from urology regarding microscopic hematuria.     # Health Maintenance: 3/2022 PAP (NILM, HR HPV +) : Not up to date and Dental: Up to date    - Discussed the risks and benefits of a transplant, including the risk of surgery and immunosuppression  medications.  Patient's overall evaluation will be discussed in the Transplant Program's regular meeting with a final recommendation on the patients suitability for transplant to be made at that time.    Pending completion of the full evaluation, patient presently appears to be enough of an acceptable kidney transplant recipient candidate to have any potential kidney donors start the evaluation process.      Evaluation:  Glenna Spears was seen in consultation at the request of Dr. Lukas Jean for evaluation as a potential kidney transplant recipient.    Reason for Visit:  Glenna Spears is a 33 year old female with ESKD from unknown etiology (no kidney biopsy), who presents for kidney transplant evaluation.    History of Present Illness:           Kidney Disease Hx:      ESKD from unclear etiology. History of longstanding uncontrolled hypertension, nephrotic range proteinuria, obesity, microscopic hematuria, preeclampsia in 2016,  illicit drug use ( remote LSD / cocaine in her 20s), family history of kidney disease including mother and sisters  Seen by nephrology in 2016 with a plan for  kidney biopsy, but she was lost to follow up. In 12/2021 she was hospitalized with DEEP on CKD with serum creatinine in the 8-9 range with 3.6 g/g of proteinuria.  She had nephrology follow up with Dr. Mar and completed negative serologic work up including complements, ANCA, viral hepatitis studies.  Started hemodialysis in 6/2022. She has a mini evaluation here on 1/23/2023 for BMI of 48. Now S/p gastric sleeve with BMI ~ 32.            Kidney Disease Dx: Unknown etiology (no kidney biopsy)       Biopsy Proven: No         On Dialysis: Yes, Date initiated: 6/2022 and Dialysis Type: [facility];,        Primary Nephrologist: Dr. Christianson        H/o Kidney Stones: No       H/o Recurrent/Frequent UTI: No         Diabetic Hx: None           Cardiac/Vascular Disease Risk Factors:        Cardiac Risk Factors: Hypertension and CKD        Known CAD: No       Known PAD/Caludication Symptoms: No       Known Heart Failure: No       Arrhythmia: No       Pulmonary Hypertension: No       Valvular Disease: No       Other: None         Viral Serology Status       CMV IgG Antibody: Unknown       EBV IgG Antibody: Unknown         Volume Status/Weight:        Volume status: Euvolemic       Weight:  Acceptable BMI       BMI: There is no height or weight on file to calculate BMI.         Functional Capacity/Frailty:        Works in  eye glass assembly, walk a lot at work.  Stays active at home with cleaning/yard work. Started doing sit ups for extra exercise.  No chest pains or shortness of breath with stairs.     Fatigue/Decreased Energy: [] No [x] Yes    Chest Pain or SOB with Exertion: [x] No [] Yes    Significant Weight Change: [] No [x] Yes    Nausea, Vomiting or Diarrhea: [x] No [] Yes    Fever, Sweats or Chills:  [x] No [] Yes    Leg Swelling [x] No [] Yes        History of Cancer: None       Allergy Testing Questions:   Medication that caused a reaction Sufla Drugs (Sufla/TMP or Bactrim) - as a child, does not recall sxs.    Antibiotics used that didn't give an allergic reaction?  Patient doesn't know    COVID Vaccination Up To Date: Not asked    Potential Living Kidney Donors: No    Review of Systems:  A comprehensive review of systems was obtained and negative, except as noted in the HPI or PMH.    Past Medical History:   Medical record was reviewed and PMH was discussed with patient and noted below.  Past Medical History:   Diagnosis Date     ESRD (end stage renal disease) (H)      GERD (gastroesophageal reflux disease)      Hypertension      Obesity        Past Social History:   Past Surgical History:   Procedure Laterality Date     AV FISTULA REPAIR        SECTION       LAPAROSCOPIC GASTRIC SLEEVE N/A 2024    Procedure: GASTRECTOMY, SLEEVE, LAPAROSCOPIC;  Surgeon: John Smith MD;  Location:  OR     Personal history of  bleeding or anesthesia problems: No    Family History:  Family History   Problem Relation Age of Onset     Anesthesia Reaction No family hx of      Deep Vein Thrombosis (DVT) No family hx of        Personal History:   Social History     Socioeconomic History     Marital status: Single     Spouse name: Not on file     Number of children: Not on file     Years of education: Not on file     Highest education level: Not on file   Occupational History     Not on file   Tobacco Use     Smoking status: Former     Current packs/day: 0.00     Types: Cigarettes     Quit date: 2023     Years since quittin.2     Passive exposure: Past     Smokeless tobacco: Never     Tobacco comments:     quit   Vaping Use     Vaping status: Never Used   Substance and Sexual Activity     Alcohol use: Not Currently     Drug use: Not Currently     Types: Marijuana     Sexual activity: Not on file   Other Topics Concern     Not on file   Social History Narrative     Not on file     Social Determinants of Health     Financial Resource Strain: High Risk (2023)    [facility]    Overall Financial Resource Strain (CARDIA)      Difficulty of Paying Living Expenses: Hard   Food Insecurity: Food Insecurity Present (2023)    [facility]    Hunger Vital Sign      Worried About Running Out of Food in the Last Year: Sometimes true      Ran Out of Food in the Last Year: Never true   Transportation Needs: No Transportation Needs (2023)    [facility]    PRAPARE - Transportation      Lack of Transportation (Medical): No      Lack of Transportation (Non-Medical): No   Physical Activity: Insufficiently Active (2023)    [facility]    Exercise Vital Sign      Days of  Exercise per Week: 1 day      Minutes of Exercise per Session: 40 min   Stress: Stress Concern Present (2/7/2023)    Received from [facility] [employer] [facility], [facility]    [place] [employer] - Occupational Stress Questionnaire      Feeling of Stress : To some extent   Social Connections: Socially Isolated (2/7/2023)    Received from [facility], [facility]    Social Connection and Isolation Panel [NHANES]      Frequency of Communication with Friends and Family: Never      Frequency of Social Gatherings with Friends and Family: Never      Attends Scientology Services: Never      Active Member of Clubs or Organizations: No      Attends Club or Organization Meetings: Never      Marital Status: Living with partner   Interpersonal Safety: Not At Risk (1/1/2024)    Received from [facility]    Intimate Partner Violence      Physically hurt, threatened and/or made to feel afraid: No   Housing Stability: High Risk (2/7/2023)    Received from [facility], [facility]    Housing Stability      In the last 12 months, was there a time when you were not able to pay the mortgage or rent on time?: Yes      In the last 12 months, how many places have you lived?: 1      Number of Places Lived in the Last Year (Outpatient): Not on file      Number of Places Lived in the Last Year (Inpatient): Not on file      In the last 12 months, was there a time when you did not have a steady place to sleep or slept in a shelter (including now)?: No       Allergies:  Allergies   Allergen Reactions     Sulfa Antibiotics Other (See Comments)     Reaction as an infant- unsure of what happened     Nsaids Other (See Comments)     Has nephrotic range proteinuria and see nephrology's note.        Medications:  Current Outpatient Medications   Medication Sig Dispense Refill     acetaminophen (TYLENOL)  325 MG tablet Take 325-650 mg by mouth every 4 hours as needed (Patient not taking: Reported on 2/8/2024)       calcitRIOL (ROCALTROL) 0.5 MCG capsule Take 0.5 mcg by mouth every evening       cinacalcet (SENSIPAR) 90 MG tablet Take 90 mg by mouth every evening       famotidine (PEPCID) 20 MG tablet Take 1 tablet (20 mg) by mouth 2 times daily 60 tablet 2     gabapentin (NEURONTIN) 100 MG capsule Take 300 mg by mouth at bedtime       levonorgestrel (KYLEENA) 19.5 MG IUD 1 Intra Uterine Device by Intrauterine route once       ondansetron (ZOFRAN ODT) 4 MG ODT tab Take 1 tablet (4 mg) by mouth every 6 hours as needed for nausea 15 tablet 0     pantoprazole (PROTONIX) 40 MG EC tablet Take 1 tablet (40 mg) by mouth daily 30 tablet 1     senna-docusate (SENOKOT-S/PERICOLACE) 8.6-50 MG tablet Take 2 tablets by mouth daily as needed for constipation (While taking narcotic pain medications.  Stop taking if having loose stools.) 30 tablet 1     sucralfate (CARAFATE) 1 GM tablet Take 1 tablet (1 g) by mouth 4 times daily 120 tablet 3     ursodiol (ACTIGALL) 300 MG capsule Take 1 capsule (300 mg) by mouth 2 times daily 60 capsule 5     VELPHORO 500 MG CHEW chewable tablet Take 500 mg by mouth 3 times daily (with meals)       vitamin B-12 (CYANOCOBALAMIN) 2500 MCG sublingual tablet Take 1 tablet (2,500 mcg) by mouth daily 30 tablet 5     No current facility-administered medications for this visit.       Vitals:  There were no vitals taken for this visit.    Exam:  GENERAL APPEARANCE: alert and no distress  HENT: mouth without ulcers or lesions  RESP: lungs clear to auscultation - no rales, rhonchi or wheezes  CV: regular rhythm, normal rate, no rub, no murmur  EDEMA: no LE edema bilaterally  ABDOMEN: soft, nondistended, nontender, bowel sounds normal  MS: extremities normal - no gross deformities noted, no evidence of inflammation in joints, no muscle tenderness  SKIN: no rash    Results:   No results found for this or any  previous visit (from the past 336 hour(s)).          Again, thank you for allowing me to participate in the care of your patient.        Sincerely,        RAFAEL Pennington CNP

## 2024-05-20 NOTE — LETTER
2024         RE: Glenna Spears  [address]  [place] [address]        Dear Colleague,    Thank you for referring your patient, Glenna Spears, to the [facility]. Please see a copy of my visit note below.    Transplant Surgery Consult Note     Medical record number: [id number]  YOB: 1990,   Consult requested  for evaluation of kidney transplant candidacy.    Assessment and Recommendations:Ms. Spears appears to be a excellent candidate for kidney transplantation and has a good understanding of the risks and benefits of this approach to the management of renal failure. The following issues should be addressed prior to finalizing her transplant candidacy:     Transplant order: Ms. Spears has End stage renal failure due to hypertension whose condition is not expected to resolve, is expected to progress, and is expected to continue to develop related comorbid conditions.  Recommend he be considered as a candidate for LDKT or DDKT.    Cardiology consult for cardiac risk stratification to be ordered: No [defer workup to nephrology]  CT abdomen and pelvis without contrast to be ordered for assessment of vascular targets: No  Transplant listing labs ordered to include HLA, ABOx2, Cr, etc.  Dietician consult ordered: Yes  Social work consult ordered: Yes  Imaging reports reviewed:  CT abd, pelvic USsss  Radiology images reviewed: none available  Recipient suitable to move forward with work up of living donors:  Yes    Please have CT abd from  pushed to our system for review of vascular targets.      The majority of our visit was spent in counselling, discussing the medical and surgical risks of kidney transplantation. We discussed approximate wait time and how that is influenced by issues such as blood type and sensitization (PRA) and access to a living donor. I contrasted potential waiting time for living vs  donor kidneys from  normal (0-85%) or higher  (%) kidney donor profile index (KDPI) donors and their associated outcomes. I would not recommend this individual to consider kidneys from high KDPI donors. The reason for this decision is best summarized as: improved long term graft survival. Potential surgical complications of kidney transplantation include bleeding, superficial or deep wound complications (infection, hernia, lymphocele), ureteral anastomotic failure (leak or stenosis), graft thrombosis, need for reoperation and other issues such as cardiac complications, pneumonia, deep venous thrombosis, pulmonary embolism, post transplant diabetes and death. The potential for recurrent disease or need for retransplantation was also addressed. We discussed the possible need for ureteral stent (and subsequent removal), and the utility of protocol biopsy and laboratory studies to evaluate for rejection or recurrent disease. We discussed the risk of graft rejection, our center's average graft and patient survival rates, immunosuppression protocols, as well as the potential opportunity to participate in clinical trials.  We also discussed the average length of stay, recovery process, and posttransplant lab and monitoring protocol.  I emphasized the need for strict immunosuppression medication adherence and the potential for complications of immunosuppression such as skin cancer or lymphoma, as well as a very low but not zero risk of donor-derived disease transmission risks (infection, cancer). Ms. Spears asked good questions and her candidacy will be reviewed at our Multidisciplinary Selection Committee. Thank you for the opportunity to participate in Ms. Spears's care.      Total time: 40 minutes        Lukas Jean MD  Professor of Surgery  Kidney/Pancreas Transplantation    ---------------------------------------------------------------------------------------------------    HPI: Ms. Spears has End stage renal failure due to hypertension. The patient is  non-diabetic.       The patient is on dialysis.    Has potential kidney donors:  No.  Interested in participation in paired exchange if a donor is willing: Doesn't know     HD x 2 yrs.  Wasn't evaluated previously due to BMI and smoking.  Has lost 100 lb through GLP-1 and gastric sleeve.  Still losing ~ 0.5 lb per week.  Wants to restart Wegovy.    ROS:  negative    The patient has the following pertinent history:       No    Yes  Dialysis:    []      [x] via:       Blood Transfusion                  [x]      []  Number of units:   Most recently:  Pregnancy:    []      [x] Number:       Previous Transplant:  [x]      [] Details:    Cancer    [x]      [] Comment:   Kidney stones   [x]      [] Comment:      Recurrent infections  [x]      []  Type:                  Bladder dysfunction  [x]      [] Cause:    Claudication   [x]      [] Distance:    Previous Amputation  [x]      [] Cause:     Chronic anticoagulation  [x]      [] Indication:   Catholic  []      []      Past Medical History:   Diagnosis Date     ESRD (end stage renal disease) (H)      GERD (gastroesophageal reflux disease)      Hypertension      Obesity      Past Surgical History:   Procedure Laterality Date     AV FISTULA REPAIR        SECTION       LAPAROSCOPIC GASTRIC SLEEVE N/A 2024    Procedure: GASTRECTOMY, SLEEVE, LAPAROSCOPIC;  Surgeon: John Smith MD;  Location: [facility] OR     Family History   Problem Relation Age of Onset     Kidney Disease Maternal Grandmother      Leukemia Paternal Grandmother 47     Leukemia Maternal Aunt 37     Anesthesia Reaction No family hx of      Deep Vein Thrombosis (DVT) No family hx of      Heart Disease No family hx of      Social History     Socioeconomic History     Marital status: Single     Spouse name: Not on file     Number of children: Not on file     Years of education: Not on file     Highest education level: Not on file   Occupational History     Not on file   Tobacco Use      Smoking status: Former     Current packs/day: 0.00     Types: Cigarettes     Quit date: 2023     Years since quittin.3     Passive exposure: Past     Smokeless tobacco: Never     Tobacco comments:     quit   Vaping Use     Vaping status: Never Used   Substance and Sexual Activity     Alcohol use: Not Currently     Drug use: Not Currently     Types: Marijuana     Sexual activity: Not on file   Other Topics Concern     Not on file   Social History Narrative     Not on file     Social Determinants of Health     Financial Resource Strain: High Risk (2023)    [facility]    Overall Financial Resource Strain (CARDIA)      Difficulty of Paying Living Expenses: Hard   Food Insecurity: Food Insecurity Present (2023)    [facility]    Hunger Vital Sign      Worried About Running Out of Food in the Last Year: Sometimes true      Ran Out of Food in the Last Year: Never true   Transportation Needs: No Transportation Needs (2023)    [facility]    PRAPARE - Transportation      Lack of Transportation (Medical): No      Lack of Transportation (Non-Medical): No   Physical Activity: Insufficiently Active (2023)    [facility]    Exercise Vital Sign      Days of Exercise per Week: 1 day      Minutes of Exercise per Session: 40 min   Stress: Stress Concern Present (2023)    [facility]    [employer] - Occupational Stress Questionnaire      Feeling of Stress : To some extent   Social Connections: Socially Isolated (2023)    Received from [facility]    Social Connection and  Isolation Panel [NHANES]      Frequency of Communication with Friends and Family: Never      Frequency of Social Gatherings with Friends and Family: Never      Attends Adventist Services: Never      Active Member of Clubs or Organizations: No      Attends Club or Organization Meetings: Never      Marital Status: Living with partner   Interpersonal Safety: Not At Risk (1/1/2024)    Received from [facility]    Intimate Partner Violence      Physically hurt, threatened and/or made to feel afraid: No   Housing Stability: High Risk (2/7/2023)    Received from [facility], [facility]    Housing Stability      In the last 12 months, was there a time when you were not able to pay the mortgage or rent on time?: Yes      In the last 12 months, how many places have you lived?: 1      Number of Places Lived in the Last Year (Outpatient): Not on file      Number of Places Lived in the Last Year (Inpatient): Not on file      In the last 12 months, was there a time when you did not have a steady place to sleep or slept in a shelter (including now)?: No       ROS:   CONSTITUTIONAL:  No fevers or chills  EYES: negative for icterus  ENT:  negative for hearing loss, tinnitus and sore throat  RESPIRATORY:  negative for cough, sputum, dyspnea  CARDIOVASCULAR:  negative for chest pain     GASTROINTESTINAL:  negative for nausea, vomiting, diarrhea or constipation  GENITOURINARY:  negative for incontinence, dysuria, bladder emptying problems  HEME:  No easy bruising  INTEGUMENT:  negative for rash and pruritus  NEURO:  Negative for headache, seizure disorder  Allergies:   Allergies   Allergen Reactions     Sulfa Antibiotics Other (See Comments)     Reaction as an infant- unsure of what happened     Nsaids Other (See Comments)     Has nephrotic range proteinuria and see nephrology's note.      Medications:  Prescription Medications as of 5/20/2024         Rx Number Disp  Refills Start End Last Dispensed Date Next Fill Date Owning Pharmacy    acetaminophen (TYLENOL) 325 MG tablet  -- --  --       Sig: Take 325-650 mg by mouth every 4 hours as needed    Class: Historical    Route: Oral    B Complex-C-Zn-Folic Acid (DIALYVITE/ZINC) TABS  -- -- 2024 --       Sig: Take 1 tablet by mouth every evening    Class: Historical    Route: Oral    calcitRIOL (ROCALTROL) 0.5 MCG capsule  -- -- 2023 --       Sig: Take 0.5 mcg by mouth every evening    Class: Historical    Route: Oral    cinacalcet (SENSIPAR) 90 MG tablet  -- --  --       Sig: Take 90 mg by mouth every evening    Class: Historical    Route: Oral    famotidine (PEPCID) 20 MG tablet  60 tablet 2 2024 --   [id number] - [place], [place] - [address] [facility] AT [address] [place] [address]    Sig: Take 1 tablet (20 mg) by mouth 2 times daily    Class: E-Prescribe    Route: Oral    gabapentin (NEURONTIN) 100 MG capsule  -- -- 3/7/2023 --       Sig: Take 300 mg by mouth at bedtime    Class: Historical    Route: Oral    levonorgestrel (KYLEENA) 19.5 MG IUD  -- -- 3/31/2022 3/30/2027       Si Intra Uterine Device by Intrauterine route once    Class: Historical    Route: Intrauterine    ondansetron (ZOFRAN ODT) 4 MG ODT tab  15 tablet 0 2024 --   [id number] - [place], [place] - [address] [facility] AT [address] [place] [address]    Sig: Take 1 tablet (4 mg) by mouth every 6 hours as needed for nausea    Class: E-Prescribe    Route: Oral    pantoprazole (PROTONIX) 40 MG EC tablet  30 tablet 1 2024 --   [facility] #[id number] - [place], [place] - [address] [facility] AT [address] [place] [address]    Sig: Take 1 tablet (40 mg) by mouth daily    Class: E-Prescribe    Route: Oral    senna-docusate (SENOKOT-S/PERICOLACE) 8.6-50 MG tablet  30 tablet 1 2023 --   [facility] #[id number] - [place], [place] - [address] AT [address] [place] [address] & [facility] [address]    Sig: Take 2  "tablets by mouth daily as needed for constipation (While taking narcotic pain medications.  Stop taking if having loose stools.)    Class: E-Prescribe    Route: Oral    sucralfate (CARAFATE) 1 GM tablet  120 tablet 3 4/30/2024 --   [facility] #[id number] - [place], [place] - [address] [facility] AT [address] [place] [address]    Sig: Take 1 tablet (1 g) by mouth 4 times daily    Class: E-Prescribe    Route: Oral    ursodiol (ACTIGALL) 300 MG capsule  60 capsule 5 2/28/2024 --   [facility][id number] - [place], [place] - [address] [facility] AT [address] [place] [address]    Sig: Take 1 capsule (300 mg) by mouth 2 times daily    Class: E-Prescribe    Route: Oral    VELPHORO 500 MG CHEW chewable tablet  -- -- 1/10/2023 --       Sig: Take 500 mg by mouth 3 times daily (with meals)    Class: Historical    Route: Oral    VENTOLIN  (90 Base) MCG/ACT inhaler  -- --  --       Sig: INHALE 2 PUFFS BY MOUTH EVERY 4 HOURS AS NEEDED FOR SHORTNESS OF BREATH OR COUGH OR EXERCISE    Class: Historical    vitamin B-12 (CYANOCOBALAMIN) 2500 MCG sublingual tablet  30 tablet 5 2/28/2024 --   [facility] #[id number] - [place], [place] - [address]    Sig: Take 1 tablet (2,500 mcg) by mouth daily    Class: E-Prescribe    Route: Oral          Exam:     /78   Pulse 79   Ht 1.753 m (5' 9\")   Wt 100.7 kg (221 lb 14.4 oz)   SpO2 99%   BMI 32.77 kg/m    Appearance: in no apparent distress.   Skin: normal  Eyes:  no redness or discharge.  Sclera anicteric  Head and Neck: Normal, no rashes or jaundice  Respiratory: easy respirations, no audible wheezing.  Abdomen: rounded, No distention   Extremities: , Edema, none  Neuro: grossly normal   Psychiatric: Normal mood and affect    Diagnostics:   Recent Results (from the past 672 hour(s))   CBC with platelets    Collection Time: 04/30/24 11:37 AM   Result Value Ref Range    WBC Count (External) 6.5 3.7 - 12.1 10(3)/uL    RBC Count " (External) 3.75 (L) 3.76 - 5.39 10(6)/uL    Hemoglobin (External) 11.7 11.2 - 15.8 g/dL    Hematocrit (External) 34.9 33.9 - 47.5 %    MCV (External) 92.9 80.6 - 98.5 fL    MCH (External) 31.3 26.4 - 32.9 pg    MCHC (External) 33.7 32.0 - 36.0 g/dL    RDW (External) 13.8 10.0 - 16.8 %    Platelet Count (External) 220 179 - 450 10(3)/uL   Hemoglobin A1c    Collection Time: 04/30/24 11:37 AM   Result Value Ref Range    Hemoglobin A1C (External) 4.6 <5.7 %   Comprehensive metabolic panel    Collection Time: 04/30/24 11:37 AM   Result Value Ref Range    Sodium (External) 139 136 - 146 mmol/L    Potassium (External) 4.2 3.5 - 5.1 mmol/L    Chloride (External) 101 98 - 107 mmol/L    CO2 (External) 29 22 - 29 mmol/L    Anion Gap (External) 13.2 10.0 - 20.0 mmol/L    Creatinine (External) 7.37 (HH) 0.57 - 1.11 mg/dL    Urea Nitrogen (External) 13.7 10.0 - 20.0 mg/dL    BUN/Creatinine Ratio (External) 1.86 (L) 11.70 - 22.90 ratio    Calcium (External) 9.6 8.6 - 10.5 mg/dL    Glucose (External) 84 70 - 100 mg/dL    GFR Estimated (External) 7 (L) >=60 mL/min/1.73m2    Protein Total (External) 7.2 6.0 - 8.0 g/dL    Albumin (External) 4.0 3.5 - 5.0 g/dL    AST (External) 9 5 - 41 U/L    ALT (External) 9 8 - 45 U/L    Alk Phosphatase (External) 46 (L) 50 - 136 U/L    Bilirubin Total (External) 0.4 0.2 - 1.2 mg/dL   Lipid Profile    Collection Time: 04/30/24 11:37 AM   Result Value Ref Range    Cholesterol (External) 184 0 - 200 mg/dL    Triglycerides (External) 123 30 - 150 mg/dL    LDL Cholesterol Calculated (External) 109 0 - 159 mg/dL    HDL Cholesterol (External) 50 >40 mg/dL   Vitamin B12    Collection Time: 04/30/24 11:37 AM   Result Value Ref Range    Vitamin B12 (External) 285 208 - 964 pg/mL   Parathyroid Hormone Intact    Collection Time: 04/30/24 11:37 AM   Result Value Ref Range    Parathyroid Hormone Intact (External) 1,531.1 (H) 11.0 - 101.0 pg/mL   Vitamin D Deficiency    Collection Time: 04/30/24 11:37 AM   Result  "Value Ref Range    Vitamin D Deficiency Screening (External) 63.4 30.0 - 100.0 ng/mL   Vitamin A    Collection Time: 04/30/24 11:37 AM   Result Value Ref Range    Vitamin A (External) 71.7 32.5 - 78.0 mcg/dL     No results found for: \"CPRA\"      Again, thank you for allowing me to participate in the care of your patient.        Sincerely,        Lukas Jean MD  "

## 2024-05-20 NOTE — PROGRESS NOTES
"Psychosocial Assessment  Patient Name/ Age: Glenna Spears 33 year old   Medical Record #: [id number]  Duration of Interview: 45 min  Process:   Face-to-Face Interview                (counseling < 50%)   Present at Appointment: Glenna and Mom        : KASSIE Kamara LGSW Date:  May 20, 2024        Type of transplant: Kidney    Donor type: Glenna's mother and significant other Santi have expressed interest in being a donor.     parent and significant other   Prior Transplants:    No Status of Transplant:       Current Living Situation    Location:   [address] [place] [address]  With Whom: lives with their family       Family/ Social Support:    Glenna lives with her mother, 35 year old sister, Santi, and her two sons who are 16 years old and 7 years old.     Glenna reports having three siblings on her dad's side but does not have a relationship with the siblings. Glenna reports limited relationship with father.  Available, helpful   Committed relationship:  Glenna and Santi have been dating for 8 years.    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 Dialysis   Complications None       Behavioral    Tobacco Use Yes  Chemical Dependency No   Glenna reports history of smoking a pack of cigarettes daily. Glenna reports quitting smoking a year ago and working to completely refrain from smoking, Glenna noted some \"slip ups\" with smoking.  Glenna reports sporadic use of marijuana. Glenna reports use of marijuana to help " with nerve pain and sleeping. Glenna reports working to receive a medical marijuana card.     Psychiatric Impairment No  Glenna reports being diagnosed with ADHD and Major Depressive Disorder. Glenna reports previously being prescribed an anti-depressant and stopped taking it because of the side effects. Glenna reports history of taking medication for ADHD and misused medication. Glenna reports not taking ADHD medication for the past two years. Glenna expresses some forgetfulness and ability to manage her current medications and appointments.     Reading ability Good  Education Level: Some College Recent Legal History No      Coping Style/Strategies: Glenna's coping strategies include watching tv, eating, taking a nap, cleaning and listening to music.      Ability to Adhere to Complex Medical Regime: No  Glenna reports inconsistent adherence to medical recommendations.   Adherence History:        Education  _X_ Medicare  _X_ Rehabilitation  _X_ Donor issues  _X_ Community resources  _X_ Post discharge housing  _X_ Financial resources  _X_ Medical insurance options  _X_ Psych adjustment  _X_ Family adjustment  _X_ Health Care Directive - Provided Education and Health Care Directive form.   Psychosocial Risks of Transplant Reviewed and Discussed:  _X_ Increased stress related to emotional,            family, social, employment or financial           situation  _X_ Affect on work and/or disability benefits  _X_ Affect on future life insurance  _X_ Transplant outcome expectations may           not be met  _X_ Mental Health Risks: anxiety,           depression, PTSD, guilt, grief and           chronic fatigue     Notable Items:   None noted.       Final Evaluation/Assessment   Patient seemed to process information well. Appeared well informed, motivated and able to follow post transplant requirements. Behavior was appropriate during interview. Has adequate income and insurance coverage. Adequate social support. Some contraindications  noted for transplant. At this time patient appears to understand the risks and benefits of transplant.      Recommendation  Conditional  SW recommends medication management and DBT therapy. During assessment Glenna made some contradicting statements related to her personal healthcare management. Glenna reports history of mismanagement of past ADHD medication.    Selection Criteria Met:  Plan for support Yes   Chemical Dependence Yes   Smoking No  Mental Health No  Adequate Finances Yes    Signature: KASSIE Kamara, DARRYL   Title: Clinical

## 2024-05-20 NOTE — PROGRESS NOTES
Transplant Surgery Consult Note     Medical record number: [id number]  YOB: 1990,   Consult requested  for evaluation of kidney transplant candidacy.    Assessment and Recommendations:Ms. Spears appears to be a excellent candidate for kidney transplantation and has a good understanding of the risks and benefits of this approach to the management of renal failure. The following issues should be addressed prior to finalizing her transplant candidacy:     Transplant order: Ms. Spears has End stage renal failure due to hypertension whose condition is not expected to resolve, is expected to progress, and is expected to continue to develop related comorbid conditions.  Recommend he be considered as a candidate for LDKT or DDKT.    Cardiology consult for cardiac risk stratification to be ordered: No [defer workup to nephrology]  CT abdomen and pelvis without contrast to be ordered for assessment of vascular targets: No  Transplant listing labs ordered to include HLA, ABOx2, Cr, etc.  Dietician consult ordered: Yes  Social work consult ordered: Yes  Imaging reports reviewed:  CT abd, pelvic USsss  Radiology images reviewed: none available  Recipient suitable to move forward with work up of living donors:  Yes    Please have CT abd from  pushed to our system for review of vascular targets.      The majority of our visit was spent in counselling, discussing the medical and surgical risks of kidney transplantation. We discussed approximate wait time and how that is influenced by issues such as blood type and sensitization (PRA) and access to a living donor. I contrasted potential waiting time for living vs  donor kidneys from  normal (0-85%) or higher (%) kidney donor profile index (KDPI) donors and their associated outcomes. I would not recommend this individual to consider kidneys from high KDPI donors. The reason for this decision is best summarized as: improved long term graft survival.  Potential surgical complications of kidney transplantation include bleeding, superficial or deep wound complications (infection, hernia, lymphocele), ureteral anastomotic failure (leak or stenosis), graft thrombosis, need for reoperation and other issues such as cardiac complications, pneumonia, deep venous thrombosis, pulmonary embolism, post transplant diabetes and death. The potential for recurrent disease or need for retransplantation was also addressed. We discussed the possible need for ureteral stent (and subsequent removal), and the utility of protocol biopsy and laboratory studies to evaluate for rejection or recurrent disease. We discussed the risk of graft rejection, our center's average graft and patient survival rates, immunosuppression protocols, as well as the potential opportunity to participate in clinical trials.  We also discussed the average length of stay, recovery process, and posttransplant lab and monitoring protocol.  I emphasized the need for strict immunosuppression medication adherence and the potential for complications of immunosuppression such as skin cancer or lymphoma, as well as a very low but not zero risk of donor-derived disease transmission risks (infection, cancer). Ms. Spears asked good questions and her candidacy will be reviewed at our Multidisciplinary Selection Committee. Thank you for the opportunity to participate in Ms. Spears's care.      Total time: 40 minutes        Lukas Jean MD  Professor of Surgery  Kidney/Pancreas Transplantation    ---------------------------------------------------------------------------------------------------    HPI: Ms. Spears has End stage renal failure due to hypertension. The patient is non-diabetic.       The patient is on dialysis.    Has potential kidney donors:  No.  Interested in participation in paired exchange if a donor is willing: Doesn't know     HD x 2 yrs.  Wasn't evaluated previously due to BMI and smoking.  Has lost 100  lb through GLP-1 and gastric sleeve.  Still losing ~ 0.5 lb per week.  Wants to restart Wegovy.    ROS:  negative    The patient has the following pertinent history:       No    Yes  Dialysis:    []      [x] via:       Blood Transfusion                  [x]      []  Number of units:   Most recently:  Pregnancy:    []      [x] Number:       Previous Transplant:  [x]      [] Details:    Cancer    [x]      [] Comment:   Kidney stones   [x]      [] Comment:      Recurrent infections  [x]      []  Type:                  Bladder dysfunction  [x]      [] Cause:    Claudication   [x]      [] Distance:    Previous Amputation  [x]      [] Cause:     Chronic anticoagulation  [x]      [] Indication:   Yazidi  []      []      Past Medical History:   Diagnosis Date    ESRD (end stage renal disease) (H)     GERD (gastroesophageal reflux disease)     Hypertension     Obesity      Past Surgical History:   Procedure Laterality Date    AV FISTULA REPAIR       SECTION      LAPAROSCOPIC GASTRIC SLEEVE N/A 2024    Procedure: GASTRECTOMY, SLEEVE, LAPAROSCOPIC;  Surgeon: John Smith MD;  Location:  OR     Family History   Problem Relation Age of Onset    Kidney Disease Maternal Grandmother     Leukemia Paternal Grandmother 47    Leukemia Maternal Aunt 37    Anesthesia Reaction No family hx of     Deep Vein Thrombosis (DVT) No family hx of     Heart Disease No family hx of      Social History     Socioeconomic History    Marital status: Single     Spouse name: Not on file    Number of children: Not on file    Years of education: Not on file    Highest education level: Not on file   Occupational History    Not on file   Tobacco Use    Smoking status: Former     Current packs/day: 0.00     Types: Cigarettes     Quit date: 2023     Years since quittin.3     Passive exposure: Past    Smokeless tobacco: Never    Tobacco comments:     quit   Vaping Use    Vaping status: Never Used   Substance and  Sexual Activity    Alcohol use: Not Currently    Drug use: Not Currently     Types: Marijuana    Sexual activity: Not on file   Other Topics Concern    Not on file   Social History Narrative    Not on file     Social Determinants of Health     Financial Resource Strain: High Risk (2/7/2023)    [facility]    Overall Financial Resource Strain (CARDIA)     Difficulty of Paying Living Expenses: Hard   Food Insecurity: Food Insecurity Present (2/7/2023)    [facility]    Hunger Vital Sign     Worried About Running Out of Food in the Last Year: Sometimes true     Ran Out of Food in the Last Year: Never true   Transportation Needs: No Transportation Needs (2/7/2023)    [facility]    PRAPARE - Transportation     Lack of Transportation (Medical): No     Lack of Transportation (Non-Medical): No   Physical Activity: Insufficiently Active (2/7/2023)    [facility]    Exercise Vital Sign     Days of Exercise per Week: 1 day     Minutes of Exercise per Session: 40 min   Stress: Stress Concern Present (2/7/2023)    [facility]    [employer] [place] - Occupational Stress Questionnaire     Feeling of Stress : To some extent   Social Connections: Socially Isolated (2/7/2023)    [facility]    Social Connection and Isolation Panel [NHANES]     Frequency of Communication with Friends and Family: Never     Frequency of Social Gatherings with Friends and Family: Never     Attends [employer] Services: Never     Active Member of Clubs or Organizations: No     Attends Club or Organization Meetings: Never      Marital Status: Living with partner   Interpersonal Safety: Not At Risk (1/1/2024)    Received from [facility]    Intimate Partner Violence     Physically hurt, threatened and/or made to feel afraid: No   Housing Stability: High Risk (2/7/2023)    [facility]    Housing Stability     In the last 12 months, was there a time when you were not able to pay the mortgage or rent on time?: Yes     In the last 12 months, how many places have you lived?: 1     Number of Places Lived in the Last Year (Outpatient): Not on file     Number of Places Lived in the Last Year (Inpatient): Not on file     In the last 12 months, was there a time when you did not have a steady place to sleep or slept in a shelter (including now)?: No       ROS:   CONSTITUTIONAL:  No fevers or chills  EYES: negative for icterus  ENT:  negative for hearing loss, tinnitus and sore throat  RESPIRATORY:  negative for cough, sputum, dyspnea  CARDIOVASCULAR:  negative for chest pain     GASTROINTESTINAL:  negative for nausea, vomiting, diarrhea or constipation  GENITOURINARY:  negative for incontinence, dysuria, bladder emptying problems  HEME:  No easy bruising  INTEGUMENT:  negative for rash and pruritus  NEURO:  Negative for headache, seizure disorder  Allergies:   Allergies   Allergen Reactions    Sulfa Antibiotics Other (See Comments)     Reaction as an infant- unsure of what happened    Nsaids Other (See Comments)     Has nephrotic range proteinuria and see nephrology's note.      Medications:  Prescription Medications as of 5/20/2024         Rx Number Disp Refills Start End Last Dispensed Date Next Fill Date Owning Pharmacy    acetaminophen (TYLENOL) 325 MG tablet  -- --  --       Sig: Take 325-650 mg by mouth every 4 hours as needed    Class: Historical    Route: Oral    B Complex-C-Zn-Folic Acid (DIALYVITE/ZINC) TABS  -- -- 2/24/2024 --       Sig: Take 1 tablet  by mouth every evening    Class: Historical    Route: Oral    calcitRIOL (ROCALTROL) 0.5 MCG capsule  -- -- 2023 --       Sig: Take 0.5 mcg by mouth every evening    Class: Historical    Route: Oral    cinacalcet (SENSIPAR) 90 MG tablet  -- --  --       Sig: Take 90 mg by mouth every evening    Class: Historical    Route: Oral    famotidine (PEPCID) 20 MG tablet  60 tablet 2 2024 --   [facility] #[id number] - [place], [place] - [address] AT [address] [place] [address]    Sig: Take 1 tablet (20 mg) by mouth 2 times daily    Class: E-Prescribe    Route: Oral    gabapentin (NEURONTIN) 100 MG capsule  -- -- 3/7/2023 --       Sig: Take 300 mg by mouth at bedtime    Class: Historical    Route: Oral    levonorgestrel (KYLEENA) 19.5 MG IUD  -- -- 3/31/2022 3/30/2027       Si Intra Uterine Device by Intrauterine route once    Class: Historical    Route: Intrauterine    ondansetron (ZOFRAN ODT) 4 MG ODT tab  15 tablet 0 2024 --   [id number] - [facility] [address] AT [address] [place] [address]    Sig: Take 1 tablet (4 mg) by mouth every 6 hours as needed for nausea    Class: E-Prescribe    Route: Oral    pantoprazole (PROTONIX) 40 MG EC tablet  30 tablet 1 2024 --   [facility] #[id number] - [place], [place] - [address] AT [address] [place] [address]    Sig: Take 1 tablet (40 mg) by mouth daily    Class: E-Prescribe    Route: Oral    senna-docusate (SENOKOT-S/PERICOLACE) 8.6-50 MG tablet  30 tablet 1 2023 --   [facility] #[id number]  [place] [facility] [address] AT [address] [place] [address]    Sig: Take 2 tablets by mouth daily as needed for constipation (While taking narcotic pain medications.  Stop taking if having loose stools.)    Class: E-Prescribe    Route: Oral    sucralfate (CARAFATE) 1 GM tablet  120 tablet 3 2024 --   [facility] #[id number] - [place], [place] - [address] AT [address] [place] [address] &  "[facility]    Sig: Take 1 tablet (1 g) by mouth 4 times daily    Class: E-Prescribe    Route: Oral    ursodiol (ACTIGALL) 300 MG capsule  60 capsule 5 2/28/2024 --   [id number] - [place], [place] - [address]    Sig: Take 1 capsule (300 mg) by mouth 2 times daily    Class: E-Prescribe    Route: Oral    VELPHORO 500 MG CHEW chewable tablet  -- -- 1/10/2023 --       Sig: Take 500 mg by mouth 3 times daily (with meals)    Class: Historical    Route: Oral    VENTOLIN  (90 Base) MCG/ACT inhaler  -- --  --       Sig: INHALE 2 PUFFS BY MOUTH EVERY 4 HOURS AS NEEDED FOR SHORTNESS OF BREATH OR COUGH OR EXERCISE    Class: Historical    vitamin B-12 (CYANOCOBALAMIN) 2500 MCG sublingual tablet  30 tablet 5 2/28/2024 --   [facility] #[id number] - [place], [place] - [address]    Sig: Take 1 tablet (2,500 mcg) by mouth daily    Class: E-Prescribe    Route: Oral          Exam:     /78   Pulse 79   Ht 1.753 m (5' 9\")   Wt 100.7 kg (221 lb 14.4 oz)   SpO2 99%   BMI 32.77 kg/m    Appearance: in no apparent distress.   Skin: normal  Eyes:  no redness or discharge.  Sclera anicteric  Head and Neck: Normal, no rashes or jaundice  Respiratory: easy respirations, no audible wheezing.  Abdomen: rounded, No distention   Extremities: , Edema, none  Neuro: grossly normal   Psychiatric: Normal mood and affect    Diagnostics:   Recent Results (from the past 672 hour(s))   CBC with platelets    Collection Time: 04/30/24 11:37 AM   Result Value Ref Range    WBC Count (External) 6.5 3.7 - 12.1 10(3)/uL    RBC Count (External) 3.75 (L) 3.76 - 5.39 10(6)/uL    Hemoglobin (External) 11.7 11.2 - 15.8 g/dL    Hematocrit (External) 34.9 33.9 - 47.5 %    MCV (External) 92.9 80.6 - 98.5 fL    MCH (External) 31.3 26.4 - 32.9 pg    MCHC (External) 33.7 32.0 - 36.0 g/dL    RDW (External) 13.8 10.0 - 16.8 %    Platelet Count (External) " "220 179 - 450 10(3)/uL   Hemoglobin A1c    Collection Time: 04/30/24 11:37 AM   Result Value Ref Range    Hemoglobin A1C (External) 4.6 <5.7 %   Comprehensive metabolic panel    Collection Time: 04/30/24 11:37 AM   Result Value Ref Range    Sodium (External) 139 136 - 146 mmol/L    Potassium (External) 4.2 3.5 - 5.1 mmol/L    Chloride (External) 101 98 - 107 mmol/L    CO2 (External) 29 22 - 29 mmol/L    Anion Gap (External) 13.2 10.0 - 20.0 mmol/L    Creatinine (External) 7.37 (HH) 0.57 - 1.11 mg/dL    Urea Nitrogen (External) 13.7 10.0 - 20.0 mg/dL    BUN/Creatinine Ratio (External) 1.86 (L) 11.70 - 22.90 ratio    Calcium (External) 9.6 8.6 - 10.5 mg/dL    Glucose (External) 84 70 - 100 mg/dL    GFR Estimated (External) 7 (L) >=60 mL/min/1.73m2    Protein Total (External) 7.2 6.0 - 8.0 g/dL    Albumin (External) 4.0 3.5 - 5.0 g/dL    AST (External) 9 5 - 41 U/L    ALT (External) 9 8 - 45 U/L    Alk Phosphatase (External) 46 (L) 50 - 136 U/L    Bilirubin Total (External) 0.4 0.2 - 1.2 mg/dL   Lipid Profile    Collection Time: 04/30/24 11:37 AM   Result Value Ref Range    Cholesterol (External) 184 0 - 200 mg/dL    Triglycerides (External) 123 30 - 150 mg/dL    LDL Cholesterol Calculated (External) 109 0 - 159 mg/dL    HDL Cholesterol (External) 50 >40 mg/dL   Vitamin B12    Collection Time: 04/30/24 11:37 AM   Result Value Ref Range    Vitamin B12 (External) 285 208 - 964 pg/mL   Parathyroid Hormone Intact    Collection Time: 04/30/24 11:37 AM   Result Value Ref Range    Parathyroid Hormone Intact (External) 1,531.1 (H) 11.0 - 101.0 pg/mL   Vitamin D Deficiency    Collection Time: 04/30/24 11:37 AM   Result Value Ref Range    Vitamin D Deficiency Screening (External) 63.4 30.0 - 100.0 ng/mL   Vitamin A    Collection Time: 04/30/24 11:37 AM   Result Value Ref Range    Vitamin A (External) 71.7 32.5 - 78.0 mcg/dL     No results found for: \"CPRA\"    "

## 2024-05-21 LAB
CARDIOLIPIN IGG SER IA-ACNC: 2.9 GPL-U/ML
CARDIOLIPIN IGG SER IA-ACNC: NEGATIVE
CARDIOLIPIN IGM SER IA-ACNC: 17 MPL-U/ML
CARDIOLIPIN IGM SER IA-ACNC: ABNORMAL
CMV IGG SERPL IA-ACNC: 1 U/ML
CMV IGG SERPL IA-ACNC: ABNORMAL
DLCOCOR-%PRED-PRE: 131 %
DLCOCOR-PRE: 31.83 ML/MIN/MMHG
DLCOUNC-%PRED-PRE: 119 %
DLCOUNC-PRE: 28.84 ML/MIN/MMHG
DLCOUNC-PRED: 24.18 ML/MIN/MMHG
DRVVT SCREEN RATIO: 0.84
EBV VCA IGG SER IA-ACNC: 369 U/ML
EBV VCA IGG SER IA-ACNC: POSITIVE
ERV-%PRED-PRE: 121 %
ERV-PRE: 1.71 L
ERV-PRED: 1.41 L
EXPTIME-PRE: 8.95 SEC
FEF2575-%PRED-POST: 74 %
FEF2575-%PRED-PRE: 63 %
FEF2575-POST: 2.66 L/SEC
FEF2575-PRE: 2.25 L/SEC
FEF2575-PRED: 3.56 L/SEC
FEFMAX-%PRED-PRE: 86 %
FEFMAX-PRE: 6.65 L/SEC
FEFMAX-PRED: 7.65 L/SEC
FEV1-%PRED-PRE: 107 %
FEV1-PRE: 3.6 L
FEV1FEV6-PRE: 65 %
FEV1FEV6-PRED: 85 %
FEV1FVC-PRE: 65 %
FEV1FVC-PRED: 84 %
FEV1SVC-PRE: 65 %
FEV1SVC-PRED: 83 %
FIFMAX-PRE: 6.16 L/SEC
FRCPLETH-%PRED-PRE: 116 %
FRCPLETH-PRE: 3.38 L
FRCPLETH-PRED: 2.9 L
FVC-%PRED-PRE: 138 %
FVC-PRE: 5.54 L
FVC-PRED: 3.98 L
HIV 1+2 AB+HIV1 P24 AG SERPL QL IA: NONREACTIVE
IC-%PRED-PRE: 135 %
IC-PRE: 3.81 L
IC-PRED: 2.81 L
LA PPP-IMP: NEGATIVE
LUPUS INTERPRETATION: NORMAL
PTT RATIO: 1
QUANTIFERON MITOGEN: 10 IU/ML
QUANTIFERON NIL TUBE: 0 IU/ML
RVPLETH-%PRED-PRE: 100 %
RVPLETH-PRE: 1.67 L
RVPLETH-PRED: 1.66 L
THROMBIN TIME: 18.1 SECONDS (ref 13–19)
THROMBIN TIME: 18.1 SECONDS (ref 13–19)
TLCPLETH-%PRED-PRE: 128 %
TLCPLETH-PRE: 7.18 L
TLCPLETH-PRED: 5.61 L
VA-%PRED-PRE: 131 %
VA-PRE: 7.3 L
VC-%PRED-PRE: 136 %
VC-PRE: 5.52 L
VC-PRED: 4.04 L
VZV IGG SER QL IA: 990.7 INDEX
VZV IGG SER QL IA: POSITIVE

## 2024-05-22 ENCOUNTER — TELEPHONE (OUTPATIENT)
Dept: ENDOCRINOLOGY | Facility: CLINIC | Age: 34
End: 2024-05-22
Payer: COMMERCIAL

## 2024-05-22 LAB
ATRIAL RATE - MUSE: 81 BPM
DIASTOLIC BLOOD PRESSURE - MUSE: NORMAL MMHG
GAMMA INTERFERON BACKGROUND BLD IA-ACNC: 0 IU/ML
INTERPRETATION ECG - MUSE: NORMAL
M TB IFN-G BLD-IMP: POSITIVE
M TB IFN-G CD4+ BCKGRND COR BLD-ACNC: 10 IU/ML
MITOGEN IGNF BCKGRD COR BLD-ACNC: 0.9 IU/ML
MITOGEN IGNF BCKGRD COR BLD-ACNC: 1.25 IU/ML
P AXIS - MUSE: 66 DEGREES
PR INTERVAL - MUSE: 162 MS
QRS DURATION - MUSE: 88 MS
QT - MUSE: 394 MS
QTC - MUSE: 457 MS
QUANTIFERON TB1 TUBE: 0.9 IU/ML
QUANTIFERON TB2 TUBE: 1.25
R AXIS - MUSE: 47 DEGREES
SYSTOLIC BLOOD PRESSURE - MUSE: NORMAL MMHG
T AXIS - MUSE: 57 DEGREES
VENTRICULAR RATE- MUSE: 81 BPM

## 2024-05-22 NOTE — TELEPHONE ENCOUNTER
Left Voicemail (1st Attempt) and Sent Mychart (1st Attempt) for the patient to call back and schedule the following:    Appointment type: [facility]   Provider: Joyce Joshi PA-C  Return date: 06/2024  Specialty phone number: [phone]  Additional appointment(s) needed: n/a  Additonal Notes: n/a

## 2024-05-23 ENCOUNTER — TELEPHONE (OUTPATIENT)
Dept: ENDOCRINOLOGY | Facility: CLINIC | Age: 34
End: 2024-05-23

## 2024-05-23 ENCOUNTER — VIRTUAL VISIT (OUTPATIENT)
Dept: ENDOCRINOLOGY | Facility: CLINIC | Age: 34
End: 2024-05-23
Payer: COMMERCIAL

## 2024-05-23 VITALS — HEIGHT: 69 IN | BODY MASS INDEX: 31.99 KG/M2 | WEIGHT: 216 LBS

## 2024-05-23 DIAGNOSIS — Z98.84 S/P LAPAROSCOPIC SLEEVE GASTRECTOMY: ICD-10-CM

## 2024-05-23 DIAGNOSIS — Z86.39 HISTORY OF MORBID OBESITY: Primary | ICD-10-CM

## 2024-05-23 DIAGNOSIS — K21.9 GERD WITHOUT ESOPHAGITIS: ICD-10-CM

## 2024-05-23 DIAGNOSIS — N18.6 END STAGE RENAL DISEASE (H): ICD-10-CM

## 2024-05-23 LAB
A*: NORMAL
A*LOCUS SEROLOGIC EQUIVALENT: 24
A*LOCUS: NORMAL
A*SEROLOGIC EQUIVALENT: 31
ABTEST METHOD: NORMAL
B*: NORMAL
B*LOCUS SEROLOGIC EQUIVALENT: 75
B*LOCUS: NORMAL
B*SEROLOGIC EQUIVALENT: 37
BW-1: NORMAL
BW-2: NORMAL
C*: NORMAL
C*LOCUS SEROLOGIC EQUIVALENT: 2
C*LOCUS: NORMAL
C*SEROLOGIC EQUIVALENT: 8
DPA1*: NORMAL
DPB1*: NORMAL
DPB1*LOCUS NMDP: NORMAL
DPB1*LOCUS: NORMAL
DPB1*NMDP: NORMAL
DQA1*: NORMAL
DQA1*LOCUS: NORMAL
DQB1*: NORMAL
DQB1*LOCUS SEROLOGIC EQUIVALENT: 8
DQB1*LOCUS: NORMAL
DQB1*SEROLOGIC EQUIVALENT: 5
DRB1*: NORMAL
DRB1*LOCUS SEROLOGIC EQUIVALENT: 4
DRB1*LOCUS: NORMAL
DRB1*SEROLOGIC EQUIVALENT: 15
DRB4*LOCUS SEROLOGIC EQUIVALENT: 53
DRB4*LOCUS: NORMAL
DRB5*: NORMAL
DRB5*SEROLOGIC EQUIVALENT: 51
DRSSO TEST METHOD: NORMAL

## 2024-05-23 PROCEDURE — 99214 OFFICE O/P EST MOD 30 MIN: CPT | Mod: 95

## 2024-05-23 RX ORDER — FAMOTIDINE 20 MG/1
20 TABLET, FILM COATED ORAL 2 TIMES DAILY
Qty: 60 TABLET | Refills: 2 | Status: SHIPPED | OUTPATIENT
Start: 2024-05-23

## 2024-05-23 RX ORDER — SEMAGLUTIDE 0.5 MG/.5ML
0.5 INJECTION, SOLUTION SUBCUTANEOUS WEEKLY
Qty: 2 ML | Refills: 0 | Status: SHIPPED | OUTPATIENT
Start: 2024-05-23 | End: 2024-06-19

## 2024-05-23 RX ORDER — SEMAGLUTIDE 0.25 MG/.5ML
0.25 INJECTION, SOLUTION SUBCUTANEOUS WEEKLY
Qty: 2 ML | Refills: 0 | Status: SHIPPED | OUTPATIENT
Start: 2024-05-23

## 2024-05-23 ASSESSMENT — PAIN SCALES - GENERAL: PAINLEVEL: NO PAIN (0)

## 2024-05-23 NOTE — LETTER
2024       RE: Glenna Spears  [address]  [place] [address]     Dear Colleague,    Thank you for referring your patient, Glenna Spears, to the [facility]. Please see a copy of my visit note below.    Virtual Visit Details    Type of service:  Video Visit   Video Start Time:  10:37am  Video End Time: 10:54am    Originating Location (pt. Location): Home    Distant Location (provider location):  Off-site  Platform used for Video Visit: [facility] Bariatric Surgery Note    RE: Glenna Spears  MR#: [id number]  : 1990  VISIT DATE: May 23, 2024      Dear No Ref-Primary, Physician,    I had the pleasure of seeing your patient, Glenna Spears, in my post-bariatric surgery assessment clinic.    Assessment & Plan  Problem List Items Addressed This Visit       End stage renal disease (H)    Relevant Medications    Semaglutide-Weight Management (WEGOVY) 0.25 MG/0.5ML pen    Semaglutide-Weight Management (WEGOVY) 0.5 MG/0.5ML pen     Other Visit Diagnoses       History of morbid obesity    -  Primary    Relevant Medications    famotidine (PEPCID) 20 MG tablet    Semaglutide-Weight Management (WEGOVY) 0.25 MG/0.5ML pen    Semaglutide-Weight Management (WEGOVY) 0.5 MG/0.5ML pen    GERD without esophagitis        Relevant Medications    famotidine (PEPCID) 20 MG tablet    S/P laparoscopic sleeve gastrectomy        Relevant Medications    famotidine (PEPCID) 20 MG tablet    Semaglutide-Weight Management (WEGOVY) 0.25 MG/0.5ML pen    Semaglutide-Weight Management (WEGOVY) 0.5 MG/0.5ML pen               34 minutes spent by me on the date of the encounter doing chart review, history and exam, documentation and further activities per the note    CHIEF COMPLAINT: Post-bariatric surgery follow-up. 5 months s/p Laparoscopic sleeve gastrectomy   with Dr. Smith.    HISTORY OF PRESENT ILLNESS:      2024      7:41 AM   Questions Regarding Prior Weight Loss Surgery Reviewed With Patient   I had the following weight loss procedure Sleeve Gastrectomy   What year was your surgery? 2024   How has your weight changed since your last visit? I have lost weight   Do you currently have any of the following Heartburn, acid reflux, or GERD (acid reflux disease)?   Do you have any concerns today? No         Plan  Start Wegovy 0.25mg once weekly for 4 weeks, then increase to 0.5mg once weekly.   Goals we discussed today: high protein, cutting out sugary creamer in coffee   Follow up with Kelli in 1 month for 6 month post op   Dietician appointment to be scheduled asap   Keep up the excellent work!     Anti-obesity medication started today for this patient   WEGOVY  Has taken in the past prior to bariatric surgery, felt it was very helpful   No history of pancreatitis   No personal or family history of medullary thyroid carcinoma  No personal or family history of Multiple Endocrine Neoplasia Type 2  Caution would be needed with this medication due to recent nausea and vomiting which has recently improved, will monitor closely and stop med if vomiting resumes   .Side effects and risks associated with this medication, as well as medication administration instructions, were discussed. Patient expressed understanding and a willingness to proceed with starting this medication.          Interval History:   Needs to lose to BMI <35 for kidney transplant. Goal wt <230 lbs (100 lbs weight loss)  ESRD due to longstanding nephrotic range proteinuria and HTN,. Hx of chronic drug use, Excedrin use.  Sober >1 year. Quit smoking January 2023   On dialysis x 1.5 years, Tuesdays Thursdays and Saturdays   Nephrologist: Dr Zaid Pulido     -Sleeve gastrectomy 1/23/24  -Was dealing with nausea, vomiting, abdominal pain, constipation and dehydration over the first 2 weeks post surgery.       -At 1 month post op was feeling much better,  once able to eat more variety of foods (was still struggling to tolerate the taste of protein shakes)  - At 1 month post op: had not been taking any of her dialysis medications lately, planned to restart the sensipar the velphoro, calcitriol shortly. Stated that they were making her nauseous and she's felt better since stopping them. At this appointment discussed the importance of restarting these medications for her kidney health, and that we can improve her nausea by taking heart burn medication regularly (pantoprazole 40mg) regularly.       Last seen by me 4/30/24  Continuing to experiencing heart burn., nausea vomiting . Self discontinued pantoprazole yesterday as she felt it was making her heart burn worse, stated that pepcid and tums were all she needed for heart burn management. We discussed that tums were not an appropriate chronic treatment for heart burn, and that PPIs (pantoparzole, omeprazole) have been found to be the most effective at heart burn treatment. Glenna declined trialling an alternative PPI at this time due to perceived lack of efficacy with omeprazole and pantoprazole, we discussed the risks and benefits of this decision and Glenna expressed understanding.      Glenna is open to continuing famotidine 20mg BID, and is open to trialing carafate to see if this helps heart burn symptoms.   Will continue to check in with Glenna and offer to try esomeprazole in the future to help with heart burn symptoms    Since last visit:   N/V largely improved, no vomiting for last 2 weeks. Unsure what changed, Glenna wonders if things have just been healing better.   Increased appetite, noticed she's been snacking more lately.     Transplant workup a few days ago, a provider there recommended considering restarting wegovy for appetite management.     Discussed risks of starting wegovy given her recent issues with heart burn, nausea, vomiting. Agreed to trial with close monitoring, and will stop if vomiting returns.      100+ lbs down from starting weight   15 lbs down since last visit 1 month ago.     Wt Readings from Last 5 Encounters:   05/23/24 98 kg (216 lb)   05/20/24 100.7 kg (221 lb 14.4 oz)   04/30/24 104.4 kg (230 lb 2.6 oz)   02/28/24 110.7 kg (244 lb)   02/15/24 112.7 kg (248 lb 6.4 oz)       Anti-obesity medication history:     Current:   None     Failed/contraindicated:   Wegovy-stopped prior to bariatric surgery.  felt it was really helpful with reducing food noise, hoping to restart     Recent diet changes:   Quick meal recall:   B: coffee with creamer (regular, doesn't like sugar free creamer), some eggs or yogurt   L: chicken, snack on cheese   D: meat, mashed potatoes or rice, broccoli, apples      Recent exercise/activity changes: more active than pre-surgery. Waking up early, cleaning the house. Working to get back into working out. Taking walks with kids.     Vitamins/Labs: 3 month post op labs stable, no concerns     GERD symptoms: heart burn well managed with pepcid     Nausea : some nausea with taking sensipar, eating eggs, otherwise no nausea     Vomiting : 2 weeks ago describes that she stopped vomiting completely, she is not sure what changed.    Dysphagia:  none, no longer struggles to take pills.          Weight History:      4/30/2024     7:41 AM   --   What is your highest lifetime weight? 330   What is your lowest weight since surgery? (In pounds) 230     Initial Weight (lbs): 320 lbs  Weight: 98 kg (216 lb)  Last Visits Weight: 100.7 kg (221 lb 14.4 oz)  Cumulative weight loss (lbs): 104  Weight Loss Percentage: 32.5%        4/30/2024     7:41 AM   Questions Regarding Co-Morbidities and Health Concerns Reviewed With Patient   Pre-diabetes Never   Diabetes II Never   High Blood Pressure Improved   High cholesterol Never   Heartburn/Reflux Worsened   Sleep apnea Stayed the same   PCOS Never   Back pain Stayed the same   Joint pain Stayed the same   Lower leg swelling Stayed the same            4/30/2024     7:41 AM   Eating Habits   How many meals do you eat per day? 2   Do you snack between meals? Yes   How much food are you eating at each meal? Less than 1/2 cup   Are you able to separate your meals and liquids by at least 30 minutes? Yes   Are you able to avoid liquid calories? Sometimes           4/30/2024     7:41 AM   Exercise Questions Reviewed With Patient   How often do you exercise? Less than 1 time per week   What is the duration of your exercise (in minutes)? 10 Minutes   What types of exercise do you do? walking   What keeps you from being more active? I should be more active but I just have not gotten around to it    Lack of Time    Too tired       Social History:      4/30/2024     7:41 AM   --   Are you smoking? No   Are you drinking alcohol? No       Medications:  Current Outpatient Medications   Medication Sig Dispense Refill    B Complex-C-Zn-Folic Acid (DIALYVITE/ZINC) TABS Take 1 tablet by mouth every evening      cinacalcet (SENSIPAR) 90 MG tablet Take 90 mg by mouth every evening      famotidine (PEPCID) 20 MG tablet Take 1 tablet (20 mg) by mouth 2 times daily 60 tablet 2    gabapentin (NEURONTIN) 100 MG capsule Take 300 mg by mouth at bedtime      levonorgestrel (KYLEENA) 19.5 MG IUD 1 Intra Uterine Device by Intrauterine route once      Semaglutide-Weight Management (WEGOVY) 0.25 MG/0.5ML pen Inject 0.25 mg Subcutaneous once a week For 4 weeks 2 mL 0    Semaglutide-Weight Management (WEGOVY) 0.5 MG/0.5ML pen Inject 0.5 mg Subcutaneous once a week After completing 4 weeks of 0.25mg dose 2 mL 0    ursodiol (ACTIGALL) 300 MG capsule Take 1 capsule (300 mg) by mouth 2 times daily 60 capsule 5    VELPHORO 500 MG CHEW chewable tablet Take 500 mg by mouth 3 times daily (with meals)      VENTOLIN  (90 Base) MCG/ACT inhaler INHALE 2 PUFFS BY MOUTH EVERY 4 HOURS AS NEEDED FOR SHORTNESS OF BREATH OR COUGH OR EXERCISE      vitamin B-12 (CYANOCOBALAMIN) 2500 MCG sublingual tablet Take  "1 tablet (2,500 mcg) by mouth daily 30 tablet 5    acetaminophen (TYLENOL) 325 MG tablet Take 325-650 mg by mouth every 4 hours as needed (Patient not taking: Reported on 2/8/2024)      calcitRIOL (ROCALTROL) 0.5 MCG capsule Take 0.5 mcg by mouth every evening (Patient not taking: Reported on 5/20/2024)      ondansetron (ZOFRAN ODT) 4 MG ODT tab Take 1 tablet (4 mg) by mouth every 6 hours as needed for nausea (Patient not taking: Reported on 5/20/2024) 15 tablet 0    senna-docusate (SENOKOT-S/PERICOLACE) 8.6-50 MG tablet Take 2 tablets by mouth daily as needed for constipation (While taking narcotic pain medications.  Stop taking if having loose stools.) (Patient not taking: Reported on 5/20/2024) 30 tablet 1     No current facility-administered medications for this visit.         4/30/2024     7:41 AM   --   Do you avoid NSAIDs such as (Ibuprofen, Aleve, Naproxen, Advil)? Yes       ROS:  GI:       4/30/2024     7:41 AM   --   Vomiting Yes   Diarrhea Yes   Constipation Yes   Swallowing trouble No   Abdominal pain Yes   Heartburn Yes     Skin:       4/30/2024     7:41 AM   BAR RBS ROS - SKIN   Rash in skin folds No     Psych:       4/30/2024     7:41 AM   --   Depression Yes   Anxiety Yes     Female Only:       4/30/2024     7:41 AM   BAR RBS ROS -    Female only None of the above   Stress urinary incontinence No             1/22/2023     5:08 PM   GABBY Score (Last Two)   GABBY Raw Score 32    32   Activation Score 62.6    62.6   GABBY Level 3    3         PHYSICAL EXAM:  Objective   Ht 1.753 m (5' 9.02\")   Wt 98 kg (216 lb)   BMI 31.88 kg/m    Vitals - Patient Reported  Pain Score: No Pain (0)      Vitals:  No vitals were obtained today due to virtual visit.    Physical Exam   GENERAL: alert and no distress  EYES: Eyes grossly normal to inspection.  No discharge or erythema, or obvious scleral/conjunctival abnormalities.  RESP: No audible wheeze, cough, or visible cyanosis.    SKIN: Visible skin clear. No significant " rash, abnormal pigmentation or lesions.  NEURO: Cranial nerves grossly intact.  Mentation and speech appropriate for age.  PSYCH: Appropriate affect, tone, and pace of words        Sincerely,    Kelli Joshi PA-C

## 2024-05-23 NOTE — NURSING NOTE
Is the patient currently in the state of [place]? YES    Visit mode:VIDEO    If the visit is dropped, the patient can be reconnected by: VIDEO VISIT: Text to cell phone:   Telephone Information:   Mobile [phone]       Will anyone else be joining the visit? NO  (If patient encounters technical issues they should call [phone]    How would you like to obtain your AVS? MyChart    Are changes needed to the allergy or medication list? N/A    Are refills needed on medications prescribed by this physician? NO    Reason for visit: RECHECK (FARIHA)    [place]

## 2024-05-23 NOTE — PATIENT INSTRUCTIONS
"Thank you for allowing us the privilege of caring for you. We hope we provided you with the excellent service you deserve.   Please let us know if there is anything else we can do for you so that we can be sure you are completely satisfied with your care experience.    To ensure the quality of our services you may be receiving a patient satisfaction survey from an independent patient satisfaction monitoring company.    The greatest compliment you can give is a \"Likely to Recommend\"    Your visit was with Kelli Joshi PA-C today.    Instructions per today's visit:     Audie Spears, it was great to visit with you today.  Here is a review of our visit.  If our clinic scheduler is not able to reach you please call [phone] to schedule your next appointments.    Plan  Start Wegovy 0.25mg once weekly for 4 weeks, then increase to 0.5mg once weekly.   Goals we discussed today: high protein, cutting out sugary creamer in coffee   Follow up with Kelli in 1 month for 6 month post op   Dietician appointment to be scheduled asap   Keep up the excellent work!       Information about Video Visits with [employer]: video visit information  _________________________________________________________________________________________________________________________________________________________  If you are asked by your clinic team to have your blood pressure checked:  [facility] do offer several locations for blood pressure checks. Please follow the below link to schedule an appointment. Scheduling an appointment at the pharmacy for a blood pressure check is now preferred.    Appointment Plus (appointment-plus.[employer]ptidata)  _________________________________________________________________________________________________________________________________________________________  Important contact and scheduling information:  Please call our contact center at [phone] to schedule your next appointments.  To find a " lab location near you, please call [phone].  For any nursing questions or concerns call Brynn Larson LPN at [phone] or Gris Mix RN at [phone]  Please call during clinic hours Monday through Friday 8:00a - 4:00p if you have questions or you can contact us via [employer] at anytime and we will reply during clinic hours.    Lab results will be communicated through My Chart or letter (if My Chart not used). Please call the clinic if you have not received communication after 1 week or if you have any questions.?  Clinic Fax: [phone]    _________________________________________________________________________________________________________________________________________________________  Meal Replacement Products:    Here is the link to our new e-store where you can purchase our meal replacement products     [employer] E-Store  [employer] Plus/store    The one week starter kit is a great way to sample a variety of products and see what works for you.    If you want more information about the product go to: [employer]eps[employer]sRated[employer]    If you are an employee or [facility] or  [employer] please contact your care team for a 10% estore discount    Free Shipping for orders over $75     Benefits of meal replacements products:    Portion and calorie control  Improved nutrition  Structured eating  Simplified food choices  Avoid contact with trigger foods  _________________________________________________________________________________________________________________________________________________________  Interested in working with a health ?  Health coaches work with you to improve your overall health and wellbeing.  They look at the whole person, and may involve discussion of different areas of life, including, but not limited to the four pillars of health (sleep, exercise, nutrition, and stress management). Discuss with your care team if you would  like to start working a health .  Health Coaching-3 Pack: Schedule by calling [phone]    $99 for three health coaching visits    Visits may be done in person or via phone    Coaching is a partnership between the  and the client; Coaches do not prescribe or diagnose    Coaching helps inspire the client to reach his/her personal goals   _________________________________________________________________________________________________________________________________________________________  24 Week Healthy Lifestyle Plan:    Our mission in the 24-week Healthy Lifestyle Plan is to provide you with individualized care by giving you the tools, education and support you need to lose weight and maintain a healthy lifestyle. In your 24-week journey, you ll be supported by a dedicated weight loss team that includes registered dietitians, medical weight management providers, health coaches, and nurses -- all with special expertise in weight loss -- to help you every step of the way.     Monthly meetings with your registered dietician or medical weight management provider help to review your progress, update your care plan, and make any adjustments needed to ensure success. Between these visits, weekly and bi-weekly health  visits will help you focus on the four pillars of weight loss -- stress, sleep, nutrition, and exercise -- and how you can best adapt each to achieve sustainable weight loss results.    In addition, you will be given exclusive access to online wellbeing classes through [employer].  Your initial visit will be with a medical weight management provider who will help to understand your weight loss goals and ensure this program is the right fit for you. Please let our team know if you are interested in the 24 week plan by sending a message to your care team or calling [phone] to  schedule.  _________________________________________________________________________________________________________________________________________________________  __________  [facility] Get Moving Program  Our team of physical therapists is trained to help you understand and take control of your condition. They will perform a thorough evaluation to determine your ability for activity and develop a customized plan to fit your goals and physical ability.  Scheduling: Unsure if the Get Moving program is right for you? Discuss the program with your medical provider or diabetes educator. You can also call us at [phone] to ask questions or schedule an appointment.   [facility] Get Moving Program  ____________________________________________________________________________________________________________________________________________________________________________   [facility] Diabetes Prevention Program (DPP)  If you have prediabetes and Medicare please contact us via [email or web address] to learn more about the Diabetes Prevention Program (DPP)  Program Details:   [employer] offers the year-long Diabetes Prevention Program (DPP). The program helps you to make lifestyle changes that prevent or delay type 2 diabetes by supporting healthy eating, increased physical activity, stress reduction and use of coping skills.   On average, previous [facility] DPP cohorts have lost and maintained at least 5% of their starting weight throughout the program and averaged more than 150 minutes of physical activity per week.  Participants meet weekly for one-hour group sessions over sixteen weeks, every other week for the next 8 weeks, and monthly for the last six months.   A year-long maintenance program is also available for participants who complete the first year.   Location & Cost:   During the COVID-19 Public Health Emergency, the program is offered virtually. When in-person classes can resume, they  will be held at [facility].  For people with Medicare, the program is covered in full. A self-pay option will also be available for those with non-Medicare insurance plans.   ______________________________________________________________________________________________________________________________________________________________________________________________________________________________    To work with a Behavioral Health Psychologist:    Call to schedule:    Scooby Griggs - [phone]  Dilia Esparza - [phone]  Tammi Denise - [phone]  Jenifer Pennington - [phone]   Justa Jimenez PhD (cannot accept Medicare) [phone]        Thank you,   [facility] Comprehensive Weight Management Team

## 2024-05-23 NOTE — TELEPHONE ENCOUNTER
PA Initiation Key: [id number]    Medication: WEGOVY 0.25 MG/0.5ML SC SOAJ  Insurance Company: [employer] [place] - Phone [phone] Fax [phone]  Pharmacy Filling the Rx: [id number] - [place], [place] - [address] AT [address] [place] [address] & [facility]  Filling Pharmacy Phone: [phone]  Filling Pharmacy Fax: [phone]  Start Date: 5/23/2024

## 2024-05-23 NOTE — PROGRESS NOTES
Virtual Visit Details    Type of service:  Video Visit   Video Start Time:  10:37am  Video End Time: 10:54am    Originating Location (pt. Location): Home    Distant Location (provider location):  Off-site  Platform used for Video Visit: [facility] Bariatric Surgery Note    RE: Glenna Spears  MR#: [id number]  : 1990  VISIT DATE: May 23, 2024      Dear No Ref-Primary, Physician,    I had the pleasure of seeing your patient, Glenna Spears, in my post-bariatric surgery assessment clinic.    Assessment & Plan   Problem List Items Addressed This Visit       End stage renal disease (H)    Relevant Medications    Semaglutide-Weight Management (WEGOVY) 0.25 MG/0.5ML pen    Semaglutide-Weight Management (WEGOVY) 0.5 MG/0.5ML pen     Other Visit Diagnoses       History of morbid obesity    -  Primary    Relevant Medications    famotidine (PEPCID) 20 MG tablet    Semaglutide-Weight Management (WEGOVY) 0.25 MG/0.5ML pen    Semaglutide-Weight Management (WEGOVY) 0.5 MG/0.5ML pen    GERD without esophagitis        Relevant Medications    famotidine (PEPCID) 20 MG tablet    S/P laparoscopic sleeve gastrectomy        Relevant Medications    famotidine (PEPCID) 20 MG tablet    Semaglutide-Weight Management (WEGOVY) 0.25 MG/0.5ML pen    Semaglutide-Weight Management (WEGOVY) 0.5 MG/0.5ML pen               34 minutes spent by me on the date of the encounter doing chart review, history and exam, documentation and further activities per the note    CHIEF COMPLAINT: Post-bariatric surgery follow-up. 5 months s/p Laparoscopic sleeve gastrectomy   with Dr. Smith.    HISTORY OF PRESENT ILLNESS:      2024     7:41 AM   Questions Regarding Prior Weight Loss Surgery Reviewed With Patient   I had the following weight loss procedure Sleeve Gastrectomy   What year was your surgery?    How has your weight changed since your last visit? I have lost weight   Do you currently have any of the following Heartburn, acid reflux, or  GERD (acid reflux disease)?   Do you have any concerns today? No         Plan  Start Wegovy 0.25mg once weekly for 4 weeks, then increase to 0.5mg once weekly.   Goals we discussed today: high protein, cutting out sugary creamer in coffee   Follow up with Kelli in 1 month for 6 month post op   Dietician appointment to be scheduled asap   Keep up the excellent work!     Anti-obesity medication started today for this patient   WEGOVY  Has taken in the past prior to bariatric surgery, felt it was very helpful   No history of pancreatitis   No personal or family history of medullary thyroid carcinoma  No personal or family history of Multiple Endocrine Neoplasia Type 2  Caution would be needed with this medication due to recent nausea and vomiting which has recently improved, will monitor closely and stop med if vomiting resumes   .Side effects and risks associated with this medication, as well as medication administration instructions, were discussed. Patient expressed understanding and a willingness to proceed with starting this medication.          Interval History:   Needs to lose to BMI <35 for kidney transplant. Goal wt <230 lbs (100 lbs weight loss)  ESRD due to longstanding nephrotic range proteinuria and HTN,. Hx of chronic drug use, Excedrin use.  Sober >1 year. Quit smoking January 2023   On dialysis x 1.5 years, Tuesdays Thursdays and Saturdays   Nephrologist: Dr Zaid Pulido     -Sleeve gastrectomy 1/23/24  -Was dealing with nausea, vomiting, abdominal pain, constipation and dehydration over the first 2 weeks post surgery.       -At 1 month post op was feeling much better, once able to eat more variety of foods (was still struggling to tolerate the taste of protein shakes)  - At 1 month post op: had not been taking any of her dialysis medications lately, planned to restart the sensipar the velphoro, calcitriol shortly. Stated that they were making her nauseous and she's felt better since  stopping them. At this appointment discussed the importance of restarting these medications for her kidney health, and that we can improve her nausea by taking heart burn medication regularly (pantoprazole 40mg) regularly.       Last seen by me 4/30/24  Continuing to experiencing heart burn., nausea vomiting . Self discontinued pantoprazole yesterday as she felt it was making her heart burn worse, stated that pepcid and tums were all she needed for heart burn management. We discussed that tums were not an appropriate chronic treatment for heart burn, and that PPIs (pantoparzole, omeprazole) have been found to be the most effective at heart burn treatment. Glenna declined trialling an alternative PPI at this time due to perceived lack of efficacy with omeprazole and pantoprazole, we discussed the risks and benefits of this decision and Glenna expressed understanding.      Glenna is open to continuing famotidine 20mg BID, and is open to trialing carafate to see if this helps heart burn symptoms.   Will continue to check in with Glenna and offer to try esomeprazole in the future to help with heart burn symptoms    Since last visit:   N/V largely improved, no vomiting for last 2 weeks. Unsure what changed, Glenna wonders if things have just been healing better.   Increased appetite, noticed she's been snacking more lately.     Transplant workup a few days ago, a provider there recommended considering restarting wegovy for appetite management.     Discussed risks of starting wegovy given her recent issues with heart burn, nausea, vomiting. Agreed to trial with close monitoring, and will stop if vomiting returns.     100+ lbs down from starting weight   15 lbs down since last visit 1 month ago.     Wt Readings from Last 5 Encounters:   05/23/24 98 kg (216 lb)   05/20/24 100.7 kg (221 lb 14.4 oz)   04/30/24 104.4 kg (230 lb 2.6 oz)   02/28/24 110.7 kg (244 lb)   02/15/24 112.7 kg (248 lb 6.4 oz)       Anti-obesity medication  history:     Current:   None     Failed/contraindicated:   Wegovy-stopped prior to bariatric surgery.  felt it was really helpful with reducing food noise, hoping to restart     Recent diet changes:   Quick meal recall:   B: coffee with creamer (regular, doesn't like sugar free creamer), some eggs or yogurt   L: chicken, snack on cheese   D: meat, mashed potatoes or rice, broccoli, apples      Recent exercise/activity changes: more active than pre-surgery. Waking up early, cleaning the house. Working to get back into working out. Taking walks with kids.     Vitamins/Labs: 3 month post op labs stable, no concerns     GERD symptoms: heart burn well managed with pepcid     Nausea : some nausea with taking sensipar, eating eggs, otherwise no nausea     Vomiting : 2 weeks ago describes that she stopped vomiting completely, she is not sure what changed.    Dysphagia:  none, no longer struggles to take pills.          Weight History:      4/30/2024     7:41 AM   --   What is your highest lifetime weight? 330   What is your lowest weight since surgery? (In pounds) 230     Initial Weight (lbs): 320 lbs  Weight: 98 kg (216 lb)  Last Visits Weight: 100.7 kg (221 lb 14.4 oz)  Cumulative weight loss (lbs): 104  Weight Loss Percentage: 32.5%        4/30/2024     7:41 AM   Questions Regarding Co-Morbidities and Health Concerns Reviewed With Patient   Pre-diabetes Never   Diabetes II Never   High Blood Pressure Improved   High cholesterol Never   Heartburn/Reflux Worsened   Sleep apnea Stayed the same   PCOS Never   Back pain Stayed the same   Joint pain Stayed the same   Lower leg swelling Stayed the same           4/30/2024     7:41 AM   Eating Habits   How many meals do you eat per day? 2   Do you snack between meals? Yes   How much food are you eating at each meal? Less than 1/2 cup   Are you able to separate your meals and liquids by at least 30 minutes? Yes   Are you able to avoid liquid calories? Sometimes            4/30/2024     7:41 AM   Exercise Questions Reviewed With Patient   How often do you exercise? Less than 1 time per week   What is the duration of your exercise (in minutes)? 10 Minutes   What types of exercise do you do? walking   What keeps you from being more active? I should be more active but I just have not gotten around to it    Lack of Time    Too tired       Social History:      4/30/2024     7:41 AM   --   Are you smoking? No   Are you drinking alcohol? No       Medications:  Current Outpatient Medications   Medication Sig Dispense Refill    B Complex-C-Zn-Folic Acid (DIALYVITE/ZINC) TABS Take 1 tablet by mouth every evening      cinacalcet (SENSIPAR) 90 MG tablet Take 90 mg by mouth every evening      famotidine (PEPCID) 20 MG tablet Take 1 tablet (20 mg) by mouth 2 times daily 60 tablet 2    gabapentin (NEURONTIN) 100 MG capsule Take 300 mg by mouth at bedtime      levonorgestrel (KYLEENA) 19.5 MG IUD 1 Intra Uterine Device by Intrauterine route once      Semaglutide-Weight Management (WEGOVY) 0.25 MG/0.5ML pen Inject 0.25 mg Subcutaneous once a week For 4 weeks 2 mL 0    Semaglutide-Weight Management (WEGOVY) 0.5 MG/0.5ML pen Inject 0.5 mg Subcutaneous once a week After completing 4 weeks of 0.25mg dose 2 mL 0    ursodiol (ACTIGALL) 300 MG capsule Take 1 capsule (300 mg) by mouth 2 times daily 60 capsule 5    VELPHORO 500 MG CHEW chewable tablet Take 500 mg by mouth 3 times daily (with meals)      VENTOLIN  (90 Base) MCG/ACT inhaler INHALE 2 PUFFS BY MOUTH EVERY 4 HOURS AS NEEDED FOR SHORTNESS OF BREATH OR COUGH OR EXERCISE      vitamin B-12 (CYANOCOBALAMIN) 2500 MCG sublingual tablet Take 1 tablet (2,500 mcg) by mouth daily 30 tablet 5    acetaminophen (TYLENOL) 325 MG tablet Take 325-650 mg by mouth every 4 hours as needed (Patient not taking: Reported on 2/8/2024)      calcitRIOL (ROCALTROL) 0.5 MCG capsule Take 0.5 mcg by mouth every evening (Patient not taking: Reported on 5/20/2024)       "ondansetron (ZOFRAN ODT) 4 MG ODT tab Take 1 tablet (4 mg) by mouth every 6 hours as needed for nausea (Patient not taking: Reported on 5/20/2024) 15 tablet 0    senna-docusate (SENOKOT-S/PERICOLACE) 8.6-50 MG tablet Take 2 tablets by mouth daily as needed for constipation (While taking narcotic pain medications.  Stop taking if having loose stools.) (Patient not taking: Reported on 5/20/2024) 30 tablet 1     No current facility-administered medications for this visit.         4/30/2024     7:41 AM   --   Do you avoid NSAIDs such as (Ibuprofen, Aleve, Naproxen, Advil)? Yes       ROS:  GI:       4/30/2024     7:41 AM   --   Vomiting Yes   Diarrhea Yes   Constipation Yes   Swallowing trouble No   Abdominal pain Yes   Heartburn Yes     Skin:       4/30/2024     7:41 AM   BAR RBS ROS - SKIN   Rash in skin folds No     Psych:       4/30/2024     7:41 AM   --   Depression Yes   Anxiety Yes     Female Only:       4/30/2024     7:41 AM   BAR RBS ROS -    Female only None of the above   Stress urinary incontinence No             1/22/2023     5:08 PM   GABBY Score (Last Two)   GABBY Raw Score 32    32   Activation Score 62.6    62.6   GABBY Level 3    3         PHYSICAL EXAM:  Objective    Ht 1.753 m (5' 9.02\")   Wt 98 kg (216 lb)   BMI 31.88 kg/m    Vitals - Patient Reported  Pain Score: No Pain (0)      Vitals:  No vitals were obtained today due to virtual visit.    Physical Exam   GENERAL: alert and no distress  EYES: Eyes grossly normal to inspection.  No discharge or erythema, or obvious scleral/conjunctival abnormalities.  RESP: No audible wheeze, cough, or visible cyanosis.    SKIN: Visible skin clear. No significant rash, abnormal pigmentation or lesions.  NEURO: Cranial nerves grossly intact.  Mentation and speech appropriate for age.  PSYCH: Appropriate affect, tone, and pace of words        Sincerely,    Kelli Joshi PA-C    "

## 2024-05-24 NOTE — TELEPHONE ENCOUNTER
Prior Authorization Approval    Medication: WEGOVY 0.25 MG/0.5ML SC SOAJ  Authorization Effective Date: 2/23/2024  Authorization Expiration Date: 5/23/2025  Approved Dose/Quantity:    Reference #: Key: [id number]   Insurance Company: [employer] [place] - Phone [phone] Fax [phone]  Expected CoPay: $    CoPay Card Available: No    Financial Assistance Needed:    Which [facility] filling the [id number] - [place], [place] - [address] AT [address] [place] [address] & [facility]  Pharmacy Notified: Y  Patient Notified: JANINA

## 2024-05-27 LAB
PROTOCOL CUTOFF: NORMAL
SA 1  COMMENTS: NORMAL
SA 1 CELL: NORMAL
SA 1 TEST METHOD: NORMAL
SA 2 CELL: NORMAL
SA 2 COMMENTS: NORMAL
SA 2 TEST METHOD: NORMAL
SA1 HI RISK ABY: NORMAL
SA1 MOD RISK ABY: NORMAL
SA2 HI RISK ABY: NORMAL
SA2 MOD RISK ABY: NORMAL
UNACCEPTABLE ANTIGENS: NORMAL
UNOS CPRA: 73

## 2024-05-29 ENCOUNTER — DOCUMENTATION ONLY (OUTPATIENT)
Dept: TRANSPLANT | Facility: CLINIC | Age: 34
End: 2024-05-29
Payer: COMMERCIAL

## 2024-05-29 ENCOUNTER — TELEPHONE (OUTPATIENT)
Dept: ENDOCRINOLOGY | Facility: CLINIC | Age: 34
End: 2024-05-29
Payer: COMMERCIAL

## 2024-05-29 PROBLEM — E66.9 OBESITY: Status: ACTIVE | Noted: 2023-03-27

## 2024-05-29 NOTE — TELEPHONE ENCOUNTER
Patient confirmed scheduled appointment:  Date: 06/12/2024  Time: 1130 am   Visit type: RET LORAINE NUTRITION  Provider: Sydnee Shipley RD  Location: virtual   Testing/imaging: n/a  Additional notes: n/a

## 2024-06-03 ENCOUNTER — TELEPHONE (OUTPATIENT)
Dept: TRANSPLANT | Facility: CLINIC | Age: 34
End: 2024-06-03
Payer: COMMERCIAL

## 2024-06-03 ENCOUNTER — TELEPHONE (OUTPATIENT)
Dept: TRANSPLANT | Facility: CLINIC | Age: 34
End: 2024-06-03

## 2024-06-03 NOTE — TELEPHONE ENCOUNTER
Patient called regarding some positive test results per the patient the  told her she was + for TB. Pt was a transferred to speak with the RNCC and was very upset that no one had notified her of the results.

## 2024-06-03 NOTE — TELEPHONE ENCOUNTER
Contacted patient to review outcome of selection committee meeting (See selection committee encounter).   Explained to patient that he/she needs to complete all components of the evaluation to be eligible for active status on the waiting list or to proceed with a live donor kidney transplant.   Reviewed next steps based on outcomes:  to call pt to make appt with ID for positive TB quant gold, exercise stress test, abd pelvic CT wo contrast. Pt to make RTC with mental health provider to get a recommendation to proceed with kidney transplant. Pt to make appt for urology to evaluate microscopic hematuria/ determine if cystoscopy is needed. Pt to sign KDPI> 85% consent. Pt to have live donors register now.   Patient will not be listed (patient is on dialysis and evaluation is not complete), patient will receive:    - An Evaluation Summary Letter indicating what is needed to complete evaluation-discussed with patient if they would like to have testing done with  [facility] or locally  Confirmed with patient that on successful completion of outstanding components, patient is eligible for active status and they will receive a follow-up call.   Confirmed that patient has contact information for additional questions or concerns.  Pt expressed very good understanding of all and was in good agreement with the plan.   Generated Transplant Evaluation Summary Letter today in EPIC - electronically sent to pt/ providers.

## 2024-06-03 NOTE — LETTER
06/03/24        Glenna Spears  [address]   [place] [address]        Dear Glenna,    It was a pleasure to see you recently for consideration of kidney transplantation. Your pre-transplant evaluation results were reviewed at our Multidisciplinary Selection Committee on 05/29/2024. The Committee is requesting the following items are completed for your transplant evaluation:    Infectious Disease appointment to evaluate your positive TB quant gold result done on 05/20/2024 with your evaluation labs and to provide a recommendation for you to proceed with a kidney transplant.   Exercise stress test to assess your heart function.   Abdominal pelvic CT without contrast to assess for arterial calcifications.   Urology appointment to assess your microscopic hematuria and to provide a recommendation for you to proceed with a kidney transplant. Please see your regular urologist for this.   Mental health provider needs to provide a recommendation for you to proceed with a kidney transplant. Please see your regular therapist for this.   Dental work needs to remain up to date. Please do make an appointment now for a check up if you have not had one in the last 12 months.   PAP smear needs to remain up to date. Please work with your regular provider for this.   Vaccinations are recommended to remain up to date for: COVID 19, hepatitis B and pneumovax. Please work with your regular providers for this.   KDPI > 85% consent needs to be signed. This was emailed to you on 06/04/2024.     A  from our Office will call you to make appointments for items # 1, 2, and 3.     Please do let me know as soon as you have completed all of the above items.  At that time I will have the outcomes reviewed at the Multidisciplinary Selection Committee for determination of next steps and approval.  When approved, you will be eligible to be added onto the kidney transplant waitlist on ACTIVE status as well as to proceed with a live donor  kidney transplant in the event you have an approved live donor.           Please have any potential live kidney donors register online at [facility] to initiate their evaluations through our program's living donor screening tool at [email or web address]. Please note that potential donors will get an e-mail response once they submit their form within 1-2 business days. Potential donors may call our Main Office Number at [phone] and ask to speak with a donor coordinator if they have questions about the process. You will be notified by your transplant coordinator if a live donor has been approved for donation.      You have not been added onto the kidney transplant waitlist at this time because you are already on dialysis.  In the future when you are added onto the waitlist, your wait time accrual start date will be the same as the start date of your chronic dialysis which is 06/21/2022.     For any questions, please contact myself at the Transplant Office Main Number at [phone] or at my Direct Number at [phone]. You can also send my My Chart messages.       Sincerely,  Ilana Dumont, RN, BSN  Pre Kidney Pancreas Transplant Coordinator   [facility]  Solid Organ Transplant Care   [address] [place] [address]  [address] [address]  [email or web address]   Office Number: [phone] Direct Number: [phone]   Fax Number: [phone]  Employed by [facility] [employer]     CC's: Dr. Cruz Mar, Dr. Cristian Wells, [facility]

## 2024-06-04 NOTE — CONFIDENTIAL NOTE
DIAGNOSIS    End stage renal disease (H)   Hypertension secondary to other renal disorders   Organ transplant candidate   Positive QuantiFERON-TB Gold test      DATE RECEIVED: 06.26.2024    NOTES (Gather within 2 years) STATUS DETAILS   OFFICE NOTE from referring provider   Internal 05.20.2024 Aj Jim APRN CNP    OFFICE NOTE from other specialist     DISCHARGE SUMMARY from hospital     DISCHARGE REPORT from the ER     LABS (any labs) Internal    MEDICATION LIST Internal    IMAGING  (NEED IMAGES AND REPORTS)     Osteomyelitis: Foot imaging      Liver Abscess: Abdominal imaging     Other (anything related to diagnoses Internal 05.20.2024 XR Chest 2 views

## 2024-06-10 ENCOUNTER — TELEPHONE (OUTPATIENT)
Dept: ENDOCRINOLOGY | Facility: CLINIC | Age: 34
End: 2024-06-10
Payer: COMMERCIAL

## 2024-06-10 NOTE — TELEPHONE ENCOUNTER
Left Voicemail (1st Attempt) and Sent Mychart (1st Attempt) for the patient to call back and schedule the following:    Appointment type: RET [facility]  Provider: Sydnee Shipley RD  Return date: 06/12/2024  Additional appointment(s) needed: n/a  Additonal Notes: Provider out of clinic, pt needs to reschedule  Please schedule with Kelli Shipley RD, next available    Never

## 2024-06-11 ENCOUNTER — TELEPHONE (OUTPATIENT)
Dept: ENDOCRINOLOGY | Facility: CLINIC | Age: 34
End: 2024-06-11
Payer: COMMERCIAL

## 2024-06-11 NOTE — TELEPHONE ENCOUNTER
Patient confirmed scheduled appointment:  Date: 06/18/2024  Time: 100 pm   Visit type: RET LORAINE NUTRITION   Provider: Kelli Starks RD  Location: virtual  Testing/imaging: n/a  Additional notes: Pt rescheduled from 06/12/2024 ( Sydnee Shipley RD pt )

## 2024-06-18 ENCOUNTER — VIRTUAL VISIT (OUTPATIENT)
Dept: ENDOCRINOLOGY | Facility: CLINIC | Age: 34
End: 2024-06-18
Payer: COMMERCIAL

## 2024-06-18 VITALS — HEIGHT: 69 IN | WEIGHT: 210 LBS | BODY MASS INDEX: 31.1 KG/M2

## 2024-06-18 DIAGNOSIS — Z99.2 ESRD (END STAGE RENAL DISEASE) ON DIALYSIS (H): ICD-10-CM

## 2024-06-18 DIAGNOSIS — Z71.3 NUTRITIONAL COUNSELING: Primary | ICD-10-CM

## 2024-06-18 DIAGNOSIS — N18.6 ESRD (END STAGE RENAL DISEASE) ON DIALYSIS (H): ICD-10-CM

## 2024-06-18 DIAGNOSIS — E66.9 OBESITY: ICD-10-CM

## 2024-06-18 PROCEDURE — 97803 MED NUTRITION INDIV SUBSEQ: CPT | Mod: 95

## 2024-06-18 PROCEDURE — 99207 PR NO CHARGE LOS: CPT | Mod: 95

## 2024-06-18 NOTE — LETTER
"6/18/2024       RE: Glenna Spears  [address]  [place] [address]     Dear Colleague,    Thank you for referring your patient, Glenna Spears, to the [facility] [place] at [facility]. Please see a copy of my visit note below.    Video-Visit Details    Type of service:  Video Visit    Video Start Time: 1:01 pm  Video End Time: 1:22 pm    Originating Location (pt. Location): Home    Distant Location (provider location): Offsite (providers home)     Platform used for Video Visit: "[employer]    Nutrition Assessment  Reason For Visit:  Glenna Spears is a 33 year old female presenting today for nutrition follow-up, 5 months s/p laparoscopic sleeve gastrectomy on 1/23/24 with Dr Smith.     Patient referred by Katey CONRAD on January 29, 2024.    Anthropometrics  Initial Consult Weight: 330 lbs  Day of Surgery Weight(1/23/24): 277 lbs    Estimated body mass index is 31.01 kg/m  as calculated from the following:    Height as of this encounter: 1.753 m (5' 9\").    Weight as of this encounter: 95.3 kg (210 lb).    Current Weight: 210 lbs (down 34 lbs from last RD visit)    Weight loss: -120 lbs from initial consult; -67 lbs from day of surgery    Current Vitamins/Minerals:   [place] MVI- taking every other day    Vitamin E, D 5000 international unit(s) and B complex (100 mcg B12) every other day  Dialyvite - hasn't been taking as it causes nausea    Hasn't had labs done at dialysis in a while per pt - not sure why. Admits she has missed some sessions and is showing late.  Hx gets itchy with high Phos and hasn't noticed this lately.      Labs:  5/20/24: K 4.0 (WNL)    Medication:  Started Wegovy- just took 3rd injections. No side effects, no more nauseous than she was prior. Has decreased hunger with Wegovy, decreased cravings.    Nutrition History  Not seen by RD since prior to surgery - no showed 1/30 1 week post op appointment. " "    Pt reports the first couple of weeks were really rough. [place] like she was dying per report- super hungry, vomiting, nausea, constipation, etc.   Felt better when she started eating more and stopping her dialysis meds (were making her nauseous) per pt. Aware she needs to be taking her meds as recommended - is going to check in with her team on decreasing some of her doses.     Feels really good today. Does not feel as well on dialysis days.     Eating lots of chicken, eggs and protein shakes. Admits she jumped ahead in the diet progression schedule sooner than recommended. Chewing food really well. Hard to get protein shakes in but aiming for 2 per day. Also doing small amounts of mashed potatoes. Finding food is easier to tolerate than liquids right now which is why she jumped ahead. Reminded patient of soft diet only until March 19th.     Hydration: water, propel, ensure (aware there are more renal friendly options), and occ diluted juice.    - Not measuring but reports constantly sipping    Going back to work 3/10.     June 2024:  Per SOT RD on 5/20/24: \"Pt admits to jumping ahead in diet progression a lot / not per typical direction of bariatric team. She tells me she was not tolerating the protein drinks and was starting solids sooner than she was supposed to.\"    Pt reports tolerating 4 or 5 bites at a time. Yogurt is best tolerated food currently. Was having lots of nausea/vomiting, wasn't tolerating medications initially (now is able to). Not able to tolerate carbs/starch like rice- usually chewing it well and waiting a couple of minutes before having more. Also not tolerating Ensure/Premier protein or eggs. Tolerating chips well.    Diet recall:  B- coffee, eats cheese/crackers or yogurt  L- cheese/crackers or yogurt  D- 11pm, usually leftovers- rice, meat, vegetable  Snacks: chips    Previous Goals:  1) Follow diet advancement schedule below.   2) Work towards 60 gm protein/day.- Progressing  3) Consume " 48-64+ oz fluids daily- between meals only once on puree diet - Progressing, getting at least 48 oz of water per day. Pt not wanting to take a lot of fluid off at dialysis  4) Eat slowly (>20 min/meal), chewing well to smooth consistency once on the bariatric soft diet. - Met, doing a lot better with this now. Usually taking 45 minutes  5) Limit portions to ~1/4 to 1/2 cup/meal. - Met  6) Continue chewable/liquid multivitamin/minerals daily- Progressing    Nutrition Prescription:  Grams Protein: 60 (minimum)  Amount of Fluid: 48-64 oz      Nutrition Diagnosis  Obesity related to hx of positive energy balance now s/p bariatric surgery as evidenced by BMI >30.    Intervention  Intervention At Appointment:  Reviewed current dietary habits  Reviewed bariatric regular diet, foods anticipated to be better tolerated, and importance of adequate protein/hydration.  Reviewed and modified previous goals  Answered pt questions  Coordination of care   Nutrition education   AVS and handouts via [employer]     Expected Engagement: fair-good    Goals:  1) Continue with the bariatric regular diet as tolerated  2) Work towards 60 gm protein/day.  3) Consume 48-64+ oz fluids daily- between meals only once on puree diet  4) Eat slowly (>20 min/meal), chewing well to smooth consistency once on the bariatric soft diet.  5) Limit portions to ~1/2 to 3/4 cup/meal until 6 months post op.  6) Aim to replace chip consumption with other foods.   7) Aim to start using clear protein drinks once/day  8) Start taking MVI every day. Add in additional B12 aiming for total of 500 mcg/day    Kidney Friendly Meal Replacements:  Meal replacement criteria per servin calories or less, at least 20 gm protein, less than 10 gm sugar, less than 200 mg potassium and less than 150 mg phosphorus.    Protein powders mixed with water:  Pure Protein whey protein (140 calories, 23 gm protein, 110 mg potassium, 80 mg phos)  Guy Bower whey protein  (vanilla/strawberry)(110 calories, 25 gm protein, 180 mg potassium)  Premier Protein whey protein (vanilla) (150 calories, 30 gm protein, 160 mg potassium)  Integrated whey protein (vanilla/strawberry) (110-120 calories, 20 gm protein, 140-150 mg potassium, 125 mg phos)    Clear Liquids:  LiquaCel (1 oz/serv) - 100 calories, 16 gm protein, 10 mg potassium, 20 mg phos  Bipro (16.9 oz/serv) - 90 calories, 20 gm protein, 0 mg potassium/phos  Gelatein 20 (4 oz/serv) (lime/orange/fruit punch/grape) - 80-90 calories, 20 gm protein, 120-180 mg potassium, 0 mg phos  Strawberry Banana Proti-15 Gelatin ([employer] e-store: [email or web address]/store) (4 oz/serv) - 70 calories, 16 gm protein, 55 mg potassium    Protein bars:  Pure Protein bars   Balance Bars  Zone protein       Post-op Diet Handouts:  Diet Guidelines after Weight-loss Surgery  [email or web address]     Your Stage 4 Diet: Soft Foods  [email or web address]    Your Stage 5 Diet: Regular Foods  [email or web address]    Supplements after Sleeve Gastrectomy, Gastric Bypass or Single Anastomosis Duodenal Switch  [email or web address]    Keeping Track of Fluids  [email or web address]    Exercise Guidelines after Weight Loss Surgery (1st 4-6 weeks)  [email or web address]      Follow-Up: Monday, January 20th at 11:00 am    Time spent with patient: 21 minutes.  VELASQUEZ Molina, RD, LD  Clinic #: [phone]

## 2024-06-18 NOTE — PROGRESS NOTES
"Video-Visit Details    Type of service:  Video Visit    Video Start Time: 1:01 pm  Video End Time: 1:22 pm    Originating Location (pt. Location): Home    Distant Location (provider location): Offsite (providers home)     Platform used for Video Visit: [employer]    Nutrition Assessment  Reason For Visit:  Glenna Spears is a 33 year old female presenting today for nutrition follow-up, 5 months s/p laparoscopic sleeve gastrectomy on 1/23/24 with Dr Smith.     Patient referred by Katey CONRAD on January 29, 2024.    Anthropometrics  Initial Consult Weight: 330 lbs  Day of Surgery Weight(1/23/24): 277 lbs    Estimated body mass index is 31.01 kg/m  as calculated from the following:    Height as of this encounter: 1.753 m (5' 9\").    Weight as of this encounter: 95.3 kg (210 lb).    Current Weight: 210 lbs (down 34 lbs from last RD visit)    Weight loss: -120 lbs from initial consult; -67 lbs from day of surgery    Current Vitamins/Minerals:   [place] MVI- taking every other day    Vitamin E, D 5000 international unit(s) and B complex (100 mcg B12) every other day  Dialyvite - hasn't been taking as it causes nausea    Hasn't had labs done at dialysis in a while per pt - not sure why. Admits she has missed some sessions and is showing late.  Hx gets itchy with high Phos and hasn't noticed this lately.      Labs:  5/20/24: K 4.0 (WNL)    Medication:  Started Wegovy- just took 3rd injections. No side effects, no more nauseous than she was prior. Has decreased hunger with Wegovy, decreased cravings.    Nutrition History  Not seen by RD since prior to surgery - no showed 1/30 1 week post op appointment.     Pt reports the first couple of weeks were really rough. [place] like she was dying per report- super hungry, vomiting, nausea, constipation, etc.   Felt better when she started eating more and stopping her dialysis meds (were making her nauseous) per pt. Aware she needs to be taking her meds as recommended - is going " "to check in with her team on decreasing some of her doses.     Feels really good today. Does not feel as well on dialysis days.     Eating lots of chicken, eggs and protein shakes. Admits she jumped ahead in the diet progression schedule sooner than recommended. Chewing food really well. Hard to get protein shakes in but aiming for 2 per day. Also doing small amounts of mashed potatoes. Finding food is easier to tolerate than liquids right now which is why she jumped ahead. Reminded patient of soft diet only until March 19th.     Hydration: water, propel, ensure (aware there are more renal friendly options), and occ diluted juice.    - Not measuring but reports constantly sipping    Going back to work 3/10.     June 2024:  Per SOT RD on 5/20/24: \"Pt admits to jumping ahead in diet progression a lot / not per typical direction of bariatric team. She tells me she was not tolerating the protein drinks and was starting solids sooner than she was supposed to.\"    Pt reports tolerating 4 or 5 bites at a time. Yogurt is best tolerated food currently. Was having lots of nausea/vomiting, wasn't tolerating medications initially (now is able to). Not able to tolerate carbs/starch like rice- usually chewing it well and waiting a couple of minutes before having more. Also not tolerating Ensure/Premier protein or eggs. Tolerating chips well.    Diet recall:  B- coffee, eats cheese/crackers or yogurt  L- cheese/crackers or yogurt  D- 11pm, usually leftovers- rice, meat, vegetable  Snacks: chips    Previous Goals:  1) Follow diet advancement schedule below.   2) Work towards 60 gm protein/day.- Progressing  3) Consume 48-64+ oz fluids daily- between meals only once on puree diet - Progressing, getting at least 48 oz of water per day. Pt not wanting to take a lot of fluid off at dialysis  4) Eat slowly (>20 min/meal), chewing well to smooth consistency once on the bariatric soft diet. - Met, doing a lot better with this now. Usually " taking 45 minutes  5) Limit portions to ~1/4 to 1/2 cup/meal. - Met  6) Continue chewable/liquid multivitamin/minerals daily- Progressing    Nutrition Prescription:  Grams Protein: 60 (minimum)  Amount of Fluid: 48-64 oz      Nutrition Diagnosis  Obesity related to hx of positive energy balance now s/p bariatric surgery as evidenced by BMI >30.    Intervention  Intervention At Appointment:  Reviewed current dietary habits  Reviewed bariatric regular diet, foods anticipated to be better tolerated, and importance of adequate protein/hydration.  Reviewed and modified previous goals  Answered pt questions  Coordination of care   Nutrition education   AVS and handouts via [employer]     Expected Engagement: fair-good    Goals:  1) Continue with the bariatric regular diet as tolerated  2) Work towards 60 gm protein/day.  3) Consume 48-64+ oz fluids daily- between meals only once on puree diet  4) Eat slowly (>20 min/meal), chewing well to smooth consistency once on the bariatric soft diet.  5) Limit portions to ~1/2 to 3/4 cup/meal until 6 months post op.  6) Aim to replace chip consumption with other foods.   7) Aim to start using clear protein drinks once/day  8) Start taking MVI every day. Add in additional B12 aiming for total of 500 mcg/day    Kidney Friendly Meal Replacements:  Meal replacement criteria per servin calories or less, at least 20 gm protein, less than 10 gm sugar, less than 200 mg potassium and less than 150 mg phosphorus.    Protein powders mixed with water:  Pure Protein whey protein (140 calories, 23 gm protein, 110 mg potassium, 80 mg phos)  Guy Sathish whey protein (vanilla/strawberry)(110 calories, 25 gm protein, 180 mg potassium)  Premier Protein whey protein (vanilla) (150 calories, 30 gm protein, 160 mg potassium)  Integrated whey protein (vanilla/strawberry) (110-120 calories, 20 gm protein, 140-150 mg potassium, 125 mg phos)    Clear Liquids:  LiquaCel (1 oz/serv) - 100 calories, 16 gm  protein, 10 mg potassium, 20 mg phos  Bipro (16.9 oz/serv) - 90 calories, 20 gm protein, 0 mg potassium/phos  Gelatein 20 (4 oz/serv) (lime/orange/fruit punch/grape) - 80-90 calories, 20 gm protein, 120-180 mg potassium, 0 mg phos  Strawberry Banana Proti-15 Gelatin ([employer] e-store: [email or web address]) (4 oz/serv) - 70 calories, 16 gm protein, 55 mg potassium    Protein bars:  Pure Protein bars   Balance Bars  Zone protein       Post-op Diet Handouts:  Diet Guidelines after Weight-loss Surgery  [email or web address]     Your Stage 4 Diet: Soft Foods  [email or web address]    Your Stage 5 Diet: Regular Foods  [email or web address]    Supplements after Sleeve Gastrectomy, Gastric Bypass or Single Anastomosis Duodenal Switch  [email or web address]    Keeping Track of Fluids  [email or web address]    Exercise Guidelines after Weight Loss Surgery (1st 4-6 weeks)  [email or web address]      Follow-Up: Monday, January 20th at 11:00 am    Time spent with patient: 21 minutes.  VELASQUEZ Molina, RD, LD  Clinic #: [phone]

## 2024-06-18 NOTE — PATIENT INSTRUCTIONS
Audie Manzanares,    Please follow-up with dietitian on  at 11:00 am.    Thank you,    Kelli Starks, MPS, RD, LD  If you need to schedule or reschedule, please call [phone].    Nutrition Goals:  1) Continue with the bariatric regular diet as tolerated  2) Work towards 60 gm protein/day.  3) Consume 48-64+ oz fluids daily- between meals only once on puree diet  4) Eat slowly (>20 min/meal), chewing well to smooth consistency once on the bariatric soft diet.  5) Limit portions to ~1/2 to 3/4 cup/meal until 6 months post op.  6) Aim to replace chip consumption with other foods.   7) Aim to start using clear protein drinks once/day  8) Start taking MVI every day. Add in additional B12 aiming for total of 500 mcg/day    Kidney Friendly Meal Replacements:  Meal replacement criteria per servin calories or less, at least 20 gm protein, less than 10 gm sugar, less than 200 mg potassium and less than 150 mg phosphorus.    Protein powders mixed with water:  Pure Protein whey protein (140 calories, 23 gm protein, 110 mg potassium, 80 mg phos)  Guy Sathish whey protein (vanilla/strawberry)(110 calories, 25 gm protein, 180 mg potassium)  Premier Protein whey protein (vanilla) (150 calories, 30 gm protein, 160 mg potassium)  Integrated whey protein (vanilla/strawberry) (110-120 calories, 20 gm protein, 140-150 mg potassium, 125 mg phos)    Clear Liquids:  LiquaCel (1 oz/serv) - 100 calories, 16 gm protein, 10 mg potassium, 20 mg phos  Bipro (16.9 oz/serv) - 90 calories, 20 gm protein, 0 mg potassium/phos  Gelatein 20 (4 oz/serv) (lime/orange/fruit punch/grape) - 80-90 calories, 20 gm protein, 120-180 mg potassium, 0 mg phos  Strawberry Banana Proti-15 Gelatin ( [employer] e-store: [email or web address]) (4 oz/serv) - 70 calories, 16 gm protein, 55 mg potassium    Protein bars:  Pure Protein bars   Balance Bars  Zone protein       Post-op Diet Handouts:  Diet Guidelines after Weight-loss  Surgery  [email or web address]     Your Stage 4 Diet: Soft Foods  [email or web address]    Your Stage 5 Diet: Regular Foods  [email or web address]    Supplements after Sleeve Gastrectomy, Gastric Bypass or Single Anastomosis Duodenal Switch  [email or web address]    Keeping Track of Fluids  [email or web address]    Exercise Guidelines after Weight Loss Surgery (1st 4-6 weeks)  [email or web address]

## 2024-06-18 NOTE — NURSING NOTE
Is the patient currently in the state of [place]? YES    Visit mode:VIDEO    If the visit is dropped, the patient can be reconnected by: VIDEO VISIT: Text to cell phone:   Telephone Information:   Mobile [phone]    and VIDEO VISIT: Send to e-mail at: [email or web address]    Will anyone else be joining the visit? NO  (If patient encounters technical issues they should call [phone]    How would you like to obtain your AVS? MyChart    Are changes needed to the allergy or medication list? No    Are refills needed on medications prescribed by this physician? NO    Reason for visit: [place]

## 2024-06-19 ENCOUNTER — VIRTUAL VISIT (OUTPATIENT)
Dept: ENDOCRINOLOGY | Facility: CLINIC | Age: 34
End: 2024-06-19
Payer: COMMERCIAL

## 2024-06-19 VITALS — WEIGHT: 212 LBS | HEIGHT: 68 IN | BODY MASS INDEX: 32.13 KG/M2

## 2024-06-19 DIAGNOSIS — Z98.84 S/P LAPAROSCOPIC SLEEVE GASTRECTOMY: ICD-10-CM

## 2024-06-19 DIAGNOSIS — N18.6 END STAGE RENAL DISEASE (H): ICD-10-CM

## 2024-06-19 DIAGNOSIS — Z86.39 HISTORY OF MORBID OBESITY: ICD-10-CM

## 2024-06-19 PROCEDURE — 99214 OFFICE O/P EST MOD 30 MIN: CPT | Mod: 95

## 2024-06-19 PROCEDURE — G2211 COMPLEX E/M VISIT ADD ON: HCPCS | Mod: 95

## 2024-06-19 RX ORDER — SEMAGLUTIDE 0.5 MG/.5ML
0.5 INJECTION, SOLUTION SUBCUTANEOUS WEEKLY
Qty: 2 ML | Refills: 1 | Status: SHIPPED | OUTPATIENT
Start: 2024-06-19 | End: 2024-07-24

## 2024-06-19 RX ORDER — CINACALCET 60 MG/1
TABLET, FILM COATED ORAL
COMMUNITY
Start: 2024-02-29

## 2024-06-19 ASSESSMENT — PAIN SCALES - GENERAL: PAINLEVEL: NO PAIN (0)

## 2024-06-19 NOTE — NURSING NOTE
Is the patient currently in the state of [place]? YES    Visit mode:VIDEO    If the visit is dropped, the patient can be reconnected by: VIDEO VISIT: Text to cell phone:   Telephone Information:   Mobile [phone]       Will anyone else be joining the visit? NO  (If patient encounters technical issues they should call [phone])    How would you like to obtain your AVS? MyChart    Are changes needed to the allergy or medication list? Yes Pt states now taking Sensipar 60mg tablets as indicated in med list.    Are refills needed on medications prescribed by this physician? NO    Reason for visit: RECHECK    [place]

## 2024-06-19 NOTE — PATIENT INSTRUCTIONS
"Thank you for allowing us the privilege of caring for you. We hope we provided you with the excellent service you deserve.   Please let us know if there is anything else we can do for you so that we can be sure you are completely satisfied with your care experience.    To ensure the quality of our services you may be receiving a patient satisfaction survey from an independent patient satisfaction monitoring company.    The greatest compliment you can give is a \"Likely to Recommend\"    Your visit was with Kelli Joshi PA-C today.    Instructions per today's visit:     Audie Spears, it was great to visit with you today.  Here is a review of our visit.  If our clinic scheduler is not able to reach you please call [phone] to schedule your next appointments.    Plan  Start esomeprazole 20mg each morning for heart burn, okay to continue famotidine and tums as needed in addition.   Okay to increase wegovy to 0.5mg, will check in in 2 weeks to evaluate whether to increase further vs stay at 0.5mg   Goals we discussed today:   Get to the pool 2x a week at [facility]   Finding alternative snacks to chips   Follow up with Kelli in 3 months   Dietician appointment with Sydnee Shipley 1/20/25, continue meeting with your nephrology Rds in the mean time    Keep up the excellent work!       Information about Video Visits with [employer]: video visit information  _________________________________________________________________________________________________________________________________________________________  If you are asked by your clinic team to have your blood pressure checked:  [facility] do offer several locations for blood pressure checks. Please follow the below link to schedule an appointment. Scheduling an appointment at the pharmacy for a blood pressure check is now preferred.    Appointment Plus " ([email or web address])  _________________________________________________________________________________________________________________________________________________________  Important contact and scheduling information:  Please call our contact center at [phone] to schedule your next appointments.  To find a lab location near you, please call [phone].  For any nursing questions or concerns call Brynn Larson LPN at [phone] or Gris Mix RN at [phone]  Please call during clinic hours Monday through Friday 8:00a - 4:00p if you have questions or you can contact us via [employer] at anytime and we will reply during clinic hours.    Lab results will be communicated through My Chart or letter (if My Chart not used). Please call the clinic if you have not received communication after 1 week or if you have any questions.?  Clinic Fax: [phone]    _________________________________________________________________________________________________________________________________________________________  Meal Replacement Products:    Here is the link to our new e-store where you can purchase our meal replacement products    [facility] E-Store  [email or web address]/store    The one week starter kit is a great way to sample a variety of products and see what works for you.    If you want more information about the product go to: [employer] Meals  [employer]    If you are an employee or [facility] or [facility] please contact your care team for a 10% estore discount    Free Shipping for orders over $75     Benefits of meal replacements products:    Portion and calorie control  Improved nutrition  Structured eating  Simplified food choices  Avoid contact with trigger foods  _________________________________________________________________________________________________________________________________________________________  Interested in working with a health  ?  Health coaches work with you to improve your overall health and wellbeing.  They look at the whole person, and may involve discussion of different areas of life, including, but not limited to the four pillars of health (sleep, exercise, nutrition, and stress management). Discuss with your care team if you would like to start working a health .  Health Coaching-3 Pack: Schedule by calling [phone]    $99 for three health coaching visits    Visits may be done in person or via phone    Coaching is a partnership between the  and the client; Coaches do not prescribe or diagnose    Coaching helps inspire the client to reach his/her personal goals   _________________________________________________________________________________________________________________________________________________________  24 Week Healthy Lifestyle Plan:    Our mission in the 24-week Healthy Lifestyle Plan is to provide you with individualized care by giving you the tools, education and support you need to lose weight and maintain a healthy lifestyle. In your 24-week journey, you ll be supported by a dedicated weight loss team that includes registered dietitians, medical weight management providers, health coaches, and nurses -- all with special expertise in weight loss -- to help you every step of the way.     Monthly meetings with your registered dietician or medical weight management provider help to review your progress, update your care plan, and make any adjustments needed to ensure success. Between these visits, weekly and bi-weekly health  visits will help you focus on the four pillars of weight loss -- stress, sleep, nutrition, and exercise -- and how you can best adapt each to achieve sustainable weight loss results.    In addition, you will be given exclusive access to online wellbeing classes through [employer].  Your initial visit will be with a medical weight management provider who will help to  understand your weight loss goals and ensure this program is the right fit for you. Please let our team know if you are interested in the 24 week plan by sending a message to your care team or calling [phone] to schedule.  _________________________________________________________________________________________________________________________________________________________  __________  [facility] Get Moving Program  Our team of physical therapists is trained to help you understand and take control of your condition. They will perform a thorough evaluation to determine your ability for activity and develop a customized plan to fit your goals and physical ability.  Scheduling: Unsure if the Get Moving program is right for you? Discuss the program with your medical provider or diabetes educator. You can also call us at [phone] to ask questions or schedule an appointment.   [facility] Get Moving Program  ____________________________________________________________________________________________________________________________________________________________________________  [facility] Diabetes Prevention Program (DPP)  If you have prediabetes and Medicare please contact us via [employer] to learn more about the Diabetes Prevention Program (DPP)  Program Details:   [facility] [facility] offers the year-long Diabetes Prevention Program (DPP). The program helps you to make lifestyle changes that prevent or delay type 2 diabetes by supporting healthy eating, increased physical activity, stress reduction and use of coping skills.   On average, previous [facility] DPP cohorts have lost and maintained at least 5% of their starting weight throughout the program and averaged more than 150 minutes of physical activity per week.  Participants meet weekly for one-hour group sessions over sixteen weeks, every other week for the next 8 weeks, and monthly for the last six months.   A year-long  maintenance program is also available for participants who complete the first year.   Location & Cost:   During the COVID-19 Public Health Emergency, the program is offered virtually. When in-person classes can resume, they will be held at [facility].  For people with Medicare, the program is covered in full. A self-pay option will also be available for those with non-Medicare insurance plans.   ______________________________________________________________________________________________________________________________________________________________________________________________________________________________    To work with a Behavioral Health Psychologist:    Call to schedule:    Scooby Griggs - [phone]  Dilia Esparza - [phone]  Tammi Denise - [phone]  Jenifer Pennington - [phone]   Justa Jimenez PhD (cannot accept Medicare) [phone]        Thank you,   [facility] Comprehensive Weight Management Team

## 2024-06-19 NOTE — PROGRESS NOTES
Return Bariatric Surgery Note    RE: Glenna Spears  MR#: [id number]  : 1990  VISIT DATE: 2024      Dear No Ref-Primary, Physician,    I had the pleasure of seeing your patient, Glenna Spears, in my post-bariatric surgery assessment clinic.    Assessment & Plan   Problem List Items Addressed This Visit    None  Visit Diagnoses       S/P laparoscopic sleeve gastrectomy        Relevant Medications    esomeprazole (NEXIUM) 20 MG DR capsule    Semaglutide-Weight Management (WEGOVY) 0.5 MG/0.5ML pen    History of morbid obesity        Relevant Medications    esomeprazole (NEXIUM) 20 MG DR capsule    Semaglutide-Weight Management (WEGOVY) 0.5 MG/0.5ML pen    End stage renal disease (H)        Relevant Medications    esomeprazole (NEXIUM) 20 MG DR capsule    Semaglutide-Weight Management (WEGOVY) 0.5 MG/0.5ML pen               33 minutes spent by me on the date of the encounter doing chart review, history and exam, documentation and further activities per the note    CHIEF COMPLAINT: Post-bariatric surgery follow-up. 6 months s/p Laparoscopic sleeve gastrectomy on 24 with Dr. Smith .     HISTORY OF PRESENT ILLNESS:      2024     9:47 AM   Questions Regarding Prior Weight Loss Surgery Reviewed With Patient   I had the following weight loss procedure Sleeve Gastrectomy   What year was your surgery?    How has your weight changed since your last visit? I have lost weight   Do you currently have any of the following Heartburn, acid reflux, or GERD (acid reflux disease)?   Do you have any concerns today? No         Plan  Start esomeprazole 20mg each morning for heart burn, okay to continue famotidine and tums as needed in addition.   Okay to increase wegovy to 0.5mg, will check in in 2 weeks to evaluate whether to increase further vs stay at 0.5mg   Goals we discussed today:   Get to the pool 2x a week at [facility]   Finding alternative snacks to chips   Follow up with Kelli in 3 months   Dietician  appointment with Sydnee Shipley 1/20/25, continue meeting with your nephrology Rds in the mean time    Keep up the excellent work!         Interval History:   Needs to lose to BMI <35 for kidney transplant. Goal wt <230 lbs (100 lbs weight loss)  ESRD due to longstanding nephrotic range proteinuria and HTN,. Hx of chronic drug use, Excedrin use.  Sober >1 year. Quit smoking January 2023   On dialysis x 1.5 years, Tuesdays Thursdays and Saturdays   Nephrologist: Dr aZid Smith [facility]     -Sleeve gastrectomy 1/23/24  -Was dealing with nausea, vomiting, abdominal pain, constipation and dehydration over the first 2 weeks post surgery.       -At 1 month post op was feeling much better, once able to eat more variety of foods (was still struggling to tolerate the taste of protein shakes)  - At 1 month post op: had not been taking any of her dialysis medications lately, planned to restart the sensipar the velphoro, calcitriol shortly. Stated that they were making her nauseous and she's felt better since stopping them. At this appointment discussed the importance of restarting these medications for her kidney health, and that we can improve her nausea by taking heart burn medication regularly (pantoprazole 40mg) regularly.       Follow up 4/30/24  Continuing to experiencing heart burn., nausea vomiting . Self discontinued pantoprazole yesterday as she felt it was making her heart burn worse, stated that pepcid and tums were all she needed for heart burn management. We discussed that tums were not an appropriate chronic treatment for heart burn, and that PPIs (pantoparzole, omeprazole) have been found to be the most effective at heart burn treatment. Glenna declined trialling an alternative PPI at this time due to perceived lack of efficacy with omeprazole and pantoprazole, we discussed the risks and benefits of this decision and Glenna expressed understanding.      Glenna is open to continuing famotidine 20mg BID, and is  open to trialing carafate to see if this helps heart burn symptoms.   Will continue to check in with Glenna and offer to try esomeprazole in the future to help with heart burn symptoms     Last seen by me 5/23/24  N/V largely improved, no vomiting for last 2 weeks. Unsure what changed, Glenna wonders if things have just been healing better.   Increased appetite, noticed she's been snacking more lately.      Transplant workup a few days ago, a provider there recommended considering restarting wegovy for appetite management.      Discussed risks of starting wegovy given her recent issues with heart burn, nausea, vomiting. Agreed to trial with close monitoring, and will stop if vomiting returns.      100+ lbs down from starting weight   15 lbs down since last visit 1 month ago.     Since last visit:   4 lbs weight loss since last visit 1 monh ago, 108lbs down from starting weight.   Ongoing heart burn, though feels it's gradually improving. Discussed trialling nexium to see if it helps symptoms improve.     Noticing more loose skin, discussed getting to maintenance weight and then getting plastic surgery referral.     Feeling good with wegovy, seeing great management with cravings. Still struggling with snacking on chips (sweet chili doritos), discussed finding alternative snacking options that maybe still have spicy flavor but fewer associated carbs.     Wt Readings from Last 5 Encounters:   06/19/24 96.2 kg (212 lb)   06/18/24 95.3 kg (210 lb)   05/23/24 98 kg (216 lb)   05/20/24 100.7 kg (221 lb 14.4 oz)   04/30/24 104.4 kg (230 lb 2.6 oz)       Anti-obesity medication history:     Current:   Wegovy 0.25: 3 weeks. Feels it's helpful with appetite, cravings. No vomiting. Ongoing heart burn that hasn't gotten worse since starting wegovy.     Recent diet changes: no major changes, eating a lot of dairy, cheese, crackers. Working towards more balance in meals (vegetables, starch, meat). Met with RD yesterday, working on  reducing chip eating (craving sweet chili doritos). Open to exploring healthier snack options, discussed trying pop corn or other healthy snacks with added spice.     Recent exercise/activity changes: more fatigue lately, very busy with work.     Vitamins/Labs: 3 month post op labs stable. Taking the following every other day: flinstone MVI , vitamin d 5000 , vitamin E, B complex. Has not been taking dialyvate due to nausea.     GERD symptoms: some improvement over the last month, taking pepcid most days, didn't tolerate omeprazole, was not interested in starting a different ppi. Will take 1 tums at night.     Nausea : denies     Vomiting : denies     Dysphagia:  none     Pregnancy: IUD       Weight History:      6/19/2024     9:47 AM   --   What is your highest lifetime weight? 340   What is your lowest weight since surgery? (In pounds) 212     Initial Weight (lbs): 320 lbs  Weight: 96.2 kg (212 lb)  Last Visits Weight: 95.3 kg (210 lb)  Cumulative weight loss (lbs): 108  Weight Loss Percentage: 33.75%        6/19/2024     9:47 AM   Questions Regarding Co-Morbidities and Health Concerns Reviewed With Patient   Pre-diabetes Never   Diabetes II Never   High Blood Pressure Gone Away   High cholesterol Never   Heartburn/Reflux Worsened   Sleep apnea Improved   PCOS Never   Back pain Improved   Joint pain Improved   Lower leg swelling Never           6/19/2024     9:47 AM   Eating Habits   How many meals do you eat per day? 2   Do you snack between meals? Yes   How much food are you eating at each meal? 1/2 cup to 1 cup   Are you able to separate your meals and liquids by at least 30 minutes? Sometimes   Are you able to avoid liquid calories? Sometimes           6/19/2024     9:47 AM   Exercise Questions Reviewed With Patient   How often do you exercise? 1 to 2 times per week   What is the duration of your exercise (in minutes)? 20 Minutes   What types of exercise do you do? walking    climbing stairs at work   What  keeps you from being more active? I have recently been sick    I should be more active but I just have not gotten around to it    Lack of Time    Too tired       Social History:      6/19/2024     9:47 AM   --   Are you smoking? No   Are you drinking alcohol? No       Medications:  Current Outpatient Medications   Medication Sig Dispense Refill    acetaminophen (TYLENOL) 325 MG tablet Take 325-650 mg by mouth every 4 hours as needed      B Complex-C-Zn-Folic Acid (DIALYVITE/ZINC) TABS Take 1 tablet by mouth every evening      cinacalcet (SENSIPAR) 60 MG tablet TAKE 1 TABLET BY MOUTH ONCE A DAY  WITH A MEAL      esomeprazole (NEXIUM) 20 MG DR capsule Take 1 capsule (20 mg) by mouth every morning (before breakfast) Take 30-60 minutes before eating. 30 capsule 3    famotidine (PEPCID) 20 MG tablet Take 1 tablet (20 mg) by mouth 2 times daily 60 tablet 2    gabapentin (NEURONTIN) 100 MG capsule Take 300 mg by mouth at bedtime      levonorgestrel (KYLEENA) 19.5 MG IUD 1 Intra Uterine Device by Intrauterine route once      Semaglutide-Weight Management (WEGOVY) 0.25 MG/0.5ML pen Inject 0.25 mg Subcutaneous once a week For 4 weeks 2 mL 0    Semaglutide-Weight Management (WEGOVY) 0.5 MG/0.5ML pen Inject 0.5 mg Subcutaneous once a week After completing 4 weeks of 0.25mg dose 2 mL 1    VELPHORO 500 MG CHEW chewable tablet Take 500 mg by mouth 3 times daily (with meals)      VENTOLIN  (90 Base) MCG/ACT inhaler INHALE 2 PUFFS BY MOUTH EVERY 4 HOURS AS NEEDED FOR SHORTNESS OF BREATH OR COUGH OR EXERCISE      vitamin B-12 (CYANOCOBALAMIN) 2500 MCG sublingual tablet Take 1 tablet (2,500 mcg) by mouth daily 30 tablet 5    calcitRIOL (ROCALTROL) 0.5 MCG capsule Take 0.5 mcg by mouth every evening (Patient not taking: Reported on 5/20/2024)      cinacalcet (SENSIPAR) 90 MG tablet Take 90 mg by mouth every evening      ondansetron (ZOFRAN ODT) 4 MG ODT tab Take 1 tablet (4 mg) by mouth every 6 hours as needed for nausea  "(Patient not taking: Reported on 5/20/2024) 15 tablet 0    senna-docusate (SENOKOT-S/PERICOLACE) 8.6-50 MG tablet Take 2 tablets by mouth daily as needed for constipation (While taking narcotic pain medications.  Stop taking if having loose stools.) (Patient not taking: Reported on 5/20/2024) 30 tablet 1    ursodiol (ACTIGALL) 300 MG capsule Take 1 capsule (300 mg) by mouth 2 times daily 60 capsule 5     No current facility-administered medications for this visit.         6/19/2024     9:47 AM   --   Do you avoid NSAIDs such as (Ibuprofen, Aleve, Naproxen, Advil)? Yes       ROS:  GI:       6/19/2024     9:47 AM   --   Vomiting No   Diarrhea No   Constipation No   Swallowing trouble No   Abdominal pain No   Heartburn Yes     Skin:       6/19/2024     9:47 AM   BAR RBS ROS - SKIN   Rash in skin folds No     Psych:       6/19/2024     9:47 AM   --   Depression No   Anxiety No     Female Only:       6/19/2024     9:47 AM   BAR RBS ROS -    Female only Birth control   Stress urinary incontinence No               1/22/2023     5:08 PM   GABBY Score (Last Two)   GABBY Raw Score 32    32   Activation Score 62.6    62.6   GABBY Level 3    3         PHYSICAL EXAM:  Objective    Ht 1.727 m (5' 8\")   Wt 96.2 kg (212 lb)   BMI 32.23 kg/m    Vitals - Patient Reported  Pain Score: No Pain (0)      Vitals:  No vitals were obtained today due to virtual visit.    Physical Exam   GENERAL: alert and no distress  EYES: Eyes grossly normal to inspection.  No discharge or erythema, or obvious scleral/conjunctival abnormalities.  RESP: No audible wheeze, cough, or visible cyanosis.    SKIN: Visible skin clear. No significant rash, abnormal pigmentation or lesions.  NEURO: Cranial nerves grossly intact.  Mentation and speech appropriate for age.  PSYCH: Appropriate affect, tone, and pace of words        Sincerely,    Kelli Joshi PA-C    The longitudinal plan of care for the diagnosis(es)/condition(s) as documented were addressed during " this visit. Due to the added complexity in care, I will continue to support Glenna in the subsequent management and with ongoing continuity of care.

## 2024-06-19 NOTE — LETTER
2024       RE: Glenna Spears  [address]  [place] [address]     Dear Colleague,    Thank you for referring your patient, Glenna Spears, to the [facility] [place] [facility]. Please see a copy of my visit note below.    Return Bariatric Surgery Note    RE: Glenna Spears  MR#: [id number]  : 1990  VISIT DATE: 2024      Dear No Ref-Primary, Physician,    I had the pleasure of seeing your patient, Glenna Spears, in my post-bariatric surgery assessment clinic.    Assessment & Plan  Problem List Items Addressed This Visit    None  Visit Diagnoses       S/P laparoscopic sleeve gastrectomy        Relevant Medications    esomeprazole (NEXIUM) 20 MG DR capsule    Semaglutide-Weight Management (WEGOVY) 0.5 MG/0.5ML pen    History of morbid obesity        Relevant Medications    esomeprazole (NEXIUM) 20 MG DR capsule    Semaglutide-Weight Management (WEGOVY) 0.5 MG/0.5ML pen    End stage renal disease (H)        Relevant Medications    esomeprazole (NEXIUM) 20 MG DR capsule    Semaglutide-Weight Management (WEGOVY) 0.5 MG/0.5ML pen               33 minutes spent by me on the date of the encounter doing chart review, history and exam, documentation and further activities per the note    CHIEF COMPLAINT: Post-bariatric surgery follow-up. 6 months s/p Laparoscopic sleeve gastrectomy on 24 with Dr. Smith .     HISTORY OF PRESENT ILLNESS:      2024     9:47 AM   Questions Regarding Prior Weight Loss Surgery Reviewed With Patient   I had the following weight loss procedure Sleeve Gastrectomy   What year was your surgery?    How has your weight changed since your last visit? I have lost weight   Do you currently have any of the following Heartburn, acid reflux, or GERD (acid reflux disease)?   Do you have any concerns today? No         Plan  Start esomeprazole 20mg each morning for heart burn, okay to  continue famotidine and tums as needed in addition.   Okay to increase wegovy to 0.5mg, will check in in 2 weeks to evaluate whether to increase further vs stay at 0.5mg   Goals we discussed today:   Get to the pool 2x a week at [facility]   Finding alternative snacks to chips   Follow up with Kelli in 3 months   Dietician appointment with Sydnee Shipley 1/20/25, continue meeting with your nephrology Rds in the mean time    Keep up the excellent work!         Interval History:   Needs to lose to BMI <35 for kidney transplant. Goal wt <230 lbs (100 lbs weight loss)  ESRD due to longstanding nephrotic range proteinuria and HTN,. Hx of chronic drug use, Excedrin use.  Sober >1 year. Quit smoking January 2023   On dialysis x 1.5 years, Tuesdays Thursdays and Saturdays   Nephrologist: Dr Zaid Pulido     -Sleeve gastrectomy 1/23/24  -Was dealing with nausea, vomiting, abdominal pain, constipation and dehydration over the first 2 weeks post surgery.       -At 1 month post op was feeling much better, once able to eat more variety of foods (was still struggling to tolerate the taste of protein shakes)  - At 1 month post op: had not been taking any of her dialysis medications lately, planned to restart the sensipar the velphoro, calcitriol shortly. Stated that they were making her nauseous and she's felt better since stopping them. At this appointment discussed the importance of restarting these medications for her kidney health, and that we can improve her nausea by taking heart burn medication regularly (pantoprazole 40mg) regularly.       Follow up 4/30/24  Continuing to experiencing heart burn., nausea vomiting . Self discontinued pantoprazole yesterday as she felt it was making her heart burn worse, stated that pepcid and tums were all she needed for heart burn management. We discussed that tums were not an appropriate chronic treatment for heart burn, and that PPIs (pantoparzole, omeprazole) have been found to  be the most effective at heart burn treatment. Glenna declined trialling an alternative PPI at this time due to perceived lack of efficacy with omeprazole and pantoprazole, we discussed the risks and benefits of this decision and Glenna expressed understanding.      Glenna is open to continuing famotidine 20mg BID, and is open to trialing carafate to see if this helps heart burn symptoms.   Will continue to check in with Glenna and offer to try esomeprazole in the future to help with heart burn symptoms     Last seen by me 5/23/24  N/V largely improved, no vomiting for last 2 weeks. Unsure what changed, Glenna wonders if things have just been healing better.   Increased appetite, noticed she's been snacking more lately.      Transplant workup a few days ago, a provider there recommended considering restarting wegovy for appetite management.      Discussed risks of starting wegovy given her recent issues with heart burn, nausea, vomiting. Agreed to trial with close monitoring, and will stop if vomiting returns.      100+ lbs down from starting weight   15 lbs down since last visit 1 month ago.     Since last visit:   4 lbs weight loss since last visit 1 monh ago, 108lbs down from starting weight.   Ongoing heart burn, though feels it's gradually improving. Discussed trialling nexium to see if it helps symptoms improve.     Noticing more loose skin, discussed getting to maintenance weight and then getting plastic surgery referral.     Feeling good with wegovy, seeing great management with cravings. Still struggling with snacking on chips (sweet chili doritos), discussed finding alternative snacking options that maybe still have spicy flavor but fewer associated carbs.     Wt Readings from Last 5 Encounters:   06/19/24 96.2 kg (212 lb)   06/18/24 95.3 kg (210 lb)   05/23/24 98 kg (216 lb)   05/20/24 100.7 kg (221 lb 14.4 oz)   04/30/24 104.4 kg (230 lb 2.6 oz)       Anti-obesity medication history:     Current:   Wegovy 0.25: 3  weeks. Feels it's helpful with appetite, cravings. No vomiting. Ongoing heart burn that hasn't gotten worse since starting wegovy.     Recent diet changes: no major changes, eating a lot of dairy, cheese, crackers. Working towards more balance in meals (vegetables, starch, meat). Met with RD yesterday, working on reducing chip eating (craving sweet chili doritos). Open to exploring healthier snack options, discussed trying pop corn or other healthy snacks with added spice.     Recent exercise/activity changes: more fatigue lately, very busy with work.     Vitamins/Labs: 3 month post op labs stable. Taking the following every other day: flinstone MVI , vitamin d 5000 , vitamin E, B complex. Has not been taking dialyvate due to nausea.     GERD symptoms: some improvement over the last month, taking pepcid most days, didn't tolerate omeprazole, was not interested in starting a different ppi. Will take 1 tums at night.     Nausea : denies     Vomiting : denies     Dysphagia:  none     Pregnancy: IUD       Weight History:      6/19/2024     9:47 AM   --   What is your highest lifetime weight? 340   What is your lowest weight since surgery? (In pounds) 212     Initial Weight (lbs): 320 lbs  Weight: 96.2 kg (212 lb)  Last Visits Weight: 95.3 kg (210 lb)  Cumulative weight loss (lbs): 108  Weight Loss Percentage: 33.75%        6/19/2024     9:47 AM   Questions Regarding Co-Morbidities and Health Concerns Reviewed With Patient   Pre-diabetes Never   Diabetes II Never   High Blood Pressure Gone Away   High cholesterol Never   Heartburn/Reflux Worsened   Sleep apnea Improved   PCOS Never   Back pain Improved   Joint pain Improved   Lower leg swelling Never           6/19/2024     9:47 AM   Eating Habits   How many meals do you eat per day? 2   Do you snack between meals? Yes   How much food are you eating at each meal? 1/2 cup to 1 cup   Are you able to separate your meals and liquids by at least 30 minutes? Sometimes   Are  you able to avoid liquid calories? Sometimes           6/19/2024     9:47 AM   Exercise Questions Reviewed With Patient   How often do you exercise? 1 to 2 times per week   What is the duration of your exercise (in minutes)? 20 Minutes   What types of exercise do you do? walking    climbing stairs at work   What keeps you from being more active? I have recently been sick    I should be more active but I just have not gotten around to it    Lack of Time    Too tired       Social History:      6/19/2024     9:47 AM   --   Are you smoking? No   Are you drinking alcohol? No       Medications:  Current Outpatient Medications   Medication Sig Dispense Refill    acetaminophen (TYLENOL) 325 MG tablet Take 325-650 mg by mouth every 4 hours as needed      B Complex-C-Zn-Folic Acid (DIALYVITE/ZINC) TABS Take 1 tablet by mouth every evening      cinacalcet (SENSIPAR) 60 MG tablet TAKE 1 TABLET BY MOUTH ONCE A DAY  WITH A MEAL      esomeprazole (NEXIUM) 20 MG DR capsule Take 1 capsule (20 mg) by mouth every morning (before breakfast) Take 30-60 minutes before eating. 30 capsule 3    famotidine (PEPCID) 20 MG tablet Take 1 tablet (20 mg) by mouth 2 times daily 60 tablet 2    gabapentin (NEURONTIN) 100 MG capsule Take 300 mg by mouth at bedtime      levonorgestrel (KYLEENA) 19.5 MG IUD 1 Intra Uterine Device by Intrauterine route once      Semaglutide-Weight Management (WEGOVY) 0.25 MG/0.5ML pen Inject 0.25 mg Subcutaneous once a week For 4 weeks 2 mL 0    Semaglutide-Weight Management (WEGOVY) 0.5 MG/0.5ML pen Inject 0.5 mg Subcutaneous once a week After completing 4 weeks of 0.25mg dose 2 mL 1    VELPHORO 500 MG CHEW chewable tablet Take 500 mg by mouth 3 times daily (with meals)      VENTOLIN  (90 Base) MCG/ACT inhaler INHALE 2 PUFFS BY MOUTH EVERY 4 HOURS AS NEEDED FOR SHORTNESS OF BREATH OR COUGH OR EXERCISE      vitamin B-12 (CYANOCOBALAMIN) 2500 MCG sublingual tablet Take 1 tablet (2,500 mcg) by mouth daily 30 tablet  "5    calcitRIOL (ROCALTROL) 0.5 MCG capsule Take 0.5 mcg by mouth every evening (Patient not taking: Reported on 5/20/2024)      cinacalcet (SENSIPAR) 90 MG tablet Take 90 mg by mouth every evening      ondansetron (ZOFRAN ODT) 4 MG ODT tab Take 1 tablet (4 mg) by mouth every 6 hours as needed for nausea (Patient not taking: Reported on 5/20/2024) 15 tablet 0    senna-docusate (SENOKOT-S/PERICOLACE) 8.6-50 MG tablet Take 2 tablets by mouth daily as needed for constipation (While taking narcotic pain medications.  Stop taking if having loose stools.) (Patient not taking: Reported on 5/20/2024) 30 tablet 1    ursodiol (ACTIGALL) 300 MG capsule Take 1 capsule (300 mg) by mouth 2 times daily 60 capsule 5     No current facility-administered medications for this visit.         6/19/2024     9:47 AM   --   Do you avoid NSAIDs such as (Ibuprofen, Aleve, Naproxen, Advil)? Yes       ROS:  GI:       6/19/2024     9:47 AM   --   Vomiting No   Diarrhea No   Constipation No   Swallowing trouble No   Abdominal pain No   Heartburn Yes     Skin:       6/19/2024     9:47 AM   BAR RBS ROS - SKIN   Rash in skin folds No     Psych:       6/19/2024     9:47 AM   --   Depression No   Anxiety No     Female Only:       6/19/2024     9:47 AM   BAR RBS ROS -    Female only Birth control   Stress urinary incontinence No               1/22/2023     5:08 PM   GABBY Score (Last Two)   GABBY Raw Score 32    32   Activation Score 62.6    62.6   GABBY Level 3    3         PHYSICAL EXAM:  Objective   Ht 1.727 m (5' 8\")   Wt 96.2 kg (212 lb)   BMI 32.23 kg/m    Vitals - Patient Reported  Pain Score: No Pain (0)      Vitals:  No vitals were obtained today due to virtual visit.    Physical Exam   GENERAL: alert and no distress  EYES: Eyes grossly normal to inspection.  No discharge or erythema, or obvious scleral/conjunctival abnormalities.  RESP: No audible wheeze, cough, or visible cyanosis.    SKIN: Visible skin clear. No significant rash, abnormal " pigmentation or lesions.  NEURO: Cranial nerves grossly intact.  Mentation and speech appropriate for age.  PSYCH: Appropriate affect, tone, and pace of words        Sincerely,    Kelli Joshi PA-C    The longitudinal plan of care for the diagnosis(es)/condition(s) as documented were addressed during this visit. Due to the added complexity in care, I will continue to support Glenna in the subsequent management and with ongoing continuity of care.     Virtual Visit Details    Type of service:  Video Visit   Video Start Time:  10:01am  Video End Time: 10:23am    Originating Location (pt. Location): Home    Distant Location (provider location):  Off-site  Platform used for Video Visit: Stacy

## 2024-06-19 NOTE — PROGRESS NOTES
Virtual Visit Details    Type of service:  Video Visit   Video Start Time:  10:01am  Video End Time: 10:23am    Originating Location (pt. Location): Home    Distant Location (provider location):  Off-site  Platform used for Video Visit: Stacy

## 2024-06-26 ENCOUNTER — TELEPHONE (OUTPATIENT)
Dept: INFECTIOUS DISEASES | Facility: CLINIC | Age: 34
End: 2024-06-26

## 2024-06-26 ENCOUNTER — PRE VISIT (OUTPATIENT)
Dept: INFECTIOUS DISEASES | Facility: CLINIC | Age: 34
End: 2024-06-26
Payer: COMMERCIAL

## 2024-06-26 ENCOUNTER — VIRTUAL VISIT (OUTPATIENT)
Dept: INFECTIOUS DISEASES | Facility: CLINIC | Age: 34
End: 2024-06-26
Attending: STUDENT IN AN ORGANIZED HEALTH CARE EDUCATION/TRAINING PROGRAM
Payer: COMMERCIAL

## 2024-06-26 VITALS — HEIGHT: 68 IN | WEIGHT: 208 LBS | BODY MASS INDEX: 31.52 KG/M2

## 2024-06-26 DIAGNOSIS — R76.12 POSITIVE QUANTIFERON-TB GOLD TEST: Primary | ICD-10-CM

## 2024-06-26 DIAGNOSIS — Z76.82 ORGAN TRANSPLANT CANDIDATE: ICD-10-CM

## 2024-06-26 DIAGNOSIS — Z23 NEED FOR VACCINATION: ICD-10-CM

## 2024-06-26 DIAGNOSIS — I15.1 HYPERTENSION SECONDARY TO OTHER RENAL DISORDERS: ICD-10-CM

## 2024-06-26 DIAGNOSIS — N18.6 END STAGE RENAL DISEASE (H): ICD-10-CM

## 2024-06-26 PROCEDURE — 99204 OFFICE O/P NEW MOD 45 MIN: CPT | Mod: 95 | Performed by: STUDENT IN AN ORGANIZED HEALTH CARE EDUCATION/TRAINING PROGRAM

## 2024-06-26 RX ORDER — RIFAMPIN 300 MG/1
600 CAPSULE ORAL DAILY
Qty: 60 CAPSULE | Refills: 3 | Status: SHIPPED | OUTPATIENT
Start: 2024-06-26

## 2024-06-26 ASSESSMENT — PAIN SCALES - GENERAL: PAINLEVEL: NO PAIN (0)

## 2024-06-26 NOTE — NURSING NOTE
Is the patient currently in the state of [place]? YES    Visit mode:VIDEO    If the visit is dropped, the patient can be reconnected by: VIDEO VISIT: Text to cell phone:   Telephone Information:   Mobile [phone]       Will anyone else be joining the visit? NO  (If patient encounters technical issues they should call [phone])    How would you like to obtain your AVS? MyChart    Are changes needed to the allergy or medication list? Yes Pt states taking Vitamin D and E every other day.     Are refills needed on medications prescribed by this physician? NO    Reason for visit: Consult    [place]

## 2024-06-26 NOTE — LETTER
6/26/2024       RE: Glenna Spears  [address]  [place] [address]     Dear Colleague,    Thank you for referring your patient, Glenna Spears, to the [facility] [place] [facility]. Please see a copy of my visit note below.    Virtual Visit Details    Type of service:  Video Visit   Video Start Time:  1.02 pm   Video End Time: 1.37 pm     Originating Location (pt. Location): Home    Distant Location (provider location):  Off-site  Platform used for Video Visit: smsPREP  Total time including chart review, care-coordination and documentation time on the date of encounter - 45 mins              [facility] [place]    [address] [address]  Phone: [phone]  Fax: [phone]      Today's Date: 06/26/2024    Recommendations:   Start rifampin 600 mg po daily x 4 months   Discussed side effects and to take it on empty stomach if possible   CMP check in 1 month - will coordinate lab with [facility] -  [phone]  Patient to Bring vaccination record from dialysis next visit     RTC 1 month, video visit     Thank you for involving Infectious Disease in the care of this patient.     Dr. Nancy Jeffrey  Division of Infectious Disease  [facility]      Assessment:  Glenna Spears is a 33 year old with asthma, HTN, ESRD on hemodialysis June 2022 being evaluated for a kidney transplant. Quant positive as part of transplant work up     Positive quant: value of around 1. PPD neg 10 years ago. Risk factor of homelessness for 6 months at 16 years of age. Dads family from [place] and Moms jackson from [facility] but no family member with known TB.     I suspect she has true latent TB. Discussed INH x 9 months or rifampin x 4 months, she prefers the latter as shorter. It interacts with nexium which was recently prescribed but she said she will  not take it. It also interacts with her IUD but she is ok with that. It interacts with semaglutide for weight loss which she will monitor.     - Immunization status; Due for Tdap, prevnar 20, shingrix, covid per the information I have     -------------------------------------------------------------------------------------------------------------------    Reason for consult / Chief complaint:   Consulted by Dr. Aj Jim for pos quant    History of presenting illness:  Glenna Spears is a 33 year old with asthma, HTN, ESRD on hemodialysis June 2022 being evaluated for a kidney transplant. Quant positive as part of transplant work up      Latest Reference Range & Units 05/20/24 13:49   Quantiferon-TB Gold Plus Result Negative  Positive !   TB1 Ag minus Nil Value IU/mL 0.90   TB2 Ag minus Nil Value IU/mL 1.25   Mitogen minus Nil Result IU/mL 10.00   Nil Result IU/mL 0.00   QUANTIFERON-TB GOLD PLUS  Rpt !     PPD neg in the past (applying to be a PCA). 10 or more years ago PPD was the last PPD    Born and grew up in [place]  Long time boyfriend from [place] had latent TB but not active TB   Was Homeless In [place] for 6 months at 16 years of age, lived in tent.   No ivda, oral drug addiction 5 years ago with amphetamines   Dads side from [place], moms grandmother from [facility]   Bu no body with TB. No known exposure to TB   Never been out of the country   Worked at a gas station for 5 years     Has Cough from asthma and smokers cough, otherwise no fevers, night sweats,, sob    CXR 5/20/24 - neg for active TB       Patient Active Problem List   Diagnosis    Moderate asthma    Vitamin D deficiency    Obesity    Anxiety    CKD (chronic kidney disease) stage 5, GFR less than 15 ml/min (H)    Depressive disorder    Dialysis AV fistula malfunction (H24)    ESRD (end stage renal disease) (H)    Secondary hyperparathyroidism (H24)    Nephrotic range proteinuria    Normocytic anemia    Hypertension          Allergies:   Allergies   Allergen Reactions    Sulfa Antibiotics Other (See Comments)     Reaction as an infant- unsure of what happened    Nsaids Other (See Comments)     Has nephrotic range proteinuria and see nephrology's note.          Medications:  Current Outpatient Medications   Medication Sig Dispense Refill    acetaminophen (TYLENOL) 325 MG tablet Take 325-650 mg by mouth every 4 hours as needed      B Complex-C-Zn-Folic Acid (DIALYVITE/ZINC) TABS Take 1 tablet by mouth every evening      cinacalcet (SENSIPAR) 60 MG tablet TAKE 1 TABLET BY MOUTH ONCE A DAY  WITH A MEAL      famotidine (PEPCID) 20 MG tablet Take 1 tablet (20 mg) by mouth 2 times daily 60 tablet 2    gabapentin (NEURONTIN) 100 MG capsule Take 300 mg by mouth at bedtime      levonorgestrel (KYLEENA) 19.5 MG IUD 1 Intra Uterine Device by Intrauterine route once      Semaglutide-Weight Management (WEGOVY) 0.5 MG/0.5ML pen Inject 0.5 mg Subcutaneous once a week After completing 4 weeks of 0.25mg dose 2 mL 1    VELPHORO 500 MG CHEW chewable tablet Take 500 mg by mouth 3 times daily (with meals)      VENTOLIN  (90 Base) MCG/ACT inhaler INHALE 2 PUFFS BY MOUTH EVERY 4 HOURS AS NEEDED FOR SHORTNESS OF BREATH OR COUGH OR EXERCISE      vitamin B-12 (CYANOCOBALAMIN) 2500 MCG sublingual tablet Take 1 tablet (2,500 mcg) by mouth daily 30 tablet 5    calcitRIOL (ROCALTROL) 0.5 MCG capsule Take 0.5 mcg by mouth every evening (Patient not taking: Reported on 5/20/2024)      cinacalcet (SENSIPAR) 90 MG tablet Take 90 mg by mouth every evening      esomeprazole (NEXIUM) 20 MG DR capsule Take 1 capsule (20 mg) by mouth every morning (before breakfast) Take 30-60 minutes before eating. 30 capsule 3    ondansetron (ZOFRAN ODT) 4 MG ODT tab Take 1 tablet (4 mg) by mouth every 6 hours as needed for nausea (Patient not taking: Reported on 5/20/2024) 15 tablet 0    Semaglutide-Weight Management (WEGOVY) 0.25 MG/0.5ML pen Inject 0.25 mg Subcutaneous  "once a week For 4 weeks 2 mL 0    senna-docusate (SENOKOT-S/PERICOLACE) 8.6-50 MG tablet Take 2 tablets by mouth daily as needed for constipation (While taking narcotic pain medications.  Stop taking if having loose stools.) (Patient not taking: Reported on 5/20/2024) 30 tablet 1    ursodiol (ACTIGALL) 300 MG capsule Take 1 capsule (300 mg) by mouth 2 times daily 60 capsule 5         Immunizations:  Immunization History   Administered Date(s) Administered    COVID-19 Monovalent 18+ (Moderna) 01/29/2022    COVID-19 Vaccine (Alix) 07/09/2021   Prevnar 13 9/2022    She thinks she got hep B and flu vaccine at dialysis     Examination:  Vital signs:   Ht 1.727 m (5' 8\")   Wt 94.3 kg (208 lb)   BMI 31.63 kg/m    Unable to examine due to virtual visit     Laboratory:  Hematology:  Recent Labs   Lab Test 05/20/24  1346 01/24/24  0657   WBC 6.8  --    HGB 10.7* 12.3   HCT 33.0*  --      --        Chemistry:  Last Comprehensive Metabolic Panel:  Lab Results   Component Value Date     05/20/2024    POTASSIUM 4.0 05/20/2024    CHLORIDE 101 05/20/2024    CO2 23 05/20/2024    ANIONGAP 14 05/20/2024     (H) 05/20/2024    BUN 29.4 (H) 05/20/2024    CR 11.40 (H) 05/20/2024    GFRESTIMATED 4 (L) 05/20/2024    KARTIK 9.4 05/20/2024       Liver Function Studies -   Recent Labs   Lab Test 05/20/24  1343   PROTTOTAL 6.7   ALBUMIN 4.1   BILITOTAL 0.3   ALKPHOS 50   AST 9   ALT 8         Nancy Jeffrey MD    "

## 2024-06-26 NOTE — PROGRESS NOTES
Virtual Visit Details    Type of service:  Video Visit   Video Start Time:  1.02 pm   Video End Time: 1.37 pm     Originating Location (pt. Location): Home    Distant Location (provider location):  Off-site  Platform used for Video Visit: Stacy  Total time including chart review, care-coordination and documentation time on the date of encounter - 45 mins              [facility] [address] [address]  Phone: [phone]  Fax: [phone]      Today's Date: 06/26/2024    Recommendations:   Start rifampin 600 mg po daily x 4 months   Discussed side effects and to take it on empty stomach if possible   CMP check in 1 month - will coordinate lab with [facility] -  [phone]  Patient to Bring vaccination record from dialysis next visit     RTC 1 month, video visit     Thank you for involving Infectious Disease in the care of this patient.     Dr. Nancy Jeffrey  Division of Infectious Disease  [facility]      Assessment:  Glenna Spears is a 33 year old with asthma, HTN, ESRD on hemodialysis June 2022 being evaluated for a kidney transplant. Quant positive as part of transplant work up     Positive quant: value of around 1. PPD neg 10 years ago. Risk factor of homelessness for 6 months at 16 years of age. Dads family from [place] and Moms jackson from [facility] but no family member with known TB.     I suspect she has true latent TB. Discussed INH x 9 months or rifampin x 4 months, she prefers the latter as shorter. It interacts with nexium which was recently prescribed but she said she will not take it. It also interacts with her IUD but she is ok with that. It interacts with semaglutide for weight loss which she will monitor.     - Immunization status; Due for Tdap, prevnar 20, shingrix, covid per the information I have      -------------------------------------------------------------------------------------------------------------------    Reason for consult / Chief complaint:   Consulted by Dr. Aj Jim for pos quant    History of presenting illness:  Glenna Spears is a 33 year old with asthma, HTN, ESRD on hemodialysis June 2022 being evaluated for a kidney transplant. Quant positive as part of transplant work up      Latest Reference Range & Units 05/20/24 13:49   Quantiferon-TB Gold Plus Result Negative  Positive !   TB1 Ag minus Nil Value IU/mL 0.90   TB2 Ag minus Nil Value IU/mL 1.25   Mitogen minus Nil Result IU/mL 10.00   Nil Result IU/mL 0.00   QUANTIFERON-TB GOLD PLUS  Rpt !     PPD neg in the past (applying to be a PCA). 10 or more years ago PPD was the last PPD    Born and grew up in [place]  Long time boyfriend from [facility] had latent TB but not active TB   Was Homeless In [place] for 6 months at 16 years of age, lived in tent.   No ivda, oral drug addiction 5 years ago with amphetamines   Dads side from [place], moms grandmother from [facility]   Bu no body with TB. No known exposure to TB   Never been out of the country   Worked at a gas station for 5 years     Has Cough from asthma and smokers cough, otherwise no fevers, night sweats,, sob    CXR 5/20/24 - neg for active TB       Patient Active Problem List   Diagnosis    Moderate asthma    Vitamin D deficiency    Obesity    Anxiety    CKD (chronic kidney disease) stage 5, GFR less than 15 ml/min (H)    Depressive disorder    Dialysis AV fistula malfunction (H24)    ESRD (end stage renal disease) (H)    Secondary hyperparathyroidism (H24)    Nephrotic range proteinuria    Normocytic anemia    Hypertension         Allergies:   Allergies   Allergen Reactions    Sulfa Antibiotics Other (See Comments)     Reaction as an infant- unsure of what happened    Nsaids Other (See Comments)     Has nephrotic range proteinuria and see nephrology's note.           Medications:  Current Outpatient Medications   Medication Sig Dispense Refill    acetaminophen (TYLENOL) 325 MG tablet Take 325-650 mg by mouth every 4 hours as needed      B Complex-C-Zn-Folic Acid (DIALYVITE/ZINC) TABS Take 1 tablet by mouth every evening      cinacalcet (SENSIPAR) 60 MG tablet TAKE 1 TABLET BY MOUTH ONCE A DAY  WITH A MEAL      famotidine (PEPCID) 20 MG tablet Take 1 tablet (20 mg) by mouth 2 times daily 60 tablet 2    gabapentin (NEURONTIN) 100 MG capsule Take 300 mg by mouth at bedtime      levonorgestrel (KYLEENA) 19.5 MG IUD 1 Intra Uterine Device by Intrauterine route once      Semaglutide-Weight Management (WEGOVY) 0.5 MG/0.5ML pen Inject 0.5 mg Subcutaneous once a week After completing 4 weeks of 0.25mg dose 2 mL 1    VELPHORO 500 MG CHEW chewable tablet Take 500 mg by mouth 3 times daily (with meals)      VENTOLIN  (90 Base) MCG/ACT inhaler INHALE 2 PUFFS BY MOUTH EVERY 4 HOURS AS NEEDED FOR SHORTNESS OF BREATH OR COUGH OR EXERCISE      vitamin B-12 (CYANOCOBALAMIN) 2500 MCG sublingual tablet Take 1 tablet (2,500 mcg) by mouth daily 30 tablet 5    calcitRIOL (ROCALTROL) 0.5 MCG capsule Take 0.5 mcg by mouth every evening (Patient not taking: Reported on 5/20/2024)      cinacalcet (SENSIPAR) 90 MG tablet Take 90 mg by mouth every evening      esomeprazole (NEXIUM) 20 MG DR capsule Take 1 capsule (20 mg) by mouth every morning (before breakfast) Take 30-60 minutes before eating. 30 capsule 3    ondansetron (ZOFRAN ODT) 4 MG ODT tab Take 1 tablet (4 mg) by mouth every 6 hours as needed for nausea (Patient not taking: Reported on 5/20/2024) 15 tablet 0    Semaglutide-Weight Management (WEGOVY) 0.25 MG/0.5ML pen Inject 0.25 mg Subcutaneous once a week For 4 weeks 2 mL 0    senna-docusate (SENOKOT-S/PERICOLACE) 8.6-50 MG tablet Take 2 tablets by mouth daily as needed for constipation (While taking narcotic pain medications.  Stop taking if having loose stools.) (Patient not  "taking: Reported on 5/20/2024) 30 tablet 1    ursodiol (ACTIGALL) 300 MG capsule Take 1 capsule (300 mg) by mouth 2 times daily 60 capsule 5         Immunizations:  Immunization History   Administered Date(s) Administered    COVID-19 Monovalent 18+ (Moderna) 01/29/2022    COVID-19 Vaccine (Alix) 07/09/2021   Prevnar 13 9/2022    She thinks she got hep B and flu vaccine at dialysis     Examination:  Vital signs:   Ht 1.727 m (5' 8\")   Wt 94.3 kg (208 lb)   BMI 31.63 kg/m    Unable to examine due to virtual visit     Laboratory:  Hematology:  Recent Labs   Lab Test 05/20/24  1346 01/24/24  0657   WBC 6.8  --    HGB 10.7* 12.3   HCT 33.0*  --      --        Chemistry:  Last Comprehensive Metabolic Panel:  Lab Results   Component Value Date     05/20/2024    POTASSIUM 4.0 05/20/2024    CHLORIDE 101 05/20/2024    CO2 23 05/20/2024    ANIONGAP 14 05/20/2024     (H) 05/20/2024    BUN 29.4 (H) 05/20/2024    CR 11.40 (H) 05/20/2024    GFRESTIMATED 4 (L) 05/20/2024    KARTIK 9.4 05/20/2024       Liver Function Studies -   Recent Labs   Lab Test 05/20/24  1343   PROTTOTAL 6.7   ALBUMIN 4.1   BILITOTAL 0.3   ALKPHOS 50   AST 9   ALT 8             "

## 2024-06-26 NOTE — TELEPHONE ENCOUNTER
Lab order faxed to [facility] at [phone] with instructions for patient to complete in one month.    ----- Message from Nancy Jeffrey sent at 6/26/2024  1:48 PM CDT -----  Pls coordinate CMP lab check in a month at [facility] - Ph [phone]. Pls see my note from today.   Thanks  AB

## 2024-07-08 ENCOUNTER — TELEPHONE (OUTPATIENT)
Dept: INFECTIOUS DISEASES | Facility: CLINIC | Age: 34
End: 2024-07-08
Payer: COMMERCIAL

## 2024-07-08 NOTE — TELEPHONE ENCOUNTER
Patient Contacted for the patient to call back and schedule the following:    Appointment type: Return  Provider: Wojciech  Return date: 7/26/2024  Specialty phone number: [phone]  Additional appointment(s) needed: na  Additonal Notes: video

## 2024-07-09 ENCOUNTER — TELEPHONE (OUTPATIENT)
Dept: ENDOCRINOLOGY | Facility: CLINIC | Age: 34
End: 2024-07-09
Payer: COMMERCIAL

## 2024-07-09 NOTE — TELEPHONE ENCOUNTER
Sent [employer] (1st Attempt) for the patient to call back and schedule the following:    Appointment type: NORBERT BARON  Provider: Joyce Joshi PA-C or Ibis Guardado PA-C  Return date: 3 mos   Specialty phone number: [phone]  Additional appointment(s) needed: n/a  Additonal Notes: n/a

## 2024-07-23 ENCOUNTER — MYC MEDICAL ADVICE (OUTPATIENT)
Dept: ENDOCRINOLOGY | Facility: CLINIC | Age: 34
End: 2024-07-23
Payer: COMMERCIAL

## 2024-07-23 DIAGNOSIS — Z98.84 S/P LAPAROSCOPIC SLEEVE GASTRECTOMY: ICD-10-CM

## 2024-07-23 DIAGNOSIS — N18.6 END STAGE RENAL DISEASE (H): ICD-10-CM

## 2024-07-23 DIAGNOSIS — Z86.39 HISTORY OF MORBID OBESITY: ICD-10-CM

## 2024-07-24 RX ORDER — SEMAGLUTIDE 0.5 MG/.5ML
0.5 INJECTION, SOLUTION SUBCUTANEOUS WEEKLY
Qty: 2 ML | Refills: 1 | Status: SHIPPED | OUTPATIENT
Start: 2024-07-24

## 2024-07-30 ENCOUNTER — TELEPHONE (OUTPATIENT)
Dept: INFECTIOUS DISEASES | Facility: CLINIC | Age: 34
End: 2024-07-30
Payer: COMMERCIAL

## 2024-07-30 NOTE — TELEPHONE ENCOUNTER
DATE/TIME OF CALL RECEIVED FROM LAB:  07/30/24 at 12:49 PM   LAB TEST:  Creatinine  LAB VALUE:  8.48 (Lab result will be faxed within the hour)   PROVIDER NOTIFIED?: Yes  PROVIDER NAME: Dr Jefrfey  DATE/TIME LAB VALUE REPORTED TO PROVIDER: 07/30/2024 at 12:51pm  MECHANISM OF PROVIDER NOTIFICATION: Page ,Epic Secure chat, high priority epic message  PROVIDER RESPONSE: No response as of 1350, paged provider again at 1353.   Per provider, patient is on dialysis.   No

## 2024-07-30 NOTE — TELEPHONE ENCOUNTER
[facility] Call [facility]    Phone Message    May a detailed message be left on voicemail: yes     Reason for Call: Other: Debbie with [facility] lab calling to report critical results.       Action Taken: Other: ID    Travel Screening: Not Applicable     Date of Service:

## 2024-08-05 ENCOUNTER — VIRTUAL VISIT (OUTPATIENT)
Dept: INFECTIOUS DISEASES | Facility: CLINIC | Age: 34
End: 2024-08-05
Attending: STUDENT IN AN ORGANIZED HEALTH CARE EDUCATION/TRAINING PROGRAM
Payer: COMMERCIAL

## 2024-08-05 VITALS
WEIGHT: 200 LBS | HEIGHT: 68 IN | DIASTOLIC BLOOD PRESSURE: 80 MMHG | BODY MASS INDEX: 30.31 KG/M2 | SYSTOLIC BLOOD PRESSURE: 100 MMHG

## 2024-08-05 DIAGNOSIS — N18.6 END STAGE RENAL DISEASE (H): ICD-10-CM

## 2024-08-05 DIAGNOSIS — Z76.82 ORGAN TRANSPLANT CANDIDATE: ICD-10-CM

## 2024-08-05 DIAGNOSIS — R76.12 POSITIVE QUANTIFERON-TB GOLD TEST: Primary | ICD-10-CM

## 2024-08-05 DIAGNOSIS — Z23 NEED FOR VACCINATION: ICD-10-CM

## 2024-08-05 PROCEDURE — 99213 OFFICE O/P EST LOW 20 MIN: CPT | Mod: 95 | Performed by: STUDENT IN AN ORGANIZED HEALTH CARE EDUCATION/TRAINING PROGRAM

## 2024-08-05 ASSESSMENT — PAIN SCALES - GENERAL: PAINLEVEL: NO PAIN (0)

## 2024-08-05 NOTE — PROGRESS NOTES
Virtual Visit Details    Type of service:  Video Visit   Video Start Time:  1.08 pm   Video End Time:1:16 PM    Originating Location (pt. Location): Home    Distant Location (provider location):  On-site  Platform used for Video Visit: [facility] [address] [address]  Phone: [phone]  Fax: [phone]      Today's Date: 08/05/2024    Recommendations:   continue rifampin 600 mg po daily x 4 months. On second month now  She is to contact us if side effects progress or new ones develop  Encouraged yogurt intake   Reminded patient to bring vaccination record from dialysis next visit     RTC mid to late Oct- video visit     Thank you for involving Infectious Disease in the care of this patient.     Dr. Nancy Jeffrey  Division of Infectious Disease  [facility]      Assessment:  Glenna Spears is a 33 year old with asthma, HTN, ESRD on hemodialysis June 2022 being evaluated for a kidney transplant. Quant positive as part of transplant work up     Positive quant: value of around 1. PPD neg 10 years ago. Risk factor of homelessness for 6 months at 16 years of age. Dads family from [place] and Moms jackson from [facility] but no family member with known TB.     I suspect she has true latent TB. Discussed INH x 9 months or rifampin x 4 months, she prefers the latter as shorter. It interacts with nexium which was recently prescribed but she said she will not take it. It also interacts with her IUD but she is ok with that. It interacts with semaglutide for weight loss which she will monitor.     Started rifampin around 6/26/24, mild GI side effects, LFTs ok at 1 month.     - Immunization status; Due for Tdap, prevnar 20, shingrix, covid per the information I have     -------------------------------------------------------------------------------------------------------------------  Interval events:  Last seen  6/26/24   Started rifampin at the time. Is on the second month of treatment. Taking it regularly. Missed 5 doses. Takes 2 capsules daily on empty stomach. Has a little bit of stomach upset and diarrhea with it. Takes little yogurt every day.   CMP at [facility] this past week was ok. Elevated creat from ESRD, she is on dialysis    She forgot to ask vaccination record from dialysis     Reason for consult / Chief complaint:   Consulted by Dr. Aj Jim for pos quant    History of presenting illness:  Glenna Spears is a 33 year old with asthma, HTN, ESRD on hemodialysis June 2022 being evaluated for a kidney transplant. Quant positive as part of transplant work up      Latest Reference Range & Units 05/20/24 13:49   Quantiferon-TB Gold Plus Result Negative  Positive !   TB1 Ag minus Nil Value IU/mL 0.90   TB2 Ag minus Nil Value IU/mL 1.25   Mitogen minus Nil Result IU/mL 10.00   Nil Result IU/mL 0.00   QUANTIFERON-TB GOLD PLUS  Rpt !     PPD neg in the past (applying to be a PCA). 10 or more years ago PPD was the last PPD    Born and grew up in [place]  Long time boyfriend from [facility] had latent TB but not active TB   Was Homeless In [place] for 6 months at 16 years of age, lived in tent.   No ivda, oral drug addiction 5 years ago with amphetamines   Dads side from [place], moms grandmother from [facility]   Bu no body with TB. No known exposure to TB   Never been out of the country   Worked at a gas station for 5 years     Has Cough from asthma and smokers cough, otherwise no fevers, night sweats,, sob    CXR 5/20/24 - neg for active TB       Patient Active Problem List   Diagnosis    Moderate asthma    Vitamin D deficiency    Obesity    Anxiety    CKD (chronic kidney disease) stage 5, GFR less than 15 ml/min (H)    Depressive disorder    Dialysis AV fistula malfunction (H24)    ESRD (end stage renal disease) (H)    Secondary hyperparathyroidism (H24)    Nephrotic range proteinuria    Normocytic  anemia    Hypertension         Allergies:   Allergies   Allergen Reactions    Sulfa Antibiotics Other (See Comments)     Reaction as an infant- unsure of what happened    Nsaids Other (See Comments)     Has nephrotic range proteinuria and see nephrology's note.          Medications:  Current Outpatient Medications   Medication Sig Dispense Refill    acetaminophen (TYLENOL) 325 MG tablet Take 325-650 mg by mouth every 4 hours as needed      B Complex-C-Zn-Folic Acid (DIALYVITE/ZINC) TABS Take 1 tablet by mouth every evening      calcitRIOL (ROCALTROL) 0.5 MCG capsule Take 0.5 mcg by mouth every evening (Patient not taking: Reported on 5/20/2024)      cinacalcet (SENSIPAR) 60 MG tablet TAKE 1 TABLET BY MOUTH ONCE A DAY  WITH A MEAL      cinacalcet (SENSIPAR) 90 MG tablet Take 90 mg by mouth every evening      esomeprazole (NEXIUM) 20 MG DR capsule Take 1 capsule (20 mg) by mouth every morning (before breakfast) Take 30-60 minutes before eating. 30 capsule 3    famotidine (PEPCID) 20 MG tablet Take 1 tablet (20 mg) by mouth 2 times daily 60 tablet 2    gabapentin (NEURONTIN) 100 MG capsule Take 300 mg by mouth at bedtime      levonorgestrel (KYLEENA) 19.5 MG IUD 1 Intra Uterine Device by Intrauterine route once      ondansetron (ZOFRAN ODT) 4 MG ODT tab Take 1 tablet (4 mg) by mouth every 6 hours as needed for nausea (Patient not taking: Reported on 5/20/2024) 15 tablet 0    rifampin (RIFADIN) 300 MG capsule Take 2 capsules (600 mg) by mouth daily On an empty stomach 60 capsule 3    Semaglutide-Weight Management (WEGOVY) 0.25 MG/0.5ML pen Inject 0.25 mg Subcutaneous once a week For 4 weeks 2 mL 0    Semaglutide-Weight Management (WEGOVY) 0.5 MG/0.5ML pen Inject 0.5 mg subcutaneously once a week After completing 4 weeks of 0.25mg dose 2 mL 1    senna-docusate (SENOKOT-S/PERICOLACE) 8.6-50 MG tablet Take 2 tablets by mouth daily as needed for constipation (While taking narcotic pain medications.  Stop taking if having  "loose stools.) (Patient not taking: Reported on 5/20/2024) 30 tablet 1    ursodiol (ACTIGALL) 300 MG capsule Take 1 capsule (300 mg) by mouth 2 times daily 60 capsule 5    VELPHORO 500 MG CHEW chewable tablet Take 500 mg by mouth 3 times daily (with meals)      VENTOLIN  (90 Base) MCG/ACT inhaler INHALE 2 PUFFS BY MOUTH EVERY 4 HOURS AS NEEDED FOR SHORTNESS OF BREATH OR COUGH OR EXERCISE      vitamin B-12 (CYANOCOBALAMIN) 2500 MCG sublingual tablet Take 1 tablet (2,500 mcg) by mouth daily 30 tablet 5         Immunizations:  Immunization History   Administered Date(s) Administered    COVID-19 Monovalent 18+ (Moderna) 01/29/2022    COVID-19 Vaccine (Alix) 07/09/2021   Prevnar 13 9/2022    She thinks she got hep B and flu vaccine at dialysis     Examination:  Vital signs:   /80   Ht 1.727 m (5' 8\")   Wt 90.7 kg (200 lb)   BMI 30.41 kg/m    Unable to examine due to virtual visit     Laboratory:  Hematology:  Recent Labs   Lab Test 05/20/24  1346 01/24/24  0657   WBC 6.8  --    HGB 10.7* 12.3   HCT 33.0*  --      --        Chemistry:  Last Comprehensive Metabolic Panel:  Lab Results   Component Value Date     05/20/2024    POTASSIUM 4.0 05/20/2024    CHLORIDE 101 05/20/2024    CO2 23 05/20/2024    ANIONGAP 14 05/20/2024     (H) 05/20/2024    BUN 29.4 (H) 05/20/2024    CR 11.40 (H) 05/20/2024    GFRESTIMATED 4 (L) 05/20/2024    KARTIK 9.4 05/20/2024       Liver Function Studies -   Recent Labs   Lab Test 05/20/24  1343   PROTTOTAL 6.7   ALBUMIN 4.1   BILITOTAL 0.3   ALKPHOS 50   AST 9   ALT 8                 "

## 2024-08-05 NOTE — LETTER
8/5/2024       RE: Glenna Spears  [address]  [place] [address]     Dear Colleague,    Thank you for referring your patient, Glenna Spears, to the [facility] INFECT[facility] DISEASE CLIN[facility] [place] at [facility]. Please see a copy of my visit note below.    Virtual Visit Details    Type of service:  Video Visit   Video Start Time:  1.08 pm   Video End Time:1:16 PM    Originating Location (pt. Location): Home    Distant Location (provider location):  On-site  Platform used for Video Visit: Moonfruit  Total time including chart review, care-coordination and documentation time on the date of encounter - 45 mins              [facility] IN[facility]IN[facility] [place] [id number] [facility] [address]  Phone: [phone]  Fax: [phone]      Today's Date: 08/05/2024    Recommendations:   continue rifampin 600 mg po daily x 4 months. On second month now  She is to contact us if side effects progress or new ones develop  Encouraged yogurt intake   Reminded patient to bring vaccination record from dialysis next visit     RTC mid to late Oct- video visit     Thank you for involving Infectious Disease in the care of this patient.     Dr. Nancy Jeffrey  Division of Infectious Disease  [facility]      Assessment:  Glenna Spears is a 33 year old with asthma, HTN, ESRD on hemodialysis June 2022 being evaluated for a kidney transplant. Quant positive as part of transplant work up     Positive quant: value of around 1. PPD neg 10 years ago. Risk factor of homelessness for 6 months at 16 years of age. Dads family from [place] and Moms jackson from [facility] but no family member with known TB.     I suspect she has true latent TB. Discussed INH x 9 months or rifampin x 4 months, she prefers the latter as shorter. It interacts with nexium which was recently prescribed but she said she will not take it. It also interacts with  her IUD but she is ok with that. It interacts with semaglutide for weight loss which she will monitor.     Started rifampin around 6/26/24, mild GI side effects, LFTs ok at 1 month.     - Immunization status; Due for Tdap, prevnar 20, shingrix, covid per the information I have     -------------------------------------------------------------------------------------------------------------------  Interval events:  Last seen 6/26/24   Started rifampin at the time. Is on the second month of treatment. Taking it regularly. Missed 5 doses. Takes 2 capsules daily on empty stomach. Has a little bit of stomach upset and diarrhea with it. Takes little yogurt every day.   CMP at [facility] this past week was ok. Elevated creat from ESRD, she is on dialysis    She forgot to ask vaccination record from dialysis     Reason for consult / Chief complaint:   Consulted by Dr. Aj Jim for pos quant    History of presenting illness:  Glenna Spears is a 33 year old with asthma, HTN, ESRD on hemodialysis June 2022 being evaluated for a kidney transplant. Quant positive as part of transplant work up      Latest Reference Range & Units 05/20/24 13:49   Quantiferon-TB Gold Plus Result Negative  Positive !   TB1 Ag minus Nil Value IU/mL 0.90   TB2 Ag minus Nil Value IU/mL 1.25   Mitogen minus Nil Result IU/mL 10.00   Nil Result IU/mL 0.00   QUANTIFERON-TB GOLD PLUS  Rpt !     PPD neg in the past (applying to be a PCA). 10 or more years ago PPD was the last PPD    Born and grew up in [place]  Long time boyfriend from [place] had latent TB but not active TB   Was Homeless In [place] for 6 months at 16 years of age, lived in tent.   No ivda, oral drug addiction 5 years ago with amphetamines   Dads side from [place], moms grandmother from [facility]   Bu no body with TB. No known exposure to TB   Never been out of the country   Worked at a gas station for 5 years     Has Cough from asthma and smokers cough, otherwise no  fevers, night sweats,, sob    CXR 5/20/24 - neg for active TB       Patient Active Problem List   Diagnosis     Moderate asthma     Vitamin D deficiency     Obesity     Anxiety     CKD (chronic kidney disease) stage 5, GFR less than 15 ml/min (H)     Depressive disorder     Dialysis AV fistula malfunction (H24)     ESRD (end stage renal disease) (H)     Secondary hyperparathyroidism (H24)     Nephrotic range proteinuria     Normocytic anemia     Hypertension         Allergies:   Allergies   Allergen Reactions     Sulfa Antibiotics Other (See Comments)     Reaction as an infant- unsure of what happened     Nsaids Other (See Comments)     Has nephrotic range proteinuria and see nephrology's note.          Medications:  Current Outpatient Medications   Medication Sig Dispense Refill     acetaminophen (TYLENOL) 325 MG tablet Take 325-650 mg by mouth every 4 hours as needed       B Complex-C-Zn-Folic Acid (DIALYVITE/ZINC) TABS Take 1 tablet by mouth every evening       calcitRIOL (ROCALTROL) 0.5 MCG capsule Take 0.5 mcg by mouth every evening (Patient not taking: Reported on 5/20/2024)       cinacalcet (SENSIPAR) 60 MG tablet TAKE 1 TABLET BY MOUTH ONCE A DAY  WITH A MEAL       cinacalcet (SENSIPAR) 90 MG tablet Take 90 mg by mouth every evening       esomeprazole (NEXIUM) 20 MG DR capsule Take 1 capsule (20 mg) by mouth every morning (before breakfast) Take 30-60 minutes before eating. 30 capsule 3     famotidine (PEPCID) 20 MG tablet Take 1 tablet (20 mg) by mouth 2 times daily 60 tablet 2     gabapentin (NEURONTIN) 100 MG capsule Take 300 mg by mouth at bedtime       levonorgestrel (KYLEENA) 19.5 MG IUD 1 Intra Uterine Device by Intrauterine route once       ondansetron (ZOFRAN ODT) 4 MG ODT tab Take 1 tablet (4 mg) by mouth every 6 hours as needed for nausea (Patient not taking: Reported on 5/20/2024) 15 tablet 0     rifampin (RIFADIN) 300 MG capsule Take 2 capsules (600 mg) by mouth daily On an empty stomach 60  "capsule 3     Semaglutide-Weight Management (WEGOVY) 0.25 MG/0.5ML pen Inject 0.25 mg Subcutaneous once a week For 4 weeks 2 mL 0     Semaglutide-Weight Management (WEGOVY) 0.5 MG/0.5ML pen Inject 0.5 mg subcutaneously once a week After completing 4 weeks of 0.25mg dose 2 mL 1     senna-docusate (SENOKOT-S/PERICOLACE) 8.6-50 MG tablet Take 2 tablets by mouth daily as needed for constipation (While taking narcotic pain medications.  Stop taking if having loose stools.) (Patient not taking: Reported on 5/20/2024) 30 tablet 1     ursodiol (ACTIGALL) 300 MG capsule Take 1 capsule (300 mg) by mouth 2 times daily 60 capsule 5     VELPHORO 500 MG CHEW chewable tablet Take 500 mg by mouth 3 times daily (with meals)       VENTOLIN  (90 Base) MCG/ACT inhaler INHALE 2 PUFFS BY MOUTH EVERY 4 HOURS AS NEEDED FOR SHORTNESS OF BREATH OR COUGH OR EXERCISE       vitamin B-12 (CYANOCOBALAMIN) 2500 MCG sublingual tablet Take 1 tablet (2,500 mcg) by mouth daily 30 tablet 5         Immunizations:  Immunization History   Administered Date(s) Administered     COVID-19 Monovalent 18+ (Moderna) 01/29/2022     COVID-19 Vaccine (Alix) 07/09/2021   Prevnar 13 9/2022    She thinks she got hep B and flu vaccine at dialysis     Examination:  Vital signs:   /80   Ht 1.727 m (5' 8\")   Wt 90.7 kg (200 lb)   BMI 30.41 kg/m    Unable to examine due to virtual visit     Laboratory:  Hematology:  Recent Labs   Lab Test 05/20/24  1346 01/24/24  0657   WBC 6.8  --    HGB 10.7* 12.3   HCT 33.0*  --      --        Chemistry:  Last Comprehensive Metabolic Panel:  Lab Results   Component Value Date     05/20/2024    POTASSIUM 4.0 05/20/2024    CHLORIDE 101 05/20/2024    CO2 23 05/20/2024    ANIONGAP 14 05/20/2024     (H) 05/20/2024    BUN 29.4 (H) 05/20/2024    CR 11.40 (H) 05/20/2024    GFRESTIMATED 4 (L) 05/20/2024    KARTIK 9.4 05/20/2024       Liver Function Studies -   Recent Labs   Lab Test 05/20/24  1343   PROTTOTAL " 6.7   ALBUMIN 4.1   BILITOTAL 0.3   ALKPHOS 50   AST 9   ALT 8                 Again, thank you for allowing me to participate in the care of your patient.      Sincerely,    Nancy Jeffrey MD

## 2024-08-05 NOTE — NURSING NOTE
Current patient location: [address]   [place] [address]    Is the patient currently in the state of [place]? YES    Visit mode:VIDEO    If the visit is dropped, the patient can be reconnected by: VIDEO VISIT: Text to cell phone:   Telephone Information:   Mobile [phone]    and VIDEO VISIT: Send to e-mail at: [email or web address]    Will anyone else be joining the visit? NO  (If patient encounters technical issues they should call [phone]    How would you like to obtain your AVS? MyChart    Are changes needed to the allergy or medication list? No    Patient denies any changes and states that all information remains accurate since last reviewed/verified.     Are refills needed on medications prescribed by this physician? NO    Rooming Documentation:  Patient declined to complete questionnaire(s), Pt declined PHQ      Reason for visit: CATHERINE Mauricio MA VVF

## 2024-08-08 ENCOUNTER — TELEPHONE (OUTPATIENT)
Dept: INFECTIOUS DISEASES | Facility: CLINIC | Age: 34
End: 2024-08-08
Payer: COMMERCIAL

## 2024-08-08 NOTE — TELEPHONE ENCOUNTER
EP called 8/8 but no one responded to sched a 2 month (around 10/16) follow up with Dr. Jeffrey via video per checkout notes from 8/5. Please sched as Return Transplant ID. --2nd attempt.

## 2024-08-12 ENCOUNTER — DOCUMENTATION ONLY (OUTPATIENT)
Dept: TRANSPLANT | Facility: CLINIC | Age: 34
End: 2024-08-12
Payer: COMMERCIAL

## 2024-08-12 DIAGNOSIS — Z86.39 HISTORY OF MORBID OBESITY: ICD-10-CM

## 2024-08-12 DIAGNOSIS — Z98.84 S/P LAPAROSCOPIC SLEEVE GASTRECTOMY: Primary | ICD-10-CM

## 2024-08-12 RX ORDER — SEMAGLUTIDE 1 MG/.5ML
1 INJECTION, SOLUTION SUBCUTANEOUS WEEKLY
Qty: 2 ML | Refills: 2 | Status: SHIPPED | OUTPATIENT
Start: 2024-08-12

## 2024-10-02 ENCOUNTER — VIRTUAL VISIT (OUTPATIENT)
Dept: INFECTIOUS DISEASES | Facility: CLINIC | Age: 34
End: 2024-10-02
Attending: STUDENT IN AN ORGANIZED HEALTH CARE EDUCATION/TRAINING PROGRAM
Payer: COMMERCIAL

## 2024-10-02 VITALS
SYSTOLIC BLOOD PRESSURE: 120 MMHG | BODY MASS INDEX: 26.66 KG/M2 | DIASTOLIC BLOOD PRESSURE: 80 MMHG | WEIGHT: 180 LBS | HEIGHT: 69 IN

## 2024-10-02 DIAGNOSIS — R76.12 POSITIVE QUANTIFERON-TB GOLD TEST: Primary | ICD-10-CM

## 2024-10-02 DIAGNOSIS — Z23 NEED FOR VACCINATION: ICD-10-CM

## 2024-10-02 DIAGNOSIS — N18.6 END STAGE RENAL DISEASE (H): ICD-10-CM

## 2024-10-02 DIAGNOSIS — Z76.82 ORGAN TRANSPLANT CANDIDATE: ICD-10-CM

## 2024-10-02 PROCEDURE — 99213 OFFICE O/P EST LOW 20 MIN: CPT | Mod: 95 | Performed by: STUDENT IN AN ORGANIZED HEALTH CARE EDUCATION/TRAINING PROGRAM

## 2024-10-02 ASSESSMENT — PAIN SCALES - GENERAL: PAINLEVEL: NO PAIN (0)

## 2024-10-02 NOTE — PROGRESS NOTES
Virtual Visit Details    Type of service:  Video Visit   Video Start Time:  1.08 pm   Video End Time:1.21 pm     Originating Location (pt. Location): Home    Distant Location (provider location):  On-site  Platform used for Video Visit: [facility] IN[facility] CLINIC [address] [address]  Phone: [phone]  Fax: [phone]      Today's Date: 10/02/2024    Recommendations:   continue rifampin 600 mg po daily x 4 months. On third month now, missed ? A months dose   To get Annual covid and flu vaccine due   Reminded patient to bring vaccination record from dialysis next visit     RTC  2 months    Thank you for involving Infectious Disease in the care of this patient.     Dr. Nancy Jeffrey  Division of Infectious Disease  [facility]      Assessment:  Glenna Spears is a 33 year old with asthma, HTN, ESRD on hemodialysis June 2022 being evaluated for a kidney transplant. Quant positive as part of transplant work up     Positive quant: value of around 1. PPD neg 10 years ago. Risk factor of homelessness for 6 months at 16 years of age. Dads family from [place] and Moms jackson from [facility] but no family member with known TB.     I suspect she has true latent TB. Discussed INH x 9 months or rifampin x 4 months, she prefers the latter as shorter. It interacts with nexium which was recently prescribed but she said she will not take it. It also interacts with her IUD but she is ok with that. It interacts with semaglutide for weight loss which she will monitor.     Started rifampin around 6/26/24, mild GI side effects, LFTs ok at 1 month.     - Immunization status; Due for Tdap, prevnar 20, shingrix, covid per the information I have     -------------------------------------------------------------------------------------------------------------------  Interval history   Last visit 8/5  She has Missed doses because she has  forgotten, she has almost a whole month prescription left. She is supposed to be at in the 4th month bottle but is in the third month bottle. She has one more refill   She threw up a couple of times with the rifampin   Takes it with a little bit of crackers and does ok   7/30 CMP ok   Got prevnar 20 vaccine     Interval events:  Last seen 6/26/24   Started rifampin at the time. Is on the second month of treatment. Taking it regularly. Missed 5 doses. Takes 2 capsules daily on empty stomach. Has a little bit of stomach upset and diarrhea with it. Takes little yogurt every day.   CMP at [facility] this past week was ok. Elevated creat from ESRD, she is on dialysis    She forgot to ask vaccination record from dialysis     Reason for consult / Chief complaint:   Consulted by Dr. Aj Jim for pos quant    History of presenting illness:  Glenna Spears is a 33 year old with asthma, HTN, ESRD on hemodialysis June 2022 being evaluated for a kidney transplant. Quant positive as part of transplant work up      Latest Reference Range & Units 05/20/24 13:49   Quantiferon-TB Gold Plus Result Negative  Positive !   TB1 Ag minus Nil Value IU/mL 0.90   TB2 Ag minus Nil Value IU/mL 1.25   Mitogen minus Nil Result IU/mL 10.00   Nil Result IU/mL 0.00   QUANTIFERON-TB GOLD PLUS  Rpt !     PPD neg in the past (applying to be a PCA). 10 or more years ago PPD was the last PPD    Born and grew up in [place]  [place] [facility] [place] but not active TB   Was Homeless In [place] for 6 months at 16 years of age, lived in tent.   No ivda, oral drug addiction 5 years ago with amphetamines   Dads side from [place], moms grandmother from [facility]   Bu no body with TB. No known exposure to TB   Never been out of the country   Worked at a gas station for 5 years     Has Cough from asthma and smokers cough, otherwise no fevers, night sweats,, sob    CXR 5/20/24 - neg for active TB       Patient Active Problem  List   Diagnosis    Moderate asthma    Vitamin D deficiency    Obesity    Anxiety    CKD (chronic kidney disease) stage 5, GFR less than 15 ml/min (H)    Depressive disorder    Dialysis AV fistula malfunction (H)    ESRD (end stage renal disease) (H)    Secondary hyperparathyroidism (H)    Nephrotic range proteinuria    Normocytic anemia    Hypertension         Allergies:   Allergies   Allergen Reactions    Sulfa Antibiotics Other (See Comments)     Reaction as an infant- unsure of what happened    Nsaids Other (See Comments)     Has nephrotic range proteinuria and see nephrology's note.          Medications:  Current Outpatient Medications   Medication Sig Dispense Refill    acetaminophen (TYLENOL) 325 MG tablet Take 325-650 mg by mouth every 4 hours as needed      B Complex-C-Zn-Folic Acid (DIALYVITE/ZINC) TABS Take 1 tablet by mouth every evening      calcitRIOL (ROCALTROL) 0.5 MCG capsule Take 0.5 mcg by mouth every evening (Patient not taking: Reported on 5/20/2024)      cinacalcet (SENSIPAR) 60 MG tablet TAKE 1 TABLET BY MOUTH ONCE A DAY  WITH A MEAL      cinacalcet (SENSIPAR) 90 MG tablet Take 90 mg by mouth every evening      esomeprazole (NEXIUM) 20 MG DR capsule Take 1 capsule (20 mg) by mouth every morning (before breakfast) Take 30-60 minutes before eating. 30 capsule 3    famotidine (PEPCID) 20 MG tablet Take 1 tablet (20 mg) by mouth 2 times daily 60 tablet 2    gabapentin (NEURONTIN) 100 MG capsule Take 300 mg by mouth at bedtime      levonorgestrel (KYLEENA) 19.5 MG IUD 1 Intra Uterine Device by Intrauterine route once      ondansetron (ZOFRAN ODT) 4 MG ODT tab Take 1 tablet (4 mg) by mouth every 6 hours as needed for nausea (Patient not taking: Reported on 5/20/2024) 15 tablet 0    rifampin (RIFADIN) 300 MG capsule Take 2 capsules (600 mg) by mouth daily On an empty stomach 60 capsule 3    Semaglutide-Weight Management (WEGOVY) 0.25 MG/0.5ML pen Inject 0.25 mg Subcutaneous once a week For 4 weeks 2  "mL 0    Semaglutide-Weight Management (WEGOVY) 0.5 MG/0.5ML pen Inject 0.5 mg subcutaneously once a week After completing 4 weeks of 0.25mg dose 2 mL 1    Semaglutide-Weight Management (WEGOVY) 1 MG/0.5ML pen Inject 1 mg subcutaneously once a week 2 mL 2    senna-docusate (SENOKOT-S/PERICOLACE) 8.6-50 MG tablet Take 2 tablets by mouth daily as needed for constipation (While taking narcotic pain medications.  Stop taking if having loose stools.) (Patient not taking: Reported on 5/20/2024) 30 tablet 1    ursodiol (ACTIGALL) 300 MG capsule Take 1 capsule (300 mg) by mouth 2 times daily 60 capsule 5    VELPHORO 500 MG CHEW chewable tablet Take 500 mg by mouth 3 times daily (with meals)      VENTOLIN  (90 Base) MCG/ACT inhaler INHALE 2 PUFFS BY MOUTH EVERY 4 HOURS AS NEEDED FOR SHORTNESS OF BREATH OR COUGH OR EXERCISE      vitamin B-12 (CYANOCOBALAMIN) 2500 MCG sublingual tablet Take 1 tablet (2,500 mcg) by mouth daily 30 tablet 5         Immunizations:  Immunization History   Administered Date(s) Administered    COVID-19 Monovalent 18+ (Moderna) 01/29/2022    COVID-19 Vaccine (Alix) 07/09/2021   Prevnar 13 9/2022    She thinks she got hep B and flu vaccine at dialysis     Examination:  Vital signs:   /80   Ht 1.753 m (5' 9\")   Wt 81.6 kg (180 lb)   BMI 26.58 kg/m    Unable to examine due to virtual visit     Laboratory:  Hematology:  Recent Labs   Lab Test 05/20/24  1346 01/24/24  0657   WBC 6.8  --    HGB 10.7* 12.3   HCT 33.0*  --      --        Chemistry:  Last Comprehensive Metabolic Panel:  Lab Results   Component Value Date     05/20/2024    POTASSIUM 4.0 05/20/2024    CHLORIDE 101 05/20/2024    CO2 23 05/20/2024    ANIONGAP 14 05/20/2024     (H) 05/20/2024    BUN 29.4 (H) 05/20/2024    CR 11.40 (H) 05/20/2024    GFRESTIMATED 4 (L) 05/20/2024    KARTIK 9.4 05/20/2024       Liver Function Studies -   Recent Labs   Lab Test 05/20/24  1343   PROTTOTAL 6.7   ALBUMIN 4.1   BILITOTAL " 0.3   ALKPHOS 50   AST 9   ALT 8

## 2024-10-02 NOTE — LETTER
10/2/2024       RE: Glenna Spears  [address]  [place] [address]     Dear Colleague,    Thank you for referring your patient, Glenna Spears, to the [facility] INFECTIOUS DISEASE CLINIC [place] at [facility]. Please see a copy of my visit note below.    Virtual Visit Details    Type of service:  Video Visit   Video Start Time:  1.08 pm   Video End Time:1.21 pm     Originating Location (pt. Location): Home    Distant Location (provider location):  On-site  Platform used for Video Visit: [facility] INFECTIOUS DISEASE CLINIC [place]    [address] [address]  Phone: [phone]  Fax: [phone]      Today's Date: 10/02/2024    Recommendations:   continue rifampin 600 mg po daily x 4 months. On third month now, missed ? A months dose   To get Annual covid and flu vaccine due   Reminded patient to bring vaccination record from dialysis next visit     RTC  2 months    Thank you for involving Infectious Disease in the care of this patient.     Dr. Nancy Jeffrey  Division of Infectious Disease  [facility]      Assessment:  Glenna Spears is a 33 year old with asthma, HTN, ESRD on hemodialysis June 2022 being evaluated for a kidney transplant. Quant positive as part of transplant work up     Positive quant: value of around 1. PPD neg 10 years ago. Risk factor of homelessness for 6 months at 16 years of age. Dads family from [place] and Moms jackson from [facility] but no family member with known TB.     I suspect she has true latent TB. Discussed INH x 9 months or rifampin x 4 months, she prefers the latter as shorter. It interacts with nexium which was recently prescribed but she said she will not take it. It also interacts with her IUD but she is ok with that. It interacts with semaglutide for weight loss which she will monitor.     Started rifampin around 6/26/24, mild GI side  effects, LFTs ok at 1 month.     - Immunization status; Due for Tdap, prevnar 20, shingrix, covid per the information I have     -------------------------------------------------------------------------------------------------------------------  Interval history   Last visit 8/5  She has Missed doses because she has forgotten, she has almost a whole month prescription left. She is supposed to be at in the 4th month bottle but is in the third month bottle. She has one more refill   She threw up a couple of times with the rifampin   Takes it with a little bit of crackers and does ok   7/30 CMP ok   Got prevnar 20 vaccine     Interval events:  Last seen 6/26/24   Started rifampin at the time. Is on the second month of treatment. Taking it regularly. Missed 5 doses. Takes 2 capsules daily on empty stomach. Has a little bit of stomach upset and diarrhea with it. Takes little yogurt every day.   CMP at [facility] this past week was ok. Elevated creat from ESRD, she is on dialysis    She forgot to ask vaccination record from dialysis     Reason for consult / Chief complaint:   Consulted by Dr. Aj Jim for pos quant    History of presenting illness:  Glenna Spears is a 33 year old with asthma, HTN, ESRD on hemodialysis June 2022 being evaluated for a kidney transplant. Quant positive as part of transplant work up      Latest Reference Range & Units 05/20/24 13:49   Quantiferon-TB Gold Plus Result Negative  Positive !   TB1 Ag minus Nil Value IU/mL 0.90   TB2 Ag minus Nil Value IU/mL 1.25   Mitogen minus Nil Result IU/mL 10.00   Nil Result IU/mL 0.00   QUANTIFERON-TB GOLD PLUS  Rpt !     PPD neg in the past (applying to be a PCA). 10 or more years ago PPD was the last PPD    Born and grew up in [place]  Long time boyfriend from [place] had latent TB but not active TB   Was Homeless In [place] for 6 months at 16 years of age, lived in tent.   No ivda, oral drug addiction 5 years ago with amphetamines   Dads side  from [place], moms grandmother from [facility]   Bu no body with TB. No known exposure to TB   Never been out of the country   Worked at a gas station for 5 years     Has Cough from asthma and smokers cough, otherwise no fevers, night sweats,, sob    CXR 5/20/24 - neg for active TB       Patient Active Problem List   Diagnosis     Moderate asthma     Vitamin D deficiency     Obesity     Anxiety     CKD (chronic kidney disease) stage 5, GFR less than 15 ml/min (H)     Depressive disorder     Dialysis AV fistula malfunction (H)     ESRD (end stage renal disease) (H)     Secondary hyperparathyroidism (H)     Nephrotic range proteinuria     Normocytic anemia     Hypertension         Allergies:   Allergies   Allergen Reactions     Sulfa Antibiotics Other (See Comments)     Reaction as an infant- unsure of what happened     Nsaids Other (See Comments)     Has nephrotic range proteinuria and see nephrology's note.          Medications:  Current Outpatient Medications   Medication Sig Dispense Refill     acetaminophen (TYLENOL) 325 MG tablet Take 325-650 mg by mouth every 4 hours as needed       B Complex-C-Zn-Folic Acid (DIALYVITE/ZINC) TABS Take 1 tablet by mouth every evening       calcitRIOL (ROCALTROL) 0.5 MCG capsule Take 0.5 mcg by mouth every evening (Patient not taking: Reported on 5/20/2024)       cinacalcet (SENSIPAR) 60 MG tablet TAKE 1 TABLET BY MOUTH ONCE A DAY  WITH A MEAL       cinacalcet (SENSIPAR) 90 MG tablet Take 90 mg by mouth every evening       esomeprazole (NEXIUM) 20 MG DR capsule Take 1 capsule (20 mg) by mouth every morning (before breakfast) Take 30-60 minutes before eating. 30 capsule 3     famotidine (PEPCID) 20 MG tablet Take 1 tablet (20 mg) by mouth 2 times daily 60 tablet 2     gabapentin (NEURONTIN) 100 MG capsule Take 300 mg by mouth at bedtime       levonorgestrel (KYLEENA) 19.5 MG IUD 1 Intra Uterine Device by Intrauterine route once       ondansetron (ZOFRAN ODT) 4 MG ODT tab Take  "1 tablet (4 mg) by mouth every 6 hours as needed for nausea (Patient not taking: Reported on 5/20/2024) 15 tablet 0     rifampin (RIFADIN) 300 MG capsule Take 2 capsules (600 mg) by mouth daily On an empty stomach 60 capsule 3     Semaglutide-Weight Management (WEGOVY) 0.25 MG/0.5ML pen Inject 0.25 mg Subcutaneous once a week For 4 weeks 2 mL 0     Semaglutide-Weight Management (WEGOVY) 0.5 MG/0.5ML pen Inject 0.5 mg subcutaneously once a week After completing 4 weeks of 0.25mg dose 2 mL 1     Semaglutide-Weight Management (WEGOVY) 1 MG/0.5ML pen Inject 1 mg subcutaneously once a week 2 mL 2     senna-docusate (SENOKOT-S/PERICOLACE) 8.6-50 MG tablet Take 2 tablets by mouth daily as needed for constipation (While taking narcotic pain medications.  Stop taking if having loose stools.) (Patient not taking: Reported on 5/20/2024) 30 tablet 1     ursodiol (ACTIGALL) 300 MG capsule Take 1 capsule (300 mg) by mouth 2 times daily 60 capsule 5     VELPHORO 500 MG CHEW chewable tablet Take 500 mg by mouth 3 times daily (with meals)       VENTOLIN  (90 Base) MCG/ACT inhaler INHALE 2 PUFFS BY MOUTH EVERY 4 HOURS AS NEEDED FOR SHORTNESS OF BREATH OR COUGH OR EXERCISE       vitamin B-12 (CYANOCOBALAMIN) 2500 MCG sublingual tablet Take 1 tablet (2,500 mcg) by mouth daily 30 tablet 5         Immunizations:  Immunization History   Administered Date(s) Administered     COVID-19 Monovalent 18+ (Moderna) 01/29/2022     COVID-19 Vaccine (Alix) 07/09/2021   Prevnar 13 9/2022    She thinks she got hep B and flu vaccine at dialysis     Examination:  Vital signs:   /80   Ht 1.753 m (5' 9\")   Wt 81.6 kg (180 lb)   BMI 26.58 kg/m    Unable to examine due to virtual visit     Laboratory:  Hematology:  Recent Labs   Lab Test 05/20/24  1346 01/24/24  0657   WBC 6.8  --    HGB 10.7* 12.3   HCT 33.0*  --      --        Chemistry:  Last Comprehensive Metabolic Panel:  Lab Results   Component Value Date     05/20/2024 "    POTASSIUM 4.0 05/20/2024    CHLORIDE 101 05/20/2024    CO2 23 05/20/2024    ANIONGAP 14 05/20/2024     (H) 05/20/2024    BUN 29.4 (H) 05/20/2024    CR 11.40 (H) 05/20/2024    GFRESTIMATED 4 (L) 05/20/2024    KARTIK 9.4 05/20/2024       Liver Function Studies -   Recent Labs   Lab Test 05/20/24  1343   PROTTOTAL 6.7   ALBUMIN 4.1   BILITOTAL 0.3   ALKPHOS 50   AST 9   ALT 8                 Again, thank you for allowing me to participate in the care of your patient.      Sincerely,    Nancy Jeffrey MD

## 2024-10-02 NOTE — NURSING NOTE
Current patient location: [address]   [place] [address]    Is the patient currently in the state of [place]? YES    Visit mode:VIDEO    If the visit is dropped, the patient can be reconnected by: VIDEO VISIT: Text to cell phone:   Telephone Information:   Mobile [phone]    and VIDEO VISIT: Send to e-mail at: [email or web address]    Will anyone else be joining the visit? NO  (If patient encounters technical issues they should call [phone]    Are changes needed to the allergy or medication list? No    Patient denies any changes and states that all information remains accurate since last reviewed/verified.     Are refills needed on medications prescribed by this physician? NO, does not believe so     Rooming Documentation:  PHQ declined     Reason for visit: CATHERINE Mauricio MA VVF

## 2024-10-03 ENCOUNTER — TELEPHONE (OUTPATIENT)
Dept: INFECTIOUS DISEASES | Facility: CLINIC | Age: 34
End: 2024-10-03
Payer: COMMERCIAL

## 2024-10-03 NOTE — TELEPHONE ENCOUNTER
EP called 10/3 to sched a 2 month video follow up with Dr. Jeffrey per checkout notes from 10/2. Patient stated that she'll be in [place] for this appt.

## 2024-10-17 DIAGNOSIS — E66.01 CLASS 3 SEVERE OBESITY WITH SERIOUS COMORBIDITY AND BODY MASS INDEX (BMI) OF 45.0 TO 49.9 IN ADULT, UNSPECIFIED OBESITY TYPE (H): ICD-10-CM

## 2024-10-17 DIAGNOSIS — E66.813 CLASS 3 SEVERE OBESITY WITH SERIOUS COMORBIDITY AND BODY MASS INDEX (BMI) OF 45.0 TO 49.9 IN ADULT, UNSPECIFIED OBESITY TYPE (H): ICD-10-CM

## 2024-10-17 DIAGNOSIS — Z86.39 HISTORY OF MORBID OBESITY: Primary | ICD-10-CM

## 2024-10-17 RX ORDER — SEMAGLUTIDE 1.7 MG/.75ML
1.7 INJECTION, SOLUTION SUBCUTANEOUS WEEKLY
Qty: 3 ML | Refills: 2 | Status: SHIPPED | OUTPATIENT
Start: 2024-10-17

## 2024-12-04 ENCOUNTER — VIRTUAL VISIT (OUTPATIENT)
Dept: INFECTIOUS DISEASES | Facility: CLINIC | Age: 34
End: 2024-12-04
Attending: STUDENT IN AN ORGANIZED HEALTH CARE EDUCATION/TRAINING PROGRAM
Payer: COMMERCIAL

## 2024-12-04 VITALS
BODY MASS INDEX: 25.76 KG/M2 | HEIGHT: 68 IN | SYSTOLIC BLOOD PRESSURE: 140 MMHG | DIASTOLIC BLOOD PRESSURE: 80 MMHG | WEIGHT: 170 LBS

## 2024-12-04 DIAGNOSIS — Z76.82 ORGAN TRANSPLANT CANDIDATE: ICD-10-CM

## 2024-12-04 DIAGNOSIS — Z22.7 LATENT TUBERCULOSIS: Primary | ICD-10-CM

## 2024-12-04 DIAGNOSIS — N18.6 END STAGE RENAL DISEASE (H): ICD-10-CM

## 2024-12-04 DIAGNOSIS — Z23 NEED FOR VACCINATION: ICD-10-CM

## 2024-12-04 ASSESSMENT — PAIN SCALES - GENERAL: PAINLEVEL_OUTOF10: NO PAIN (0)

## 2024-12-04 NOTE — LETTER
12/4/2024       RE: Glenna Spears  [address]  [place] [address]     Dear Colleague,    Thank you for referring your patient, Glenna Spears, to the [facility] INFECTIOUS DISEASE CLINIC [place] at [facility]. Please see a copy of my visit note below.    Virtual Visit Details    Type of service:  Video Visit   Video Start Time:  12.34 pm   Video End Time:12:39 PM    Originating Location (pt. Location): Home    Distant Location (provider location):  Off-site  Platform used for Video Visit: [facility] INFECTIOUS DISEASE CLINIC [place] [id number] [facility] [address]  Phone: [phone]  Fax: [phone]      Today's Date: 12/04/2024    Recommendations:   She has completed 4 months of rifampin for latent TB treatment over a period of 5 months.   Due for Tdap, COVID and Shingrix - discussed - she will get     Thank you for involving Infectious Disease in the care of this patient.   No follow up necessary     Dr. Nancy Jeffrey  Division of Infectious Disease  [facility]      Assessment:  Glenna Spears is a 33 year old with asthma, HTN, ESRD on hemodialysis June 2022 being evaluated for a kidney transplant. Quant positive as part of transplant work up     Positive quant: value of around 1. PPD neg 10 years ago. Risk factor of homelessness for 6 months at 16 years of age. Dads family from [place] and Moms family from [facility] but no family member with known TB.     I suspect she has true latent TB. Discussed INH x 9 months or rifampin x 4 months, she prefers the latter as shorter. It interacts with nexium which was recently prescribed but she said she will not take it. It also interacts with her IUD but she is ok with that. It interacts with semaglutide for weight loss which she will monitor.     Started rifampin around 6/26/24, mild GI side effects, LFTs ok at 1 month. She completed 4  months of rifampin for latent TB treatment over a period of 5 months.    - Immunization status; Due for Tdap, shingrix, covid per the information I have     -------------------------------------------------------------------------------------------------------------------  Interval History:  Last visit 10/2/24   Got done with rifampin a week ago, she missed doses but took all the medicines eventually.   Got prevnar 20 and flu shot but not covid shot      Called pharmacy and verified she picked up the rifampin prescriptions on 6/26, 7/28, 9/10 and 11/1.       Interval history   Last visit 8/5  She has Missed doses because she has forgotten, she has almost a whole month prescription left. She is supposed to be at in the 4th month bottle but is in the third month bottle. She has one more refill   She threw up a couple of times with the rifampin   Takes it with a little bit of crackers and does ok   7/30 CMP ok   Got prevnar 20 vaccine     Interval events:  Last seen 6/26/24   Started rifampin at the time. Is on the second month of treatment. Taking it regularly. Missed 5 doses. Takes 2 capsules daily on empty stomach. Has a little bit of stomach upset and diarrhea with it. Takes little yogurt every day.   CMP at [facility] this past week was ok. Elevated creat from ESRD, she is on dialysis    She forgot to ask vaccination record from dialysis     Reason for consult / Chief complaint:   Consulted by Dr. Aj Jim for pos quant    History of presenting illness:  Glenna Spears is a 33 year old with asthma, HTN, ESRD on hemodialysis June 2022 being evaluated for a kidney transplant. Quant positive as part of transplant work up      Latest Reference Range & Units 05/20/24 13:49   Quantiferon-TB Gold Plus Result Negative  Positive !   TB1 Ag minus Nil Value IU/mL 0.90   TB2 Ag minus Nil Value IU/mL 1.25   Mitogen minus Nil Result IU/mL 10.00   Nil Result IU/mL 0.00   QUANTIFERON-TB GOLD PLUS  Rpt !     PPD neg in the  past (applying to be a PCA). 10 or more years ago PPD was the last PPD    Born and grew up in [place]  Long time boyfriend from [place] had latent TB but not active TB   Was Homeless In [place] for 6 months at 16 years of age, lived in tent.   No ivda, oral drug addiction 5 years ago with amphetamines   Dads side from [place], moms grandmother from [facility]   Bu no body with TB. No known exposure to TB   Never been out of the country   Worked at a gas station for 5 years     Has Cough from asthma and smokers cough, otherwise no fevers, night sweats,, sob    CXR 5/20/24 - neg for active TB       Patient Active Problem List   Diagnosis     Moderate asthma     Vitamin D deficiency     Obesity     Anxiety     CKD (chronic kidney disease) stage 5, GFR less than 15 ml/min (H)     Depressive disorder     Dialysis AV fistula malfunction (H)     ESRD (end stage renal disease) (H)     Secondary hyperparathyroidism (H)     Nephrotic range proteinuria     Normocytic anemia     Hypertension         Allergies:   Allergies   Allergen Reactions     Sulfa Antibiotics Other (See Comments)     Reaction as an infant- unsure of what happened     Nsaids Other (See Comments)     Has nephrotic range proteinuria and see nephrology's note.          Medications:  Current Outpatient Medications   Medication Sig Dispense Refill     acetaminophen (TYLENOL) 325 MG tablet Take 325-650 mg by mouth every 4 hours as needed       B Complex-C-Zn-Folic Acid (DIALYVITE/ZINC) TABS Take 1 tablet by mouth every evening       calcitRIOL (ROCALTROL) 0.5 MCG capsule Take 0.5 mcg by mouth every evening (Patient not taking: Reported on 5/20/2024)       cinacalcet (SENSIPAR) 60 MG tablet TAKE 1 TABLET BY MOUTH ONCE A DAY  WITH A MEAL       cinacalcet (SENSIPAR) 90 MG tablet Take 90 mg by mouth every evening       esomeprazole (NEXIUM) 20 MG DR capsule Take 1 capsule (20 mg) by mouth every morning (before breakfast) Take 30-60 minutes before eating. 30  capsule 3     famotidine (PEPCID) 20 MG tablet Take 1 tablet (20 mg) by mouth 2 times daily 60 tablet 2     gabapentin (NEURONTIN) 100 MG capsule Take 300 mg by mouth at bedtime       levonorgestrel (KYLEENA) 19.5 MG IUD 1 Intra Uterine Device by Intrauterine route once       ondansetron (ZOFRAN ODT) 4 MG ODT tab Take 1 tablet (4 mg) by mouth every 6 hours as needed for nausea (Patient not taking: Reported on 5/20/2024) 15 tablet 0     rifampin (RIFADIN) 300 MG capsule Take 2 capsules (600 mg) by mouth daily On an empty stomach 60 capsule 3     Semaglutide-Weight Management (WEGOVY) 0.25 MG/0.5ML pen Inject 0.25 mg Subcutaneous once a week For 4 weeks 2 mL 0     Semaglutide-Weight Management (WEGOVY) 0.5 MG/0.5ML pen Inject 0.5 mg subcutaneously once a week After completing 4 weeks of 0.25mg dose 2 mL 1     Semaglutide-Weight Management (WEGOVY) 1 MG/0.5ML pen Inject 1 mg subcutaneously once a week 2 mL 2     Semaglutide-Weight Management (WEGOVY) 1.7 MG/0.75ML pen Inject 1.7 mg subcutaneously once a week. 3 mL 2     senna-docusate (SENOKOT-S/PERICOLACE) 8.6-50 MG tablet Take 2 tablets by mouth daily as needed for constipation (While taking narcotic pain medications.  Stop taking if having loose stools.) (Patient not taking: Reported on 5/20/2024) 30 tablet 1     ursodiol (ACTIGALL) 300 MG capsule Take 1 capsule (300 mg) by mouth 2 times daily 60 capsule 5     VELPHORO 500 MG CHEW chewable tablet Take 500 mg by mouth 3 times daily (with meals)       VENTOLIN  (90 Base) MCG/ACT inhaler INHALE 2 PUFFS BY MOUTH EVERY 4 HOURS AS NEEDED FOR SHORTNESS OF BREATH OR COUGH OR EXERCISE       vitamin B-12 (CYANOCOBALAMIN) 2500 MCG sublingual tablet Take 1 tablet (2,500 mcg) by mouth daily 30 tablet 5         Immunizations:  Immunization History   Administered Date(s) Administered     COVID-19 Monovalent 18+ (Moderna) 01/29/2022     COVID-19 Vaccine (Alix) 07/09/2021   Prevnar 13 9/2022  She thinks she got hep B at  "dialysis     Examination:  Vital signs:   BP (!) 140/80   Ht 1.727 m (5' 8\")   Wt 77.1 kg (170 lb)   BMI 25.85 kg/m    Unable to examine due to virtual visit     Laboratory:  Hematology:  Recent Labs   Lab Test 05/20/24  1346 01/24/24  0657   WBC 6.8  --    HGB 10.7* 12.3   HCT 33.0*  --      --        Chemistry:  Last Comprehensive Metabolic Panel:  Lab Results   Component Value Date     05/20/2024    POTASSIUM 4.0 05/20/2024    CHLORIDE 101 05/20/2024    CO2 23 05/20/2024    ANIONGAP 14 05/20/2024     (H) 05/20/2024    BUN 29.4 (H) 05/20/2024    CR 11.40 (H) 05/20/2024    GFRESTIMATED 4 (L) 05/20/2024    KARTIK 9.4 05/20/2024       Liver Function Studies -   Lab Results   Component Value Date    AST 9 05/20/2024     Lab Results   Component Value Date    ALT 8 05/20/2024     No results found for: \"BILICONJ\"   Lab Results   Component Value Date    BILITOTAL 0.3 05/20/2024     Lab Results   Component Value Date    ALBUMIN 4.1 05/20/2024     Lab Results   Component Value Date    PROTTOTAL 6.7 05/20/2024      Lab Results   Component Value Date    ALKPHOS 50 05/20/2024                 Again, thank you for allowing me to participate in the care of your patient.      Sincerely,    Nancy [place] MD    "

## 2024-12-04 NOTE — PROGRESS NOTES
Virtual Visit Details    Type of service:  Video Visit   Video Start Time:  12.34 pm   Video End Time:12:39 PM    Originating Location (pt. Location): Home    Distant Location (provider location):  Off-site  Platform used for Video Visit: [facility] [address] [address]  Phone: [phone]  Fax: [phone]      Today's Date: 12/04/2024    Recommendations:   She has completed 4 months of rifampin for latent TB treatment over a period of 5 months.   Due for Tdap, COVID and Shingrix - discussed - she will get     Thank you for involving Infectious Disease in the care of this patient.   No follow up necessary     Dr. Nancy Jeffrey  Division of Infectious Disease  [facility]      Assessment:  Glenna Spears is a 33 year old with asthma, HTN, ESRD on hemodialysis June 2022 being evaluated for a kidney transplant. Quant positive as part of transplant work up     Positive quant: value of around 1. PPD neg 10 years ago. Risk factor of homelessness for 6 months at 16 years of age. Dads family from [place] and Moms family from [facility] but no family member with known TB.     I suspect she has true latent TB. Discussed INH x 9 months or rifampin x 4 months, she prefers the latter as shorter. It interacts with nexium which was recently prescribed but she said she will not take it. It also interacts with her IUD but she is ok with that. It interacts with semaglutide for weight loss which she will monitor.     Started rifampin around 6/26/24, mild GI side effects, LFTs ok at 1 month. She completed 4 months of rifampin for latent TB treatment over a period of 5 months.    - Immunization status; Due for Tdap, shingrix, covid per the information I have     -------------------------------------------------------------------------------------------------------------------  Interval History:  Last visit 10/2/24   Got done  with rifampin a week ago, she missed doses but took all the medicines eventually.   Got prevnar 20 and flu shot but not covid shot      Called pharmacy and verified she picked up the rifampin prescriptions on 6/26, 7/28, 9/10 and 11/1.       Interval history   Last visit 8/5  She has Missed doses because she has forgotten, she has almost a whole month prescription left. She is supposed to be at in the 4th month bottle but is in the third month bottle. She has one more refill   She threw up a couple of times with the rifampin   Takes it with a little bit of crackers and does ok   7/30 CMP ok   Got prevnar 20 vaccine     Interval events:  Last seen 6/26/24   Started rifampin at the time. Is on the second month of treatment. Taking it regularly. Missed 5 doses. Takes 2 capsules daily on empty stomach. Has a little bit of stomach upset and diarrhea with it. Takes little yogurt every day.   CMP at [facility] this past week was ok. Elevated creat from ESRD, she is on dialysis    She forgot to ask vaccination record from dialysis     Reason for consult / Chief complaint:   Consulted by Dr. Aj Jim for pos quant    History of presenting illness:  Glenna Spears is a 33 year old with asthma, HTN, ESRD on hemodialysis June 2022 being evaluated for a kidney transplant. Quant positive as part of transplant work up      Latest Reference Range & Units 05/20/24 13:49   Quantiferon-TB Gold Plus Result Negative  Positive !   TB1 Ag minus Nil Value IU/mL 0.90   TB2 Ag minus Nil Value IU/mL 1.25   Mitogen minus Nil Result IU/mL 10.00   Nil Result IU/mL 0.00   QUANTIFERON-TB GOLD PLUS  Rpt !     PPD neg in the past (applying to be a PCA). 10 or more years ago PPD was the last PPD    Born and grew up in [place]  Long time boyfriend from [place] had latent TB but not active TB   Was Homeless In [place] for 6 months at 16 years of age, lived in tent.   No ivda, oral drug addiction 5 years ago with amphetamines   Dads side  from [place], moms grandmother from [facility]   Bu no body with TB. No known exposure to TB   Never been out of the country   Worked at a gas station for 5 years     Has Cough from asthma and smokers cough, otherwise no fevers, night sweats,, sob    CXR 5/20/24 - neg for active TB       Patient Active Problem List   Diagnosis    Moderate asthma    Vitamin D deficiency    Obesity    Anxiety    CKD (chronic kidney disease) stage 5, GFR less than 15 ml/min (H)    Depressive disorder    Dialysis AV fistula malfunction (H)    ESRD (end stage renal disease) (H)    Secondary hyperparathyroidism (H)    Nephrotic range proteinuria    Normocytic anemia    Hypertension         Allergies:   Allergies   Allergen Reactions    Sulfa Antibiotics Other (See Comments)     Reaction as an infant- unsure of what happened    Nsaids Other (See Comments)     Has nephrotic range proteinuria and see nephrology's note.          Medications:  Current Outpatient Medications   Medication Sig Dispense Refill    acetaminophen (TYLENOL) 325 MG tablet Take 325-650 mg by mouth every 4 hours as needed      B Complex-C-Zn-Folic Acid (DIALYVITE/ZINC) TABS Take 1 tablet by mouth every evening      calcitRIOL (ROCALTROL) 0.5 MCG capsule Take 0.5 mcg by mouth every evening (Patient not taking: Reported on 5/20/2024)      cinacalcet (SENSIPAR) 60 MG tablet TAKE 1 TABLET BY MOUTH ONCE A DAY  WITH A MEAL      cinacalcet (SENSIPAR) 90 MG tablet Take 90 mg by mouth every evening      esomeprazole (NEXIUM) 20 MG DR capsule Take 1 capsule (20 mg) by mouth every morning (before breakfast) Take 30-60 minutes before eating. 30 capsule 3    famotidine (PEPCID) 20 MG tablet Take 1 tablet (20 mg) by mouth 2 times daily 60 tablet 2    gabapentin (NEURONTIN) 100 MG capsule Take 300 mg by mouth at bedtime      levonorgestrel (KYLEENA) 19.5 MG IUD 1 Intra Uterine Device by Intrauterine route once      ondansetron (ZOFRAN ODT) 4 MG ODT tab Take 1 tablet (4 mg) by mouth  "every 6 hours as needed for nausea (Patient not taking: Reported on 5/20/2024) 15 tablet 0    rifampin (RIFADIN) 300 MG capsule Take 2 capsules (600 mg) by mouth daily On an empty stomach 60 capsule 3    Semaglutide-Weight Management (WEGOVY) 0.25 MG/0.5ML pen Inject 0.25 mg Subcutaneous once a week For 4 weeks 2 mL 0    Semaglutide-Weight Management (WEGOVY) 0.5 MG/0.5ML pen Inject 0.5 mg subcutaneously once a week After completing 4 weeks of 0.25mg dose 2 mL 1    Semaglutide-Weight Management (WEGOVY) 1 MG/0.5ML pen Inject 1 mg subcutaneously once a week 2 mL 2    Semaglutide-Weight Management (WEGOVY) 1.7 MG/0.75ML pen Inject 1.7 mg subcutaneously once a week. 3 mL 2    senna-docusate (SENOKOT-S/PERICOLACE) 8.6-50 MG tablet Take 2 tablets by mouth daily as needed for constipation (While taking narcotic pain medications.  Stop taking if having loose stools.) (Patient not taking: Reported on 5/20/2024) 30 tablet 1    ursodiol (ACTIGALL) 300 MG capsule Take 1 capsule (300 mg) by mouth 2 times daily 60 capsule 5    VELPHORO 500 MG CHEW chewable tablet Take 500 mg by mouth 3 times daily (with meals)      VENTOLIN  (90 Base) MCG/ACT inhaler INHALE 2 PUFFS BY MOUTH EVERY 4 HOURS AS NEEDED FOR SHORTNESS OF BREATH OR COUGH OR EXERCISE      vitamin B-12 (CYANOCOBALAMIN) 2500 MCG sublingual tablet Take 1 tablet (2,500 mcg) by mouth daily 30 tablet 5         Immunizations:  Immunization History   Administered Date(s) Administered    COVID-19 Monovalent 18+ (Moderna) 01/29/2022    COVID-19 Vaccine (Alix) 07/09/2021   Prevnar 13 9/2022  She thinks she got hep B at dialysis     Examination:  Vital signs:   BP (!) 140/80   Ht 1.727 m (5' 8\")   Wt 77.1 kg (170 lb)   BMI 25.85 kg/m    Unable to examine due to virtual visit     Laboratory:  Hematology:  Recent Labs   Lab Test 05/20/24  1346 01/24/24  0657   WBC 6.8  --    HGB 10.7* 12.3   HCT 33.0*  --      --        Chemistry:  Last Comprehensive Metabolic " "Panel:  Lab Results   Component Value Date     05/20/2024    POTASSIUM 4.0 05/20/2024    CHLORIDE 101 05/20/2024    CO2 23 05/20/2024    ANIONGAP 14 05/20/2024     (H) 05/20/2024    BUN 29.4 (H) 05/20/2024    CR 11.40 (H) 05/20/2024    GFRESTIMATED 4 (L) 05/20/2024    KARTIK 9.4 05/20/2024       Liver Function Studies -   Lab Results   Component Value Date    AST 9 05/20/2024     Lab Results   Component Value Date    ALT 8 05/20/2024     No results found for: \"BILICONJ\"   Lab Results   Component Value Date    BILITOTAL 0.3 05/20/2024     Lab Results   Component Value Date    ALBUMIN 4.1 05/20/2024     Lab Results   Component Value Date    PROTTOTAL 6.7 05/20/2024      Lab Results   Component Value Date    ALKPHOS 50 05/20/2024                 "

## 2024-12-04 NOTE — NURSING NOTE
Current patient location: [address]   [place] [address]    Is the patient currently in the state of [place]? YES    Visit mode:VIDEO    If the visit is dropped, the patient can be reconnected by:VIDEO VISIT: Text to cell phone:   Telephone Information:   Mobile [phone]    and VIDEO VISIT: Send to e-mail at: [email or web address]    Will anyone else be joining the visit? NO  (If patient encounters technical issues they should call [phone])    Are changes needed to the allergy or medication list? No    Patient denies any changes and states that all information remains accurate since last reviewed/verified.     Are refills needed on medications prescribed by this physician? NO    Rooming Documentation:  Questionnaire(s) completed    Reason for visit: CATHERINE Mauricio MA VVF

## 2024-12-12 ENCOUNTER — TELEPHONE (OUTPATIENT)
Dept: TRANSPLANT | Facility: CLINIC | Age: 34
End: 2024-12-12
Payer: COMMERCIAL

## 2024-12-12 NOTE — TELEPHONE ENCOUNTER
Called Glenna to see if I can help her get her evaluation completed. Was only able to leave a voice message asking for a return call. I did tell her she still needs an exercise stress test, CT a/p, repeat cardiolipins,dental  and mental health therapist update.

## 2024-12-30 ENCOUNTER — TELEPHONE (OUTPATIENT)
Dept: TRANSPLANT | Facility: CLINIC | Age: 34
End: 2024-12-30
Payer: COMMERCIAL

## 2024-12-30 NOTE — TELEPHONE ENCOUNTER
Glenna returned my call. She would prefer to have her remaining evaluation items scheduled at [facility]. She does not have a PCP listed and we talked about why that is so important. She will call there and establish care with a new PCP and call me back with the name. Orders will be sent once we have that information.

## 2024-12-30 NOTE — TELEPHONE ENCOUNTER
Glenna called back and has established care with a new PCP at [facility]. Her first visit will be 1/13/2025. Will send orders for her remaining evaluation items. Glenna will also scheduled an appointment with her dentist and establish with a mental health provider. We also talked about why a living donor is the ideal answer in her situation. She is going to place the website on her facebook page and start handing out the business cards with the donor website.

## 2024-12-30 NOTE — LETTER
PHYSICIAN ORDERS      DATE & TIME ISSUED: 2024 12:39 PM  PATIENT NAME: Glenna Spears   : 1990     [facility] MR# [if applicable]: [id number]     DIAGNOSIS:  Z.76.82    ICD-10 CODE: N 18.5    Exercise stress test  CT a/p  without contrast [id number]  Cardiolipins  IgG and IgM [id number]           Any questions please call: Ilana Dumont [phone]    Please fax each result same day as resulted/available    Critical lab results page [phone].      Lukas Jean MD  Professor of Surgery  Kidney/Pancreas Transplantation

## 2025-01-07 ENCOUNTER — TRANSFERRED RECORDS (OUTPATIENT)
Dept: HEALTH INFORMATION MANAGEMENT | Facility: CLINIC | Age: 35
End: 2025-01-07

## 2025-01-07 DIAGNOSIS — E66.813 CLASS 3 SEVERE OBESITY WITH SERIOUS COMORBIDITY AND BODY MASS INDEX (BMI) OF 45.0 TO 49.9 IN ADULT, UNSPECIFIED OBESITY TYPE (H): ICD-10-CM

## 2025-01-07 DIAGNOSIS — Z86.39 HISTORY OF MORBID OBESITY: ICD-10-CM

## 2025-01-07 DIAGNOSIS — E66.01 CLASS 3 SEVERE OBESITY WITH SERIOUS COMORBIDITY AND BODY MASS INDEX (BMI) OF 45.0 TO 49.9 IN ADULT, UNSPECIFIED OBESITY TYPE (H): ICD-10-CM

## 2025-01-07 LAB — PHQ9 SCORE: 14

## 2025-01-08 NOTE — PROGRESS NOTES
Virtual Visit Details    Type of service:  Video Visit   Video Start Time: 11:08 AM  Video End Time:11:29 AM    Originating Location (pt. Location): Home    Distant Location (provider location):  Off-site  Platform used for Video Visit: [facility] Bariatric Surgery Note    RE: Glenna Spears  MR#: [id number]  : 1990  VISIT DATE: 2025      Dear Neela Irene,    I had the pleasure of seeing your patient, Glenna Spears, in my post-bariatric surgery assessment clinic.    Assessment & Plan   Problem List Items Addressed This Visit       Obesity - Primary    Relevant Medications    Semaglutide-Weight Management (WEGOVY) 2.4 MG/0.75ML pen    Other Relevant Orders    Parathyroid Hormone Intact    Vitamin D Deficiency    Vitamin A    Vitamin B12    Hemoglobin A1c    CBC with Platelets and Reflex to Iron Studies    Calcium    Hepatic panel     Other Visit Diagnoses       End stage renal disease (H)        Relevant Medications    Semaglutide-Weight Management (WEGOVY) 2.4 MG/0.75ML pen    Other Relevant Orders    Parathyroid Hormone Intact    Vitamin D Deficiency    Vitamin A    Vitamin B12    Hemoglobin A1c    CBC with Platelets and Reflex to Iron Studies    Calcium    Hepatic panel    S/P laparoscopic sleeve gastrectomy        Relevant Medications    Semaglutide-Weight Management (WEGOVY) 2.4 MG/0.75ML pen    Other Relevant Orders    Parathyroid Hormone Intact    Vitamin D Deficiency    Vitamin A    Vitamin B12    Hemoglobin A1c    CBC with Platelets and Reflex to Iron Studies    Calcium    Hepatic panel               24 minutes spent by me on the date of the encounter doing chart review, history and exam, documentation and further activities per the note    CHIEF COMPLAINT: Post-bariatric surgery follow-up.     HISTORY OF PRESENT ILLNESS:      2025     7:41 AM   Questions Regarding Prior Weight Loss Surgery Reviewed With Patient   I had the following weight loss procedure Sleeve  Gastrectomy   What year was your surgery? 2024   How has your weight changed since your last visit? I have lost weight   Do you currently have any of the following Heartburn, acid reflux, or GERD (acid reflux disease)?   Do you have any concerns today? No         Plan  Increase wegovy to 2.4   Goals we discussed today:   Getting on transplant list   Finding non-food coping skills for quitting smoking   Labs ordered today[email or web address]  Follow up with other char in 3 months   Dietician appointment scheduled 1/20/25   Keep up the excellent work!       Interval History:   Needs to lose to BMI <35 for kidney transplant. Goal wt <230 lbs (100 lbs weight loss)  ESRD due to longstanding nephrotic range proteinuria and HTN,. Hx of chronic drug use, Excedrin use.  Sober >1 year. Quit smoking January 2023   On dialysis x 1.5 years, Tuesdays Thursdays and Saturdays   Nephrologist: Dr Zaid Pulido     -Sleeve gastrectomy 1/23/24  -Was dealing with nausea, vomiting, abdominal pain, constipation and dehydration over the first 2 weeks post surgery.       -At 1 month post op was feeling much better, once able to eat more variety of foods (was still struggling to tolerate the taste of protein shakes)  - At 1 month post op: had not been taking any of her dialysis medications lately, planned to restart the sensipar the velphoro, calcitriol shortly. Stated that they were making her nauseous and she's felt better since stopping them. At this appointment discussed the importance of restarting these medications for her kidney health, and that we can improve her nausea by taking heart burn medication regularly (pantoprazole 40mg) regularly.       Follow up 4/30/24  Continuing to experiencing heart burn., nausea vomiting . Self discontinued pantoprazole yesterday as she felt it was making her heart burn worse, stated that pepcid and tums were all she needed for heart burn management.  We discussed that tums were not an appropriate chronic treatment for heart burn, and that PPIs (pantoparzole, omeprazole) have been found to be the most effective at heart burn treatment. Glenna declined trialling an alternative PPI at this time due to perceived lack of efficacy with omeprazole and pantoprazole, we discussed the risks and benefits of this decision and Glenna expressed understanding.      Glenna is open to continuing famotidine 20mg BID, and is open to trialing carafate to see if this helps heart burn symptoms.   Will continue to check in with Glenna and offer to try esomeprazole in the future to help with heart burn symptoms     Seen by me 5/23/24  N/V largely improved, no vomiting for last 2 weeks. Unsure what changed, Glenna wonders if things have just been healing better.   Increased appetite, noticed she's been snacking more lately.      Transplant workup a few days ago, a provider there recommended considering restarting wegovy for appetite management.      Discussed risks of starting wegovy given her recent issues with heart burn, nausea, vomiting. Agreed to trial with close monitoring, and will stop if vomiting returns.      100+ lbs down from starting weight   15 lbs down since last visit 1 month ago.      Last seen by me 6/19/24:   4 lbs weight loss since last visit 1 monh ago, 108lbs down from starting weight.   Ongoing heart burn, though feels it's gradually improving. Discussed trialling nexium to see if it helps symptoms improve.      Noticing more loose skin, discussed getting to maintenance weight and then getting plastic surgery referral.      Feeling good with wegovy, seeing great management with cravings. Still struggling with snacking on chips (sweet chili doritos), discussed finding alternative snacking options that maybe still have spicy flavor but fewer associated carbs.      -started esomeprazole for heart burn symptoms     Today in visit 1/9/25  Almost 1 year post op - down 151 lbs  since starting with the program.   Reports things are going a lot better, finding life is easier with her weight loss, increased energy, able to get more done, work more.   Some concerns with KELSEA lately so has been skipping 1 session of dialysis each week- discussed the importance of regular dialysis to mange her disease and preserve her eligibility for a transplant.     Struggling with quitting smoking on and off.   Still getting GERD occasionally- needing pepcid prn for this.   Denies vomiting.   Bothered by excess loose skin, denies seeing rashes in these areas. Plans to address loose skin after kidney transplant.   Establishing care with a new place- [facility].         Anti-obesity medication history:   Current:   Wegovy 1.7mg- working on quitting smoking, increased appetite with this, wondering if she could increase wegovy to highest dose to help with this. Nausea in the past with dose increases     Past/failed/contraindicated:       Recent diet changes: smaller portions. Eating yogurt, 1/2 sandwich (turkey, swiss cheese, lettuce), meat, potatoes. Sometimes a bit of rice. Broccoli or mixed vegetables. 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 More active at work since losing weight. Trying to go to [facility] with boyfriend and doing at-home workouts. Plays racquet ball at [facility]. Doing [employer] workout videos.     Vitamins/Labs: Due for post op labs. Taking a vitamin E, D, and B every other day.     GERD symptoms: a few times a week, managed with pepcid prn     Nausea : only with dose increases of wegovy, none recently     Vomiting : denies     Dysphagia:  denies           Weight History:      1/7/2025     7:41 AM   --   What is your highest lifetime weight? 350   What is your lowest weight since surgery? (In pounds) 165     Initial Weight (lbs): 320 lbs  Weight: 76.7 kg (169 lb)  Last Visits Weight: 96.2 kg (212  lb)  Cumulative weight loss (lbs): 151  Weight Loss Percentage: 47.19%        1/7/2025     7:41 AM   Questions Regarding Co-Morbidities and Health Concerns Reviewed With Patient   Pre-diabetes Never   Diabetes II Never   High Blood Pressure Gone Away   High cholesterol Never   Heartburn/Reflux Stayed the same   Sleep apnea Improved   PCOS Never   Back pain Improved   Joint pain Improved   Lower leg swelling Never           1/7/2025     7:41 AM   Eating Habits   How many meals do you eat per day? 2   Do you snack between meals? Yes   How much food are you eating at each meal? 1/2 cup to 1 cup   Are you able to separate your meals and liquids by at least 30 minutes? Yes   Are you able to avoid liquid calories? Yes           1/7/2025     7:41 AM   Exercise Questions Reviewed With Patient   How often do you exercise? 1 to 2 times per week   What is the duration of your exercise (in minutes)? 20 Minutes   What types of exercise do you do? walking    home gym    other   What keeps you from being more active? I am as active as I can possbily be    Pain    Lack of Time    Too tired       Social History:      1/7/2025     7:41 AM   --   Are you smoking? No   Are you drinking alcohol? No       Medications:  Current Outpatient Medications   Medication Sig Dispense Refill    Semaglutide-Weight Management (WEGOVY) 2.4 MG/0.75ML pen Inject 2.4 mg subcutaneously once a week. 3 mL 3    acetaminophen (TYLENOL) 325 MG tablet Take 325-650 mg by mouth every 4 hours as needed      B Complex-C-Zn-Folic Acid (DIALYVITE/ZINC) TABS Take 1 tablet by mouth every evening      calcitRIOL (ROCALTROL) 0.5 MCG capsule Take 0.5 mcg by mouth every evening (Patient not taking: Reported on 5/20/2024)      cinacalcet (SENSIPAR) 60 MG tablet TAKE 1 TABLET BY MOUTH ONCE A DAY  WITH A MEAL      cinacalcet (SENSIPAR) 90 MG tablet Take 90 mg by mouth every evening      famotidine (PEPCID) 20 MG tablet Take 1 tablet (20 mg) by mouth 2 times daily 60 tablet 2  "   gabapentin (NEURONTIN) 100 MG capsule Take 300 mg by mouth at bedtime      levonorgestrel (KYLEENA) 19.5 MG IUD 1 Intra Uterine Device by Intrauterine route once      rifampin (RIFADIN) 300 MG capsule Take 2 capsules (600 mg) by mouth daily On an empty stomach 60 capsule 3    Semaglutide-Weight Management (WEGOVY) 1.7 MG/0.75ML pen Inject 1.7 mg subcutaneously once a week. 3 mL 2    VELPHORO 500 MG CHEW chewable tablet Take 500 mg by mouth 3 times daily (with meals)      VENTOLIN  (90 Base) MCG/ACT inhaler INHALE 2 PUFFS BY MOUTH EVERY 4 HOURS AS NEEDED FOR SHORTNESS OF BREATH OR COUGH OR EXERCISE      vitamin B-12 (CYANOCOBALAMIN) 2500 MCG sublingual tablet Take 1 tablet (2,500 mcg) by mouth daily 30 tablet 5     No current facility-administered medications for this visit.         1/7/2025     7:41 AM   --   Do you avoid NSAIDs such as (Ibuprofen, Aleve, Naproxen, Advil)? Yes       ROS:  GI:       1/7/2025     7:41 AM   --   Vomiting No   Diarrhea Yes   Constipation No   Swallowing trouble No   Abdominal pain No   Heartburn Yes     Skin:       1/7/2025     7:41 AM   BAR RBS ROS - SKIN   Rash in skin folds No     Psych:       1/7/2025     7:41 AM   --   Depression No   Anxiety No     Female Only:       1/7/2025     7:41 AM   BAR RBS ROS -    Female only Birth control   Stress urinary incontinence No               1/22/2023     5:08 PM   GABBY Score (Last Two)   GABBY Raw Score 32    32   Activation Score 62.6    62.6   GABBY Level 3    3         PHYSICAL EXAM:  Objective    Ht 1.727 m (5' 7.99\")   Wt 76.7 kg (169 lb)   BMI 25.70 kg/m           Vitals:  No vitals were obtained today due to virtual visit.    Physical Exam   GENERAL: alert and no distress  EYES: Eyes grossly normal to inspection.  No discharge or erythema, or obvious scleral/conjunctival abnormalities.  RESP: No audible wheeze, cough, or visible cyanosis.    SKIN: Visible skin clear. No significant rash, abnormal pigmentation or " lesions.  NEURO: Cranial nerves grossly intact.  Mentation and speech appropriate for age.  PSYCH: Appropriate affect, tone, and pace of words        Sincerely,    Kelli Joshi PA-C    The longitudinal plan of care for the diagnosis(es)/condition(s) as documented were addressed during this visit. Due to the added complexity in care, I will continue to support Glenna in the subsequent management and with ongoing continuity of care.

## 2025-01-09 ENCOUNTER — VIRTUAL VISIT (OUTPATIENT)
Dept: ENDOCRINOLOGY | Facility: CLINIC | Age: 35
End: 2025-01-09
Payer: COMMERCIAL

## 2025-01-09 ENCOUNTER — DOCUMENTATION ONLY (OUTPATIENT)
Dept: ENDOCRINOLOGY | Facility: CLINIC | Age: 35
End: 2025-01-09

## 2025-01-09 VITALS — BODY MASS INDEX: 25.61 KG/M2 | WEIGHT: 169 LBS | HEIGHT: 68 IN

## 2025-01-09 DIAGNOSIS — Z98.84 S/P LAPAROSCOPIC SLEEVE GASTRECTOMY: ICD-10-CM

## 2025-01-09 DIAGNOSIS — N18.6 END STAGE RENAL DISEASE (H): ICD-10-CM

## 2025-01-09 DIAGNOSIS — E66.813 CLASS 3 SEVERE OBESITY WITH SERIOUS COMORBIDITY AND BODY MASS INDEX (BMI) OF 45.0 TO 49.9 IN ADULT, UNSPECIFIED OBESITY TYPE (H): Primary | ICD-10-CM

## 2025-01-09 DIAGNOSIS — E66.01 CLASS 3 SEVERE OBESITY WITH SERIOUS COMORBIDITY AND BODY MASS INDEX (BMI) OF 45.0 TO 49.9 IN ADULT, UNSPECIFIED OBESITY TYPE (H): Primary | ICD-10-CM

## 2025-01-09 NOTE — LETTER
2025       RE: Glenna Spears  [address]  [place] [address]     Dear Colleague,    Thank you for referring your patient, Glenna Spears, to the [facility]. Please see a copy of my visit note below.    Virtual Visit Details    Type of service:  Video Visit   Video Start Time: 11:08 AM  Video End Time:11:29 AM    Originating Location (pt. Location): Home    Distant Location (provider location):  Off-site  Platform used for Video Visit: [facility] Bariatric Surgery Note    RE: Glenna Spears  MR#: [id number]  : 1990  VISIT DATE: 2025      Dear Neela Irene,    I had the pleasure of seeing your patient, Glenna Spears, in my post-bariatric surgery assessment clinic.    Assessment & Plan  Problem List Items Addressed This Visit       Obesity - Primary    Relevant Medications    Semaglutide-Weight Management (WEGOVY) 2.4 MG/0.75ML pen    Other Relevant Orders    Parathyroid Hormone Intact    Vitamin D Deficiency    Vitamin A    Vitamin B12    Hemoglobin A1c    CBC with Platelets and Reflex to Iron Studies    Calcium    Hepatic panel     Other Visit Diagnoses       End stage renal disease (H)        Relevant Medications    Semaglutide-Weight Management (WEGOVY) 2.4 MG/0.75ML pen    Other Relevant Orders    Parathyroid Hormone Intact    Vitamin D Deficiency    Vitamin A    Vitamin B12    Hemoglobin A1c    CBC with Platelets and Reflex to Iron Studies    Calcium    Hepatic panel    S/P laparoscopic sleeve gastrectomy        Relevant Medications    Semaglutide-Weight Management (WEGOVY) 2.4 MG/0.75ML pen    Other Relevant Orders    Parathyroid Hormone Intact    Vitamin D Deficiency    Vitamin A    Vitamin B12    Hemoglobin A1c    CBC with Platelets and Reflex to Iron Studies    Calcium    Hepatic panel               24 minutes spent by me on the date of the encounter doing chart  review, history and exam, documentation and further activities per the note    CHIEF COMPLAINT: Post-bariatric surgery follow-up.     HISTORY OF PRESENT ILLNESS:      1/7/2025     7:41 AM   Questions Regarding Prior Weight Loss Surgery Reviewed With Patient   I had the following weight loss procedure Sleeve Gastrectomy   What year was your surgery? 2024   How has your weight changed since your last visit? I have lost weight   Do you currently have any of the following Heartburn, acid reflux, or GERD (acid reflux disease)?   Do you have any concerns today? No         Plan  Increase wegovy to 2.4   Goals we discussed today:   Getting on transplant list   Finding non-food coping skills for quitting smoking   Labs ordered today: post op lab labs- faxed to [facility]  Follow up with other char in 3 months   Dietician appointment scheduled 1/20/25   Keep up the excellent work!       Interval History:   Needs to lose to BMI <35 for kidney transplant. Goal wt <230 lbs (100 lbs weight loss)  ESRD due to longstanding nephrotic range proteinuria and HTN,. Hx of chronic drug use, Excedrin use.  Sober >1 year. Quit smoking January 2023   On dialysis x 1.5 years, Tuesdays Thursdays and Saturdays   Nephrologist: Dr Zaid Pulido     -Sleeve gastrectomy 1/23/24  -Was dealing with nausea, vomiting, abdominal pain, constipation and dehydration over the first 2 weeks post surgery.       -At 1 month post op was feeling much better, once able to eat more variety of foods (was still struggling to tolerate the taste of protein shakes)  - At 1 month post op: had not been taking any of her dialysis medications lately, planned to restart the sensipar the velphoro, calcitriol shortly. Stated that they were making her nauseous and she's felt better since stopping them. At this appointment discussed the importance of restarting these medications for her kidney health, and that we can improve her nausea by taking  heart burn medication regularly (pantoprazole 40mg) regularly.       Follow up 4/30/24  Continuing to experiencing heart burn., nausea vomiting . Self discontinued pantoprazole yesterday as she felt it was making her heart burn worse, stated that pepcid and tums were all she needed for heart burn management. We discussed that tums were not an appropriate chronic treatment for heart burn, and that PPIs (pantoparzole, omeprazole) have been found to be the most effective at heart burn treatment. Glenna declined trialling an alternative PPI at this time due to perceived lack of efficacy with omeprazole and pantoprazole, we discussed the risks and benefits of this decision and Glenna expressed understanding.      Glenna is open to continuing famotidine 20mg BID, and is open to trialing carafate to see if this helps heart burn symptoms.   Will continue to check in with Glenna and offer to try esomeprazole in the future to help with heart burn symptoms     Seen by me 5/23/24  N/V largely improved, no vomiting for last 2 weeks. Unsure what changed, Glenna wonders if things have just been healing better.   Increased appetite, noticed she's been snacking more lately.      Transplant workup a few days ago, a provider there recommended considering restarting wegovy for appetite management.      Discussed risks of starting wegovy given her recent issues with heart burn, nausea, vomiting. Agreed to trial with close monitoring, and will stop if vomiting returns.      100+ lbs down from starting weight   15 lbs down since last visit 1 month ago.      Last seen by me 6/19/24:   4 lbs weight loss since last visit 1 monh ago, 108lbs down from starting weight.   Ongoing heart burn, though feels it's gradually improving. Discussed trialling nexium to see if it helps symptoms improve.      Noticing more loose skin, discussed getting to maintenance weight and then getting plastic surgery referral.      Feeling good with wegovy, seeing great  management with cravings. Still struggling with snacking on chips (sweet chili doritos), discussed finding alternative snacking options that maybe still have spicy flavor but fewer associated carbs.      -started esomeprazole for heart burn symptoms     Today in visit 1/9/25  Almost 1 year post op - down 151 lbs since starting with the program.   Reports things are going a lot better, finding life is easier with her weight loss, increased energy, able to get more done, work more.   Some concerns with FMLA lately so has been skipping 1 session of dialysis each week- discussed the importance of regular dialysis to mange her disease and preserve her eligibility for a transplant.     Struggling with quitting smoking on and off.   Still getting GERD occasionally- needing pepcid prn for this.   Denies vomiting.   Bothered by excess loose skin, denies seeing rashes in these areas. Plans to address loose skin after kidney transplant.   Establishing care with a new place- [facility].         Anti-obesity medication history:   Current:   Wegovy 1.7mg- working on quitting smoking, increased appetite with this, wondering if she could increase wegovy to highest dose to help with this. Nausea in the past with dose increases     Past/failed/contraindicated:       Recent diet changes: smaller portions. Eating yogurt, 1/2 sandwich (turkey, swiss cheese, lettuce), meat, potatoes. Sometimes a bit of rice. Broccoli or mixed vegetables. 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 More active at work since losing weight. Trying to go to [facility] with boyfriend and doing at-home workouts. Plays racquet ball at [facility]. Doing [employer] workout videos.     Vitamins/Labs: Due for post op labs. Taking a vitamin E, D, and B every other day.     GERD symptoms: a few times a week, managed with pepcid prn     Nausea : only with dose increases of wegovy, none  recently     Vomiting : denies     Dysphagia:  denies           Weight History:      1/7/2025     7:41 AM   --   What is your highest lifetime weight? 350   What is your lowest weight since surgery? (In pounds) 165     Initial Weight (lbs): 320 lbs  Weight: 76.7 kg (169 lb)  Last Visits Weight: 96.2 kg (212 lb)  Cumulative weight loss (lbs): 151  Weight Loss Percentage: 47.19%        1/7/2025     7:41 AM   Questions Regarding Co-Morbidities and Health Concerns Reviewed With Patient   Pre-diabetes Never   Diabetes II Never   High Blood Pressure Gone Away   High cholesterol Never   Heartburn/Reflux Stayed the same   Sleep apnea Improved   PCOS Never   Back pain Improved   Joint pain Improved   Lower leg swelling Never           1/7/2025     7:41 AM   Eating Habits   How many meals do you eat per day? 2   Do you snack between meals? Yes   How much food are you eating at each meal? 1/2 cup to 1 cup   Are you able to separate your meals and liquids by at least 30 minutes? Yes   Are you able to avoid liquid calories? Yes           1/7/2025     7:41 AM   Exercise Questions Reviewed With Patient   How often do you exercise? 1 to 2 times per week   What is the duration of your exercise (in minutes)? 20 Minutes   What types of exercise do you do? walking    home gym    other   What keeps you from being more active? I am as active as I can possbily be    Pain    Lack of Time    Too tired       Social History:      1/7/2025     7:41 AM   --   Are you smoking? No   Are you drinking alcohol? No       Medications:  Current Outpatient Medications   Medication Sig Dispense Refill     Semaglutide-Weight Management (WEGOVY) 2.4 MG/0.75ML pen Inject 2.4 mg subcutaneously once a week. 3 mL 3     acetaminophen (TYLENOL) 325 MG tablet Take 325-650 mg by mouth every 4 hours as needed       B Complex-C-Zn-Folic Acid (DIALYVITE/ZINC) TABS Take 1 tablet by mouth every evening       calcitRIOL (ROCALTROL) 0.5 MCG capsule Take 0.5 mcg by mouth  "every evening (Patient not taking: Reported on 5/20/2024)       cinacalcet (SENSIPAR) 60 MG tablet TAKE 1 TABLET BY MOUTH ONCE A DAY  WITH A MEAL       cinacalcet (SENSIPAR) 90 MG tablet Take 90 mg by mouth every evening       famotidine (PEPCID) 20 MG tablet Take 1 tablet (20 mg) by mouth 2 times daily 60 tablet 2     gabapentin (NEURONTIN) 100 MG capsule Take 300 mg by mouth at bedtime       levonorgestrel (KYLEENA) 19.5 MG IUD 1 Intra Uterine Device by Intrauterine route once       rifampin (RIFADIN) 300 MG capsule Take 2 capsules (600 mg) by mouth daily On an empty stomach 60 capsule 3     Semaglutide-Weight Management (WEGOVY) 1.7 MG/0.75ML pen Inject 1.7 mg subcutaneously once a week. 3 mL 2     VELPHORO 500 MG CHEW chewable tablet Take 500 mg by mouth 3 times daily (with meals)       VENTOLIN  (90 Base) MCG/ACT inhaler INHALE 2 PUFFS BY MOUTH EVERY 4 HOURS AS NEEDED FOR SHORTNESS OF BREATH OR COUGH OR EXERCISE       vitamin B-12 (CYANOCOBALAMIN) 2500 MCG sublingual tablet Take 1 tablet (2,500 mcg) by mouth daily 30 tablet 5     No current facility-administered medications for this visit.         1/7/2025     7:41 AM   --   Do you avoid NSAIDs such as (Ibuprofen, Aleve, Naproxen, Advil)? Yes       ROS:  GI:       1/7/2025     7:41 AM   --   Vomiting No   Diarrhea Yes   Constipation No   Swallowing trouble No   Abdominal pain No   Heartburn Yes     Skin:       1/7/2025     7:41 AM   BAR RBS ROS - SKIN   Rash in skin folds No     Psych:       1/7/2025     7:41 AM   --   Depression No   Anxiety No     Female Only:       1/7/2025     7:41 AM   BAR RBS ROS -    Female only Birth control   Stress urinary incontinence No               1/22/2023     5:08 PM   GABBY Score (Last Two)   GABBY Raw Score 32    32   Activation Score 62.6    62.6   GABBY Level 3    3         PHYSICAL EXAM:  Objective   Ht 1.727 m (5' 7.99\")   Wt 76.7 kg (169 lb)   BMI 25.70 kg/m           Vitals:  No vitals were obtained today due to " virtual visit.    Physical Exam   GENERAL: alert and no distress  EYES: Eyes grossly normal to inspection.  No discharge or erythema, or obvious scleral/conjunctival abnormalities.  RESP: No audible wheeze, cough, or visible cyanosis.    SKIN: Visible skin clear. No significant rash, abnormal pigmentation or lesions.  NEURO: Cranial nerves grossly intact.  Mentation and speech appropriate for age.  PSYCH: Appropriate affect, tone, and pace of words        Sincerely,    Kelli Joshi PA-C    The longitudinal plan of care for the diagnosis(es)/condition(s) as documented were addressed during this visit. Due to the added complexity in care, I will continue to support Glenna in the subsequent management and with ongoing continuity of care.       Again, thank you for allowing me to participate in the care of your patient.      Sincerely,    Kelli Joshi PA-C

## 2025-01-09 NOTE — PATIENT INSTRUCTIONS
"Thank you for allowing us the privilege of caring for you. We hope we provided you with the excellent service you deserve.   Please let us know if there is anything else we can do for you so that we can be sure you are completely satisfied with your care experience.    To ensure the quality of our services you may be receiving a patient satisfaction survey from an independent patient satisfaction monitoring company.    The greatest compliment you can give is a \"Likely to Recommend\"    Your visit was with Kelli Joshi PA-C today.    Instructions per today's visit:     Audie Spears, it was great to visit with you today.  Here is a review of our visit.  If our clinic scheduler is not able to reach you please call [phone] to schedule your next appointments.    Plan  Increase wegovy to 2.4   Goals we discussed today:   Getting on transplant list   Finding non-food coping skills for quitting smoking   Labs ordered today: post op lab labs- faxed to [facility]  Follow up with other char in 3 months   Dietician appointment scheduled 1/20/25   Keep up the excellent work!       Information about Video Visits with [facility]: video visit information  _________________________________________________________________________________________________________________________________________________________  If you are asked by your clinic team to have your blood pressure checked:  [facility] do offer several locations for blood pressure checks. Please follow the below link to schedule an appointment. Scheduling an appointment at the pharmacy for a blood pressure check is now preferred.    Appointment Plus ([email or web address])  _________________________________________________________________________________________________________________________________________________________  Important contact and scheduling information:  Please call our contact center at [phone] to schedule your " next appointments.  To find a lab location near you, please call [phone].  For any nursing questions or concerns call Brynn Larson LPN at [phone] or Gris Mix RN at [phone]  Please call during clinic hours Monday through Friday 8:00a - 4:00p if you have questions or you can contact us via interspireSubmitt at anytime and we will reply during clinic hours.    Lab results will be communicated through My Chart or letter (if My Chart not used). Please call the clinic if you have not received communication after 1 week or if you have any questions.?  Clinic Fax: [phone]    _Interested in working with a health ?  Health coaches work with you to improve your overall health and wellbeing.  They look at the whole person, and may involve discussion of different areas of life, including, but not limited to the four pillars of health (sleep, exercise, nutrition, and stress management). Discuss with your care team if you would like to start working a health .  Health Coaching-3 Pack: Schedule by calling [phone]    $99 for three health coaching visits    Visits may be done in person or via phone    Coaching is a partnership between the  and the client; Coaches do not prescribe or diagnose    Coaching helps inspire the client to reach his/her personal goals   _________________________________________________________________________________________________________________________________________________________  __________  [facility] Get Moving Program  Our team of physical therapists is trained to help you understand and take control of your condition. They will perform a thorough evaluation to determine your ability for activity and develop a customized plan to fit your goals and physical ability.  Scheduling: Unsure if the Get Moving program is right for you? Discuss the program with your medical provider or diabetes educator. You can also call us at [phone] to ask  questions or schedule an appointment.   [facility] Get Moving Program  ____________________________________________________________________________________________________________________________________________________________________________        Thank samantha,   [facility] Comprehensive Weight Management Team

## 2025-01-09 NOTE — NURSING NOTE
Is the patient currently in the state of [place]? YES    Location: home    Visit mode:VIDEO    If the visit is dropped, the patient can be reconnected by: VIDEO VISIT: Text to cell phone:   Telephone Information:   Mobile [phone]       Will anyone else be joining the visit? NO  (If patient encounters technical issues they should call [phone]    Are changes needed to the allergy or medication list? No    Are refills needed on medications prescribed by this physician? NO    Reason for visit: CATHERINE Segovia, Virtual Visit Facilitator    QNR Status: completed

## 2025-01-13 RX ORDER — SEMAGLUTIDE 1.7 MG/.75ML
1.7 INJECTION, SOLUTION SUBCUTANEOUS WEEKLY
Qty: 3 ML | Refills: 2 | Status: SHIPPED | OUTPATIENT
Start: 2025-01-13

## 2025-01-13 NOTE — TELEPHONE ENCOUNTER
"Semaglutide-Weight Management (WEGOVY) 1.7 MG/0.75ML pen   Start: 10/17/2024   Disp  3 ml   R  2    1/9/2025  [facility]      Kelli Joshi, PA-C  Physician Assistant - Medical     Nv: none    \"Follow up with other char in 3 month\"     Scheduling has been notified to contact the pt for appointment.      "

## 2025-01-13 NOTE — PROGRESS NOTES
"Video-Visit Details    Type of service:  Video Visit    Video Start Time: 11:01 am  Video End Time: 11:10 am    Originating Location (pt. Location): Home    Distant Location (provider location): Offsite (providers home)     Platform used for Video Visit: Aspyra    Nutrition Assessment  Reason For Visit:  Glenna Spears is a 34 year old female presenting today for nutrition follow-up, 12 months s/p laparoscopic sleeve gastrectomy on 1/23/24 with Dr Smith.     Patient referred by Katey CONRAD on January 29, 2024.    Anthropometrics  Initial Consult Weight: 330 lbs  Day of Surgery Weight(1/23/24): 277 lbs    Estimated body mass index is 24.94 kg/m  as calculated from the following:    Height as of this encounter: 1.727 m (5' 8\").    Weight as of this encounter: 74.4 kg (164 lb).    Current Weight: 164 lbs     Weight loss: -166 lbs (50.3%) from initial consult; -113 lbs from day of surgery    Current Vitamins/Minerals:  Taking a E, D and B complex regularly     Medication:  Wegovy    Nutrition History  Not seen by RD since prior to surgery - no showed 1/30 1 week post op appointment.     Pt reports the first couple of weeks were really rough. [place] like she was dying per report- super hungry, vomiting, nausea, constipation, etc.   Felt better when she started eating more and stopping her dialysis meds (were making her nauseous) per pt. Aware she needs to be taking her meds as recommended - is going to check in with her team on decreasing some of her doses.     Feels really good today. Does not feel as well on dialysis days.     Eating lots of chicken, eggs and protein shakes. Admits she jumped ahead in the diet progression schedule sooner than recommended. Chewing food really well. Hard to get protein shakes in but aiming for 2 per day. Also doing small amounts of mashed potatoes. Finding food is easier to tolerate than liquids right now which is why she jumped ahead. Reminded patient of soft diet only until March " "19th.     Hydration: water, propel, ensure (aware there are more renal friendly options), and occ diluted juice.    - Not measuring but reports constantly sipping    Going back to work 3/10.     June 2024:  Per SOT RD on 5/20/24: \"Pt admits to jumping ahead in diet progression a lot / not per typical direction of bariatric team. She tells me she was not tolerating the protein drinks and was starting solids sooner than she was supposed to.\"    Pt reports tolerating 4 or 5 bites at a time. Yogurt is best tolerated food currently. Was having lots of nausea/vomiting, wasn't tolerating medications initially (now is able to). Not able to tolerate carbs/starch like rice- usually chewing it well and waiting a couple of minutes before having more. Also not tolerating Ensure/Premier protein or eggs. Tolerating chips well.    Diet recall:  B- coffee, eats cheese/crackers or yogurt  L- cheese/crackers or yogurt  D- 11pm, usually leftovers- rice, meat, vegetable  Snacks: chips    Jan 2025    Pt shared she has a bad diet but is eating smaller portions of the foods. Admits to eating a lot of phosphorus.     Pt has been noticing increased appetite with quitting smoking. Saw Kelli 1/9/25 and increased Wegovy dose.    Per PA note 1/9/25  \"smaller portions. Eating yogurt, 1/2 sandwich (turkey, swiss cheese, lettuce), meat, potatoes. Sometimes a bit of rice. Broccoli or mixed vegetables. Fruits: apples, clementines\"    No concerns with hydration. Not having much pulled at dialysis. Drinking some regular gatorade but gets minis and just takes a few sips. Drinks 100% juice \"but not too much\" Aware zero sugar is recommended.     Previous Goals:  1) Continue with the bariatric regular diet as tolerated - Met  2) Work towards 60 gm protein/day.  3) Consume 48-64+ oz fluids daily- between meals only once on puree diet  4) Eat slowly (>20 min/meal), chewing well to smooth consistency once on the bariatric soft diet.  5) Limit portions to " ~1/2 to 3/4 cup/meal until 6 months post op.  6) Aim to replace chip consumption with other foods.   7) Aim to start using clear protein drinks once/day  8) Start taking MVI every day. Add in additional B12 aiming for total of 500 mcg/day    Nutrition Prescription:  Grams Protein: 60 (minimum)  Amount of Fluid: 48-64 oz      Nutrition Diagnosis  Obesity related to hx of positive energy balance now s/p bariatric surgery as evidenced by BMI >30.  - Resolved. Pt being followed for continued nutrition support following surgery and due to hx of obesity     Intervention  Intervention At Appointment:  Reviewed current dietary habits  Reviewed bariatric regular diet, foods anticipated to be better tolerated, and importance of adequate protein/hydration.  Reviewed and modified previous goals  Answered pt questions  Coordination of care   Nutrition education   AVS and handouts via [employer]     Expected Engagement: fair-good    Goals:  1) Continue with the bariatric regular diet as tolerated  2) Work towards 60+ gm protein/day.  3) Consume 48-64+ oz fluids daily- between meals   4) Eat slowly (>20 min/meal), chewing well to smooth consistency once on the bariatric soft diet.  5) Limit portions, stop when satiated   6) Take supplements as recommended     Kidney Friendly Meal Replacements:  Meal replacement criteria per servin calories or less, at least 20 gm protein, less than 10 gm sugar, less than 200 mg potassium and less than 150 mg phosphorus.    Protein powders mixed with water:  Pure Protein whey protein (140 calories, 23 gm protein, 110 mg potassium, 80 mg phos)  Guy Sathish whey protein (vanilla/strawberry)(110 calories, 25 gm protein, 180 mg potassium)  Premier Protein whey protein (vanilla) (150 calories, 30 gm protein, 160 mg potassium)  Integrated whey protein (vanilla/strawberry) (110-120 calories, 20 gm protein, 140-150 mg potassium, 125 mg phos)    Clear Liquids:  LiquaCel (1 oz/serv) - 100 calories, 16 gm  protein, 10 mg potassium, 20 mg phos  Bipro (16.9 oz/serv) - 90 calories, 20 gm protein, 0 mg potassium/phos  Gelatein 20 (4 oz/serv) (lime/orange/fruit punch/grape) - 80-90 calories, 20 gm protein, 120-180 mg potassium, 0 mg phos  Strawberry Banana Proti-15 Gelatin ([employer] [place] e-store: [email or web address]) (4 oz/serv) - 70 calories, 16 gm protein, 55 mg potassium    Protein bars:  Pure Protein bars   Balance Bars  Zone protein       Post-op Diet Handouts:  Diet Guidelines after Weight-loss Surgery  [email or web address]     Your Stage 5 Diet: Regular Foods  [email or web address]    Supplements after Sleeve Gastrectomy, Gastric Bypass or Single Anastomosis Duodenal Switch  [email or web address]    Keeping Track of Fluids  [email or web address]    Follow-Up: 1  year    Time spent with patient: 9 minutes.  MARISSA Messer, RD, LD  Clinic #: [phone]

## 2025-01-16 ENCOUNTER — TELEPHONE (OUTPATIENT)
Dept: ENDOCRINOLOGY | Facility: CLINIC | Age: 35
End: 2025-01-16
Payer: COMMERCIAL

## 2025-01-16 NOTE — TELEPHONE ENCOUNTER
Left Voicemail (1st Attempt) and Sent [phone] (1st Attempt) for the patient to call back and schedule the following:    Appointment type: Return Bariatric  Appointment mode: In Person or Virtual Visit  Provider: Kelli Joshi PA-C or another LEWIS  Return date: Approx. 4 months  Specialty phone number: [phone]    Additional Notes:

## 2025-01-16 NOTE — TELEPHONE ENCOUNTER
Patient confirmed scheduled appointment:     Date: 7/1/5  Time: 1 PM  Visit type: Return Bariatric  Visit mode: Virtual Visit  Provider:  Kelli Joshi PA-C  [place] [facility] [place]    Additional Notes:

## 2025-01-20 ENCOUNTER — VIRTUAL VISIT (OUTPATIENT)
Dept: ENDOCRINOLOGY | Facility: CLINIC | Age: 35
End: 2025-01-20
Payer: COMMERCIAL

## 2025-01-20 ENCOUNTER — TELEPHONE (OUTPATIENT)
Dept: TRANSPLANT | Facility: CLINIC | Age: 35
End: 2025-01-20

## 2025-01-20 VITALS — WEIGHT: 164 LBS | HEIGHT: 68 IN | BODY MASS INDEX: 24.86 KG/M2

## 2025-01-20 DIAGNOSIS — Z98.84 S/P LAPAROSCOPIC SLEEVE GASTRECTOMY: ICD-10-CM

## 2025-01-20 DIAGNOSIS — N18.6 END STAGE RENAL DISEASE (H): ICD-10-CM

## 2025-01-20 DIAGNOSIS — Z71.3 NUTRITIONAL COUNSELING: Primary | ICD-10-CM

## 2025-01-20 DIAGNOSIS — Z86.39 HISTORY OF MORBID OBESITY: ICD-10-CM

## 2025-01-20 PROCEDURE — 99207 PR NO CHARGE LOS: CPT | Mod: 95 | Performed by: DIETITIAN, REGISTERED

## 2025-01-20 PROCEDURE — 97803 MED NUTRITION INDIV SUBSEQ: CPT | Mod: 95 | Performed by: DIETITIAN, REGISTERED

## 2025-01-20 ASSESSMENT — PAIN SCALES - GENERAL: PAINLEVEL_OUTOF10: NO PAIN (0)

## 2025-01-20 NOTE — LETTER
"1/20/2025       RE: Glenna Spears  [address]  [place] [address]     Dear Colleague,    Thank you for referring your patient, Glenna Spears, to the [facility] [place] at [facility]. Please see a copy of my visit note below.    Video-Visit Details    Type of service:  Video Visit    Video Start Time: 11:01 am  Video End Time: 11:10 am    Originating Location (pt. Location): Home    Distant Location (provider location): Offsite (providers home)     Platform used for Video Visit: [employer]    Nutrition Assessment  Reason For Visit:  Glenna Spears is a 34 year old female presenting today for nutrition follow-up, 12 months s/p laparoscopic sleeve gastrectomy on 1/23/24 with Dr Smith.     Patient referred by Katey CONRAD on January 29, 2024.    Anthropometrics  Initial Consult Weight: 330 lbs  Day of Surgery Weight(1/23/24): 277 lbs    Estimated body mass index is 24.94 kg/m  as calculated from the following:    Height as of this encounter: 1.727 m (5' 8\").    Weight as of this encounter: 74.4 kg (164 lb).    Current Weight: 164 lbs     Weight loss: -166 lbs (50.3%) from initial consult; -113 lbs from day of surgery    Current Vitamins/Minerals:  Taking a E, D and B complex regularly     Medication:  Wegovy    Nutrition History  Not seen by RD since prior to surgery - no showed 1/30 1 week post op appointment.     Pt reports the first couple of weeks were really rough. [place] like she was dying per report- super hungry, vomiting, nausea, constipation, etc.   Felt better when she started eating more and stopping her dialysis meds (were making her nauseous) per pt. Aware she needs to be taking her meds as recommended - is going to check in with her team on decreasing some of her doses.     Feels really good today. Does not feel as well on dialysis days.     Eating lots of chicken, eggs and protein shakes. Admits she jumped ahead in " "the diet progression schedule sooner than recommended. Chewing food really well. Hard to get protein shakes in but aiming for 2 per day. Also doing small amounts of mashed potatoes. Finding food is easier to tolerate than liquids right now which is why she jumped ahead. Reminded patient of soft diet only until March 19th.     Hydration: water, propel, ensure (aware there are more renal friendly options), and occ diluted juice.    - Not measuring but reports constantly sipping    Going back to work 3/10.     June 2024:  Per SOT RD on 5/20/24: \"Pt admits to jumping ahead in diet progression a lot / not per typical direction of bariatric team. She tells me she was not tolerating the protein drinks and was starting solids sooner than she was supposed to.\"    Pt reports tolerating 4 or 5 bites at a time. Yogurt is best tolerated food currently. Was having lots of nausea/vomiting, wasn't tolerating medications initially (now is able to). Not able to tolerate carbs/starch like rice- usually chewing it well and waiting a couple of minutes before having more. Also not tolerating Ensure/Premier protein or eggs. Tolerating chips well.    Diet recall:  B- coffee, eats cheese/crackers or yogurt  L- cheese/crackers or yogurt  D- 11pm, usually leftovers- rice, meat, vegetable  Snacks: chips    Jan 2025    Pt shared she has a bad diet but is eating smaller portions of the foods. Admits to eating a lot of phosphorus.     Pt has been noticing increased appetite with quitting smoking. Saw Kelli 1/9/25 and increased Wegovy dose.    Per PA note 1/9/25  \"smaller portions. Eating yogurt, 1/2 sandwich (turkey, swiss cheese, lettuce), meat, potatoes. Sometimes a bit of rice. Broccoli or mixed vegetables. Fruits: apples, clementines\"    No concerns with hydration. Not having much pulled at dialysis. Drinking some regular gatorade but gets minis and just takes a few sips. Drinks 100% juice \"but not too much\" Aware zero sugar is recommended. "     Previous Goals:  1) Continue with the bariatric regular diet as tolerated - Met  2) Work towards 60 gm protein/day.  3) Consume 48-64+ oz fluids daily- between meals only once on puree diet  4) Eat slowly (>20 min/meal), chewing well to smooth consistency once on the bariatric soft diet.  5) Limit portions to ~1/2 to 3/4 cup/meal until 6 months post op.  6) Aim to replace chip consumption with other foods.   7) Aim to start using clear protein drinks once/day  8) Start taking MVI every day. Add in additional B12 aiming for total of 500 mcg/day    Nutrition Prescription:  Grams Protein: 60 (minimum)  Amount of Fluid: 48-64 oz      Nutrition Diagnosis  Obesity related to hx of positive energy balance now s/p bariatric surgery as evidenced by BMI >30.  - Resolved. Pt being followed for continued nutrition support following surgery and due to hx of obesity     Intervention  Intervention At Appointment:  Reviewed current dietary habits  Reviewed bariatric regular diet, foods anticipated to be better tolerated, and importance of adequate protein/hydration.  Reviewed and modified previous goals  Answered pt questions  Coordination of care   Nutrition education   AVS and handouts via [employer]elockt     Expected Engagement: fair-good    Goals:  1) Continue with the bariatric regular diet as tolerated  2) Work towards 60+ gm protein/day.  3) Consume 48-64+ oz fluids daily- between meals   4) Eat slowly (>20 min/meal), chewing well to smooth consistency once on the bariatric soft diet.  5) Limit portions, stop when satiated   6) Take supplements as recommended     Kidney Friendly Meal Replacements:  Meal replacement criteria per servin calories or less, at least 20 gm protein, less than 10 gm sugar, less than 200 mg potassium and less than 150 mg phosphorus.    Protein powders mixed with water:  Pure Protein whey protein (140 calories, 23 gm protein, 110 mg potassium, 80 mg phos)  Guy Bower whey protein  (vanilla/strawberry)(110 calories, 25 gm protein, 180 mg potassium)  Premier Protein whey protein (vanilla) (150 calories, 30 gm protein, 160 mg potassium)  Integrated whey protein (vanilla/strawberry) (110-120 calories, 20 gm protein, 140-150 mg potassium, 125 mg phos)    Clear Liquids:  LiquaCel (1 oz/serv) - 100 calories, 16 gm protein, 10 mg potassium, 20 mg phos  Bipro (16.9 oz/serv) - 90 calories, 20 gm protein, 0 mg potassium/phos  Gelatein 20 (4 oz/serv) (lime/orange/fruit punch/grape) - 80-90 calories, 20 gm protein, 120-180 mg potassium, 0 mg phos  Strawberry Banana Proti-15 Gelatin ([employer] e-store: https://[email or web address]/store) (4 oz/serv) - 70 calories, 16 gm protein, 55 mg potassium    Protein bars:  Pure Protein bars   Balance Bars  Zone protein       Post-op Diet Handouts:  Diet Guidelines after Weight-loss Surgery  [email or web address]     Your Stage 5 Diet: Regular Foods  [email or web address]    Supplements after Sleeve Gastrectomy, Gastric Bypass or Single Anastomosis Duodenal Switch  [email or web address]    Keeping Track of Fluids  [email or web address]    Follow-Up: 1  year    Time spent with patient: 9 minutes.  MARISSA Messer, RD, LD  Clinic #: [phone]            Again, thank you for allowing me to participate in the care of your patient.      Sincerely,    Sydnee Shipley RD

## 2025-01-20 NOTE — NURSING NOTE
Is the patient currently in the state of [place]? YES    Visit mode:VIDEO    If the visit is dropped, the patient can be reconnected by: VIDEO VISIT: Send to e-mail at: [email or web address]    Will anyone else be joining the visit? NO  (If patient encounters technical issues they should call [phone]    How would you like to obtain your AVS? MyChart    Are changes needed to the allergy or medication list? No    Are refills needed on medications prescribed by this physician? NO    Reason for visit: [place]ATH[place]

## 2025-01-20 NOTE — LETTER
PHYSICIAN ORDERS      DATE & TIME ISSUED: 2025 12:25 PM  PATIENT NAME: Glenna Spears   : 1990      [facility] MR# [if applicable]: [id number]     DIAGNOSIS:  Z76.82  ICD-10 CODE: N18.5     CT a/p without contrast [id number]  Cardiolipins IgG and IgM [id number]    Any questions please call: [place] [phone]    Please fax each result same day as resulted/available    Critical lab results page [phone].    .  Pietro Lane in Immunology and Transplantation  Surgical Director, Kidney & Pancreas Transplant Programs  Medical Director, Solid Organ Transplant Unit

## 2025-01-20 NOTE — PATIENT INSTRUCTIONS
Goals:  1) Continue with the bariatric regular diet as tolerated  2) Work towards 60+ gm protein/day.  3) Consume 48-64+ oz fluids daily- between meals   4) Eat slowly (>20 min/meal), chewing well to smooth consistency once on the bariatric soft diet.  5) Limit portions, stop when satiated   6) Take supplements as recommended     Kidney Friendly Meal Replacements:  Meal replacement criteria per servin calories or less, at least 20 gm protein, less than 10 gm sugar, less than 200 mg potassium and less than 150 mg phosphorus.    Protein powders mixed with water:  Pure Protein whey protein (140 calories, 23 gm protein, 110 mg potassium, 80 mg phos)  Guy Sathish whey protein (vanilla/strawberry)(110 calories, 25 gm protein, 180 mg potassium)  Premier Protein whey protein (vanilla) (150 calories, 30 gm protein, 160 mg potassium)  Integrated whey protein (vanilla/strawberry) (110-120 calories, 20 gm protein, 140-150 mg potassium, 125 mg phos)    Clear Liquids:  LiquaCel (1 oz/serv) - 100 calories, 16 gm protein, 10 mg potassium, 20 mg phos  Bipro (16.9 oz/serv) - 90 calories, 20 gm protein, 0 mg potassium/phos  Gelatein 20 (4 oz/serv) (lime/orange/fruit punch/grape) - 80-90 calories, 20 gm protein, 120-180 mg potassium, 0 mg phos  Strawberry Banana Proti-15 Gelatin ([employer] [place] e-store: https://The Smartphone Physical/store) (4 oz/serv) - 70 calories, 16 gm protein, 55 mg potassium    Protein bars:  Pure Protein bars   Balance Bars  Zone protein       Post-op Diet Handouts:  Diet Guidelines after Weight-loss Surgery  [email or web address]     Your Stage 5 Diet: Regular Foods  [email or web address]    Supplements after Sleeve Gastrectomy, Gastric Bypass or Single Anastomosis Duodenal Switch  [email or web address]    Keeping Track of Fluids  [email or web address]    Follow-Up: 1 year    MARISSA Messer, RD, LD  Clinic #: [phone]

## 2025-01-20 NOTE — TELEPHONE ENCOUNTER
Called Glenna to get an update. She has a new PCP and this has been added to the snapshot. [facility] is the beginning of March. She established care with a mental health provider at [facility] in [place]. She is scheduled to meet with the provider on a monthly basis. Will request the intake assessment. Orders for CT a/p and repeat cardiolipins will be resent to new PCP

## 2025-02-17 ENCOUNTER — TRANSFERRED RECORDS (OUTPATIENT)
Dept: HEALTH INFORMATION MANAGEMENT | Facility: CLINIC | Age: 35
End: 2025-02-17
Payer: COMMERCIAL

## 2025-02-18 ENCOUNTER — TEAM CONFERENCE (OUTPATIENT)
Dept: TRANSPLANT | Facility: CLINIC | Age: 35
End: 2025-02-18
Payer: COMMERCIAL

## 2025-02-18 NOTE — TELEPHONE ENCOUNTER
Image Review Meeting on 02/18/2025    ATTENDEES: Dr. Melissa Sexton    IMAGES REVIEWED: CT abd pelvic wo contrast     DECISION: vessel status suitable for kidney transplant    INCIDENTALS: No

## 2025-03-22 ENCOUNTER — HEALTH MAINTENANCE LETTER (OUTPATIENT)
Age: 35
End: 2025-03-22

## 2025-03-24 ENCOUNTER — MYC MEDICAL ADVICE (OUTPATIENT)
Dept: TRANSPLANT | Facility: CLINIC | Age: 35
End: 2025-03-24
Payer: COMMERCIAL

## 2025-03-24 DIAGNOSIS — Z76.82 AWAITING ORGAN TRANSPLANT: Primary | ICD-10-CM

## 2025-03-25 ENCOUNTER — TELEPHONE (OUTPATIENT)
Dept: TRANSPLANT | Facility: CLINIC | Age: 35
End: 2025-03-25
Payer: COMMERCIAL

## 2025-03-25 NOTE — TELEPHONE ENCOUNTER
Spoke with Allyson SONG at dialysis today.  Allyson shared patient does have difficulty dealing with her health management, attending provider appts and testing, and feels like she has a lot to do.  I reviewed patient's pre K tx eval status and noted I have an initial psych consult 01/07/2025 which recommends ongoing therapy but I do not have any additional records for ongoing therapy.  Allyson is not sure if patient has continued ongoing individual therapy or not. Noted patient has taken a long time to complete pre K eval items recommended at RTC with transplant team in May 2024.  Allyson shared patient is capable of managing her care but is not fully sure of why patient is delaying getting things done. Allyson shared she can see patient lashing out at myself frequently with her frustrations.  Allyson plans on speaking with patient at next appt.  I did explain my concern is for post transplant time when patients are required to complete recommendations in a timely manner and cooperate with good communication in our Office. I explained I will call patient this afternoon as well.

## 2025-03-25 NOTE — TELEPHONE ENCOUNTER
Spoke to patient today at length. Reviewed that she has completed recommended items now for her evaluation and that I will review all at tomorrow's Selection Committee. Explained I will call her after the meeting to discuss outcome.  Reviewed the length of time it took patient to complete her evaluation.  Pt confirmed she remembers attending RTC with transplant team on 05/20/2024 because it was an all day set of appts.  Pt does not remember my call with her on 06/03/2024 to discuss the outcome of the meeting.  Pt also sharing today that she did not receive my letter 06/03/2024 and that I just have been coming up with items to complete as we go along.  I explained that nothing is different from the letter 06/03/2024. I discovered today that patient was looking in her [facility] My Chart for my Brea Letter. Instructed pt to see [facility] My Chart for all communications in our health system just as she looks into [facility] My [facility] for that health care system communications. I did explain to patient that post transplant she will have a much greater amount of patient responsibilities to keep track of then pre transplant and so I asked her how she will be able to do this.  I also added that if she does not remain compliant with her post transplant recommendations; taking medications/ labs/ RTC with providers and so on, it is easy to lose a kidney transplant and end up on dialysis again.  Pt then became very frustrated stating she just wants to get on the list and that this is taking too long.  I explained I will review her status at tomorrow's Selection Committee and get back to her afterwards.  Pt expressed fair understanding of all.

## 2025-03-25 NOTE — TELEPHONE ENCOUNTER
Reviewed dialysis provider rounding notes, pt is not compliant with phos binders and has missed 3 runs in the last 3 months due to work schedule.     Called dialysis unit today, confirmed that they do have pt's pra order and will draw on 03/27/2025. They will borrow a pra box for mailing this time.  I also asked to speak with Allyson SONG about patient - they will send her a message today for this.

## 2025-03-27 ENCOUNTER — LAB (OUTPATIENT)
Dept: LAB | Facility: CLINIC | Age: 35
End: 2025-03-27
Payer: COMMERCIAL

## 2025-03-27 DIAGNOSIS — Z76.82 AWAITING ORGAN TRANSPLANT: ICD-10-CM

## 2025-04-07 ENCOUNTER — VIRTUAL VISIT (OUTPATIENT)
Dept: TRANSPLANT | Facility: CLINIC | Age: 35
End: 2025-04-07
Payer: COMMERCIAL

## 2025-04-07 DIAGNOSIS — Z76.82 ORGAN TRANSPLANT CANDIDATE: Primary | ICD-10-CM

## 2025-04-07 NOTE — GROUP NOTE
Date: 4/7/2025    Scheduled Start Time:  8:00 AM   Scheduled End Time:  9:10 AM    Department: [facility]    Facilitator(s): Tete Cabrera RN    Documentation:  Solid Organ Transplant Education      Patient attended the pre-transplant patient education class today. The [facility] website pre-transplant modules were viewed:     Content reviewed:  Living Donation and how to access that program  Paired exchange  Kidney Donor Profile Index (KDPI)  Waiting list issues (right to decline without penalty, high PHS risk donors, what to expect when called with an offer)  Hepatitis C Donor Acceptance education and risks  Hospital experience, length of stay, need to stay locally post-discharge (2-4 weeks)    Surgical complications with video                      Post-surgery lifting and driving restrictions  Post-transplant routines, frequency of lab work and clinic visits  Role of Transplant Coordinator    Participants were informed of the benefits of transplant as well as potential risks such as infection, cancer, and death. The need for total adherence with immunosuppression medications and following transplant regimens was stressed.  The overall evaluation/approval/listing process was reviewed.          Patient:   Glenna Spears    Transplant Episode(s):  Kidney Transplant Evaluation - 2/1/2023    Glenna was actively engaged in transplant class

## 2025-04-10 LAB
PROTOCOL CUTOFF: NORMAL
SA 1  COMMENTS: NORMAL
SA 1 CELL: NORMAL
SA 1 TEST METHOD: NORMAL
SA 2 CELL: NORMAL
SA 2 COMMENTS: NORMAL
SA 2 TEST METHOD: NORMAL
SA1 HI RISK ABY: NORMAL
SA1 MOD RISK ABY: NORMAL
SA2 HI RISK ABY: NORMAL
SA2 MOD RISK ABY: NORMAL
UNACCEPTABLE ANTIGENS: NORMAL
UNOS CPRA: 76

## 2025-04-15 ENCOUNTER — TELEPHONE (OUTPATIENT)
Dept: ENDOCRINOLOGY | Facility: CLINIC | Age: 35
End: 2025-04-15
Payer: COMMERCIAL

## 2025-04-15 NOTE — TELEPHONE ENCOUNTER
Patient confirmed scheduled appointment:     Date: 6/3/25  Time: 2:30 PM  Visit type: Return Weight Management  Visit mode: Virtual Visit  Provider:  Kelli Joshi PA-C  Location: [facility] [place]    Additional Notes:   Rescheduled from 7/1/25        Date: 4/22/25  Time: 8Am  Visit type: Return Med WT MGMT Nutrition  Visit mode: Virtual Visit  Provider:  Sydnee Shipley  Location: [facility] [place]    Additional Notes:

## 2025-04-16 NOTE — PROGRESS NOTES
"Video-Visit Details    Type of service:  Video Visit    Video Start Time: 8:04 am  Video End Time: 8:16 am    Originating Location (pt. Location): Home    Distant Location (provider location): Offsite (providers home)     Platform used for Video Visit: [employer]    Nutrition Assessment  Reason For Visit:  Glenna Spears is a 34 year old female presenting today for nutrition follow-up, 15 months s/p laparoscopic sleeve gastrectomy on 1/23/24 with Dr Smith.     Patient referred by Katey CONRAD on January 29, 2024.    Anthropometrics  Initial Consult Weight: 330 lbs  Day of Surgery Weight(1/23/24): 277 lbs    Estimated body mass index is 24.4 kg/m  as calculated from the following:    Height as of this encounter: 1.727 m (5' 8\").    Weight as of this encounter: 72.8 kg (160 lb 7.9 oz).    Current Weight: 160 lbs     Weight loss: -170 lbs (51.5%) from initial consult; -117 lbs from day of surgery     Current Vitamins/Minerals:  Taking a E, D and B complex regularly     Medication:  Wegovy    Nutrition History    Jan 2025    Pt shared she has a bad diet but is eating smaller portions of the foods. Admits to eating a lot of phosphorus.     Pt has been noticing increased appetite with quitting smoking. Saw Kelli 1/9/25 and increased Wegovy dose.    Per PA note 1/9/25  \"smaller portions. Eating yogurt, 1/2 sandwich (turkey, swiss cheese, lettuce), meat, potatoes. Sometimes a bit of rice. Broccoli or mixed vegetables. Fruits: apples, clementines\"    No concerns with hydration. Not having much pulled at dialysis. Drinking some regular gatorade but gets minis and just takes a few sips. Drinks 100% juice \"but not too much\" Aware zero sugar is recommended.     April 2025:    Weight stable overall. Down 4 lbs since last seen two months ago.     No concerns nutrition wise per pt - eats everything but in very small amounts. Eating sandwiches, salads, \"a lot of dairy still\" - mostly chicken for protein.     Wegovy helping curb " appetite.     Phos continues to be high. Potassium WNL. Pt attributes her high phos to eating a lot of dairy - continues to work on this.    Hydration going well - drinks a little too much however per pt. Pulling 3500 currently. Not peeing as much lately.  Does still drink juice, 100% juice. Tries to have this as her only sugary beverage. No pop (use to be addicted to pop per pt).    PA: played tennis yesterday, active at work 5 days a week. Working out occ. Also playing racquet ball     Previous Goals:  1) Continue with the bariatric regular diet as tolerated  2) Work towards 60+ gm protein/day.  3) Consume 48-64+ oz fluids daily- between meals   4) Eat slowly (>20 min/meal), chewing well to smooth consistency once on the bariatric soft diet.  5) Limit portions, stop when satiated   6) Take supplements as recommended     Nutrition Prescription:  Grams Protein: 60 (minimum)  Amount of Fluid: 48-64 oz      Nutrition Diagnosis  Obesity related to hx of positive energy balance now s/p bariatric surgery as evidenced by BMI >30.  - Resolved. Pt being followed for continued nutrition support following surgery and due to hx of obesity     Intervention  Intervention At Appointment:  Reviewed current dietary habits  Reviewed bariatric regular diet, foods anticipated to be better tolerated, and importance of adequate protein/hydration.  Reviewed and modified previous goals  Answered pt questions  Coordination of care   Nutrition education   AVS and handouts via [employer]     Expected Engagement: fair-good    Goals:  1) Continue with the bariatric regular diet as tolerated  2) Work towards 60+ gm protein/day.  3) Consume 48-64+ oz fluids daily- between meals   4) Eat slowly (>20 min/meal), chewing well to smooth consistency once on the bariatric soft diet.  5) Limit portions, stop when satiated   6) Take supplements as recommended     Kidney Friendly Meal Replacements:  Meal replacement criteria per servin calories or  less, at least 20 gm protein, less than 10 gm sugar, less than 200 mg potassium and less than 150 mg phosphorus.    Protein powders mixed with water:  Pure Protein whey protein (140 calories, 23 gm protein, 110 mg potassium, 80 mg phos)  Guy Sathish whey protein (vanilla/strawberry)(110 calories, 25 gm protein, 180 mg potassium)  Premier Protein whey protein (vanilla) (150 calories, 30 gm protein, 160 mg potassium)  Integrated whey protein (vanilla/strawberry) (110-120 calories, 20 gm protein, 140-150 mg potassium, 125 mg phos)    Clear Liquids:  LiquaCel (1 oz/serv) - 100 calories, 16 gm protein, 10 mg potassium, 20 mg phos  Bipro (16.9 oz/serv) - 90 calories, 20 gm protein, 0 mg potassium/phos  Gelatein 20 (4 oz/serv) (lime/orange/fruit punch/grape) - 80-90 calories, 20 gm protein, 120-180 mg potassium, 0 mg phos  Strawberry Banana Proti-15 Gelatin ([facility] e-store: [email or web address]) (4 oz/serv) - 70 calories, 16 gm protein, 55 mg potassium    Protein bars:  Pure Protein bars   Balance Bars  Zone protein       Post-op Diet Handouts:  Diet Guidelines after Weight-loss Surgery  [email or web address]     Your Stage 5 Diet: Regular Foods  [email or web address]    Supplements after Sleeve Gastrectomy, Gastric Bypass or Single Anastomosis Duodenal Switch  [email or web address]    Keeping Track of Fluids  [email or web address]    Follow-Up: Jan 2026 at minimum    Time spent with patient: 12 minutes.  MARISSA Messer, RD, LD  Clinic #: [phone]

## 2025-04-22 ENCOUNTER — VIRTUAL VISIT (OUTPATIENT)
Dept: ENDOCRINOLOGY | Facility: CLINIC | Age: 35
End: 2025-04-22
Payer: COMMERCIAL

## 2025-04-22 VITALS — BODY MASS INDEX: 24.32 KG/M2 | HEIGHT: 68 IN | WEIGHT: 160.5 LBS

## 2025-04-22 DIAGNOSIS — Z99.2 ESRD (END STAGE RENAL DISEASE) ON DIALYSIS (H): ICD-10-CM

## 2025-04-22 DIAGNOSIS — Z98.84 S/P LAPAROSCOPIC SLEEVE GASTRECTOMY: ICD-10-CM

## 2025-04-22 DIAGNOSIS — Z86.39 HISTORY OF MORBID OBESITY: ICD-10-CM

## 2025-04-22 DIAGNOSIS — Z71.3 NUTRITIONAL COUNSELING: Primary | ICD-10-CM

## 2025-04-22 DIAGNOSIS — N18.6 ESRD (END STAGE RENAL DISEASE) ON DIALYSIS (H): ICD-10-CM

## 2025-04-22 PROCEDURE — 99207 PR NO CHARGE LOS: CPT | Mod: 95 | Performed by: DIETITIAN, REGISTERED

## 2025-04-22 PROCEDURE — 97803 MED NUTRITION INDIV SUBSEQ: CPT | Mod: 95 | Performed by: DIETITIAN, REGISTERED

## 2025-04-22 ASSESSMENT — PAIN SCALES - GENERAL: PAINLEVEL_OUTOF10: NO PAIN (0)

## 2025-04-22 NOTE — NURSING NOTE
Current patient location: clinic    Is the patient currently in the state of [place]? YES    Visit mode: VIDEO    If the visit is dropped, the patient can be reconnected by:VIDEO VISIT: Text to cell phone:   Telephone Information:   Mobile [phone]       Will anyone else be joining the visit? NO  (If patient encounters technical issues they should call [phone]    Are changes needed to the allergy or medication list? N/A    Are refills needed on medications prescribed by this physician? NO    Rooming Documentation:  Not applicable      Reason for visit: RECHECK    Wt other than 24 hrs:    Pain more than one location:  no  [place]

## 2025-04-22 NOTE — PATIENT INSTRUCTIONS
Goals:  1) Continue with the bariatric regular diet as tolerated  2) Work towards 60+ gm protein/day.  3) Consume 48-64+ oz fluids daily- between meals   4) Eat slowly (>20 min/meal), chewing well to smooth consistency once on the bariatric soft diet.  5) Limit portions, stop when satiated   6) Take supplements as recommended     Kidney Friendly Meal Replacements:  Meal replacement criteria per servin calories or less, at least 20 gm protein, less than 10 gm sugar, less than 200 mg potassium and less than 150 mg phosphorus.    Protein powders mixed with water:  Pure Protein whey protein (140 calories, 23 gm protein, 110 mg potassium, 80 mg phos)  Guy Sathish whey protein (vanilla/strawberry)(110 calories, 25 gm protein, 180 mg potassium)  Premier Protein whey protein (vanilla) (150 calories, 30 gm protein, 160 mg potassium)  Integrated whey protein (vanilla/strawberry) (110-120 calories, 20 gm protein, 140-150 mg potassium, 125 mg phos)    Clear Liquids:  LiquaCel (1 oz/serv) - 100 calories, 16 gm protein, 10 mg potassium, 20 mg phos  Bipro (16.9 oz/serv) - 90 calories, 20 gm protein, 0 mg potassium/phos  Gelatein 20 (4 oz/serv) (lime/orange/fruit punch/grape) - 80-90 calories, 20 gm protein, 120-180 mg potassium, 0 mg phos  Strawberry Banana Proti-15 Gelatin ( [employer] [place] e-store: [email or web address]) (4 oz/serv) - 70 calories, 16 gm protein, 55 mg potassium    Protein bars:  Pure Protein bars   Balance Bars  Zone protein       Post-op Diet Handouts:  Diet Guidelines after Weight-loss Surgery  [email or web address]     Your Stage 5 Diet: Regular Foods  [email or web address]    Supplements after Sleeve Gastrectomy, Gastric Bypass or Single Anastomosis Duodenal Switch  [email or web address]    Keeping Track of Fluids  [email or web address]    Follow-Up: 2026 at minimum    MARISSA Messer, RD, LD  Clinic #: [phone]

## 2025-04-22 NOTE — LETTER
"4/22/2025       RE: Glenna Spears  [address]  [place] [address]     Dear Colleague,    Thank you for referring your patient, Glenna Spears, to the [facility] [place] [facility]. Please see a copy of my visit note below.    Video-Visit Details    Type of service:  Video Visit    Video Start Time: 8:04 am  Video End Time: 8:16 am    Originating Location (pt. Location): Home    Distant Location (provider location): Offsite (providers home)     Platform used for Video Visit: [employer]    Nutrition Assessment  Reason For Visit:  Glenna Spears is a 34 year old female presenting today for nutrition follow-up, 15 months s/p laparoscopic sleeve gastrectomy on 1/23/24 with Dr Smith.     Patient referred by Katey CONRAD on January 29, 2024.    Anthropometrics  Initial Consult Weight: 330 lbs  Day of Surgery Weight(1/23/24): 277 lbs    Estimated body mass index is 24.4 kg/m  as calculated from the following:    Height as of this encounter: 1.727 m (5' 8\").    Weight as of this encounter: 72.8 kg (160 lb 7.9 oz).    Current Weight: 160 lbs     Weight loss: -170 lbs (51.5%) from initial consult; -117 lbs from day of surgery     Current Vitamins/Minerals:  Taking a E, D and B complex regularly     Medication:  Wegovy    Nutrition History    Jan 2025    Pt shared she has a bad diet but is eating smaller portions of the foods. Admits to eating a lot of phosphorus.     Pt has been noticing increased appetite with quitting smoking. Saw Kelli 1/9/25 and increased Wegovy dose.    Per PA note 1/9/25  \"smaller portions. Eating yogurt, 1/2 sandwich (turkey, swiss cheese, lettuce), meat, potatoes. Sometimes a bit of rice. Broccoli or mixed vegetables. Fruits: apples, clementines\"    No concerns with hydration. Not having much pulled at dialysis. Drinking some regular gatorade but gets minis and just takes a few sips. Drinks 100% juice \"but " "not too much\" Aware zero sugar is recommended.     April 2025:    Weight stable overall. Down 4 lbs since last seen two months ago.     No concerns nutrition wise per pt - eats everything but in very small amounts. Eating sandwiches, salads, \"a lot of dairy still\" - mostly chicken for protein.     Wegovy helping curb appetite.     Phos continues to be high. Potassium WNL. Pt attributes her high phos to eating a lot of dairy - continues to work on this.    Hydration going well - drinks a little too much however per pt. Pulling 3500 currently. Not peeing as much lately.  Does still drink juice, 100% juice. Tries to have this as her only sugary beverage. No pop (use to be addicted to pop per pt).    PA: played tennis yesterday, active at work 5 days a week. Working out occ. Also playing racquet ball     Previous Goals:  1) Continue with the bariatric regular diet as tolerated  2) Work towards 60+ gm protein/day.  3) Consume 48-64+ oz fluids daily- between meals   4) Eat slowly (>20 min/meal), chewing well to smooth consistency once on the bariatric soft diet.  5) Limit portions, stop when satiated   6) Take supplements as recommended     Nutrition Prescription:  Grams Protein: 60 (minimum)  Amount of Fluid: 48-64 oz      Nutrition Diagnosis  Obesity related to hx of positive energy balance now s/p bariatric surgery as evidenced by BMI >30.  - Resolved. Pt being followed for continued nutrition support following surgery and due to hx of obesity     Intervention  Intervention At Appointment:  Reviewed current dietary habits  Reviewed bariatric regular diet, foods anticipated to be better tolerated, and importance of adequate protein/hydration.  Reviewed and modified previous goals  Answered pt questions  Coordination of care   Nutrition education   AVS and handouts via [employer]     Expected Engagement: fair-good    Goals:  1) Continue with the bariatric regular diet as tolerated  2) Work towards 60+ gm protein/day.  3) " Consume 48-64+ oz fluids daily- between meals   4) Eat slowly (>20 min/meal), chewing well to smooth consistency once on the bariatric soft diet.  5) Limit portions, stop when satiated   6) Take supplements as recommended     Kidney Friendly Meal Replacements:  Meal replacement criteria per servin calories or less, at least 20 gm protein, less than 10 gm sugar, less than 200 mg potassium and less than 150 mg phosphorus.    Protein powders mixed with water:  Pure Protein whey protein (140 calories, 23 gm protein, 110 mg potassium, 80 mg phos)  Guy Sathish whey protein (vanilla/strawberry)(110 calories, 25 gm protein, 180 mg potassium)  Premier Protein whey protein (vanilla) (150 calories, 30 gm protein, 160 mg potassium)  Integrated whey protein (vanilla/strawberry) (110-120 calories, 20 gm protein, 140-150 mg potassium, 125 mg phos)    Clear Liquids:  LiquaCel (1 oz/serv) - 100 calories, 16 gm protein, 10 mg potassium, 20 mg phos  Bipro (16.9 oz/serv) - 90 calories, 20 gm protein, 0 mg potassium/phos  Gelatein 20 (4 oz/serv) (lime/orange/fruit punch/grape) - 80-90 calories, 20 gm protein, 120-180 mg potassium, 0 mg phos  Strawberry Banana Proti-15 Gelatin ([facility] e-store: [email or web address]) (4 oz/serv) - 70 calories, 16 gm protein, 55 mg potassium    Protein bars:  Pure Protein bars   Balance Bars  Zone protein       Post-op Diet Handouts:  Diet Guidelines after Weight-loss Surgery  [email or web address]     Your Stage 5 Diet: Regular Foods  [email or web address]    Supplements after Sleeve Gastrectomy, Gastric Bypass or Single Anastomosis Duodenal Switch  [email or web address]    Keeping Track of Fluids  [email or web address]    Follow-Up: 2026 at minimum    Time spent with patient: 12 minutes.  MARISSA Messer, RD, LD  Clinic #: [phone]            Again, thank you for allowing me to participate in the care of your patient.       Sincerely,    Sydnee Shipley RD

## 2025-04-28 ENCOUNTER — TELEPHONE (OUTPATIENT)
Dept: TRANSPLANT | Facility: CLINIC | Age: 35
End: 2025-04-28
Payer: COMMERCIAL

## 2025-04-28 NOTE — TELEPHONE ENCOUNTER
Called patient today and spoke with her. Explained her status was reviewed at our Selection Committee on 03/26/2025 and that she is approved for active listing on the kidney transplant waitlist.  I explained right now her insurance prior auth is pending for active listing. Explained I will watch for insur prior auth approval and will call her then and list her. Pt expressed very good understanding of all and was in good agreement with the plan.

## 2025-05-02 ENCOUNTER — TELEPHONE (OUTPATIENT)
Dept: TRANSPLANT | Facility: CLINIC | Age: 35
End: 2025-05-02
Payer: COMMERCIAL

## 2025-05-02 NOTE — LETTER
May 2, 2025    Glenna Spears  [address]   [place] [address]      Dear Ms. Spears,    This letter is sent to confirm that you have completed your transplant work-up and you are a candidate in the kidney transplant program at the [facility].  You were placed on the kidney ACTIVE on 2025.   Your wait time accrual start date is the same as the start date of your chronic dialysis which is 2022.     Per our routine Office workflow, you will now be transferred to the kidney transplant waitlist coordinator team. Your new coordinator is Suzanna TILLEY assisted by Deborah HARRISON. Either can be reached by calling our Main Office Number in the upper right hand corner of this letter.     You are now ACTIVE on the waitlist and an organ becomes available, a coordinator will need to speak to you immediately.  You could be contacted at any time during the day or night as an organ could become available at any time.  Please make certain our office always has your current telephone numbers and address.      Items we will need from you:    We have received approval from your insurance company for the transplant procedure.  It is critical that you notify us if there is any change in your insurance.  It is also important that you familiarize yourself with the details of your specific insurance policy.  Our patient  is available to assist you if you should have any questions regarding your coverage.    An ALA or PRA blood sample needs to be sent here every 3 months to match you with  donors or any potential living donors. Your next sample is due 2025.  Please continue to have this drawn at your dialysis unit.     During this waiting period, we may request additional periodic laboratory tests with your primary physician.  It will be your responsibility to remind your physician to forward your results to the Transplant Office.    We need to be kept  informed of any changes in your medical condition such as:    changes in your medications,   significant changes in your health            significant infections (such as pneumonia or abscesses)  blood transfusions  any condition which requires hospitalization  any surgery    Remember to complete any routine cancer screening tests required before your transplant.  This includes colonoscopy; prostate screening for men, and mammogram and gynecologic testing for women, as well as dental work.  Your primary care clinic can assist you with arranging for these exams.  Remind your caregivers to forward copies of the records and final reports.      We want you to know that the [facility] Transplant Program has physician and surgeon coverage 24 hours a day, 365 days a year, and is readily available to facilitate organ acceptance, procurement, and transplantation in a timely manner. Our additional transplant surgeons and physicians are credentialed by the hospital to provide transplant services and can independently manage the care of transplant patients. Our additional transplant surgeons are also able to independently perform transplant operations and organ procurement procedures. Additionally, our transplant surgeons and physicians will not be on call for two or more transplant programs more than 30 miles apart unless the circumstances have been reviewed and approved by the [employer] for Organ Sharing (UNOS) Membership and Professional Standards Committee (MPSC). Finally, our primary physicians and primary surgeons are not designated as the primary surgeon or primary physician at more than 1 transplant hospital. If this coverage changes or there are substantial program changes, you will be notified in writing by letter.    Attached is a letter from UNOS that describes the services and information offered to patients by UNOS and the Organ Procurement and Transplantation Network (OPTN).    We appreciate having  had the opportunity to participate in your care.  If you have questions, please feel free to call the Transplant Office at [phone] or [phone].      Sincerely,  Ilana     Solid Organ Transplant  [facility]      Enclosures: UNOS Letter  cc: Dr. Neela Irene, Dr. Cruz Mar, [facility] [place]                   The Organ Procurement and Transplantation Network Toll-free patient services line:  9-168-817-8088  Your resource for organ transplant information    Staffed 8:30 am - 5:00 pm ET Monday - Friday Leave a message 24/7 to receive a call back    The Organ Procurement and Transplantation Network (OPTN) is the national transplant system. It makes the policies that decide how donated organs are matched to patients waiting for a transplant. The OPTN:    Makes sure donated organs get matched to people on the transplant waiting list  Tells people about the donation and transplant processes  Makes sure that the public knows about the need for more organ and tissue donations    The OPTN has a free patient services line that you can call to:  Get more information about:  Organ donation and organ transplants  Donation and transplant policies  Get an information kit with:  A list of transplant hospitals  Waiting list information  Talk about any questions you may have about your transplant hospital or organ procurement organization. The staff will do their best to help you or point you to others who may help.  Find out how you can volunteer with the OPTN and help shape transplant policy     The patient services line number is: 1-651-926-9971    Patient services line staff CANNOT answer questions about your own medical care, including:  Waiting list status  Test results  Medical records  You will need to call your transplant hospital for this information.    The following websites have more information  about transplantation and donation:    OPTN: https://optn.transplant.hrsa.gov/    For potential living donors and transplant recipients:    Living with transplant: https://www.transplantliving.org/    Living donation process: https://optn.transplant.hrsa.gov/living-donation/    Financial assistance: https://www.livingdonorassistance.org/    Transplantation data: https://www.srtr.org/    Organ donation: https://www.organdonor.gov/    Volunteer with the OPTN: https://optn.transplant.hrsa.gov/get-involved/

## 2025-05-02 NOTE — TELEPHONE ENCOUNTER
Listed patient today on kidney waitlist on ACTIVE status due to she meets all criteria for this.  Pt does qualify for wait time with a dialysis start date of 2022 per 2728 form.  Pt listed for option of hep B core AB offers as she is hep B surface AB positive. Generated listing letter in EPIC - electronically sent to pt/ providers.     Called patient to inform them of eligibility of listing to active status.  Pt very happy and agreeable to this.   -Verified contact information as documented in EPIC  -Informed patient that a  donor offer can happen at any time (24 hours/day, 7 days/week)  -Instructed patient about importance of having PRAs drawn every 3 months via: (Mailers/[facility])  -Reviewed organ offer process including request to accept or deny offer, informing coordinator of any recent infections  -Reviewed timeframe after call with organ offer (process for admission to hospital and pre-op testing and work and timeframe)  -Discussed expected length of surgical procedure, expected inpatient length of stay post transplant, and potential to stay locally post transplant.    -Encouraged patient to have live donors register.   -Explained per our routine Office workflow, she will now be transferred to the kidney transplant waitlist team. Explained her new coordinator is Suzanna TILLEY assisted by Deborah HARRISON and that either can be reached by calling our Main Office Number.   Provided name and contact information for additional questions or concerns.  Pt expressed excellent understanding of all and was in good agreement with the plan.

## 2025-05-06 ENCOUNTER — DOCUMENTATION ONLY (OUTPATIENT)
Dept: TRANSPLANT | Facility: CLINIC | Age: 35
End: 2025-05-06
Payer: COMMERCIAL

## 2025-05-06 ENCOUNTER — TELEPHONE (OUTPATIENT)
Dept: TRANSPLANT | Facility: CLINIC | Age: 35
End: 2025-05-06
Payer: COMMERCIAL

## 2025-05-06 NOTE — TELEPHONE ENCOUNTER
Called pt to introduce myself as WL coordinator and encouraged pt to stay up on health maintenance and to let us know if insurance changes, contact info updates. Explained WL protocol for pt's status and when follow up is needed. Gave pt direct information and encouraged to reach out with any questions/concerns.     Hepatitis C Donor Acceptance      -Called pt in regards to the possibility of accepting a  or living donor kidney that has known Hepatitis C infection (past or current).  Informed pt due to shortage of organs, the transplant team is always looking for safe alternatives to increase chances of transplant. One means of increasing chance of transplant is to accept an organ that may have been exposed to hepatitis C.  Even if the organ accepted has preliminary evidence of hepatitis C infection, there is a possibility that pt may not contract hepatitis C from it.  If hepatitis C is contracted, it is treatable.     -Reviewed that Hepatitis C (HCV) is an infection caused by the Hepatitis C virus. Hepatitis C may be spread from one person to another through contact with the blood of an infected person.  This virus can attack the liver and cause injury. If not treated, this virus can lead to liver injury.     -Reviewed that all  donor offer organs are checked for hepatitis C infection. A small number of organs test clearly positive for active hepatitis C infection (Approximately 5% nationally of  donors). A few organs have testing that can indicate the donor may have been infected with hepatitis C but it is not clear whether the organ is currently infected with the virus or may have been infected in the past.     -If the donor has been exposed to hepatitis C but DOES NOT have active virus in the bloodstream, it is very unlikely that pt will develop hepatitis C infection. If the donor DOES have active virus in the bloodstream, there is a very high likelihood (almost 100%) that hep C will  develop. Regardless, pt will be monitored closely by your post transplant team for any signs of Hepatitis C infection and treated appropriately.     -Reviewed there are several medications available to treat hepatitis C, and the treatments are more effective and have fewer side effects than previous treatment options. These medications are taken by mouth daily for 4 to 24 weeks.  When taken as prescribed, these medications have cure rates of 90% to 100% based on currently available data from large studies.  They are safe to take after transplant.     -Reviewed that if pt chooses not to accept a hepatitis C exposed donor, it will not affect their status on the transplant list.  Donor organs that are hepatitis C-exposed will not be offered to you. We will continue to consider non-hepatitis C -exposed donor offers for you. Pt care will not be negatively affected in any way.      -Answered patient questions regarding Hep C donor acceptance     -Pt declines to be listed for Hep C positive donors .  Pt has declined, will make sure UNOS and EPIC chart reflect this option

## 2025-05-09 ENCOUNTER — TELEPHONE (OUTPATIENT)
Dept: ENDOCRINOLOGY | Facility: CLINIC | Age: 35
End: 2025-05-09
Payer: COMMERCIAL

## 2025-05-09 NOTE — TELEPHONE ENCOUNTER
PA Initiation    Medication: WEGOVY 1.7 MG/0.75ML SC SOAJ  Insurance Company: [employer] - Phone [phone] Fax [phone]  Pharmacy Filling the Rx: [id number] - [place], [place] - [address] AT [address] [place] [address] & [facility]  Filling Pharmacy Phone: [phone]  Filling Pharmacy Fax: [phone]  Start Date: 5/9/2025

## 2025-05-12 NOTE — TELEPHONE ENCOUNTER
Prior Authorization Approval    Medication: WEGOVY 1.7 MG/0.75ML SC SOAJ  Authorization Effective Date: 2/8/2025  Authorization Expiration Date: 5/9/2026  Approved Dose/Quantity: 1 month  Reference #: [id number]   Insurance Company: [employer] - Phone [phone] Fax [phone]  Expected CoPay: $    CoPay Card Available:      Financial Assistance Needed:    Which Pharmacy is filling the prescription: [id number] - [place], [place] - [address] AT [address] [place] [address] & [facility]  Pharmacy Notified:pa renewal  Patient Notified: pa renewal

## 2025-05-16 ENCOUNTER — ORGAN (OUTPATIENT)
Dept: TRANSPLANT | Facility: CLINIC | Age: 35
End: 2025-05-16
Payer: COMMERCIAL

## 2025-05-16 ENCOUNTER — HOSPITAL ENCOUNTER (OUTPATIENT)
Age: 35
End: 2025-05-16
Attending: SURGERY | Admitting: SURGERY
Payer: COMMERCIAL

## 2025-05-17 ENCOUNTER — HOSPITAL ENCOUNTER (INPATIENT)
Facility: CLINIC | Age: 35
LOS: 3 days | Discharge: HOME | End: 2025-05-20
Attending: SURGERY | Admitting: SURGERY
Payer: COMMERCIAL

## 2025-05-17 ENCOUNTER — APPOINTMENT (OUTPATIENT)
Dept: GENERAL RADIOLOGY | Facility: CLINIC | Age: 35
End: 2025-05-17
Payer: COMMERCIAL

## 2025-05-17 ENCOUNTER — APPOINTMENT (OUTPATIENT)
Dept: ULTRASOUND IMAGING | Facility: CLINIC | Age: 35
End: 2025-05-17
Attending: SURGERY
Payer: COMMERCIAL

## 2025-05-17 ENCOUNTER — ANESTHESIA (OUTPATIENT)
Dept: SURGERY | Facility: CLINIC | Age: 35
End: 2025-05-17
Payer: COMMERCIAL

## 2025-05-17 ENCOUNTER — APPOINTMENT (OUTPATIENT)
Dept: GENERAL RADIOLOGY | Facility: CLINIC | Age: 35
End: 2025-05-17
Attending: SURGERY
Payer: COMMERCIAL

## 2025-05-17 ENCOUNTER — DOCUMENTATION ONLY (OUTPATIENT)
Dept: TRANSPLANT | Facility: CLINIC | Age: 35
End: 2025-05-17

## 2025-05-17 ENCOUNTER — ANESTHESIA EVENT (OUTPATIENT)
Dept: SURGERY | Facility: CLINIC | Age: 35
End: 2025-05-17
Payer: COMMERCIAL

## 2025-05-17 DIAGNOSIS — G89.18 ACUTE POST-OPERATIVE PAIN: ICD-10-CM

## 2025-05-17 DIAGNOSIS — E66.9 OBESITY, UNSPECIFIED CLASS, UNSPECIFIED OBESITY TYPE, UNSPECIFIED WHETHER SERIOUS COMORBIDITY PRESENT: ICD-10-CM

## 2025-05-17 DIAGNOSIS — N18.5 CKD (CHRONIC KIDNEY DISEASE) STAGE 5, GFR LESS THAN 15 ML/MIN (H): Primary | ICD-10-CM

## 2025-05-17 DIAGNOSIS — E83.42 HYPOMAGNESEMIA: ICD-10-CM

## 2025-05-17 DIAGNOSIS — Z94.0 KIDNEY REPLACED BY TRANSPLANT: Chronic | ICD-10-CM

## 2025-05-17 LAB
ABO + RH BLD: NORMAL
ALBUMIN MFR UR ELPH: 96.2 MG/DL
ALBUMIN SERPL BCG-MCNC: 4.1 G/DL (ref 3.5–5.2)
ALBUMIN UR-MCNC: NEGATIVE MG/DL
ALP SERPL-CCNC: 75 U/L (ref 40–150)
ALT SERPL W P-5'-P-CCNC: 33 U/L (ref 0–50)
ANION GAP SERPL CALCULATED.3IONS-SCNC: 17 MMOL/L (ref 7–15)
ANION GAP SERPL CALCULATED.3IONS-SCNC: 21 MMOL/L (ref 7–15)
APPEARANCE UR: CLEAR
APTT PPP: 29 SECONDS (ref 22–38)
AST SERPL W P-5'-P-CCNC: 17 U/L (ref 0–45)
ATRIAL RATE - MUSE: 80 BPM
BASE EXCESS BLDV CALC-SCNC: -3 MMOL/L (ref -3–3)
BASOPHILS # BLD AUTO: 0.1 10E3/UL (ref 0–0.2)
BASOPHILS NFR BLD AUTO: 1 %
BILIRUB SERPL-MCNC: 0.4 MG/DL
BILIRUB UR QL STRIP: NEGATIVE
BLD GP AB SCN SERPL QL: NEGATIVE
BLD PROD TYP BPU: NORMAL
BLD PROD TYP BPU: NORMAL
BLOOD COMPONENT TYPE: NORMAL
BLOOD COMPONENT TYPE: NORMAL
BUN SERPL-MCNC: 43.8 MG/DL (ref 6–20)
BUN SERPL-MCNC: 45.2 MG/DL (ref 6–20)
CA-I BLD-MCNC: 4.7 MG/DL (ref 4.4–5.2)
CALCIUM SERPL-MCNC: 9.3 MG/DL (ref 8.8–10.4)
CALCIUM SERPL-MCNC: 9.7 MG/DL (ref 8.8–10.4)
CHLORIDE SERPL-SCNC: 97 MMOL/L (ref 98–107)
CHLORIDE SERPL-SCNC: 98 MMOL/L (ref 98–107)
CHOLEST SERPL-MCNC: 153 MG/DL
CMV DNA SPEC NAA+PROBE-ACNC: NOT DETECTED IU/ML
CODING SYSTEM: NORMAL
CODING SYSTEM: NORMAL
COLOR UR AUTO: COLORLESS
CREAT SERPL-MCNC: 9.37 MG/DL (ref 0.51–0.95)
CREAT SERPL-MCNC: 9.43 MG/DL (ref 0.51–0.95)
CREAT UR-MCNC: 52.6 MG/DL
CROSSMATCH: NORMAL
CROSSMATCH: NORMAL
DIASTOLIC BLOOD PRESSURE - MUSE: NORMAL MMHG
EGFRCR SERPLBLD CKD-EPI 2021: 5 ML/MIN/1.73M2
EGFRCR SERPLBLD CKD-EPI 2021: 5 ML/MIN/1.73M2
EOSINOPHIL # BLD AUTO: 0.1 10E3/UL (ref 0–0.7)
EOSINOPHIL NFR BLD AUTO: 1 %
ERYTHROCYTE [DISTWIDTH] IN BLOOD BY AUTOMATED COUNT: 13 % (ref 10–15)
ERYTHROCYTE [DISTWIDTH] IN BLOOD BY AUTOMATED COUNT: 13.2 % (ref 10–15)
EST. AVERAGE GLUCOSE BLD GHB EST-MCNC: 94 MG/DL
GLUCOSE BLD-MCNC: 113 MG/DL (ref 70–99)
GLUCOSE BLDC GLUCOMTR-MCNC: 102 MG/DL (ref 70–99)
GLUCOSE BLDC GLUCOMTR-MCNC: 144 MG/DL (ref 70–99)
GLUCOSE BLDC GLUCOMTR-MCNC: 163 MG/DL (ref 70–99)
GLUCOSE BLDC GLUCOMTR-MCNC: 65 MG/DL (ref 70–99)
GLUCOSE BLDC GLUCOMTR-MCNC: 68 MG/DL (ref 70–99)
GLUCOSE BLDC GLUCOMTR-MCNC: 72 MG/DL (ref 70–99)
GLUCOSE SERPL-MCNC: 188 MG/DL (ref 70–99)
GLUCOSE SERPL-MCNC: 78 MG/DL (ref 70–99)
GLUCOSE UR STRIP-MCNC: NEGATIVE MG/DL
HBA1C MFR BLD: 4.9 %
HBV CORE AB SERPL QL IA: NONREACTIVE
HBV SURFACE AB SERPL IA-ACNC: >1000 M[IU]/ML
HBV SURFACE AB SERPL IA-ACNC: REACTIVE M[IU]/ML
HBV SURFACE AG SERPL QL IA: NONREACTIVE
HCG INTACT+B SERPL-ACNC: 1 MIU/ML
HCO3 BLDV-SCNC: 24 MMOL/L (ref 21–28)
HCO3 SERPL-SCNC: 17 MMOL/L (ref 22–29)
HCO3 SERPL-SCNC: 22 MMOL/L (ref 22–29)
HCT VFR BLD AUTO: 32.2 % (ref 35–47)
HCT VFR BLD AUTO: 38.7 % (ref 35–47)
HCV AB SERPL QL IA: NONREACTIVE
HDLC SERPL-MCNC: 58 MG/DL
HGB BLD-MCNC: 10.6 G/DL (ref 11.7–15.7)
HGB BLD-MCNC: 10.7 G/DL (ref 11.7–15.7)
HGB BLD-MCNC: 10.8 G/DL (ref 11.7–15.7)
HGB BLD-MCNC: 12 G/DL (ref 11.7–15.7)
HGB BLD-MCNC: 12.8 G/DL (ref 11.7–15.7)
HGB UR QL STRIP: NEGATIVE
HIV 1+2 AB+HIV1 P24 AG SERPL QL IA: NONREACTIVE
IMM GRANULOCYTES # BLD: 0.1 10E3/UL
IMM GRANULOCYTES NFR BLD: 1 %
INR PPP: 0.98 (ref 0.85–1.15)
INTERPRETATION ECG - MUSE: NORMAL
KETONES UR STRIP-MCNC: NEGATIVE MG/DL
LACTATE BLD-SCNC: 1.6 MMOL/L (ref 0.7–2)
LDLC SERPL CALC-MCNC: 75 MG/DL
LEUKOCYTE ESTERASE UR QL STRIP: NEGATIVE
LYMPHOCYTES # BLD AUTO: 3 10E3/UL (ref 0.8–5.3)
LYMPHOCYTES NFR BLD AUTO: 29 %
MAGNESIUM SERPL-MCNC: 2.2 MG/DL (ref 1.7–2.3)
MCH RBC QN AUTO: 31.6 PG (ref 26.5–33)
MCH RBC QN AUTO: 31.9 PG (ref 26.5–33)
MCHC RBC AUTO-ENTMCNC: 32.9 G/DL (ref 31.5–36.5)
MCHC RBC AUTO-ENTMCNC: 33.1 G/DL (ref 31.5–36.5)
MCV RBC AUTO: 96 FL (ref 78–100)
MCV RBC AUTO: 97 FL (ref 78–100)
MONOCYTES # BLD AUTO: 0.9 10E3/UL (ref 0–1.3)
MONOCYTES NFR BLD AUTO: 9 %
NEUTROPHILS # BLD AUTO: 6.4 10E3/UL (ref 1.6–8.3)
NEUTROPHILS NFR BLD AUTO: 61 %
NITRATE UR QL: NEGATIVE
NONHDLC SERPL-MCNC: 95 MG/DL
NRBC # BLD AUTO: 0 10E3/UL
NRBC BLD AUTO-RTO: 0 /100
O2/TOTAL GAS SETTING VFR VENT: 21 %
OXYHGB MFR BLDV: 57 % (ref 70–75)
P AXIS - MUSE: 70 DEGREES
PCO2 BLDV: 47 MM HG (ref 40–50)
PH BLDV: 7.31 [PH] (ref 7.32–7.43)
PH UR STRIP: 6 [PH] (ref 5–7)
PHOSPHATE SERPL-MCNC: 7.3 MG/DL (ref 2.5–4.5)
PLATELET # BLD AUTO: 201 10E3/UL (ref 150–450)
PLATELET # BLD AUTO: 288 10E3/UL (ref 150–450)
PO2 BLDV: 32 MM HG (ref 25–47)
POTASSIUM BLD-SCNC: 5.2 MMOL/L (ref 3.4–5.3)
POTASSIUM SERPL-SCNC: 4.8 MMOL/L (ref 3.4–5.3)
POTASSIUM SERPL-SCNC: 4.9 MMOL/L (ref 3.4–5.3)
POTASSIUM SERPL-SCNC: 5.2 MMOL/L (ref 3.4–5.3)
POTASSIUM SERPL-SCNC: 5.7 MMOL/L (ref 3.4–5.3)
POTASSIUM SERPL-SCNC: 5.8 MMOL/L (ref 3.4–5.3)
PR INTERVAL - MUSE: 128 MS
PROT SERPL-MCNC: 7.1 G/DL (ref 6.4–8.3)
PROT/CREAT 24H UR: 1.83 MG/MG CR (ref 0–0.2)
PROTHROMBIN TIME: 13.3 SECONDS (ref 11.8–14.8)
QRS DURATION - MUSE: 100 MS
QT - MUSE: 384 MS
QTC - MUSE: 442 MS
R AXIS - MUSE: 71 DEGREES
RBC # BLD AUTO: 3.32 10E6/UL (ref 3.8–5.2)
RBC # BLD AUTO: 4.05 10E6/UL (ref 3.8–5.2)
RBC URINE: 4 /HPF
SAO2 % BLDV: 58 % (ref 70–75)
SARS-COV-2 RNA RESP QL NAA+PROBE: NEGATIVE
SODIUM BLD-SCNC: 133 MMOL/L (ref 135–145)
SODIUM SERPL-SCNC: 135 MMOL/L (ref 135–145)
SODIUM SERPL-SCNC: 137 MMOL/L (ref 135–145)
SP GR UR STRIP: 1.01 (ref 1–1.03)
SPECIMEN EXP DATE BLD: NORMAL
SPECIMEN TYPE: NORMAL
SQUAMOUS EPITHELIAL: 1 /HPF
SYSTOLIC BLOOD PRESSURE - MUSE: NORMAL MMHG
T AXIS - MUSE: 81 DEGREES
TRIGL SERPL-MCNC: 100 MG/DL
UNIT ABO/RH: NORMAL
UNIT ABO/RH: NORMAL
UNIT NUMBER: NORMAL
UNIT NUMBER: NORMAL
UNIT STATUS: NORMAL
UNIT STATUS: NORMAL
UNIT TYPE ISBT: 6200
UNIT TYPE ISBT: 6200
UROBILINOGEN UR STRIP-MCNC: 0.2 MG/DL
VENTRICULAR RATE- MUSE: 80 BPM
WBC # BLD AUTO: 10.5 10E3/UL (ref 4–11)
WBC # BLD AUTO: 11.4 10E3/UL (ref 4–11)
WBC URINE: 5 /HPF

## 2025-05-17 PROCEDURE — 250N000009 HC RX 250: Performed by: NURSE ANESTHETIST, CERTIFIED REGISTERED

## 2025-05-17 PROCEDURE — 86665 EPSTEIN-BARR CAPSID VCA: CPT

## 2025-05-17 PROCEDURE — 85730 THROMBOPLASTIN TIME PARTIAL: CPT

## 2025-05-17 PROCEDURE — 86825 HLA X-MATH NON-CYTOTOXIC: CPT

## 2025-05-17 PROCEDURE — 258N000003 HC RX IP 258 OP 636

## 2025-05-17 PROCEDURE — 258N000002 HC RX IP 258 OP 250: Performed by: SURGERY

## 2025-05-17 PROCEDURE — 84156 ASSAY OF PROTEIN URINE: CPT

## 2025-05-17 PROCEDURE — 250N000025 HC SEVOFLURANE, PER MIN: Performed by: SURGERY

## 2025-05-17 PROCEDURE — 710N000010 HC RECOVERY PHASE 1, LEVEL 2, PER MIN: Performed by: SURGERY

## 2025-05-17 PROCEDURE — 250N000011 HC RX IP 250 OP 636: Performed by: NURSE ANESTHETIST, CERTIFIED REGISTERED

## 2025-05-17 PROCEDURE — 84100 ASSAY OF PHOSPHORUS: CPT | Performed by: SURGERY

## 2025-05-17 PROCEDURE — 250N000013 HC RX MED GY IP 250 OP 250 PS 637: Performed by: ANESTHESIOLOGY

## 2025-05-17 PROCEDURE — 87799 DETECT AGENT NOS DNA QUANT: CPT

## 2025-05-17 PROCEDURE — 258N000003 HC RX IP 258 OP 636: Performed by: SURGERY

## 2025-05-17 PROCEDURE — 84702 CHORIONIC GONADOTROPIN TEST: CPT | Performed by: SURGERY

## 2025-05-17 PROCEDURE — 812N000003 HC ACQUISITION KIDNEY CADAVER

## 2025-05-17 PROCEDURE — 85018 HEMOGLOBIN: CPT | Performed by: SURGERY

## 2025-05-17 PROCEDURE — 85610 PROTHROMBIN TIME: CPT

## 2025-05-17 PROCEDURE — 250N000011 HC RX IP 250 OP 636: Mod: JZ

## 2025-05-17 PROCEDURE — 250N000011 HC RX IP 250 OP 636: Mod: JZ | Performed by: ANESTHESIOLOGY

## 2025-05-17 PROCEDURE — 86833 HLA CLASS II HIGH DEFIN QUAL: CPT

## 2025-05-17 PROCEDURE — 250N000009 HC RX 250

## 2025-05-17 PROCEDURE — 86803 HEPATITIS C AB TEST: CPT

## 2025-05-17 PROCEDURE — 83718 ASSAY OF LIPOPROTEIN: CPT

## 2025-05-17 PROCEDURE — 258N000001 HC RX 258: Performed by: ANESTHESIOLOGY

## 2025-05-17 PROCEDURE — 86706 HEP B SURFACE ANTIBODY: CPT

## 2025-05-17 PROCEDURE — 85018 HEMOGLOBIN: CPT

## 2025-05-17 PROCEDURE — 370N000017 HC ANESTHESIA TECHNICAL FEE, PER MIN: Performed by: SURGERY

## 2025-05-17 PROCEDURE — 999N000065 XR CHEST PORT 1 VIEW

## 2025-05-17 PROCEDURE — 36415 COLL VENOUS BLD VENIPUNCTURE: CPT

## 2025-05-17 PROCEDURE — 84132 ASSAY OF SERUM POTASSIUM: CPT

## 2025-05-17 PROCEDURE — 87535 HIV-1 PROBE&REVERSE TRNSCRPJ: CPT

## 2025-05-17 PROCEDURE — 82955 ASSAY OF G6PD ENZYME: CPT | Performed by: SURGERY

## 2025-05-17 PROCEDURE — 86644 CMV ANTIBODY: CPT

## 2025-05-17 PROCEDURE — 83036 HEMOGLOBIN GLYCOSYLATED A1C: CPT

## 2025-05-17 PROCEDURE — 84132 ASSAY OF SERUM POTASSIUM: CPT | Performed by: SURGERY

## 2025-05-17 PROCEDURE — 36592 COLLECT BLOOD FROM PICC: CPT | Performed by: SURGERY

## 2025-05-17 PROCEDURE — 250N000011 HC RX IP 250 OP 636

## 2025-05-17 PROCEDURE — 120N000011 HC R&B TRANSPLANT UMMC

## 2025-05-17 PROCEDURE — 250N000009 HC RX 250: Performed by: PHYSICIAN ASSISTANT

## 2025-05-17 PROCEDURE — 71045 X-RAY EXAM CHEST 1 VIEW: CPT | Mod: 26 | Performed by: STUDENT IN AN ORGANIZED HEALTH CARE EDUCATION/TRAINING PROGRAM

## 2025-05-17 PROCEDURE — 250N000013 HC RX MED GY IP 250 OP 250 PS 637: Performed by: SURGERY

## 2025-05-17 PROCEDURE — 0TY00Z0 TRANSPLANTATION OF RIGHT KIDNEY, ALLOGENEIC, OPEN APPROACH: ICD-10-PCS | Performed by: SURGERY

## 2025-05-17 PROCEDURE — 86832 HLA CLASS I HIGH DEFIN QUAL: CPT

## 2025-05-17 PROCEDURE — 93005 ELECTROCARDIOGRAM TRACING: CPT

## 2025-05-17 PROCEDURE — 999N000141 HC STATISTIC PRE-PROCEDURE NURSING ASSESSMENT: Performed by: SURGERY

## 2025-05-17 PROCEDURE — 86704 HEP B CORE ANTIBODY TOTAL: CPT

## 2025-05-17 PROCEDURE — 30243S1 TRANSFUSION OF NONAUTOLOGOUS GLOBULIN INTO CENTRAL VEIN, PERCUTANEOUS APPROACH: ICD-10-PCS | Performed by: SURGERY

## 2025-05-17 PROCEDURE — 81001 URINALYSIS AUTO W/SCOPE: CPT

## 2025-05-17 PROCEDURE — 87389 HIV-1 AG W/HIV-1&-2 AB AG IA: CPT

## 2025-05-17 PROCEDURE — C2617 STENT, NON-COR, TEM W/O DEL: HCPCS | Performed by: SURGERY

## 2025-05-17 PROCEDURE — 86850 RBC ANTIBODY SCREEN: CPT

## 2025-05-17 PROCEDURE — 272N000001 HC OR GENERAL SUPPLY STERILE: Performed by: SURGERY

## 2025-05-17 PROCEDURE — 76776 US EXAM K TRANSPL W/DOPPLER: CPT

## 2025-05-17 PROCEDURE — 86923 COMPATIBILITY TEST ELECTRIC: CPT

## 2025-05-17 PROCEDURE — 87086 URINE CULTURE/COLONY COUNT: CPT

## 2025-05-17 PROCEDURE — 250N000012 HC RX MED GY IP 250 OP 636 PS 637: Performed by: SURGERY

## 2025-05-17 PROCEDURE — 50360 RNL ALTRNSPLJ W/O RCP NFRCT: CPT | Mod: RT | Performed by: SURGERY

## 2025-05-17 PROCEDURE — 76776 US EXAM K TRANSPL W/DOPPLER: CPT | Mod: 26 | Performed by: STUDENT IN AN ORGANIZED HEALTH CARE EDUCATION/TRAINING PROGRAM

## 2025-05-17 PROCEDURE — 83945 ASSAY OF OXALATE: CPT

## 2025-05-17 PROCEDURE — 250N000013 HC RX MED GY IP 250 OP 250 PS 637

## 2025-05-17 PROCEDURE — 71046 X-RAY EXAM CHEST 2 VIEWS: CPT | Mod: 26 | Performed by: RADIOLOGY

## 2025-05-17 PROCEDURE — 83735 ASSAY OF MAGNESIUM: CPT | Performed by: SURGERY

## 2025-05-17 PROCEDURE — 258N000003 HC RX IP 258 OP 636: Performed by: NURSE ANESTHETIST, CERTIFIED REGISTERED

## 2025-05-17 PROCEDURE — 87635 SARS-COV-2 COVID-19 AMP PRB: CPT

## 2025-05-17 PROCEDURE — 250N000011 HC RX IP 250 OP 636: Mod: JZ | Performed by: PHYSICIAN ASSISTANT

## 2025-05-17 PROCEDURE — 360N000077 HC SURGERY LEVEL 4, PER MIN: Performed by: SURGERY

## 2025-05-17 PROCEDURE — 250N000011 HC RX IP 250 OP 636: Performed by: SURGERY

## 2025-05-17 PROCEDURE — 87340 HEPATITIS B SURFACE AG IA: CPT

## 2025-05-17 PROCEDURE — 71046 X-RAY EXAM CHEST 2 VIEWS: CPT

## 2025-05-17 PROCEDURE — 250N000012 HC RX MED GY IP 250 OP 636 PS 637

## 2025-05-17 PROCEDURE — 80053 COMPREHEN METABOLIC PANEL: CPT

## 2025-05-17 PROCEDURE — 86790 VIRUS ANTIBODY NOS: CPT

## 2025-05-17 DEVICE — SOF-FLEX DOUBLE PIGTAIL URETERAL STENT SET
Type: IMPLANTABLE DEVICE | Site: ABDOMEN | Status: FUNCTIONAL
Brand: SOF-FLEX

## 2025-05-17 RX ORDER — CEFUROXIME SODIUM 1.5 G/16ML
1.5 INJECTION, POWDER, FOR SOLUTION INTRAVENOUS ONCE
Status: COMPLETED | OUTPATIENT
Start: 2025-05-17 | End: 2025-05-17

## 2025-05-17 RX ORDER — ONDANSETRON 4 MG/1
4 TABLET, ORALLY DISINTEGRATING ORAL EVERY 30 MIN PRN
Status: DISCONTINUED | OUTPATIENT
Start: 2025-05-17 | End: 2025-05-17 | Stop reason: HOSPADM

## 2025-05-17 RX ORDER — NALOXONE HYDROCHLORIDE 0.4 MG/ML
0.1 INJECTION, SOLUTION INTRAMUSCULAR; INTRAVENOUS; SUBCUTANEOUS
Status: CANCELLED | OUTPATIENT
Start: 2025-05-17

## 2025-05-17 RX ORDER — NALOXONE HYDROCHLORIDE 0.4 MG/ML
0.4 INJECTION, SOLUTION INTRAMUSCULAR; INTRAVENOUS; SUBCUTANEOUS
Status: DISCONTINUED | OUTPATIENT
Start: 2025-05-17 | End: 2025-05-20 | Stop reason: HOSPADM

## 2025-05-17 RX ORDER — OXYCODONE HYDROCHLORIDE 5 MG/1
5 TABLET ORAL EVERY 4 HOURS PRN
Status: DISCONTINUED | OUTPATIENT
Start: 2025-05-17 | End: 2025-05-20

## 2025-05-17 RX ORDER — MANNITOL 20 G/100ML
INJECTION, SOLUTION INTRAVENOUS PRN
Status: DISCONTINUED | OUTPATIENT
Start: 2025-05-17 | End: 2025-05-17

## 2025-05-17 RX ORDER — ATORVASTATIN CALCIUM 10 MG/1
10 TABLET, FILM COATED ORAL DAILY
Status: DISCONTINUED | OUTPATIENT
Start: 2025-05-18 | End: 2025-05-20 | Stop reason: HOSPADM

## 2025-05-17 RX ORDER — SODIUM CHLORIDE, SODIUM GLUCONATE, SODIUM ACETATE, POTASSIUM CHLORIDE AND MAGNESIUM CHLORIDE 526; 502; 368; 37; 30 MG/100ML; MG/100ML; MG/100ML; MG/100ML; MG/100ML
INJECTION, SOLUTION INTRAVENOUS CONTINUOUS PRN
Status: DISCONTINUED | OUTPATIENT
Start: 2025-05-17 | End: 2025-05-17

## 2025-05-17 RX ORDER — NORTRIPTYLINE HYDROCHLORIDE 25 MG/1
25 CAPSULE ORAL AT BEDTIME
COMMUNITY
Start: 2024-12-30

## 2025-05-17 RX ORDER — PENTAMIDINE ISETHIONATE 300 MG/300MG
300 INHALANT RESPIRATORY (INHALATION)
Status: COMPLETED | OUTPATIENT
Start: 2025-05-20 | End: 2025-05-20

## 2025-05-17 RX ORDER — VALGANCICLOVIR 450 MG/1
450 TABLET, FILM COATED ORAL
Status: DISCONTINUED | OUTPATIENT
Start: 2025-05-19 | End: 2025-05-20 | Stop reason: HOSPADM

## 2025-05-17 RX ORDER — DEXAMETHASONE SODIUM PHOSPHATE 4 MG/ML
4 INJECTION, SOLUTION INTRA-ARTICULAR; INTRALESIONAL; INTRAMUSCULAR; INTRAVENOUS; SOFT TISSUE
Status: DISCONTINUED | OUTPATIENT
Start: 2025-05-17 | End: 2025-05-17 | Stop reason: HOSPADM

## 2025-05-17 RX ORDER — HYDROMORPHONE HCL IN WATER/PF 6 MG/30 ML
0.4 PATIENT CONTROLLED ANALGESIA SYRINGE INTRAVENOUS
Status: DISCONTINUED | OUTPATIENT
Start: 2025-05-17 | End: 2025-05-19

## 2025-05-17 RX ORDER — BUMETANIDE 0.25 MG/ML
INJECTION, SOLUTION INTRAMUSCULAR; INTRAVENOUS PRN
Status: DISCONTINUED | OUTPATIENT
Start: 2025-05-17 | End: 2025-05-17

## 2025-05-17 RX ORDER — ONDANSETRON 2 MG/ML
INJECTION INTRAMUSCULAR; INTRAVENOUS PRN
Status: DISCONTINUED | OUTPATIENT
Start: 2025-05-17 | End: 2025-05-17

## 2025-05-17 RX ORDER — SODIUM CHLORIDE, SODIUM LACTATE, POTASSIUM CHLORIDE, CALCIUM CHLORIDE 600; 310; 30; 20 MG/100ML; MG/100ML; MG/100ML; MG/100ML
INJECTION, SOLUTION INTRAVENOUS CONTINUOUS
Status: DISCONTINUED | OUTPATIENT
Start: 2025-05-17 | End: 2025-05-17 | Stop reason: HOSPADM

## 2025-05-17 RX ORDER — SODIUM CHLORIDE 450 MG/100ML
INJECTION, SOLUTION INTRAVENOUS CONTINUOUS PRN
Status: DISCONTINUED | OUTPATIENT
Start: 2025-05-17 | End: 2025-05-18

## 2025-05-17 RX ORDER — TENAPANOR 31.9 MG/1
30 TABLET, FILM COATED ORAL DAILY
Status: ON HOLD | COMMUNITY
End: 2025-05-20

## 2025-05-17 RX ORDER — LIDOCAINE HYDROCHLORIDE 20 MG/ML
INJECTION, SOLUTION INFILTRATION; PERINEURAL PRN
Status: DISCONTINUED | OUTPATIENT
Start: 2025-05-17 | End: 2025-05-17

## 2025-05-17 RX ORDER — CALCIUM CHLORIDE 100 MG/ML
INJECTION INTRAVENOUS; INTRAVENTRICULAR PRN
Status: DISCONTINUED | OUTPATIENT
Start: 2025-05-17 | End: 2025-05-17

## 2025-05-17 RX ORDER — PENTAMIDINE ISETHIONATE 300 MG/300MG
300 INHALANT RESPIRATORY (INHALATION)
Status: DISCONTINUED | OUTPATIENT
Start: 2025-06-19 | End: 2025-05-20 | Stop reason: HOSPADM

## 2025-05-17 RX ORDER — HYDROMORPHONE HCL IN WATER/PF 6 MG/30 ML
0.2 PATIENT CONTROLLED ANALGESIA SYRINGE INTRAVENOUS
Status: DISCONTINUED | OUTPATIENT
Start: 2025-05-17 | End: 2025-05-19

## 2025-05-17 RX ORDER — HEPARIN SODIUM 1000 [USP'U]/ML
INJECTION, SOLUTION INTRAVENOUS; SUBCUTANEOUS PRN
Status: DISCONTINUED | OUTPATIENT
Start: 2025-05-17 | End: 2025-05-17

## 2025-05-17 RX ORDER — MAGNESIUM OXIDE 400 MG/1
400 TABLET ORAL
Status: DISCONTINUED | OUTPATIENT
Start: 2025-05-19 | End: 2025-05-20 | Stop reason: HOSPADM

## 2025-05-17 RX ORDER — ACETAMINOPHEN 650 MG/1
650 SUPPOSITORY RECTAL ONCE
Status: COMPLETED | OUTPATIENT
Start: 2025-05-17 | End: 2025-05-17

## 2025-05-17 RX ORDER — FENTANYL CITRATE 50 UG/ML
50 INJECTION, SOLUTION INTRAMUSCULAR; INTRAVENOUS EVERY 5 MIN PRN
Status: DISCONTINUED | OUTPATIENT
Start: 2025-05-17 | End: 2025-05-17 | Stop reason: HOSPADM

## 2025-05-17 RX ORDER — NALOXONE HYDROCHLORIDE 0.4 MG/ML
0.2 INJECTION, SOLUTION INTRAMUSCULAR; INTRAVENOUS; SUBCUTANEOUS
Status: DISCONTINUED | OUTPATIENT
Start: 2025-05-17 | End: 2025-05-20 | Stop reason: HOSPADM

## 2025-05-17 RX ORDER — ALBUTEROL SULFATE 90 UG/1
2 INHALANT RESPIRATORY (INHALATION) EVERY 6 HOURS PRN
Status: DISCONTINUED | OUTPATIENT
Start: 2025-05-17 | End: 2025-05-20 | Stop reason: HOSPADM

## 2025-05-17 RX ORDER — METHOCARBAMOL 500 MG/1
500 TABLET, FILM COATED ORAL EVERY 6 HOURS PRN
Status: DISCONTINUED | OUTPATIENT
Start: 2025-05-17 | End: 2025-05-20 | Stop reason: HOSPADM

## 2025-05-17 RX ORDER — NICOTINE POLACRILEX 4 MG
15-30 LOZENGE BUCCAL
Status: DISCONTINUED | OUTPATIENT
Start: 2025-05-17 | End: 2025-05-17

## 2025-05-17 RX ORDER — ONDANSETRON 4 MG/1
4 TABLET, ORALLY DISINTEGRATING ORAL EVERY 30 MIN PRN
Status: CANCELLED | OUTPATIENT
Start: 2025-05-17

## 2025-05-17 RX ORDER — LIDOCAINE 40 MG/G
CREAM TOPICAL
Status: DISCONTINUED | OUTPATIENT
Start: 2025-05-17 | End: 2025-05-17 | Stop reason: HOSPADM

## 2025-05-17 RX ORDER — ONDANSETRON 2 MG/ML
4 INJECTION INTRAMUSCULAR; INTRAVENOUS EVERY 30 MIN PRN
Status: DISCONTINUED | OUTPATIENT
Start: 2025-05-17 | End: 2025-05-17 | Stop reason: HOSPADM

## 2025-05-17 RX ORDER — CEFUROXIME SODIUM 1.5 G/16ML
1.5 INJECTION, POWDER, FOR SOLUTION INTRAVENOUS SEE ADMIN INSTRUCTIONS
Status: DISCONTINUED | OUTPATIENT
Start: 2025-05-17 | End: 2025-05-17 | Stop reason: HOSPADM

## 2025-05-17 RX ORDER — ONDANSETRON 4 MG/1
4 TABLET, ORALLY DISINTEGRATING ORAL EVERY 6 HOURS PRN
Status: DISCONTINUED | OUTPATIENT
Start: 2025-05-17 | End: 2025-05-20 | Stop reason: HOSPADM

## 2025-05-17 RX ORDER — FENTANYL CITRATE 50 UG/ML
25 INJECTION, SOLUTION INTRAMUSCULAR; INTRAVENOUS EVERY 5 MIN PRN
Status: DISCONTINUED | OUTPATIENT
Start: 2025-05-17 | End: 2025-05-17 | Stop reason: HOSPADM

## 2025-05-17 RX ORDER — BISACODYL 10 MG
10 SUPPOSITORY, RECTAL RECTAL DAILY PRN
Status: DISCONTINUED | OUTPATIENT
Start: 2025-05-20 | End: 2025-05-20 | Stop reason: HOSPADM

## 2025-05-17 RX ORDER — DEXTROSE MONOHYDRATE 50 MG/ML
INJECTION, SOLUTION INTRAVENOUS CONTINUOUS PRN
Status: DISCONTINUED | OUTPATIENT
Start: 2025-05-17 | End: 2025-05-17

## 2025-05-17 RX ORDER — BUMETANIDE 0.25 MG/ML
2 INJECTION, SOLUTION INTRAMUSCULAR; INTRAVENOUS ONCE
Status: COMPLETED | OUTPATIENT
Start: 2025-05-17 | End: 2025-05-17

## 2025-05-17 RX ORDER — FAMOTIDINE 20 MG/1
20 TABLET, FILM COATED ORAL 2 TIMES DAILY
Status: DISCONTINUED | OUTPATIENT
Start: 2025-05-17 | End: 2025-05-19

## 2025-05-17 RX ORDER — PROPOFOL 10 MG/ML
INJECTION, EMULSION INTRAVENOUS PRN
Status: DISCONTINUED | OUTPATIENT
Start: 2025-05-17 | End: 2025-05-17

## 2025-05-17 RX ORDER — ONDANSETRON 2 MG/ML
4 INJECTION INTRAMUSCULAR; INTRAVENOUS EVERY 6 HOURS PRN
Status: DISCONTINUED | OUTPATIENT
Start: 2025-05-17 | End: 2025-05-20 | Stop reason: HOSPADM

## 2025-05-17 RX ORDER — HYDROMORPHONE HCL IN WATER/PF 6 MG/30 ML
0.2 PATIENT CONTROLLED ANALGESIA SYRINGE INTRAVENOUS EVERY 5 MIN PRN
Status: DISCONTINUED | OUTPATIENT
Start: 2025-05-17 | End: 2025-05-17 | Stop reason: HOSPADM

## 2025-05-17 RX ORDER — OXYCODONE HYDROCHLORIDE 5 MG/1
5 TABLET ORAL
Refills: 0 | Status: CANCELLED | OUTPATIENT
Start: 2025-05-17

## 2025-05-17 RX ORDER — SODIUM CHLORIDE 9 MG/ML
INJECTION, SOLUTION INTRAVENOUS CONTINUOUS PRN
Status: DISCONTINUED | OUTPATIENT
Start: 2025-05-17 | End: 2025-05-17

## 2025-05-17 RX ORDER — NALOXONE HYDROCHLORIDE 0.4 MG/ML
0.1 INJECTION, SOLUTION INTRAMUSCULAR; INTRAVENOUS; SUBCUTANEOUS
Status: DISCONTINUED | OUTPATIENT
Start: 2025-05-17 | End: 2025-05-17 | Stop reason: HOSPADM

## 2025-05-17 RX ORDER — TACROLIMUS 1 MG/1
0.03 CAPSULE ORAL
Status: DISCONTINUED | OUTPATIENT
Start: 2025-05-17 | End: 2025-05-17

## 2025-05-17 RX ORDER — HYDROMORPHONE HCL IN WATER/PF 6 MG/30 ML
0.4 PATIENT CONTROLLED ANALGESIA SYRINGE INTRAVENOUS EVERY 5 MIN PRN
Status: DISCONTINUED | OUTPATIENT
Start: 2025-05-17 | End: 2025-05-17 | Stop reason: HOSPADM

## 2025-05-17 RX ORDER — PAPAVERINE HYDROCHLORIDE 30 MG/ML
INJECTION INTRAMUSCULAR; INTRAVENOUS PRN
Status: DISCONTINUED | OUTPATIENT
Start: 2025-05-17 | End: 2025-05-17 | Stop reason: HOSPADM

## 2025-05-17 RX ORDER — OXYCODONE HYDROCHLORIDE 10 MG/1
10 TABLET ORAL EVERY 4 HOURS PRN
Status: DISCONTINUED | OUTPATIENT
Start: 2025-05-17 | End: 2025-05-20

## 2025-05-17 RX ORDER — VITAMIN B COMPLEX
1 TABLET ORAL DAILY
Status: ON HOLD | COMMUNITY
End: 2025-05-20

## 2025-05-17 RX ORDER — DEXTROSE, SODIUM CHLORIDE, SODIUM LACTATE, POTASSIUM CHLORIDE, AND CALCIUM CHLORIDE 5; .6; .31; .03; .02 G/100ML; G/100ML; G/100ML; G/100ML; G/100ML
INJECTION, SOLUTION INTRAVENOUS CONTINUOUS
Status: DISCONTINUED | OUTPATIENT
Start: 2025-05-17 | End: 2025-05-19

## 2025-05-17 RX ORDER — MYCOPHENOLATE MOFETIL 250 MG/1
750 CAPSULE ORAL
Status: DISCONTINUED | OUTPATIENT
Start: 2025-05-17 | End: 2025-05-20 | Stop reason: HOSPADM

## 2025-05-17 RX ORDER — ONDANSETRON 2 MG/ML
4 INJECTION INTRAMUSCULAR; INTRAVENOUS EVERY 30 MIN PRN
Status: CANCELLED | OUTPATIENT
Start: 2025-05-17

## 2025-05-17 RX ORDER — POLYETHYLENE GLYCOL 3350 17 G/17G
17 POWDER, FOR SOLUTION ORAL DAILY
Status: DISCONTINUED | OUTPATIENT
Start: 2025-05-18 | End: 2025-05-20 | Stop reason: HOSPADM

## 2025-05-17 RX ORDER — OXYCODONE HYDROCHLORIDE 10 MG/1
10 TABLET ORAL
Refills: 0 | Status: CANCELLED | OUTPATIENT
Start: 2025-05-17

## 2025-05-17 RX ORDER — MULTIVIT WITH MINERALS/LUTEIN
400 TABLET ORAL DAILY
Status: ON HOLD | COMMUNITY
End: 2025-05-20

## 2025-05-17 RX ORDER — SODIUM CHLORIDE 9 MG/ML
1000 INJECTION, SOLUTION INTRAVENOUS CONTINUOUS PRN
Status: DISCONTINUED | OUTPATIENT
Start: 2025-05-17 | End: 2025-05-18

## 2025-05-17 RX ORDER — PROCHLORPERAZINE MALEATE 5 MG/1
10 TABLET ORAL EVERY 6 HOURS PRN
Status: DISCONTINUED | OUTPATIENT
Start: 2025-05-17 | End: 2025-05-20 | Stop reason: HOSPADM

## 2025-05-17 RX ORDER — DEXAMETHASONE SODIUM PHOSPHATE 4 MG/ML
4 INJECTION, SOLUTION INTRA-ARTICULAR; INTRALESIONAL; INTRAMUSCULAR; INTRAVENOUS; SOFT TISSUE
Status: CANCELLED | OUTPATIENT
Start: 2025-05-17

## 2025-05-17 RX ORDER — VARENICLINE TARTRATE 0.5 (11)-1
0.5 KIT ORAL DAILY
COMMUNITY
Start: 2025-01-13

## 2025-05-17 RX ORDER — ACETAMINOPHEN 325 MG/1
975 TABLET ORAL EVERY 8 HOURS
Status: DISCONTINUED | OUTPATIENT
Start: 2025-05-17 | End: 2025-05-20

## 2025-05-17 RX ORDER — ACETAMINOPHEN 325 MG/1
975 TABLET ORAL ONCE
Status: COMPLETED | OUTPATIENT
Start: 2025-05-17 | End: 2025-05-17

## 2025-05-17 RX ORDER — DEXTROSE MONOHYDRATE 25 G/50ML
25-50 INJECTION, SOLUTION INTRAVENOUS
Status: DISCONTINUED | OUTPATIENT
Start: 2025-05-17 | End: 2025-05-17

## 2025-05-17 RX ORDER — FUROSEMIDE 10 MG/ML
INJECTION INTRAMUSCULAR; INTRAVENOUS PRN
Status: DISCONTINUED | OUTPATIENT
Start: 2025-05-17 | End: 2025-05-17

## 2025-05-17 RX ORDER — AMOXICILLIN 250 MG
1 CAPSULE ORAL 2 TIMES DAILY
Status: DISCONTINUED | OUTPATIENT
Start: 2025-05-17 | End: 2025-05-20 | Stop reason: HOSPADM

## 2025-05-17 RX ORDER — SODIUM CHLORIDE, SODIUM LACTATE, POTASSIUM CHLORIDE, CALCIUM CHLORIDE 600; 310; 30; 20 MG/100ML; MG/100ML; MG/100ML; MG/100ML
INJECTION, SOLUTION INTRAVENOUS CONTINUOUS PRN
Status: DISCONTINUED | OUTPATIENT
Start: 2025-05-17 | End: 2025-05-17

## 2025-05-17 RX ORDER — FENTANYL CITRATE 50 UG/ML
INJECTION, SOLUTION INTRAMUSCULAR; INTRAVENOUS PRN
Status: DISCONTINUED | OUTPATIENT
Start: 2025-05-17 | End: 2025-05-17

## 2025-05-17 RX ADMIN — FUROSEMIDE 100 MG: 10 INJECTION, SOLUTION INTRAVENOUS at 08:30

## 2025-05-17 RX ADMIN — SODIUM ZIRCONIUM CYCLOSILICATE 10 G: 10 POWDER, FOR SUSPENSION ORAL at 19:41

## 2025-05-17 RX ADMIN — FUROSEMIDE 100 MG: 10 INJECTION, SOLUTION INTRAVENOUS at 10:29

## 2025-05-17 RX ADMIN — OXYCODONE HYDROCHLORIDE 5 MG: 5 TABLET ORAL at 16:28

## 2025-05-17 RX ADMIN — DEXTROSE 30 G: 15 GEL ORAL at 02:29

## 2025-05-17 RX ADMIN — DEXTROSE MONOHYDRATE: 50 INJECTION, SOLUTION INTRAVENOUS at 11:39

## 2025-05-17 RX ADMIN — HEPARIN SODIUM 2000 UNITS: 1000 INJECTION INTRAVENOUS; SUBCUTANEOUS at 07:27

## 2025-05-17 RX ADMIN — CEFUROXIME 1.5 G: 1.5 INJECTION, POWDER, FOR SOLUTION INTRAVENOUS at 05:01

## 2025-05-17 RX ADMIN — BUMETANIDE 2.5 MG: 0.25 INJECTION INTRAMUSCULAR; INTRAVENOUS at 08:30

## 2025-05-17 RX ADMIN — SODIUM CHLORIDE: 0.45 INJECTION, SOLUTION INTRAVENOUS at 14:33

## 2025-05-17 RX ADMIN — SODIUM CHLORIDE: 9 INJECTION, SOLUTION INTRAVENOUS at 11:15

## 2025-05-17 RX ADMIN — HYDROMORPHONE HYDROCHLORIDE 0.5 MG: 1 INJECTION, SOLUTION INTRAMUSCULAR; INTRAVENOUS; SUBCUTANEOUS at 11:01

## 2025-05-17 RX ADMIN — FAMOTIDINE 20 MG: 20 TABLET, FILM COATED ORAL at 21:10

## 2025-05-17 RX ADMIN — LIDOCAINE HYDROCHLORIDE 100 MG: 20 INJECTION, SOLUTION INFILTRATION; PERINEURAL at 04:34

## 2025-05-17 RX ADMIN — HYDROMORPHONE HYDROCHLORIDE 0.4 MG: 0.2 INJECTION, SOLUTION INTRAMUSCULAR; INTRAVENOUS; SUBCUTANEOUS at 22:20

## 2025-05-17 RX ADMIN — Medication 100 MG: at 04:34

## 2025-05-17 RX ADMIN — SODIUM CHLORIDE 1000 ML: 0.9 INJECTION, SOLUTION INTRAVENOUS at 13:00

## 2025-05-17 RX ADMIN — FENTANYL CITRATE 50 MCG: 50 INJECTION, SOLUTION INTRAMUSCULAR; INTRAVENOUS at 12:35

## 2025-05-17 RX ADMIN — METHOCARBAMOL 500 MG: 500 TABLET ORAL at 17:42

## 2025-05-17 RX ADMIN — SODIUM CHLORIDE, SODIUM GLUCONATE, SODIUM ACETATE, POTASSIUM CHLORIDE AND MAGNESIUM CHLORIDE: 526; 502; 368; 37; 30 INJECTION, SOLUTION INTRAVENOUS at 04:29

## 2025-05-17 RX ADMIN — SODIUM BICARBONATE: 84 INJECTION, SOLUTION INTRAVENOUS at 18:17

## 2025-05-17 RX ADMIN — SODIUM CHLORIDE 5 UNITS: 0.9 INJECTION, SOLUTION INTRAVENOUS at 13:34

## 2025-05-17 RX ADMIN — SODIUM CHLORIDE 1000 ML: 0.9 INJECTION, SOLUTION INTRAVENOUS at 18:18

## 2025-05-17 RX ADMIN — DEXTROSE MONOHYDRATE 50 ML: 25 INJECTION, SOLUTION INTRAVENOUS at 11:34

## 2025-05-17 RX ADMIN — HYDROMORPHONE HYDROCHLORIDE 0.4 MG: 0.2 INJECTION, SOLUTION INTRAMUSCULAR; INTRAVENOUS; SUBCUTANEOUS at 16:55

## 2025-05-17 RX ADMIN — MIDAZOLAM 2 MG: 1 INJECTION INTRAMUSCULAR; INTRAVENOUS at 04:26

## 2025-05-17 RX ADMIN — OXYCODONE HYDROCHLORIDE 10 MG: 10 TABLET ORAL at 21:10

## 2025-05-17 RX ADMIN — DEXTROSE MONOHYDRATE 25 ML: 25 INJECTION, SOLUTION INTRAVENOUS at 03:42

## 2025-05-17 RX ADMIN — PHENYLEPHRINE HYDROCHLORIDE 100 MCG: 10 INJECTION INTRAVENOUS at 05:20

## 2025-05-17 RX ADMIN — Medication 200 MG: at 11:39

## 2025-05-17 RX ADMIN — SODIUM CHLORIDE, SODIUM GLUCONATE, SODIUM ACETATE, POTASSIUM CHLORIDE AND MAGNESIUM CHLORIDE: 526; 502; 368; 37; 30 INJECTION, SOLUTION INTRAVENOUS at 08:57

## 2025-05-17 RX ADMIN — MYCOPHENOLATE MOFETIL 1000 MG: 500 INJECTION, POWDER, LYOPHILIZED, FOR SOLUTION INTRAVENOUS at 05:01

## 2025-05-17 RX ADMIN — HYDROMORPHONE HYDROCHLORIDE 0.2 MG: 0.2 INJECTION, SOLUTION INTRAMUSCULAR; INTRAVENOUS; SUBCUTANEOUS at 13:10

## 2025-05-17 RX ADMIN — FENTANYL CITRATE 50 MCG: 50 INJECTION INTRAMUSCULAR; INTRAVENOUS at 10:06

## 2025-05-17 RX ADMIN — MANNITOL 25 G: 20 INJECTION, SOLUTION INTRAVENOUS at 08:30

## 2025-05-17 RX ADMIN — HYDROMORPHONE HYDROCHLORIDE 0.2 MG: 0.2 INJECTION, SOLUTION INTRAMUSCULAR; INTRAVENOUS; SUBCUTANEOUS at 19:11

## 2025-05-17 RX ADMIN — DEXTROSE, SODIUM CHLORIDE, SODIUM LACTATE, POTASSIUM CHLORIDE, AND CALCIUM CHLORIDE: 5; .6; .31; .03; .02 INJECTION, SOLUTION INTRAVENOUS at 12:23

## 2025-05-17 RX ADMIN — FENTANYL CITRATE 50 MCG: 50 INJECTION, SOLUTION INTRAMUSCULAR; INTRAVENOUS at 12:18

## 2025-05-17 RX ADMIN — SODIUM CHLORIDE 500 MG: 9 INJECTION, SOLUTION INTRAVENOUS at 05:01

## 2025-05-17 RX ADMIN — CALCIUM CHLORIDE INJECTION 250 MG: 100 INJECTION, SOLUTION INTRAVENOUS at 11:20

## 2025-05-17 RX ADMIN — DEXTROSE, SODIUM CHLORIDE, SODIUM LACTATE, POTASSIUM CHLORIDE, AND CALCIUM CHLORIDE: 5; .6; .31; .03; .02 INJECTION, SOLUTION INTRAVENOUS at 22:20

## 2025-05-17 RX ADMIN — HYDROMORPHONE HYDROCHLORIDE 1 MG: 1 INJECTION, SOLUTION INTRAMUSCULAR; INTRAVENOUS; SUBCUTANEOUS at 05:22

## 2025-05-17 RX ADMIN — ACETAMINOPHEN 975 MG: 325 TABLET ORAL at 02:30

## 2025-05-17 RX ADMIN — CALCIUM CHLORIDE INJECTION 500 MG: 100 INJECTION, SOLUTION INTRAVENOUS at 11:16

## 2025-05-17 RX ADMIN — FENTANYL CITRATE 50 MCG: 50 INJECTION INTRAMUSCULAR; INTRAVENOUS at 06:45

## 2025-05-17 RX ADMIN — HYDROMORPHONE HYDROCHLORIDE 0.2 MG: 0.2 INJECTION, SOLUTION INTRAMUSCULAR; INTRAVENOUS; SUBCUTANEOUS at 14:46

## 2025-05-17 RX ADMIN — DEXTROSE, SODIUM CHLORIDE, SODIUM LACTATE, POTASSIUM CHLORIDE, AND CALCIUM CHLORIDE: 5; .6; .31; .03; .02 INJECTION, SOLUTION INTRAVENOUS at 13:00

## 2025-05-17 RX ADMIN — PROPOFOL 200 MG: 10 INJECTION, EMULSION INTRAVENOUS at 04:34

## 2025-05-17 RX ADMIN — FENTANYL CITRATE 100 MCG: 50 INJECTION INTRAMUSCULAR; INTRAVENOUS at 04:33

## 2025-05-17 RX ADMIN — TACROLIMUS 2.5 MG: 1 CAPSULE ORAL at 18:10

## 2025-05-17 RX ADMIN — MYCOPHENOLATE MOFETIL 750 MG: 250 CAPSULE ORAL at 18:10

## 2025-05-17 RX ADMIN — ANTI-THYMOCYTE GLOBULIN (RABBIT) 150 MG: 5 INJECTION, POWDER, LYOPHILIZED, FOR SOLUTION INTRAVENOUS at 05:01

## 2025-05-17 RX ADMIN — HYDROMORPHONE HYDROCHLORIDE 0.2 MG: 0.2 INJECTION, SOLUTION INTRAMUSCULAR; INTRAVENOUS; SUBCUTANEOUS at 13:36

## 2025-05-17 RX ADMIN — PHENYLEPHRINE HYDROCHLORIDE 100 MCG: 10 INJECTION INTRAVENOUS at 05:02

## 2025-05-17 RX ADMIN — ONDANSETRON 4 MG: 2 INJECTION INTRAMUSCULAR; INTRAVENOUS at 11:39

## 2025-05-17 RX ADMIN — Medication 10 MG: at 10:12

## 2025-05-17 RX ADMIN — CALCIUM CHLORIDE INJECTION 250 MG: 100 INJECTION, SOLUTION INTRAVENOUS at 11:25

## 2025-05-17 RX ADMIN — SODIUM CHLORIDE, SODIUM LACTATE, POTASSIUM CHLORIDE, AND CALCIUM CHLORIDE: .6; .31; .03; .02 INJECTION, SOLUTION INTRAVENOUS at 04:31

## 2025-05-17 RX ADMIN — ACETAMINOPHEN 975 MG: 325 TABLET ORAL at 17:30

## 2025-05-17 RX ADMIN — SENNOSIDES AND DOCUSATE SODIUM 1 TABLET: 50; 8.6 TABLET ORAL at 21:10

## 2025-05-17 RX ADMIN — BUMETANIDE 2 MG: 0.25 INJECTION INTRAMUSCULAR; INTRAVENOUS at 17:30

## 2025-05-17 RX ADMIN — FUROSEMIDE 10 MG/HR: 10 INJECTION, SOLUTION INTRAMUSCULAR; INTRAVENOUS at 18:17

## 2025-05-17 RX ADMIN — Medication 20 MG: at 08:15

## 2025-05-17 RX ADMIN — HYDROMORPHONE HYDROCHLORIDE 0.5 MG: 1 INJECTION, SOLUTION INTRAMUSCULAR; INTRAVENOUS; SUBCUTANEOUS at 12:01

## 2025-05-17 ASSESSMENT — ACTIVITIES OF DAILY LIVING (ADL)
ADLS_ACUITY_SCORE: 48
ADLS_ACUITY_SCORE: 44
ADLS_ACUITY_SCORE: 48
ADLS_ACUITY_SCORE: 44
ADLS_ACUITY_SCORE: 48
ADLS_ACUITY_SCORE: 44
ADLS_ACUITY_SCORE: 48
ADLS_ACUITY_SCORE: 44
ADLS_ACUITY_SCORE: 53
ADLS_ACUITY_SCORE: 53
ADLS_ACUITY_SCORE: 48
ADLS_ACUITY_SCORE: 44
ADLS_ACUITY_SCORE: 53
ADLS_ACUITY_SCORE: 44
ADLS_ACUITY_SCORE: 48
ADLS_ACUITY_SCORE: 53
ADLS_ACUITY_SCORE: 44

## 2025-05-17 NOTE — TELEPHONE ENCOUNTER
Organ Offer Encounter Information    Organ Offer Information  Organ offer date & time: 5/16/2025 10:15 PM  Coordinator/Fellow/Attending name: Nereida Pascual RN   Organ(s):  Organ UNOS ID Match Run ID Comment Organ Laterality   Kidney [id number] MNOP, R-KI         Recent infections?: Yes (Comment: cold last week with residual cough. Nasal HSV. Dr. Barreto aware,) Recent IV antibiotics?: Neg     New medications?: No Recent pregnancy?: No     Angicoagulation medications?: No Recent vaccinations?: No     Recent blood transfusions?: No Recent hospitalizations?: No   Has your insurance changed in the last 6-12 months?: Neg    Patient last dialyzed: 5/15/2025 10:18 PM  Dialysis type: Hemo  Discussed organ offer with: Patient  Patient/Caregiver name: Glenna Spears  Discussed risk category with Patient/Other: DCD  Understood donor criteria, verbalized understanding  Discussed program-specific outcomes: Asked questions regarding SRTR, verbalized understanding  Right to decline organ offer without penalty, Patient/Other: Aware of option to decline without penalty  Organ offer decision status Patient/Other: Accepted Offer  Additional Comments: 5/16/2025 11:29 PM  Kidney: MNOP, DCD  MD: Estevan  OPO Contact: Cy [employer]  VXM Results: compatible, no DSA  XM Plan (FXM must be done with serum no older than 10 days from transplant): Will do retrospective FXM, ok per Dr. Barreto to go to OR on VXM results.   Donor/Recip HCV Status: neg/neg  (HCV+ Donors - Send Epic in basket message to [facility] HCV POOL - Include Donor UNOS ID)  Is this an ABO A2 donor to ABO B Recipient:No  (In the event of A2 to B transplant, Anti-A titers need to be collected at the time of the crossmatch or admission - whichever is first.  Use [employer]e .A2toB for instructions.)    Plan (Admission, NPO, Donor OR): Discussed offer with recipient. Discussed DCD donor. Recipient accepted but has further questions/concerns. Recipient  mentioned they had a cold last week and has HSV blisters in nose.  Pt reported this happens every time she has a cold and that blisters are healing. Pt stated that her cold is gone, but due to her asthma, she usually has a residual cough after being sick. She also expressed concern for spread of her HSV virus in her nose. Dr. Barreto advised that HSV spread is a possibility with immunosuppressants, but low risk considering blisters are healing. Ok to proceed and recipient agreed to transplant.  - - -   COVID Screening  In the past month, have you:  Or anyone close to you had a positive COVID test or suspected to have COVID: No   Had any COVID symptoms (Fever, Cough, Short of Breath, Loss of Taste/Smell, Rash): No   -----------------  Admissions: 2331, Ally  Unit: 2355, Preop--spoke with Liya in PACU.  Update Provider Entering Orders (XM Plan & COVID Testing): 0008, paged Dr.Onuralp Serrano for orders. Return call: 0021 AM  Immunology: 2356, Kallie  Book OR: 5/17/25, 0001, spoke with Candelario who confirmed 0400 OR.  Vessel Storage Confirmation (PA/SHYAM/REGLA): Ok to bank  Blood Bank: Everardo Vazquez- confirmed laterality- right KI, will be delivered by [employer] to blood bank on a pump.   TransNet/ABO Verification: labels printed 1249 AM, confirmed receipt with Aza in OR.  Add Organ: Added 1909   KIDNEY Research ([phone]): paged [phone]  -------------------------------------------------------------------  Donor OR Time: 5/16/25, 1400  Procuring MD: Elisabeth Wallace, [facility]  Contact in the OR: Miracle Benoit  Organs Being Procured: REGLA DEGROOT, research  Expected Delays: no  Flush Solution: UW  Biopsy: Yes  Pump: Yes  Special Requests (Special blood tubes, nodes, waivers-XM and/or anatomical): none   MD for Visualization: Allen/Estevan  Transportation Details: [employer] - to deliver Right KI to [facility] blood bank.     Nereida Pascual, RN  Transplant Coordinator      Attestation I have discussed all of the  above with the Patient/Legal Guardian/Caregiver regarding this organ offer.: Yes  Coordinator/Fellow/Attending name: Nereida Pascual RN

## 2025-05-17 NOTE — OP NOTE
Transplant Surgery  Operative Note     Procedure date:  05/17/25    Preoperative diagnosis:  End Stage renal failure due to hypertension    Postoperative diagnosis:  Same    Procedure:  1. Right kidney  transplant,  Donation after Circulatory Death, Right  iliac fossa, with arterial and venous reconstruction. A J-J ureteral stent was placed.  2. Kidney allograft preparation on Back Table      Surgeon:  MARY PANDEY    Fellow/Assistant:  Dragan Villa MD. Fellow. Dr. Villa was the primary assistant  for the procedure and participated in all aspects of the case under my supervision. His presence was necessary as there was no qualified resident to assist with this procedure.    Anesthesia:  General    Specimen:  None    Drains:  Barrie-Tesfaye drain    Urine output:  45 mls    Estimated blood loss:  350    Fluids administered:        Indication: The patient has End Stage renal failure due to hypertension and received an organ offer for a Donation after Circulatory Death kidney allograft. After discussing the risks and benefits of proceeding, the patient agreed to proceed with surgery and provided informed consent.  Findings: Integrity of recipient artery: Mild atherosclerosis .    Donor: 47 M DCD KDPI 54%. Hx HTN and liver disease, terminal Cr 0.48. 2/2/1 mismatch, KDPI 76%, no DSA, negative FXM    Graft: Single artery with early bifurcation, transected on back table with separation of 2-3 mm diameter upper pole artery. Caval conduit vein. Single ureter. Placed in right iliac fossa end to side to external iliac vessels with reperfusion through main artery. Inferior epigastric artery dissected free and divided- reconstructed end to end to upper pole artery. Good flow, good perfusion of all territory. Good UOP. URETERAL STENT PLACED. RANDALL placed due to oozing.   Intraoperative Events: None,    Final ABO/Crossmatch verification: After the donor organ arrived to the operating room and prior to  anastomosis, I participated in the transplant pre-verification upon organ receipt timeout by visually verifying the donor ID, organ and laterality, donor blood type, recipient unique identifier, recipient blood type, and that the donor and recipient are blood type compatible. The crossmatch was done prospectively; the T cell flow crossmatch result was negative and B cell flow crossmatch result was negative prior to anastomosis.  The patient received Thymoglobulin solumedrol and Cellcept on induction.    Donor Organ Information:   Donor UNOS ID:  [id number]    Donor arterial clamp on:  5/16/2025  3:41 PM    Total ischemic time:  1021 min    Cold ischemic time:  1,021 min    Warm ischemic time:  47 min    Preservation fluid:  UW      Back Table Details:   Procedure:  Bench preparation of the kidney allograft for transplantation with arterial and venous reconstruction    Surgeon:  DRAGAN VILLA    Faculty Co-Surgeon:  MARY PANDEY    Fellow/Assistant:  Dragan Villa MD- Fellow. As above    Donor arrival to recipient room:  5/17/2025  4:57 AM    Graft injury:  Yes    Graft biopsy:  Yes- <1% glomerulosclerosis, <5% IFTA, minimal vascular changes    Organ received on:  Pump    Pump resistance:  0.11    Pump flow:  138    Arterial anatomy:  Single    Donor arterial quality:  Normal    Venous anatomy:  Single    Ureteral anatomy:  Single    Any reconstruction:  Yes    Artery:  Other    Vein:  Caval conduit     Complications: laceration of upper pole branch artery on back table- reconstructed with separate implantation to inferior epigastric after perfusion of main artery.    Findings: Normal as above      None.    Back Table Preparation:  The donor kidney was received and inspected. It had been flushed with UW. The graft was prepared on the back table by removing perinephric fat and ligating venous tributaries and lymphatics. The ureter was also cleaned of excess tissue. If required, reconstruction was  performed as detailed above. The kidney was stored in iced cold preservation solution until ready for transplantation. Faculty was present for the critical portions of the procedure.    Operative Procedure:   Arterial anastomosis start:  5/17/2025  7:55 AM    Arterial unclamp:  5/17/2025  8:41 AM    Extra vessels used:    no      The patient was brought to the operating room, placed in a supine position, and a time out was performed. Sequential compression devices were placed on both lower extremities and general endotracheal anesthesia was induced.  The patient was given IV antibiotics and a Hu catheter. A central line was placed by Anesthesia service. The abdomen was then shaved, prepped, and draped in the usual sterile fashion.  An incision was made in the right lower quadrant and carried down through the subcutaneous tissue and the abdominal wall fascia. If encountered, the epigastric vessels were ligated in continuity, divided and secured with surgical clips. The right iliac artery and vein were exposed. The retractor system was placed and the lymphatics overlying the vessels were serially ligated and divided.     The patient was heparinized. We applied atraumatic vascular clamps and the donor kidney was brought to the operative field. We made a venotomy and the caval conduit was anastomosed to the recipient right External Iliac vein in an end-to-side fashion. An arteriotomy was made and the donor main renal artery was anastomosed to the recipient right external iliac artery  in an end to side fashion. The patient was simultaneously loaded with IV mannitol, Lasix and volume. The renal artery was protected and the clamps were removed. After several cardiac cycles, we opened the renal artery and the kidney had Good reperfusion and was firm and pink . There was a 10% area of territorial hypoperfusion in the upper renal pole. We had previously identified a transected artery responsible for supplying this. The  artery was flushed with heparinized saline and occluded with a  Heifitz clamp. The end of the artery was spatulated and tailored. The inferior epigastric artery was identified in the rectus sheath. Careful dissection along the artery was performed to achieve sufficient length to reach the upper pole artery without tension. At an appropriate bifurcation, both ends were clipped and divided, and the bifurcation spatulated to make an anastomotic patch. This artery was also flushed with heparinized saline and occluded with a Heifitz. A running 7-0 prolene anastomosis was performed using the patches. After completing the back wall, verapamil was instilled into each artery and Heifitz replaced. On anastomosis the territory that previously looked ischemic greatly improved and there was a strong palpable and dopplerable signal in the artery. The artery was treated with papaverine placed topically.    The transplant ureter was managed by creating a Liche (anterior multistitch) anastomosis with absorbable suture. A stent was placed across the anastomosis. The kidney made Yes urine prior to implantation.    Hemostasis was obtained, the anastomoses inspected, and the kidney placed in the iliac fossa. After placement, the vessel lay was inspected and found to be acceptable. The kidney position was Retroperitoneal. The field was irrigated with antibiotic solution. A drain was placed. The retractor was removed and the abdominal wall fascia reapproximated. Subcutaneous tissues were irrigated and hemostasis obtained.  The skin was reapproximated with staples and a dry dressing was applied.   All needle, sponge and instrument counts were correct x 2. The patient was awakened, extubated, and transferred to PACU for post-op monitoring. Faculty was present for key portions of the procedure.

## 2025-05-17 NOTE — TELEPHONE ENCOUNTER
TRANSPLANT OR REPORT    Organ: KI  Laterality (if known): Right  Organ Location: MNOP    UNOS ID: [id number]  Donor OR Time: 5/16/25 1400  Expected/Actual Cross Clamp Time: 1541  Expected Organ Arrival Time: 5/17/25, 0130    Surgeon: Etsevan  Time in OR: 05/17/25, 0400  Time in 3C: Will admit to PACU    Recipient Details  Admission ETA: 0130AM  Unit: PACU  Isolation: None  Latex Allergy: None  : No  Diagnosis: ESRD      Kidney/Panc Transplants  XM Status (Need to wait for XM?): No, per Dr. Barreto    Liver or KP/PA Recipients - Vessel Banking:  Donor has positive serologies for HIV/HCV/HBV: no  Donor has risk criteria for HIV/HCV/HBV: no      Transplant Coordinator Contact Info: Nereida Pascual RN  [phone]      Vessel Bank Information  Transplant hospitals must not store a donor s extra vessels if the donor has tested positive for any of the following:   - HIV by antibody, antigen, or nucleic acid test (JAYASHREE)   - Hepatitis B surface antigen (HBsAg)   - Hepatitis B (HBV) by JAYASHREE   - Hepatitis C (HCV) by antibody or JAYASHREE     Extra vessels from donors that do not test positive for HIV, HBV, or HCV as above may be stored

## 2025-05-17 NOTE — BRIEF OP NOTE
[facility]    Brief Operative Note    Pre-operative diagnosis: End stage renal disease (H) [N18.6]  Post-operative diagnosis Same as pre-operative diagnosis    Procedure: Transplant kidney recipient  donor with Vein and Artery reconstruction, Right - Abdomen    Surgeon: Surgeons and Role:     * Manjeet Barreto MD - Primary     * Dragan Villa MD - Fellow - Assisting  Anesthesia: General   Estimated Blood Loss: 350 mL from 2025  4:28 AM to 2025 12:02 PM      Drains: 19 fr angelica  Specimens: * No specimens in log *  Findings:   None.  Complications: None .  Implants:   Implant Name Type Inv. Item Serial No.  Lot No. LRB No. Used Action   STENT URETERAL DBL PIGTAIL SOF-FLEX CVD [id number] - [id number] Stent STENT URETERAL DBL PIGTAIL SOF-FLEX CVD [id number]  [facility] [id number] Right 1 Implanted           Donor 39 yo DCD, cr 0.68, 30 min WIT, less than 1% gs on bx  Pump resistance 0.11, flow 130  Small upper pole artery anastomosed to the epigastric.   Right kidney, stapled caval conduit, both sides.

## 2025-05-17 NOTE — ANESTHESIA PROCEDURE NOTES
Airway       Patient location during procedure: OR       Procedure Start/Stop Times: 5/17/2025 4:37 AM  Staff -        Anesthesiologist:  Melissa Nixon MD       Resident/Fellow: El Colon MD       Performed By: resident and with residents       Procedure performed by resident/fellow/CRNA in presence of a teaching physician.    Consent for Airway        Urgency: elective  Indications and Patient Condition       Indications for airway management: saloni-procedural       Induction type:intravenous       Mask difficulty assessment: 1 - vent by mask    Final Airway Details       Final airway type: endotracheal airway       Successful airway: ETT - single  Endotracheal Airway Details        ETT size (mm): 7.5       Cuffed: yes       Successful intubation technique: video laryngoscopy       VL Blade Size: Glidescope 3       Grade View of Cords: 1       Adjucts: stylet       Position: Left       Measured from: gums/teeth       Secured at (cm): 22       Bite block used: Soft    Post intubation assessment        Placement verified by: capnometry, equal breath sounds and chest rise        Number of attempts at approach: 1       Number of other approaches attempted: 0       Secured with: tape       Ease of procedure: easy       Dentition: Intact    Medication(s) Administered   Medication Administration Time: 5/17/2025 4:37 AM

## 2025-05-17 NOTE — ANESTHESIA CARE TRANSFER NOTE
Patient: Glenna Spears    Procedure: Procedure(s):  Transplant kidney recipient  donor with Vein and Artery reconstruction       Diagnosis: End stage renal disease (H) [N18.6]  Diagnosis Additional Information: No value filed.    Anesthesia Type:   General     Note:    Oropharynx: spontaneously breathing  Level of Consciousness: drowsy  Oxygen Supplementation: face mask  Level of Supplemental Oxygen (L/min / FiO2): 6  Independent Airway: airway patency satisfactory and stable  Dentition: dentition unchanged  Vital Signs Stable: post-procedure vital signs reviewed and stable  Report to RN Given: handoff report given  Patient transferred to: PACU    Handoff Report: Identifed the Patient, Identified the Reponsible Provider, Reviewed the pertinent medical history, Discussed the surgical course, Reviewed Intra-OP anesthesia mangement and issues during anesthesia, Set expectations for post-procedure period and Allowed opportunity for questions and acknowledgement of understanding      Vitals:  Vitals Value Taken Time   BP     Temp     Pulse 110 25 12:10   Resp 10 25 12:10   SpO2 97 % 25 12:10   Vitals shown include unfiled device data.    Electronically Signed By: RAFAEL Nelson CRNA  May 17, 2025  12:10 PM

## 2025-05-17 NOTE — ANESTHESIA PREPROCEDURE EVALUATION
Anesthesia Pre-Procedure Evaluation    Patient: Glenna Spears   MRN: [id number] : 1990          Procedure : Procedure(s):  Transplant kidney recipient  donor         Past Medical History:   Diagnosis Date    ESRD (end stage renal disease) (H)     GERD (gastroesophageal reflux disease)     Hypertension     Obesity     Secondary hyperparathyroidism     Vitamin D deficiency       Past Surgical History:   Procedure Laterality Date    AV FISTULA REPAIR       SECTION      LAPAROSCOPIC GASTRIC SLEEVE N/A 2024    Procedure: GASTRECTOMY, SLEEVE, LAPAROSCOPIC;  Surgeon: John Smith MD;  Location: [facility] OR      Allergies   Allergen Reactions    Sulfa Antibiotics Other (See Comments)     Reaction as an infant- unsure of what happened    Nsaids Other (See Comments)     Has nephrotic range proteinuria and see nephrology's note.       Social History     Tobacco Use    Smoking status: Former     Current packs/day: 0.00     Types: Cigarettes     Quit date: 2023     Years since quittin.2     Passive exposure: Past    Smokeless tobacco: Never    Tobacco comments:     quit   Substance Use Topics    Alcohol use: Not Currently      Wt Readings from Last 1 Encounters:   25 71.1 kg (156 lb 12 oz)        Anesthesia Evaluation   Pt has had prior anesthetic.     No history of anesthetic complications       ROS/MED HX  ENT/Pulmonary:     (+)                      asthma  Treatment: Inhaler prn,       recent URI,          Neurologic:       Cardiovascular:     (+)  hypertension- -   -  - -                                      METS/Exercise Tolerance:     Hematologic:       Musculoskeletal:       GI/Hepatic:     (+) GERD,                   Renal/Genitourinary:     (+) renal disease, type: ESRD,            Endo:       Psychiatric/Substance Use:     (+)     : in the past.    Infectious Disease:       Malignancy:       Other:              Physical Exam  Airway  Mallampati: I  TM distance: >3  FB  Neck ROM: full  Mouth opening: >= 4 cm    Cardiovascular - normal exam   Dental   (+) Minor Abnormalities - some fillings, tiny chips      Pulmonary - normal exam      Neurological - normal exam  She appears awake, alert and oriented x3.    Other Findings       OUTSIDE LABS:  CBC:   Lab Results   Component Value Date    WBC 10.5 05/17/2025    WBC 6.8 05/20/2024    HGB 12.8 05/17/2025    HGB 10.7 (L) 05/20/2024    HCT 38.7 05/17/2025    HCT 33.0 (L) 05/20/2024     05/17/2025     05/20/2024     BMP:   Lab Results   Component Value Date     05/17/2025     05/20/2024    POTASSIUM 4.9 05/17/2025    POTASSIUM 4.0 05/20/2024    CHLORIDE 98 05/17/2025    CHLORIDE 101 05/20/2024    CO2 22 05/17/2025    CO2 23 05/20/2024    BUN 43.8 (H) 05/17/2025    BUN 29.4 (H) 05/20/2024    CR 9.43 (H) 05/17/2025    CR 11.40 (H) 05/20/2024     (H) 05/17/2025    GLC 65 (L) 05/17/2025     COAGS:   Lab Results   Component Value Date    PTT 29 05/17/2025    INR 0.98 05/17/2025     POC:   Lab Results   Component Value Date    HCGS Negative 05/20/2024     HEPATIC:   Lab Results   Component Value Date    ALBUMIN 4.1 05/17/2025    PROTTOTAL 7.1 05/17/2025    ALT 33 05/17/2025    AST 17 05/17/2025    ALKPHOS 75 05/17/2025    BILITOTAL 0.4 05/17/2025     OTHER:   Lab Results   Component Value Date    A1C 4.9 05/17/2025    KARTIK 9.7 05/17/2025       Anesthesia Plan    ASA Status:  3      NPO Status: ELEVATED Aspiration Risk/Unknown   Anesthesia Type: General.  Airway: oral.  Induction: intravenous.  Maintenance: Balanced.   Techniques and Equipment:     - Airway:   Central Line Kit: Triple Lumen Kit     - Monitoring Plan: standard ASA monitoring, train of four monitoring, BALTA, arterial line kit, central line kit     Consents    Anesthesia Plan(s) and associated risks, benefits, and realistic alternatives discussed. Questions answered and patient/representative(s) expressed understanding.     - Discussed:  anesthesiologist, resident     - Discussed with:  Patient        - Pt is DNR/DNI Status: no DNR     Blood Consent:      - Discussed with: patient.     Postoperative Care         Comments:    Other Comments: Took wegovy injection today- RSI               GRACIELA BARCLAY MD    I have reviewed the pertinent notes and labs in the chart from the past 30 days and (re)examined the patient.  Any updates or changes from those notes are reflected in this note.    Clinically Significant Risk Factors Present on Admission                   # Hypertension: Noted on problem list               # Asthma: noted on problem list

## 2025-05-17 NOTE — ANESTHESIA PROCEDURE NOTES
Central Line/PA Catheter Placement    Pre-Procedure   Staff -        Anesthesiologist:  Melissa Nixon MD       Resident/Fellow: El Colon MD       Performed By: resident       Pre-Anesthestic Checklist: patient identified, IV checked, site marked, risks and benefits discussed, informed consent, monitors and equipment checked, pre-op evaluation and at physician/surgeon's request  Timeout:       Correct Patient: Yes        Correct Procedure: Yes        Correct Site: Yes        Correct Position: Yes        Correct Laterality: Yes   Line Placement:   This line was placed Post Induction    Procedure   Procedure: central line       Laterality: right       Insertion Site: internal jugular.  Sterile Prep        All elements of maximal sterile barrier technique followed       Patient Prep/Sterile Barriers: draped, hand hygiene, gloves , hat , mask , draped, gown, sterile gel and probe cover  Insertion/Injection        Technique: ultrasound guided and Seldinger Technique        1. Ultrasound was used to evaluate the access site.       2. Vein evaluated via ultrasound for patency/adequacy.       3. Using real-time ultrasound the needle/catheter was observed entering the artery/vein.       Type: CVC       Catheter Size: 7 Fr       Catheter Length: 20       Number of Lumens: triple lumen  Narrative         Secured by: suture       Tegaderm and Biopatch dressing used.       Complications: None apparent,        blood aspirated from all lumens,        All lumens flushed: Yes       Verification method: Placement to be verified post-op       Tip termination: The catheter tip was threaded to reside in the right atrium subject to post surgical x-ray

## 2025-05-17 NOTE — CONSULTS
[facility]  Transplant Nephrology Progress Note  Date of Admission:  5/17/2025  Today's Date: 05/17/2025  Requesting physician: Manjeet Barreto*    Recommendations:   - Continue current immunosuppression.  - No need for dialysis.     Assessment & Plan   # DDKT: Trend down. Transplant renal US normal.   - Baseline Creatinine: ~ TBD   - Proteinuria: Moderate (1-3 grams)   - DSA Hx: pending   - Last cPRA: pending   - BK Viremia: Not checked post transplant   - Kidney Tx Biopsy Hx: No biopsy history.    # Immunosuppression: Tacrolimus immediate release (goal 8-10), Mycophenolate mofetil (dose 750 mg every 12 hours), and Methylprednisolone (dose taper)   - Induction with Recent Transplant:  Intermediate Intensity Protocol   - Continue with intensive monitoring of immunosuppression for efficacy and toxicity.   - Historical Changes in Immunosuppression: None   - Changes: Not at this time    # Infection Prevention:   Last CD4 Level: Not checked recently  - PJP: Inhaled pentamidine  - CMV: Valganciclovir (Valcyte)      - CMV IgG Ab High Risk Discordance (D+/R-) at time of transplant: No  Present CMV Serostatus: Positive  - EBV IgG Ab High Risk Discordance (D+/R-) at time of transplant: No  Present EBV Serostatus: Positive    # Hypertension: Controlled;  Goal BP: < 150/90   - Changes: Not at this time    # Anemia in Chronic Renal Disease: Hgb: Stable, low      SHALOM: unknown   - Iron studies: Not checked recently    # Mineral Bone Disorder:    - Secondary renal hyperparathyroidism; PTH level: not checked recently, but Markedly elevated (>1201 pg/ml)  in April 2024      On treatment: Cinacalcet  - Vitamin D; level: Normal        On supplement: Yes  - Calcium; level: Normal        On supplement: No  - Phosphorus; level: High        On supplement: No. Would hold on binder for now, but continue to monitor    # Electrolytes:  - Potassium; level: Normal        On supplement:  No  - Magnesium; level: Normal        On supplement: No  - Bicarbonate; level: Normal        On supplement: No  - Sodium; level: Normal    # Other Significant PMH:   - Obesity s/p gastric sleeve 1/23/24: on semaglutide    - History of Illicit Drug use: remote LSD /cocaine use in her 20s, + marijuana.    - Positive Quant Gold: s/p treatment by ID    # Transplant History:  Etiology of Kidney Failure: Unknown etiology  Tx: DDKT  Transplant: 5/17/2025 (Kidney)  Significant transplant-related complications: None    Recommendations were communicated to the primary team verbally.    Seen and discussed with Dr. Jayy Kilpatrick [facility]  Transplant Nephrology    Interval History  Ms. Speras's creatinine is 7.43 (05/18 0650); Trend down.  Good urine output.  Other significant labs/tests/vitals: phos high, but patient nauseated this morning and only POD1, so will continue to monitor.   Now reports RLE anterior thigh numbness and RLE weakness. No other new neuro symptoms. No recent back injury.   No chest pain or shortness of breath.  No leg swelling.  Some nausea but no vomiting.  No fever, sweats or chills.    Review of Systems   4 point ROS was obtained and negative except as noted in the Interval History.    MEDICATIONS:  Current Facility-Administered Medications   Medication Dose Route Frequency Provider Last Rate Last Admin     Current Facility-Administered Medications   Medication Dose Route Frequency Provider Last Rate Last Admin    dextrose 5% in lactated ringers infusion   Intravenous Continuous Manjeet Barreto  mL/hr at 05/17/25 1300 New Bag at 05/17/25 1300    lactated ringers infusion   Intravenous Continuous Melissa Nixon MD        sodium chloride 0.45% infusion   Intravenous Continuous PRN Manjeet Barreto MD        sodium chloride 0.9 % infusion  1,000 mL Intravenous Continuous PRN Manjeet Barreto  mL/hr at 05/17/25 1300 1,000 mL at  "25 1300       Physical Exam   Temp  Av.2  F (36.8  C)  Min: 97.8  F (36.6  C)  Max: 98.5  F (36.9  C)      Pulse  Av.3  Min: 98  Max: 124 Resp  Av.6  Min: 10  Max: 19  SpO2  Av.6 %  Min: 86 %  Max: 100 %    CVP (mmHg): 7 mmHgBP 139/79   Pulse 115   Temp 98.5  F (36.9  C) (Axillary)   Resp 19   Ht 1.727 m (5' 8\")   Wt 71.1 kg (156 lb 12 oz)   SpO2 94%   BMI 23.83 kg/m     Date 25 0700 - 25 0659   Shift 0944-6588 0745-6242 2503-8325 24 Hour Total   INTAKE   I.V. 1300   1300   Shift Total(mL/kg) 1300(18.28)   1300(18.28)   OUTPUT   Urine 347   347   Blood 350   350   Shift Total(mL/kg) 697(9.8)   697(9.8)   Weight (kg) 71.1 71.1 71.1 71.1      Admit Weight: 71.1 kg (156 lb 12 oz)     GENERAL APPEARANCE: alert and no distress  HENT: mouth without ulcers or lesions  RESP: lungs clear to auscultation - no rales, rhonchi or wheezes  CV: regular rhythm, normal rate, no rub, no murmur  EDEMA: no LE edema bilaterally  ABDOMEN: soft, nondistended, nontender, bowel sounds normal  MS: extremities normal - no gross deformities noted, no evidence of inflammation in joints, no muscle tenderness  SKIN: no rash  NEURO: RLE with decreased sensation anterior thigh and decreased strength 3/5    Data   All labs reviewed by me.  CMP  Recent Labs   Lab 25  1250 25  1204 25  0408 25  0339 25  0246 25  0203   NA  --  135  --   --   --  137   POTASSIUM  --  5.7*  --   --   --  4.9   CHLORIDE  --  97*  --   --   --  98   CO2  --  17*  --   --   --  22   ANIONGAP  --  21*  --   --   --  17*   * 188* 102* 65*   < > 78   BUN  --  45.2*  --   --   --  43.8*   CR  --  9.37*  --   --   --  9.43*   GFRESTIMATED  --  5*  --   --   --  5*   KARTIK  --  9.3  --   --   --  9.7   MAG  --  2.2  --   --   --   --    PHOS  --  7.3*  --   --   --   --    PROTTOTAL  --   --   --   --   --  7.1   ALBUMIN  --   --   --   --   --  4.1   BILITOTAL  --   --   --   --   --  0.4 "   ALKPHOS  --   --   --   --   --  75   AST  --   --   --   --   --  17   ALT  --   --   --   --   --  33    < > = values in this interval not displayed.     CBC  Recent Labs   Lab 25  1204 25  0203   HGB 10.6* 12.8   WBC 11.4* 10.5   RBC 3.32* 4.05   HCT 32.2* 38.7   MCV 97 96   MCH 31.9 31.6   MCHC 32.9 33.1   RDW 13.2 13.0    288     INR  Recent Labs   Lab 25  0203   INR 0.98   PTT 29     ABGNo lab results found in last 7 days.   Urine Studies  Recent Labs   Lab Test 25  0225 24  1339   COLOR Colorless Light Yellow   APPEARANCE Clear Clear   URINEGLC Negative 200*   URINEBILI Negative Negative   URINEKETONE Negative Negative   SG 1.015 1.010   UBLD Negative Moderate*   URINEPH 6.0 7.5*   PROTEIN Negative 30*   NITRITE Negative Negative   LEUKEST Negative Negative   RBCU 4* 3*   WBCU 5 3     No lab results found.  PTH  No lab results found.  Iron Studies  No lab results found.    IMAGING:  All imaging studies reviewed by me.    Past Medical History    I have reviewed this patient's medical history and updated it with pertinent information if needed.   Past Medical History:   Diagnosis Date    ESRD (end stage renal disease) (H)     GERD (gastroesophageal reflux disease)     Hypertension     Obesity     Secondary hyperparathyroidism     Vitamin D deficiency        Past Surgical History   I have reviewed this patient's surgical history and updated it with pertinent information if needed.  Past Surgical History:   Procedure Laterality Date    AV FISTULA REPAIR       SECTION      LAPAROSCOPIC GASTRIC SLEEVE N/A 2024    Procedure: GASTRECTOMY, SLEEVE, LAPAROSCOPIC;  Surgeon: John Smith MD;  Location:  OR       Family History   I have reviewed this patient's family history and updated it with pertinent information if needed.   Family History   Problem Relation Age of Onset    Kidney Disease Maternal Grandmother     Leukemia Paternal Grandmother 47    Leukemia  Maternal Aunt 37    Anesthesia Reaction No family hx of     Deep Vein Thrombosis (DVT) No family hx of     Heart Disease No family hx of        Social History   I have reviewed this patient's social history and updated it with pertinent information if needed. Glenna Spears  reports that she quit smoking about 2 years ago. Her smoking use included cigarettes. She has been exposed to tobacco smoke. She has never used smokeless tobacco. She reports that she does not currently use alcohol. She reports that she does not currently use drugs after having used the following drugs: Marijuana.    Prior to Admission Medications   Medications Prior to Admission   Medication Sig Dispense Refill Last Dose/Taking    acetaminophen (TYLENOL) 325 MG tablet Take 325-650 mg by mouth every 4 hours as needed   Past Month    B Complex-C-Zn-Folic Acid (DIALYVITE/ZINC) TABS Take 1 tablet by mouth every evening   5/16/2025 Noon    nortriptyline (PAMELOR) 25 MG capsule Take 25 mg by mouth at bedtime.   5/15/2025    Semaglutide-Weight Management (WEGOVY) 2.4 MG/0.75ML pen Inject 2.4 mg subcutaneously once a week. 3 mL 3 5/16/2025 at  1:00 PM    varenicline (CHANTIX SANCHEZ) 0.5 MG X 11 & 1 MG X 42 tablet Take 0.5 mg by mouth daily.   5/15/2025    VENTOLIN  (90 Base) MCG/ACT inhaler INHALE 2 PUFFS BY MOUTH EVERY 4 HOURS AS NEEDED FOR SHORTNESS OF BREATH OR COUGH OR EXERCISE   Past Week    Vitamin D3 (VITAMIN D, CHOLECALCIFEROL,) 25 mcg (1000 units) tablet Take 1 tablet by mouth daily.   5/15/2025    vitamin E (TOCOPHEROL) 400 units (180 mg) capsule Take 400 Units by mouth daily.   5/15/2025    cinacalcet (SENSIPAR) 90 MG tablet Take 90 mg by mouth every evening   5/15/2025    famotidine (PEPCID) 20 MG tablet Take 1 tablet (20 mg) by mouth 2 times daily 60 tablet 2 5/15/2025    levonorgestrel (KYLEENA) 19.5 MG IUD 1 Intra Uterine Device by Intrauterine route once       Semaglutide-Weight Management (WEGOVY) 1.7 MG/0.75ML pen Inject 1.7 mg  subcutaneously once a week. Call clinic to schedule follow up appointment. 3 mL 2     VELPHORO 500 MG CHEW chewable tablet Take 500 mg by mouth 3 times daily (with meals)   5/15/2025    vitamin B-12 (CYANOCOBALAMIN) 2500 MCG sublingual tablet Take 1 tablet (2,500 mcg) by mouth daily 30 tablet 5 Unknown    XPHOZAH 30 MG TABS daily.   5/15/2025

## 2025-05-17 NOTE — ANESTHESIA POSTPROCEDURE EVALUATION
Patient: Glenna Spears    Procedure: Procedure(s):  Transplant kidney recipient  donor with Vein and Artery reconstruction       Anesthesia Type:  General    Note:  Disposition: Admission; Inpatient   Postop Pain Control: Uneventful            Sign Out: Well controlled pain   PONV: No   Neuro/Psych: Uneventful            Sign Out: Acceptable/Baseline neuro status   Airway/Respiratory: Uneventful            Sign Out: Acceptable/Baseline resp. status   CV/Hemodynamics: Uneventful            Sign Out: Acceptable CV status; No obvious hypovolemia; No obvious fluid overload   Other NRE: NONE   DID A NON-ROUTINE EVENT OCCUR? No           Last vitals:  Vitals:    25 0135 25 0158   BP:  (!) 151/76   Resp:  16   Temp: 36.6  C (97.8  F)    SpO2:  100%       Electronically Signed By: Mariusz Robert MD  May 17, 2025  12:07 PM

## 2025-05-17 NOTE — PROGRESS NOTES
Transplant Brief Progress Note  May 17, 2025    Subjective: Pt is s/p DCD kidney transplant. She reports feeling well following surgery. She reports her pain is well controlled. She denies dyspnea and/or chest pain and/or dizziness. She reports passing some gas.  CC    Objective  Temp:  [97.8  F (36.6  C)-98.5  F (36.9  C)] 98.5  F (36.9  C)  Pulse:  [] 97  Resp:  [10-19] 10  BP: (107-157)/(67-89) 124/81  SpO2:  [86 %-100 %] 95 %    Results for orders placed or performed during the hospital encounter of 05/17/25 (from the past 24 hours)   COVID-19 Virus (Coronavirus) by PCR Nasopharyngeal    Specimen: Nasopharyngeal; Swab   Result Value Ref Range    SARS CoV2 PCR Negative Negative    Narrative    Testing was performed using the Xpert Xpress SARS-CoV-2 Assay on the Cepheid Gene-Xpert Instrument Systems. Additional information about this assay can be found via the Test Directory. This [place] FDA cleared test should be ordered for the detection of SARS-CoV-2 in individuals with signs and symptoms of respiratory tract infection. This test is for in vitro diagnostic use under the [place] FDA for laboratories certified under CLIA to perform high complexity testing. A negative result does not rule out the presence of PCR inhibitors in the specimen or target RNA concentration below the limit of detection for the assay. The possibility of a false negative should be considered if the patient's recent exposure or clinical presentation suggests COVID-19. This test [facility] validated by [facility]. These Laboratories are certified under the  Clinical Laboratory Improvement Amendments (CLIA) as qualified to perform high complexity testing.   Glucose by meter   Result Value Ref Range    GLUCOSE BY METER POCT 68 (L) 70 - 99 mg/dL   EKG 12-lead, tracing only   Result Value Ref Range    Systolic Blood Pressure  mmHg    Diastolic Blood Pressure  mmHg    Ventricular Rate 80 BPM    Atrial Rate 80 BPM    ND Interval  128 ms    QRS Duration 100 ms     ms    QTc 442 ms    P Axis 70 degrees    R AXIS 71 degrees    T Axis 81 degrees    Interpretation ECG       Sinus rhythm  Normal ECG  When compared with ECG of 20-May-2024 13:54,  No significant change was found  Confirmed by MD NAVEEN, LIN ([id number]) on 5/17/2025 9:48:33 AM     CBC with platelets differential    Narrative    The following orders were created for panel order CBC with platelets differential.  Procedure                               Abnormality         Status                     ---------                               -----------         ------                     CBC with platelets and ...[[id number]]                      Final result                 Please view results for these tests on the individual orders.   INR   Result Value Ref Range    INR 0.98 0.85 - 1.15    PT 13.3 11.8 - 14.8 Seconds   Partial thromboplastin time   Result Value Ref Range    aPTT 29 22 - 38 Seconds   Comprehensive metabolic panel   Result Value Ref Range    Sodium 137 135 - 145 mmol/L    Potassium 4.9 3.4 - 5.3 mmol/L    Carbon Dioxide (CO2) 22 22 - 29 mmol/L    Anion Gap 17 (H) 7 - 15 mmol/L    Urea Nitrogen 43.8 (H) 6.0 - 20.0 mg/dL    Creatinine 9.43 (H) 0.51 - 0.95 mg/dL    GFR Estimate 5 (L) >60 mL/min/1.73m2    Calcium 9.7 8.8 - 10.4 mg/dL    Chloride 98 98 - 107 mmol/L    Glucose 78 70 - 99 mg/dL    Alkaline Phosphatase 75 40 - 150 U/L    AST 17 0 - 45 U/L    ALT 33 0 - 50 U/L    Protein Total 7.1 6.4 - 8.3 g/dL    Albumin 4.1 3.5 - 5.2 g/dL    Bilirubin Total 0.4 <=1.2 mg/dL   Cytomegalovirus DNA by PCR, Quantitative    Specimen: Arm, Right; Blood   Result Value Ref Range    CMV DNA IU/mL Not Detected Not Detected IU/mL    CMV Quantitative PCR Specimen Type Blood     Narrative    The fidel  CMV assay is a FDA-approved in vitro nucleic acid amplification test for the quantification of cytomegalovirus (CMV) DNA in human EDTA plasma using the Roche fidel  instrument system for  automated viral nucleic acid extraction and purification (silica-based capture technique), followed by PCR amplification and real-time detection. Selective amplification of target nucleic acid from the sample is achieved by the use of target virus-specific forward and reverse primers which are selected from highly conserved regions of the CMV DNA polymerase (UL54) gene.     This test is intended for use as an aid in the management of CMV in transplant patients. In patients receiving anti-CMV therapy, serial DNA measurements can be used to assess viral response to treatment. Titer results are reported in International Units/mL (IU/mL). This assay has received FDA approval for the testing of human EDTA plasma only. The [facility] has validated the performance characteristics of the fidel  CMV assay for plasma and urine.   Hepatitis B Surface Antibody   Result Value Ref Range    Hepatitis B Surface Antibody Reactive     Hepatitis B Surface Antibody Instrument Value >1,000.00 <8.5 m[IU]/mL   HIV Antigen Antibody Combo Cascade   Result Value Ref Range    HIV Antigen Antibody Combo Nonreactive Nonreactive   Hepatitis B core antibody   Result Value Ref Range    Hepatitis B Core Antibody Total Nonreactive Nonreactive   Hepatitis B surface antigen   Result Value Ref Range    Hepatitis B Surface Antigen Nonreactive Nonreactive   Hepatitis C antibody   Result Value Ref Range    Hepatitis C Antibody Nonreactive Nonreactive   HLA Final Crossmatch Recipient    Narrative    The following orders were created for panel order HLA Final Crossmatch Recipient.  Procedure                               Abnormality         Status                     ---------                               -----------         ------                     HLA Final Crossmatch Re...[[id number]]                      In process                   Please view results for these tests on the individual orders.    Hemoglobin A1c   Result Value Ref Range    Estimated Average Glucose 94 <117 mg/dL    Hemoglobin A1C 4.9 <5.7 %   Lipid Profile   Result Value Ref Range    Cholesterol 153 <200 mg/dL    Triglycerides 100 <150 mg/dL    Direct Measure HDL 58 >=50 mg/dL    LDL Cholesterol Calculated 75 <100 mg/dL    Non HDL Cholesterol 95 <130 mg/dL    Narrative    Cholesterol  Desirable: < 200 mg/dL  Borderline High: 200 - 239 mg/dL  High: >= 240 mg/dL    Triglycerides  Normal: < 150 mg/dL  Borderline High: 150 - 199 mg/dL  High: 200-499 mg/dL  Very High: >= 500 mg/dL    Direct Measure HDL  Female: >= 50 mg/dL   Male: >= 40 mg/dL    LDL Cholesterol  Desirable: < 100 mg/dL  Above Desirable: 100 - 129 mg/dL   Borderline High: 130 - 159 mg/dL   High:  160 - 189 mg/dL   Very High: >= 190 mg/dL    Non HDL Cholesterol  Desirable: < 130 mg/dL  Above Desirable: 130 - 159 mg/dL  Borderline High: 160 - 189 mg/dL  High: 190 - 219 mg/dL  Very High: >= 220 mg/dL   ABO/Rh type and screen    Narrative    The following orders were created for panel order ABO/Rh type and screen.  Procedure                               Abnormality         Status                     ---------                               -----------         ------                     Adult Type and Screen[[id number]]                           Final result                 Please view results for these tests on the individual orders.   HCG quantitative pregnancy   Result Value Ref Range    hCG Quantitative 1 <5 mIU/mL   CBC with platelets and differential   Result Value Ref Range    WBC Count 10.5 4.0 - 11.0 10e3/uL    RBC Count 4.05 3.80 - 5.20 10e6/uL    Hemoglobin 12.8 11.7 - 15.7 g/dL    Hematocrit 38.7 35.0 - 47.0 %    MCV 96 78 - 100 fL    MCH 31.6 26.5 - 33.0 pg    MCHC 33.1 31.5 - 36.5 g/dL    RDW 13.0 10.0 - 15.0 %    Platelet Count 288 150 - 450 10e3/uL    % Neutrophils 61 %    % Lymphocytes 29 %    % Monocytes 9 %    % Eosinophils 1 %    % Basophils 1 %    % Immature  Granulocytes 1 %    NRBCs per 100 WBC 0 <1 /100    Absolute Neutrophils 6.4 1.6 - 8.3 10e3/uL    Absolute Lymphocytes 3.0 0.8 - 5.3 10e3/uL    Absolute Monocytes 0.9 0.0 - 1.3 10e3/uL    Absolute Eosinophils 0.1 0.0 - 0.7 10e3/uL    Absolute Basophils 0.1 0.0 - 0.2 10e3/uL    Absolute Immature Granulocytes 0.1 <=0.4 10e3/uL    Absolute NRBCs 0.0 10e3/uL   Adult Type and Screen   Result Value Ref Range    ABO/RH(D) A POS     Antibody Screen Negative Negative    SPECIMEN EXPIRATION DATE [id number]    XR Chest 2 Views    Narrative    Exam: XR CHEST 2 VIEWS  5/17/2025 2:21 AM     History:  pre-transplant screening       Comparison:  Chest radiograph 5/20/2024.    Findings: Upright PA and lateral views the chest..    Trachea is midline. The cardiomediastinal silhouette is within normal  limits. No significant pleural effusion or pneumothorax. No focal  airspace opacity. The visualized upper abdomen is unremarkable.  Surgical clips in the left abdomen.      Impression    Impression: Lungs are clear.    I have personally reviewed the examination and initial interpretation  and I agree with the findings.    BUBBA MCLEOD,          SYSTEM ID:  [id number]   Routine UA with microscopic   Result Value Ref Range    Color Urine Colorless Colorless, Straw, Light Yellow, Yellow    Appearance Urine Clear Clear    Glucose Urine Negative Negative mg/dL    Bilirubin Urine Negative Negative    Ketones Urine Negative Negative mg/dL    Specific Gravity Urine 1.015 1.003 - 1.035    Blood Urine Negative Negative    pH Urine 6.0 5.0 - 7.0    Protein Albumin Urine Negative Negative mg/dL    Urobilinogen Urine 0.2 0.2, 1.0 mg/dL    Nitrite Urine Negative Negative    Leukocyte Esterase Urine Negative Negative    RBC Urine 4 (H) <=2 /HPF    WBC Urine 5 <=5 /HPF    Squamous Epithelials Urine 1 <=1 /HPF   Protein  random urine   Result Value Ref Range    Total Protein Urine mg/dL 96.2   mg/dL    Total Protein Urine mg/mg Creat 1.83 (H) 0.00 -  0.20 mg/mg Cr    Creatinine Urine mg/dL 52.6 mg/dL   Glucose by meter   Result Value Ref Range    GLUCOSE BY METER POCT 72 70 - 99 mg/dL   Prepare red blood cells (unit)   Result Value Ref Range    Blood Component Type Red Blood Cells     Product Code S6042A99     Unit Status Ready for issue     Unit Number [id number]     CROSSMATCH Compatible     CODING SYSTEM [id number]    Prepare red blood cells (unit)   Result Value Ref Range    Blood Component Type Red Blood Cells     Product Code C1535R91     Unit Status Ready for issue     Unit Number [id number]     CROSSMATCH Compatible     CODING SYSTEM [id number]    Glucose by meter   Result Value Ref Range    GLUCOSE BY METER POCT 65 (L) 70 - 99 mg/dL   Glucose by meter   Result Value Ref Range    GLUCOSE BY METER POCT 102 (H) 70 - 99 mg/dL   CBC with platelets   Result Value Ref Range    WBC Count 11.4 (H) 4.0 - 11.0 10e3/uL    RBC Count 3.32 (L) 3.80 - 5.20 10e6/uL    Hemoglobin 10.6 (L) 11.7 - 15.7 g/dL    Hematocrit 32.2 (L) 35.0 - 47.0 %    MCV 97 78 - 100 fL    MCH 31.9 26.5 - 33.0 pg    MCHC 32.9 31.5 - 36.5 g/dL    RDW 13.2 10.0 - 15.0 %    Platelet Count 201 150 - 450 10e3/uL   Basic metabolic panel   Result Value Ref Range    Sodium 135 135 - 145 mmol/L    Potassium 5.7 (H) 3.4 - 5.3 mmol/L    Chloride 97 (L) 98 - 107 mmol/L    Carbon Dioxide (CO2) 17 (L) 22 - 29 mmol/L    Anion Gap 21 (H) 7 - 15 mmol/L    Urea Nitrogen 45.2 (H) 6.0 - 20.0 mg/dL    Creatinine 9.37 (H) 0.51 - 0.95 mg/dL    GFR Estimate 5 (L) >60 mL/min/1.73m2    Calcium 9.3 8.8 - 10.4 mg/dL    Glucose 188 (H) 70 - 99 mg/dL   Magnesium   Result Value Ref Range    Magnesium 2.2 1.7 - 2.3 mg/dL   Phosphorus   Result Value Ref Range    Phosphorus 7.3 (H) 2.5 - 4.5 mg/dL   Glucose by meter   Result Value Ref Range    GLUCOSE BY METER POCT 163 (H) 70 - 99 mg/dL   XR Chest Port 1 View    Narrative    Exam: XR CHEST PORT 1 VIEW, 5/17/2025 1:18 PM    Comparison: 5/17/2025 at 2:21 AM    History: Post-op  Kidney Transplant    Findings:  Portable AP view of the semiupright chest. Right IJ CVC with tip at  the cavoatrial junction. Trachea is midline. Cardiomediastinal  silhouette is within normal limits. No focal airspace opacity. No  pneumothorax or pleural effusion. The visualized upper abdomen is  unremarkable. No acute osseous abnormalities.      Impression    Impression:   No acute cardiopulmonary findings.    I have personally reviewed the examination and initial interpretation  and I agree with the findings.    GIGI MONTEMAYOR DO         SYSTEM ID:  [id number]   US Renal Transplant with Doppler    Narrative    EXAMINATION: US RENAL TRANSPLANT, 5/17/2025 1:23 PM     COMPARISON: None.    HISTORY: s/p DDKT, please assess flows    TECHNIQUE:  Grey-scale, color Doppler and spectral flow analysis.    FINDINGS:  The transplant kidney is located right lower quadrant, and measures  13.3 cm in length. Parenchyma is of normal thickness and echogenicity.  No focal lesions. No hydronephrosis. No perinephric fluid collection.  Hu within the bladder.    Renal artery flow:   81 cm/sec peak systolic at hilum.  70 peak systolic at anastomosis.  Arcuate artery resistive indices (upper to lower): 0.73, 0.75, 0.68    Renal Vein Flow:  22 at hilum.   67 at anastomosis.    Iliac artery flow:  113 cm/s peak systolic above anastomosis.  169 cm/s peak systolic below anastomosis.    Iliac vein flow:  Patent above and below the anastomosis.      Impression    IMPRESSION:   Normal transplant kidney ultrasound.    I have personally reviewed the examination and initial interpretation  and I agree with the findings.    GIGI MONTEMAYOR DO         SYSTEM ID:  [id number]   Glucose by meter   Result Value Ref Range    GLUCOSE BY METER POCT 144 (H) 70 - 99 mg/dL   Potassium   Result Value Ref Range    Potassium 5.2 3.4 - 5.3 mmol/L   Venous Panel POCT   Result Value Ref Range    pH Venous POCT 7.31 (L) 7.32 - 7.43    pCO2 Venous POCT 47 40 - 50 mm Hg     pO2 Venous POCT 32 25 - 47 mm Hg    Bicarbonate Venous POCT 24 21 - 28 mmol/L    Sodium POCT 133 (L) 135 - 145 mmol/L    Potassium POCT 5.2 3.4 - 5.3 mmol/L    Hemoglobin POCT 12.0 11.7 - 15.7 g/dL    Oxyhemoglobin Venous POCT 57 (L) 70 - 75 %    Glucose Whole Blood POCT 113 (H) 70 - 99 mg/dL    Base Excess/Deficit (+/-) POCT -3.0 -3.0 - 3.0 mmol/L    Calcium, Ionized Whole Blood POCT 4.7 4.4 - 5.2 mg/dL    O2 Sat, Venous POCT 58 (L) 70 - 75 %    Lactic Acid POCT 1.6 0.7 - 2.0 mmol/L    FIO2 POCT 21.0 %    Narrative    In healthy individuals, oxyhemoglobin (O2Hb) and oxygen saturation (SO2) are approximately equal. In the presence of dyshemoglobins, oxyhemoglobin can be considerably lower than oxygen saturation.        Physical Exam  General: Alert, well-appearing, in no acute distress.  Chest wall: Symmetric thorax.   Respiratory: Non-labored breathing RA  Cardiovascular: Regular rate and rhythm.   Gastrointestinal: Abdomen minimally-distended and appropriately tender. Incisions covered with dressing. Minimal serosanguinous drain in the drain.  : Hu in, Yellow urine (UOP 800cc)  Extremities: wwp   Skin: No rashes or lesions appreciated.    A/P: Glenna Spears is a 34 year old  old female s/p DCD kidney transplant. She is recovering approprietly following her procedure.    -Q4H HBG check  -Q4H K check  -Monitor I/O  -Rest of care per the order    Kushal Douglass MD  Transplant Surgery, PGY 1

## 2025-05-17 NOTE — LETTER
Transition Communication Hand-off for Care Transitions to Next Level of Care Provider    Name: Glenna Spears  : 1990  MRN #: [id number]  Primary Care Provider: Neela Irene     Primary Clinic: [address] [address]     Reason for Hospitalization:  End stage renal disease (H) [N18.6]  ESRD (end stage renal disease) (H) [N18.6]  Admit Date/Time: 2025  1:27 AM  Discharge Date: 25  Payor Source: Payor: BLUE PLUS / Plan: BLUE PLUS ADVANTAGE MA / Product Type: HMO /     Discharge Plan: home with home care     Discharge Needs Assessment:  Needs      Flowsheet Row Most Recent Value   Equipment Currently Used at Home none   # of Referrals Placed by CM Homecare        Follow-up plan:    Future Appointments   Date Time Provider Department Center   2025  3:30 PM Maggi Torres PT [facility]   2025  7:15 AM  LAB [facility]   2025  8:00 AM Nataliya Barragan APRN [facility]   2025  8:00 AM  [facility]   2025  7:15 AM  LAB [facility]   2025  8:00 AM  [facility] NUR[facility]   2025  8:30 AM Nataliya Barragan APRN [facility]   6/3/2025  2:30 PM Kelli Joshi PA-C [facility]       Any outstanding tests or procedures:        Referrals       Future Labs/Procedures    Home Care Referral     Comments:    Home Care visits to start following completion of daily ATC Clinic visits ([phone])     Skilled nursing visits to monitor cardiac and resp status   Monitor hydration, nutrition, urinary and bowel status   Monitor healing of incision     Instruct in medications and eval effects    Assist / teach patient to obtain and record lab results in handbook     Lab draws(mornings) per orders, report results to Post Transplant Coordinator: Tammi Cuellar, RN #[phone]  Fax # [phone]              Your provider has ordered home health services. If you have not been contacted  within 2 days of your discharge please call the selected Home Care agency listed on your Discharge document.  If a Home Care agency is NOT listed, please call [phone].            Supplies       Future Labs/Procedures    Walker Order             Key Recommendations:      Jaquelin John RN    AVS/Discharge Summary is the source of truth; this is a helpful guide for improved communication of patient story

## 2025-05-17 NOTE — PROGRESS NOTES
Patient removed from OS waitlist after  donor right kidney transplant. OS ID [id number].    Donor Has Risk Criteria for Transmission of HIV/HCV/HBV: No  Recipient Notified of Risk Criteria: N/A

## 2025-05-18 LAB
ANION GAP SERPL CALCULATED.3IONS-SCNC: 15 MMOL/L (ref 7–15)
ANION GAP SERPL CALCULATED.3IONS-SCNC: 16 MMOL/L (ref 7–15)
BACTERIA UR CULT: NORMAL
BASOPHILS # BLD AUTO: 0 10E3/UL (ref 0–0.2)
BASOPHILS NFR BLD AUTO: 0 %
BK VIRUS SPECIMEN TYPE: NORMAL
BKV DNA # SPEC NAA+PROBE: NOT DETECTED IU/ML
BUN SERPL-MCNC: 42.9 MG/DL (ref 6–20)
BUN SERPL-MCNC: 43.1 MG/DL (ref 6–20)
CALCIUM SERPL-MCNC: 9.4 MG/DL (ref 8.8–10.4)
CALCIUM SERPL-MCNC: 9.5 MG/DL (ref 8.8–10.4)
CHLORIDE SERPL-SCNC: 100 MMOL/L (ref 98–107)
CHLORIDE SERPL-SCNC: 98 MMOL/L (ref 98–107)
CREAT SERPL-MCNC: 7.43 MG/DL (ref 0.51–0.95)
CREAT SERPL-MCNC: 7.87 MG/DL (ref 0.51–0.95)
EGFRCR SERPLBLD CKD-EPI 2021: 6 ML/MIN/1.73M2
EGFRCR SERPLBLD CKD-EPI 2021: 7 ML/MIN/1.73M2
EOSINOPHIL # BLD AUTO: 0.1 10E3/UL (ref 0–0.7)
EOSINOPHIL NFR BLD AUTO: 1 %
ERYTHROCYTE [DISTWIDTH] IN BLOOD BY AUTOMATED COUNT: 13.1 % (ref 10–15)
ERYTHROCYTE [DISTWIDTH] IN BLOOD BY AUTOMATED COUNT: 13.2 % (ref 10–15)
GLUCOSE BLDC GLUCOMTR-MCNC: 93 MG/DL (ref 70–99)
GLUCOSE SERPL-MCNC: 105 MG/DL (ref 70–99)
GLUCOSE SERPL-MCNC: 96 MG/DL (ref 70–99)
HCO3 SERPL-SCNC: 22 MMOL/L (ref 22–29)
HCO3 SERPL-SCNC: 23 MMOL/L (ref 22–29)
HCT VFR BLD AUTO: 30.3 % (ref 35–47)
HCT VFR BLD AUTO: 30.9 % (ref 35–47)
HGB BLD-MCNC: 10 G/DL (ref 11.7–15.7)
HGB BLD-MCNC: 10.2 G/DL (ref 11.7–15.7)
HGB BLD-MCNC: 10.2 G/DL (ref 11.7–15.7)
HGB BLD-MCNC: 10.3 G/DL (ref 11.7–15.7)
HGB BLD-MCNC: 10.4 G/DL (ref 11.7–15.7)
HGB BLD-MCNC: 10.7 G/DL (ref 11.7–15.7)
IMM GRANULOCYTES # BLD: 0 10E3/UL
IMM GRANULOCYTES NFR BLD: 0 %
LYMPHOCYTES # BLD AUTO: 0.2 10E3/UL (ref 0.8–5.3)
LYMPHOCYTES NFR BLD AUTO: 2 %
MAGNESIUM SERPL-MCNC: 2 MG/DL (ref 1.7–2.3)
MCH RBC QN AUTO: 31.7 PG (ref 26.5–33)
MCH RBC QN AUTO: 32.2 PG (ref 26.5–33)
MCHC RBC AUTO-ENTMCNC: 33.3 G/DL (ref 31.5–36.5)
MCHC RBC AUTO-ENTMCNC: 33.7 G/DL (ref 31.5–36.5)
MCV RBC AUTO: 94 FL (ref 78–100)
MCV RBC AUTO: 94 FL (ref 78–100)
MCV RBC AUTO: 96 FL (ref 78–100)
MCV RBC AUTO: 96 FL (ref 78–100)
MCV RBC AUTO: 97 FL (ref 78–100)
MCV RBC AUTO: 97 FL (ref 78–100)
MONOCYTES # BLD AUTO: 0.6 10E3/UL (ref 0–1.3)
MONOCYTES NFR BLD AUTO: 7 %
NEUTROPHILS # BLD AUTO: 7.3 10E3/UL (ref 1.6–8.3)
NEUTROPHILS NFR BLD AUTO: 89 %
NRBC # BLD AUTO: 0 10E3/UL
NRBC BLD AUTO-RTO: 0 /100
OXALATE SERPL-SCNC: <2 UMOL/L
PHOSPHATE SERPL-MCNC: 8.7 MG/DL (ref 2.5–4.5)
PLATELET # BLD AUTO: 171 10E3/UL (ref 150–450)
PLATELET # BLD AUTO: 179 10E3/UL (ref 150–450)
POTASSIUM SERPL-SCNC: 3.7 MMOL/L (ref 3.4–5.3)
POTASSIUM SERPL-SCNC: 3.8 MMOL/L (ref 3.4–5.3)
POTASSIUM SERPL-SCNC: 4 MMOL/L (ref 3.4–5.3)
POTASSIUM SERPL-SCNC: 4.2 MMOL/L (ref 3.4–5.3)
POTASSIUM SERPL-SCNC: 4.4 MMOL/L (ref 3.4–5.3)
POTASSIUM SERPL-SCNC: 4.4 MMOL/L (ref 3.4–5.3)
RBC # BLD AUTO: 3.2 10E6/UL (ref 3.8–5.2)
RBC # BLD AUTO: 3.22 10E6/UL (ref 3.8–5.2)
SODIUM SERPL-SCNC: 136 MMOL/L (ref 135–145)
SODIUM SERPL-SCNC: 138 MMOL/L (ref 135–145)
WBC # BLD AUTO: 8 10E3/UL (ref 4–11)
WBC # BLD AUTO: 8.2 10E3/UL (ref 4–11)

## 2025-05-18 PROCEDURE — 85025 COMPLETE CBC W/AUTO DIFF WBC: CPT | Performed by: SURGERY

## 2025-05-18 PROCEDURE — 85027 COMPLETE CBC AUTOMATED: CPT | Performed by: SURGERY

## 2025-05-18 PROCEDURE — 82310 ASSAY OF CALCIUM: CPT | Performed by: PHYSICIAN ASSISTANT

## 2025-05-18 PROCEDURE — 120N000011 HC R&B TRANSPLANT UMMC

## 2025-05-18 PROCEDURE — 250N000013 HC RX MED GY IP 250 OP 250 PS 637: Performed by: PHYSICIAN ASSISTANT

## 2025-05-18 PROCEDURE — 250N000013 HC RX MED GY IP 250 OP 250 PS 637

## 2025-05-18 PROCEDURE — 84100 ASSAY OF PHOSPHORUS: CPT | Performed by: SURGERY

## 2025-05-18 PROCEDURE — 258N000003 HC RX IP 258 OP 636: Performed by: PHYSICIAN ASSISTANT

## 2025-05-18 PROCEDURE — 85018 HEMOGLOBIN: CPT | Performed by: PHYSICIAN ASSISTANT

## 2025-05-18 PROCEDURE — 250N000011 HC RX IP 250 OP 636: Mod: JZ | Performed by: PHYSICIAN ASSISTANT

## 2025-05-18 PROCEDURE — 80048 BASIC METABOLIC PNL TOTAL CA: CPT | Performed by: SURGERY

## 2025-05-18 PROCEDURE — 36592 COLLECT BLOOD FROM PICC: CPT | Performed by: SURGERY

## 2025-05-18 PROCEDURE — 250N000011 HC RX IP 250 OP 636: Performed by: SURGERY

## 2025-05-18 PROCEDURE — 250N000012 HC RX MED GY IP 250 OP 636 PS 637: Performed by: SURGERY

## 2025-05-18 PROCEDURE — 83735 ASSAY OF MAGNESIUM: CPT | Performed by: SURGERY

## 2025-05-18 PROCEDURE — 84132 ASSAY OF SERUM POTASSIUM: CPT | Performed by: SURGERY

## 2025-05-18 PROCEDURE — 250N000013 HC RX MED GY IP 250 OP 250 PS 637: Performed by: SURGERY

## 2025-05-18 RX ORDER — FAMOTIDINE 20 MG/1
20 TABLET, FILM COATED ORAL 2 TIMES DAILY PRN
COMMUNITY

## 2025-05-18 RX ORDER — DIPHENHYDRAMINE HCL 25 MG
25-50 CAPSULE ORAL ONCE
Status: COMPLETED | OUTPATIENT
Start: 2025-05-18 | End: 2025-05-18

## 2025-05-18 RX ORDER — FUROSEMIDE 10 MG/ML
80 INJECTION INTRAMUSCULAR; INTRAVENOUS ONCE
Status: COMPLETED | OUTPATIENT
Start: 2025-05-18 | End: 2025-05-18

## 2025-05-18 RX ORDER — HEPARIN SODIUM 10000 [USP'U]/100ML
400 INJECTION, SOLUTION INTRAVENOUS CONTINUOUS
Status: DISCONTINUED | OUTPATIENT
Start: 2025-05-18 | End: 2025-05-20

## 2025-05-18 RX ORDER — ACETAMINOPHEN 325 MG/1
650 TABLET ORAL ONCE
Status: COMPLETED | OUTPATIENT
Start: 2025-05-18 | End: 2025-05-18

## 2025-05-18 RX ORDER — DIPHENHYDRAMINE HCL 12.5MG/5ML
25-50 LIQUID (ML) ORAL ONCE
Status: COMPLETED | OUTPATIENT
Start: 2025-05-18 | End: 2025-05-18

## 2025-05-18 RX ADMIN — OXYCODONE HYDROCHLORIDE 10 MG: 10 TABLET ORAL at 23:40

## 2025-05-18 RX ADMIN — ACETAMINOPHEN 975 MG: 325 TABLET ORAL at 02:05

## 2025-05-18 RX ADMIN — OXYCODONE HYDROCHLORIDE 10 MG: 10 TABLET ORAL at 13:30

## 2025-05-18 RX ADMIN — METHOCARBAMOL 500 MG: 500 TABLET ORAL at 00:25

## 2025-05-18 RX ADMIN — ACETAMINOPHEN 650 MG: 325 TABLET ORAL at 16:03

## 2025-05-18 RX ADMIN — ALBUTEROL SULFATE 2 PUFF: 90 AEROSOL, METERED RESPIRATORY (INHALATION) at 07:14

## 2025-05-18 RX ADMIN — OXYCODONE HYDROCHLORIDE 10 MG: 10 TABLET ORAL at 04:09

## 2025-05-18 RX ADMIN — MYCOPHENOLATE MOFETIL 750 MG: 250 CAPSULE ORAL at 18:11

## 2025-05-18 RX ADMIN — ANTI-THYMOCYTE GLOBULIN (RABBIT) 150 MG: 5 INJECTION, POWDER, LYOPHILIZED, FOR SOLUTION INTRAVENOUS at 17:01

## 2025-05-18 RX ADMIN — HYDROMORPHONE HYDROCHLORIDE 0.4 MG: 0.2 INJECTION, SOLUTION INTRAMUSCULAR; INTRAVENOUS; SUBCUTANEOUS at 02:28

## 2025-05-18 RX ADMIN — HYDROMORPHONE HYDROCHLORIDE 0.4 MG: 0.2 INJECTION, SOLUTION INTRAMUSCULAR; INTRAVENOUS; SUBCUTANEOUS at 05:02

## 2025-05-18 RX ADMIN — METHOCARBAMOL 500 MG: 500 TABLET ORAL at 15:42

## 2025-05-18 RX ADMIN — PROCHLORPERAZINE MALEATE 10 MG: 5 TABLET ORAL at 16:10

## 2025-05-18 RX ADMIN — HYDROMORPHONE HYDROCHLORIDE 0.4 MG: 0.2 INJECTION, SOLUTION INTRAMUSCULAR; INTRAVENOUS; SUBCUTANEOUS at 00:25

## 2025-05-18 RX ADMIN — ACETAMINOPHEN 650 MG: 325 TABLET ORAL at 16:37

## 2025-05-18 RX ADMIN — TACROLIMUS 2.5 MG: 1 CAPSULE ORAL at 18:11

## 2025-05-18 RX ADMIN — DIPHENHYDRAMINE HYDROCHLORIDE 50 MG: 25 CAPSULE ORAL at 16:03

## 2025-05-18 RX ADMIN — HYDROMORPHONE HYDROCHLORIDE 0.4 MG: 0.2 INJECTION, SOLUTION INTRAMUSCULAR; INTRAVENOUS; SUBCUTANEOUS at 07:20

## 2025-05-18 RX ADMIN — FUROSEMIDE 80 MG: 10 INJECTION, SOLUTION INTRAMUSCULAR; INTRAVENOUS at 14:34

## 2025-05-18 RX ADMIN — HYDROMORPHONE HYDROCHLORIDE 0.2 MG: 0.2 INJECTION, SOLUTION INTRAMUSCULAR; INTRAVENOUS; SUBCUTANEOUS at 15:42

## 2025-05-18 RX ADMIN — FAMOTIDINE 20 MG: 20 TABLET, FILM COATED ORAL at 07:30

## 2025-05-18 RX ADMIN — SENNOSIDES AND DOCUSATE SODIUM 1 TABLET: 50; 8.6 TABLET ORAL at 07:30

## 2025-05-18 RX ADMIN — DEXTROSE, SODIUM CHLORIDE, SODIUM LACTATE, POTASSIUM CHLORIDE, AND CALCIUM CHLORIDE: 5; .6; .31; .03; .02 INJECTION, SOLUTION INTRAVENOUS at 18:12

## 2025-05-18 RX ADMIN — ACETAMINOPHEN 975 MG: 325 TABLET ORAL at 10:17

## 2025-05-18 RX ADMIN — ONDANSETRON 4 MG: 2 INJECTION, SOLUTION INTRAMUSCULAR; INTRAVENOUS at 05:33

## 2025-05-18 RX ADMIN — HYDROMORPHONE HYDROCHLORIDE 0.4 MG: 0.2 INJECTION, SOLUTION INTRAMUSCULAR; INTRAVENOUS; SUBCUTANEOUS at 10:14

## 2025-05-18 RX ADMIN — POLYETHYLENE GLYCOL 3350 17 G: 17 POWDER, FOR SOLUTION ORAL at 07:39

## 2025-05-18 RX ADMIN — OXYCODONE HYDROCHLORIDE 10 MG: 10 TABLET ORAL at 18:11

## 2025-05-18 RX ADMIN — HEPARIN SODIUM 200 UNITS/HR: 10000 INJECTION, SOLUTION INTRAVENOUS at 07:31

## 2025-05-18 RX ADMIN — TACROLIMUS 2.5 MG: 1 CAPSULE ORAL at 07:30

## 2025-05-18 RX ADMIN — METHOCARBAMOL 500 MG: 500 TABLET ORAL at 09:32

## 2025-05-18 RX ADMIN — DIPHENHYDRAMINE HYDROCHLORIDE 50 MG: 25 CAPSULE ORAL at 16:38

## 2025-05-18 RX ADMIN — ONDANSETRON 4 MG: 4 TABLET, ORALLY DISINTEGRATING ORAL at 13:00

## 2025-05-18 RX ADMIN — MYCOPHENOLATE MOFETIL 750 MG: 250 CAPSULE ORAL at 07:30

## 2025-05-18 RX ADMIN — METHYLPREDNISOLONE SODIUM SUCCINATE 250 MG: 125 INJECTION, POWDER, LYOPHILIZED, FOR SOLUTION INTRAMUSCULAR; INTRAVENOUS at 16:03

## 2025-05-18 RX ADMIN — OXYCODONE HYDROCHLORIDE 10 MG: 10 TABLET ORAL at 08:53

## 2025-05-18 RX ADMIN — ATORVASTATIN CALCIUM 10 MG: 10 TABLET, FILM COATED ORAL at 07:30

## 2025-05-18 RX ADMIN — ALBUTEROL SULFATE 2 PUFF: 90 AEROSOL, METERED RESPIRATORY (INHALATION) at 00:35

## 2025-05-18 ASSESSMENT — ACTIVITIES OF DAILY LIVING (ADL)
ADLS_ACUITY_SCORE: 54
ADLS_ACUITY_SCORE: 53
ADLS_ACUITY_SCORE: 54
ADLS_ACUITY_SCORE: 54
ADLS_ACUITY_SCORE: 53
ADLS_ACUITY_SCORE: 54
ADLS_ACUITY_SCORE: 53
ADLS_ACUITY_SCORE: 54
ADLS_ACUITY_SCORE: 53
ADLS_ACUITY_SCORE: 53
ADLS_ACUITY_SCORE: 54
ADLS_ACUITY_SCORE: 54
ADLS_ACUITY_SCORE: 55
ADLS_ACUITY_SCORE: 53
ADLS_ACUITY_SCORE: 55
ADLS_ACUITY_SCORE: 53
ADLS_ACUITY_SCORE: 54
ADLS_ACUITY_SCORE: 55
ADLS_ACUITY_SCORE: 55
ADLS_ACUITY_SCORE: 53
ADLS_ACUITY_SCORE: 54

## 2025-05-18 NOTE — PROGRESS NOTES
CLINICAL NUTRITION SERVICES - ASSESSMENT NOTE    RECOMMENDATIONS FOR MDs/PROVIDERS TO ORDER:  None today     Registered Dietitian Interventions:  --Initial post-SOT diet instruction completed       Future/Additional Recommendations:  If suspect eating poorly, would offer supplements and start on kcal cnts.     Minimize diet restrictions as able due to high calorie/protein needs post-transplant. Oral supplements as needed to help meet nutritional needs.    High-protein food choices with meals to help meet high needs post-transplant over the next 6-8 weeks.    Heat-healthy diet (low saturated fat, low sodium, high fiber) and food safety precautions long term due to immunosuppression regimen post transplant.     Need High Kcal/high protein diet instructions added to discharge summary tab          REASON FOR ASSESSMENT  Provider order -     Post Op Kidney Transplant       Chart reviewed:  34 year old female who has a past medical history of ESRD (end stage renal disease) (H), GERD (gastroesophageal reflux disease), Hypertension, Obesity, Secondary hyperparathyroidism, and Vitamin D deficiency.. s/p laparoscopic sleeve gastrectomy on 24 with Dr Smith.     Patient is now POD #1 from a  donor kidney transplant with vein and artery reconstruction       SUBJECTIVE INFORMATION  Assessed patient in room.    NUTRITION HISTORY  Per review of outpatient RD note from , patient was following with bariatric RD and was eating high protein meals.     Met with patient. Patient notes , familiar with post transplant diet. Has been consuming a high protein diet since her bariatric surgery. Eats small frequent meals focusing on high protein meals. Would like to wait on oral supplements and trying some regular diet today. Has poor appetite post op status       CURRENT NUTRITION ORDERS  Diet: Regular    CURRENT INTAKE/TOLERANCE  Poor po, consuming 25% of meals     LABS  Nutrition-relevant labs:   BUN: 42.9, Cr: 7.42  "(H), GFR: 7  K+: 4.2, Phos: 8.7 (H)    MEDICATIONS  Nutrition-relevant medications:   Bumex, lasix  Prednisone, Mycophenolate       ANTHROPOMETRICS  Height: 172.7 cm (5' 8\")  Most Recent Weight: 71.1 kg (156 lb 12 oz) admit wt on 5/17  IBW: 63.6 kg ( 112% IBW)  BMI (kg/m ): Normal BMI  ( 23.83)  Weight History: 7.3% wt loss in 5 month   Wt Readings from Last 10 Encounters:   05/17/25 71.1 kg (156 lb 12 oz)   04/22/25 72.8 kg (160 lb 7.9 oz)   01/20/25 74.4 kg (164 lb)   01/09/25 76.7 kg (169 lb)   12/04/24 77.1 kg (170 lb)   10/02/24 81.6 kg (180 lb)   08/05/24 90.7 kg (200 lb)   06/26/24 94.3 kg (208 lb)   06/19/24 96.2 kg (212 lb)   06/18/24 95.3 kg (210 lb)       Dosing Weight: 71 kg, based on admit wt    ASSESSED NUTRITION NEEDS  Estimated Energy Needs: 1775- 2130 kcals (25-30 Kcal/Kg)  Justification: higher end for post-op SOT  Estimated Protein Needs: 92- 142  grams protein (1.3-2 gm/Kg)  Justification: increased needs post op SOT  Estimated Fluid Needs:  (25-30 mL/Kg)  Justification: maintenance, or per MD pending fluid status and adequate UOP    SYSTEM FINDINGS    Skin/wounds: reviewed   GI symptoms: No BM since admit yesterday     MALNUTRITION  % Intake: Decreased intake does not meet criteria  % Weight Loss: Weight loss does not meet criteria   Subcutaneous Fat Loss: None observed  Muscle Loss: None observed  Fluid Accumulation/Edema: None noted  Malnutrition Diagnosis: Patient does not meet two of the established criteria necessary for diagnosing malnutrition but is at risk for malnutrition  Malnutrition Present on Admission: No    NUTRITION DIAGNOSIS  Food and nutrition-related knowledge deficit r/t length of time since previous post-transplant education AEB patient verbal report, review of chart record, and MD consult for nutrition education.      INTERVENTIONS  Nutrition Education:  1. Provided instruction on post-transplant diet with discussion regarding protein sources and high protein needs in acute " post-tx phase.  Reviewed recommendations to follow low fat/low sodium diet long term and discussed heart healthy diet tips.  Discussed monitoring of K+/Phos lab values with possible need for adjustment of these in the diet as necessary. Reviewed need for food safety precautions to prevent food borne illness.    2. Provided & reviewed handout: Post-transplant diet guidelines. Patient receptive to information provided. Expected diet compliance is good.         Goals  1. Patient will verbalize understanding of 3 important aspects of post-transplant diet guidelines.   2. PO intake >50% meals TID.     Monitoring/Evaluation  Progress toward goals will be monitored and evaluated per policy.      Vinicio Muhammad RD/LD  Unit 7C ([id number]) and ([phone]),  Monday-Friday: Vocera -> (7C clinical Dietitian),   Weekend/Holiday: Vocera -> (Weekend Clinical Dietitian)

## 2025-05-18 NOTE — PROGRESS NOTES
Transplant Surgery  Inpatient Daily Progress Note  2025    Assessment & Plan: Glenna is a 34 year old female who has a past medical history of ESRD (end stage renal disease) (H), GERD (gastroesophageal reflux disease), Hypertension, Obesity, Secondary hyperparathyroidism, and Vitamin D deficiency.. She/he is now POD #1 from a  donor kidney transplant with vein and artery reconstruction with Dr. Manjeet Barreto.    Graft Function:  Kidney: Post-op US with patent doppler. Renal artery, renal vein, iliac artery, and iliac vein are all patent and non-stenotic with velocities <200cm/s. Arcuate Artery RI of 0.73, 0.75, 0.68.     US Renal Transplant with Doppler 2025    Narrative  EXAMINATION: US RENAL TRANSPLANT, 2025 1:23 PM    COMPARISON: None.    HISTORY: s/p DDKT, please assess flows    TECHNIQUE:  Grey-scale, color Doppler and spectral flow analysis.    FINDINGS:  The transplant kidney is located right lower quadrant, and measures  13.3 cm in length. Parenchyma is of normal thickness and echogenicity.  No focal lesions. No hydronephrosis. No perinephric fluid collection.  Hu within the bladder.    Renal artery flow:  81 cm/sec peak systolic at hilum.  70 peak systolic at anastomosis.  Arcuate artery resistive indices (upper to lower): 0.73, 0.75, 0.68    Renal Vein Flow:  22 at hilum.  67 at anastomosis.    Iliac artery flow:  113 cm/s peak systolic above anastomosis.  169 cm/s peak systolic below anastomosis.    Iliac vein flow:  Patent above and below the anastomosis.    Impression  IMPRESSION:  Normal transplant kidney ultrasound.    I have personally reviewed the examination and initial interpretation  and I agree with the findings.    GIGI MONTEMAYOR DO      SYSTEM ID:  [id number]      - Creatinine down trending as expected, 2025: 7.87 mg/dL.  (from 9.43 preoperatively). UOP 6.4 L   - Potassium 2025: 4.4 mmol/L; 4.4 mmol/L. Peaked 5.8, Lokelma x1.      Immunosuppression  management: PRA 76 - originally planning for Intermediate Risk, will increase to high risk due to DCD kidney  Thymoglobulin: 2mg/kg IVx1 given intraoperatively, 150 mg again 5/18  Solu-Medrol: 500 mg intra-op, 250 mg POD 1  MMF: 750 mg BID  Tacrolimus: Goal level 8-10. Pending AM [place] level.       Hematology:  Anemia of chronic disease: Last Hgb: 5/18/2025: 10.2 g/dL; 10.2 g/dL. ALEXIA. Continue to monitor with daily labs.  Renal artery reconstruction: Heparin drip, straight rate 200 units/hr.    Neurology:  Pain management: APAP Q8H, Oxycodone 5-10mg q4h PRN, Hydromorphone 0.2-0.4mg Q2H PRN, Robaxin 500 mg q6h PRN,     Cardiac:  - DC cardiac monitoring today (POD #1)  - Atorvastatin 10mg QD    Respiratory:  - 1 LPM / Device: Nasal cannula  - Continue to titrate supplemental oxygen to SpO2 goals  - DC capnography today (POD #1)   - Encourage good pulmonary hygiene: IS use Q1H, Deep breathing and coughing    GI/Nutrition:  Diet: Regular  Bowel regimen: Senna BID, PEG QD, PRN MoM  - Ondansetron PRN for nausea    Endocrine:   - No history of T2DM. Goal glucose 100-150mg/dl    Fluid/Electrolytes:   - MIVF: Straight rate  - DC fluid replacements today    : Hu in place d/t new surgical anastomosis. Plan to DC on POC #3.    Infectious disease: Afebrile    Prophylaxis:   - DVT: heparin drip 200 units/hr  - Fall  - GI: Pepcid 20 mg BID during steroid use  - Viral (Valganciclovir)  - Pneumocystis: Bactrim held by nephrology in setting of hyperkalemia. OK to restart today vs tomorrow.    Disposition: Pending recovery.     Medical Decision Making: Medium  Subsequent visit 07038 (moderate level decision making)    LEWIS/Fellow/Resident Provider: Frederick Mayo MD    Faculty: Manjeet Barreto MD  _________________________________________________________________  Transplant History:   5/17/2025 (Kidney), Postoperative day: 1     Interval History: History is obtained from the patient  Overnight events: FELICIAON Manzanares  is feeling well this morning. Pain is well controlled, about 3 if not moving, up to 6-7 when coughing/moving. Had some nausea overnight, improved with zofran. Tolerating sips of water, ordered breakfast for this AM. Reports some numbness around right thigh, likely from surgical retraction. Having good UOP via Hu, 6.4 L. Passing gas, no BM yet.    ROS:   A 10-point review of systems was negative except as noted above.    Meds:  Current Facility-Administered Medications   Medication Dose Route Frequency Provider Last Rate Last Admin    acetaminophen (TYLENOL) tablet 975 mg  975 mg Oral Q8H Manjeet Barreto MD   975 mg at 05/18/25 0205    atorvastatin (LIPITOR) tablet 10 mg  10 mg Oral Daily Manjeet Barreto MD        famotidine (PEPCID) tablet 20 mg  20 mg Oral BID Manjeet Barreto MD   20 mg at 05/17/25 2110    Or    famotidine (PEPCID) injection 20 mg  20 mg Intravenous BID Manjeet Barreto MD        [START ON 5/19/2025] magnesium oxide (MAG-OX) tablet 400 mg  400 mg Oral Daily with lunch Manjeet Barreto MD        mycophenolate (GENERIC EQUIVALENT) capsule 750 mg  750 mg Oral BID IS Manjeet Barreto MD   750 mg at 05/17/25 1810    [START ON 5/20/2025] pentamidine (NEBUPENT) neb solution 300 mg  300 mg Inhalation Once Manjeet Barreto MD        Followed by    [START ON 6/19/2025] pentamidine (NEBUPENT) neb solution 300 mg  300 mg Inhalation Once Manjeet Barreto MD        polyethylene glycol (MIRALAX) Packet 17 g  17 g Oral Daily Manjeet Barreto MD        senna-docusate (SENOKOT-S/PERICOLACE) 8.6-50 MG per tablet 1 tablet  1 tablet Oral BID Manjeet Barreto MD   1 tablet at 05/17/25 2110    sodium chloride (PF) 0.9% PF flush 10 mL  10 mL Intracatheter Q8H Manjeet Barreto MD   10 mL at 05/17/25 2221    tacrolimus (GENERIC  "EQUIVALENT) capsule 2.5 mg  2.5 mg Oral BID IS Manjeet Barreto MD   2.5 mg at 05/17/25 1810    [START ON 5/19/2025] valGANciclovir (VALCYTE) tablet 450 mg  450 mg Oral Once per day on Monday Thursday Manjeet Barreto MD           Physical Exam:     Admit Weight: 71.1 kg (156 lb 12 oz)    Current vitals:   BP (!) 141/75   Pulse 88   Temp 98.1  F (36.7  C) (Oral)   Resp 14   Ht 1.727 m (5' 8\")   Wt 71.1 kg (156 lb 12 oz)   SpO2 94%   BMI 23.83 kg/m      Vital sign ranges:    Temp:  [98  F (36.7  C)-99.3  F (37.4  C)] 98.1  F (36.7  C)  Pulse:  [] 88  Resp:  [10-19] 14  BP: (107-157)/(67-96) 141/75  SpO2:  [86 %-100 %] 94 %    General Appearance: in no apparent distress.   Skin: Warm, perfused  Heart: RRR. Well perfused  Lungs: Nonlabored resps on 1L NC.  Abdomen: The abdomen is appropriately tender, ND, soft, no rebound, non-peritonitic. Curvilinear incision is present on the RLQ of ABD, covered with dressing; no signs of bleeding or discharge. RANDALL drain with thin sanguinous output.  : celaya is present.  Urine is clear/yellow.  Extremities: edema: + upper extremities, strength 5/5  Neurologic: awake, alert, and oriented. Tremor absent..     Data:   CMP  Recent Labs   Lab 05/18/25  0220 05/17/25  2140 05/17/25  1822 05/17/25  1523 05/17/25  1250 05/17/25  1204 05/17/25  0246 05/17/25  0203     --   --  133*  --  135  --  137   POTASSIUM 4.4  4.4 4.8   < > 5.2   < > 5.7*  --  4.9   CHLORIDE 98  --   --   --   --  97*  --  98   CO2 22  --   --   --   --  17*  --  22   *  --   --  113*   < > 188*   < > 78   BUN 43.1*  --   --   --   --  45.2*  --  43.8*   CR 7.87*  --   --   --   --  9.37*  --  9.43*   GFRESTIMATED 6*  --   --   --   --  5*  --  5*   KARTIK 9.5  --   --   --   --  9.3  --  9.7   ICAW  --   --   --  4.7  --   --   --   --    MAG 2.0  --   --   --   --  2.2  --   --    PHOS 8.7*  --   --   --   --  7.3*  --   --    ALBUMIN  --   --   --   --   --   " --   --  4.1   BILITOTAL  --   --   --   --   --   --   --  0.4   ALKPHOS  --   --   --   --   --   --   --  75   AST  --   --   --   --   --   --   --  17   ALT  --   --   --   --   --   --   --  33    < > = values in this interval not displayed.     CBC  Recent Labs   Lab 05/18/25  0220 05/17/25  2142 05/17/25  1523 05/17/25  1204 05/17/25  0203   HGB 10.2*  10.2* 10.7*   < > 10.6* 12.8   WBC 8.2  --   --  11.4* 10.5     --   --  201 288   A1C  --   --   --   --  4.9    < > = values in this interval not displayed.

## 2025-05-18 NOTE — PHARMACY-TRANSPLANT NOTE
Adult Kidney Transplant Post Operative Note    34 year old female s/p  donor kidney transplant on 2025 for hypertension.      Planned immunosuppression regimen per kidney transplant protocol:  INDUCTION: Transition from intermediate to high intensity due to concern for delayed graft function with DCD donor and prolonged CIT, PRA 76  Thymoglobulin 6 mg/kg total as divided below:   (POD 0): thymoglobulin 2 mg/kg (150 mg), methylprednisolone 500 mg IV   (POD 1): thymoglobulin 2 mg/kg (150 mg), methylprednisolone 250 mg IV   (POD 2): thymoglobulin 2 mg/kg (125 mg), methylprednisolone 100 mg IV     MAINTENANCE:   - Mycophenolate 750 mg twice daily  - Tacrolimus with goal trough levels of 8-10 mcg/L for first 6 months post-transplant     Opportunistic pathogen prophylaxis includes:  - PJP: to be determined - Bactrim discontinued due to hyperkalemia and sulfa allergy   - CMV D (pending) /R+: Valganciclovir for 3 months duration tentatively    Patient is not enrolled in medication study.    Pharmacy will monitor for medication interactions and immunosuppression levels in conjunction with the team. Medication therapy needs for discharge planning will continue to be addressed throughout the current admission via multidisciplinary rounds and order review.  Pharmacy will make recommendations as appropriate.  Rama Ricketts Pharm.D., BCPS

## 2025-05-18 NOTE — PLAN OF CARE
"  Vitals: BP (!) 143/78   Pulse 102   Temp 98.8  F (37.1  C)   Resp 20   Ht 1.727 m (5' 8\")   Wt 71.1 kg (156 lb 12 oz)   SpO2 95%   BMI 23.83 kg/m    Stable vitals, on room air.  Endocrine: Glucose 96.  Labs: Improving creatinine.  Pain: Moderate RLQ abdominal discomfort.  PRN's: Oxycodone 10 mg, Robaxin, IV Dilaudid X2.  Diet: Regular diet, did not tolerate breakfast well.  LDA: R TL internal jugular, D5LR at 100cc/hr, I=O. RANDALL with serosanguinous output (is loosly sutured so try to avoid manipulation of it). Heparin increased to 400U/hr.  GI: Not passing gas, previous gastric sleeve, emesis with small amount of food.  : Hu in place, good urine output.  Skin: Abdominal incision with surgical dressing, old drainage (leave dressing on)  Neuro: Anxious, alert and oriented.  Mobility: Right numbness, anterior and above the knee. Provider Anitha notified. Use gait belt and stand by assist of 1 as patient can pivot to the chair.  Education: Will need transplant education.  Plan: Thymoglobulin dose this afternoon.    Problem: Adult Inpatient Plan of Care  Goal: Plan of Care Review  Description: The Plan of Care Review/Shift note should be completed every shift.  The Outcome Evaluation is a brief statement about your assessment that the patient is improving, declining, or no change.  This information will be displayed automatically on your shiftnote.  Outcome: Progressing  Flowsheets (Taken 5/18/2025 1033)  Outcome Evaluation: Good urine output, numb right leg  Plan of Care Reviewed With: patient               "

## 2025-05-18 NOTE — PHARMACY-ADMISSION MEDICATION HISTORY
Pharmacist Admission Medication History    Admission medication history is complete. The information provided in this note is only as accurate as the sources available at the time of the update.    Information Source(s): Patient and CareEverywhere/SureScripts via in-person    Pertinent Information:   - Patient is a good historian, she is able to recall all medications   - Discussed sulfa allergy, patient states this happened when she was a child and she does not remember details of the reaction  - Patient reports only using varenicline for smoking cessation, no nicotine patches/gum/lozenges used recently     Changes made to PTA medication list:  Added: None  Deleted:   Semaglutide 1.7 mg dose, pt currently on 2.4 mg dose  Cyanocobalamin   Changed:   Cinacalcet: updated dosing to 30 mg three times weekly   Famotidine: changed from scheduled to as needed   Xphozah: updated dose/frequency    Allergies reviewed with patient and updates made in EHR: yes    Medication History Completed By: Rama Ricketts, MarkD, BCPS 5/18/2025 11:02 AM    PTA Med List   Medication Sig Note Last Dose/Taking    acetaminophen (TYLENOL) 325 MG tablet Take 325-650 mg by mouth every 4 hours as needed  Past Month    B Complex-C-Zn-Folic Acid (DIALYVITE/ZINC) TABS Take 1 tablet by mouth every evening  5/16/2025 Noon    cinacalcet (SENSIPAR) 30 MG tablet Take 30 mg by mouth three times a week.  5/15/2025    famotidine (PEPCID) 20 MG tablet Take 20 mg by mouth 2 times daily as needed.  Taking As Needed    nortriptyline (PAMELOR) 25 MG capsule Take 25 mg by mouth at bedtime.  5/15/2025    Semaglutide-Weight Management (WEGOVY) 2.4 MG/0.75ML pen Inject 2.4 mg subcutaneously once a week. 5/18/2025: Fridays 5/16/2025 at  1:00 PM    varenicline (CHANTIX SANCHEZ) 0.5 MG X 11 & 1 MG X 42 tablet Take 0.5 mg by mouth daily.  5/15/2025    VELPHORO 500 MG CHEW chewable tablet Take 500 mg by mouth 3 times daily (with meals)  5/15/2025    VENTOLIN  (90  Base) MCG/ACT inhaler INHALE 2 PUFFS BY MOUTH EVERY 4 HOURS AS NEEDED FOR SHORTNESS OF BREATH OR COUGH OR EXERCISE  Past Week    Vitamin D3 (VITAMIN D, CHOLECALCIFEROL,) 25 mcg (1000 units) tablet Take 1 tablet by mouth daily.  5/15/2025    vitamin E (TOCOPHEROL) 400 units (180 mg) capsule Take 400 Units by mouth daily.  5/15/2025    XPHOZAH 30 MG TABS Take 30 mg by mouth daily.  5/15/2025

## 2025-05-18 NOTE — PLAN OF CARE
"Goal Outcome Evaluation:      Plan of Care Reviewed With: patient    Overall Patient Progress: improvingOverall Patient Progress: improving    Outcome Evaluation: POD zero, CVP        Vitals: stable on 2 liters of oxygen NC. /80 (BP Location: Right arm)   Pulse 96   Temp 98.5  F (36.9  C) (Axillary)   Resp 11   Ht 1.727 m (5' 8\")   Wt 71.1 kg (156 lb 12 oz)   SpO2 97%   BMI 23.83 kg/m    Tele : normal sinus.   CVP : 4-7  Capno : stable.   Neuro : a x o x 4  Blood glucose: none bg 113  Pain/nausea: c/o 6/10 pain when cough. Wheezng (hx of asthma, ordered her albuterol).  Diet: clears. Sips of water.   Lines: PIV R SL. TLIJ infusing. MIVF @ 100. Lasix @ 10. Replacements 1 and 2  : celaya OP per hr 200-300 ml her hour Yellow.   GI: no bm, no gas.  Drains: R RANDALL bloody 80 ml. Dressing dried blood.  Skin: Admitted/transferred from:   Time of arrival on unit 1800  2 RN full  skin assessment completed by Mckayla TILLEY  Skin assessment finding: OP dressing drainage dried, not extended. RANDALL.   Interventions/actions:  Will continue to monitor.  Mobility: not out of bed. Turned in bed.     Labs : 6 pm K 5.8 (treated with lokelma x 1). Recheck @ 2200 4.8   Hgb 6 pm: 10.8, 10 pm:  10.7    Pt c/o of loss of sensation and numbness on right thigh, paged doc. Doc aware. Left leg is fine.   "

## 2025-05-18 NOTE — PLAN OF CARE
"Goal Outcome Evaluation:      Plan of Care Reviewed With: patient    Outcome Evaluation: HTN on 1L NC. CVP between 5-8.    BP (!) 141/75   Pulse 88   Temp 98.1  F (36.7  C) (Oral)   Resp 14   Ht 1.727 m (5' 8\")   Wt 71.1 kg (156 lb 12 oz)   SpO2 94%   BMI 23.83 kg/m      Shift: [id number]  Isolation Status: none  VS: HTN on RA, afebrile  CVP: 5-8  Neuro: Aox4  Behaviors: calm, cooperative  BG: none  Labs: q4h Hgb and K  Respiratory: inspiratory and expiratory wheezing; frequent productive cough PRN albuterol x2  Cardiac: HTN, on tele. NSR  Pain/Nausea: mod/severe pain at incision (worsen with cough), managed with PRN robaxin x1, oxy x1, dilaudid x3 and scheduled tylenol. Zofran x1 for nausea  Diet: regular diet  IV Access: RPIVx1, LAVF, R triple lumen IJ infusing MIVF @100mL/hr, lasix @10mL/hr, replacements 1 and 2  Lines/Drains: RJP 30mL bloody output  GI/: voiding via celaya. 225-650 mL/hr clear, yellow output  Skin: RLQ incision - UTV, dressing marked   Mobility: not out of bed. Position turned independently   Plan: Continue with POC and notify provider with any changes.          "

## 2025-05-19 ENCOUNTER — APPOINTMENT (OUTPATIENT)
Dept: ULTRASOUND IMAGING | Facility: CLINIC | Age: 35
End: 2025-05-19
Attending: NURSE PRACTITIONER
Payer: COMMERCIAL

## 2025-05-19 ENCOUNTER — DOCUMENTATION ONLY (OUTPATIENT)
Dept: TRANSPLANT | Facility: CLINIC | Age: 35
End: 2025-05-19
Payer: COMMERCIAL

## 2025-05-19 ENCOUNTER — TELEPHONE (OUTPATIENT)
Dept: PHARMACY | Facility: CLINIC | Age: 35
End: 2025-05-19
Payer: COMMERCIAL

## 2025-05-19 LAB
ANION GAP SERPL CALCULATED.3IONS-SCNC: 14 MMOL/L (ref 7–15)
BASOPHILS # BLD AUTO: 0 10E3/UL (ref 0–0.2)
BASOPHILS NFR BLD AUTO: 0 %
BUN SERPL-MCNC: 38.8 MG/DL (ref 6–20)
CALCIUM SERPL-MCNC: 10 MG/DL (ref 8.8–10.4)
CHLORIDE SERPL-SCNC: 101 MMOL/L (ref 98–107)
CMV IGG SERPL IA-ACNC: 1 U/ML
CMV IGG SERPL IA-ACNC: ABNORMAL
CREAT SERPL-MCNC: 5.46 MG/DL (ref 0.51–0.95)
EBV VCA IGG SER IA-ACNC: 471 U/ML
EBV VCA IGG SER IA-ACNC: POSITIVE
EBV VCA IGM SER IA-ACNC: <10 U/ML
EBV VCA IGM SER IA-ACNC: NORMAL
EGFRCR SERPLBLD CKD-EPI 2021: 10 ML/MIN/1.73M2
EOSINOPHIL # BLD AUTO: 0 10E3/UL (ref 0–0.7)
EOSINOPHIL NFR BLD AUTO: 0 %
ERYTHROCYTE [DISTWIDTH] IN BLOOD BY AUTOMATED COUNT: 13.2 % (ref 10–15)
GLUCOSE SERPL-MCNC: 118 MG/DL (ref 70–99)
HBV DNA SERPL QL NAA+PROBE: NORMAL
HCO3 SERPL-SCNC: 24 MMOL/L (ref 22–29)
HCT VFR BLD AUTO: 28.8 % (ref 35–47)
HCV RNA SERPL QL NAA+PROBE: NORMAL
HGB BLD-MCNC: 9.5 G/DL (ref 11.7–15.7)
HIV1+2 RNA SERPL QL NAA+PROBE: NORMAL
IMM GRANULOCYTES # BLD: 0 10E3/UL
IMM GRANULOCYTES NFR BLD: 0 %
LYMPHOCYTES # BLD AUTO: 0.1 10E3/UL (ref 0.8–5.3)
LYMPHOCYTES NFR BLD AUTO: 2 %
MAGNESIUM SERPL-MCNC: 1.9 MG/DL (ref 1.7–2.3)
MCH RBC QN AUTO: 31.7 PG (ref 26.5–33)
MCHC RBC AUTO-ENTMCNC: 33 G/DL (ref 31.5–36.5)
MCV RBC AUTO: 96 FL (ref 78–100)
MONOCYTES # BLD AUTO: 0.4 10E3/UL (ref 0–1.3)
MONOCYTES NFR BLD AUTO: 8 %
NEUTROPHILS # BLD AUTO: 4.4 10E3/UL (ref 1.6–8.3)
NEUTROPHILS NFR BLD AUTO: 90 %
NRBC # BLD AUTO: 0 10E3/UL
NRBC BLD AUTO-RTO: 0 /100
PHOSPHATE SERPL-MCNC: 7.1 MG/DL (ref 2.5–4.5)
PLATELET # BLD AUTO: 151 10E3/UL (ref 150–450)
POTASSIUM SERPL-SCNC: 3.9 MMOL/L (ref 3.4–5.3)
RBC # BLD AUTO: 3 10E6/UL (ref 3.8–5.2)
SODIUM SERPL-SCNC: 139 MMOL/L (ref 135–145)
UFH PPP CHRO-ACNC: <0.1 IU/ML (ref ?–1.1)
WBC # BLD AUTO: 4.9 10E3/UL (ref 4–11)

## 2025-05-19 PROCEDURE — 250N000012 HC RX MED GY IP 250 OP 636 PS 637: Performed by: SURGERY

## 2025-05-19 PROCEDURE — 76776 US EXAM K TRANSPL W/DOPPLER: CPT

## 2025-05-19 PROCEDURE — 84100 ASSAY OF PHOSPHORUS: CPT | Performed by: SURGERY

## 2025-05-19 PROCEDURE — 97161 PT EVAL LOW COMPLEX 20 MIN: CPT | Mod: GP | Performed by: PHYSICAL THERAPIST

## 2025-05-19 PROCEDURE — 97116 GAIT TRAINING THERAPY: CPT | Mod: GP | Performed by: PHYSICAL THERAPIST

## 2025-05-19 PROCEDURE — 85520 HEPARIN ASSAY: CPT | Performed by: PHYSICIAN ASSISTANT

## 2025-05-19 PROCEDURE — 76776 US EXAM K TRANSPL W/DOPPLER: CPT | Mod: 26 | Performed by: RADIOLOGY

## 2025-05-19 PROCEDURE — 250N000009 HC RX 250: Performed by: NURSE PRACTITIONER

## 2025-05-19 PROCEDURE — 120N000011 HC R&B TRANSPLANT UMMC

## 2025-05-19 PROCEDURE — 82374 ASSAY BLOOD CARBON DIOXIDE: CPT | Performed by: SURGERY

## 2025-05-19 PROCEDURE — 250N000013 HC RX MED GY IP 250 OP 250 PS 637: Performed by: NURSE PRACTITIONER

## 2025-05-19 PROCEDURE — 250N000013 HC RX MED GY IP 250 OP 250 PS 637: Performed by: STUDENT IN AN ORGANIZED HEALTH CARE EDUCATION/TRAINING PROGRAM

## 2025-05-19 PROCEDURE — 250N000011 HC RX IP 250 OP 636: Performed by: NURSE PRACTITIONER

## 2025-05-19 PROCEDURE — 97530 THERAPEUTIC ACTIVITIES: CPT | Mod: GP | Performed by: PHYSICAL THERAPIST

## 2025-05-19 PROCEDURE — 258N000003 HC RX IP 258 OP 636: Performed by: NURSE PRACTITIONER

## 2025-05-19 PROCEDURE — 83735 ASSAY OF MAGNESIUM: CPT | Performed by: SURGERY

## 2025-05-19 PROCEDURE — 250N000013 HC RX MED GY IP 250 OP 250 PS 637: Performed by: SURGERY

## 2025-05-19 PROCEDURE — 85041 AUTOMATED RBC COUNT: CPT | Performed by: SURGERY

## 2025-05-19 PROCEDURE — 250N000011 HC RX IP 250 OP 636: Performed by: SURGERY

## 2025-05-19 PROCEDURE — 99233 SBSQ HOSP IP/OBS HIGH 50: CPT | Mod: FS | Performed by: NURSE PRACTITIONER

## 2025-05-19 RX ORDER — RAMELTEON 8 MG/1
8 TABLET ORAL AT BEDTIME
Status: DISCONTINUED | OUTPATIENT
Start: 2025-05-19 | End: 2025-05-19

## 2025-05-19 RX ORDER — METHYLPREDNISOLONE SODIUM SUCCINATE 125 MG/2ML
100 INJECTION INTRAMUSCULAR; INTRAVENOUS ONCE
Status: COMPLETED | OUTPATIENT
Start: 2025-05-19 | End: 2025-05-19

## 2025-05-19 RX ORDER — DIPHENHYDRAMINE HCL 12.5MG/5ML
25-50 LIQUID (ML) ORAL ONCE
Status: COMPLETED | OUTPATIENT
Start: 2025-05-19 | End: 2025-05-19

## 2025-05-19 RX ORDER — ACETAMINOPHEN 325 MG/1
650 TABLET ORAL ONCE
Status: DISCONTINUED | OUTPATIENT
Start: 2025-05-19 | End: 2025-05-19

## 2025-05-19 RX ORDER — FAMOTIDINE 10 MG
10 TABLET ORAL DAILY
Status: DISCONTINUED | OUTPATIENT
Start: 2025-05-20 | End: 2025-05-20 | Stop reason: HOSPADM

## 2025-05-19 RX ORDER — LIDOCAINE HYDROCHLORIDE 20 MG/ML
JELLY TOPICAL EVERY 4 HOURS PRN
Status: DISCONTINUED | OUTPATIENT
Start: 2025-05-19 | End: 2025-05-20

## 2025-05-19 RX ORDER — DIPHENHYDRAMINE HCL 25 MG
25-50 CAPSULE ORAL ONCE
Status: COMPLETED | OUTPATIENT
Start: 2025-05-19 | End: 2025-05-19

## 2025-05-19 RX ORDER — RAMELTEON 8 MG/1
8 TABLET ORAL AT BEDTIME
Status: DISCONTINUED | OUTPATIENT
Start: 2025-05-19 | End: 2025-05-20 | Stop reason: HOSPADM

## 2025-05-19 RX ADMIN — MYCOPHENOLATE MOFETIL 750 MG: 250 CAPSULE ORAL at 18:08

## 2025-05-19 RX ADMIN — ACETAMINOPHEN 975 MG: 325 TABLET ORAL at 18:08

## 2025-05-19 RX ADMIN — LIDOCAINE HYDROCHLORIDE: 20 JELLY TOPICAL at 11:34

## 2025-05-19 RX ADMIN — OXYCODONE HYDROCHLORIDE 10 MG: 10 TABLET ORAL at 03:22

## 2025-05-19 RX ADMIN — OXYCODONE HYDROCHLORIDE 10 MG: 10 TABLET ORAL at 21:25

## 2025-05-19 RX ADMIN — ATORVASTATIN CALCIUM 10 MG: 10 TABLET, FILM COATED ORAL at 07:57

## 2025-05-19 RX ADMIN — ACETAMINOPHEN 975 MG: 325 TABLET ORAL at 11:28

## 2025-05-19 RX ADMIN — SENNOSIDES AND DOCUSATE SODIUM 1 TABLET: 50; 8.6 TABLET ORAL at 19:32

## 2025-05-19 RX ADMIN — TACROLIMUS 2.5 MG: 1 CAPSULE ORAL at 18:08

## 2025-05-19 RX ADMIN — METHOCARBAMOL 500 MG: 500 TABLET ORAL at 05:57

## 2025-05-19 RX ADMIN — TACROLIMUS 2.5 MG: 1 CAPSULE ORAL at 07:52

## 2025-05-19 RX ADMIN — SENNOSIDES AND DOCUSATE SODIUM 1 TABLET: 50; 8.6 TABLET ORAL at 07:57

## 2025-05-19 RX ADMIN — OXYCODONE HYDROCHLORIDE 10 MG: 10 TABLET ORAL at 11:56

## 2025-05-19 RX ADMIN — FAMOTIDINE 20 MG: 20 TABLET, FILM COATED ORAL at 07:56

## 2025-05-19 RX ADMIN — LIDOCAINE HYDROCHLORIDE: 20 JELLY TOPICAL at 16:34

## 2025-05-19 RX ADMIN — OXYCODONE HYDROCHLORIDE 10 MG: 10 TABLET ORAL at 16:33

## 2025-05-19 RX ADMIN — ALBUTEROL SULFATE 2 PUFF: 90 AEROSOL, METERED RESPIRATORY (INHALATION) at 05:27

## 2025-05-19 RX ADMIN — ACETAMINOPHEN 975 MG: 325 TABLET ORAL at 01:37

## 2025-05-19 RX ADMIN — RAMELTEON 8 MG: 8 TABLET ORAL at 21:25

## 2025-05-19 RX ADMIN — POLYETHYLENE GLYCOL 3350 17 G: 17 POWDER, FOR SOLUTION ORAL at 07:57

## 2025-05-19 RX ADMIN — VALGANCICLOVIR 450 MG: 450 TABLET, FILM COATED ORAL at 08:10

## 2025-05-19 RX ADMIN — OXYCODONE HYDROCHLORIDE 10 MG: 10 TABLET ORAL at 08:09

## 2025-05-19 RX ADMIN — ANTI-THYMOCYTE GLOBULIN (RABBIT) 125 MG: 5 INJECTION, POWDER, LYOPHILIZED, FOR SOLUTION INTRAVENOUS at 11:56

## 2025-05-19 RX ADMIN — MAGNESIUM HYDROXIDE 30 ML: 400 SUSPENSION ORAL at 16:34

## 2025-05-19 RX ADMIN — ONDANSETRON 4 MG: 4 TABLET, ORALLY DISINTEGRATING ORAL at 07:47

## 2025-05-19 RX ADMIN — MYCOPHENOLATE MOFETIL 750 MG: 250 CAPSULE ORAL at 07:52

## 2025-05-19 RX ADMIN — MAGNESIUM OXIDE TAB 400 MG (241.3 MG ELEMENTAL MG) 400 MG: 400 (241.3 MG) TAB at 11:29

## 2025-05-19 RX ADMIN — DIPHENHYDRAMINE HYDROCHLORIDE 50 MG: 25 CAPSULE ORAL at 11:28

## 2025-05-19 RX ADMIN — DEXTROSE, SODIUM CHLORIDE, SODIUM LACTATE, POTASSIUM CHLORIDE, AND CALCIUM CHLORIDE: 5; .6; .31; .03; .02 INJECTION, SOLUTION INTRAVENOUS at 03:23

## 2025-05-19 RX ADMIN — METHOCARBAMOL 500 MG: 500 TABLET ORAL at 13:24

## 2025-05-19 RX ADMIN — METHOCARBAMOL 500 MG: 500 TABLET ORAL at 19:32

## 2025-05-19 RX ADMIN — METHYLPREDNISOLONE SODIUM SUCCINATE 100 MG: 125 INJECTION, POWDER, FOR SOLUTION INTRAMUSCULAR; INTRAVENOUS at 11:36

## 2025-05-19 ASSESSMENT — ACTIVITIES OF DAILY LIVING (ADL)
ADLS_ACUITY_SCORE: 59
ADLS_ACUITY_SCORE: 57
ADLS_ACUITY_SCORE: 59
ADLS_ACUITY_SCORE: 59
ADLS_ACUITY_SCORE: 57
ADLS_ACUITY_SCORE: 59
ADLS_ACUITY_SCORE: 59
ADLS_ACUITY_SCORE: 57
DEPENDENT_IADLS:: INDEPENDENT
ADLS_ACUITY_SCORE: 57
ADLS_ACUITY_SCORE: 59
ADLS_ACUITY_SCORE: 57
ADLS_ACUITY_SCORE: 59
ADLS_ACUITY_SCORE: 57
ADLS_ACUITY_SCORE: 57
ADLS_ACUITY_SCORE: 59

## 2025-05-19 NOTE — PLAN OF CARE
"Goal Outcome Evaluation:      Plan of Care Reviewed With: patient    Overall Patient Progress: improvingOverall Patient Progress: improving    POD 2    Vitals: stable room air.   BP (!) 155/95 (BP Location: Left arm)   Pulse 96   Temp 98.8  F (37.1  C) (Oral)   Resp 16   Ht 1.727 m (5' 8\")   Wt 72.1 kg (159 lb)   SpO2 93%   BMI 24.18 kg/m      Neuro : a x o x 4.   Blood glucose: none.  Pain/nausea: prn oxy x 1. Scheduled tyle x . Xylocaine at bedside for celaay site pain.   Diet: regular, poor appetite.   Lines: TLIJ infusing thymo,heparin  units per hr with ns tko.   : celaya  ml. Yellow   GI: no bm, passing gas. Milk of mag x 1.   Drains: R RANDALL 50 ml. Bloody. Cdi.dried drainage.   Skin: Incision abdomen staples, light dressing on, scant leaky.   Mobility: up x 1 x walker, RLE wobbly (loss of sensation since post op-team suspect intra op position. Educated to call for help, and use walker. Had a unwitnessed fall per  on days (day RN did paperwork on that). No injuries.  Education : med and lab book updated. Handbook given, MTP explained.             "

## 2025-05-19 NOTE — PROGRESS NOTES
Transplant Surgery  Inpatient Daily Progress Note  2025    Assessment & Plan: Glenna is a 34 year old female who has a past medical history of ESRD (end stage renal disease) (H), GERD (gastroesophageal reflux disease), Hypertension, Obesity, Secondary hyperparathyroidism, and Vitamin D deficiency.. She/he is now POD #2 from a  donor kidney transplant with vein and artery reconstruction with stent with Dr. Manjeet Barreto.    Today's Changes:   - consult PT for right leg numbness, weakness    - Uro Jet for topical Hu pain relief    - Transplant renal US to evaluate upper pole artery     Graft Function:  Kidney: Post-op US with patent doppler. Creatinine down-trending, today 5.46. UOP 4.6L . Drain 175 ml out.    - Transplant Nephrology Consulted    - Repeat US today to evaluate small upper pole artery     Renal artery reconstruction:   - Straight rate Heparin 400 units/hour, plan to discharge on  mg x 3 months     Immunosuppression management: PRA 76 -  high induction risk due to DCD kidney  Thymoglobulin: 150 mg IV given intraoperatively, 150 mg , 125 mg  for total of 5.9 mg/kg  Solu-Medrol: 500 mg intra-op, 250 mg POD 1, 100 mg POD 2   MMF: 750 mg BID  Tacrolimus: Goal level 8-10.     Hematology:  Anemia of chronic disease:   Acute blood loss anemia: Hemoglobin stable ~ 9 to 10.     Neurology:  Pain management: APAP Q8H, Oxycodone 5-10mg q4h PRN, Hydromorphone 0.2-0.4mg Q2H PRN, Robaxin 500 mg q6h PRN,     Cardiac:  Hyperlipidemia:  - Atorvastatin 10mg QD    Respiratory:  - Continue to titrate supplemental oxygen to SpO2 goals  - Encourage good pulmonary hygiene: IS use Q1H, Deep breathing and coughing    GI/Nutrition: History of gastric sleeve.   Diet: Regular  Bowel regimen: Senna BID, PEG QD, PRN MoM, Ondansetron PRN for nausea    Endocrine: No history of T2DM. Goal glucose 100-150mg/dl; monitor with steroid doses with IS.     Fluid/Electrolytes:   Hyperphosphatemia:  Monitor.     : Hu in place d/t new surgical anastomosis; plan to DC on POC #3.    Infectious disease: Afebrile    MSK:   Post-operative RLE numbness and weakness: Medial right thigh numbness and right knee weakness post-operatively.    - Consult to PT    Prophylaxis:   - DVT: heparin drip 400 units/hr  - Fall  - GI: Pepcid 20 mg BID during steroid use  - Viral (Valganciclovir)  - Pneumocystis: Bactrim held by nephrology in setting of hyperkalemia.    Disposition: Pending recovery.     Medical Decision Making: Medium  Subsequent visit 41138 (moderate level decision making)    LEWIS/Fellow/Resident Provider:   RAFAEL Son, CNP  Adult Solid Organ Transplant   Contact: Vocera Web Console    Faculty: Dr. Garcia  _________________________________________________________________  Transplant History:   5/17/2025 (Kidney), Postoperative day: 2     Interval History: History is obtained from the patient  Overnight events: NAEON. Continues to experience right knee buckling and right leg tingling sensation.     ROS:   A 10-point review of systems was negative except as noted above.    Meds:  Current Facility-Administered Medications   Medication Dose Route Frequency Provider Last Rate Last Admin    acetaminophen (TYLENOL) tablet 975 mg  975 mg Oral Q8H Manjeet Barreto MD   975 mg at 05/19/25 0137    atorvastatin (LIPITOR) tablet 10 mg  10 mg Oral Daily Manjeet Barreto MD   10 mg at 05/18/25 0730    famotidine (PEPCID) tablet 20 mg  20 mg Oral BID Manjeet Barreto MD   20 mg at 05/18/25 0730    Or    famotidine (PEPCID) injection 20 mg  20 mg Intravenous BID Manjeet Barreto MD        magnesium oxide (MAG-OX) tablet 400 mg  400 mg Oral Daily with lunch Manjeet Barreto MD        mycophenolate (GENERIC EQUIVALENT) capsule 750 mg  750 mg Oral BID IS Manjeet Barreto MD   750 mg at 05/18/25 1811    [START ON  "5/20/2025] pentamidine (NEBUPENT) neb solution 300 mg  300 mg Inhalation Once Manjeet Barreto MD        Followed by    [START ON 6/19/2025] pentamidine (NEBUPENT) neb solution 300 mg  300 mg Inhalation Once Manjeet Barreto MD        polyethylene glycol (MIRALAX) Packet 17 g  17 g Oral Daily Manjeet Barreto MD   17 g at 05/18/25 0739    ramelteon (ROZEREM) tablet 8 mg  8 mg Oral At Bedtime Juan Becerra MD        senna-docusate (SENOKOT-S/PERICOLACE) 8.6-50 MG per tablet 1 tablet  1 tablet Oral BID Manjeet Barreto MD   1 tablet at 05/18/25 0730    sodium chloride (PF) 0.9% PF flush 10 mL  10 mL Intracatheter Q8H Manjeet Barreto MD   10 mL at 05/18/25 0739    tacrolimus (GENERIC EQUIVALENT) capsule 2.5 mg  2.5 mg Oral BID IS Manjeet Barreto MD   2.5 mg at 05/18/25 1811    valGANciclovir (VALCYTE) tablet 450 mg  450 mg Oral Once per day on Monday Thursday Manjeet Barreto MD           Physical Exam:     Admit Weight: 71.1 kg (156 lb 12 oz)    Current vitals:   BP (!) 147/87 (BP Location: Right arm)   Pulse 89   Temp 98.6  F (37  C) (Oral)   Resp 16   Ht 1.727 m (5' 8\")   Wt 71.1 kg (156 lb 12 oz)   SpO2 95%   BMI 23.83 kg/m      Vital sign ranges:    Temp:  [97.3  F (36.3  C)-99.3  F (37.4  C)] 98.6  F (37  C)  Pulse:  [] 89  Resp:  [16-20] 16  BP: (132-159)/(69-97) 147/87  SpO2:  [88 %-100 %] 95 %    General Appearance: in no apparent distress.   Skin: Warm, perfused  Heart: Well perfused  Lungs: Nonlabored resps on room air.   Abdomen: The abdomen is appropriately tender, ND. Curvilinear incision is present on the RLQ, incision is well approximated. RANDALL drain with thin sanguinous output.  : Hu is present.  Urine is clear/yellow.  Extremities: Edema: + upper extremities, strength 5/5  Neurologic: Awake, alert, and oriented. Tremor absent..     Data:   CMP  Recent Labs   Lab " 05/19/25  0518 05/18/25  1556 05/18/25  0956 05/18/25  0650 05/18/25  0636 05/18/25  0220 05/17/25  1822 05/17/25  1523 05/17/25  0246 05/17/25  0203     --   --  138  --  136  --  133*   < > 137   POTASSIUM 3.9 3.8   < > 4.2   < > 4.4  4.4   < > 5.2   < > 4.9   CHLORIDE 101  --   --  100  --  98  --   --    < > 98   CO2 24  --   --  23  --  22  --   --    < > 22   *  --   --  96   < > 105*  --  113*   < > 78   BUN 38.8*  --   --  42.9*  --  43.1*  --   --    < > 43.8*   CR 5.46*  --   --  7.43*  --  7.87*  --   --    < > 9.43*   GFRESTIMATED 10*  --   --  7*  --  6*  --   --    < > 5*   KARTIK 10.0  --   --  9.4  --  9.5  --   --    < > 9.7   ICAW  --   --   --   --   --   --   --  4.7  --   --    MAG 1.9  --   --   --   --  2.0  --   --    < >  --    PHOS 7.1*  --   --   --   --  8.7*  --   --    < >  --    ALBUMIN  --   --   --   --   --   --   --   --   --  4.1   BILITOTAL  --   --   --   --   --   --   --   --   --  0.4   ALKPHOS  --   --   --   --   --   --   --   --   --  75   AST  --   --   --   --   --   --   --   --   --  17   ALT  --   --   --   --   --   --   --   --   --  33    < > = values in this interval not displayed.     CBC  Recent Labs   Lab 05/19/25 0518 05/18/25  1556 05/18/25  0956 05/18/25  0650 05/17/25  1204 05/17/25  0203   HGB 9.5* 10.4*   < > 10.3*   < > 12.8   WBC 4.9  --   --  8.0   < > 10.5     --   --  171   < > 288   A1C  --   --   --   --   --  4.9    < > = values in this interval not displayed.

## 2025-05-19 NOTE — PROGRESS NOTES
25 0324   Appointment Info   Signing Clinician's Name / Credentials (PT) Maggi Torres, PT, DPT   Living Environment   Transportation Anticipated family or friend will provide   Living Environment Comments Pt lives outside of the [facility], however pt plans to stay with grandmother locally upon discharge, there are ~ 2 stairs to enter, pt can further stay on the main level   Self-Care   Usual Activity Tolerance good   Current Activity Tolerance moderate   Equipment Currently Used at Home none   Fall history within last six months yes   Number of times patient has fallen within last six months 1   Activity/Exercise/Self-Care Comment pt reports since weight loss, she has been very pleased to be able to participate in biking and tennis   General Information   Onset of Illness/Injury or Date of Surgery 25   Referring Physician Anitha Ceja PA-C   Pertinent History of Current Problem (include personal factors and/or comorbidities that impact the POC) Glenna is a 34 year old female who has a past medical history of ESRD (end stage renal disease) (H), GERD (gastroesophageal reflux disease), Hypertension, Obesity, Secondary hyperparathyroidism, and Vitamin D deficiency.. She/he is now POD #2 from a  donor kidney transplant with vein and artery reconstruction with stent with Dr. Manjeet Barreto.   Existing Precautions/Restrictions fall;abdominal   Cognition   Cognitive Status Comments cooperative throughout   Pain Assessment   Patient Currently in Pain Yes, see Vital Sign flowsheet  (R sided abdominal incisional pain)   Posture    Posture Comments slightly reduced upright posture   Range of Motion (ROM)   ROM Comment < full R knee extension   Strength (Manual Muscle Testing)   Strength Comments R quad weakness noted; pt unable to complete full LAQ, with deficits also performing SAQ   Bed Mobility   Comment, (Bed Mobility) SBA supine>sit with logroll, slight bedrail use   Transfers  "  Comment, (Transfers) CGA-SBA sit<>stand with FWW   Gait/Stairs (Locomotion)   Comment, (Gait/Stairs) Ambulated with CGA-SBA, FWW use, two instances of feeling R LE slightly less stable, but pt able to self-correct. Pt able to progress to consistent step-through gait, increased step lengths without imbalance   Balance   Balance Comments reduced stabilty than baseline; due to R LE status benefits from walker support   Sensory Examination   Sensory Perception Comments reports numbness to inner R LE from medial ankle upwards, and further numbness to inner thigh. Pt noted some tingling, \"pins and needles\" type sensations to anterior R knee, and that this increased with WB activities   Clinical Impression   Criteria for Skilled Therapeutic Intervention Yes, treatment indicated   PT Diagnosis (PT) impaired mobility   Influenced by the following impairments fatigue, pain, reduced activity tolerance, balance, strength, ROM   Functional limitations due to impairments below baseline bed mobility, transfers, gait   Clinical Presentation (PT Evaluation Complexity) stable   Clinical Presentation Rationale clinical judgement, [facility]   Clinical Decision Making (Complexity) moderate complexity   Planned Therapy Interventions (PT) balance training;bed mobility training;gait training;home exercise program;neuromuscular re-education;patient/family education;postural re-education;ROM (range of motion);stair training;strengthening;stretching;transfer training;risk factor education;home program guidelines;progressive activity/exercise   Risk & Benefits of therapy have been explained evaluation/treatment results reviewed;care plan/treatment goals reviewed;risks/benefits reviewed;current/potential barriers reviewed;participants voiced agreement with care plan;participants included;patient   PT Total Evaluation Time   PT Eval, Low Complexity Minutes (54757) 5   Physical Therapy Goals   PT Frequency Daily   PT Predicted Duration/Target Date " for Goal Attainment 05/26/25   PT Goals Bed Mobility;Transfers;Gait;Stairs   PT: Bed Mobility Independent;Supine to/from sit;Rolling;Bridging;Within precautions   PT: Transfers Independent;Modified independent;Bed to/from chair;Sit to/from stand;Within precautions;Assistive device   PT: Gait Modified independent   PT: Stairs Modified independent;2 stairs   Distance in Feet 2 x 130   PT Discharge Planning   PT Plan progress mobility; focus on safe gait; R LE ex; also provide pt handout for abdominal precautions and discuss ADL considerations (trial shower transfer if needed)   PT Discharge Recommendation (DC Rec) home with assist;home with home care physical therapy;home with outpatient physical therapy   PT Rationale for DC Rec With FWW use,  pt is further stable and progressing with gait distances, but due to deficits in R LE strength and altered gait and IND vs baseline, advise home PT vs OP PT follow-up. Will re-assess pt 5/20.   PT Brief overview of current status SBA supine>sit, sit<>standl; CGA, gait belt and FWW for gait   PT Total Distance Amb During Session (feet) 260   PT Equipment Needed at Discharge walker, rolling   Physical Therapy Time and Intention   Timed Code Treatment Minutes 35   Total Session Time (sum of timed and untimed services) 40

## 2025-05-19 NOTE — PROGRESS NOTES
Physician Attestation     I saw and evaluated Glenna Spears as part of a shared APRN/PA visit.     I personally reviewed the vital signs, medications, labs, and imaging.    I personally provided a substantive portion of care for this patient and I approve the care plan as written by the LEWIS.  I was involved with Medical Decision Making including: Please see A&P for additional details of medical decision making.  MANAGEMENT DISCUSSED with the following over the past 24 hours:   ESKD of unclear etiology s/p DDKTx (DCD, prolonged CIT), cPRA-76%.CMV and EBV no discordant Cr down-trending, good UOP, plan for high risk induction-3rd dose of ATG 2,g/kg today. Cr down-trending, good UOP, no acute RRT indications     Spike Maddox MD  Date of Service (when I saw the patient): 05/19/25      [facility]  Transplant Nephrology Progress Note  Date of Admission:  5/17/2025  Today's Date: 05/19/2025  Requesting physician: Manjeet Barreto*    Recommendations:   - Continue current immunosuppression.  - No need for dialysis.   - Continue to hold Semaglutide for 3 months post-txp.     Assessment & Plan   # DDKT: Trend down. Transplant renal US normal. Good urine output.    - Baseline Creatinine: ~ TBD   - Proteinuria: Moderate (1-3 grams)   - DSA Hx: pending   - Last cPRA76%   - BK Viremia: Not checked post transplant   - Kidney Tx Biopsy Hx: No biopsy history.    # Immunosuppression: Tacrolimus immediate release (goal 8-10), Mycophenolate mofetil (dose 750 mg every 12 hours), and Methylprednisolone (dose taper)   - Induction with Recent Transplant:  High Intensity Protocol   - Continue with intensive monitoring of immunosuppression for efficacy and toxicity.   - Historical Changes in Immunosuppression: cPRA-76%, switched from intermediate to high risk induction due to high DGF risks (DCD, prolonged CIT)   - Changes: Not at this time Consider switching to MPA if ongoing N/V  and GI upset.     # Infection Prevention:   Last CD4 Level: Not checked recently  - PJP: Inhaled pentamidine  - CMV: Valganciclovir (Valcyte)      - CMV IgG Ab High Risk Discordance (D+/R-) at time of transplant: No  Present CMV Serostatus: Positive  - EBV IgG Ab High Risk Discordance (D+/R-) at time of transplant: No  Present EBV Serostatus: Positive    # Hypertension: Controlled;  Goal BP: < 150/90   - Changes: Not at this time    # Anemia in Chronic Renal Disease: Hgb: Stable, low      SHALOM: unknown   - Iron studies: Not checked recently    # Mineral Bone Disorder:    - Secondary renal hyperparathyroidism; PTH level: not checked recently, but Markedly elevated (>1201 pg/ml)  in April 2024      On treatment: Cinacalcet  - Vitamin D; level: Normal        On supplement: Yes  - Calcium; level: Normal        On supplement: No  - Phosphorus; level: High        On supplement: No. Would hold on binder for now, but continue to monitor    # Electrolytes:  - Potassium; level: Normal        On supplement: No  - Magnesium; level: Normal        On supplement: No  - Bicarbonate; level: Normal        On supplement: No  - Sodium; level: Normal    # Other Significant PMH:   - Obesity s/p gastric sleeve 1/23/24: on semaglutide    - History of Illicit Drug use: remote LSD /cocaine use in her 20s, + marijuana.    - Positive Quant Gold: s/p treatment by ID    # Transplant History:  Etiology of Kidney Failure: Unknown etiology  Tx: DDKT  Transplant: 5/17/2025 (Kidney)  Significant transplant-related complications: None    Recommendations were communicated to the primary team verbally.    Seen and discussed with RAFAEL [facility]  Transplant Nephrology    Interval History  Ms. Rosado creatinine is 5.46 (05/19 0518); Trend down.  Good urine output.  Other significant labs/tests/vitals: VSS.   Continues to have numb/tingling of RLE. Able to ambulate. Improved but not back to normal.   no events overnight.  no  chest pain or shortness of breath.  no leg swelling.  yes nausea and no vomiting, relieved by zofran.  Bowel movements are loose.  no fever, sweats or chills.     Review of Systems   4 point ROS was obtained and negative except as noted in the Interval History.    MEDICATIONS:  Current Facility-Administered Medications   Medication Dose Route Frequency Provider Last Rate Last Admin    acetaminophen (TYLENOL) tablet 975 mg  975 mg Oral Q8H Manjeet Barreto MD   975 mg at 05/19/25 0137    anti-thymocyte globulin (THYMOGLOBULIN - Rabbit) 125 mg in sodium chloride 0.9 % 300 mL intermittent infusion  125 mg Intravenous Central line Once Ashley Wells NP        atorvastatin (LIPITOR) tablet 10 mg  10 mg Oral Daily Manjeet Barreto MD   10 mg at 05/19/25 0757    diphenhydrAMINE (BENADRYL) capsule 25-50 mg  25-50 mg Oral Once Ashley Wells NP        Or    diphenhydrAMINE (BENADRYL) elixir 25-50 mg  25-50 mg Per NG tube Once Ashley Wells NP        famotidine (PEPCID) tablet 20 mg  20 mg Oral BID Manjeet Barreot MD   20 mg at 05/19/25 0756    Or    famotidine (PEPCID) injection 20 mg  20 mg Intravenous BID Manjeet Barreto MD        magnesium oxide (MAG-OX) tablet 400 mg  400 mg Oral Daily with lunch Manjeet Barreto MD        methylPREDNISolone Na Suc (solu-MEDROL) injection 100 mg  100 mg Intravenous Once Ashley Wells NP        mycophenolate (GENERIC EQUIVALENT) capsule 750 mg  750 mg Oral BID IS Manjeet Barreto MD   750 mg at 05/19/25 0752    [START ON 5/20/2025] pentamidine (NEBUPENT) neb solution 300 mg  300 mg Inhalation Once Manjeet Barreto MD        Followed by    [START ON 6/19/2025] pentamidine (NEBUPENT) neb solution 300 mg  300 mg Inhalation Once Manjeet Barreto MD        polyethylene glycol (MIRALAX) Packet 17 g  17 g Oral Daily Manjeet Barreto  "MD Angeline   17 g at 25 0757    ramelteon (ROZEREM) tablet 8 mg  8 mg Oral At Bedtime Juan Becerra MD        senna-docusate (SENOKOT-S/PERICOLACE) 8.6-50 MG per tablet 1 tablet  1 tablet Oral BID Manjeet Barreto MD   1 tablet at 25 0757    sodium chloride (PF) 0.9% PF flush 10 mL  10 mL Intracatheter Q8H Manjeet Barreto MD   10 mL at 25 0811    tacrolimus (GENERIC EQUIVALENT) capsule 2.5 mg  2.5 mg Oral BID IS Manjeet Barreto MD   2.5 mg at 25 0752    valGANciclovir (VALCYTE) tablet 450 mg  450 mg Oral Once per day on  Manjeet Barreto MD   450 mg at 25 0810     Current Facility-Administered Medications   Medication Dose Route Frequency Provider Last Rate Last Admin    dextrose 5% in lactated ringers infusion   Intravenous Continuous Manjeet Barreto  mL/hr at 25 0659 Rate Verify at 25 0659    heparin infusion 25,000 units in 0.45% NaCl 250 mL ANTICOAGULANT  400 Units/hr Intravenous Continuous Anitha Ceja PA-C 4 mL/hr at 25 2330 400 Units/hr at 25 2330       Physical Exam   Temp  Av.2  F (36.8  C)  Min: 97.8  F (36.6  C)  Max: 98.5  F (36.9  C)      Pulse  Av.3  Min: 98  Max: 124 Resp  Av.6  Min: 10  Max: 19  SpO2  Av.6 %  Min: 86 %  Max: 100 %    CVP (mmHg): 7 mmHgBP (!) 147/87 (BP Location: Right arm)   Pulse 89   Temp 98.6  F (37  C) (Oral)   Resp 16   Ht 1.727 m (5' 8\")   Wt 72.1 kg (159 lb)   SpO2 95%   BMI 24.18 kg/m     Date 25 0700 - 05/18/25 0659   Shift 1370-1395 6972-7886 7983-6087 24 Hour Total   INTAKE   I.V. 1300   1300   Shift Total(mL/kg) 1300(18.28)   1300(18.28)   OUTPUT   Urine 347   347   Blood 350   350   Shift Total(mL/kg) 697(9.8)   697(9.8)   Weight (kg) 71.1 71.1 71.1 71.1      Admit Weight: 71.1 kg (156 lb 12 oz)     GENERAL APPEARANCE: alert and no distress  HENT: mouth " without ulcers or lesions  RESP: lungs clear to auscultation - no rales, rhonchi or wheezes  CV: regular rhythm, normal rate, no rub, no murmur  EDEMA: no LE edema bilaterally  ABDOMEN: soft, nondistended, nontender, bowel sounds normal  MS: extremities normal - no gross deformities noted, no evidence of inflammation in joints, no muscle tenderness  SKIN: no rash  NEURO: RLE with decreased sensation anterior thigh and decreased strength 4/5    Data   All labs reviewed by me.  CMP  Recent Labs   Lab 05/19/25  0518 05/18/25  1556 05/18/25  0956 05/18/25  0650 05/18/25  0638 05/18/25  0636 05/18/25  0220 05/17/25  1822 05/17/25  1523 05/17/25  1250 05/17/25  1204 05/17/25  0246 05/17/25  0203     --   --  138  --   --  136  --  133*  --  135  --  137   POTASSIUM 3.9 3.8 3.7 4.2  --    < > 4.4  4.4   < > 5.2   < > 5.7*  --  4.9   CHLORIDE 101  --   --  100  --   --  98  --   --   --  97*  --  98   CO2 24  --   --  23  --   --  22  --   --   --  17*  --  22   ANIONGAP 14  --   --  15  --   --  16*  --   --   --  21*  --  17*   *  --   --  96 93  --  105*  --  113*   < > 188*   < > 78   BUN 38.8*  --   --  42.9*  --   --  43.1*  --   --   --  45.2*  --  43.8*   CR 5.46*  --   --  7.43*  --   --  7.87*  --   --   --  9.37*  --  9.43*   GFRESTIMATED 10*  --   --  7*  --   --  6*  --   --   --  5*  --  5*   KARTIK 10.0  --   --  9.4  --   --  9.5  --   --   --  9.3  --  9.7   MAG 1.9  --   --   --   --   --  2.0  --   --   --  2.2  --   --    PHOS 7.1*  --   --   --   --   --  8.7*  --   --   --  7.3*  --   --    PROTTOTAL  --   --   --   --   --   --   --   --   --   --   --   --  7.1   ALBUMIN  --   --   --   --   --   --   --   --   --   --   --   --  4.1   BILITOTAL  --   --   --   --   --   --   --   --   --   --   --   --  0.4   ALKPHOS  --   --   --   --   --   --   --   --   --   --   --   --  75   AST  --   --   --   --   --   --   --   --   --   --   --   --  17   ALT  --   --   --   --   --   --   --    --   --   --   --   --  33    < > = values in this interval not displayed.     CBC  Recent Labs   Lab 05/19/25  0518 05/18/25  1556 05/18/25  0956 05/18/25  0650 05/18/25  0636 05/18/25  0220 05/17/25  1523 05/17/25  1204   HGB 9.5* 10.4* 10.7* 10.3*   < > 10.2*  10.2*   < > 10.6*   WBC 4.9  --   --  8.0  --  8.2  --  11.4*   RBC 3.00*  --   --  3.20*  --  3.22*  --  3.32*   HCT 28.8*  --   --  30.9*  --  30.3*  --  32.2*   MCV 96 96 96 97   < > 94  94   < > 97   MCH 31.7  --   --  32.2  --  31.7  --  31.9   MCHC 33.0  --   --  33.3  --  33.7  --  32.9   RDW 13.2  --   --  13.2  --  13.1  --  13.2     --   --  171  --  179  --  201    < > = values in this interval not displayed.     INR  Recent Labs   Lab 05/17/25  0203   INR 0.98   PTT 29     ABG  Recent Labs   Lab 05/17/25  1523   O2PER 21.0      Urine Studies  Recent Labs   Lab Test 05/17/25  0225 05/20/24  1339   COLOR Colorless Light Yellow   APPEARANCE Clear Clear   URINEGLC Negative 200*   URINEBILI Negative Negative   URINEKETONE Negative Negative   SG 1.015 1.010   UBLD Negative Moderate*   URINEPH 6.0 7.5*   PROTEIN Negative 30*   NITRITE Negative Negative   LEUKEST Negative Negative   RBCU 4* 3*   WBCU 5 3     No lab results found.  PTH  No lab results found.  Iron Studies  No lab results found.    IMAGING:  All imaging studies reviewed by me.

## 2025-05-19 NOTE — PLAN OF CARE
Goal Outcome Evaluation:      Plan of Care Reviewed With: patient    Overall Patient Progress: improvingOverall Patient Progress: improving    Outcome Evaluation: POD 1    Vitals: stable room air. Per hr d/t thymo.   Neuro : a x o x 4. Frustrated.  Blood glucose: pod 1, 93.   Pain/nausea: managed with prn oxy and dilaudid, and robaxin.   Diet: regular, nauseas: compazine x 1.   Lines: TLIJ infusing D5LR @ 100. Heparin  units per hr + tko ns. Thymo @ 50.   : celaya yellow OP 1850 ml. Positional.   GI: passing gas, no bm. Laxatives given.   Drains: R RANDALL 50 ml. Dressing dried drainage.   Skin: OP dressing drainage unchanged. Cdi.   Mobility: a x 1-2, RLE wobbly, per pt (getting better), team aware, suspect due to intra-op positioning. Got up to commode. No bm.   Education : med and lab book updated .     Needs to work with PT. Order is in.

## 2025-05-19 NOTE — PLAN OF CARE
"Vitals: BP (!) 148/80 (BP Location: Right arm)   Pulse 97   Temp 98  F (36.7  C) (Oral)   Resp 15   Ht 1.727 m (5' 8\")   Wt 72.1 kg (159 lb)   SpO2 95%   BMI 24.18 kg/m      Endocrine: Lab glucose   Labs: Improving creatinine, stable labs.  Pain: Moderate RLQ abdominal discomfort, celaya irritation.  PRN's: Oxycodone, Robaxin, Zofran ODT, Lido gel (Urojet)  Diet: Regular diet, eats small meals.  LDA: R TL internal jugular, Heparin at 400U/hr, IVF's DC'd. R RANDALL, serosanguinous.R PIV.  GI: Passing gas, no BM, declines Miralax.  : Celaya in place until 5/20, good urine output.  Skin: Abdominal incision, staples (bruising) surgical dressing removed. Leave RANDALL dressing alone as the suture is fragile.  Neuro: Alert and oriented, anxious at times, declined Atarax.  Mobility: Right leg weakness, knee tito randomly at times, high falls risk because of this. PT able to evaluate with improvement in ambulation.  Education: Medication card updated, needs Pharmacy review.  Plan: Discharge locally in 1-2 days. Thymoglobulin infusing. Ultrasound completed. Patient is to use the walker when out of bed.    Problem: Adult Inpatient Plan of Care  Goal: Plan of Care Review  Description: The Plan of Care Review/Shift note should be completed every shift.  The Outcome Evaluation is a brief statement about your assessment that the patient is improving, declining, or no change.  This information will be displayed automatically on your shiftnote.  Outcome: Progressing  Flowsheets (Taken 5/19/2025 1005)  Outcome Evaluation: Right leg weakness  Plan of Care Reviewed With: patient   Goal Outcome Evaluation:      Plan of Care Reviewed With: patient        Outcome Evaluation: Right leg weakness          " (1) Other Diagnosis

## 2025-05-19 NOTE — PROGRESS NOTES
Preliminary and Final positive donor sputum culture results have been uploaded to [facility].  Donor ID [id number].  Dr. aGrg and Dr. Garcia notified of results.    Ashley Domingo RN   Transplant Coordinator  [phone]

## 2025-05-19 NOTE — PLAN OF CARE
"Goal Outcome Evaluation:      Plan of Care Reviewed With: patient    Overall Patient Progress: improvingOverall Patient Progress: improving    Outcome Evaluation: AVSS on RA. Pain and nausea improving, tolerated half turkey sandwich. Needs pharmacy education.      BP (!) 153/78 (BP Location: Right arm)   Pulse 95   Temp 98.7  F (37.1  C) (Oral)   Resp 16   Ht 1.727 m (5' 8\")   Wt 71.1 kg (156 lb 12 oz)   SpO2 92%   BMI 23.83 kg/m      Shift: 6373-9984  Isolation Status: standard/cytotoxic  VS: stable on RA, afebrile  Neuro: A&O x4  Behaviors: receptive to cares  BG: daily POD 1 & 2  Labs/Imaging: Creatinine 7.43, K 3.8, Hgb 10.4; pending AM labs  Respiratory: regular rate and rhythm; inspiratory and expiratory wheezes, hx of asthma, said she will ask for inhaler if she needs it  Cardiac: WNL  Pain/Nausea: Improving per pt  PRN: Oxycodone 10 mg x2 and Robaxin 500 mg x1 for pain  Diet: regular, tolerating well  LDA: R TL IJ; L AVF; celaya; R RANDALL with ~ 5 mL dark red/serosang output  Infusion(s): D5LR @ 100 mL/hr; heparin SR @ 400 units/hr with NS TKO  GI/: LBM PTS, BS+, passing gas. Celaya patent with ~ 1105 mL CYU  Skin: RLQ incision covered with op dressing and marked, no extension  Mobility: Ax1 with GB and walker  Events/Education: Med card and lab book up to date. Needs pharmacy education.  Plan: Notify MD of any significant changes, continue current POC.     "

## 2025-05-19 NOTE — TELEPHONE ENCOUNTER
A pharmacist spent 30 minutes providing medication teaching with Glenna Spears and Santi (sig other) for discharge with a focus on new medications/dose changes.  The discharge medication list was reviewed with the patient/family and the following points were discussed, as applicable: Name, description, purpose, dose/strength, common side effects, food/medications to avoid, action to be taken if dose is missed, when to call MD, and how to obtain refills.  The patient will be responsible for managing medications. Additionally, the following transplant related education was covered: Purpose of medication card, Medication videos, Timing of medications and day of lab draw considerations , Emesis, Prescription Insurance , and Discharge process for receiving meds   Patient will  transplant supplies including 7 day pill organizer, thermometer, and BP monitor at the discharge pharmacy along with medications.  Patient chooses to receive medications from [facility].   Clinical Pharmacy Consult:                                                      Transplant Specific:   Date of Transplant: 5/17/25  Type of Transplant: kidney  First Transplant: yes  History of rejection: no    Immunosuppression Regimen   TAC 2.5mg qAM & 2.5mg qPM and MMF 750mg qAM & 750mg qPM  Patient specific goal: 8-10  Most recent level: not drawn yet  Immunosuppressant Levels:  not drawn yet  Pt adherent to lab draws: yes  Scr:   Lab Results   Component Value Date    CR 5.46 05/19/2025     Side effects: no side effects    Prophylactic Medications  PJP Prophylactic: Inhaled Pentamidine 300mg q 4 weeks  Scheduled Discontinue Date: Lifelong     Antifungal: Not needed thus far  Scheduled Discontinue Date: N/A     CMV Prophylactic: CrCl 10 to 24 mL/minute: Valcyte 450 mg twice weekly   Scheduled Discontinue Date: 3 months Anticipated date: 8/17/25    Acid Reducer: Pepcid (famotidine)  Scheduled Reviewed Date: on PTA    Vascular Prophylactic: Not  needed thus far  Scheduled Discontinue Date: N/A    Reminders:  Bring to first clinic appt: med box, med card, bp monitor, all medications being taken, and lab book.  2.   MTM pharmacist visit on first clinic appt and if ok, again in 3 to 4 months during follow up appt.  3.   Avoid Grapefruit and Grapefruit juice.   4.   Avoid herbal supplements. If wish to take other medications or supplements, call your coordinator.   5.   Keep lab appts.   6.   Can use apps on phone like [facility] to help manage medication lists and reminders.   7.   Make sure you are protecting your skin by wearing long sleeves and applying sunscreen to exposed skin, for any significant time in the sun.     Transplant Coordinator is NAV Christensen RPH

## 2025-05-19 NOTE — PROVIDER NOTIFICATION
Patient reported falling in her room at the bedside an hour before notifying staff.She said that when taking a step her right leg/knee buckled and she went to the floor. No staff was in the room, boyfriend was at the bedside but unable to prevent the fall. She has no injury or bruising, did not hit her head or abdomen, just went down to her knees. Vitals have been stable.     She has reported right leg numbness since after her surgery. PT consult was ordered 5/18 to assess. Patient instructed to use the call light when getting up, use gait belt and walker.

## 2025-05-19 NOTE — PROGRESS NOTES
Care Management Follow Up    Length of Stay (days): 2    Expected Discharge Date: 2025     Concerns to be Addressed: discharge planning     Patient plan of care discussed at interdisciplinary rounds: Yes    Anticipated Discharge Disposition: Home, Home Care, Transitional Care           Anticipated Discharge Services: Home Care  Anticipated Discharge DME:  (Pending PT consult)    Patient/family educated on Medicare website which has current facility and service quality ratings: yes  Education Provided on the Discharge Plan:    Patient/Family in Agreement with the Plan: yes    Referrals Placed by CM/SW: Homecare  Private pay costs discussed: Not applicable    Discussed  Partnership in Safe Discharge Planning  document with patient/family: No     Handoff Completed: No, handoff not indicated or clinically appropriate    Additional Information:    HEALTH BENEFIT: (BCBS) PREPAID MEDICAL ASSISTANCE    ID# [id number] GRP# [id number] (EFFECTIVE (DATE: 2024) )    (MEDICAID ID# [id number])         PHARMACY BENEFIT: (BCBS PMAP)    PROCESSING INFO: ID#[id number] GRP#[id number] PCN#[id number] BIN# [id number] (EFFECTIVE (DATE: 2024) )    DEDUCTIBLE ($0) & MAX OUT-OF-POCKET ($0)    COPAY STRUCTURE:    $ (0) FOR GENERIC    $ (0) FOR BRAND    (PA NEEDED) FOR NON-FORMULARY MEDICATIONS         TEST CLAIM SPECIALTY #28    MYCOPHENOLATE 250mg (#240/30DS)=$0    PROGRAF 1mg (#120/30DS)= PA NEEDED    TACROLIMUS 1mg (#120/30DS)= $0    CYCLOSPORINE 100mg (#60/30DS)=$0    VALGANCICLOVIR 450mg (#60/30DS)=$0    VALACYCLOVIR 1gm (#90/30DS)=$0         TEST CLAIM DISCHARGE #27 (21 YEARS AND OLDER):    MYCOPHENOLATE 250mg (#240/30DS)=$0    PROGRAF 1mg (#120/30DS)= PA NEEDED    TACROLIMUS 1mg (#120/30DS)= $0    CYCLOSPORINE 100mg (#60/30DS)=$0    VALGANCICLOVIR 450mg (#60/30DS)=$0    VALACYCLOVIR 1gm (#90/30DS)=$0      Patient underwent  donor right kidney transplant.  Met with patient to update psychosocial assessment and provide brief  "education about SW role while inpatient, as well as expectations/requirements and follow up needs post-transplant. SW also provided education about need for compliance with transplant medications, and explained ESRD Medicare benefits and medication coverage under Medicare part B. Medicare 2728 forms completed however patient declined signing due to having \"insurance through the state.\"     Patient reports once she is discharged from the hospital, she will stay locally in [place] with her dad, uncle and cousin. They will assist patient with transportation to/from clinic appointments. Patient's mom and significant other will be caring for her children back in [place].     Patient denies any current mental health concerns at this time. Reports difficultly raising her leg, PT has been consulted. No other concerns noted at this time.       Next Steps: SW will remain available as needed.    KASSIE Angel, [facility]  Outpatient Kidney/Pancreas/Auto Islet Transplant Program  Ph: [phone]      "

## 2025-05-19 NOTE — CONSULTS
Care Management Initial Consult    General Information  Assessment completed with: Patient, Spouse or significant otherSanti  Type of CM/SW Visit: Initial Assessment  Primary Care Provider verified and updated as needed: Yes   Readmission within the last 30 days: no previous admission in last 30 days     Advance Care Planning: Advance Care Planning Reviewed: no concerns identified       Communication Assessment  Patient's communication style: spoken language (English or Bilingual)       Cognitive  Cognitive/Neuro/Behavioral: WDL                      Living Environment:   People in home: mom, sister, Santi (S.O), 2 sons (16, 6 YO)     Current living Arrangements: house      Able to return to prior arrangements: yes     Family/Social Support:  Care provided by: self  Provides care for: child(marcel)  Marital Status: Lives with Significant Other  Support system: Parent(s), Children, Significant Other          Description of Support System: Supportive, Involved    Support Assessment: Adequate family and caregiver support, Adequate social supports    Current Resources:   Patient receiving home care services: No  Community Resources: [facility]  Equipment currently used at home: none  Supplies currently used at home: None    Employment/Financial:  Employment Status: employed full-time     Financial Concerns: none   Does the patient's insurance plan have a 3 day qualifying hospital stay waiver?  No    Functional Status:  Prior to admission patient needed assistance:   Dependent ADLs:: Independent  Dependent IADLs:: Independent     Mental Health Status:  Mental Health Status: No Current Concerns     Chemical Dependency Status:  Chemical Dependency Status: No Current Concerns     Values/Beliefs:  Spiritual, Cultural Beliefs, Mormon Practices, Values that affect care: no       Discussed  Partnership in Safe Discharge Planning  document with patient/family: No      Care Management Follow Up    Length of Stay (days):  2  Expected Discharge Date: 05/20/2025  Concerns to be Addressed: discharge planning    Patient plan of care discussed at interdisciplinary rounds: Yes    Anticipated Discharge Disposition: Home vs TCU (pending PT consult)  Patient is from [place] and plans to stay locally at her grandmother's home: [address] [place] [id number]    Anticipated Discharge Services:  Home Care PT/RN referral for labs placed  Anticipated Discharge DME: None    Patient/family educated on Medicare website which has current facility and service quality ratings: yes  Education Provided on the Discharge Plan: Yes  Patient/Family in Agreement with the Plan: yes     Referrals Placed by CM/SW: Homecare  Private pay costs discussed: Not applicable    Handoff Completed: No, handoff not indicated or clinically appropriate    Additional Information:  Per LEWIS Ramirez new Kidney Transplant patient will be medically ready for discharge tomorrow. She does is not DGF and doesn't need Simulect. She is having issues with her leg and PT has been consulted to address this and make recommendations.     Met with patient and MELISSA Hancock to review anticipated discharge plan if PT recommends discharge to home:  ATC apts, weekly transplant labs on M/Th, and option for home health RN services.    Pt would like referral for HH RN to agency that has staffing and can take their insurance. Pt is aware that if a HH RN is not available, they will need to go to  clinic for weekly labs.    Patient is from [place] and will be staying at her grandmothers (Joel) home locally: [address] [place] [id number]. Her dad (Sterling), uncle (Ayo), adult brother and adult cousin all live there. They will be able to provide rides to appointments.     [facility] (ph:[phone] fx:[phone]) accepted.     Next Steps:   [] PT final recs  [] [facility] PT/RN, orders needed.  [] Schedule ATC apt day of  discharge   [] CTS handoff to OP TXP RNCC  [] external hand off - Neela Irene, RN, [facility]  Inpatient Care Management - FLOAT

## 2025-05-19 NOTE — PLAN OF CARE
Goal Outcome Evaluation:      Plan of Care Reviewed With: patient, significant other    Overall Patient Progress: improvingOverall Patient Progress: improving    Outcome Evaluation: Discharge likely tomorrow pending PT josh John RN, [facility]  Acute Care Management - FLOAT

## 2025-05-20 ENCOUNTER — TELEPHONE (OUTPATIENT)
Dept: TRANSPLANT | Facility: CLINIC | Age: 35
End: 2025-05-20
Payer: COMMERCIAL

## 2025-05-20 ENCOUNTER — APPOINTMENT (OUTPATIENT)
Dept: PHYSICAL THERAPY | Facility: CLINIC | Age: 35
End: 2025-05-20
Payer: COMMERCIAL

## 2025-05-20 VITALS
WEIGHT: 157.5 LBS | HEART RATE: 91 BPM | BODY MASS INDEX: 23.87 KG/M2 | RESPIRATION RATE: 16 BRPM | DIASTOLIC BLOOD PRESSURE: 75 MMHG | HEIGHT: 68 IN | OXYGEN SATURATION: 91 % | SYSTOLIC BLOOD PRESSURE: 135 MMHG | TEMPERATURE: 98.5 F

## 2025-05-20 DIAGNOSIS — Z94.0 KIDNEY TRANSPLANTED: Primary | ICD-10-CM

## 2025-05-20 PROBLEM — G89.18 ACUTE POST-OPERATIVE PAIN: Status: ACTIVE | Noted: 2025-05-20

## 2025-05-20 PROBLEM — D84.9 IMMUNOSUPPRESSED STATUS: Chronic | Status: ACTIVE | Noted: 2025-05-20

## 2025-05-20 PROBLEM — N18.5 CKD (CHRONIC KIDNEY DISEASE) STAGE 5, GFR LESS THAN 15 ML/MIN (H): Status: RESOLVED | Noted: 2021-12-24 | Resolved: 2025-05-20

## 2025-05-20 PROBLEM — E83.42 HYPOMAGNESEMIA: Status: ACTIVE | Noted: 2025-05-20

## 2025-05-20 PROBLEM — D84.9 IMMUNOSUPPRESSED STATUS: Status: ACTIVE | Noted: 2025-05-20

## 2025-05-20 PROBLEM — N18.6 ESRD (END STAGE RENAL DISEASE) (H): Status: RESOLVED | Noted: 2021-12-25 | Resolved: 2025-05-20

## 2025-05-20 PROBLEM — R29.898 RIGHT LEG WEAKNESS: Status: ACTIVE | Noted: 2025-05-20

## 2025-05-20 LAB
ANION GAP SERPL CALCULATED.3IONS-SCNC: 11 MMOL/L (ref 7–15)
BASOPHILS # BLD AUTO: 0 10E3/UL (ref 0–0.2)
BASOPHILS NFR BLD AUTO: 0 %
BUN SERPL-MCNC: 36.5 MG/DL (ref 6–20)
CALCIUM SERPL-MCNC: 10.3 MG/DL (ref 8.8–10.4)
CELL TYPE ALLO: NORMAL
CELL TYPE AUTO: NORMAL
CHANNELSHIFTALLOB1: 17
CHANNELSHIFTALLOB2: 12
CHANNELSHIFTALLOT1: -24
CHANNELSHIFTALLOT2: -24
CHANNELSHIFTAUTOB1: -47
CHANNELSHIFTAUTOB2: -45
CHANNELSHIFTAUTOT1: -34
CHANNELSHIFTAUTOT2: -38
CHLORIDE SERPL-SCNC: 105 MMOL/L (ref 98–107)
COMMENT ALLOB2: NORMAL
CREAT SERPL-MCNC: 3.76 MG/DL (ref 0.51–0.95)
CROSSMATCHDATEALLO: NORMAL
CROSSMATCHDATEAUTO: NORMAL
DONOR ALLO: NORMAL
DONOR AUTO: NORMAL
DONOR IDENTIFICATION: NORMAL
DONORCELLDATE ALLO: NORMAL
DONORCELLDATE AUTO: NORMAL
DSA COMMENTS: NORMAL
DSA PRESENT: NO
DSA TEST METHOD: NORMAL
EGFRCR SERPLBLD CKD-EPI 2021: 15 ML/MIN/1.73M2
EOSINOPHIL # BLD AUTO: 0 10E3/UL (ref 0–0.7)
EOSINOPHIL NFR BLD AUTO: 0 %
ERYTHROCYTE [DISTWIDTH] IN BLOOD BY AUTOMATED COUNT: 13 % (ref 10–15)
G6PD RBC-CCNT: 15 U/G HB
GLUCOSE SERPL-MCNC: 106 MG/DL (ref 70–99)
HCO3 SERPL-SCNC: 26 MMOL/L (ref 22–29)
HCT VFR BLD AUTO: 27.5 % (ref 35–47)
HGB BLD-MCNC: 8.9 G/DL (ref 11.7–15.7)
IMM GRANULOCYTES # BLD: 0 10E3/UL
IMM GRANULOCYTES NFR BLD: 1 %
LYMPHOCYTES # BLD AUTO: 0.1 10E3/UL (ref 0.8–5.3)
LYMPHOCYTES NFR BLD AUTO: 4 %
MAGNESIUM SERPL-MCNC: 2.1 MG/DL (ref 1.7–2.3)
MCH RBC QN AUTO: 31.6 PG (ref 26.5–33)
MCHC RBC AUTO-ENTMCNC: 32.4 G/DL (ref 31.5–36.5)
MCV RBC AUTO: 98 FL (ref 78–100)
MONOCYTES # BLD AUTO: 0.3 10E3/UL (ref 0–1.3)
MONOCYTES NFR BLD AUTO: 9 %
NEUTROPHILS # BLD AUTO: 2.7 10E3/UL (ref 1.6–8.3)
NEUTROPHILS NFR BLD AUTO: 86 %
NRBC # BLD AUTO: 0 10E3/UL
NRBC BLD AUTO-RTO: 0 /100
ORGAN: NORMAL
PHOSPHATE SERPL-MCNC: 4.3 MG/DL (ref 2.5–4.5)
PLATELET # BLD AUTO: 151 10E3/UL (ref 150–450)
POS CUT OFF ALLO B: >69
POS CUT OFF ALLO T: >53
POS CUT OFF AUTO B: >69
POS CUT OFF AUTO T: >53
POTASSIUM SERPL-SCNC: 3.7 MMOL/L (ref 3.4–5.3)
RBC # BLD AUTO: 2.82 10E6/UL (ref 3.8–5.2)
RESULT ALLO B1: NORMAL
RESULT ALLO B2: NORMAL
RESULT ALLO T1: NORMAL
RESULT ALLO T2: NORMAL
RESULT AUTO B1: NORMAL
RESULT AUTO B2: NORMAL
RESULT AUTO T1: NORMAL
RESULT AUTO T2: NORMAL
SA 1  COMMENTS: NORMAL
SA 1 CELL: NORMAL
SA 1 TEST METHOD: NORMAL
SA 2 CELL: NORMAL
SA 2 COMMENTS: NORMAL
SA 2 TEST METHOD: NORMAL
SA1 HI RISK ABY: NORMAL
SA1 MOD RISK ABY: NORMAL
SA2 HI RISK ABY: NORMAL
SA2 MOD RISK ABY: NORMAL
SERUM DATE ALLO B1: NORMAL
SERUM DATE ALLO B2: NORMAL
SERUM DATE ALLO T1: NORMAL
SERUM DATE ALLO T2: NORMAL
SERUM DATE AUTO B1: NORMAL
SERUM DATE AUTO B2: NORMAL
SERUM DATE AUTO T1: NORMAL
SERUM DATE AUTO T2: NORMAL
SODIUM SERPL-SCNC: 142 MMOL/L (ref 135–145)
TACROLIMUS BLD-MCNC: 3.7 UG/L (ref 5–15)
TESTMETHODALLO: NORMAL
TESTMETHODAUTO: NORMAL
TME LAST DOSE: ABNORMAL H
TME LAST DOSE: ABNORMAL H
TREATMENT ALLO B1: NORMAL
TREATMENT ALLO B2: NORMAL
TREATMENT ALLO T1: NORMAL
TREATMENT ALLO T2: NORMAL
TREATMENT AUTO B1: NORMAL
TREATMENT AUTO B2: NORMAL
TREATMENT AUTO T1: NORMAL
TREATMENT AUTO T2: NORMAL
UFH PPP CHRO-ACNC: <0.1 IU/ML (ref ?–1.1)
UNACCEPTABLE ANTIGENS: NORMAL
UNOS CPRA: 76
WBC # BLD AUTO: 3.2 10E3/UL (ref 4–11)

## 2025-05-20 PROCEDURE — 80048 BASIC METABOLIC PNL TOTAL CA: CPT | Performed by: SURGERY

## 2025-05-20 PROCEDURE — 85520 HEPARIN ASSAY: CPT | Performed by: PHYSICIAN ASSISTANT

## 2025-05-20 PROCEDURE — 97530 THERAPEUTIC ACTIVITIES: CPT | Mod: GP | Performed by: PHYSICAL THERAPIST

## 2025-05-20 PROCEDURE — 250N000012 HC RX MED GY IP 250 OP 636 PS 637: Performed by: SURGERY

## 2025-05-20 PROCEDURE — 36592 COLLECT BLOOD FROM PICC: CPT | Performed by: SURGERY

## 2025-05-20 PROCEDURE — 250N000012 HC RX MED GY IP 250 OP 636 PS 637: Performed by: NURSE PRACTITIONER

## 2025-05-20 PROCEDURE — 250N000009 HC RX 250: Performed by: SURGERY

## 2025-05-20 PROCEDURE — 250N000011 HC RX IP 250 OP 636: Performed by: SURGERY

## 2025-05-20 PROCEDURE — 83735 ASSAY OF MAGNESIUM: CPT | Performed by: SURGERY

## 2025-05-20 PROCEDURE — 99233 SBSQ HOSP IP/OBS HIGH 50: CPT | Mod: FS | Performed by: NURSE PRACTITIONER

## 2025-05-20 PROCEDURE — 85025 COMPLETE CBC W/AUTO DIFF WBC: CPT | Performed by: SURGERY

## 2025-05-20 PROCEDURE — 250N000013 HC RX MED GY IP 250 OP 250 PS 637: Performed by: NURSE PRACTITIONER

## 2025-05-20 PROCEDURE — 80197 ASSAY OF TACROLIMUS: CPT | Performed by: SURGERY

## 2025-05-20 PROCEDURE — 84100 ASSAY OF PHOSPHORUS: CPT | Performed by: SURGERY

## 2025-05-20 PROCEDURE — 250N000013 HC RX MED GY IP 250 OP 250 PS 637: Performed by: SURGERY

## 2025-05-20 PROCEDURE — 94640 AIRWAY INHALATION TREATMENT: CPT

## 2025-05-20 PROCEDURE — 97116 GAIT TRAINING THERAPY: CPT | Mod: GP | Performed by: PHYSICAL THERAPIST

## 2025-05-20 PROCEDURE — 999N000157 HC STATISTIC RCP TIME EA 10 MIN

## 2025-05-20 PROCEDURE — 94642 AEROSOL INHALATION TREATMENT: CPT

## 2025-05-20 RX ORDER — ATORVASTATIN CALCIUM 10 MG/1
10 TABLET, FILM COATED ORAL DAILY
Qty: 30 TABLET | Refills: 2 | Status: SHIPPED | OUTPATIENT
Start: 2025-05-21

## 2025-05-20 RX ORDER — POLYETHYLENE GLYCOL 3350 17 G/17G
17 POWDER, FOR SOLUTION ORAL DAILY PRN
Qty: 510 G | Refills: 0 | Status: SHIPPED | OUTPATIENT
Start: 2025-05-20

## 2025-05-20 RX ORDER — ONDANSETRON 4 MG/1
4 TABLET, ORALLY DISINTEGRATING ORAL EVERY 8 HOURS PRN
Qty: 20 TABLET | Refills: 0 | Status: SHIPPED | OUTPATIENT
Start: 2025-05-20

## 2025-05-20 RX ORDER — TACROLIMUS 0.5 MG/1
0.5 CAPSULE ORAL 2 TIMES DAILY
Qty: 60 CAPSULE | Refills: 11 | Status: ACTIVE | OUTPATIENT
Start: 2025-05-20 | End: 2025-05-28

## 2025-05-20 RX ORDER — OXYCODONE HYDROCHLORIDE 5 MG/1
5 TABLET ORAL EVERY 6 HOURS PRN
Refills: 0 | Status: DISCONTINUED | OUTPATIENT
Start: 2025-05-20 | End: 2025-05-20 | Stop reason: HOSPADM

## 2025-05-20 RX ORDER — CALCIUM CARBONATE 500 MG/1
500 TABLET, CHEWABLE ORAL DAILY PRN
Status: DISCONTINUED | OUTPATIENT
Start: 2025-05-20 | End: 2025-05-20 | Stop reason: HOSPADM

## 2025-05-20 RX ORDER — VALGANCICLOVIR 450 MG/1
450 TABLET, FILM COATED ORAL EVERY OTHER DAY
Qty: 60 TABLET | Refills: 2 | Status: ACTIVE | OUTPATIENT
Start: 2025-05-21

## 2025-05-20 RX ORDER — SENNOSIDES 8.6 MG
325 CAPSULE ORAL DAILY
Status: DISCONTINUED | OUTPATIENT
Start: 2025-05-20 | End: 2025-05-20 | Stop reason: HOSPADM

## 2025-05-20 RX ORDER — OXYCODONE HYDROCHLORIDE 10 MG/1
10 TABLET ORAL EVERY 6 HOURS PRN
Refills: 0 | Status: DISCONTINUED | OUTPATIENT
Start: 2025-05-20 | End: 2025-05-20 | Stop reason: HOSPADM

## 2025-05-20 RX ORDER — VALGANCICLOVIR 450 MG/1
450 TABLET, FILM COATED ORAL
Qty: 60 TABLET | Refills: 2 | Status: SHIPPED | OUTPATIENT
Start: 2025-05-22 | End: 2025-05-20

## 2025-05-20 RX ORDER — MAGNESIUM OXIDE 400 MG/1
400 TABLET ORAL
Qty: 30 TABLET | Refills: 2 | Status: SHIPPED | OUTPATIENT
Start: 2025-05-20 | End: 2025-05-28

## 2025-05-20 RX ORDER — TACROLIMUS 1 MG/1
4 CAPSULE ORAL
Status: DISCONTINUED | OUTPATIENT
Start: 2025-05-20 | End: 2025-05-20 | Stop reason: HOSPADM

## 2025-05-20 RX ORDER — AMOXICILLIN 250 MG
1 CAPSULE ORAL 2 TIMES DAILY
Qty: 60 TABLET | Refills: 0 | Status: SHIPPED | OUTPATIENT
Start: 2025-05-20

## 2025-05-20 RX ORDER — METHOCARBAMOL 500 MG/1
500 TABLET, FILM COATED ORAL EVERY 6 HOURS PRN
Qty: 20 TABLET | Refills: 0 | Status: SHIPPED | OUTPATIENT
Start: 2025-05-20

## 2025-05-20 RX ORDER — ACETAMINOPHEN 325 MG/1
975 TABLET ORAL EVERY 8 HOURS PRN
Status: DISCONTINUED | OUTPATIENT
Start: 2025-05-20 | End: 2025-05-20 | Stop reason: HOSPADM

## 2025-05-20 RX ORDER — OXYCODONE HYDROCHLORIDE 5 MG/1
5-10 TABLET ORAL EVERY 6 HOURS PRN
Qty: 20 TABLET | Refills: 0 | Status: SHIPPED | OUTPATIENT
Start: 2025-05-20 | End: 2025-05-23

## 2025-05-20 RX ORDER — SENNOSIDES 8.6 MG
325 CAPSULE ORAL DAILY
Qty: 30 TABLET | Refills: 2 | Status: SHIPPED | OUTPATIENT
Start: 2025-05-20

## 2025-05-20 RX ORDER — MYCOPHENOLATE MOFETIL 250 MG/1
750 CAPSULE ORAL 2 TIMES DAILY
Qty: 180 CAPSULE | Refills: 11 | Status: ACTIVE | OUTPATIENT
Start: 2025-05-20

## 2025-05-20 RX ORDER — TACROLIMUS 1 MG/1
4 CAPSULE ORAL 2 TIMES DAILY
Qty: 240 CAPSULE | Refills: 11 | Status: ACTIVE | OUTPATIENT
Start: 2025-05-20 | End: 2025-05-21

## 2025-05-20 RX ORDER — DAPSONE 25 MG/1
50 TABLET ORAL DAILY
Status: DISCONTINUED | OUTPATIENT
Start: 2025-05-21 | End: 2025-05-20

## 2025-05-20 RX ADMIN — MAGNESIUM HYDROXIDE 30 ML: 400 SUSPENSION ORAL at 08:36

## 2025-05-20 RX ADMIN — ACETAMINOPHEN 975 MG: 325 TABLET ORAL at 16:51

## 2025-05-20 RX ADMIN — FAMOTIDINE 10 MG: 10 TABLET ORAL at 08:34

## 2025-05-20 RX ADMIN — PROCHLORPERAZINE MALEATE 10 MG: 5 TABLET ORAL at 11:08

## 2025-05-20 RX ADMIN — ALBUTEROL SULFATE 2 PUFF: 90 AEROSOL, METERED RESPIRATORY (INHALATION) at 03:21

## 2025-05-20 RX ADMIN — ACETAMINOPHEN 975 MG: 325 TABLET ORAL at 11:08

## 2025-05-20 RX ADMIN — OXYCODONE HYDROCHLORIDE 10 MG: 10 TABLET ORAL at 03:13

## 2025-05-20 RX ADMIN — OXYCODONE HYDROCHLORIDE 10 MG: 10 TABLET ORAL at 08:35

## 2025-05-20 RX ADMIN — SENNOSIDES AND DOCUSATE SODIUM 1 TABLET: 50; 8.6 TABLET ORAL at 08:36

## 2025-05-20 RX ADMIN — MYCOPHENOLATE MOFETIL 750 MG: 250 CAPSULE ORAL at 08:36

## 2025-05-20 RX ADMIN — ACETAMINOPHEN 975 MG: 325 TABLET ORAL at 03:13

## 2025-05-20 RX ADMIN — ALBUTEROL SULFATE 2 PUFF: 90 AEROSOL, METERED RESPIRATORY (INHALATION) at 10:29

## 2025-05-20 RX ADMIN — ONDANSETRON 4 MG: 4 TABLET, ORALLY DISINTEGRATING ORAL at 08:34

## 2025-05-20 RX ADMIN — OXYCODONE HYDROCHLORIDE 5 MG: 5 TABLET ORAL at 16:57

## 2025-05-20 RX ADMIN — TACROLIMUS 2.5 MG: 1 CAPSULE ORAL at 08:36

## 2025-05-20 RX ADMIN — ATORVASTATIN CALCIUM 10 MG: 10 TABLET, FILM COATED ORAL at 08:36

## 2025-05-20 RX ADMIN — METHOCARBAMOL 500 MG: 500 TABLET ORAL at 12:24

## 2025-05-20 RX ADMIN — MAGNESIUM OXIDE TAB 400 MG (241.3 MG ELEMENTAL MG) 400 MG: 400 (241.3 MG) TAB at 11:39

## 2025-05-20 RX ADMIN — TACROLIMUS 4 MG: 1 CAPSULE ORAL at 18:40

## 2025-05-20 RX ADMIN — ASPIRIN 325 MG: 325 TABLET, COATED ORAL at 11:39

## 2025-05-20 RX ADMIN — BISACODYL 10 MG: 10 SUPPOSITORY RECTAL at 13:52

## 2025-05-20 RX ADMIN — MYCOPHENOLATE MOFETIL 750 MG: 250 CAPSULE ORAL at 18:40

## 2025-05-20 RX ADMIN — PENTAMIDINE ISETHIONATE 300 MG: 300 INHALANT RESPIRATORY (INHALATION) at 10:29

## 2025-05-20 ASSESSMENT — ACTIVITIES OF DAILY LIVING (ADL)
ADLS_ACUITY_SCORE: 59

## 2025-05-20 NOTE — DISCHARGE SUMMARY
[facility]    Discharge Summary  Transplant Surgery    Date of Admission:  2025  Date of Discharge:  2025  Discharging Provider: Debbie Reilly NP / Domenico Garcia MD     Discharge Diagnoses   Principal Problem:    Kidney replaced by transplant  Active Problems:    Essential hypertension    Immunosuppressed status    Acute post-operative pain    Hypomagnesemia    Right leg weakness    History of Present Illness   Glenna Spears is an 34 year old female with history of ESRD of unknown etiology, anemia, obesity s/p gastric sleeve in , and latent TB. S/p DCD  donor kidney transplant w/ ureteral stent on 25.    Hospital Course   s/p DCD DDKT +stent 25: Creatinine down to 3.8 by day of discharge. Good UOP. Last US  with patent vasculature.    - RANDALL drain to remain in place until output < 30 mL x 3 days.    - Continue  mg daily x 3 months d/t renal artery reconstruction.    Immunosuppressed due to medications:  Induction: via high intensity induction (cPRA 76 but DCD donor and prolonged CIT) with Thymoglobulin 425 mg (6 mg/kg).   Maintenance:   -  mg BID   - Tacrolimus 4 mg BID. Goal level 8-10.   Infection prophylaxis: PJP (Pentamidine given , no Bactrim d/t sulfa allergy, G6PD WNL), viral (Valcyte x 12 weeks)    Transplant coordinator: Tammi Cuellar [phone]  Donor type:  DCD  DSA at time of transplant:  No  Ureteral stent: YES  CMV:  Donor + / Recipient +  EBV:  Donor + / Recipient +  Thymoglobulin: 425 mg, 6 mg/kg     Neurology:  Acute post-op pain: Controlled on current regimen:   - PRN Tylenol, Robaxin, oxycodone (weaning).     Hematology:  Anemia of chronic disease, acute blood loss: Hemoglobin stable ~9.     Fluid/Electrolytes:   Hypomagnesemia:    - Continue Mag-Ox 400 mg daily.     MSK:   Post-operative RLE numbness and weakness: Medial right thigh numbness and right knee weakness post-operatively. PT  consulted.   - Continue home PT on discharge.           Significant Results and Procedures    Procedure date:  05/17/25    Preoperative diagnosis:  End Stage renal failure due to hypertension    Postoperative diagnosis:  Same    Procedure:  1. Right kidney  transplant,  Donation after Circulatory Death, Right  iliac fossa, with arterial and venous reconstruction. A J-J ureteral stent was placed.  2. Kidney allograft preparation on Back Table       Surgeon:  MARY PANDEY     Pending Results   These results will be followed up by Transplant team  Unresulted Labs Ordered in the Past 30 Days of this Admission       Date and Time Order Name Status Description    5/17/2025  3:19 AM Prepare red blood cells (unit) Preliminary     5/17/2025  3:19 AM Prepare red blood cells (unit) Preliminary     5/17/2025  1:31 AM BK Virus IgG Antibody In process             Code Status   Full    Primary Care Physician   Neela Irene    Physical Exam   Temp: 98.2  F (36.8  C) Temp src: Oral BP: (!) 155/90 Pulse: 90   Resp: 16 SpO2: 96 % O2 Device: None (Room air)    Vitals:    05/17/25 0135 05/19/25 0843 05/20/25 0316   Weight: 71.1 kg (156 lb 12 oz) 72.1 kg (159 lb) 71.4 kg (157 lb 8 oz)     Vital Signs with Ranges  Temp:  [97.8  F (36.6  C)-98.8  F (37.1  C)] 98.2  F (36.8  C)  Pulse:  [86-99] 90  Resp:  [16-18] 16  BP: (136-155)/(82-96) 155/90  SpO2:  [90 %-96 %] 96 %  I/O last 3 completed shifts:  In: 900 [P.O.:600; I.V.:300]  Out: 2005 [Urine:1875; Drains:130]    Constitutional: resting comfortably in bed  Eyes: EOMI  ENT: right internal jugular line in place  Respiratory: unlabored on RA  Cardiovascular: perfused  GI: abdomen soft. Incision closed with staples and open to air. RANDALL drain with serosanguinous output.   Genitourinary: Hu in place with clear sergei output  Skin: warm, dry  Musculoskeletal: no edema noted  Neurologic: A&Ox4  Neuropsychiatric: calm, pleasant    Time Spent on this Encounter    Debbie MILLS NP, personally saw the patient today and spent greater than 30 minutes discharging this patient.    Discharge Disposition   Discharged to home  Condition at discharge: Stable    Consultations This Hospital Stay   NEPHROLOGY KIDNEY/PANCREAS TRANSPLANT ADULT IP CONSULT  SOT MEDICATION HISTORY IP PHARMACY CONSULT  CARE MANAGEMENT / SOCIAL WORK IP CONSULT  PHARMACY IP CONSULT  NUTRITION SERVICES ADULT IP CONSULT  NEPHROLOGY KIDNEY/PANCREAS TRANSPLANT ADULT IP CONSULT  PHYSICAL THERAPY ADULT IP CONSULT  PHYSICAL THERAPY ADULT IP CONSULT    Discharge Orders       Home Care Referral      Reason for your hospital stay    You were admitted for a kidney transplant. You are discharging home in stable condition with close follow up.     Activity    Your activity upon discharge: Walk at least four times a day, lift no greater than 10 pounds for 6-8 weeks from the time of surgery.  No driving while taking narcotics or 3 weeks after surgery.     Tubes and Drains    RANDALL drain, please empty and record output as needed and change dressing daily.     ADULT [facility] Specialty Follow-up and recommended labs and tests    [facility] FOLLOW UP:     1. [facility]: Over the next 2 days you will be seen in the [facility] (ph. [phone], option 3). Your labs will be drawn at the beginning of your appointment. DO NOT take your medications prior to having labs drawn. Please bring all your medications with you from home to take after labs are drawn.     2. Follow up with Dr. Barreto in Transplant Clinic in 1-2 weeks.     3. Follow up with Transplant Nephrologist as scheduled.     4.  Ureteral stent removal in 4-6 weeks, to be scheduled by Transplant Coordinator. If a  does not contact you for this, please contact your transplant coordinator.     Remember to always bring an updated medication list to all appointments.        Call your Transplant Coordinator  ([phone]) with questions about Transplant Center appointment scheduling.     LABS:     CBC, BMP, magnesium, phosphorus, tacrolimus level to be drawn daily while in ATC, then every Monday and Thursday by home health care nurse if arranged, or at an outpatient lab.      PTH, iron panel, and Vitamin D level to be drawn on 1st day at [facility].     Monitor and record    Monitor blood pressure and weight daily.     When to contact your care team    WHEN TO CONTACT YOUR  COORDINATOR:     Transplant Coordinator [phone]     Notify your coordinator if you have pain over your kidney, increased redness or drainage from your incision, fever greater than 100F, decreased urine output or new or increased amount of blood in urine.     Notify your coordinator immediately if you are ever unable to take your immunosuppressive medications for any reason.     If you have URGENT concerns after office hours, please call the hospital switchboard at [phone] and ask to have the organ transplant nurse on-call paged. If you have a life-threatening emergency, go to the nearest emergency room.     Wound care and dressings    Instructions to care for your wound at home: If you have staples in place, they will be removed in 3 weeks after operation. Wash incision daily with soap and water. Do not soak or scrub.     Walker Order     Diet    Diet recommendations post-transplant: Heart healthy dietary habits long term (low saturated/trans fat, low sodium). High protein diet x 8 weeks. Practice food safety precautions.     Discharge Medications    Current Discharge Medication List        START taking these medications    Details   aspirin (ASA) 325 MG EC tablet Take 1 tablet (325 mg) by mouth daily.  Qty: 30 tablet, Refills: 2    Associated Diagnoses: Kidney replaced by transplant      atorvastatin (LIPITOR) 10 MG tablet Take 1 tablet (10 mg) by mouth daily.  Qty: 30 tablet, Refills: 2    Associated Diagnoses: Kidney replaced by transplant       magnesium oxide (MAG-OX) 400 MG tablet Take 1 tablet (400 mg) by mouth daily (with lunch).  Qty: 30 tablet, Refills: 2    Associated Diagnoses: Hypomagnesemia      methocarbamol (ROBAXIN) 500 MG tablet Take 1 tablet (500 mg) by mouth every 6 hours as needed for muscle spasms.  Qty: 20 tablet, Refills: 0    Associated Diagnoses: Acute post-operative pain      mycophenolate (GENERIC EQUIVALENT) 250 MG capsule Take 3 capsules (750 mg) by mouth 2 times daily.  Qty: 180 capsule, Refills: 11    Associated Diagnoses: Kidney replaced by transplant      oxyCODONE (ROXICODONE) 5 MG tablet Take 1-2 tablets (5-10 mg) by mouth every 6 hours as needed for moderate to severe pain.  Qty: 20 tablet, Refills: 0    Associated Diagnoses: Kidney replaced by transplant; Acute post-operative pain      polyethylene glycol (MIRALAX) 17 GM/Dose powder Take 17 g by mouth daily as needed for constipation.  Qty: 510 g, Refills: 0    Associated Diagnoses: Kidney replaced by transplant      senna-docusate (SENOKOT-S/PERICOLACE) 8.6-50 MG tablet Take 1 tablet by mouth 2 times daily.  Qty: 60 tablet, Refills: 0    Associated Diagnoses: Kidney replaced by transplant      !! tacrolimus (GENERIC EQUIVALENT) 0.5 MG capsule Take 1 capsule (0.5 mg) by mouth 2 times daily. To have available for dose adjustments per transplant team. Discharge dose = 4 mg BID.  Qty: 60 capsule, Refills: 11    Associated Diagnoses: Kidney replaced by transplant      !! tacrolimus (GENERIC EQUIVALENT) 1 MG capsule Take 4 capsules (4 mg) by mouth 2 times daily.  Qty: 240 capsule, Refills: 11    Associated Diagnoses: Kidney replaced by transplant      valGANciclovir (VALCYTE) 450 MG tablet Take 1 tablet (450 mg) by mouth twice a week.  Qty: 60 tablet, Refills: 2    Comments: Anticipate improvement in kidney function. Eventual dose = 900 mg daily. Dose adjustments per transplant team.  Associated Diagnoses: Kidney replaced by transplant       !! - Potential duplicate  medications found. Please discuss with provider.        CONTINUE these medications which have CHANGED    Details   Semaglutide-Weight Management (WEGOVY) 2.4 MG/0.75ML pen Inject 2.4 mg subcutaneously once a week. HOLD x 3 months    Associated Diagnoses: Obesity, unspecified class, unspecified obesity type, unspecified whether serious comorbidity present           CONTINUE these medications which have NOT CHANGED    Details   acetaminophen (TYLENOL) 325 MG tablet Take 325-650 mg by mouth every 4 hours as needed      famotidine (PEPCID) 20 MG tablet Take 20 mg by mouth 2 times daily as needed.      nortriptyline (PAMELOR) 25 MG capsule Take 25 mg by mouth at bedtime.      varenicline (CHANTIX SANCHEZ) 0.5 MG X 11 & 1 MG X 42 tablet Take 0.5 mg by mouth daily.      VENTOLIN  (90 Base) MCG/ACT inhaler INHALE 2 PUFFS BY MOUTH EVERY 4 HOURS AS NEEDED FOR SHORTNESS OF BREATH OR COUGH OR EXERCISE      levonorgestrel (KYLEENA) 19.5 MG IUD 1 Intra Uterine Device by Intrauterine route once           STOP taking these medications       B Complex-C-Zn-Folic Acid (DIALYVITE/ZINC) TABS Comments:   Reason for Stopping:         cinacalcet (SENSIPAR) 30 MG tablet Comments:   Reason for Stopping:         VELPHORO 500 MG CHEW chewable tablet Comments:   Reason for Stopping:         Vitamin D3 (VITAMIN D, CHOLECALCIFEROL,) 25 mcg (1000 units) tablet Comments:   Reason for Stopping:         vitamin E (TOCOPHEROL) 400 units (180 mg) capsule Comments:   Reason for Stopping:         XPHOZAH 30 MG TABS Comments:   Reason for Stopping:             Allergies   Allergies   Allergen Reactions    Sulfa Antibiotics Other (See Comments)     Reaction as an infant- unsure of what happened    Nsaids Other (See Comments)     Has nephrotic range proteinuria and see nephrology's note.      Data   Most Recent 3 CBC's:  Recent Labs   Lab Test 05/20/25  0553 05/19/25  0518 05/18/25  1556 05/18/25  0956 05/18/25  0650   WBC 3.2* 4.9  --   --  8.0   HGB  8.9* 9.5* 10.4*   < > 10.3*   MCV 98 96 96   < > 97    151  --   --  171    < > = values in this interval not displayed.      Most Recent 3 BMP's:  Recent Labs   Lab Test 05/20/25  0552 05/19/25  0518 05/18/25  1556 05/18/25  0956 05/18/25  0650    139  --   --  138   POTASSIUM 3.7 3.9 3.8   < > 4.2   CHLORIDE 105 101  --   --  100   CO2 26 24  --   --  23   BUN 36.5* 38.8*  --   --  42.9*   CR 3.76* 5.46*  --   --  7.43*   ANIONGAP 11 14  --   --  15   KARTIK 10.3 10.0  --   --  9.4   * 118*  --   --  96    < > = values in this interval not displayed.     Most Recent 2 LFT's:  Recent Labs   Lab Test 05/17/25  0203 05/20/24  1343   AST 17 9   ALT 33 8   ALKPHOS 75 50   BILITOTAL 0.4 0.3     Most Recent INR's and Anticoagulation Dosing History:  Anticoagulation Dose History          Latest Ref Rng & Units 5/20/2024 5/17/2025   Recent Dosing and Labs   INR 0.85 - 1.15 1.12  0.98      Most Recent 3 Troponin's:No lab results found.  Most Recent Cholesterol Panel:  Recent Labs   Lab Test 05/17/25  0203   CHOL 153   LDL 75   HDL 58   TRIG 100     Most Recent 6 Bacteria Isolates From Any Culture (See EPIC Reports for Culture Details):No lab results found.  Most Recent TSH, T4 and A1c Labs:  Recent Labs   Lab Test 05/17/25  0203   A1C 4.9     Results for orders placed or performed during the hospital encounter of 05/17/25   XR Chest 2 Views    Narrative    Exam: XR CHEST 2 VIEWS  5/17/2025 2:21 AM     History:  pre-transplant screening       Comparison:  Chest radiograph 5/20/2024.    Findings: Upright PA and lateral views the chest..    Trachea is midline. The cardiomediastinal silhouette is within normal  limits. No significant pleural effusion or pneumothorax. No focal  airspace opacity. The visualized upper abdomen is unremarkable.  Surgical clips in the left abdomen.      Impression    Impression: Lungs are clear.    I have personally reviewed the examination and initial interpretation  and I agree  with the findings.    BUBBA MCLEOD,          SYSTEM ID:  [id number]   XR Chest Port 1 View    Narrative    Exam: XR CHEST PORT 1 VIEW, 5/17/2025 1:18 PM    Comparison: 5/17/2025 at 2:21 AM    History: Post-op Kidney Transplant    Findings:  Portable AP view of the semiupright chest. Right IJ CVC with tip at  the cavoatrial junction. Trachea is midline. Cardiomediastinal  silhouette is within normal limits. No focal airspace opacity. No  pneumothorax or pleural effusion. The visualized upper abdomen is  unremarkable. No acute osseous abnormalities.      Impression    Impression:   No acute cardiopulmonary findings.    I have personally reviewed the examination and initial interpretation  and I agree with the findings.    GIGI MONTEMAYOR DO         SYSTEM ID:  [id number]   US Renal Transplant with Doppler    Narrative    EXAMINATION: US RENAL TRANSPLANT, 5/17/2025 1:23 PM     COMPARISON: None.    HISTORY: s/p DDKT, please assess flows    TECHNIQUE:  Grey-scale, color Doppler and spectral flow analysis.    FINDINGS:  The transplant kidney is located right lower quadrant, and measures  13.3 cm in length. Parenchyma is of normal thickness and echogenicity.  No focal lesions. No hydronephrosis. No perinephric fluid collection.  Hu within the bladder.    Renal artery flow:   81 cm/sec peak systolic at hilum.  70 peak systolic at anastomosis.  Arcuate artery resistive indices (upper to lower): 0.73, 0.75, 0.68    Renal Vein Flow:  22 at hilum.   67 at anastomosis.    Iliac artery flow:  113 cm/s peak systolic above anastomosis.  169 cm/s peak systolic below anastomosis.    Iliac vein flow:  Patent above and below the anastomosis.      Impression    IMPRESSION:   Normal transplant kidney ultrasound.    I have personally reviewed the examination and initial interpretation  and I agree with the findings.    GIGI MONTEMAYOR DO         SYSTEM ID:  [id number]   US Renal Transplant with Doppler    Narrative    EXAMINATION: US RENAL  TRANSPLANT,  5/19/2025 1:20 PM     COMPARISON: Renal transplant ultrasound 5/17/2025.    HISTORY: please evaluate for small upper pole artery.    TECHNIQUE:  Grey-scale, color Doppler and spectral flow analysis.    FINDINGS:  The transplant kidney is located right lower quadrant, and measures  14.4 cm. Parenchyma is of normal thickness and echogenicity. No focal  lesions. No hydronephrosis. No perinephric fluid collection.    Renal artery flow:   68 cm/sec peak systolic at hilum.  45 cm/sec peak systolic at anastomosis.  Arcuate artery resistive indices (upper to lower): 0.52, 0.62, 0.57    Renal Vein Flow:  15 cm/sec at hilum.   59 cm/sec at anastomosis.    Iliac artery flow:  194 cm/sec peak systolic above anastomosis.  262 cm/sec peak systolic below anastomosis.    Iliac vein flow:  Patent above and below the anastomosis.    The urinary bladder was not seen.      Impression    IMPRESSION:   1. Normal grayscale appearance of the right lower quadrant transplant.  2. Patent transplant kidney Doppler evaluation    I have personally reviewed the examination and initial interpretation  and I agree with the findings.    LYLA IRENE MD         SYSTEM ID:  [id number]

## 2025-05-20 NOTE — PHARMACY-TRANSPLANT NOTE
Solid Organ Transplant Recipient Prior to Discharge Note    34 year old female s/p  donor kidney transplant on 2025.    Immunosuppression plan at time of discharge:  Mycophenolate 750 mg by mouth twice daily about 12 hours apart  Tacrolimus 4 mg by mouth twice daily about 12 hours apart, titrated to goal trough of 8-10 mcg/L  Tacrolimus started  @ 1800 at 2.5 mg PO BID  Tacrolimus level today, 2025 was 3.7 mcg/L (12 hour trough), dose increased to discharge dose of 4 mg po bid.                    Opportunistic prophylaxis:  Pentamidine nebulized solution 300 mg, administered via inhalation on 25. Consider dapsone 50 mg once daily starting 2025. G6PD checked 2025 and was WNL (15 U/g Hgb).  Valganciclovir 450 mg by mouth every other day for CMV prophylaxis (CMV IgG: Donor +  / Recipient +; EBV IgG: Donor + / Recipient + ). Adjust valganciclovir to prophylactic dose of 900 mg by mouth daily when CrCl > 60 ml/min.    Pharmacy has monitored for medication interactions and immunosuppression levels in conjunction with the multidisciplinary team. In anticipation for discharge, medication therapy needs have been addressed daily throughout the current admission via multidisciplinary rounds and/or discussions, order verification, daily clinical pharmacy review, and communication with prescribers.  Nereida Ruiz, Pharm.D., [facility], BCTXP  [address] pharmacist

## 2025-05-20 NOTE — PROGRESS NOTES
CLINICAL NUTRITION SERVICES - DISCHARGE NOTE    Pt reports she isn't eating much, only bites.  She isn't concerned as she doesn't eat much ordinarily since her gastric bypass surgery 1.5 years ago.      Education provided on high protein needs.  She notes she doesn't enjoy protein shakes.  Will send protein bars and Gelatein Plus for patient to try.  Encouraged finding a protein supplement and focusing on high protein foods during the next 6-8 weeks to support healing and recovery.     Patient s discharge needs assessed and discharge planning has been conducted with the multidisciplinary transplant care team including physicians, pharmacy, social work and transplant coordinator.    Follow up/Monitoring:  Once discharged, place outpatient nutrition consult via the transplant team if nutrition concerns arise.    Debbie Hector, MS, RD, LD, CCTD, CNSC  7A + 7B (beds [id number])  Available on Vocera [7A or 7B Clinical Dietitian]   Weekend/Holiday: Vocera [Weekend Clinical Dietitian]

## 2025-05-20 NOTE — PROGRESS NOTES
Care Management Discharge Note    Discharge Date: 05/20/2025     Discharge Disposition: Patient is from [place] and plans to stay locally at her grandmother's home: [id number] [place] [address]   Discharge Services: Home Care RN/PT  [facility] (ph:[phone] fx:[phone]   Discharge DME:  FWW issue by PT  Discharge Transportation: family or friend will provide  Private pay costs discussed: Not applicable   Patient/family educated on Medicare website which has current facility and service quality ratings: yes  Education Provided on the Discharge Plan:  yes  Persons Notified of Discharge Plans: patient, home care  Patient/Family in Agreement with the Plan: yes  Handoff Referral Completed: Yes    Additional Information:  Per primary team kidney transplant patient is ready to discharge today with ATC visits and HH RN/PT.     PT evaluated patient and determine Home with HH PT or OP PT is appropriate. They will issue a FWW    Epic in-basket sent ([facility] Scheduling, [facility] Nurses-[facility]) requesting ATC appointment for first two days after discharge. Confirmed this was scheduled.     Patient was accepted by [facility]  HHRN/PT - updated of discharge via EPIC. SOC is Wed 5/21.      Jaquelin John, RN, [place]  [facility]  Inpatient Care Management - FLOAT

## 2025-05-20 NOTE — PROGRESS NOTES
[facility]  Transplant Nephrology Progress Note  Date of Admission:  5/17/2025  Today's Date: 05/20/2025  Requesting physician: Manjeet Barreto*    Recommendations:   - Plan for pentamidine today. G6PD WNL, will re-evaluate switch to dapsone outpatient.   - Continue current immunosuppression.  - No need for dialysis.   - Continue to hold Semaglutide for 3 months post-txp.     Assessment & Plan   # DDKT: Trend down. Transplant renal US normal. Good urine output.    - Baseline Creatinine: ~ TBD   - Proteinuria: Moderate (1-3 grams)   - DSA Hx: pending   - Last cPRA76%   - BK Viremia: Not checked post transplant   - Kidney Tx Biopsy Hx: No biopsy history.    # Immunosuppression: Tacrolimus immediate release (goal 8-10), Mycophenolate mofetil (dose 750 mg every 12 hours), and Methylprednisolone (dose taper)   - Induction with Recent Transplant:  High Intensity Protocol   - Continue with intensive monitoring of immunosuppression for efficacy and toxicity.   - Historical Changes in Immunosuppression: cPRA-76%, switched from intermediate to high risk induction due to high DGF risks (DCD, prolonged CIT)   - Changes: Not at this time Consider switching to MPA if ongoing N/V and GI upset.     # Infection Prevention:   Last CD4 Level: Not checked recently  - PJP: Inhaled pentamidine  - CMV: Valganciclovir (Valcyte)      - CMV IgG Ab High Risk Discordance (D+/R-) at time of transplant: No  Present CMV Serostatus: Positive  - EBV IgG Ab High Risk Discordance (D+/R-) at time of transplant: No  Present EBV Serostatus: Positive    # Hypertension: Controlled;  Goal BP: < 150/90   - Changes: Not at this time    # Anemia in Chronic Renal Disease: Hgb: Stable, low      SHALOM: unknown   - Iron studies: Not checked recently    # Mineral Bone Disorder:    - Secondary renal hyperparathyroidism; PTH level: not checked recently, but Markedly elevated (>1201 pg/ml)  in April 2024      On  treatment: Cinacalcet  - Vitamin D; level: Normal        On supplement: Yes  - Calcium; level: Normal        On supplement: No  - Phosphorus; level: High        On supplement: No. Would hold on binder for now, but continue to monitor    # Electrolytes:  - Potassium; level: Normal        On supplement: No  - Magnesium; level: Normal        On supplement: No  - Bicarbonate; level: Normal        On supplement: No  - Sodium; level: Normal    # Other Significant PMH:   - Obesity s/p gastric sleeve 1/23/24: on semaglutide    - History of Illicit Drug use: remote LSD /cocaine use in her 20s, + marijuana.    - Positive Quant Gold: s/p treatment by ID    # Transplant History:  Etiology of Kidney Failure: Unknown etiology  Tx: DDKT  Transplant: 5/17/2025 (Kidney)  Significant transplant-related complications: None    Recommendations were communicated to the primary team verbally.    Seen and discussed with Dr. Jayy Laws, [facility]  Transplant Nephrology    Interval History  Ms. Spears's creatinine is 3.76 (05/20 0552); Trend down.  Great urine output.  Other significant labs/tests/vitals: VSS  Patient ambulating, will have home PT. She feels ready to have catheter and central line out.   no events overnight.  no chest pain or shortness of breath.  no leg swelling.  no nausea and vomiting.  Bowel movements are not yet.  no fever, sweats or chills.     Review of Systems   4 point ROS was obtained and negative except as noted in the Interval History.    MEDICATIONS:  Current Facility-Administered Medications   Medication Dose Route Frequency Provider Last Rate Last Admin    acetaminophen (TYLENOL) tablet 975 mg  975 mg Oral Q8H Manjeet Barreto MD   975 mg at 05/20/25 0313    atorvastatin (LIPITOR) tablet 10 mg  10 mg Oral Daily Manjeet Barreto MD   10 mg at 05/19/25 0757    famotidine (PEPCID) tablet 10 mg  10 mg Oral Daily Manjeet Barreto MD        magnesium  oxide (MAG-OX) tablet 400 mg  400 mg Oral Daily with lunch Manjeet Barreto MD   400 mg at 25 1129    mycophenolate (GENERIC EQUIVALENT) capsule 750 mg  750 mg Oral BID IS Manjeet Barreto MD   750 mg at 25 1808    pentamidine (NEBUPENT) neb solution 300 mg  300 mg Inhalation Once Manjeet Barreto MD        Followed by    [START ON 2025] pentamidine (NEBUPENT) neb solution 300 mg  300 mg Inhalation Once Manjeet Barreto MD        polyethylene glycol (MIRALAX) Packet 17 g  17 g Oral Daily Manjeet Barreto MD   17 g at 25 0757    ramelteon (ROZEREM) tablet 8 mg  8 mg Oral At Bedtime Juan Becerra MD   8 mg at 25 2125    senna-docusate (SENOKOT-S/PERICOLACE) 8.6-50 MG per tablet 1 tablet  1 tablet Oral BID Manjeet Barreto MD   1 tablet at 25 1932    sodium chloride (PF) 0.9% PF flush 10 mL  10 mL Intracatheter Q8H Manjeet Barreto MD   10 mL at 25 0811    tacrolimus (GENERIC EQUIVALENT) capsule 2.5 mg  2.5 mg Oral BID IS Manjeet Barreto MD   2.5 mg at 25 1808    valGANciclovir (VALCYTE) tablet 450 mg  450 mg Oral Once per day on  Manjeet Barreto MD   450 mg at 25 0810     Current Facility-Administered Medications   Medication Dose Route Frequency Provider Last Rate Last Admin    heparin infusion 25,000 units in 0.45% NaCl 250 mL ANTICOAGULANT  400 Units/hr Intravenous Continuous Anitha Ceja PA-C 4 mL/hr at 25 400 Units/hr at 25       Physical Exam   Temp  Av.2  F (36.8  C)  Min: 97.8  F (36.6  C)  Max: 98.5  F (36.9  C)      Pulse  Av.3  Min: 98  Max: 124 Resp  Av.6  Min: 10  Max: 19  SpO2  Av.6 %  Min: 86 %  Max: 100 %    CVP (mmHg): 7 mmHgBP 137/89 (BP Location: Right arm)   Pulse 99   Temp 98.4  F (36.9  C) (Oral)   Resp 18   Ht 1.727 m  "(5' 8\")   Wt 71.4 kg (157 lb 8 oz)   SpO2 96%   BMI 23.95 kg/m     Date 05/17/25 0700 - 05/18/25 0659   Shift 8760-3821 3613-8855 1500-4998 24 Hour Total   INTAKE   I.V. 1300   1300   Shift Total(mL/kg) 1300(18.28)   1300(18.28)   OUTPUT   Urine 347   347   Blood 350   350   Shift Total(mL/kg) 697(9.8)   697(9.8)   Weight (kg) 71.1 71.1 71.1 71.1      Admit Weight: 71.1 kg (156 lb 12 oz)     GENERAL APPEARANCE: alert and no distress  HENT: mouth without ulcers or lesions  RESP: lungs clear to auscultation - no rales, rhonchi or wheezes  CV: regular rhythm, normal rate, no rub, no murmur  EDEMA: no LE edema bilaterally  ABDOMEN: soft, nondistended, nontender, bowel sounds normal  MS: extremities normal - no gross deformities noted, no evidence of inflammation in joints, no muscle tenderness  SKIN: no rash  NEURO: RLE with decreased sensation anterior thigh and decreased strength 4/5  DIALYSIS: LUE AV Fistula   KIDNEY: minimal TTP    Data   All labs reviewed by me.  CMP  Recent Labs   Lab 05/20/25  0552 05/19/25  0518 05/18/25  1556 05/18/25  0956 05/18/25  0650 05/18/25  0638 05/18/25  0636 05/18/25  0220 05/17/25  1250 05/17/25  1204 05/17/25  0246 05/17/25  0203    139  --   --  138  --   --  136   < > 135  --  137   POTASSIUM 3.7 3.9 3.8 3.7 4.2  --    < > 4.4  4.4   < > 5.7*  --  4.9   CHLORIDE 105 101  --   --  100  --   --  98  --  97*  --  98   CO2 26 24  --   --  23  --   --  22  --  17*  --  22   ANIONGAP 11 14  --   --  15  --   --  16*  --  21*  --  17*   * 118*  --   --  96 93  --  105*   < > 188*   < > 78   BUN 36.5* 38.8*  --   --  42.9*  --   --  43.1*  --  45.2*  --  43.8*   CR 3.76* 5.46*  --   --  7.43*  --   --  7.87*  --  9.37*  --  9.43*   GFRESTIMATED 15* 10*  --   --  7*  --   --  6*  --  5*  --  5*   KARTIK 10.3 10.0  --   --  9.4  --   --  9.5  --  9.3  --  9.7   MAG 2.1 1.9  --   --   --   --   --  2.0  --  2.2  --   --    PHOS 4.3 7.1*  --   --   --   --   --  8.7*  --  7.3*  " --   --    PROTTOTAL  --   --   --   --   --   --   --   --   --   --   --  7.1   ALBUMIN  --   --   --   --   --   --   --   --   --   --   --  4.1   BILITOTAL  --   --   --   --   --   --   --   --   --   --   --  0.4   ALKPHOS  --   --   --   --   --   --   --   --   --   --   --  75   AST  --   --   --   --   --   --   --   --   --   --   --  17   ALT  --   --   --   --   --   --   --   --   --   --   --  33    < > = values in this interval not displayed.     CBC  Recent Labs   Lab 05/20/25  0553 05/19/25  0518 05/18/25  1556 05/18/25  0956 05/18/25  0650 05/18/25  0636 05/18/25  0220   HGB 8.9* 9.5* 10.4* 10.7* 10.3*   < > 10.2*  10.2*   WBC 3.2* 4.9  --   --  8.0  --  8.2   RBC 2.82* 3.00*  --   --  3.20*  --  3.22*   HCT 27.5* 28.8*  --   --  30.9*  --  30.3*   MCV 98 96 96 96 97   < > 94  94   MCH 31.6 31.7  --   --  32.2  --  31.7   MCHC 32.4 33.0  --   --  33.3  --  33.7   RDW 13.0 13.2  --   --  13.2  --  13.1    151  --   --  171  --  179    < > = values in this interval not displayed.     INR  Recent Labs   Lab 05/17/25  0203   INR 0.98   PTT 29     ABG  Recent Labs   Lab 05/17/25  1523   O2PER 21.0      Urine Studies  Recent Labs   Lab Test 05/17/25  0225 05/20/24  1339   COLOR Colorless Light Yellow   APPEARANCE Clear Clear   URINEGLC Negative 200*   URINEBILI Negative Negative   URINEKETONE Negative Negative   SG 1.015 1.010   UBLD Negative Moderate*   URINEPH 6.0 7.5*   PROTEIN Negative 30*   NITRITE Negative Negative   LEUKEST Negative Negative   RBCU 4* 3*   WBCU 5 3     No lab results found.  PTH  No lab results found.  Iron Studies  No lab results found.    IMAGING:  All imaging studies reviewed by me.

## 2025-05-20 NOTE — PROGRESS NOTES
DISCHARGE:  Patient with orders to discharge to home.     Education Provided:   Med Card updated  Lab Book updated  Handouts given for RANDALL drain   Specialty Pharmacy met goal   LDAs removed by previous nurse     Discharge instructions, medications & follow ups reviewed with patient. Copy of discharge summary given to patient. PIV removed. Previous nurse called [facility] and report given nurse.    Patient in stable condition. AVSS. Patient had no further questions regarding discharge instructions and medications. Patient transferred out by wheelchair & left with boyfriend.  Plan for tomorrow outpatient lab.

## 2025-05-20 NOTE — PLAN OF CARE
Physical Therapy Discharge Summary    Reason for therapy discharge:    Discharged to home with outpatient therapy.    Progress towards therapy goal(s). See goals on Care Plan in [facility] electronic health record for goal details.  Goals partially met.  Barriers to achieving goals:   discharge from facility.    Therapy recommendation(s):    Continued therapy is recommended.  Rationale/Recommendations:  OP PT to maximize functional IND and safety.

## 2025-05-20 NOTE — PLAN OF CARE
"Goal Outcome Evaluation:      Plan of Care Reviewed With: patient    Overall Patient Progress: improving    Outcome Evaluation: VSS, pain well managed    /89 (BP Location: Right arm)   Pulse 99   Temp 98.4  F (36.9  C) (Oral)   Resp 18   Ht 1.727 m (5' 8\")   Wt 71.4 kg (157 lb 8 oz)   SpO2 96%   BMI 23.95 kg/m      Shift: 9542-9942   Isolation Status: n/a  VS: Stable on RA, afebrile  Neuro: Aox4  Behaviors: Calm and cooperative  Labs: pending AM lab draw  Respiratory: WDL  Cardiac: WDL  Pain/Nausea: Complaints of abdominal/incisional pain, denies nausea  PRN: Robaxin x1, Oxycodone x1, Albuterol inhaler x1  Diet: Regular diet  IV Access: R IJ  Infusion(s): Heparin SR @ 400 units/hr  Lines/Drains: R RANDALL with 50 ml of serosang output  GI/: Hu in place with good UOP. No BM since prior to surgery, passing gas  Skin: RLQ incision stapled FROYLAN  Mobility: Stand-by assist, complaints RLE numbness   Events/Education: Med card and lab book up to date  Plan: Continue with POC and notify team with any changes        "

## 2025-05-20 NOTE — PLAN OF CARE
Patient's Name: Glenna Spears    Procedure Date: 05/20/25  Procedure Time 12:30 PM    Procedure Performed: Central line removal     The Central Venous Catheter (CVC) was removed per provider order.     The central venous catheter was located: Right Internal Jugular vein, with a total of 3   lumens.     The patient was placed in Supine position with Insertion site lower than heart.     Valsalva response achieved by: Patient instructed to take a deep breath and bear down while the CVC was removed with a constant steady motion.     Firm pressure was applied at the access site for   10 minutes until bleeding stopped.     Post removal site assessment; Clean and dry without bleeding. Occlusive dressing applied: Yes    CVC tip was inspected and intact: Yes    Tip was sent to lab for culture per provider order: No    Patient tolerated the procedure: well    2nd RN present during removal (if applicable)   TREVA Madison RN   5/20/2025   12:50 PM

## 2025-05-21 ENCOUNTER — INFUSION THERAPY VISIT (OUTPATIENT)
Dept: INFUSION THERAPY | Facility: CLINIC | Age: 35
End: 2025-05-21
Attending: NURSE PRACTITIONER
Payer: COMMERCIAL

## 2025-05-21 ENCOUNTER — RESULTS FOLLOW-UP (OUTPATIENT)
Dept: TRANSPLANT | Facility: CLINIC | Age: 35
End: 2025-05-21

## 2025-05-21 ENCOUNTER — ANCILLARY PROCEDURE (OUTPATIENT)
Dept: ULTRASOUND IMAGING | Facility: CLINIC | Age: 35
End: 2025-05-21
Attending: NURSE PRACTITIONER
Payer: COMMERCIAL

## 2025-05-21 ENCOUNTER — MEDICAL CORRESPONDENCE (OUTPATIENT)
Dept: HEALTH INFORMATION MANAGEMENT | Facility: CLINIC | Age: 35
End: 2025-05-21

## 2025-05-21 ENCOUNTER — LAB (OUTPATIENT)
Dept: LAB | Facility: CLINIC | Age: 35
End: 2025-05-21
Payer: COMMERCIAL

## 2025-05-21 ENCOUNTER — TELEPHONE (OUTPATIENT)
Dept: PHARMACY | Facility: CLINIC | Age: 35
End: 2025-05-21
Payer: COMMERCIAL

## 2025-05-21 ENCOUNTER — TELEPHONE (OUTPATIENT)
Dept: TRANSPLANT | Facility: CLINIC | Age: 35
End: 2025-05-21

## 2025-05-21 VITALS
TEMPERATURE: 98.4 F | OXYGEN SATURATION: 97 % | WEIGHT: 158.8 LBS | HEART RATE: 87 BPM | BODY MASS INDEX: 24.15 KG/M2 | SYSTOLIC BLOOD PRESSURE: 148 MMHG | DIASTOLIC BLOOD PRESSURE: 82 MMHG

## 2025-05-21 DIAGNOSIS — Z94.0 KIDNEY REPLACED BY TRANSPLANT: ICD-10-CM

## 2025-05-21 DIAGNOSIS — Z94.0 KIDNEY REPLACED BY TRANSPLANT: Primary | ICD-10-CM

## 2025-05-21 DIAGNOSIS — Z94.0 KIDNEY REPLACED BY TRANSPLANT: Chronic | ICD-10-CM

## 2025-05-21 LAB
ALBUMIN UR-MCNC: 50 MG/DL
ANION GAP SERPL CALCULATED.3IONS-SCNC: 10 MMOL/L (ref 7–15)
APPEARANCE UR: ABNORMAL
BACTERIA #/AREA URNS HPF: ABNORMAL /HPF
BASOPHILS # BLD AUTO: 0 10E3/UL (ref 0–0.2)
BASOPHILS NFR BLD AUTO: 0 %
BILIRUB UR QL STRIP: NEGATIVE
BUN SERPL-MCNC: 31.8 MG/DL (ref 6–20)
CALCIUM SERPL-MCNC: 10.4 MG/DL (ref 8.8–10.4)
CHLORIDE SERPL-SCNC: 105 MMOL/L (ref 98–107)
COLOR UR AUTO: YELLOW
CREAT SERPL-MCNC: 2.82 MG/DL (ref 0.51–0.95)
EGFRCR SERPLBLD CKD-EPI 2021: 22 ML/MIN/1.73M2
EOSINOPHIL # BLD AUTO: 0 10E3/UL (ref 0–0.7)
EOSINOPHIL NFR BLD AUTO: 1 %
ERYTHROCYTE [DISTWIDTH] IN BLOOD BY AUTOMATED COUNT: 13 % (ref 10–15)
GLUCOSE SERPL-MCNC: 115 MG/DL (ref 70–99)
GLUCOSE UR STRIP-MCNC: NEGATIVE MG/DL
HCO3 SERPL-SCNC: 26 MMOL/L (ref 22–29)
HCT VFR BLD AUTO: 30.6 % (ref 35–47)
HGB BLD-MCNC: 9.9 G/DL (ref 11.7–15.7)
HGB UR QL STRIP: ABNORMAL
IMM GRANULOCYTES # BLD: 0 10E3/UL
IMM GRANULOCYTES NFR BLD: 1 %
KETONES UR STRIP-MCNC: NEGATIVE MG/DL
LEUKOCYTE ESTERASE UR QL STRIP: ABNORMAL
LYMPHOCYTES # BLD AUTO: 0.2 10E3/UL (ref 0.8–5.3)
LYMPHOCYTES NFR BLD AUTO: 4 %
MAGNESIUM SERPL-MCNC: 2.4 MG/DL (ref 1.7–2.3)
MCH RBC QN AUTO: 31.7 PG (ref 26.5–33)
MCHC RBC AUTO-ENTMCNC: 32.4 G/DL (ref 31.5–36.5)
MCV RBC AUTO: 98 FL (ref 78–100)
MONOCYTES # BLD AUTO: 0.3 10E3/UL (ref 0–1.3)
MONOCYTES NFR BLD AUTO: 8 %
MUCOUS THREADS #/AREA URNS LPF: PRESENT /LPF
NEUTROPHILS # BLD AUTO: 3.7 10E3/UL (ref 1.6–8.3)
NEUTROPHILS NFR BLD AUTO: 87 %
NITRATE UR QL: POSITIVE
NRBC # BLD AUTO: 0 10E3/UL
NRBC BLD AUTO-RTO: 0 /100
PH UR STRIP: 5.5 [PH] (ref 5–7)
PHOSPHATE SERPL-MCNC: 2.6 MG/DL (ref 2.5–4.5)
PLATELET # BLD AUTO: 168 10E3/UL (ref 150–450)
POTASSIUM SERPL-SCNC: 3.7 MMOL/L (ref 3.4–5.3)
RBC # BLD AUTO: 3.12 10E6/UL (ref 3.8–5.2)
RBC URINE: 97 /HPF
SODIUM SERPL-SCNC: 141 MMOL/L (ref 135–145)
SP GR UR STRIP: 1.02 (ref 1–1.03)
SQUAMOUS EPITHELIAL: 3 /HPF
TACROLIMUS BLD-MCNC: 5.4 UG/L (ref 5–15)
TME LAST DOSE: NORMAL H
TME LAST DOSE: NORMAL H
UROBILINOGEN UR STRIP-MCNC: NORMAL MG/DL
WBC # BLD AUTO: 4.3 10E3/UL (ref 4–11)
WBC CLUMPS #/AREA URNS HPF: PRESENT /HPF
WBC URINE: 78 /HPF

## 2025-05-21 PROCEDURE — 80048 BASIC METABOLIC PNL TOTAL CA: CPT | Performed by: PATHOLOGY

## 2025-05-21 PROCEDURE — 36415 COLL VENOUS BLD VENIPUNCTURE: CPT | Performed by: PATHOLOGY

## 2025-05-21 PROCEDURE — 84100 ASSAY OF PHOSPHORUS: CPT | Performed by: PATHOLOGY

## 2025-05-21 PROCEDURE — 85025 COMPLETE CBC W/AUTO DIFF WBC: CPT | Performed by: PATHOLOGY

## 2025-05-21 PROCEDURE — 81003 URINALYSIS AUTO W/O SCOPE: CPT

## 2025-05-21 PROCEDURE — 80197 ASSAY OF TACROLIMUS: CPT | Performed by: NURSE PRACTITIONER

## 2025-05-21 PROCEDURE — 99213 OFFICE O/P EST LOW 20 MIN: CPT | Mod: 24 | Performed by: NURSE PRACTITIONER

## 2025-05-21 PROCEDURE — 99000 SPECIMEN HANDLING OFFICE-LAB: CPT | Performed by: PATHOLOGY

## 2025-05-21 PROCEDURE — 87186 SC STD MICRODIL/AGAR DIL: CPT

## 2025-05-21 PROCEDURE — 83735 ASSAY OF MAGNESIUM: CPT | Performed by: PATHOLOGY

## 2025-05-21 PROCEDURE — 93971 EXTREMITY STUDY: CPT | Mod: RT | Performed by: STUDENT IN AN ORGANIZED HEALTH CARE EDUCATION/TRAINING PROGRAM

## 2025-05-21 RX ORDER — TACROLIMUS 5 MG/1
5 CAPSULE ORAL 2 TIMES DAILY
Qty: 60 CAPSULE | Refills: 11 | Status: SHIPPED | OUTPATIENT
Start: 2025-05-21 | End: 2025-05-22

## 2025-05-21 RX ORDER — TACROLIMUS 1 MG/1
1 CAPSULE ORAL 2 TIMES DAILY
Qty: 240 CAPSULE | Refills: 11 | Status: SHIPPED | OUTPATIENT
Start: 2025-05-21 | End: 2025-05-22

## 2025-05-21 NOTE — PROGRESS NOTES
Transplant Surgery Progress Note    Transplants:  2025 (Kidney); Postoperative day:  4  S: Glenna Spears is an 34 year old female with history of ESRD of unknown etiology, anemia, obesity s/p gastric sleeve in , and latent TB. S/p DCD  donor kidney transplant w/ ureteral stent on 25.     -Pentamidine given , no Bactrim d/t sulfa allergy, G6PD WNL. Consider switch to dapsone as outpatient.     Vomited once last night in car but feeling better. Has zofran PRN.   Pain manageable.   Around 50ml out of RANDALL overnight.   Appetite is fair and fluid intake could be better.   Hu removed yesterday - slight dysuria at end of stream with voiding. No F/C.   No CP or SOB.     R leg numbness ongoing since sx. Will do outpatient PT. Feels more swollen today. No calf pain.   Transplant History:    Transplant Type:  DDKT  Donor Type: Donation after Circulatory Death   Transplant Date:  2025 (Kidney)   Ureteral Stent:  Yes   Crossmatch:  negative   DSA at Tx:  No  Baseline Cr: TBD   DeNovo DSA: No    Acute Rejection Hx:  No    Present Maintenance Immunosuppression:  Tacrolimus and Mycophenolate mofetil    CMV IgG Ab Discordance:  No  EBV IgG Ab Discordance:  No    BK Viremia:  No  EBV Viremia:  No    Transplant Coordinator: Tammi Cuellar     Transplant Office Phone Number: [phone]     Immunosuppressant Medications       Immunosuppressive Agents Disp Start End     mycophenolate (GENERIC EQUIVALENT) 250 MG capsule 180 capsule 2025 --    Sig - Route: Take 3 capsules (750 mg) by mouth 2 times daily. - Oral    Class: E-Prescribe     tacrolimus (GENERIC EQUIVALENT) 0.5 MG capsule 60 capsule 2025 --    Sig - Route: Take 1 capsule (0.5 mg) by mouth 2 times daily. To have available for dose adjustments per transplant team. Discharge dose = 4 mg BID. - Oral    Class: E-Prescribe     tacrolimus (GENERIC EQUIVALENT) 1 MG capsule 240 capsule 2025 --    Sig - Route: Take 4 capsules (4 mg) by mouth  2 times daily. - Oral    Class: E-Prescribe            Possible Immunosuppression-related side effects:   []             headache  []             vivid dreams  []             irritability  []             cognitive difficuties  []             fine tremor  []             nausea  []             diarrhea  []             neuropathy      []             edema  []             renal calcineurin toxicity  []             hyperkalemia  []             post-transplant diabetes  []             decreased appetite  []             increased appetite  []             other:  []             none    Prescription Medications as of 2025         Rx Number Disp Refills Start End Last Dispensed Date Next Fill Date Owning Pharmacy    acetaminophen (TYLENOL) 325 MG tablet  -- --  --       Sig: Take 325-650 mg by mouth every 4 hours as needed    Class: Historical    Route: Oral    aspirin (ASA) 325 MG EC tablet  30 tablet 2 2025 --   [address]    Sig: Take 1 tablet (325 mg) by mouth daily.    Class: E-Prescribe    Route: Oral    atorvastatin (LIPITOR) 10 MG tablet  30 tablet 2 2025 --   [address]    Sig: Take 1 tablet (10 mg) by mouth daily.    Class: E-Prescribe    Route: Oral    famotidine (PEPCID) 20 MG tablet  -- --  --       Sig: Take 20 mg by mouth 2 times daily as needed.    Class: Historical    Route: Oral    levonorgestrel (KYLEENA) 19.5 MG IUD  -- -- 3/31/2022 3/30/2027       Si Intra Uterine Device by Intrauterine route once    Class: Historical    Route: Intrauterine    magnesium oxide (MAG-OX) 400 MG tablet  30 tablet 2 2025 --   [address]    Sig: Take 1 tablet (400 mg) by mouth daily (with lunch).    Class: E-Prescribe    Route: Oral    methocarbamol (ROBAXIN) 500 MG tablet  20 tablet 0 2025 --   [facility]  [address]    Sig: Take 1 tablet (500 mg) by mouth every 6 hours as needed for muscle spasms.    Class: E-Prescribe    Route: Oral    mycophenolate (GENERIC EQUIVALENT) 250 MG capsule  180 capsule 11 5/20/2025 --   [address]    Sig: Take 3 capsules (750 mg) by mouth 2 times daily.    Class: E-Prescribe    Route: Oral    nortriptyline (PAMELOR) 25 MG capsule  -- -- 12/30/2024 --       Sig: Take 25 mg by mouth at bedtime.    Class: Historical    Route: Oral    ondansetron (ZOFRAN ODT) 4 MG ODT tab  20 tablet 0 5/20/2025 --   [address]    Sig: Take 1 tablet (4 mg) by mouth every 8 hours as needed for nausea.    Class: E-Prescribe    Route: Oral    oxyCODONE (ROXICODONE) 5 MG tablet  20 tablet 0 5/20/2025 --   [address]    Sig: Take 1-2 tablets (5-10 mg) by mouth every 6 hours as needed for moderate to severe pain.    Class: E-Prescribe    Earliest Fill Date: 5/20/2025    Route: Oral    polyethylene glycol (MIRALAX) 17 GM/Dose powder  510 g 0 5/20/2025 --   [address]    Sig: Take 17 g by mouth daily as needed for constipation.    Class: E-Prescribe    Route: Oral    Semaglutide-Weight Management (WEGOVY) 2.4 MG/0.75ML pen  -- -- 7/17/2025 --       Sig: Inject 2.4 mg subcutaneously once a week. HOLD x 3 months    Class: No Print Out    Route: Subcutaneous    senna-docusate (SENOKOT-S/PERICOLACE) 8.6-50 MG tablet  60 tablet 0 5/20/2025 --   [address]    Sig: Take 1 tablet by mouth 2 times daily.    Class: E-Prescribe    Route: Oral    tacrolimus (GENERIC EQUIVALENT) 0.5 MG capsule  60 capsule 11 5/20/2025 --   [address]     "Sig: Take 1 capsule (0.5 mg) by mouth 2 times daily. To have available for dose adjustments per transplant team. Discharge dose = 4 mg BID.    Class: E-Prescribe    Route: Oral    tacrolimus (GENERIC EQUIVALENT) 1 MG capsule  240 capsule 11 5/20/2025 --   [facility] - [place], [place] - [address] [place] [address]    Sig: Take 4 capsules (4 mg) by mouth 2 times daily.    Class: E-Prescribe    Route: Oral    valGANciclovir (VALCYTE) 450 MG tablet  60 tablet 2 5/21/2025 --   [facility] - [address]    Sig: Take 1 tablet (450 mg) by mouth every other day.    Class: E-Prescribe    Route: Oral    varenicline (CHANTIX SANCHEZ) 0.5 MG X 11 & 1 MG X 42 tablet  -- -- 1/13/2025 --       Sig: Take 0.5 mg by mouth daily.    Class: Historical    Route: Oral    VENTOLIN  (90 Base) MCG/ACT inhaler  -- --  --       Sig: INHALE 2 PUFFS BY MOUTH EVERY 4 HOURS AS NEEDED FOR SHORTNESS OF BREATH OR COUGH OR EXERCISE    Class: Historical          Hospital Medications as of 5/21/2025         Dose Frequency Start End    pentamidine (NEBUPENT) neb solution 300 mg (Completed) 300 mg ONCE RT 5/20/2025 5/20/2025    Admin Instructions: POD #3.  Administer in private room only.    Only if not candidate for dapsone  Refer to Pentamidine Aerosolized policy. RT to administer. Contact provider if albuterol bronchodilator is not ordered to be given 15 minutes prior to pentamidine.    Class: E-Prescribe    Route: Inhalation    Linked Group 1: Placed in \"Followed by\" Linked Group                O:   Temp:  [98.2  F (36.8  C)-98.5  F (36.9  C)] 98.4  F (36.9  C)  Pulse:  [] 120  Resp:  [16] 16  BP: (119-155)/(70-90) 119/70  SpO2:  [91 %-97 %] 97 %  General Appearance: in no apparent distress.   Skin: Normal, no rashes or jaundice  Heart: perfused. Tachycardic with standing.   Lungs: easy respirations, no audible wheezing.  Abdomen: flat, The wound is dry and intact, without hernia. The " abdomen is non-tender. The kidney graft is not tender.  There is no ascites.  Extremities: Tremor absent.   Edema: trace edema in RLE.  No palpable cord or mass.         Latest Ref Rng & Units 5/21/2025     7:35 AM 5/20/2025     5:53 AM 5/20/2025     5:52 AM 5/19/2025     5:18 AM 5/18/2025     6:50 AM   Transplant Immunosuppression Labs   Creat 0.51 - 0.95 mg/dL 2.82   3.76  5.46  7.43    Urea Nitrogen 6.0 - 20.0 mg/dL 31.8   36.5  38.8  42.9    WBC 4.0 - 11.0 10e3/uL 4.3  3.2   4.9  8.0    Neutrophil % 87  86   90     ANEU 1.6 - 8.3 10e3/uL 3.7  2.7   4.4         Chemistries:   Recent Labs   Lab Test 05/21/25  0735   BUN 31.8*   CR 2.82*   GFRESTIMATED 22*   *     Lab Results   Component Value Date    A1C 4.9 05/17/2025     Recent Labs   Lab Test 05/17/25  0203   ALBUMIN 4.1   BILITOTAL 0.4   ALKPHOS 75   AST 17   ALT 33     Urine Studies:  Recent Labs   Lab Test 05/17/25  0225   COLOR Colorless   APPEARANCE Clear   URINEGLC Negative   URINEBILI Negative   URINEKETONE Negative   SG 1.015   UBLD Negative   URINEPH 6.0   PROTEIN Negative   NITRITE Negative   LEUKEST Negative   RBCU 4*   WBCU 5     No lab results found.  Hematology:   Recent Labs   Lab Test 05/21/25  0735 05/20/25  0553 05/19/25  0518   HGB 9.9* 8.9* 9.5*    151 151   WBC 4.3 3.2* 4.9     Coags:   Recent Labs   Lab Test 05/17/25  0203 05/20/24  1343   INR 0.98 1.12     HLA antibodies:   SA1 HI RISK SADIQ   Date Value Ref Range Status   05/17/2025 B:42 44 45 67 76 82  Final     SA1 MOD RISK SADIQ   Date Value Ref Range Status   05/17/2025 A:2B:7 27 54 55 56 73 81  Final     SA2 HI RISK SADIQ   Date Value Ref Range Status   05/17/2025 None  Final     SA2 MOD RISK SADIQ   Date Value Ref Range Status   05/17/2025 None  Final       Assessment: Glenna Spears is doing fairly well s/p DDKT:  Issues we addressed during her visit include:    Plan:    1. Graft function: Cr down trending   2. Immunosuppression Management: No change   .  Complexity of  management:Medium.  Contributing factors: DDKT,  3. Push oral fluids today.  UA/Cx for dysuria.   R leg venous duplex today to R/O DVT   Hold Mag ox dose today.      Followup: [facility] tomorrow     Total Time: 15 min,   Counselling Time: 5 min.    Nataliya Barragan NP

## 2025-05-21 NOTE — TELEPHONE ENCOUNTER
Glenna Spears is a post-kidney transplant patient who discharged on 5/20/2025. Patient chooses to receive medications from [facility]. The patient will be responsible for managing medications.    SOT*COURT (GLENNA) IS A (KIDNEY) TRANSPLANT PATIENT  (DATE OF TRANSPLANT: (DATE: 5/17/2025) )     HEALTH BENEFIT: ([facility]) PREPAID MEDICAL ASSISTANCE   ID# [id number] GRP# [id number] (EFFECTIVE (DATE: 1/1/2024) )   (MEDICAID ID# [id number])     PHARMACY BENEFIT: ([facility] PMAP)  PROCESSING INFO: ID#[id number] GRP#[id number] PCN#[id number] BIN# [id number] (EFFECTIVE (DATE: 1/1/2024) )   DEDUCTIBLE ($0) & MAX OUT-OF-POCKET ($0)   COPAY STRUCTURE:  $ (0) FOR GENERIC  $ (0) FOR BRAND  (PA NEEDED) FOR NON-FORMULARY MEDICATIONS      TEST CLAIM SPECIALTY #28  MYCOPHENOLATE 250MG (#240/30DS)=$0   PROGRAF 1MG (#120/30DS)= PA NEEDED  TACROLIMUS 1MG (#120/30DS)= $0  CYCLOSPORINE 100MG (#60/30DS)=$0  VALGANCICLOVIR 450MG (#60/30DS)=$0  VALACYCLOVIR 1GM (#90/30DS)=$0    TEST CLAIM DISCHARGE #27 (21 YEARS AND OLDER):  MYCOPHENOLATE 250MG (#240/30DS)=$0   PROGRAF 1MG (#120/30DS)= PA NEEDED  TACROLIMUS 1MG (#120/30DS)= $0  CYCLOSPORINE 100MG (#60/30DS)=$0  VALGANCICLOVIR 450MG (#60/30DS)=$0  VALACYCLOVIR 1GM (#90/30DS)=$0      **CAN PATIENT FILL AT [facility] FOR MEDICATIONS LISTED? YES    PHARMACY TEST CLAIMS ARE ESTIMATES AND MAY NOT REFLECT THE FINAL COST. ACTUAL COSTS CAN VARY BASED ON ADDITIONAL MEDICATIONS FILLED AND THE PATIENT S PROGRESS TOWARD MEETING THEIR DEDUCTIBLE AND OUT-OF-POCKET MAXIMUM.    Martinez Valdivia, [facility]

## 2025-05-21 NOTE — PATIENT INSTRUCTIONS
Dear Glenna Spears    Thank you for choosing [facility] ([facility]) for your infusion.  The following information is a summary of our appointment as well as important reminders.      -Contact care coordinator for any concerns related to post transplant care.  -To come back to [facility] tomorrow for follow up at 8:00 am, with lab appointment prior at 07:15 am. Advised to hold off taking anti rejection medications prior to lab draw.    Contact Information:    Transplant Coordinator: Tammi Cuellar RN (Kid Coord)   Transplant Office:  [phone]  [facility]:  [phone]  Ask for physician on call for kidney transplant.  Unit [address] (Transplant Unit):  [phone]  [facility]:  [phone]  [facility]:  [phone]     If you are a transplant patient and require transplant education, please click on this link: [email or web address].    If you have any questions on your upcoming Specialty Infusion appointments, please call scheduling at [phone].  It was a pleasure taking care of you today.    Sincerely,    [facility]  [address] [place]  [address]  Phone:  [phone]

## 2025-05-21 NOTE — PROGRESS NOTES
"Glenna Spears came to [facility] today for a lab and assess following a kidney  transplant on 5/17/25.      Discharge date: 5/20/25  Transplant coordinator: Tammi Cuellar RN (Kid Coord)   Phone number patient can be reached at: [phone] (Home Phone)        Physical Assessment:  See physical assessment located under \"Document Flowsheets\".  Incision site: Right lower abdomen. [place], CDI. RANDALL drain (40 ml reddish fluid drained at 12:00 noon yesterday, 25 ml last midnight, 24 ml at 08:25 am today). Advised to continue monitoring and record the drain output.  Lines: HD Fistula left upper arm  Hu: Folley Cath removed yesterday per pt.  Urine clarity: A little reddish still at times per pt. Complained of  pain and burning at the need of urination. Catheter removed yesterday. Notified CHAPARRO An and ordered ua and urine culture test.  Hydration: Pt still not use to drinking more fluids. Encouraged to drink at least 2-3 Liters a day. Patient also reported that she has some palpitation/shakiness. CHAPARRO An notified.   Nutrition: Less appetite. Had 3 bites of Welsh and Croissant for breakfast. Had a 2 bites of burger before leaving the hospital around 7 pm last night. Patient reported that she vomited around 8:30 pm last night, not a lot mostly spit. CHAPARRO An notified.  Last BM: Around 7 pm. Diarrheal stool and went around 3-4 times. Per patient they gave her some laxative at the hospital yesterday so she can defecate before they discharge her. CHAPARRO An aware.  Pain: 7in then incision site and 3-4 generalized when she came. Has home prescribed pain meds. Patient also reported that she has numbness on the incision site and numbness and tingling on the Right leg and knee tito when walking. CHAPARRO An aware. Requested for an ultrasound of the right leg today.  My transplant place videos watched: Yes. Patient has access to it.    Labs drawn by [facility] staff No  Vascular access: N/A    Plan of care for today: "   -Patient assessed, V/S recorded.  -Recent copy of labs given to patient.  -Patient assessment and V/S reviewed with CHAPARRO An.  -Med card verified for accuracy and med box filled today at bed side with the help of the patient. Patient verbalized understanding on how to update the med card and med box. Patient prefers to take the Aspirin at noon along with the Magnesium and the Varenicline (Chantix Dudley) around 4-6 pm. CHAPARRO An agreed.  -Patient seen and assessed by CHAPARRO An with orders:     * To hold off taking the Magnesium oxide dose for today until further notice tomorrow after the labs. CHAPARRO An to update patient after the labs tomorrow. ---Patient aware.     *To collect and send sample for urine and urine culture.--done     *Ultrasound for Right lower extremity.--done this morning.  -Advised to call care coordinator Tammi Cuellar RN (Kid Coord)  for any concerns related to care and follow up.  -Advised to come back to [facility] tomorrow at 8:00 am, with lab appointment prior at 07:15 am. Advised to hold off taking anti rejection medications prior to lab draw.      Medication changes:     - To hold off taking the Magnesium oxide dose today until further notice after her labs tomorrow.    Medications administered:    -Self home administered medication.    Patient education:    The following teaching topics were addressed: Importance of drinking 2L of non-caffeinated fluids daily, Incisional care, Signs/symptoms of infection, Good handwashing, Medications (purposes, doses and times of administration), Phone numbers to call with concenrs (Transplant coordinator, Unit 6 and [facility]), 7A discharge check list, Plan of care, and Drain care   Patient verbalized understanding and all questions answered.    Drug level:  Patient took 4 mg of Tacrolimus last evening at 8 pm.  Care coordinator to follow up with the result.    Face to face time: 90 minutes    Discharge Plan    Pt will follow up with to  [facility] tomorrow with lab appointment prior.  Discharge instructions reviewed with patient: YES  Patient/Representative verbalized understanding, all questions answered: YES    Discharged from unit at 10:05 am  with whom: Boyfriend. They will go to first floor for ultrasound appointment.    BP (!) 148/82 (BP Location: Right arm, Patient Position: Sitting, Cuff Size: Adult Regular)   Pulse 87   Temp 98.4  F (36.9  C) (Oral)   Wt 72 kg (158 lb 12.8 oz)   SpO2 97%   BMI 24.15 kg/m       Amrita Samano, RN, RN

## 2025-05-21 NOTE — TELEPHONE ENCOUNTER
General  Route to LPN    Reason for call: Pt said she had a urine test done today and she thinks she as a UTI     Call back needed? Yes    Return Call Needed  Same as documented in contacts section  When to return call?: Same day: Route High Priority

## 2025-05-21 NOTE — LETTER
2025      Glenna Spears  [address]  [place] [address]      Dear Colleague,    Thank you for referring your patient, Glenna Spears, to the [facility]. Please see a copy of my visit note below.    Transplant Surgery Progress Note    Transplants:  2025 (Kidney); Postoperative day:  4  S: Glenna Spears is an 34 year old female with history of ESRD of unknown etiology, anemia, obesity s/p gastric sleeve in , and latent TB. S/p DCD  donor kidney transplant w/ ureteral stent on 25.     -Pentamidine given , no Bactrim d/t sulfa allergy, G6PD WNL. Consider switch to dapsone as outpatient.     Vomited once last night in car but feeling better. Has zofran PRN.   Pain manageable.   Around 50ml out of RANDALL overnight.   Appetite is fair and fluid intake could be better.   Hu removed yesterday - slight dysuria at end of stream with voiding. No F/C.   No CP or SOB.     R leg numbness ongoing since sx. Will do outpatient PT. Feels more swollen today. No calf pain.   Transplant History:    Transplant Type:  DDKT  Donor Type: Donation after Circulatory Death   Transplant Date:  2025 (Kidney)   Ureteral Stent:  Yes   Crossmatch:  negative   DSA at Tx:  No  Baseline Cr: TBD   DeNovo DSA: No    Acute Rejection Hx:  No    Present Maintenance Immunosuppression:  Tacrolimus and Mycophenolate mofetil    CMV IgG Ab Discordance:  No  EBV IgG Ab Discordance:  No    BK Viremia:  No  EBV Viremia:  No    Transplant Coordinator: Tammi Cuellar     Transplant Office Phone Number: [phone]     Immunosuppressant Medications       Immunosuppressive Agents Disp Start End     mycophenolate (GENERIC EQUIVALENT) 250 MG capsule 180 capsule 2025 --    Sig - Route: Take 3 capsules (750 mg) by mouth 2 times daily. - Oral    Class: E-Prescribe     tacrolimus (GENERIC EQUIVALENT) 0.5 MG capsule 60 capsule 2025 --    Sig - Route: Take 1 capsule (0.5 mg) by  mouth 2 times daily. To have available for dose adjustments per transplant team. Discharge dose = 4 mg BID. - Oral    Class: E-Prescribe     tacrolimus (GENERIC EQUIVALENT) 1 MG capsule 240 capsule 2025 --    Sig - Route: Take 4 capsules (4 mg) by mouth 2 times daily. - Oral    Class: E-Prescribe            Possible Immunosuppression-related side effects:   []             headache  []             vivid dreams  []             irritability  []             cognitive difficuties  []             fine tremor  []             nausea  []             diarrhea  []             neuropathy      []             edema  []             renal calcineurin toxicity  []             hyperkalemia  []             post-transplant diabetes  []             decreased appetite  []             increased appetite  []             other:  []             none    Prescription Medications as of 2025         Rx Number Disp Refills Start End Last Dispensed Date Next Fill Date Owning Pharmacy    acetaminophen (TYLENOL) 325 MG tablet  -- --  --       Sig: Take 325-650 mg by mouth every 4 hours as needed    Class: Historical    Route: Oral    aspirin (ASA) 325 MG EC tablet  30 tablet 2 2025 --   [address]    Sig: Take 1 tablet (325 mg) by mouth daily.    Class: E-Prescribe    Route: Oral    atorvastatin (LIPITOR) 10 MG tablet  30 tablet 2 2025 --   [address]    Sig: Take 1 tablet (10 mg) by mouth daily.    Class: E-Prescribe    Route: Oral    famotidine (PEPCID) 20 MG tablet  -- --  --       Sig: Take 20 mg by mouth 2 times daily as needed.    Class: Historical    Route: Oral    levonorgestrel (KYLEENA) 19.5 MG IUD  -- -- 3/31/2022 3/30/2027       Si Intra Uterine Device by Intrauterine route once    Class: Historical    Route: Intrauterine    magnesium oxide (MAG-OX) 400 MG tablet  30 tablet 2 2025 --    [address]    Sig: Take 1 tablet (400 mg) by mouth daily (with lunch).    Class: E-Prescribe    Route: Oral    methocarbamol (ROBAXIN) 500 MG tablet  20 tablet 0 5/20/2025 --   [address]    Sig: Take 1 tablet (500 mg) by mouth every 6 hours as needed for muscle spasms.    Class: E-Prescribe    Route: Oral    mycophenolate (GENERIC EQUIVALENT) 250 MG capsule  180 capsule 11 5/20/2025 --   [address]    Sig: Take 3 capsules (750 mg) by mouth 2 times daily.    Class: E-Prescribe    Route: Oral    nortriptyline (PAMELOR) 25 MG capsule  -- -- 12/30/2024 --       Sig: Take 25 mg by mouth at bedtime.    Class: Historical    Route: Oral    ondansetron (ZOFRAN ODT) 4 MG ODT tab  20 tablet 0 5/20/2025 --   [address]    Sig: Take 1 tablet (4 mg) by mouth every 8 hours as needed for nausea.    Class: E-Prescribe    Route: Oral    oxyCODONE (ROXICODONE) 5 MG tablet  20 tablet 0 5/20/2025 --   [address]    Sig: Take 1-2 tablets (5-10 mg) by mouth every 6 hours as needed for moderate to severe pain.    Class: E-Prescribe    Earliest Fill Date: 5/20/2025    Route: Oral    polyethylene glycol (MIRALAX) 17 GM/Dose powder  510 g 0 5/20/2025 --   [address]    Sig: Take 17 g by mouth daily as needed for constipation.    Class: E-Prescribe    Route: Oral    Semaglutide-Weight Management (WEGOVY) 2.4 MG/0.75ML pen  -- -- 7/17/2025 --       Sig: Inject 2.4 mg subcutaneously once a week. HOLD x 3 months    Class: No Print Out    Route: Subcutaneous    senna-docusate (SENOKOT-S/PERICOLACE) 8.6-50 MG tablet  60 tablet 0 5/20/2025 --   [facility], [place]  "- [address] [place] [address]    Sig: Take 1 tablet by mouth 2 times daily.    Class: E-Prescribe    Route: Oral    tacrolimus (GENERIC EQUIVALENT) 0.5 MG capsule  60 capsule 11 5/20/2025 --   [address]    Sig: Take 1 capsule (0.5 mg) by mouth 2 times daily. To have available for dose adjustments per transplant team. Discharge dose = 4 mg BID.    Class: E-Prescribe    Route: Oral    tacrolimus (GENERIC EQUIVALENT) 1 MG capsule  240 capsule 11 5/20/2025 --   [facility] - [address]    Sig: Take 4 capsules (4 mg) by mouth 2 times daily.    Class: E-Prescribe    Route: Oral    valGANciclovir (VALCYTE) 450 MG tablet  60 tablet 2 5/21/2025 --   [place], [place] - [address] [place] [address]    Sig: Take 1 tablet (450 mg) by mouth every other day.    Class: E-Prescribe    Route: Oral    varenicline (CHANTIX SANCHEZ) 0.5 MG X 11 & 1 MG X 42 tablet  -- -- 1/13/2025 --       Sig: Take 0.5 mg by mouth daily.    Class: Historical    Route: Oral    VENTOLIN  (90 Base) MCG/ACT inhaler  -- --  --       Sig: INHALE 2 PUFFS BY MOUTH EVERY 4 HOURS AS NEEDED FOR SHORTNESS OF BREATH OR COUGH OR EXERCISE    Class: Historical          Hospital Medications as of 5/21/2025         Dose Frequency Start End    pentamidine (NEBUPENT) neb solution 300 mg (Completed) 300 mg ONCE RT 5/20/2025 5/20/2025    Admin Instructions: POD #3.  Administer in private room only.    Only if not candidate for dapsone  Refer to Pentamidine Aerosolized policy. RT to administer. Contact provider if albuterol bronchodilator is not ordered to be given 15 minutes prior to pentamidine.    Class: E-Prescribe    Route: Inhalation    Linked Group 1: Placed in \"Followed by\" Linked Group                O:   Temp:  [98.2  F (36.8  C)-98.5  F (36.9  C)] 98.4  F (36.9  C)  Pulse:  [] 120  Resp:  [16] 16  BP: (119-155)/(70-90) 119/70  SpO2:  [91 " %-97 %] 97 %  General Appearance: in no apparent distress.   Skin: Normal, no rashes or jaundice  Heart: perfused. Tachycardic with standing.   Lungs: easy respirations, no audible wheezing.  Abdomen: flat, The wound is dry and intact, without hernia. The abdomen is non-tender. The kidney graft is not tender.  There is no ascites.  Extremities: Tremor absent.   Edema: trace edema in RLE.  No palpable cord or mass.         Latest Ref Rng & Units 5/21/2025     7:35 AM 5/20/2025     5:53 AM 5/20/2025     5:52 AM 5/19/2025     5:18 AM 5/18/2025     6:50 AM   Transplant Immunosuppression Labs   Creat 0.51 - 0.95 mg/dL 2.82   3.76  5.46  7.43    Urea Nitrogen 6.0 - 20.0 mg/dL 31.8   36.5  38.8  42.9    WBC 4.0 - 11.0 10e3/uL 4.3  3.2   4.9  8.0    Neutrophil % 87  86   90     ANEU 1.6 - 8.3 10e3/uL 3.7  2.7   4.4         Chemistries:   Recent Labs   Lab Test 05/21/25  0735   BUN 31.8*   CR 2.82*   GFRESTIMATED 22*   *     Lab Results   Component Value Date    A1C 4.9 05/17/2025     Recent Labs   Lab Test 05/17/25  0203   ALBUMIN 4.1   BILITOTAL 0.4   ALKPHOS 75   AST 17   ALT 33     Urine Studies:  Recent Labs   Lab Test 05/17/25  0225   COLOR Colorless   APPEARANCE Clear   URINEGLC Negative   URINEBILI Negative   URINEKETONE Negative   SG 1.015   UBLD Negative   URINEPH 6.0   PROTEIN Negative   NITRITE Negative   LEUKEST Negative   RBCU 4*   WBCU 5     No lab results found.  Hematology:   Recent Labs   Lab Test 05/21/25  0735 05/20/25  0553 05/19/25  0518   HGB 9.9* 8.9* 9.5*    151 151   WBC 4.3 3.2* 4.9     Coags:   Recent Labs   Lab Test 05/17/25  0203 05/20/24  1343   INR 0.98 1.12     HLA antibodies:   SA1 HI RISK SADIQ   Date Value Ref Range Status   05/17/2025 B:42 44 45 67 76 82  Final     SA1 MOD RISK SADIQ   Date Value Ref Range Status   05/17/2025 A:2B:7 27 54 55 56 73 81  Final     SA2 HI RISK SADIQ   Date Value Ref Range Status   05/17/2025 None  Final     SA2 MOD RISK SADIQ   Date Value Ref Range  Status   05/17/2025 None  Final       Assessment: Glenna Spears is doing fairly well s/p DDKT:  Issues we addressed during her visit include:    Plan:    1. Graft function: Cr down trending   2. Immunosuppression Management: No change   .  Complexity of management:Medium.  Contributing factors: DDKT,  3. Push oral fluids today.  UA/Cx for dysuria.   R leg venous duplex today to R/O DVT   Hold Mag ox dose today.      Followup: [facility] tomorrow     Total Time: 15 min,   Counselling Time: 5 min.    Nataliya Barragan NP      Again, thank you for allowing me to participate in the care of your patient.        Sincerely,        RAFAEL Cordova CNP    Electronically signed

## 2025-05-22 ENCOUNTER — RESULTS FOLLOW-UP (OUTPATIENT)
Dept: TRANSPLANT | Facility: CLINIC | Age: 35
End: 2025-05-22

## 2025-05-22 ENCOUNTER — OFFICE VISIT (OUTPATIENT)
Dept: INFUSION THERAPY | Facility: CLINIC | Age: 35
End: 2025-05-22
Attending: NURSE PRACTITIONER
Payer: COMMERCIAL

## 2025-05-22 ENCOUNTER — LAB (OUTPATIENT)
Dept: LAB | Facility: CLINIC | Age: 35
End: 2025-05-22
Payer: COMMERCIAL

## 2025-05-22 ENCOUNTER — TELEPHONE (OUTPATIENT)
Dept: TRANSPLANT | Facility: CLINIC | Age: 35
End: 2025-05-22
Payer: COMMERCIAL

## 2025-05-22 VITALS
DIASTOLIC BLOOD PRESSURE: 77 MMHG | TEMPERATURE: 98.3 F | OXYGEN SATURATION: 100 % | RESPIRATION RATE: 16 BRPM | HEART RATE: 94 BPM | SYSTOLIC BLOOD PRESSURE: 136 MMHG | WEIGHT: 159.8 LBS | BODY MASS INDEX: 24.3 KG/M2

## 2025-05-22 DIAGNOSIS — G89.18 ACUTE POST-OPERATIVE PAIN: ICD-10-CM

## 2025-05-22 DIAGNOSIS — Z94.0 KIDNEY REPLACED BY TRANSPLANT: Chronic | ICD-10-CM

## 2025-05-22 DIAGNOSIS — Z94.0 KIDNEY REPLACED BY TRANSPLANT: Primary | ICD-10-CM

## 2025-05-22 DIAGNOSIS — Z76.82 ORGAN TRANSPLANT CANDIDATE: ICD-10-CM

## 2025-05-22 DIAGNOSIS — R76.12 POSITIVE QUANTIFERON-TB GOLD TEST: ICD-10-CM

## 2025-05-22 DIAGNOSIS — I15.1 HYPERTENSION SECONDARY TO OTHER RENAL DISORDERS: ICD-10-CM

## 2025-05-22 DIAGNOSIS — Z94.0 KIDNEY REPLACED BY TRANSPLANT: ICD-10-CM

## 2025-05-22 DIAGNOSIS — Z48.298 AFTERCARE FOLLOWING ORGAN TRANSPLANT: ICD-10-CM

## 2025-05-22 DIAGNOSIS — Z79.899 ENCOUNTER FOR LONG-TERM CURRENT USE OF MEDICATION: ICD-10-CM

## 2025-05-22 DIAGNOSIS — Z20.828 CONTACT WITH AND (SUSPECTED) EXPOSURE TO OTHER VIRAL COMMUNICABLE DISEASES: ICD-10-CM

## 2025-05-22 DIAGNOSIS — Z76.82 AWAITING ORGAN TRANSPLANT: ICD-10-CM

## 2025-05-22 DIAGNOSIS — Z98.84 S/P LAPAROSCOPIC SLEEVE GASTRECTOMY: ICD-10-CM

## 2025-05-22 DIAGNOSIS — E66.813 CLASS 3 SEVERE OBESITY WITH SERIOUS COMORBIDITY AND BODY MASS INDEX (BMI) OF 45.0 TO 49.9 IN ADULT, UNSPECIFIED OBESITY TYPE (H): ICD-10-CM

## 2025-05-22 DIAGNOSIS — Z98.890 OTHER SPECIFIED POSTPROCEDURAL STATES: ICD-10-CM

## 2025-05-22 DIAGNOSIS — N30.00 ACUTE CYSTITIS WITHOUT HEMATURIA: Primary | ICD-10-CM

## 2025-05-22 DIAGNOSIS — N18.6 END STAGE RENAL DISEASE (H): ICD-10-CM

## 2025-05-22 LAB
ALBUMIN SERPL BCG-MCNC: 3.8 G/DL (ref 3.5–5.2)
ALP SERPL-CCNC: 56 U/L (ref 40–150)
ALT SERPL W P-5'-P-CCNC: 22 U/L (ref 0–50)
ANION GAP SERPL CALCULATED.3IONS-SCNC: 10 MMOL/L (ref 7–15)
AST SERPL W P-5'-P-CCNC: 18 U/L (ref 0–45)
BACTERIA UR CULT: ABNORMAL
BASOPHILS # BLD AUTO: 0 10E3/UL (ref 0–0.2)
BASOPHILS NFR BLD AUTO: 0 %
BILIRUB SERPL-MCNC: 0.5 MG/DL
BILIRUBIN DIRECT (ROCHE PRO & PURE): 0.21 MG/DL (ref 0–0.45)
BK VIRUS IGG ANTIBODY: ABNORMAL TITER
BUN SERPL-MCNC: 28.7 MG/DL (ref 6–20)
CALCIUM SERPL-MCNC: 10.1 MG/DL (ref 8.8–10.4)
CALCIUM SERPL-MCNC: 10.1 MG/DL (ref 8.8–10.4)
CHLORIDE SERPL-SCNC: 104 MMOL/L (ref 98–107)
CREAT SERPL-MCNC: 2.49 MG/DL (ref 0.51–0.95)
EGFRCR SERPLBLD CKD-EPI 2021: 25 ML/MIN/1.73M2
EOSINOPHIL # BLD AUTO: 0 10E3/UL (ref 0–0.7)
EOSINOPHIL NFR BLD AUTO: 1 %
ERYTHROCYTE [DISTWIDTH] IN BLOOD BY AUTOMATED COUNT: 13 % (ref 10–15)
ERYTHROCYTE [DISTWIDTH] IN BLOOD BY AUTOMATED COUNT: 13 % (ref 10–15)
EST. AVERAGE GLUCOSE BLD GHB EST-MCNC: 94 MG/DL
FERRITIN SERPL-MCNC: 1258 NG/ML (ref 6–175)
GLUCOSE SERPL-MCNC: 91 MG/DL (ref 70–99)
HBA1C MFR BLD: 4.9 %
HCO3 SERPL-SCNC: 27 MMOL/L (ref 22–29)
HCT VFR BLD AUTO: 30.7 % (ref 35–47)
HCT VFR BLD AUTO: 30.7 % (ref 35–47)
HGB BLD-MCNC: 9.8 G/DL (ref 11.7–15.7)
HGB BLD-MCNC: 9.8 G/DL (ref 11.7–15.7)
HOLD SPECIMEN: NORMAL
IMM GRANULOCYTES # BLD: 0 10E3/UL
IMM GRANULOCYTES NFR BLD: 1 %
IRON BINDING CAPACITY (ROCHE): 163 UG/DL (ref 240–430)
IRON SATN MFR SERPL: 39 % (ref 15–46)
IRON SERPL-MCNC: 63 UG/DL (ref 37–145)
LYMPHOCYTES # BLD AUTO: 0.1 10E3/UL (ref 0.8–5.3)
LYMPHOCYTES NFR BLD AUTO: 2 %
MAGNESIUM SERPL-MCNC: 2.3 MG/DL (ref 1.7–2.3)
MCH RBC QN AUTO: 31 PG (ref 26.5–33)
MCH RBC QN AUTO: 31 PG (ref 26.5–33)
MCHC RBC AUTO-ENTMCNC: 31.9 G/DL (ref 31.5–36.5)
MCHC RBC AUTO-ENTMCNC: 31.9 G/DL (ref 31.5–36.5)
MCV RBC AUTO: 97 FL (ref 78–100)
MCV RBC AUTO: 97 FL (ref 78–100)
MONOCYTES # BLD AUTO: 0.5 10E3/UL (ref 0–1.3)
MONOCYTES NFR BLD AUTO: 8 %
NEUTROPHILS # BLD AUTO: 5 10E3/UL (ref 1.6–8.3)
NEUTROPHILS NFR BLD AUTO: 89 %
NRBC # BLD AUTO: 0 10E3/UL
NRBC BLD AUTO-RTO: 0 /100
PHOSPHATE SERPL-MCNC: 2.3 MG/DL (ref 2.5–4.5)
PLATELET # BLD AUTO: 199 10E3/UL (ref 150–450)
PLATELET # BLD AUTO: 199 10E3/UL (ref 150–450)
POTASSIUM SERPL-SCNC: 3.6 MMOL/L (ref 3.4–5.3)
PROT SERPL-MCNC: 6.6 G/DL (ref 6.4–8.3)
PTH-INTACT SERPL-MCNC: 304 PG/ML (ref 15–65)
RBC # BLD AUTO: 3.16 10E6/UL (ref 3.8–5.2)
RBC # BLD AUTO: 3.16 10E6/UL (ref 3.8–5.2)
SODIUM SERPL-SCNC: 141 MMOL/L (ref 135–145)
TACROLIMUS BLD-MCNC: 12.3 UG/L (ref 5–15)
TME LAST DOSE: NORMAL H
TME LAST DOSE: NORMAL H
VIT B12 SERPL-MCNC: 331 PG/ML (ref 232–1245)
VIT D+METAB SERPL-MCNC: 48 NG/ML (ref 20–50)
WBC # BLD AUTO: 5.6 10E3/UL (ref 4–11)
WBC # BLD AUTO: 5.6 10E3/UL (ref 4–11)

## 2025-05-22 PROCEDURE — 82607 VITAMIN B-12: CPT

## 2025-05-22 PROCEDURE — 83036 HEMOGLOBIN GLYCOSYLATED A1C: CPT

## 2025-05-22 PROCEDURE — 86147 CARDIOLIPIN ANTIBODY EA IG: CPT | Performed by: NURSE PRACTITIONER

## 2025-05-22 PROCEDURE — 86832 HLA CLASS I HIGH DEFIN QUAL: CPT | Performed by: SURGERY

## 2025-05-22 PROCEDURE — 86828 HLA CLASS I&II ANTIBODY QUAL: CPT | Performed by: SURGERY

## 2025-05-22 PROCEDURE — 82306 VITAMIN D 25 HYDROXY: CPT

## 2025-05-22 PROCEDURE — 86833 HLA CLASS II HIGH DEFIN QUAL: CPT | Performed by: SURGERY

## 2025-05-22 PROCEDURE — 80197 ASSAY OF TACROLIMUS: CPT | Performed by: NURSE PRACTITIONER

## 2025-05-22 RX ORDER — CEFPODOXIME PROXETIL 100 MG/1
100 TABLET, FILM COATED ORAL 2 TIMES DAILY
Qty: 14 TABLET | Refills: 0 | Status: SHIPPED | OUTPATIENT
Start: 2025-05-22 | End: 2025-05-22 | Stop reason: ALTCHOICE

## 2025-05-22 RX ORDER — CIPROFLOXACIN 500 MG/1
500 TABLET, FILM COATED ORAL 2 TIMES DAILY
Qty: 14 TABLET | Refills: 0 | Status: SHIPPED | OUTPATIENT
Start: 2025-05-22 | End: 2025-05-28

## 2025-05-22 RX ORDER — TACROLIMUS 1 MG/1
CAPSULE ORAL
Qty: 240 CAPSULE | Refills: 11 | Status: SHIPPED | OUTPATIENT
Start: 2025-05-22

## 2025-05-22 RX ORDER — TACROLIMUS 5 MG/1
5 CAPSULE ORAL 2 TIMES DAILY
Qty: 60 CAPSULE | Refills: 11 | Status: SHIPPED | OUTPATIENT
Start: 2025-05-22

## 2025-05-22 NOTE — Clinical Note
2025      Glenna Spears  [address]  [place] [address]      Dear Colleague,    Thank you for referring your patient, Glenna Spears, to the [facility]. Please see a copy of my visit note below.    Transplant Surgery Progress Note    Transplants:  2025 (Kidney); Postoperative day:  5  S: Glenna Spears is an 34 year old female with history of ESRD of unknown etiology, anemia, obesity s/p gastric sleeve in , and latent TB. S/p DCD  donor kidney transplant w/ ureteral stent on 25.     -Pentamidine given , no Bactrim d/t sulfa allergy, G6PD WNL. Consider switch to dapsone as outpatient.     Urine culture + for E.coi       -: increased drainage drainage. Changed pad 2 times today - mostly pink colored drainage. Leaking noted from one spot on incision but no major obvious opening     Needs oxy refill.    Transplant History:    Transplant Type:  DDKT  Donor Type: Donation after Circulatory Death   Transplant Date:  2025 (Kidney)   Ureteral Stent:  Yes   Crossmatch:  negative   DSA at Tx:  No  Baseline Cr: TBD   DeNovo DSA: No    Acute Rejection Hx:  No    Present Maintenance Immunosuppression:  Tacrolimus and Mycophenolate mofetil    CMV IgG Ab Discordance:  No  EBV IgG Ab Discordance:  No    BK Viremia:  No  EBV Viremia:  No    Transplant Coordinator: Tammi Cuellar     Transplant Office Phone Number: [phone]     Immunosuppressant Medications       Immunosuppressive Agents Disp Start End     mycophenolate (GENERIC EQUIVALENT) 250 MG capsule 180 capsule 2025 --    Sig - Route: Take 3 capsules (750 mg) by mouth 2 times daily. - Oral    Class: E-Prescribe     tacrolimus (GENERIC EQUIVALENT) 0.5 MG capsule 60 capsule 2025 --    Sig - Route: Take 1 capsule (0.5 mg) by mouth 2 times daily. To have available for dose adjustments per transplant team. Discharge dose = 4 mg BID. - Oral    Class: E-Prescribe     tacrolimus  (GENERIC EQUIVALENT) 1 MG capsule 240 capsule 2025 --    Sig - Route: Take 4 capsules (4 mg) by mouth 2 times daily. - Oral    Class: E-Prescribe            Possible Immunosuppression-related side effects:   []             headache  []             vivid dreams  []             irritability  []             cognitive difficuties  []             fine tremor  []             nausea  []             diarrhea  []             neuropathy      []             edema  []             renal calcineurin toxicity  []             hyperkalemia  []             post-transplant diabetes  []             decreased appetite  []             increased appetite  []             other:  []             none    Prescription Medications as of 2025         Rx Number Disp Refills Start End Last Dispensed Date Next Fill Date Owning Pharmacy    acetaminophen (TYLENOL) 325 MG tablet  -- --  --       Sig: Take 325-650 mg by mouth every 4 hours as needed    Class: Historical    Route: Oral    aspirin (ASA) 325 MG EC tablet  30 tablet 2 2025 --   [address]    Sig: Take 1 tablet (325 mg) by mouth daily.    Class: E-Prescribe    Route: Oral    atorvastatin (LIPITOR) 10 MG tablet  30 tablet 2 2025 --   [address]    Sig: Take 1 tablet (10 mg) by mouth daily.    Class: E-Prescribe    Route: Oral    famotidine (PEPCID) 20 MG tablet  -- --  --       Sig: Take 20 mg by mouth 2 times daily as needed.    Class: Historical    Route: Oral    levonorgestrel (KYLEENA) 19.5 MG IUD  -- -- 3/31/2022 3/30/2027       Si Intra Uterine Device by Intrauterine route once    Class: Historical    Route: Intrauterine    magnesium oxide (MAG-OX) 400 MG tablet  30 tablet 2 2025 --   [address]    Sig: Take 1 tablet (400 mg) by mouth daily (with lunch).    Class: E-Prescribe     Route: Oral    methocarbamol (ROBAXIN) 500 MG tablet  20 tablet 0 5/20/2025 --   [address]    Sig: Take 1 tablet (500 mg) by mouth every 6 hours as needed for muscle spasms.    Class: E-Prescribe    Route: Oral    mycophenolate (GENERIC EQUIVALENT) 250 MG capsule  180 capsule 11 5/20/2025 --   [address]    Sig: Take 3 capsules (750 mg) by mouth 2 times daily.    Class: E-Prescribe    Route: Oral    nortriptyline (PAMELOR) 25 MG capsule  -- -- 12/30/2024 --       Sig: Take 25 mg by mouth at bedtime.    Class: Historical    Route: Oral    ondansetron (ZOFRAN ODT) 4 MG ODT tab  20 tablet 0 5/20/2025 --   [address]    Sig: Take 1 tablet (4 mg) by mouth every 8 hours as needed for nausea.    Class: E-Prescribe    Route: Oral    oxyCODONE (ROXICODONE) 5 MG tablet  20 tablet 0 5/20/2025 --   [address]    Sig: Take 1-2 tablets (5-10 mg) by mouth every 6 hours as needed for moderate to severe pain.    Class: E-Prescribe    Earliest Fill Date: 5/20/2025    Route: Oral    polyethylene glycol (MIRALAX) 17 GM/Dose powder  510 g 0 5/20/2025 --   [address]    Sig: Take 17 g by mouth daily as needed for constipation.    Class: E-Prescribe    Route: Oral    Semaglutide-Weight Management (WEGOVY) 2.4 MG/0.75ML pen  -- -- 7/17/2025 --       Sig: Inject 2.4 mg subcutaneously once a week. HOLD x 3 months    Class: No Print Out    Route: Subcutaneous    senna-docusate (SENOKOT-S/PERICOLACE) 8.6-50 MG tablet  60 tablet 0 5/20/2025 --   [address]    Sig: Take 1 tablet by mouth 2 times daily.    Class: E-Prescribe    Route: Oral    tacrolimus (GENERIC EQUIVALENT) 0.5 MG capsule  60  capsule 11 5/20/2025 --   [facility] - [place], [place] - [address] [facility]    Sig: Take 1 capsule (0.5 mg) by mouth 2 times daily. To have available for dose adjustments per transplant team. Discharge dose = 4 mg BID.    Class: E-Prescribe    Route: Oral    tacrolimus (GENERIC EQUIVALENT) 1 MG capsule  240 capsule 11 5/21/2025 --   [place], [place] - [address] [facility] [address]    Sig: Take 1 capsule (1 mg) by mouth 2 times daily. Total dose: 6 mg twice daily    Class: E-Prescribe    Notes to Pharmacy: TXP DT 5/17/2025 (Kidney) TXP Dischg DT 5/20/2025 DX Kidney replaced by transplant Z94.0 TX [facility] ([place], [place])    Route: Oral    tacrolimus (GENERIC EQUIVALENT) 5 MG capsule  60 capsule 11 5/21/2025 --   [place], [place] - [address] [facility] [address]    Sig: Take 1 capsule (5 mg) by mouth 2 times daily. Total dose: 6 mg twice daily    Class: E-Prescribe    Notes to Pharmacy: TXP DT 5/17/2025 (Kidney) TXP Dischg DT 5/20/2025 DX Kidney replaced by transplant Z94.0 TX [facility] ([place], [place])    Route: Oral    valGANciclovir (VALCYTE) 450 MG tablet  60 tablet 2 5/21/2025 --   [facility] - [place], [place] - [address] [facility]    Sig: Take 1 tablet (450 mg) by mouth every other day.    Class: E-Prescribe    Route: Oral    varenicline (CHANTIX SANCHEZ) 0.5 MG X 11 & 1 MG X 42 tablet  -- -- 1/13/2025 --       Sig: Take 0.5 mg by mouth daily.    Class: Historical    Route: Oral    VENTOLIN  (90 Base) MCG/ACT inhaler  -- --  --       Sig: INHALE 2 PUFFS BY MOUTH EVERY 4 HOURS AS NEEDED FOR SHORTNESS OF BREATH OR COUGH OR EXERCISE    Class: Historical            O:   Temp:  [98.3  F (36.8  C)] 98.3  F (36.8  C)  Pulse:  [87-96] 96  Resp:  [16] 16  BP: (135-148)/(82-93) 138/93  SpO2:  [100 %] 100 %  General  Appearance: in no apparent distress.   Skin: Normal, no rashes or jaundice  Heart: perfused. Tachycardic with standing.   Lungs: easy respirations, no audible wheezing.  Abdomen: flat, The wound is dry and intact, without hernia. The abdomen is non-tender. The kidney graft is not tender.  There is no ascites.  Extremities: Tremor absent.   Edema: trace edema in RLE.  No palpable cord or mass.         Latest Ref Rng & Units 5/22/2025     7:00 AM 5/21/2025     7:35 AM 5/20/2025     5:53 AM 5/20/2025     5:52 AM 5/19/2025     5:18 AM   Transplant Immunosuppression Labs   Creat 0.51 - 0.95 mg/dL 2.49  2.82   3.76  5.46    Urea Nitrogen 6.0 - 20.0 mg/dL 28.7  31.8   36.5  38.8    WBC 4.0 - 11.0 10e3/uL  4.0 - 11.0 10e3/uL 5.6     5.6  4.3  3.2   4.9    Neutrophil % 89  87  86   90    ANEU 1.6 - 8.3 10e3/uL 5.0  3.7  2.7   4.4        Chemistries:   Recent Labs   Lab Test 05/22/25  0700   BUN 28.7*   CR 2.49*   GFRESTIMATED 25*   GLC 91     Lab Results   Component Value Date    A1C 4.9 05/17/2025     Recent Labs   Lab Test 05/22/25  0700   ALBUMIN 3.8   BILITOTAL 0.5   ALKPHOS 56   AST 18   ALT 22     Urine Studies:  Recent Labs   Lab Test 05/21/25  0959   COLOR Yellow   APPEARANCE Slightly Cloudy*   URINEGLC Negative   URINEBILI Negative   URINEKETONE Negative   SG 1.017   UBLD Large*   URINEPH 5.5   PROTEIN 50*   NITRITE Positive*   LEUKEST Large*   RBCU 97*   WBCU 78*     No lab results found.  Hematology:   Recent Labs   Lab Test 05/22/25  0700 05/21/25  0735 05/20/25  0553   HGB 9.8*  9.8* 9.9* 8.9*     199 168 151   WBC 5.6  5.6 4.3 3.2*     Coags:   Recent Labs   Lab Test 05/17/25  0203 05/20/24  1343   INR 0.98 1.12     HLA antibodies:   SA1 HI RISK SADIQ   Date Value Ref Range Status   05/17/2025 B:42 44 45 67 76 82  Final     SA1 MOD RISK SADIQ   Date Value Ref Range Status   05/17/2025 A:2B:7 27 54 55 56 73 81  Final     SA2 HI RISK SADIQ   Date Value Ref Range Status   05/17/2025 None  Final     SA2 MOD  RISK SADIQ   Date Value Ref Range Status   05/17/2025 None  Final       Assessment: Glenna Spears is doing fairly well s/p DDKT:  Issues we addressed during her visit include:    Plan:    1. Graft function: Cr down trending   2. Immunosuppression Management: No change   .  Complexity of management:Medium.  Contributing factors: DDKT,  3. Push oral fluids today.  UA/Cx for dysuria.   R leg venous duplex today to R/O DVT   Hold Mag ox dose today.      Followup: [facility] tomorrow     Total Time: 15 min,   Counselling Time: 5 min.    Nataliya Barragan NP        Again, thank you for allowing me to participate in the care of your patient.        Sincerely,        RAFAEL Cordova CNP    Electronically signed

## 2025-05-22 NOTE — PROGRESS NOTES
Transplant Surgery Progress Note    Transplants:  2025 (Kidney); Postoperative day:  5  S: Glenna Spears is an 34 year old female with history of ESRD of unknown etiology, anemia, obesity s/p gastric sleeve in , and latent TB. S/p DCD  donor kidney transplant w/ ureteral stent on 25.     -Pentamidine given , no Bactrim d/t sulfa allergy, G6PD WNL. Consider switch to dapsone as outpatient.     Urine culture + for E.coi       -: increased drainage drainage. Changed pad 2 times today - mostly pink colored drainage. Leaking noted from one spot on incision but no major obvious opening     Needs oxy refill.    Transplant History:    Transplant Type:  DDKT  Donor Type: Donation after Circulatory Death   Transplant Date:  2025 (Kidney)   Ureteral Stent:  Yes   Crossmatch:  negative   DSA at Tx:  No  Baseline Cr: TBD   DeNovo DSA: No    Acute Rejection Hx:  No    Present Maintenance Immunosuppression:  Tacrolimus and Mycophenolate mofetil    CMV IgG Ab Discordance:  No  EBV IgG Ab Discordance:  No    BK Viremia:  No  EBV Viremia:  No    Transplant Coordinator: Tammi Cuellar     Transplant Office Phone Number: [phone]     Immunosuppressant Medications       Immunosuppressive Agents Disp Start End     mycophenolate (GENERIC EQUIVALENT) 250 MG capsule 180 capsule 2025 --    Sig - Route: Take 3 capsules (750 mg) by mouth 2 times daily. - Oral    Class: E-Prescribe     tacrolimus (GENERIC EQUIVALENT) 0.5 MG capsule 60 capsule 2025 --    Sig - Route: Take 1 capsule (0.5 mg) by mouth 2 times daily. To have available for dose adjustments per transplant team. Discharge dose = 4 mg BID. - Oral    Class: E-Prescribe     tacrolimus (GENERIC EQUIVALENT) 1 MG capsule 240 capsule 2025 --    Sig - Route: Take 4 capsules (4 mg) by mouth 2 times daily. - Oral    Class: E-Prescribe            Possible Immunosuppression-related side effects:   []             headache  []             vivid  dreams  []             irritability  []             cognitive difficuties  []             fine tremor  []             nausea  []             diarrhea  []             neuropathy      []             edema  []             renal calcineurin toxicity  []             hyperkalemia  []             post-transplant diabetes  []             decreased appetite  []             increased appetite  []             other:  []             none    Prescription Medications as of 2025         Rx Number Disp Refills Start End Last Dispensed Date Next Fill Date Owning Pharmacy    acetaminophen (TYLENOL) 325 MG tablet  -- --  --       Sig: Take 325-650 mg by mouth every 4 hours as needed    Class: Historical    Route: Oral    aspirin (ASA) 325 MG EC tablet  30 tablet 2 2025 --   [address]    Sig: Take 1 tablet (325 mg) by mouth daily.    Class: E-Prescribe    Route: Oral    atorvastatin (LIPITOR) 10 MG tablet  30 tablet 2 2025 --   [address]    Sig: Take 1 tablet (10 mg) by mouth daily.    Class: E-Prescribe    Route: Oral    famotidine (PEPCID) 20 MG tablet  -- --  --       Sig: Take 20 mg by mouth 2 times daily as needed.    Class: Historical    Route: Oral    levonorgestrel (KYLEENA) 19.5 MG IUD  -- -- 3/31/2022 3/30/2027       Si Intra Uterine Device by Intrauterine route once    Class: Historical    Route: Intrauterine    magnesium oxide (MAG-OX) 400 MG tablet  30 tablet 2 2025 --   [address]    Sig: Take 1 tablet (400 mg) by mouth daily (with lunch).    Class: E-Prescribe    Route: Oral    methocarbamol (ROBAXIN) 500 MG tablet  20 tablet 0 2025 --   [address]    Sig: Take 1 tablet (500 mg) by mouth every 6 hours as needed for muscle spasms.    Class: E-Prescribe     Route: Oral    mycophenolate (GENERIC EQUIVALENT) 250 MG capsule  180 capsule 11 5/20/2025 --   [address]    Sig: Take 3 capsules (750 mg) by mouth 2 times daily.    Class: E-Prescribe    Route: Oral    nortriptyline (PAMELOR) 25 MG capsule  -- -- 12/30/2024 --       Sig: Take 25 mg by mouth at bedtime.    Class: Historical    Route: Oral    ondansetron (ZOFRAN ODT) 4 MG ODT tab  20 tablet 0 5/20/2025 --   [address]    Sig: Take 1 tablet (4 mg) by mouth every 8 hours as needed for nausea.    Class: E-Prescribe    Route: Oral    oxyCODONE (ROXICODONE) 5 MG tablet  20 tablet 0 5/20/2025 --   [address]    Sig: Take 1-2 tablets (5-10 mg) by mouth every 6 hours as needed for moderate to severe pain.    Class: E-Prescribe    Earliest Fill Date: 5/20/2025    Route: Oral    polyethylene glycol (MIRALAX) 17 GM/Dose powder  510 g 0 5/20/2025 --   [address]    Sig: Take 17 g by mouth daily as needed for constipation.    Class: E-Prescribe    Route: Oral    Semaglutide-Weight Management (WEGOVY) 2.4 MG/0.75ML pen  -- -- 7/17/2025 --       Sig: Inject 2.4 mg subcutaneously once a week. HOLD x 3 months    Class: No Print Out    Route: Subcutaneous    senna-docusate (SENOKOT-S/PERICOLACE) 8.6-50 MG tablet  60 tablet 0 5/20/2025 --   [address]    Sig: Take 1 tablet by mouth 2 times daily.    Class: E-Prescribe    Route: Oral    tacrolimus (GENERIC EQUIVALENT) 0.5 MG capsule  60 capsule 11 5/20/2025 --   [address]    Sig: Take 1 capsule (0.5 mg) by mouth 2 times daily. To have available for dose adjustments per transplant team. Discharge dose = 4 mg BID.    Class:  E-Prescribe    Route: Oral    tacrolimus (GENERIC EQUIVALENT) 1 MG capsule  240 capsule 11 5/21/2025 --   [place], [place] - [address] [facility] [id number]    Sig: Take 1 capsule (1 mg) by mouth 2 times daily. Total dose: 6 mg twice daily    Class: E-Prescribe    Notes to Pharmacy: TXP DT 5/17/2025 (Kidney) TXP Dischg DT 5/20/2025 DX Kidney replaced by transplant Z94.0 TX [facility] ([place], [place])    Route: Oral    tacrolimus (GENERIC EQUIVALENT) 5 MG capsule  60 capsule 11 5/21/2025 --   [place], [place] - [address] [facility] [address]    Sig: Take 1 capsule (5 mg) by mouth 2 times daily. Total dose: 6 mg twice daily    Class: E-Prescribe    Notes to Pharmacy: TXP DT 5/17/2025 (Kidney) TXP Dischg DT 5/20/2025 DX Kidney replaced by transplant Z94.0 [facility] ([place], [place])    Route: Oral    valGANciclovir (VALCYTE) 450 MG tablet  60 tablet 2 5/21/2025 --   [place], [place] - [address] [facility]    Sig: Take 1 tablet (450 mg) by mouth every other day.    Class: E-Prescribe    Route: Oral    varenicline (CHANTIX SANCHEZ) 0.5 MG X 11 & 1 MG X 42 tablet  -- -- 1/13/2025 --       Sig: Take 0.5 mg by mouth daily.    Class: Historical    Route: Oral    VENTOLIN  (90 Base) MCG/ACT inhaler  -- --  --       Sig: INHALE 2 PUFFS BY MOUTH EVERY 4 HOURS AS NEEDED FOR SHORTNESS OF BREATH OR COUGH OR EXERCISE    Class: Historical            O:   Temp:  [98.3  F (36.8  C)] 98.3  F (36.8  C)  Pulse:  [87-96] 96  Resp:  [16] 16  BP: (135-148)/(82-93) 138/93  SpO2:  [100 %] 100 %  General Appearance: in no apparent distress.   Skin: Normal, no rashes or jaundice  Heart: perfused. Tachycardic with standing.   Lungs: easy respirations, no audible wheezing.  Abdomen: flat, The wound is dry and intact, without hernia. The abdomen is  non-tender. The kidney graft is not tender.  There is no ascites.  Extremities: Tremor absent.   Edema: trace edema in RLE.  No palpable cord or mass.         Latest Ref Rng & Units 5/22/2025     7:00 AM 5/21/2025     7:35 AM 5/20/2025     5:53 AM 5/20/2025     5:52 AM 5/19/2025     5:18 AM   Transplant Immunosuppression Labs   Creat 0.51 - 0.95 mg/dL 2.49  2.82   3.76  5.46    Urea Nitrogen 6.0 - 20.0 mg/dL 28.7  31.8   36.5  38.8    WBC 4.0 - 11.0 10e3/uL  4.0 - 11.0 10e3/uL 5.6     5.6  4.3  3.2   4.9    Neutrophil % 89  87  86   90    ANEU 1.6 - 8.3 10e3/uL 5.0  3.7  2.7   4.4        Chemistries:   Recent Labs   Lab Test 05/22/25  0700   BUN 28.7*   CR 2.49*   GFRESTIMATED 25*   GLC 91     Lab Results   Component Value Date    A1C 4.9 05/17/2025     Recent Labs   Lab Test 05/22/25  0700   ALBUMIN 3.8   BILITOTAL 0.5   ALKPHOS 56   AST 18   ALT 22     Urine Studies:  Recent Labs   Lab Test 05/21/25  0959   COLOR Yellow   APPEARANCE Slightly Cloudy*   URINEGLC Negative   URINEBILI Negative   URINEKETONE Negative   SG 1.017   UBLD Large*   URINEPH 5.5   PROTEIN 50*   NITRITE Positive*   LEUKEST Large*   RBCU 97*   WBCU 78*     No lab results found.  Hematology:   Recent Labs   Lab Test 05/22/25  0700 05/21/25  0735 05/20/25  0553   HGB 9.8*  9.8* 9.9* 8.9*     199 168 151   WBC 5.6  5.6 4.3 3.2*     Coags:   Recent Labs   Lab Test 05/17/25  0203 05/20/24  1343   INR 0.98 1.12     HLA antibodies:   SA1 HI RISK SADIQ   Date Value Ref Range Status   05/17/2025 B:42 44 45 67 76 82  Final     SA1 MOD RISK SADIQ   Date Value Ref Range Status   05/17/2025 A:2B:7 27 54 55 56 73 81  Final     SA2 HI RISK SADIQ   Date Value Ref Range Status   05/17/2025 None  Final     SA2 MOD RISK SADIQ   Date Value Ref Range Status   05/17/2025 None  Final       Assessment: Glenna Spears is doing fairly well s/p DDKT:  Issues we addressed during her visit include:    Plan:    1. Graft function: Cr down trending   2. Immunosuppression  Management: No change   .  Complexity of management:Medium.  Contributing factors: DDKT,  3. Push oral fluids today.  UA/Cx for dysuria.   R leg venous duplex today to R/O DVT   Hold Mag ox dose today.      Followup: [facility] tomorrow     Total Time: 15 min,   Counselling Time: 5 min.    Nataliya Barragan NP

## 2025-05-22 NOTE — TELEPHONE ENCOUNTER
Kidney and Pancreas Transplant Coordinator Transition to Outpatient Discharge Note    Surgeon (updated in Transplant Information tab): Estevan  Nephrologist (updated in Transplant Information tab): Ash  Transplant Coordinator: Tammi Cuellar RN    Lines / drains in place at time of discharge:  Stent: Yes.  Other: 5/28 early removal scheduled d/t UTI  RANDALL removed    Immunosuppression:   CNI: tac  Antimetabolite: MMF  Prednisone taper: No.    Induction: Thymoglobulin: 425 mg, 6 mg/kg     Prophylaxis:   CMV: D+/R+ : Valcyte 3 months; renal dosing  EBV: D+/R+    Antibiotic: pentamidine d/t sulfa allergy- will switch to dapsone outpt likely    High Risk DSA: No.    Pharmacy after discharge: [facility] mail order  Lab after discharge: [facility]  Lab letter faxed to outside lab, if needed: Yes.    Education:  Writer spoke with Glenna Spears - staying with family.   We discussed immunosuppression medications, indications, side effects, dose adjustments, and lab monitoring.   Lab draw schedule was provided via [employer]hart/mail to the patient and fully explained.    DSA  kits needed:  Yes.    If so, request submitted to centralized team for send-out.    Consent to Communicate on File: Yes.    If no, discussed inability for team to communicate with any person other than the patient regarding PHI, including scheduling.   If no, consent to communicate was offered: Yes.     Further education was provided on typical post transplant course, labs examined with thresholds, biopsy, infection prevention, office contact numbers, and when to call.     Discharge Transition Plan:   Pt will transition home, with assistance from family - currently staying local, lives in [facility]. Pt will have home care .   Pt states having working BP monitor, scale, and thermometer at home.    Follow Up:  Orders for post-transplant follow-up appointments placed: Yes.  Patient Status Checklist Task updated: Yes.    Special/Additional needs:  No.  Pt questions for follow up:     Tammi Cuellar RN  Post-Kidney Transplant Coordinator  (ph) [phone]

## 2025-05-22 NOTE — PATIENT INSTRUCTIONS
Dear Glenna Spears    Thank you for choosing [facility] ([facility] for your infusion.  The following information is a summary of our appointment as well as important reminders.          If you have any questions on your upcoming Specialty Infusion appointments, please call scheduling at [phone].  It was a pleasure taking care of you today.    Sincerely,    [facility]  [address] [place] [address]  [id number]  Phone:  [phone]

## 2025-05-22 NOTE — PROGRESS NOTES
"Glenna Spears came to [facility] today for a lab and assess following a Kidney transplant on 5/17/2025 .      Discharge date: 5/20/25  Transplant coordinator: Tammi Cuellar RN (Kid Coord)   Phone number patient can be reached at: [phone] (Home Phone)        Physical Assessment:  See physical assessment located under \"Document Flowsheets\".  Incision site: Right lower abdomen, Staple, CDI. RANDALL drain (drained 23 ml at 6 pm last night (slightly red). Today upon arrival at [facility] there was about 20 ml in the drain bag. RANDALL drain pulled out today by CHAPARRO An.  Lines: HD Fistula on left upper arm  Hu: No.  Urine clarity: Yellow and a little cloudy. Patient reported discomfort in the lower pelvic area even without peeing. Nataliya MAYFIELD, notified.  Hydration: Approximately 2 L. Reinforced to drink at least 2-3 Liters of fluids a day.  Nutrition: Per pt appetite is improving a little and had some rice and pork for dinner.   Last BM: No BM since after discharge from the hospital. Nataliya notified.  Pain: 8 in the incision site, 7 in the lower pelvic area, lower body area 5. Patient took oxycodone this morning.   My transplant place videos watched: Yes. Has access to it.    Labs drawn by [facility] staff No  Vascular access: N/A    Plan of care for today:     -Patient assessed, V/S recorded.  -Recent copy of labs given to patient.  -Patient assessment and V/S reviewed with CHAPARRO An. Nataliya instructed again the patient to hold off taking the Magnesium oxide today and wait for the labs and update tomorrow. Patient verbalized understanding of the plan.  -Incision dressing changed today. Given some dressing materials to use at home.  -Health education regarding post transplant care reinforced.  -Patient reported that she updated the med box and the med card with the new Tacrolimus dose.  -Instructed to call care coordinator for any concerns about care and follow up.  -Instructed to collect the new antibiotics prescription down " the pharmacy.  -In basket sent to  Tammi Cuellar RN ([facility])  to follow up and coordinate patient care.      Medication changes:     No changes. CHAPARRO An instructed again the patient to hold off taking the Magnesium oxide today and wait for the labs and update tomorrow. Patient verbalized understanding of the plan.    Medications administered:      Patient education:    The following teaching topics were addressed: Importance of drinking 2L of non-caffeinated fluids daily, Incisional care, Signs/symptoms of infection, Good handwashing, Medications (purposes, doses and times of administration), Phone numbers to call with concenrs (Transplant coordinator, Unit [facility]- and [facility]), 7A discharge check list, and Plan of care   Patient verbalized understanding and all questions answered.    Drug level:  Patient took 6 mg of Tacrolimus last evening at 8 pm.  Care coordinator to follow up with the result.    Face to face time: 90  Discharge Plan    Pt will follow up with Transplant care coordinator/Transplant team.  Discharge instructions reviewed with patient: YES  Patient/Representative verbalized understanding, all questions answered: NO    Discharged from unit at 0935 with whom: self to home.      /77 (BP Location: Right arm, Patient Position: Sitting, Cuff Size: Adult Regular)   Pulse 94   Temp 98.3  F (36.8  C) (Oral)   Resp 16   SpO2 100%      Amrita Samano, RN, RN

## 2025-05-23 ENCOUNTER — MEDICAL CORRESPONDENCE (OUTPATIENT)
Dept: HEALTH INFORMATION MANAGEMENT | Facility: CLINIC | Age: 35
End: 2025-05-23
Payer: COMMERCIAL

## 2025-05-23 LAB — BK VIRUS IGG ANTIBODY: ABNORMAL TITER

## 2025-05-23 RX ORDER — OXYCODONE HYDROCHLORIDE 5 MG/1
5-10 TABLET ORAL EVERY 8 HOURS PRN
Qty: 12 TABLET | Refills: 0 | Status: SHIPPED | OUTPATIENT
Start: 2025-05-23

## 2025-05-24 ENCOUNTER — TELEPHONE (OUTPATIENT)
Dept: TRANSPLANT | Facility: CLINIC | Age: 35
End: 2025-05-24
Payer: COMMERCIAL

## 2025-05-24 NOTE — TELEPHONE ENCOUNTER
Glenna paged on call coordinator. Drain out a few days ago, swelling right side below incision especially when standing. Feels like extra fluid below and to the right of incision. States she had weight loss surgery so has a lot of extra skin that hangs. Swollen area has slowly been getting larger.    Continues to have incisional pain but not worsened, no fever, little bit of redness on a couple spots of incision. Incision is no longer draining. On antibiotics for UTI. Voiding well.    Glenna sent photos of her abdomen, incision looks good. Right side of lower abdomen more swollen than left.     Discussed that it is likely some extra fluid that has built up/is building up. Advised her to try abdominal binder to help mobilize the fluid and not have the area pull on her incision. Advised her to page again with any increase in swelling or s/s of infection.

## 2025-05-25 LAB
ANNOTATION COMMENT IMP: NORMAL
B60: 1061
DONOR IDENTIFICATION: NORMAL
DSA COMMENTS: NORMAL
DSA PRESENT: YES
DSA TEST METHOD: NORMAL
INT SUB COMMENTS: NORMAL
INT SUB RESULT: NORMAL
INTERF SUBSTANCE: NORMAL
INTSUB TEST METHOD: NORMAL
ORGAN: NORMAL
RETINYL PALMITATE SERPL-MCNC: <0.02 MG/L
SA 1  COMMENTS: NORMAL
SA 1 CELL: NORMAL
SA 1 TEST METHOD: NORMAL
SA 2 CELL: NORMAL
SA 2 COMMENTS: NORMAL
SA 2 TEST METHOD: NORMAL
SA1 HI RISK ABY: NORMAL
SA1 MOD RISK ABY: NORMAL
SA2 HI RISK ABY: NORMAL
SA2 MOD RISK ABY: NORMAL
UNACCEPTABLE ANTIGENS: NORMAL
UNOS CPRA: 81
VIT A SERPL-MCNC: 0.68 MG/L

## 2025-05-26 NOTE — PROGRESS NOTES
Addendum:     Hyponatremia, resolved     RAFAEL Son, CNP  Adult Solid Organ Transplant   Contact: Vocera Web Console

## 2025-05-27 ENCOUNTER — LAB (OUTPATIENT)
Dept: LAB | Facility: CLINIC | Age: 35
End: 2025-05-27
Payer: COMMERCIAL

## 2025-05-27 ENCOUNTER — TELEPHONE (OUTPATIENT)
Dept: TRANSPLANT | Facility: CLINIC | Age: 35
End: 2025-05-27

## 2025-05-27 ENCOUNTER — RESULTS FOLLOW-UP (OUTPATIENT)
Dept: TRANSPLANT | Facility: CLINIC | Age: 35
End: 2025-05-27

## 2025-05-27 ENCOUNTER — MEDICAL CORRESPONDENCE (OUTPATIENT)
Dept: HEALTH INFORMATION MANAGEMENT | Facility: CLINIC | Age: 35
End: 2025-05-27

## 2025-05-27 DIAGNOSIS — Z94.0 KIDNEY REPLACED BY TRANSPLANT: Primary | ICD-10-CM

## 2025-05-27 DIAGNOSIS — Z48.298 AFTERCARE FOLLOWING ORGAN TRANSPLANT: Primary | ICD-10-CM

## 2025-05-27 LAB
ANION GAP SERPL CALCULATED.3IONS-SCNC: 12 MMOL/L (ref 7–15)
BUN SERPL-MCNC: 22.4 MG/DL (ref 6–20)
CALCIUM SERPL-MCNC: 10.1 MG/DL (ref 8.8–10.4)
CHLORIDE SERPL-SCNC: 103 MMOL/L (ref 98–107)
CREAT SERPL-MCNC: 3.06 MG/DL (ref 0.51–0.95)
EGFRCR SERPLBLD CKD-EPI 2021: 20 ML/MIN/1.73M2
ERYTHROCYTE [DISTWIDTH] IN BLOOD BY AUTOMATED COUNT: 13.2 % (ref 10–15)
GLUCOSE SERPL-MCNC: 81 MG/DL (ref 70–99)
HCO3 SERPL-SCNC: 23 MMOL/L (ref 22–29)
HCT VFR BLD AUTO: 29.9 % (ref 35–47)
HGB BLD-MCNC: 9.5 G/DL (ref 11.7–15.7)
MAGNESIUM SERPL-MCNC: 4.1 MG/DL (ref 1.7–2.3)
MCH RBC QN AUTO: 31.5 PG (ref 26.5–33)
MCHC RBC AUTO-ENTMCNC: 31.8 G/DL (ref 31.5–36.5)
MCV RBC AUTO: 99 FL (ref 78–100)
PHOSPHATE SERPL-MCNC: 3.1 MG/DL (ref 2.5–4.5)
PLATELET # BLD AUTO: 362 10E3/UL (ref 150–450)
POTASSIUM SERPL-SCNC: 4.4 MMOL/L (ref 3.4–5.3)
RBC # BLD AUTO: 3.02 10E6/UL (ref 3.8–5.2)
SODIUM SERPL-SCNC: 138 MMOL/L (ref 135–145)
TACROLIMUS BLD-MCNC: 13.4 UG/L (ref 5–15)
TME LAST DOSE: NORMAL H
TME LAST DOSE: NORMAL H
WBC # BLD AUTO: 5 10E3/UL (ref 4–11)

## 2025-05-27 PROCEDURE — 80048 BASIC METABOLIC PNL TOTAL CA: CPT | Performed by: SURGERY

## 2025-05-27 PROCEDURE — 84100 ASSAY OF PHOSPHORUS: CPT | Performed by: SURGERY

## 2025-05-27 PROCEDURE — 85014 HEMATOCRIT: CPT | Performed by: SURGERY

## 2025-05-27 PROCEDURE — 80197 ASSAY OF TACROLIMUS: CPT | Performed by: SURGERY

## 2025-05-27 PROCEDURE — 83735 ASSAY OF MAGNESIUM: CPT | Performed by: SURGERY

## 2025-05-27 NOTE — TELEPHONE ENCOUNTER
Glenna stated she called to have refill sent to [facility], wanting to cancel that request, her refill is ready at [facility] and she will pick it up any questions patient stated feel free to call

## 2025-05-27 NOTE — TELEPHONE ENCOUNTER
Patient called and stated she is concerned about her lab results from today. Her GFR went down and her creatinine went up. Patient advised her Tacrolimus results are still pending, but I will notify Tammi of her concern. Patient would like a call back to discuss as soon as possible. Assured patient one of us will return her call today. Patient verbalized understanding of plan. She can be reached at [phone].

## 2025-05-27 NOTE — TELEPHONE ENCOUNTER
"RNCC spoke with pt.   WBC WNL  Cr is elevated to >3.0  Reports increased swelling below incision, but no increase in pain. Believes she is constipated, has had only one BM since discharge. Has taken senna yesterday and today, but is nervous to take more d/t diarrhea she had while inpt that she believes led to E. Coli UTI.    SBP 120s-130s  Weight is stable  Glenna reports that she was nauseated yesterday and had emesis x2. She was able to keep IS down, and nausea has improved today. Has hx of weight loss surgery, so slight nausea is not abnormal for her.     Dr. Aleman advised pt get US prior to appt with Nataliya tomorrow. Given number to schedule STAT US.     Pt is very anxious that her \"numbers and GFR are all moving the wrong way\", concerned that \"kidney is failing\" and would like an aspiration done.    RNCC assured pt that we first need the US to confirm that there is a fluid collection present before taking any interventional action.   "

## 2025-05-27 NOTE — TELEPHONE ENCOUNTER
Medication Refill  Route to [facility]    Pharmacy Name: [facility]  Pharmacy Location: [address] [place] [address]   Name of Medication: oxyCODONE (ROXICODONE) 5 MG tablet   Quantity / Dose: 5 MG    Pt has a ride this Morning and stated [facility] will not take [facility] insurance and [facility]

## 2025-05-28 ENCOUNTER — ANCILLARY PROCEDURE (OUTPATIENT)
Dept: GENERAL RADIOLOGY | Facility: CLINIC | Age: 35
End: 2025-05-28
Attending: NURSE PRACTITIONER
Payer: COMMERCIAL

## 2025-05-28 ENCOUNTER — OFFICE VISIT (OUTPATIENT)
Dept: TRANSPLANT | Facility: CLINIC | Age: 35
End: 2025-05-28
Attending: NURSE PRACTITIONER
Payer: COMMERCIAL

## 2025-05-28 ENCOUNTER — ANCILLARY PROCEDURE (OUTPATIENT)
Dept: ULTRASOUND IMAGING | Facility: CLINIC | Age: 35
End: 2025-05-28
Attending: NURSE PRACTITIONER
Payer: COMMERCIAL

## 2025-05-28 DIAGNOSIS — N30.00 ACUTE CYSTITIS WITHOUT HEMATURIA: ICD-10-CM

## 2025-05-28 DIAGNOSIS — Z48.298 AFTERCARE FOLLOWING ORGAN TRANSPLANT: Primary | ICD-10-CM

## 2025-05-28 DIAGNOSIS — Z94.0 KIDNEY REPLACED BY TRANSPLANT: Chronic | ICD-10-CM

## 2025-05-28 DIAGNOSIS — Z48.298 AFTERCARE FOLLOWING ORGAN TRANSPLANT: ICD-10-CM

## 2025-05-28 DIAGNOSIS — Z94.0 KIDNEY REPLACED BY TRANSPLANT: ICD-10-CM

## 2025-05-28 PROCEDURE — 250N000009 HC RX 250: Performed by: NURSE PRACTITIONER

## 2025-05-28 PROCEDURE — 76776 US EXAM K TRANSPL W/DOPPLER: CPT | Mod: GC | Performed by: STUDENT IN AN ORGANIZED HEALTH CARE EDUCATION/TRAINING PROGRAM

## 2025-05-28 PROCEDURE — 74019 RADEX ABDOMEN 2 VIEWS: CPT | Performed by: RADIOLOGY

## 2025-05-28 RX ORDER — TACROLIMUS 5 MG/1
CAPSULE ORAL
COMMUNITY
Start: 2025-05-28

## 2025-05-28 RX ORDER — LIDOCAINE HYDROCHLORIDE 20 MG/ML
JELLY TOPICAL ONCE
Status: COMPLETED | OUTPATIENT
Start: 2025-05-28 | End: 2025-05-28

## 2025-05-28 RX ORDER — CIPROFLOXACIN 500 MG/1
500 TABLET, FILM COATED ORAL 2 TIMES DAILY
Qty: 14 TABLET | Refills: 0 | Status: SHIPPED | OUTPATIENT
Start: 2025-05-28

## 2025-05-28 RX ORDER — LEVOFLOXACIN 250 MG/1
500 TABLET, FILM COATED ORAL ONCE
Status: DISCONTINUED | OUTPATIENT
Start: 2025-05-28 | End: 2025-05-28 | Stop reason: ALTCHOICE

## 2025-05-28 RX ORDER — TACROLIMUS 0.5 MG/1
CAPSULE ORAL
Qty: 60 CAPSULE | Refills: 11 | Status: SHIPPED | OUTPATIENT
Start: 2025-05-28

## 2025-05-28 RX ORDER — TACROLIMUS 1 MG/1
4 CAPSULE ORAL 2 TIMES DAILY
Qty: 240 CAPSULE | Refills: 11 | Status: SHIPPED | OUTPATIENT
Start: 2025-05-28

## 2025-05-28 RX ADMIN — LIDOCAINE HYDROCHLORIDE: 20 JELLY TOPICAL at 10:22

## 2025-05-28 NOTE — NURSING NOTE
Cystoscopy - Ureteral stent removal    Prepped patient for procedure with no complications.   2% lidocaine gel injected into urethra via urojet.   Assisted Nataliya Barragan with stent removal.  Administered 500mg Levaquin PO after procedure.  Patient was discharged in stable condition.       Allergies   Allergen Reactions    Sulfa Antibiotics Other (See Comments)     Reaction as an infant- unsure of what happened    Nsaids Other (See Comments)     Has nephrotic range proteinuria and see nephrology's note.          Rio Becerra RN on 5/28/2025 at 10:21 AM

## 2025-05-28 NOTE — PROCEDURES
Transplant Surgery  Operative Note    Preop dx: Status post  Donor kidney transplant.  Post op dx: same   Procedure: Flexible cystoscopy and ureteral stent removal   Surgeon: Nataliya Barragan NP   Assistant: Rio Becerra RN   Anesthesia: local  EBL: 0   Specimens: none.  Findings: none abnormal. Stent inspected and noted to be intact.   Indication: The patient is status post kidney transplant and the ureteral stent is no longer needed.    Procedure: The patient was positioned supine on the table.  The groin was sterilely prepped and draped in the usual fashion. Time out was done. Urojet was applied to the urethra. A flexible cystoscope was inserted and advanced thru the urethra into the bladder. The stent was visualized and grasped. The cystoscope, grasper and stent were removed en-mass. The stent was visualized to be intact and discolored brownish film present.   The bladder mucosa was erythematous and inflamed in appearance. Urine with sediment. Antibiotics were administered. The patient tolerated the procedure well and was asked to void before leaving the clinic.     Cipro    Patent

## 2025-05-29 ENCOUNTER — TELEPHONE (OUTPATIENT)
Dept: TRANSPLANT | Facility: CLINIC | Age: 35
End: 2025-05-29

## 2025-05-29 ENCOUNTER — RESULTS FOLLOW-UP (OUTPATIENT)
Dept: TRANSPLANT | Facility: CLINIC | Age: 35
End: 2025-05-29

## 2025-05-29 ENCOUNTER — TELEPHONE (OUTPATIENT)
Dept: INTERVENTIONAL RADIOLOGY/VASCULAR | Facility: CLINIC | Age: 35
End: 2025-05-29

## 2025-05-29 DIAGNOSIS — Z48.298 AFTERCARE FOLLOWING ORGAN TRANSPLANT: Primary | ICD-10-CM

## 2025-05-29 NOTE — TELEPHONE ENCOUNTER
ISSUE:  Fluid collection present on US >11 cm, possible lymphocele or evolving hematoma    IMPRESSION:   1. Renal transplant in the right lower quadrant. No obstruction or  hydronephrosis.  2. Borderline elevated resistive indices within the arcuate arteries  as well as borderline resistive indices elevation in the transplant  renal artery at the hilum and at the anastomosis. No evidence of  stenosis by velocity criteria. Overall exam findings are similar to  ultrasound from 5/17/2025.   3. Newly demonstrated multiseptated loculated fluid collection  anterior to the transplant kidney, which measures up to 11.1 cm,  possibly lymphocele or evolving hematoma.    PLAN:  Nataliya  Reviewed US results with Manjeet feng to ask IR to drain collection.  extended her Cipro yesterday for another week as her bladder looked pretty inflamed.

## 2025-05-29 NOTE — PROGRESS NOTES
Outpatient IR Referral  05/29/25    Referring Provider:   IR Referral Outpatient order Only (05/29/2025 10:43 AM)   Recommendations/Plan:  US guided aspiration only of per-transplant fluid collection.  No hold on asa.  Fluid orders will be entered by Nataliya Barragan NP.      IR recommendations also relayed Epic messaging.  IR referral converted to procedure order.      Brief History:    Glenna Spears is a 34 year old female with history of Right Iliac fossa renal transplant by Manjeet Barreto MD (05/17/2025) now with US Renal Transplant with Doppler (05/28/2025 6:39 PM) demonstrating increasing loculated fluid collection anterior to transplanted kidney and worsening renal function.      Pertinent Medications:    Current Outpatient Medications   Medication Sig Dispense Refill    acetaminophen (TYLENOL) 325 MG tablet Take 325-650 mg by mouth every 4 hours as needed      aspirin (ASA) 325 MG EC tablet Take 1 tablet (325 mg) by mouth daily. 30 tablet 2    atorvastatin (LIPITOR) 10 MG tablet Take 1 tablet (10 mg) by mouth daily. 30 tablet 2    ciprofloxacin (CIPRO) 500 MG tablet Take 1 tablet (500 mg) by mouth 2 times daily. 14 tablet 0    famotidine (PEPCID) 20 MG tablet Take 20 mg by mouth 2 times daily as needed.      levonorgestrel (KYLEENA) 19.5 MG IUD 1 Intra Uterine Device by Intrauterine route once      methocarbamol (ROBAXIN) 500 MG tablet Take 1 tablet (500 mg) by mouth every 6 hours as needed for muscle spasms. 20 tablet 0    mycophenolate (GENERIC EQUIVALENT) 250 MG capsule Take 3 capsules (750 mg) by mouth 2 times daily. 180 capsule 11    nortriptyline (PAMELOR) 25 MG capsule Take 25 mg by mouth at bedtime.      ondansetron (ZOFRAN ODT) 4 MG ODT tab Take 1 tablet (4 mg) by mouth every 8 hours as needed for nausea. 20 tablet 0    oxyCODONE (ROXICODONE) 5 MG tablet Take 1-2 tablets (5-10 mg) by mouth every 8 hours as needed for moderate to severe pain. 12 tablet 0    polyethylene glycol  "(MIRALAX) 17 GM/Dose powder Take 17 g by mouth daily as needed for constipation. 510 g 0    [START ON 7/17/2025] Semaglutide-Weight Management (WEGOVY) 2.4 MG/0.75ML pen Inject 2.4 mg subcutaneously once a week. HOLD x 3 months      senna-docusate (SENOKOT-S/PERICOLACE) 8.6-50 MG tablet Take 1 tablet by mouth 2 times daily. 60 tablet 0    tacrolimus (GENERIC EQUIVALENT) 0.5 MG capsule Profile Rx: patient will contact pharmacy when needed 60 capsule 11    tacrolimus (GENERIC EQUIVALENT) 1 MG capsule Take 4 capsules (4 mg) by mouth 2 times daily. 240 capsule 11    tacrolimus (GENERIC EQUIVALENT) 5 MG capsule Profile Rx: patient will contact pharmacy when needed      valGANciclovir (VALCYTE) 450 MG tablet Take 1 tablet (450 mg) by mouth every other day. 60 tablet 2    varenicline (CHANTIX SANCHEZ) 0.5 MG X 11 & 1 MG X 42 tablet Take 0.5 mg by mouth daily.      VENTOLIN  (90 Base) MCG/ACT inhaler INHALE 2 PUFFS BY MOUTH EVERY 4 HOURS AS NEEDED FOR SHORTNESS OF BREATH OR COUGH OR EXERCISE       No current facility-administered medications for this visit.     Most Recent Labs:  Lab Results   Component Value Date    WBC 5.0 05/27/2025     Lab Results   Component Value Date    RBC 3.02 05/27/2025     Lab Results   Component Value Date    HGB 9.5 05/27/2025     Lab Results   Component Value Date    HCT 29.9 05/27/2025     Lab Results   Component Value Date     05/27/2025    Last Comprehensive Metabolic Panel:  Lab Results   Component Value Date     05/27/2025    POTASSIUM 4.4 05/27/2025    CHLORIDE 103 05/27/2025    CO2 23 05/27/2025    ANIONGAP 12 05/27/2025    GLC 81 05/27/2025    BUN 22.4 (H) 05/27/2025    CR 3.06 (H) 05/27/2025    GFRESTIMATED 20 (L) 05/27/2025    KARTIK 10.1 05/27/2025      INR   Date Value Ref Range Status   05/17/2025 0.98 0.85 - 1.15 Final          If requesting team would like sample sent for anything else please use the IR order set \"IR RAD Biopsy or Fluid Aspirate Specimens\" to select " your necessary diagnostic labs and pend for admission.     If there are labs you desire that are not found in this order set or you have questions regarding specific diagnostic labs please call the associated lab personnel.     Ranjana Bradford PA-C  Interventional Radiology   [phone] (IR RN triage)

## 2025-05-30 ENCOUNTER — TELEPHONE (OUTPATIENT)
Dept: TRANSPLANT | Facility: CLINIC | Age: 35
End: 2025-05-30

## 2025-05-30 ENCOUNTER — APPOINTMENT (OUTPATIENT)
Dept: MEDSURG UNIT | Facility: CLINIC | Age: 35
End: 2025-05-30
Attending: RADIOLOGY
Payer: COMMERCIAL

## 2025-05-30 ENCOUNTER — APPOINTMENT (OUTPATIENT)
Dept: INTERVENTIONAL RADIOLOGY/VASCULAR | Facility: CLINIC | Age: 35
End: 2025-05-30
Attending: NURSE PRACTITIONER
Payer: COMMERCIAL

## 2025-05-30 PROCEDURE — 272N000502 HC NEEDLE CR3

## 2025-05-30 PROCEDURE — 99152 MOD SED SAME PHYS/QHP 5/>YRS: CPT

## 2025-05-30 PROCEDURE — 76942 ECHO GUIDE FOR BIOPSY: CPT | Mod: 26

## 2025-05-30 PROCEDURE — 10160 PNXR ASPIR ABSC HMTMA BULLA: CPT

## 2025-05-30 PROCEDURE — 999N000142 HC STATISTIC PROCEDURE PREP ONLY

## 2025-05-30 PROCEDURE — 999N000132 HC STATISTIC PP CARE STAGE 1

## 2025-05-30 NOTE — TELEPHONE ENCOUNTER
Pt's home care nurse called regarding labs from yesterday. The labs were dropped off at [facility], who faxed results to [facility]. Results not yet in chart, [facility] will not send them to an alternate number. Nurse stated that she plans to call [facility], and then call back.

## 2025-06-02 ENCOUNTER — RESULTS FOLLOW-UP (OUTPATIENT)
Dept: TRANSPLANT | Facility: CLINIC | Age: 35
End: 2025-06-02

## 2025-06-02 ENCOUNTER — LAB (OUTPATIENT)
Dept: LAB | Facility: CLINIC | Age: 35
End: 2025-06-02
Payer: COMMERCIAL

## 2025-06-02 DIAGNOSIS — Z79.899 ENCOUNTER FOR LONG-TERM CURRENT USE OF MEDICATION: ICD-10-CM

## 2025-06-02 DIAGNOSIS — Z20.828 CONTACT WITH AND (SUSPECTED) EXPOSURE TO OTHER VIRAL COMMUNICABLE DISEASES: ICD-10-CM

## 2025-06-02 DIAGNOSIS — Z48.298 AFTERCARE FOLLOWING ORGAN TRANSPLANT: ICD-10-CM

## 2025-06-02 DIAGNOSIS — Z98.890 OTHER SPECIFIED POSTPROCEDURAL STATES: ICD-10-CM

## 2025-06-02 DIAGNOSIS — Z94.0 KIDNEY REPLACED BY TRANSPLANT: ICD-10-CM

## 2025-06-02 DIAGNOSIS — Z94.0 KIDNEY REPLACED BY TRANSPLANT: Chronic | ICD-10-CM

## 2025-06-02 DIAGNOSIS — N30.00 ACUTE CYSTITIS WITHOUT HEMATURIA: ICD-10-CM

## 2025-06-02 LAB
ANION GAP SERPL CALCULATED.3IONS-SCNC: 10 MMOL/L (ref 7–15)
BUN SERPL-MCNC: 23.8 MG/DL (ref 6–20)
CALCIUM SERPL-MCNC: 9.7 MG/DL (ref 8.8–10.4)
CHLORIDE SERPL-SCNC: 106 MMOL/L (ref 98–107)
CREAT SERPL-MCNC: 3.53 MG/DL (ref 0.51–0.95)
EGFRCR SERPLBLD CKD-EPI 2021: 17 ML/MIN/1.73M2
ERYTHROCYTE [DISTWIDTH] IN BLOOD BY AUTOMATED COUNT: 12.7 % (ref 10–15)
FERRITIN SERPL-MCNC: 1136 NG/ML (ref 6–175)
GLUCOSE SERPL-MCNC: 109 MG/DL (ref 70–99)
HCO3 SERPL-SCNC: 23 MMOL/L (ref 22–29)
HCT VFR BLD AUTO: 30.3 % (ref 35–47)
HGB BLD-MCNC: 10 G/DL (ref 11.7–15.7)
IRON BINDING CAPACITY (ROCHE): 212 UG/DL (ref 240–430)
IRON SATN MFR SERPL: 32 % (ref 15–46)
IRON SERPL-MCNC: 67 UG/DL (ref 37–145)
MCH RBC QN AUTO: 31.5 PG (ref 26.5–33)
MCHC RBC AUTO-ENTMCNC: 33 G/DL (ref 31.5–36.5)
MCV RBC AUTO: 96 FL (ref 78–100)
PLATELET # BLD AUTO: 320 10E3/UL (ref 150–450)
POTASSIUM SERPL-SCNC: 4.5 MMOL/L (ref 3.4–5.3)
PTH-INTACT SERPL-MCNC: 220 PG/ML (ref 15–65)
RBC # BLD AUTO: 3.17 10E6/UL (ref 3.8–5.2)
SODIUM SERPL-SCNC: 139 MMOL/L (ref 135–145)
TACROLIMUS BLD-MCNC: 7.2 UG/L (ref 5–15)
TME LAST DOSE: NORMAL H
TME LAST DOSE: NORMAL H
VIT D+METAB SERPL-MCNC: 40 NG/ML (ref 20–50)
WBC # BLD AUTO: 5.1 10E3/UL (ref 4–11)

## 2025-06-02 PROCEDURE — 86833 HLA CLASS II HIGH DEFIN QUAL: CPT

## 2025-06-02 PROCEDURE — 82306 VITAMIN D 25 HYDROXY: CPT

## 2025-06-02 PROCEDURE — 85027 COMPLETE CBC AUTOMATED: CPT

## 2025-06-02 PROCEDURE — 80048 BASIC METABOLIC PNL TOTAL CA: CPT

## 2025-06-02 PROCEDURE — 83550 IRON BINDING TEST: CPT

## 2025-06-02 PROCEDURE — 36415 COLL VENOUS BLD VENIPUNCTURE: CPT

## 2025-06-02 PROCEDURE — 86832 HLA CLASS I HIGH DEFIN QUAL: CPT

## 2025-06-02 PROCEDURE — 83970 ASSAY OF PARATHORMONE: CPT

## 2025-06-02 PROCEDURE — 82728 ASSAY OF FERRITIN: CPT

## 2025-06-02 PROCEDURE — 80197 ASSAY OF TACROLIMUS: CPT

## 2025-06-02 PROCEDURE — 86828 HLA CLASS I&II ANTIBODY QUAL: CPT | Mod: 59

## 2025-06-02 PROCEDURE — 83540 ASSAY OF IRON: CPT

## 2025-06-02 RX ORDER — CIPROFLOXACIN 500 MG/1
500 TABLET, FILM COATED ORAL 2 TIMES DAILY
Qty: 6 TABLET | Refills: 0 | Status: SHIPPED | OUTPATIENT
Start: 2025-06-02

## 2025-06-02 RX ORDER — TACROLIMUS 1 MG/1
3 CAPSULE ORAL 2 TIMES DAILY
Qty: 180 CAPSULE | Refills: 11 | Status: SHIPPED | OUTPATIENT
Start: 2025-06-02

## 2025-06-03 ENCOUNTER — RESULTS FOLLOW-UP (OUTPATIENT)
Dept: TRANSPLANT | Facility: CLINIC | Age: 35
End: 2025-06-03

## 2025-06-03 ENCOUNTER — VIRTUAL VISIT (OUTPATIENT)
Dept: ENDOCRINOLOGY | Facility: CLINIC | Age: 35
End: 2025-06-03
Payer: COMMERCIAL

## 2025-06-03 ENCOUNTER — OFFICE VISIT (OUTPATIENT)
Dept: TRANSPLANT | Facility: CLINIC | Age: 35
End: 2025-06-03
Attending: INTERNAL MEDICINE
Payer: COMMERCIAL

## 2025-06-03 ENCOUNTER — HOSPITAL ENCOUNTER (OUTPATIENT)
Dept: ULTRASOUND IMAGING | Facility: CLINIC | Age: 35
Discharge: HOME | End: 2025-06-03
Attending: NURSE PRACTITIONER
Payer: COMMERCIAL

## 2025-06-03 ENCOUNTER — TELEPHONE (OUTPATIENT)
Dept: TRANSPLANT | Facility: CLINIC | Age: 35
End: 2025-06-03

## 2025-06-03 ENCOUNTER — E-CONSULT (OUTPATIENT)
Dept: ONCOLOGY | Facility: CLINIC | Age: 35
End: 2025-06-03

## 2025-06-03 VITALS — BODY MASS INDEX: 24.78 KG/M2 | WEIGHT: 163.5 LBS | HEIGHT: 68 IN

## 2025-06-03 VITALS
HEIGHT: 68 IN | SYSTOLIC BLOOD PRESSURE: 119 MMHG | WEIGHT: 163.5 LBS | BODY MASS INDEX: 24.78 KG/M2 | RESPIRATION RATE: 16 BRPM | OXYGEN SATURATION: 100 % | HEART RATE: 80 BPM | DIASTOLIC BLOOD PRESSURE: 70 MMHG | TEMPERATURE: 97.9 F

## 2025-06-03 DIAGNOSIS — Z98.84 S/P LAPAROSCOPIC SLEEVE GASTRECTOMY: Primary | ICD-10-CM

## 2025-06-03 DIAGNOSIS — Z48.298 AFTERCARE FOLLOWING ORGAN TRANSPLANT: Primary | ICD-10-CM

## 2025-06-03 DIAGNOSIS — Z48.298 AFTERCARE FOLLOWING ORGAN TRANSPLANT: ICD-10-CM

## 2025-06-03 DIAGNOSIS — Z94.0 KIDNEY TRANSPLANTED: ICD-10-CM

## 2025-06-03 DIAGNOSIS — R11.0 NAUSEA: ICD-10-CM

## 2025-06-03 DIAGNOSIS — G89.18 ACUTE POST-OPERATIVE PAIN: ICD-10-CM

## 2025-06-03 DIAGNOSIS — I82.621 ARM DVT (DEEP VENOUS THROMBOEMBOLISM), ACUTE, RIGHT (H): ICD-10-CM

## 2025-06-03 DIAGNOSIS — K21.9 GASTROESOPHAGEAL REFLUX DISEASE, UNSPECIFIED WHETHER ESOPHAGITIS PRESENT: ICD-10-CM

## 2025-06-03 DIAGNOSIS — N30.00 ACUTE CYSTITIS WITHOUT HEMATURIA: Primary | ICD-10-CM

## 2025-06-03 DIAGNOSIS — F32.A DEPRESSIVE DISORDER: Chronic | ICD-10-CM

## 2025-06-03 DIAGNOSIS — Z94.0 KIDNEY REPLACED BY TRANSPLANT: Chronic | ICD-10-CM

## 2025-06-03 PROCEDURE — 99213 OFFICE O/P EST LOW 20 MIN: CPT | Mod: 24 | Performed by: NURSE PRACTITIONER

## 2025-06-03 PROCEDURE — 99207 E-CONSULT TO HEMATOLOGY (ADULT OUTPT PROVIDER TO SPECIALIST WRITTEN QUESTION & RESPONSE): CPT | Performed by: NURSE PRACTITIONER

## 2025-06-03 PROCEDURE — 76776 US EXAM K TRANSPL W/DOPPLER: CPT | Mod: 26 | Performed by: RADIOLOGY

## 2025-06-03 PROCEDURE — G0463 HOSPITAL OUTPT CLINIC VISIT: HCPCS | Performed by: NURSE PRACTITIONER

## 2025-06-03 PROCEDURE — 76776 US EXAM K TRANSPL W/DOPPLER: CPT

## 2025-06-03 PROCEDURE — 93970 EXTREMITY STUDY: CPT | Mod: 26 | Performed by: RADIOLOGY

## 2025-06-03 PROCEDURE — 93970 EXTREMITY STUDY: CPT

## 2025-06-03 PROCEDURE — G2211 COMPLEX E/M VISIT ADD ON: HCPCS | Mod: 95

## 2025-06-03 PROCEDURE — 98005 SYNCH AUDIO-VIDEO EST LOW 20: CPT

## 2025-06-03 RX ORDER — PANTOPRAZOLE SODIUM 40 MG/1
40 TABLET, DELAYED RELEASE ORAL DAILY
Qty: 30 TABLET | Refills: 1 | Status: SHIPPED | OUTPATIENT
Start: 2025-06-03

## 2025-06-03 RX ORDER — CIPROFLOXACIN 500 MG/1
500 TABLET, FILM COATED ORAL DAILY
Qty: 3 TABLET | Refills: 0 | Status: SHIPPED | OUTPATIENT
Start: 2025-06-03

## 2025-06-03 RX ORDER — METHOCARBAMOL 500 MG/1
500 TABLET, FILM COATED ORAL EVERY 6 HOURS PRN
Qty: 20 TABLET | Refills: 0 | Status: SHIPPED | OUTPATIENT
Start: 2025-06-03

## 2025-06-03 ASSESSMENT — PATIENT HEALTH QUESTIONNAIRE - PHQ9: SUM OF ALL RESPONSES TO PHQ QUESTIONS 1-9: 21

## 2025-06-03 ASSESSMENT — PAIN SCALES - GENERAL: PAINLEVEL_OUTOF10: MODERATE PAIN (5)

## 2025-06-03 NOTE — PROGRESS NOTES
Virtual Visit Details    Type of service:  Video Visit   Video Start Time: 2:37pm  Video End Time:2:55pm    Originating Location (pt. Location): Home    Distant Location (provider location):  Off-site  Platform used for Video Visit: Stacy

## 2025-06-03 NOTE — NURSING NOTE
"Chief Complaint   Patient presents with    Surgical Followup     17 days post kidney transplant      Vital signs:  Temp: 97.9  F (36.6  C) Temp src: Oral BP: 119/70 Pulse: 80   Resp: 16 SpO2: 100 %     Height: 172.7 cm (5' 8\") (pt reported) Weight: 74.2 kg (163 lb 8 oz)  Estimated body mass index is 24.86 kg/m  as calculated from the following:    Height as of this encounter: 1.727 m (5' 8\").    Weight as of this encounter: 74.2 kg (163 lb 8 oz).    Cha Keyes, [facility]  6/3/2025 9:53 AM    "

## 2025-06-03 NOTE — PATIENT INSTRUCTIONS
"Thank you for allowing us the privilege of caring for you. We hope we provided you with the excellent service you deserve.   Please let us know if there is anything else we can do for you so that we can be sure you are completely satisfied with your care experience.    To ensure the quality of our services you may be receiving a patient satisfaction survey from an independent patient satisfaction monitoring company.    The greatest compliment you can give is a \"Likely to Recommend\"    Your visit was with Kelli Joshi PA-C today.    Instructions per today's visit:     Audie Spears, it was great to visit with you today.  Here is a review of our visit.  If our clinic scheduler is not able to reach you please call [phone] to schedule your next appointments.    Plan  Agree with your transplant team to restart pantoprazole to help with your GERD, contact us if your heartburn is persisting despite this, we could trial other PPI medications.   Continue to hold Wegovy, when cleared to restart Wegovy by transplant team should restart at the 0.25 mg dose to reduce risk of side effects.  Goals we discussed today:   Continue to prioritize protein  Continue to increase exercise and daily movement as tolerated  Follow up with Kelli in 3-4 months   Referral placed to health psychology provider as Glenna is endorsing worsened depression in the context of her complications surrounding her kidney transplant.  Dietician appointment as needed   Keep up the excellent work!       Information about Video Visits with [employer]: video visit information  _________________________________________________________________________________________________________________________________________________________  If you are asked by your clinic team to have your blood pressure checked:  [facility] do offer several locations for blood pressure checks. Please follow the below link to schedule an appointment. Scheduling an " appointment at the pharmacy for a blood pressure check is now preferred.    Appointment Plus ([email or web address])  _________________________________________________________________________________________________________________________________________________________  Important contact and scheduling information:  Please call our contact center at [phone] to schedule your next appointments.  To find a lab location near you, please call [phone].  For any nursing questions or concerns call Brynn Larson LPN at [phone] or Gris Mix RN at [phone]  Please call during clinic hours Monday through Friday 8:00a - 4:00p if you have questions or you can contact us via [employer] at anytime and we will reply during clinic hours.    Lab results will be communicated through My Chart or letter (if My Chart not used). Please call the clinic if you have not received communication after 1 week or if you have any questions.?  Clinic Fax: [phone]    _Interested in working with a health ?  Health coaches work with you to improve your overall health and wellbeing.  They look at the whole person, and may involve discussion of different areas of life, including, but not limited to the four pillars of health (sleep, exercise, nutrition, and stress management). Discuss with your care team if you would like to start working a health .  Health Coaching-3 Pack: Schedule by calling [phone]    $99 for three health coaching visits    Visits may be done in person or via phone    Coaching is a partnership between the  and the client; Coaches do not prescribe or diagnose    Coaching helps inspire the client to reach his/her personal goals   _________________________________________________________________________________________________________________________________________________________  __________  [facility] Get Moving Program  Our team of physical therapists is trained to  help you understand and take control of your condition. They will perform a thorough evaluation to determine your ability for activity and develop a customized plan to fit your goals and physical ability.  Scheduling: Unsure if the Get Moving program is right for you? Discuss the program with your medical provider or diabetes educator. You can also call us at [phone] to ask questions or schedule an appointment.   [facility] Get Moving Program  ____________________________________________________________________________________________________________________________________________________________________________        Thank you,   [facility] Comprehensive Weight Management Team

## 2025-06-03 NOTE — NURSING NOTE
Current patient location: [address] [place] [id number]    Is the patient currently in the state of [place]? YES    Visit mode: VIDEO    If the visit is dropped, the patient can be reconnected by:VIDEO VISIT: Text to cell phone:   Telephone Information:   Mobile [phone]       Will anyone else be joining the visit? NO  (If patient encounters technical issues they should call [phone]    Are changes needed to the allergy or medication list? No    Are refills needed on medications prescribed by this physician? NO    Rooming Documentation:  Questionnaire(s) completed    Reason for visit: RECHECK    Pt states 5/10 pain from recent Kidney Transplant at incision site.    Depression Response    Patient completed the PHQ-9 assessment for depression and scored >9? Yes  Question 9 on the PHQ-9 was positive for suicidality? Yes  Does patient have current mental health provider? Yes    Is this a virtual visit? Yes   Does patient have suicidal ideation (positive question 9)? Yes (adult) - transfer to Red Flag Triage ([phone]) Patient declined transfer.  Notify provider.     I personally notified the following: visit provider    Pt states she feels high depression screening score is due to recent bad news received about health.    Kj Cervantes SPENSER

## 2025-06-03 NOTE — PROGRESS NOTES
Return Bariatric Surgery Note    RE: Glenna Spears  MR#: [id number]  : 1990  VISIT DATE: Glen 3, 2025      Dear Maria Victoria, Neela Harrington,    I had the pleasure of seeing your patient, Glenna Spears, in my post-bariatric surgery assessment clinic.    Assessment & Plan   Problem List Items Addressed This Visit       Depressive disorder (Chronic)    Relevant Orders    Adult Mental Health  Referral    Kidney replaced by transplant (Chronic)    Relevant Orders    Adult Mental Health  Referral     Other Visit Diagnoses         S/P laparoscopic sleeve gastrectomy    -  Primary    Relevant Orders    Adult Mental Health  Referral               21 minutes spent by me on the date of the encounter doing chart review, history and exam, documentation and further activities per the note    CHIEF COMPLAINT: Post-bariatric surgery follow-up.    HISTORY OF PRESENT ILLNESS:      2025     3:36 PM   Questions Regarding Prior Weight Loss Surgery Reviewed With Patient   I had the following weight loss procedure Sleeve Gastrectomy   What year was your surgery?    How has your weight changed since your last visit? I have stayed about the same   Do you currently have any of the following Heartburn, acid reflux, or GERD (acid reflux disease)?   Do you have any concerns today? No         Plan  Agree with your transplant team to restart pantoprazole to help with your GERD, contact us if your heartburn is persisting despite this, we could trial other PPI medications.   Continue to hold Wegovy, when cleared to restart Wegovy by transplant team should restart at the 0.25 mg dose to reduce risk of side effects.  Goals we discussed today:   Continue to prioritize protein  Continue to increase exercise and daily movement as tolerated  Follow up with Kelli in 3-4 months   Referral placed to health psychology provider as Glenna is endorsing worsened depression in the context of her complications surrounding  her kidney transplant.  Dietician appointment as needed   Keep up the excellent work!         Interval History:   Status post kidney transplant May 2025 (ESRD due to longstanding nephrotic range proteinuria and HTN). Hx of chronic drug use, Excedrin use.  Sober >1 year. Quit smoking January 2023     Sleeve gastrectomy 1/23/24- well tolerated aside from GERD         Last seen by me 1/9/25  Almost 1 year post op - down 151 lbs since starting with the program.   Reports things are going a lot better, finding life is easier with her weight loss, increased energy, able to get more done, work more.   Some concerns with FMLA lately so has been skipping 1 session of dialysis each week- discussed the importance of regular dialysis to mange her disease and preserve her eligibility for a transplant.      Struggling with quitting smoking on and off.   Still getting GERD occasionally- needing pepcid prn for this.   Denies vomiting.   Bothered by excess loose skin, denies seeing rashes in these areas. Plans to address loose skin after kidney transplant.   Establishing care with a new place- [facility].   -increased wegovy to 2.4mg    - kidney transplant May 2025.     Today in visit 6/3/25     Elevated PHQ today, Glenna reports it's situational due to several health issues she's been having since her kidney transplant. Issues with swelling around kidney, also planning to get kidney biopsy.   Newly diagnosed with DVT in neck and blood clot in arm today, being followed by nephrologist- plan to speak with hematologist for this.     Is loving not having to go to dialysis.     Regarding bariatric surgery, feeling excellent from her sleeve gastrectomy, describes it as the best decision she's made.     Holding wegovy for the time being since her transplant, not restarting at least until July 2025.     Had met with a therapist in the past, is open to meeting with a new therapist for support with her worsened mental health,  will place referral today for this.    Started nortriptyline through neurologist for nerve pain but is noting that it's helping her mental health.     Wt Readings from Last 5 Encounters:   06/03/25 74.2 kg (163 lb 8 oz)   06/03/25 74.2 kg (163 lb 8 oz)   05/22/25 72.5 kg (159 lb 12.8 oz)   05/21/25 72 kg (158 lb 12.8 oz)   05/20/25 71.4 kg (157 lb 8 oz)       Anti-obesity medication history:     Current:   Wegovy- holding since kidney transplant, planning to restart in July.     Past/failed/contraindicated:       Recent diet changes: eating 3 meals a day, grandma is cooking for her a lot since her surgery. Eating lots of Amharic food- beef, pork. Drinks an ensure if she's having less of an appetite.     Recent exercise/activity changes: Working on moving more, still in process of recovering from kidney transplant     Vitamins/Labs: bariatric labs completed may 2025.     GERD symptoms: ongoing as an issue, seems to have worsened since kidney transplant. GERD previously well managed by daily pepcid. SOT team prescribed pantoprazole today to see if helpful.     Vomiting : some nausea/ vomiting after kidney transplant, nausea well managed with zofran lately.     Dysphagia:  denies           Weight History:      6/2/2025     3:36 PM   --   What is your highest lifetime weight? 345   What is your lowest weight since surgery? (In pounds) 152     Initial Weight (lbs): 320 lbs  Weight: 74.2 kg (163 lb 8 oz)     Cumulative weight loss (lbs): 156.5  Weight Loss Percentage: 48.91%        6/2/2025     3:36 PM   Questions Regarding Co-Morbidities and Health Concerns Reviewed With Patient   Pre-diabetes Never   Diabetes II Never   High Blood Pressure Gone Away   High cholesterol Never   Heartburn/Reflux Worsened   Sleep apnea Gone away   PCOS Stayed the same   Back pain Gone away   Joint pain Never   Lower leg swelling Stayed the same           6/2/2025     3:36 PM   Eating Habits   How many meals do you eat per day? 2   Do you  snack between meals? Yes   How much food are you eating at each meal? 1/2 cup to 1 cup   Are you able to separate your meals and liquids by at least 30 minutes? Sometimes   Are you able to avoid liquid calories? Sometimes           6/2/2025     3:36 PM   Exercise Questions Reviewed With Patient   How often do you exercise? 3 to 4 times per week   What is the duration of your exercise (in minutes)? 30 Minutes   What types of exercise do you do? walking    swimming    other   What keeps you from being more active? I have recently been sick       Social History:      6/2/2025     3:36 PM   --   Are you smoking? No   Are you drinking alcohol? No       Medications:  Current Outpatient Medications   Medication Sig Dispense Refill    acetaminophen (TYLENOL) 325 MG tablet Take 325-650 mg by mouth every 4 hours as needed      aspirin (ASA) 325 MG EC tablet Take 1 tablet (325 mg) by mouth daily. 30 tablet 2    atorvastatin (LIPITOR) 10 MG tablet Take 1 tablet (10 mg) by mouth daily. 30 tablet 2    ciprofloxacin (CIPRO) 500 MG tablet Take 1 tablet (500 mg) by mouth daily. 3 tablet 0    ciprofloxacin (CIPRO) 500 MG tablet Take 1 tablet (500 mg) by mouth 2 times daily. 6 tablet 0    famotidine (PEPCID) 20 MG tablet Take 20 mg by mouth 2 times daily as needed.      levonorgestrel (KYLEENA) 19.5 MG IUD 1 Intra Uterine Device by Intrauterine route once      methocarbamol (ROBAXIN) 500 MG tablet Take 1 tablet (500 mg) by mouth every 6 hours as needed for muscle spasms. 20 tablet 0    mycophenolate (GENERIC EQUIVALENT) 250 MG capsule Take 3 capsules (750 mg) by mouth 2 times daily. 180 capsule 11    nortriptyline (PAMELOR) 25 MG capsule Take 25 mg by mouth at bedtime.      ondansetron (ZOFRAN ODT) 4 MG ODT tab Take 1 tablet (4 mg) by mouth every 8 hours as needed for nausea. 20 tablet 0    oxyCODONE (ROXICODONE) 5 MG tablet Take 1-2 tablets (5-10 mg) by mouth every 8 hours as needed for moderate to severe pain. 12 tablet 0     "pantoprazole (PROTONIX) 40 MG EC tablet Take 1 tablet (40 mg) by mouth daily. 30 tablet 1    senna-docusate (SENOKOT-S/PERICOLACE) 8.6-50 MG tablet Take 1 tablet by mouth 2 times daily. (Patient taking differently: Take 2 tablets by mouth 2 times daily.) 60 tablet 0    tacrolimus (GENERIC EQUIVALENT) 0.5 MG capsule Profile Rx: patient will contact pharmacy when needed (Patient not taking: Reported on 6/3/2025) 60 capsule 11    tacrolimus (GENERIC EQUIVALENT) 1 MG capsule Take 3 capsules (3 mg) by mouth 2 times daily. 180 capsule 11    tacrolimus (GENERIC EQUIVALENT) 5 MG capsule Profile Rx: patient will contact pharmacy when needed (Patient not taking: Reported on 6/3/2025)      valGANciclovir (VALCYTE) 450 MG tablet Take 1 tablet (450 mg) by mouth every other day. 60 tablet 2    varenicline (CHANTIX SANCHEZ) 0.5 MG X 11 & 1 MG X 42 tablet Take 0.5 mg by mouth daily.      VENTOLIN  (90 Base) MCG/ACT inhaler INHALE 2 PUFFS BY MOUTH EVERY 4 HOURS AS NEEDED FOR SHORTNESS OF BREATH OR COUGH OR EXERCISE       No current facility-administered medications for this visit.         6/2/2025     3:36 PM   --   Do you avoid NSAIDs such as (Ibuprofen, Aleve, Naproxen, Advil)? Yes       ROS:  GI:       6/2/2025     3:36 PM   --   Vomiting No   Diarrhea No   Constipation Yes   Swallowing trouble No   Abdominal pain No   Heartburn Yes     Skin:       6/2/2025     3:36 PM   BAR RBS ROS - SKIN   Rash in skin folds No     Psych:       6/2/2025     3:36 PM   --   Depression No   Anxiety Yes     Female Only:       6/2/2025     3:36 PM   BAR RBS ROS -    Female only Birth control   Stress urinary incontinence No         1/22/2023     5:08 PM 5/25/2025    11:06 AM   GABBY Score (Last Two)   GABBY Raw Score 32    32 35    Activation Score 62.6    62.6 72.1    GABBY Level 3    3 3        Patient-reported         PHYSICAL EXAM:  Objective    Ht 1.727 m (5' 8\")   Wt 74.2 kg (163 lb 8 oz)   BMI 24.86 kg/m           Vitals:  No vitals were " obtained today due to virtual visit.    Physical Exam   GENERAL: alert and no distress  EYES: Eyes grossly normal to inspection.  No discharge or erythema, or obvious scleral/conjunctival abnormalities.  RESP: No audible wheeze, cough, or visible cyanosis.    SKIN: Visible skin clear. No significant rash, abnormal pigmentation or lesions.  NEURO: Cranial nerves grossly intact.  Mentation and speech appropriate for age.  PSYCH: Appropriate affect, tone, and pace of words        Sincerely,    Kelli Joshi PA-C    The longitudinal plan of care for the diagnosis(es)/condition(s) as documented were addressed during this visit. Due to the added complexity in care, I will continue to support Glenna in the subsequent management and with ongoing continuity of care.

## 2025-06-03 NOTE — PROGRESS NOTES
6/3/2025     E-Consult has been accepted.    Interprofessional consultation requested by:  Nataliya Barragan APRN CNP      Clinical Question/Purpose: MY CLINICAL QUESTION IS: does she need anticoagulation for right internal jugular DVT. Likely line associated as she had R internal jugular central line place with kidney transplant 5/17/25. Currently on ASA 325mg daily for kidney transplant arterial construction.     Patient assessment and information reviewed:   Patient underwent upper extremity ultrasound 6/3/2025 as below    Unclear if this was performed for symptoms or if part of routine evaluation  This appears to be provoked given information that she had right internal jugular line in place for her recent kidney transplant  Platelets are 372 at last check (would support anticoagulation)  Creatinine, however, is elevated at 3.53     Recommendations:    If the line is out, would treat for provoked VTE with 3 month course of anticoagulation  Choice of anticoagulant would depend on medication interactions (transplant pharmacist may weigh in on this) although with renal function would likely consider apixaban versus warfarin  Therapeutic dose X3 months    The recommendations provided in this E-Consult are based on a review of clinical data pertinent to the clinical question presented, without a review of the patient's complete medical record or, the benefit of a comprehensive in-person or virtual patient evaluation. This consultation should not replace the clinical judgement and evaluation of the provider ordering this E-Consult. Any new clinical issues, or changes in patient status since the filing of this E-Consult will need to be taken into account when assessing these recommendations. Please contact me if you have further questions.    My total time spent reviewing clinical information and formulating assessment was 10 minutes.        Kimber Huber MD

## 2025-06-03 NOTE — PROGRESS NOTES
Transplant Surgery Progress Note    Transplants:  2025 (Kidney); Postoperative day:  17  S: Glenna Spears is an 34 year old female with history of ESRD of unknown etiology, anemia, obesity s/p gastric sleeve in , and latent TB. S/p DCD  donor kidney transplant w/ ureteral stent on 25.     -Pentamidine given , no Bactrim d/t sulfa allergy, G6PD WNL. Consider switch to dapsone as on .     Urine culture + for E.coli: ureteral stent removed and on antibiotics. Symptoms resolved.     Appetite is good. Drinking around 2 liters.     Biggest complaint is feeling of fullness around the kidney.     Transplant History:    Transplant Type:  DDKT  Donor Type: Donation after Circulatory Death   Transplant Date:  2025 (Kidney)   Ureteral Stent:  Yes, now removed   Crossmatch:  negative   DSA at Tx:  No  Baseline Cr: TBD   DeNovo DSA: No    Acute Rejection Hx:  No    Present Maintenance Immunosuppression:  Tacrolimus and Mycophenolate mofetil    CMV IgG Ab Discordance:  No  EBV IgG Ab Discordance:  No    BK Viremia:  No  EBV Viremia:  No    Transplant Coordinator: Tammi Cuellar     Transplant Office Phone Number: [phone]     Immunosuppressant Medications       Immunosuppressive Agents Disp Start End     mycophenolate (GENERIC EQUIVALENT) 250 MG capsule 180 capsule 2025 --    Sig - Route: Take 3 capsules (750 mg) by mouth 2 times daily. - Oral    Class: E-Prescribe     tacrolimus (GENERIC EQUIVALENT) 0.5 MG capsule 60 capsule 2025 --    Sig - Route: Take 1 capsule (0.5 mg) by mouth 2 times daily. To have available for dose adjustments per transplant team. Discharge dose = 4 mg BID. - Oral    Class: E-Prescribe     tacrolimus (GENERIC EQUIVALENT) 1 MG capsule 240 capsule 2025 --    Sig - Route: Take 4 capsules (4 mg) by mouth 2 times daily. - Oral    Class: E-Prescribe            Possible Immunosuppression-related side effects:   []             headache  []             vivid  dreams  []             irritability  []             cognitive difficuties  []             fine tremor  []             nausea  []             diarrhea  []             neuropathy      []             edema  []             renal calcineurin toxicity  []             hyperkalemia  []             post-transplant diabetes  []             decreased appetite  []             increased appetite  []             other:  []             none    Prescription Medications as of 6/3/2025         Rx Number Disp Refills Start End Last Dispensed Date Next Fill Date Owning Pharmacy    acetaminophen (TYLENOL) 325 MG tablet  -- --  --       Sig: Take 325-650 mg by mouth every 4 hours as needed    Class: Historical    Route: Oral    aspirin (ASA) 325 MG EC tablet  30 tablet 2 2025 --   [address]    Sig: Take 1 tablet (325 mg) by mouth daily.    Class: E-Prescribe    Route: Oral    atorvastatin (LIPITOR) 10 MG tablet  30 tablet 2 2025 --   [address]    Sig: Take 1 tablet (10 mg) by mouth daily.    Class: E-Prescribe    Route: Oral    ciprofloxacin (CIPRO) 500 MG tablet  6 tablet 0 2025 --   [address]    Sig: Take 1 tablet (500 mg) by mouth 2 times daily.    Class: E-Prescribe    Route: Oral    famotidine (PEPCID) 20 MG tablet  -- --  --       Sig: Take 20 mg by mouth 2 times daily as needed.    Class: Historical    Route: Oral    levonorgestrel (KYLEENA) 19.5 MG IUD  -- -- 3/31/2022 3/30/2027       Si Intra Uterine Device by Intrauterine route once    Class: Historical    Route: Intrauterine    methocarbamol (ROBAXIN) 500 MG tablet  20 tablet 0 2025 --   [address]    Sig: Take 1 tablet (500 mg) by mouth every 6 hours as needed for muscle spasms.    Class: E-Prescribe     Route: Oral    mycophenolate (GENERIC EQUIVALENT) 250 MG capsule  180 capsule 11 5/20/2025 --   [facility] [place], [place] - [address] [place] [address]    Sig: Take 3 capsules (750 mg) by mouth 2 times daily.    Class: E-Prescribe    Route: Oral    nortriptyline (PAMELOR) 25 MG capsule  -- -- 12/30/2024 --       Sig: Take 25 mg by mouth at bedtime.    Class: Historical    Route: Oral    ondansetron (ZOFRAN ODT) 4 MG ODT tab  20 tablet 0 5/20/2025 --   [address]    Sig: Take 1 tablet (4 mg) by mouth every 8 hours as needed for nausea.    Class: E-Prescribe    Route: Oral    oxyCODONE (ROXICODONE) 5 MG tablet  12 tablet 0 5/23/2025 --   [id number] [place], [place] - [address]    Sig: Take 1-2 tablets (5-10 mg) by mouth every 8 hours as needed for moderate to severe pain.    Class: E-Prescribe    Earliest Fill Date: 5/23/2025    Route: Oral    pantoprazole (PROTONIX) 40 MG EC tablet  30 tablet 1 5/30/2025 --   [id number] - [place], [place] - [address]    Sig: Take 1 tablet (40 mg) by mouth daily.    Class: E-Prescribe    Route: Oral    Semaglutide-Weight Management (WEGOVY) 2.4 MG/0.75ML pen  -- -- 7/17/2025 --       Sig: Inject 2.4 mg subcutaneously once a week. HOLD x 3 months    Class: No Print Out    Route: Subcutaneous    senna-docusate (SENOKOT-S/PERICOLACE) 8.6-50 MG tablet  60 tablet 0 5/20/2025 --   [place], [place] - [address] [place] [address]    Sig: Take 1 tablet by mouth 2 times daily.    Class: E-Prescribe    Route: Oral    tacrolimus (GENERIC EQUIVALENT) 0.5 MG capsule  60 capsule 11 5/28/2025 --   [facility] [place], [place] - [address]    Sig: Profile Rx: patient will contact pharmacy when needed    Class: E-Prescribe    Notes to Pharmacy: TXP DT 5/17/2025 (Kidney) TXP Dischg DT 5/20/2025 DX  Kidney replaced by transplant Z94.0 TX [facility] ([place], [place])    tacrolimus (GENERIC EQUIVALENT) 1 MG capsule  180 capsule 11 6/2/2025 --   [facility] - [place], [place] - [address]    Sig: Take 3 capsules (3 mg) by mouth 2 times daily.    Class: E-Prescribe    Notes to Pharmacy: TXP DT 5/17/2025 (Kidney) TXP Dischg DT 5/20/2025 DX Kidney replaced by transplant Z94.0 TX [facility] ([place], [place])    Route: Oral    tacrolimus (GENERIC EQUIVALENT) 5 MG capsule  -- -- 5/28/2025 --       Sig: Profile Rx: patient will contact pharmacy when needed    Class: Historical    Notes to Pharmacy: TXP DT 5/17/2025 (Kidney) TXP Dischg DT 5/20/2025 DX Kidney replaced by transplant Z94.0 [facility] ([place], [place])    valGANciclovir (VALCYTE) 450 MG tablet  60 tablet 2 5/21/2025 --   [facility] - [place], [place] - [address]    Sig: Take 1 tablet (450 mg) by mouth every other day.    Class: E-Prescribe    Route: Oral    varenicline (CHANTIX SANCHEZ) 0.5 MG X 11 & 1 MG X 42 tablet  -- -- 1/13/2025 --       Sig: Take 0.5 mg by mouth daily.    Class: Historical    Route: Oral    VENTOLIN  (90 Base) MCG/ACT inhaler  -- --  --       Sig: INHALE 2 PUFFS BY MOUTH EVERY 4 HOURS AS NEEDED FOR SHORTNESS OF BREATH OR COUGH OR EXERCISE    Class: Historical            O:   Temp:  [97.9  F (36.6  C)] 97.9  F (36.6  C)  Pulse:  [80] 80  Resp:  [16] 16  BP: (119)/(70) 119/70  SpO2:  [100 %] 100 %  General Appearance: in no apparent distress.   Skin: Normal, no rashes or jaundice  Heart: perfused.   Lungs: easy respirations, no audible wheezing.  Abdomen: The wound is dry and intact, without hernia. The abdomen is non-tender. The kidney graft is not tender. Protruding abdomen R>L.   Extremities: Tremor absent.   Edema: No edema        Latest Ref  Rng & Units 6/2/2025     8:39 AM 5/27/2025     8:00 AM 5/22/2025     7:00 AM 5/21/2025     7:35 AM 5/20/2025     5:53 AM   Transplant Immunosuppression Labs   Creat 0.51 - 0.95 mg/dL 3.53  3.06  2.49  2.82     Urea Nitrogen 6.0 - 20.0 mg/dL 23.8  22.4  28.7  31.8     WBC 4.0 - 11.0 10e3/uL 5.1  5.0  5.6     5.6  4.3  3.2    Neutrophil %   89  87  86    ANEU 1.6 - 8.3 10e3/uL   5.0  3.7  2.7        Chemistries:   Recent Labs   Lab Test 06/02/25  0839   BUN 23.8*   CR 3.53*   GFRESTIMATED 17*   *     Lab Results   Component Value Date    A1C 4.9 05/17/2025     Recent Labs   Lab Test 05/22/25  0700   ALBUMIN 3.8   BILITOTAL 0.5   ALKPHOS 56   AST 18   ALT 22     Urine Studies:  Recent Labs   Lab Test 05/21/25  0959   COLOR Yellow   APPEARANCE Slightly Cloudy*   URINEGLC Negative   URINEBILI Negative   URINEKETONE Negative   SG 1.017   UBLD Large*   URINEPH 5.5   PROTEIN 50*   NITRITE Positive*   LEUKEST Large*   RBCU 97*   WBCU 78*     No lab results found.  Hematology:   Recent Labs   Lab Test 06/02/25  0839 05/27/25  0800 05/22/25  0700   HGB 10.0* 9.5* 9.8*  9.8*    362 199  199   WBC 5.1 5.0 5.6  5.6     Coags:   Recent Labs   Lab Test 05/17/25  0203 05/20/24  1343   INR 0.98 1.12     HLA antibodies:   SA1 HI RISK SADIQ   Date Value Ref Range Status   05/22/2025 B:44 45 67 76 82  Final     SA1 MOD RISK SADIQ   Date Value Ref Range Status   05/22/2025 A:2B:7 13 27 41 42 48 54 55 56 60 61 73 81  Final     SA2 HI RISK SADIQ   Date Value Ref Range Status   05/22/2025 None  Final     SA2 MOD RISK SADIQ   Date Value Ref Range Status   05/22/2025 None  Final       Assessment: Glenna Spears is doing fairly well s/p DDKT:  Issues we addressed during her visit include:    Plan:    1. Graft function: Cr 3.5<-3.7<-3.1 from wendie 2.5 post transplant. Will plan for kidney biopsy now that UTI is resolved, perinephric fluid collection was drained on 5/29 (120cc serosang output). Obtain US kidney.   2. Immunosuppression  Management: Tac level 7.2 after one dose of 3 mg, dose reduced on 6/1 due to 5/29 level 13.8. Instructed patient to increase back to 4 mg BID. Complexity of management: Medium. Contributing factors: DDKT,UTI, pain management   3. Complete Cipro  4. Obtain Right UE US for edema >L side. RUE with non occlusive DVT, likely line associated due to RIJ. Refer to hematology.     Joaquina Tapia PA-C

## 2025-06-03 NOTE — LETTER
6/3/2025      Glenna Spears  [address]  [place] [address]      Dear Colleague,    Thank you for referring your patient, Glenna Spears, to the [facility] TRANSPLANT [facility]INIC. Please see a copy of my visit note below.    Transplant Surgery Progress Note    Transplants:  2025 (Kidney); Postoperative day:  17  S: Glenna Spears is an 34 year old female with history of ESRD of unknown etiology, anemia, obesity s/p gastric sleeve in , and latent TB. S/p DCD  donor kidney transplant w/ ureteral stent on 25.     -Pentamidine given , no Bactrim d/t sulfa allergy, G6PD WNL. Consider switch to dapsone as on .     Urine culture + for E.coli: ureteral stent removed and on antibiotics. Symptoms resolved.     Appetite is good. Drinking around 2 liters.     Biggest complaint is feeling of fullness around the kidney.     Transplant History:    Transplant Type:  DDKT  Donor Type: Donation after Circulatory Death   Transplant Date:  2025 (Kidney)   Ureteral Stent:  Yes, now removed   Crossmatch:  negative   DSA at Tx:  No  Baseline Cr: TBD   DeNovo DSA: No    Acute Rejection Hx:  No    Present Maintenance Immunosuppression:  Tacrolimus and Mycophenolate mofetil    CMV IgG Ab Discordance:  No  EBV IgG Ab Discordance:  No    BK Viremia:  No  EBV Viremia:  No    Transplant Coordinator: Tammi Cuellar     Transplant Office Phone Number: [phone]     Immunosuppressant Medications       Immunosuppressive Agents Disp Start End     mycophenolate (GENERIC EQUIVALENT) 250 MG capsule 180 capsule 2025 --    Sig - Route: Take 3 capsules (750 mg) by mouth 2 times daily. - Oral    Class: E-Prescribe     tacrolimus (GENERIC EQUIVALENT) 0.5 MG capsule 60 capsule 2025 --    Sig - Route: Take 1 capsule (0.5 mg) by mouth 2 times daily. To have available for dose adjustments per transplant team. Discharge dose = 4 mg BID. - Oral    Class: E-Prescribe     tacrolimus (GENERIC EQUIVALENT) 1 MG  capsule 240 capsule 2025 --    Sig - Route: Take 4 capsules (4 mg) by mouth 2 times daily. - Oral    Class: E-Prescribe            Possible Immunosuppression-related side effects:   []             headache  []             vivid dreams  []             irritability  []             cognitive difficuties  []             fine tremor  []             nausea  []             diarrhea  []             neuropathy      []             edema  []             renal calcineurin toxicity  []             hyperkalemia  []             post-transplant diabetes  []             decreased appetite  []             increased appetite  []             other:  []             none    Prescription Medications as of 6/3/2025         Rx Number Disp Refills Start End Last Dispensed Date Next Fill Date Owning Pharmacy    acetaminophen (TYLENOL) 325 MG tablet  -- --  --       Sig: Take 325-650 mg by mouth every 4 hours as needed    Class: Historical    Route: Oral    aspirin (ASA) 325 MG EC tablet  30 tablet 2 2025 --   [address]    Sig: Take 1 tablet (325 mg) by mouth daily.    Class: E-Prescribe    Route: Oral    atorvastatin (LIPITOR) 10 MG tablet  30 tablet 2 2025 --   [address]    Sig: Take 1 tablet (10 mg) by mouth daily.    Class: E-Prescribe    Route: Oral    ciprofloxacin (CIPRO) 500 MG tablet  6 tablet 0 2025 --   [address]    Sig: Take 1 tablet (500 mg) by mouth 2 times daily.    Class: E-Prescribe    Route: Oral    famotidine (PEPCID) 20 MG tablet  -- --  --       Sig: Take 20 mg by mouth 2 times daily as needed.    Class: Historical    Route: Oral    levonorgestrel (KYLEENA) 19.5 MG IUD  -- -- 3/31/2022 3/30/2027       Si Intra Uterine Device by Intrauterine route once    Class: Historical    Route: Intrauterine     methocarbamol (ROBAXIN) 500 MG tablet  20 tablet 0 5/20/2025 --   [address]    Sig: Take 1 tablet (500 mg) by mouth every 6 hours as needed for muscle spasms.    Class: E-Prescribe    Route: Oral    mycophenolate (GENERIC EQUIVALENT) 250 MG capsule  180 capsule 11 5/20/2025 --   [address]    Sig: Take 3 capsules (750 mg) by mouth 2 times daily.    Class: E-Prescribe    Route: Oral    nortriptyline (PAMELOR) 25 MG capsule  -- -- 12/30/2024 --       Sig: Take 25 mg by mouth at bedtime.    Class: Historical    Route: Oral    ondansetron (ZOFRAN ODT) 4 MG ODT tab  20 tablet 0 5/20/2025 --   [place], [place] - [address] [place] [address]    Sig: Take 1 tablet (4 mg) by mouth every 8 hours as needed for nausea.    Class: E-Prescribe    Route: Oral    oxyCODONE (ROXICODONE) 5 MG tablet  12 tablet 0 5/23/2025 --   [id number] [place], [place] - [address]    Sig: Take 1-2 tablets (5-10 mg) by mouth every 8 hours as needed for moderate to severe pain.    Class: E-Prescribe    Earliest Fill Date: 5/23/2025    Route: Oral    pantoprazole (PROTONIX) 40 MG EC tablet  30 tablet 1 5/30/2025 --   [id number] - [place], [place] - [address]    Sig: Take 1 tablet (40 mg) by mouth daily.    Class: E-Prescribe    Route: Oral    Semaglutide-Weight Management (WEGOVY) 2.4 MG/0.75ML pen  -- -- 7/17/2025 --       Sig: Inject 2.4 mg subcutaneously once a week. HOLD x 3 months    Class: No Print Out    Route: Subcutaneous    senna-docusate (SENOKOT-S/PERICOLACE) 8.6-50 MG tablet  60 tablet 0 5/20/2025 --   [address]    Sig: Take 1 tablet by mouth 2 times daily.    Class: E-Prescribe    Route: Oral    tacrolimus (GENERIC EQUIVALENT) 0.5 MG capsule  60 capsule 11  5/28/2025 --   [facility] - [place], [place] - [address]    Sig: Profile Rx: patient will contact pharmacy when needed    Class: E-Prescribe    Notes to Pharmacy: TXP DT 5/17/2025 (Kidney) TXP Dischg DT 5/20/2025 DX Kidney replaced by transplant Z94.0 [facility] ([place], [place])    tacrolimus (GENERIC EQUIVALENT) 1 MG capsule  180 capsule 11 6/2/2025 --   [facility] - [place], [place] - [address]    Sig: Take 3 capsules (3 mg) by mouth 2 times daily.    Class: E-Prescribe    Notes to Pharmacy: TXP DT 5/17/2025 (Kidney) TXP Dischg DT 5/20/2025 DX Kidney replaced by transplant Z94.0 [facility] ([place], [place])    Route: Oral    tacrolimus (GENERIC EQUIVALENT) 5 MG capsule  -- -- 5/28/2025 --       Sig: Profile Rx: patient will contact pharmacy when needed    Class: Historical    Notes to Pharmacy: TXP DT 5/17/2025 (Kidney) TXP Dischg DT 5/20/2025 DX Kidney replaced by transplant Z94.0 [facility] ([place], [place])    valGANciclovir (VALCYTE) 450 MG tablet  60 tablet 2 5/21/2025 --   [facility] - [place], [place] - [address]    Sig: Take 1 tablet (450 mg) by mouth every other day.    Class: E-Prescribe    Route: Oral    varenicline (CHANTIX SANCHEZ) 0.5 MG X 11 & 1 MG X 42 tablet  -- -- 1/13/2025 --       Sig: Take 0.5 mg by mouth daily.    Class: Historical    Route: Oral    VENTOLIN  (90 Base) MCG/ACT inhaler  -- --  --       Sig: INHALE 2 PUFFS BY MOUTH EVERY 4 HOURS AS NEEDED FOR SHORTNESS OF BREATH OR COUGH OR EXERCISE    Class: Historical            O:   Temp:  [97.9  F (36.6  C)] 97.9  F (36.6  C)  Pulse:  [80] 80  Resp:  [16] 16  BP: (119)/(70) 119/70  SpO2:  [100 %] 100 %  General Appearance: in no apparent distress.   Skin: Normal, no rashes or jaundice  Heart: perfused.    Lungs: easy respirations, no audible wheezing.  Abdomen: The wound is dry and intact, without hernia. The abdomen is non-tender. The kidney graft is not tender. Protruding abdomen R>L.   Extremities: Tremor absent.   Edema: No edema        Latest Ref Rng & Units 6/2/2025     8:39 AM 5/27/2025     8:00 AM 5/22/2025     7:00 AM 5/21/2025     7:35 AM 5/20/2025     5:53 AM   Transplant Immunosuppression Labs   Creat 0.51 - 0.95 mg/dL 3.53  3.06  2.49  2.82     Urea Nitrogen 6.0 - 20.0 mg/dL 23.8  22.4  28.7  31.8     WBC 4.0 - 11.0 10e3/uL 5.1  5.0  5.6     5.6  4.3  3.2    Neutrophil %   89  87  86    ANEU 1.6 - 8.3 10e3/uL   5.0  3.7  2.7        Chemistries:   Recent Labs   Lab Test 06/02/25  0839   BUN 23.8*   CR 3.53*   GFRESTIMATED 17*   *     Lab Results   Component Value Date    A1C 4.9 05/17/2025     Recent Labs   Lab Test 05/22/25  0700   ALBUMIN 3.8   BILITOTAL 0.5   ALKPHOS 56   AST 18   ALT 22     Urine Studies:  Recent Labs   Lab Test 05/21/25  0959   COLOR Yellow   APPEARANCE Slightly Cloudy*   URINEGLC Negative   URINEBILI Negative   URINEKETONE Negative   SG 1.017   UBLD Large*   URINEPH 5.5   PROTEIN 50*   NITRITE Positive*   LEUKEST Large*   RBCU 97*   WBCU 78*     No lab results found.  Hematology:   Recent Labs   Lab Test 06/02/25  0839 05/27/25  0800 05/22/25  0700   HGB 10.0* 9.5* 9.8*  9.8*    362 199  199   WBC 5.1 5.0 5.6  5.6     Coags:   Recent Labs   Lab Test 05/17/25  0203 05/20/24  1343   INR 0.98 1.12     HLA antibodies:   SA1 HI RISK SADIQ   Date Value Ref Range Status   05/22/2025 B:44 45 67 76 82  Final     SA1 MOD RISK SADIQ   Date Value Ref Range Status   05/22/2025 A:2B:7 13 27 41 42 48 54 55 56 60 61 73 81  Final     SA2 HI RISK SADIQ   Date Value Ref Range Status   05/22/2025 None  Final     SA2 MOD RISK SADIQ   Date Value Ref Range Status   05/22/2025 None  Final       Assessment: Glenna INA Spears is doing fairly well s/p DDKT:  Issues we addressed during her visit  include:    Plan:    1. Graft function: Cr 3.5<-3.7<-3.1 from wendie 2.5 post transplant. Will plan for kidney biopsy now that UTI is resolved, perinephric fluid collection was drained on 5/29 (120cc serosang output). Obtain US kidney.   2. Immunosuppression Management: Tac level 7.2 after one dose of 3 mg, dose reduced on 6/1 due to 5/29 level 13.8. Instructed patient to increase back to 4 mg BID. Complexity of management: Medium. Contributing factors: DDKT,UTI, pain management   3. Complete Cipro  4. Obtain Right UE US for edema >L side.     Joaquina Tapia PA-C     Again, thank you for allowing me to participate in the care of your patient.        Sincerely,        RAFAEL Cordova CNP    Electronically signed

## 2025-06-03 NOTE — CONSULTS
"    Outpatient IR Referral  06/03/25    Glenna Spears  [id number]    Referral Request:    Outpatient IR Referral; EMERGENT. Glenna Spears. MRN: [id number]. Transplant kidney biopsy. Hx: kidney txp (RLQ 5/17/25). elevated creatinine. AC = Yes. Referring[facility] Call Back # [phone].     ===  Procedure Approved for:    Ultrasound guided transplant kidney biopsy. ALSO, Aspirate saloni-nephric fluid around transplant organ at time of kidney transplant biopsy encounter.    Outpatient IR Referral; EMERGENT. Glenna Spears. MRN: [id number]. Transplant kidney biopsy. Hx: kidney txp (RLQ 5/17/25). elevated creatinine. AC = Yes. Referring: Nataliya Barragan APRN CNP in  [facility]. Call Back # [phone].     ===  Plan:  Procedure order and surgical pathology order placed.    If requesting provider would like specimen sample sent for anything other than surgical pathology please use the IR order set \"IR RAD Biopsy or Fluid Aspirate Specimens\" to select your necessary diagnostic labs and pend for admission.     If there are labs you desire that are not found in this order set or you have questions regarding specific diagnostic labs please call the associated lab personnel.     Request sent to procedural scheduling work queue for appointment.    *Please note the date of procedure is tentative and that the Timing of Procedure is TBD Based on Staffing/Schedule and Triage*    No pre-op clinic visit is required.    Cheo Gallardo PA-C  Interventional Radiology   IR on-call pager: [phone]    "

## 2025-06-03 NOTE — LETTER
6/3/2025       RE: Glenna Spears  [address]  [place] [address]     Dear Colleague,    Thank you for referring your patient, Glenna Spears, to the [facility] WE[facility] MANAG[facility]INIC [place] [facility]. Please see a copy of my visit note below.    Return Bariatric Surgery Note    RE: Glenna Spears  MR#: [id number]  : 1990  VISIT DATE: Glen 3, 2025      Dear Neela Irene,    I had the pleasure of seeing your patient, Glenna Spears, in my post-bariatric surgery assessment clinic.    Assessment & Plan  Problem List Items Addressed This Visit       Depressive disorder (Chronic)    Relevant Orders    Adult Mental Health  Referral    Kidney replaced by transplant (Chronic)    Relevant Orders    Adult Mental Health  Referral     Other Visit Diagnoses         S/P laparoscopic sleeve gastrectomy    -  Primary    Relevant Orders    Adult Mental Health  Referral               21 minutes spent by me on the date of the encounter doing chart review, history and exam, documentation and further activities per the note    CHIEF COMPLAINT: Post-bariatric surgery follow-up.    HISTORY OF PRESENT ILLNESS:      2025     3:36 PM   Questions Regarding Prior Weight Loss Surgery Reviewed With Patient   I had the following weight loss procedure Sleeve Gastrectomy   What year was your surgery?    How has your weight changed since your last visit? I have stayed about the same   Do you currently have any of the following Heartburn, acid reflux, or GERD (acid reflux disease)?   Do you have any concerns today? No         Plan  Agree with your transplant team to restart pantoprazole to help with your GERD, contact us if your heartburn is persisting despite this, we could trial other PPI medications.   Continue to hold Wegovy, when cleared to restart Wegovy by transplant team should restart at the 0.25 mg dose to reduce risk  of side effects.  Goals we discussed today:   Continue to prioritize protein  Continue to increase exercise and daily movement as tolerated  Follow up with Kelli in 3-4 months   Referral placed to health psychology provider as Glenna is endorsing worsened depression in the context of her complications surrounding her kidney transplant.  Dietician appointment as needed   Keep up the excellent work!         Interval History:   Status post kidney transplant May 2025 (ESRD due to longstanding nephrotic range proteinuria and HTN). Hx of chronic drug use, Excedrin use.  Sober >1 year. Quit smoking January 2023     Sleeve gastrectomy 1/23/24- well tolerated aside from GERD         Last seen by me 1/9/25  Almost 1 year post op - down 151 lbs since starting with the program.   Reports things are going a lot better, finding life is easier with her weight loss, increased energy, able to get more done, work more.   Some concerns with FMLA lately so has been skipping 1 session of dialysis each week- discussed the importance of regular dialysis to mange her disease and preserve her eligibility for a transplant.      Struggling with quitting smoking on and off.   Still getting GERD occasionally- needing pepcid prn for this.   Denies vomiting.   Bothered by excess loose skin, denies seeing rashes in these areas. Plans to address loose skin after kidney transplant.   Establishing care with a new place- [facility].   -increased wegovy to 2.4mg    - kidney transplant May 2025.     Today in visit 6/3/25     Elevated PHQ today, Glenna reports it's situational due to several health issues she's been having since her kidney transplant. Issues with swelling around kidney, also planning to get kidney biopsy.   Newly diagnosed with DVT in neck and blood clot in arm today, being followed by nephrologist- plan to speak with hematologist for this.     Is loving not having to go to dialysis.     Regarding bariatric surgery, feeling  excellent from her sleeve gastrectomy, describes it as the best decision she's made.     Holding wegovy for the time being since her transplant, not restarting at least until July 2025.     Had met with a therapist in the past, is open to meeting with a new therapist for support with her worsened mental health, will place referral today for this.    Started nortriptyline through neurologist for nerve pain but is noting that it's helping her mental health.     Wt Readings from Last 5 Encounters:   06/03/25 74.2 kg (163 lb 8 oz)   06/03/25 74.2 kg (163 lb 8 oz)   05/22/25 72.5 kg (159 lb 12.8 oz)   05/21/25 72 kg (158 lb 12.8 oz)   05/20/25 71.4 kg (157 lb 8 oz)       Anti-obesity medication history:     Current:   Wegovy- holding since kidney transplant, planning to restart in July.     Past/failed/contraindicated:       Recent diet changes: eating 3 meals a day, grandma is cooking for her a lot since her surgery. Eating lots of [place] food- beef, pork. Drinks an ensure if she's having less of an appetite.     Recent exercise/activity changes: Working on moving more, still in process of recovering from kidney transplant     Vitamins/Labs: bariatric labs completed may 2025.     GERD symptoms: ongoing as an issue, seems to have worsened since kidney transplant. GERD previously well managed by daily pepcid. SOT team prescribed pantoprazole today to see if helpful.     Vomiting : some nausea/ vomiting after kidney transplant, nausea well managed with zofran lately.     Dysphagia:  denies           Weight History:      6/2/2025     3:36 PM   --   What is your highest lifetime weight? 345   What is your lowest weight since surgery? (In pounds) 152     Initial Weight (lbs): 320 lbs  Weight: 74.2 kg (163 lb 8 oz)     Cumulative weight loss (lbs): 156.5  Weight Loss Percentage: 48.91%        6/2/2025     3:36 PM   Questions Regarding Co-Morbidities and Health Concerns Reviewed With Patient   Pre-diabetes Never   Diabetes  II Never   High Blood Pressure Gone Away   High cholesterol Never   Heartburn/Reflux Worsened   Sleep apnea Gone away   PCOS Stayed the same   Back pain Gone away   Joint pain Never   Lower leg swelling Stayed the same           6/2/2025     3:36 PM   Eating Habits   How many meals do you eat per day? 2   Do you snack between meals? Yes   How much food are you eating at each meal? 1/2 cup to 1 cup   Are you able to separate your meals and liquids by at least 30 minutes? Sometimes   Are you able to avoid liquid calories? Sometimes           6/2/2025     3:36 PM   Exercise Questions Reviewed With Patient   How often do you exercise? 3 to 4 times per week   What is the duration of your exercise (in minutes)? 30 Minutes   What types of exercise do you do? walking    swimming    other   What keeps you from being more active? I have recently been sick       Social History:      6/2/2025     3:36 PM   --   Are you smoking? No   Are you drinking alcohol? No       Medications:  Current Outpatient Medications   Medication Sig Dispense Refill     acetaminophen (TYLENOL) 325 MG tablet Take 325-650 mg by mouth every 4 hours as needed       aspirin (ASA) 325 MG EC tablet Take 1 tablet (325 mg) by mouth daily. 30 tablet 2     atorvastatin (LIPITOR) 10 MG tablet Take 1 tablet (10 mg) by mouth daily. 30 tablet 2     ciprofloxacin (CIPRO) 500 MG tablet Take 1 tablet (500 mg) by mouth daily. 3 tablet 0     ciprofloxacin (CIPRO) 500 MG tablet Take 1 tablet (500 mg) by mouth 2 times daily. 6 tablet 0     famotidine (PEPCID) 20 MG tablet Take 20 mg by mouth 2 times daily as needed.       levonorgestrel (KYLEENA) 19.5 MG IUD 1 Intra Uterine Device by Intrauterine route once       methocarbamol (ROBAXIN) 500 MG tablet Take 1 tablet (500 mg) by mouth every 6 hours as needed for muscle spasms. 20 tablet 0     mycophenolate (GENERIC EQUIVALENT) 250 MG capsule Take 3 capsules (750 mg) by mouth 2 times daily. 180 capsule 11     nortriptyline  (PAMELOR) 25 MG capsule Take 25 mg by mouth at bedtime.       ondansetron (ZOFRAN ODT) 4 MG ODT tab Take 1 tablet (4 mg) by mouth every 8 hours as needed for nausea. 20 tablet 0     oxyCODONE (ROXICODONE) 5 MG tablet Take 1-2 tablets (5-10 mg) by mouth every 8 hours as needed for moderate to severe pain. 12 tablet 0     pantoprazole (PROTONIX) 40 MG EC tablet Take 1 tablet (40 mg) by mouth daily. 30 tablet 1     senna-docusate (SENOKOT-S/PERICOLACE) 8.6-50 MG tablet Take 1 tablet by mouth 2 times daily. (Patient taking differently: Take 2 tablets by mouth 2 times daily.) 60 tablet 0     tacrolimus (GENERIC EQUIVALENT) 0.5 MG capsule Profile Rx: patient will contact pharmacy when needed (Patient not taking: Reported on 6/3/2025) 60 capsule 11     tacrolimus (GENERIC EQUIVALENT) 1 MG capsule Take 3 capsules (3 mg) by mouth 2 times daily. 180 capsule 11     tacrolimus (GENERIC EQUIVALENT) 5 MG capsule Profile Rx: patient will contact pharmacy when needed (Patient not taking: Reported on 6/3/2025)       valGANciclovir (VALCYTE) 450 MG tablet Take 1 tablet (450 mg) by mouth every other day. 60 tablet 2     varenicline (CHANTIX SANCHEZ) 0.5 MG X 11 & 1 MG X 42 tablet Take 0.5 mg by mouth daily.       VENTOLIN  (90 Base) MCG/ACT inhaler INHALE 2 PUFFS BY MOUTH EVERY 4 HOURS AS NEEDED FOR SHORTNESS OF BREATH OR COUGH OR EXERCISE       No current facility-administered medications for this visit.         6/2/2025     3:36 PM   --   Do you avoid NSAIDs such as (Ibuprofen, Aleve, Naproxen, Advil)? Yes       ROS:  GI:       6/2/2025     3:36 PM   --   Vomiting No   Diarrhea No   Constipation Yes   Swallowing trouble No   Abdominal pain No   Heartburn Yes     Skin:       6/2/2025     3:36 PM   BAR RBS ROS - SKIN   Rash in skin folds No     Psych:       6/2/2025     3:36 PM   --   Depression No   Anxiety Yes     Female Only:       6/2/2025     3:36 PM   BAR RBS ROS -    Female only Birth control   Stress urinary incontinence  "No         1/22/2023     5:08 PM 5/25/2025    11:06 AM   GABBY Score (Last Two)   GABBY Raw Score 32    32 35    Activation Score 62.6    62.6 72.1    GABBY Level 3    3 3        Patient-reported         PHYSICAL EXAM:  Objective   Ht 1.727 m (5' 8\")   Wt 74.2 kg (163 lb 8 oz)   BMI 24.86 kg/m           Vitals:  No vitals were obtained today due to virtual visit.    Physical Exam   GENERAL: alert and no distress  EYES: Eyes grossly normal to inspection.  No discharge or erythema, or obvious scleral/conjunctival abnormalities.  RESP: No audible wheeze, cough, or visible cyanosis.    SKIN: Visible skin clear. No significant rash, abnormal pigmentation or lesions.  NEURO: Cranial nerves grossly intact.  Mentation and speech appropriate for age.  PSYCH: Appropriate affect, tone, and pace of words        Sincerely,    Kelli Joshi PA-C    The longitudinal plan of care for the diagnosis(es)/condition(s) as documented were addressed during this visit. Due to the added complexity in care, I will continue to support Glenna in the subsequent management and with ongoing continuity of care.     Virtual Visit Details    Type of service:  Video Visit   Video Start Time: 2:37pm  Video End Time:2:55pm    Originating Location (pt. Location): Home    Distant Location (provider location):  Off-site  Platform used for Video Visit: AmWell      Again, thank you for allowing me to participate in the care of your patient.      Sincerely,    Kelli Joshi PA-C    "

## 2025-06-04 ENCOUNTER — TELEPHONE (OUTPATIENT)
Dept: INTERVENTIONAL RADIOLOGY/VASCULAR | Facility: CLINIC | Age: 35
End: 2025-06-04
Payer: COMMERCIAL

## 2025-06-04 ENCOUNTER — TELEPHONE (OUTPATIENT)
Dept: TRANSPLANT | Facility: CLINIC | Age: 35
End: 2025-06-04
Payer: COMMERCIAL

## 2025-06-04 ENCOUNTER — PATIENT OUTREACH (OUTPATIENT)
Dept: CARE COORDINATION | Facility: CLINIC | Age: 35
End: 2025-06-04
Payer: COMMERCIAL

## 2025-06-04 LAB
INT SUB COMMENTS: NORMAL
INT SUB RESULT: NORMAL
INTERF SUBSTANCE: NORMAL
INTSUB TEST METHOD: NORMAL

## 2025-06-04 NOTE — TELEPHONE ENCOUNTER
Post Kidney and Pancreas Transplant Team Conference  Date: 6/4/2025  Transplant Coordinator: Tammi     Attendees:  [x]  Dr. Aleman [x] Katey Santiago, RN [x] Jodi Hodges LPN     []  Dr. Maddox [] Nunu Goldberg, RN    [x] Dr. Allen [x] Naty Zelaya RN    [] Dr. Lezama [x] Tammi Cuellar, RN [] Amrita Clemente RN   [] Dr. Sexton [] Meena Alford, RN    [] Dr. Jean [] Latoya Arsmtrong, TREVA [] Carlos Castro, PharmD   []  Dr. Barreto [] Danya Martel, RN    [] Dr. Garcia [] Pino Guerra RN     [] Johana Avila RN    [] Nataliya Barragan, CHAPARRO [] Ashley Early RN        Verbal Plan Read Back:   Get biopsy done ASAP so she can get on anticoagulation. Discuss with hematology first (e-consult).    Routed to RN Coordinator   Jodi Hodges LPN

## 2025-06-05 ENCOUNTER — RESULTS FOLLOW-UP (OUTPATIENT)
Dept: TRANSPLANT | Facility: CLINIC | Age: 35
End: 2025-06-05

## 2025-06-05 ENCOUNTER — TELEPHONE (OUTPATIENT)
Dept: TRANSPLANT | Facility: CLINIC | Age: 35
End: 2025-06-05

## 2025-06-05 ENCOUNTER — LAB (OUTPATIENT)
Dept: LAB | Facility: CLINIC | Age: 35
End: 2025-06-05
Payer: COMMERCIAL

## 2025-06-05 DIAGNOSIS — Z20.828 CONTACT WITH AND (SUSPECTED) EXPOSURE TO OTHER VIRAL COMMUNICABLE DISEASES: ICD-10-CM

## 2025-06-05 DIAGNOSIS — Z48.298 AFTERCARE FOLLOWING ORGAN TRANSPLANT: ICD-10-CM

## 2025-06-05 DIAGNOSIS — Z79.899 ENCOUNTER FOR LONG-TERM CURRENT USE OF MEDICATION: ICD-10-CM

## 2025-06-05 DIAGNOSIS — Z94.0 KIDNEY REPLACED BY TRANSPLANT: ICD-10-CM

## 2025-06-05 DIAGNOSIS — Z98.890 OTHER SPECIFIED POSTPROCEDURAL STATES: ICD-10-CM

## 2025-06-05 LAB
A1: 5777
A3: 586
ANION GAP SERPL CALCULATED.3IONS-SCNC: 11 MMOL/L (ref 7–15)
B60: NORMAL
B8: 9517
BUN SERPL-MCNC: 20.5 MG/DL (ref 6–20)
CALCIUM SERPL-MCNC: 9.9 MG/DL (ref 8.8–10.4)
CHLORIDE SERPL-SCNC: 109 MMOL/L (ref 98–107)
CREAT SERPL-MCNC: 2.41 MG/DL (ref 0.51–0.95)
DONOR IDENTIFICATION: NORMAL
DR17: 5139
DR52: 3754
DSA COMMENTS: NORMAL
DSA PRESENT: YES
DSA TEST METHOD: NORMAL
EGFRCR SERPLBLD CKD-EPI 2021: 26 ML/MIN/1.73M2
ERYTHROCYTE [DISTWIDTH] IN BLOOD BY AUTOMATED COUNT: 13 % (ref 10–15)
GLUCOSE SERPL-MCNC: 95 MG/DL (ref 70–99)
HCO3 SERPL-SCNC: 20 MMOL/L (ref 22–29)
HCT VFR BLD AUTO: 28.7 % (ref 35–47)
HGB BLD-MCNC: 9.4 G/DL (ref 11.7–15.7)
MCH RBC QN AUTO: 31.1 PG (ref 26.5–33)
MCHC RBC AUTO-ENTMCNC: 32.8 G/DL (ref 31.5–36.5)
MCV RBC AUTO: 95 FL (ref 78–100)
ORGAN: NORMAL
PLATELET # BLD AUTO: 312 10E3/UL (ref 150–450)
POTASSIUM SERPL-SCNC: 4.4 MMOL/L (ref 3.4–5.3)
RBC # BLD AUTO: 3.02 10E6/UL (ref 3.8–5.2)
SA 1  COMMENTS: NORMAL
SA 1 CELL: NORMAL
SA 1 TEST METHOD: NORMAL
SA 2 CELL: NORMAL
SA 2 COMMENTS: NORMAL
SA 2 TEST METHOD: NORMAL
SA1 HI RISK ABY: NORMAL
SA1 MOD RISK ABY: NORMAL
SA2 HI RISK ABY: NORMAL
SA2 MOD RISK ABY: NORMAL
SODIUM SERPL-SCNC: 140 MMOL/L (ref 135–145)
TACROLIMUS BLD-MCNC: 6.9 UG/L (ref 5–15)
TME LAST DOSE: NORMAL H
TME LAST DOSE: NORMAL H
UNACCEPTABLE ANTIGENS: NORMAL
UNOS CPRA: 100
WBC # BLD AUTO: 5.2 10E3/UL (ref 4–11)

## 2025-06-06 ENCOUNTER — APPOINTMENT (OUTPATIENT)
Dept: MEDSURG UNIT | Facility: CLINIC | Age: 35
End: 2025-06-06
Attending: RADIOLOGY
Payer: COMMERCIAL

## 2025-06-06 ENCOUNTER — HOSPITAL ENCOUNTER (OUTPATIENT)
Facility: CLINIC | Age: 35
Discharge: HOME | End: 2025-06-06
Attending: RADIOLOGY | Admitting: RADIOLOGY
Payer: COMMERCIAL

## 2025-06-06 ENCOUNTER — APPOINTMENT (OUTPATIENT)
Dept: INTERVENTIONAL RADIOLOGY/VASCULAR | Facility: CLINIC | Age: 35
End: 2025-06-06
Attending: NURSE PRACTITIONER
Payer: COMMERCIAL

## 2025-06-06 VITALS
RESPIRATION RATE: 20 BRPM | HEART RATE: 78 BPM | OXYGEN SATURATION: 100 % | SYSTOLIC BLOOD PRESSURE: 136 MMHG | DIASTOLIC BLOOD PRESSURE: 85 MMHG | TEMPERATURE: 98 F

## 2025-06-06 DIAGNOSIS — Z48.298 AFTERCARE FOLLOWING ORGAN TRANSPLANT: ICD-10-CM

## 2025-06-06 LAB
ALBUMIN MFR UR ELPH: 20.7 MG/DL
ALBUMIN UR-MCNC: 10 MG/DL
ANION GAP SERPL CALCULATED.3IONS-SCNC: 10 MMOL/L (ref 7–15)
APPEARANCE UR: CLEAR
BASOPHILS # BLD AUTO: 0 10E3/UL (ref 0–0.2)
BASOPHILS NFR BLD AUTO: 1 %
BILIRUB UR QL STRIP: NEGATIVE
BUN SERPL-MCNC: 19.7 MG/DL (ref 6–20)
CALCIUM SERPL-MCNC: 9.7 MG/DL (ref 8.8–10.4)
CHLORIDE SERPL-SCNC: 111 MMOL/L (ref 98–107)
COLOR UR AUTO: ABNORMAL
CREAT FLD-MCNC: 2.1 MG/DL
CREAT SERPL-MCNC: 2.07 MG/DL (ref 0.51–0.95)
CREAT UR-MCNC: 134 MG/DL
CREATININE BODY FLUID SOURCE: NORMAL
EGFRCR SERPLBLD CKD-EPI 2021: 32 ML/MIN/1.73M2
EOSINOPHIL # BLD AUTO: 0.2 10E3/UL (ref 0–0.7)
EOSINOPHIL NFR BLD AUTO: 4 %
ERYTHROCYTE [DISTWIDTH] IN BLOOD BY AUTOMATED COUNT: 13.1 % (ref 10–15)
GLUCOSE SERPL-MCNC: 81 MG/DL (ref 70–99)
GLUCOSE UR STRIP-MCNC: NEGATIVE MG/DL
HCO3 SERPL-SCNC: 19 MMOL/L (ref 22–29)
HCT VFR BLD AUTO: 28.5 % (ref 35–47)
HGB BLD-MCNC: 9.1 G/DL (ref 11.7–15.7)
HGB UR QL STRIP: NEGATIVE
IMM GRANULOCYTES # BLD: 0 10E3/UL
IMM GRANULOCYTES NFR BLD: 0 %
INR PPP: 1.11 (ref 0.85–1.15)
KETONES UR STRIP-MCNC: NEGATIVE MG/DL
LEUKOCYTE ESTERASE UR QL STRIP: NEGATIVE
LYMPHOCYTES # BLD AUTO: 0.3 10E3/UL (ref 0.8–5.3)
LYMPHOCYTES NFR BLD AUTO: 7 %
MCH RBC QN AUTO: 31 PG (ref 26.5–33)
MCHC RBC AUTO-ENTMCNC: 31.9 G/DL (ref 31.5–36.5)
MCV RBC AUTO: 97 FL (ref 78–100)
MONOCYTES # BLD AUTO: 0.4 10E3/UL (ref 0–1.3)
MONOCYTES NFR BLD AUTO: 9 %
NEUTROPHILS # BLD AUTO: 3.2 10E3/UL (ref 1.6–8.3)
NEUTROPHILS NFR BLD AUTO: 79 %
NITRATE UR QL: NEGATIVE
NRBC # BLD AUTO: 0 10E3/UL
NRBC BLD AUTO-RTO: 0 /100
PH UR STRIP: 5.5 [PH] (ref 5–7)
PLATELET # BLD AUTO: 260 10E3/UL (ref 150–450)
POTASSIUM SERPL-SCNC: 4.2 MMOL/L (ref 3.4–5.3)
PROT/CREAT 24H UR: 0.15 MG/MG CR (ref 0–0.2)
PROTHROMBIN TIME: 14.6 SECONDS (ref 11.8–14.8)
RBC # BLD AUTO: 2.94 10E6/UL (ref 3.8–5.2)
SODIUM SERPL-SCNC: 140 MMOL/L (ref 135–145)
SP GR UR STRIP: 1.02 (ref 1–1.03)
TRIGL FLD-MCNC: 67 MG/DL
TRIGLYCERIDE BODY FLUID SOURCE: NORMAL
UROBILINOGEN UR STRIP-MCNC: NORMAL MG/DL
WBC # BLD AUTO: 4.1 10E3/UL (ref 4–11)

## 2025-06-06 PROCEDURE — 999N000133 HC STATISTIC PP CARE STAGE 2

## 2025-06-06 PROCEDURE — 85004 AUTOMATED DIFF WBC COUNT: CPT | Performed by: NURSE PRACTITIONER

## 2025-06-06 PROCEDURE — 250N000013 HC RX MED GY IP 250 OP 250 PS 637: Performed by: PHYSICIAN ASSISTANT

## 2025-06-06 PROCEDURE — 86832 HLA CLASS I HIGH DEFIN QUAL: CPT | Performed by: NURSE PRACTITIONER

## 2025-06-06 PROCEDURE — 50200 RENAL BIOPSY PERQ: CPT | Mod: RT | Performed by: PHYSICIAN ASSISTANT

## 2025-06-06 PROCEDURE — C1769 GUIDE WIRE: HCPCS

## 2025-06-06 PROCEDURE — 88305 TISSUE EXAM BY PATHOLOGIST: CPT | Mod: 26 | Performed by: PATHOLOGY

## 2025-06-06 PROCEDURE — 82570 ASSAY OF URINE CREATININE: CPT | Performed by: NURSE PRACTITIONER

## 2025-06-06 PROCEDURE — 99152 MOD SED SAME PHYS/QHP 5/>YRS: CPT

## 2025-06-06 PROCEDURE — 86828 HLA CLASS I&II ANTIBODY QUAL: CPT | Performed by: NURSE PRACTITIONER

## 2025-06-06 PROCEDURE — 272N000505 HC NEEDLE CR5

## 2025-06-06 PROCEDURE — 88350 IMFLUOR EA ADDL 1ANTB STN PX: CPT | Mod: 26 | Performed by: PATHOLOGY

## 2025-06-06 PROCEDURE — 84478 ASSAY OF TRIGLYCERIDES: CPT | Performed by: NURSE PRACTITIONER

## 2025-06-06 PROCEDURE — 84156 ASSAY OF PROTEIN URINE: CPT | Performed by: NURSE PRACTITIONER

## 2025-06-06 PROCEDURE — 250N000009 HC RX 250

## 2025-06-06 PROCEDURE — 85610 PROTHROMBIN TIME: CPT | Performed by: PHYSICIAN ASSISTANT

## 2025-06-06 PROCEDURE — 272N000192 HC ACCESSORY CR2

## 2025-06-06 PROCEDURE — 86833 HLA CLASS II HIGH DEFIN QUAL: CPT | Performed by: NURSE PRACTITIONER

## 2025-06-06 PROCEDURE — 49406 IMAGE CATH FLUID PERI/RETRO: CPT

## 2025-06-06 PROCEDURE — 99152 MOD SED SAME PHYS/QHP 5/>YRS: CPT | Performed by: PHYSICIAN ASSISTANT

## 2025-06-06 PROCEDURE — C1729 CATH, DRAINAGE: HCPCS

## 2025-06-06 PROCEDURE — 36415 COLL VENOUS BLD VENIPUNCTURE: CPT | Performed by: NURSE PRACTITIONER

## 2025-06-06 PROCEDURE — 272N000500 HC NEEDLE CR2

## 2025-06-06 PROCEDURE — 88313 SPECIAL STAINS GROUP 2: CPT | Mod: 26 | Performed by: PATHOLOGY

## 2025-06-06 PROCEDURE — 250N000011 HC RX IP 250 OP 636: Mod: JZ

## 2025-06-06 PROCEDURE — 88348 ELECTRON MICROSCOPY DX: CPT | Mod: 26 | Performed by: PATHOLOGY

## 2025-06-06 PROCEDURE — 999N000142 HC STATISTIC PROCEDURE PREP ONLY

## 2025-06-06 PROCEDURE — 87086 URINE CULTURE/COLONY COUNT: CPT | Performed by: NURSE PRACTITIONER

## 2025-06-06 PROCEDURE — 76942 ECHO GUIDE FOR BIOPSY: CPT | Mod: 26 | Performed by: PHYSICIAN ASSISTANT

## 2025-06-06 PROCEDURE — 87799 DETECT AGENT NOS DNA QUANT: CPT | Performed by: NURSE PRACTITIONER

## 2025-06-06 PROCEDURE — 80048 BASIC METABOLIC PNL TOTAL CA: CPT | Performed by: NURSE PRACTITIONER

## 2025-06-06 PROCEDURE — 88346 IMFLUOR 1ST 1ANTB STAIN PX: CPT | Mod: TC | Performed by: NURSE PRACTITIONER

## 2025-06-06 PROCEDURE — 81003 URINALYSIS AUTO W/O SCOPE: CPT | Performed by: NURSE PRACTITIONER

## 2025-06-06 PROCEDURE — 88346 IMFLUOR 1ST 1ANTB STAIN PX: CPT | Mod: 26 | Performed by: PATHOLOGY

## 2025-06-06 PROCEDURE — 49406 IMAGE CATH FLUID PERI/RETRO: CPT | Performed by: PHYSICIAN ASSISTANT

## 2025-06-06 RX ORDER — NALOXONE HYDROCHLORIDE 0.4 MG/ML
0.4 INJECTION, SOLUTION INTRAMUSCULAR; INTRAVENOUS; SUBCUTANEOUS
Status: DISCONTINUED | OUTPATIENT
Start: 2025-06-06 | End: 2025-06-06 | Stop reason: HOSPADM

## 2025-06-06 RX ORDER — NALOXONE HYDROCHLORIDE 0.4 MG/ML
0.2 INJECTION, SOLUTION INTRAMUSCULAR; INTRAVENOUS; SUBCUTANEOUS
Status: DISCONTINUED | OUTPATIENT
Start: 2025-06-06 | End: 2025-06-06 | Stop reason: HOSPADM

## 2025-06-06 RX ORDER — ACETAMINOPHEN 325 MG/1
650 TABLET ORAL EVERY 4 HOURS PRN
Status: DISCONTINUED | OUTPATIENT
Start: 2025-06-06 | End: 2025-06-06 | Stop reason: HOSPADM

## 2025-06-06 RX ORDER — LIDOCAINE 40 MG/G
CREAM TOPICAL
Status: DISCONTINUED | OUTPATIENT
Start: 2025-06-06 | End: 2025-06-06 | Stop reason: HOSPADM

## 2025-06-06 RX ORDER — FLUMAZENIL 0.1 MG/ML
0.2 INJECTION, SOLUTION INTRAVENOUS
Status: DISCONTINUED | OUTPATIENT
Start: 2025-06-06 | End: 2025-06-06 | Stop reason: HOSPADM

## 2025-06-06 RX ORDER — FENTANYL CITRATE 50 UG/ML
25-50 INJECTION, SOLUTION INTRAMUSCULAR; INTRAVENOUS EVERY 5 MIN PRN
Refills: 0 | Status: DISCONTINUED | OUTPATIENT
Start: 2025-06-06 | End: 2025-06-06 | Stop reason: HOSPADM

## 2025-06-06 RX ADMIN — FENTANYL CITRATE 50 MCG: 50 INJECTION INTRAMUSCULAR; INTRAVENOUS at 12:12

## 2025-06-06 RX ADMIN — MIDAZOLAM 2 MG: 1 INJECTION INTRAMUSCULAR; INTRAVENOUS at 11:58

## 2025-06-06 RX ADMIN — ACETAMINOPHEN 650 MG: 325 TABLET, FILM COATED ORAL at 13:10

## 2025-06-06 RX ADMIN — LIDOCAINE HYDROCHLORIDE 10 ML: 10 INJECTION, SOLUTION EPIDURAL; INFILTRATION; INTRACAUDAL; PERINEURAL at 12:17

## 2025-06-06 RX ADMIN — FENTANYL CITRATE 50 MCG: 50 INJECTION INTRAMUSCULAR; INTRAVENOUS at 12:06

## 2025-06-06 RX ADMIN — FENTANYL CITRATE 100 MCG: 50 INJECTION INTRAMUSCULAR; INTRAVENOUS at 11:58

## 2025-06-06 RX ADMIN — MIDAZOLAM 1 MG: 1 INJECTION INTRAMUSCULAR; INTRAVENOUS at 12:12

## 2025-06-06 RX ADMIN — MIDAZOLAM 1 MG: 1 INJECTION INTRAMUSCULAR; INTRAVENOUS at 12:06

## 2025-06-06 ASSESSMENT — ACTIVITIES OF DAILY LIVING (ADL)
ADLS_ACUITY_SCORE: 55

## 2025-06-06 NOTE — PROCEDURES
[facility]    Procedure: IR Procedure Note    Date/Time: 6/6/2025 12:32 PM    Performed by: Roberto Epstein PA-C  Authorized by: Roberto Epstein PA-C  IR Fellow Physician:    Pre Procedure Diagnosis: elevated creatinine  Post Procedure Diagnosis: elevated creatinine    UNIVERSAL PROTOCOL   Site Marked: NA  Prior Images Obtained and Reviewed:  Yes  Required items: Required blood products, implants, devices and special equipment available    Patient identity confirmed:  Arm band, provided demographic data, hospital-assigned identification number and verbally with patient  Patient was reevaluated immediately before administering moderate or deep sedation or anesthesia  Confirmation Checklist:  Correct equipment/implants were available, procedure was appropriate and matched the consent or emergent situation, relevant allergies and patient's identity using two indicators  Time out: Immediately prior to the procedure a time out was called    Universal Protocol: the Joint Commission Universal Protocol was followed    Preparation: Patient was prepped and draped in usual sterile fashion       ANESTHESIA    Anesthesia:  Local infiltration  Local Anesthetic:  Lidocaine 1% without epinephrine  Anesthetic Total (mL):  10      SEDATION  Patient Sedated: Yes    Sedation Type:  Moderate (conscious) sedation  Sedation:  Fentanyl and midazolam  Vital signs: Vital signs monitored during sedation    See dictated procedure note for full details.  Findings: Moderate sedation for transplant kidney biopsy - 4 mg of midazolam and 200 mcg of fentanyl given over 24 minutes.     Specimens: core needle biopsy specimens sent for pathological analysis    Procedural Complications: None    Condition: Stable    Plan: 2 hours bedrest      PROCEDURE  Describe Procedure: Completed ultrasound guided drain placement into perinephric fluid collection. A total of 120 ml aspirated from 10 [place] skater  pigtail drain and sent to lab for diagnostic testing.     Completed ultrasound-guided transplant kidney biopsy. 2 passes yielded 2 cores sent to pathology in preservative. Gelfoam in tract. No immediate complication.      Patient Tolerance:  Patient tolerated the procedure well with no immediate complications  Length of time physician/provider present for 1:1 monitoring during sedation:  0-22 min

## 2025-06-06 NOTE — PRE-PROCEDURE
GENERAL PRE-PROCEDURE:   Procedure:  RLQ transplant kidney biopsy and fluid aspiration of perinephric collection  Date/Time:  6/6/2025 10:19 AM    Written consent obtained?: Yes    Risks and benefits: Risks, benefits and alternatives were discussed    Consent given by:  Patient  Patient states understanding of procedure being performed: Yes    Patient's understanding of procedure matches consent: Yes    Procedure consent matches procedure scheduled: Yes    Expected level of sedation:  Moderate  Appropriately NPO:  Yes  ASA Class:  2  Mallampati  :  Grade 2- soft palate, base of uvula, tonsillar pillars, and portion of posterior pharyngeal wall visible  Lungs:  Lungs clear with good breath sounds bilaterally  Heart:  Normal heart sounds and rate  History & Physical reviewed:  History and physical reviewed and no updates needed  Statement of review:  I have reviewed the lab findings, diagnostic data, medications, and the plan for sedation

## 2025-06-06 NOTE — PROGRESS NOTES
[facility] Progress Service Note  Interventional Radiology  06/06/25   10:48 AM    Recommendations/Plan:    -Patient is approved for drain placement of a perinephric fluid collection instead of aspiration. Transplant team was consulted and agreed to this plan. Patient is on IR schedule today for a RLQ transplant kidney biopsy and originally the plan was to aspirate this fluid to be able to perform the kidney biopsy.   -Patient had this fluid aspirated on 5/30/25 with a total of 120 mL of fluid aspirated to complete evacuation. Labs from this aspiration demonstrate a urine leak. Patient today at 2A stated that she feels that this fluid collection is larger than it was before.     Vitals:   /76 (BP Location: Right arm, Cuff Size: Adult Regular)   Pulse 70   Temp 97.9  F (36.6  C) (Oral)   Resp 16   SpO2 100%     Pertinent Labs Reviewed:  CBC:  Lab Results   Component Value Date    WBC 4.1 06/06/2025    WBC 5.2 06/05/2025    WBC 5.1 06/02/2025     Lab Results   Component Value Date    HGB 9.1 06/06/2025    HGB 9.4 06/05/2025    HGB 10.0 06/02/2025     Lab Results   Component Value Date     06/06/2025     06/05/2025     06/02/2025    INR:  Lab Results   Component Value Date    INR 0.98 05/17/2025    PTT 29 05/17/2025          COVID Results:  COVID-19 Antibody Results, Testing for Immunity           No data to display              COVID-19 PCR Results          8/8/2023    13:48 5/17/2025    01:39   COVID-19 PCR Results   COVID-19 Virus by PCR (External Result) Negative     SARS CoV2 PCR  Negative     Potassium   Date Value Ref Range Status   06/05/2025 4.4 3.4 - 5.3 mmol/L Final     Potassium POCT   Date Value Ref Range Status   05/17/2025 5.2 3.4 - 5.3 mmol/L Final     Comment:     CRITICAL VALUES NOTED AND REPORTED TO MD Emma Lockwood PA-C  Interventional Radiology

## 2025-06-06 NOTE — PROGRESS NOTES
Pt arrived for kidney biopsy with fluid drainage. VSS on RA, denies pain. PIV placed, labs pending. Consent completed. Dad Sterling at bedside to provide transportation home post-procedure.

## 2025-06-06 NOTE — DISCHARGE INSTRUCTIONS
[facility]    Interventional Radiology  Patient Instructions Following Biopsy    AFTER YOU GO HOME  If you were given sedation, for the first 24 hours: we recommend that an adult stay with you, DO NOT drive or operate machinery at home or at work, DO NOT smoke, DO NOT make any important or legal decisions.  DO NOT drink alcoholic beverages the day of your procedure  Drink plenty of fluids   Resume your regular diet, unless otherwise instructed by your Primary Physician  Relax and take it easy for 48 hours  DO NOT do any strenuous exercise or lifting (> 10 lbs) for at least 3 days following your procedure  Keep the dressing dry and in place for 24 hours. Replace with Band aid for 2 days.  Never leave a wet dressing in place.  Do not take a shower for at least 12 hours following your procedure. No tub bath, hot tub, or swimming for 5 days.  There should be minimum drainage from the biopsy site    CALL THE PHYSICIAN IF:  You start bleeding from the procedure site.  If you do start to bleed from that site, lie down flat and hold pressure on the site for a minimum of 10 minutes.  Your physician will tell you if you need to return to the hospital  You develop nausea or vomiting  You have excessive swelling, redness, or tenderness at the site  You have drainage that looks like it is infected.  You experience severe pain  You develop hives or a rash or unexplained itching  You develop shortness of breath  You develop a temperature of 101 degrees F or greater  You develop bloody clots or red urine after you are discharged  You develop chest pain or cough up blood, lightheadedness or fainting    [facility] INTERVENTIONAL RADIOLOGY DEPARTMENT  Procedure Physician: Roberto Epstein PA-C   Date of procedure: June 6, 2025  Telephone Numbers: [phone]      Monday-Friday 7:30 am to 4:00 pm  [phone] After 4:00 pm Monday-Friday, Weekends & Holidays. Option #4 for the , and then ask for the Interventional  "Radiologist on call.  Someone is on call 24 hrs/day  [facility] toll free number: [phone] Monday-Friday 8:00 am to 4:30 pm  [facility] Emergency Dept: [phone]          [facility]  Department of Vascular and Interventional Radiology (IR)   Drain Placement Discharge Instructions    You had a small tube/drainage catheter placed into an abnormal fluid collection by Roberto Epstein PA-C on 06/06/25.      If you received sedation:   For 24 hours:   Have an adult stay with you for 24 hours.   Drink plenty of fluids and resume your regular diet.  Do not drive or operate machinery at home or at work  No alcoholic beverages  Do not make any important legal decisions  No heavy lifting greater than 10 lb unless otherwise instructed by your physician.    Slowly advance toward your normal activities after two days    Bathing  We recommend sponge baths.  If you must shower, please secure the catheter, connecting tubes and collection device into plastic bag around the body with adhesive taping.   Be sure to keep the catheter site as dry as possible.      Discharge Checklist  Measuring cup    Components of Drainage Catheter System        How to  Care for Your Catheter  Inspect the catheter dressing and change the dressing if it is soiled or loose.  Inspect the catheter for any signs of kinking.   Ensure the collection bag is affixed in a lower position that the drainage catheter to encourage drainage by gravity.     Empty the collection device  1.   Clean your work area with household  and water and wipe with towel.  2.   Wash your hands with soap and water for 15 seconds then gather the needed items  3.   Twist the blue valve at the bottom of the bag and empty contents into measuring cup.  Wipe the bottom of blue valve.  4.   Twist the blue valve back to the closed position.  5.   Record the amount of fluid under \"Total Drainage.\" Empty the drain once daily.    Recording drain outputs  Date Total " Drainage  Flush Amount  NET Drainage  Fluid Quality                                                   Contact Information  Please contact IR for the following:    -Fever >=101F and/or shaking chills  -worsening redness, warmth and pus drainage at/or around the site of catheter placement  -Inability to flush the catheter  -Significant leakage around the catheter site  -Catheter pulled out or falls out  -Change in the quality of the draining fluid (e.g. new feculant output)  -Abrupt change in the quantity of fluid output.  -New or increasing pain that does not respond to over the counter pain medication    The Interventional Radiology Nursing line is available at [phone] Monday to Friday (7:30AM-4:00PM).   Any voicemails left will be returned within 24 hours.      For emergencies (that cannot wait until normal business hours) during night time hours and weekends,  please call  [employer]  at [phone] (option #4) and ask to page [facility] on call Interventional Radiology team.      Follow-Up  Transplant care coordinator has been contacted regarding follow-up for your drain. Record the daily output over the weekend, then call the SOT clinic and reach out to your transplant coordinator on Monday.   IR scheduling number: [phone] to schedule follow-up. You may or may not need additional imaging prior to removal of tube.    What to bring at your next IR appointment  List of current medications  A record of drain outputs    [facility] Locations (for filling of syringe prescriptions):  [facility]   [address]  [place], [place] [address]  [phone]  [facility]  [address]/ [address]  [facility] [address]  [phone] [facility] [facility]  [address]  [facility] [address]  [phone]     [facility] (Dressings and other medical supplies):  [email or web address] (for various  locations) [phone]

## 2025-06-06 NOTE — PROGRESS NOTES
Bedrest completed. Pt voided, no blood or clots in urine. Tolerating PO. Mild pain in R abdomen relieved by ice and Tylenol. Biopsy site and drain site CDI with no bleeding or hematoma. Discharge and drain care instructions reviewed with pt. Drain supplies also provided. PIV removed. Pt understandably frustrated with lack of follow-up plan regarding drain management, duration, and when possible stent would be placed, etc. RN reached out to SOT clinic and spoke with transplant coordinator and Ashley Wells NP. IR provider reached out to inpatient transplant team as well. Pt's regular transplant surgery MD and NP were unavailable, so RN instructed pt to reach out to SOT clinic again on Monday.     Medical transport providing transportation home, dad will be accompanying pt as well.

## 2025-06-06 NOTE — PROGRESS NOTES
Pt arrived via cart from IR s/p core needle biopsy of transplanted kidney/ drain placement into perinephric fluid collection. Dressing sites CDI, no hematoma. Patient denies pain, tolerating regular diet. Family at bedside.

## 2025-06-06 NOTE — IR NOTE
Patient Name: Glenna Spears  Medical Record Number: [id number]  Today's Date: 6/6/2025    Procedure: Transplant kidney biopsy and saloni-nephric drain placement  Proceduralist: Roberto Epstein  Pathology present: yes    Procedure Start: 1156  Procedure end: 1220  Sedation medications administered: 4 mg Midazolam, 200 mcg Fentanyl     Report given to: ROSMERY Cedeno RN      Other Notes: Pt arrived to IR room 2 from . Consent reviewed. Pt denies any questions or concerns regarding procedure. Pt positioned supine and monitored per protocol. Pt tolerated procedure without any noted complications. Pt transferred back to .

## 2025-06-06 NOTE — PROGRESS NOTES
Transplant Surgery  Progress Note  2025    Assessment & Plan: Glenna Spears is an 34 year old female with history of ESRD of unknown etiology, anemia, obesity s/p gastric sleeve in , and latent TB. S/p DCD  donor kidney transplant w/ ureteral stent (subsequently removed due to E. Coli positive urine culture) on 25 by Dr. Barreto. On 2025 Ms. Spears underwent saloni-nephric fluid aspiration of ~ 120 ml; today she presents to [facility] today for planned OP aspiration of a saloni-transplant fluid collection and transplant kidney biopsy. I was contacted by IR this AM for consideration of drain placement as visualization of the perinephric fluid volume was estimated to be ~ 200 ml.     I discussed drain placement with Dr. Villa, SOT Fellow, and it is okay to proceed with drain placement for the fluid collection.     RAFAEL Son, CNP  Adult Solid Organ Transplant   Contact: Vocera Web Console

## 2025-06-07 LAB
BACTERIA UR CULT: NORMAL
BK VIRUS SPECIMEN TYPE: NORMAL
BKV DNA # SPEC NAA+PROBE: NOT DETECTED IU/ML

## 2025-06-08 ENCOUNTER — TELEPHONE (OUTPATIENT)
Dept: TRANSPLANT | Facility: CLINIC | Age: 35
End: 2025-06-08
Payer: COMMERCIAL

## 2025-06-08 ENCOUNTER — HOSPITAL ENCOUNTER (INPATIENT)
Facility: CLINIC | Age: 35
LOS: 3 days | Discharge: HOME | End: 2025-06-11
Attending: EMERGENCY MEDICINE | Admitting: TRANSPLANT SURGERY
Payer: COMMERCIAL

## 2025-06-08 DIAGNOSIS — R10.9 ABDOMINAL PAIN, UNSPECIFIED ABDOMINAL LOCATION: ICD-10-CM

## 2025-06-08 DIAGNOSIS — F43.22 ADJUSTMENT DISORDER WITH ANXIOUS MOOD: ICD-10-CM

## 2025-06-08 DIAGNOSIS — T86.11 ANTIBODY MEDIATED REJECTION OF KIDNEY TRANSPLANT: Primary | ICD-10-CM

## 2025-06-08 DIAGNOSIS — R11.0 NAUSEA: ICD-10-CM

## 2025-06-08 DIAGNOSIS — K21.9 GASTROESOPHAGEAL REFLUX DISEASE, UNSPECIFIED WHETHER ESOPHAGITIS PRESENT: ICD-10-CM

## 2025-06-08 DIAGNOSIS — Z94.0 KIDNEY REPLACED BY TRANSPLANT: ICD-10-CM

## 2025-06-08 LAB
ABO + RH BLD: NORMAL
ALBUMIN SERPL BCG-MCNC: 3.7 G/DL (ref 3.5–5.2)
ALP SERPL-CCNC: 106 U/L (ref 40–150)
ALT SERPL W P-5'-P-CCNC: 7 U/L (ref 0–50)
ANION GAP SERPL CALCULATED.3IONS-SCNC: 7 MMOL/L (ref 7–15)
AST SERPL W P-5'-P-CCNC: 12 U/L (ref 0–45)
BASOPHILS # BLD AUTO: 0.1 10E3/UL (ref 0–0.2)
BASOPHILS NFR BLD AUTO: 1 %
BILIRUB SERPL-MCNC: 0.2 MG/DL
BLD GP AB SCN SERPL QL: NEGATIVE
BUN SERPL-MCNC: 21 MG/DL (ref 6–20)
CALCIUM SERPL-MCNC: 9.5 MG/DL (ref 8.8–10.4)
CHLORIDE SERPL-SCNC: 111 MMOL/L (ref 98–107)
CREAT SERPL-MCNC: 2.1 MG/DL (ref 0.51–0.95)
EGFRCR SERPLBLD CKD-EPI 2021: 31 ML/MIN/1.73M2
EOSINOPHIL # BLD AUTO: 0.2 10E3/UL (ref 0–0.7)
EOSINOPHIL NFR BLD AUTO: 3 %
ERYTHROCYTE [DISTWIDTH] IN BLOOD BY AUTOMATED COUNT: 13.1 % (ref 10–15)
GLUCOSE SERPL-MCNC: 96 MG/DL (ref 70–99)
HCO3 SERPL-SCNC: 21 MMOL/L (ref 22–29)
HCT VFR BLD AUTO: 28.1 % (ref 35–47)
HGB BLD-MCNC: 8.6 G/DL (ref 11.7–15.7)
IMM GRANULOCYTES # BLD: 0 10E3/UL
IMM GRANULOCYTES NFR BLD: 1 %
LYMPHOCYTES # BLD AUTO: 0.3 10E3/UL (ref 0.8–5.3)
LYMPHOCYTES NFR BLD AUTO: 5 %
MAGNESIUM SERPL-MCNC: 1.8 MG/DL (ref 1.7–2.3)
MCH RBC QN AUTO: 30 PG (ref 26.5–33)
MCHC RBC AUTO-ENTMCNC: 30.6 G/DL (ref 31.5–36.5)
MCV RBC AUTO: 98 FL (ref 78–100)
MONOCYTES # BLD AUTO: 0.4 10E3/UL (ref 0–1.3)
MONOCYTES NFR BLD AUTO: 6 %
NEUTROPHILS # BLD AUTO: 5 10E3/UL (ref 1.6–8.3)
NEUTROPHILS NFR BLD AUTO: 84 %
NRBC # BLD AUTO: 0 10E3/UL
NRBC BLD AUTO-RTO: 0 /100
PLATELET # BLD AUTO: 280 10E3/UL (ref 150–450)
POTASSIUM SERPL-SCNC: 4.9 MMOL/L (ref 3.4–5.3)
PROT SERPL-MCNC: 6 G/DL (ref 6.4–8.3)
RBC # BLD AUTO: 2.87 10E6/UL (ref 3.8–5.2)
SODIUM SERPL-SCNC: 139 MMOL/L (ref 135–145)
SPECIMEN EXP DATE BLD: NORMAL
WBC # BLD AUTO: 6 10E3/UL (ref 4–11)

## 2025-06-08 PROCEDURE — 85025 COMPLETE CBC W/AUTO DIFF WBC: CPT | Performed by: EMERGENCY MEDICINE

## 2025-06-08 PROCEDURE — 36415 COLL VENOUS BLD VENIPUNCTURE: CPT | Performed by: TRANSPLANT SURGERY

## 2025-06-08 PROCEDURE — 99285 EMERGENCY DEPT VISIT HI MDM: CPT | Mod: 25 | Performed by: EMERGENCY MEDICINE

## 2025-06-08 PROCEDURE — 86901 BLOOD TYPING SEROLOGIC RH(D): CPT | Performed by: TRANSPLANT SURGERY

## 2025-06-08 PROCEDURE — 36415 COLL VENOUS BLD VENIPUNCTURE: CPT | Performed by: EMERGENCY MEDICINE

## 2025-06-08 PROCEDURE — 258N000003 HC RX IP 258 OP 636

## 2025-06-08 PROCEDURE — 250N000011 HC RX IP 250 OP 636: Mod: JZ | Performed by: EMERGENCY MEDICINE

## 2025-06-08 PROCEDURE — 250N000013 HC RX MED GY IP 250 OP 250 PS 637

## 2025-06-08 PROCEDURE — 999N000127 HC STATISTIC PERIPHERAL IV START W US GUIDANCE

## 2025-06-08 PROCEDURE — 99285 EMERGENCY DEPT VISIT HI MDM: CPT | Performed by: EMERGENCY MEDICINE

## 2025-06-08 PROCEDURE — 80053 COMPREHEN METABOLIC PANEL: CPT | Performed by: EMERGENCY MEDICINE

## 2025-06-08 PROCEDURE — 250N000011 HC RX IP 250 OP 636: Mod: JZ

## 2025-06-08 PROCEDURE — 250N000012 HC RX MED GY IP 250 OP 636 PS 637

## 2025-06-08 PROCEDURE — 83735 ASSAY OF MAGNESIUM: CPT | Performed by: EMERGENCY MEDICINE

## 2025-06-08 PROCEDURE — 120N000011 HC R&B TRANSPLANT UMMC

## 2025-06-08 PROCEDURE — 96374 THER/PROPH/DIAG INJ IV PUSH: CPT | Performed by: EMERGENCY MEDICINE

## 2025-06-08 RX ORDER — ONDANSETRON 4 MG/1
4 TABLET, ORALLY DISINTEGRATING ORAL EVERY 8 HOURS PRN
Status: DISCONTINUED | OUTPATIENT
Start: 2025-06-08 | End: 2025-06-11 | Stop reason: HOSPADM

## 2025-06-08 RX ORDER — MYCOPHENOLATE MOFETIL 250 MG/1
750 CAPSULE ORAL 2 TIMES DAILY
Status: DISCONTINUED | OUTPATIENT
Start: 2025-06-08 | End: 2025-06-11 | Stop reason: HOSPADM

## 2025-06-08 RX ORDER — CIPROFLOXACIN 500 MG/1
500 TABLET, FILM COATED ORAL 2 TIMES DAILY
Status: DISCONTINUED | OUTPATIENT
Start: 2025-06-08 | End: 2025-06-08

## 2025-06-08 RX ORDER — VARENICLINE TARTRATE 0.5 MG/1
0.5 TABLET, FILM COATED ORAL DAILY
Status: DISCONTINUED | OUTPATIENT
Start: 2025-06-09 | End: 2025-06-09

## 2025-06-08 RX ORDER — AMOXICILLIN 250 MG
1 CAPSULE ORAL 2 TIMES DAILY PRN
Status: DISCONTINUED | OUTPATIENT
Start: 2025-06-08 | End: 2025-06-11 | Stop reason: HOSPADM

## 2025-06-08 RX ORDER — FAMOTIDINE 20 MG/1
20 TABLET, FILM COATED ORAL 2 TIMES DAILY PRN
Status: DISCONTINUED | OUTPATIENT
Start: 2025-06-08 | End: 2025-06-11 | Stop reason: HOSPADM

## 2025-06-08 RX ORDER — METHOCARBAMOL 500 MG/1
500 TABLET, FILM COATED ORAL EVERY 6 HOURS PRN
Status: DISCONTINUED | OUTPATIENT
Start: 2025-06-08 | End: 2025-06-11 | Stop reason: HOSPADM

## 2025-06-08 RX ORDER — OXYCODONE HYDROCHLORIDE 10 MG/1
10 TABLET ORAL EVERY 8 HOURS PRN
Status: DISCONTINUED | OUTPATIENT
Start: 2025-06-08 | End: 2025-06-11 | Stop reason: HOSPADM

## 2025-06-08 RX ORDER — ATORVASTATIN CALCIUM 10 MG/1
10 TABLET, FILM COATED ORAL DAILY
Status: DISCONTINUED | OUTPATIENT
Start: 2025-06-09 | End: 2025-06-11 | Stop reason: HOSPADM

## 2025-06-08 RX ORDER — ALBUTEROL SULFATE 90 UG/1
2 INHALANT RESPIRATORY (INHALATION) EVERY 4 HOURS PRN
Status: DISCONTINUED | OUTPATIENT
Start: 2025-06-08 | End: 2025-06-11 | Stop reason: HOSPADM

## 2025-06-08 RX ORDER — VALGANCICLOVIR 450 MG/1
450 TABLET, FILM COATED ORAL EVERY OTHER DAY
Status: DISCONTINUED | OUTPATIENT
Start: 2025-06-10 | End: 2025-06-10

## 2025-06-08 RX ORDER — OXYCODONE HYDROCHLORIDE 5 MG/1
5 TABLET ORAL EVERY 8 HOURS PRN
Status: DISCONTINUED | OUTPATIENT
Start: 2025-06-08 | End: 2025-06-11 | Stop reason: HOSPADM

## 2025-06-08 RX ORDER — AMOXICILLIN 250 MG
2 CAPSULE ORAL 2 TIMES DAILY PRN
Status: DISCONTINUED | OUTPATIENT
Start: 2025-06-08 | End: 2025-06-11 | Stop reason: HOSPADM

## 2025-06-08 RX ORDER — AMOXICILLIN 250 MG
2 CAPSULE ORAL 2 TIMES DAILY
Status: DISCONTINUED | OUTPATIENT
Start: 2025-06-08 | End: 2025-06-11 | Stop reason: HOSPADM

## 2025-06-08 RX ORDER — TACROLIMUS 1 MG/1
3 CAPSULE ORAL 2 TIMES DAILY
Status: DISCONTINUED | OUTPATIENT
Start: 2025-06-08 | End: 2025-06-08

## 2025-06-08 RX ORDER — TACROLIMUS 5 MG/1
5 CAPSULE ORAL
Status: DISCONTINUED | OUTPATIENT
Start: 2025-06-08 | End: 2025-06-10

## 2025-06-08 RX ORDER — CALCIUM CARBONATE 500 MG/1
1000 TABLET, CHEWABLE ORAL 4 TIMES DAILY PRN
Status: DISCONTINUED | OUTPATIENT
Start: 2025-06-08 | End: 2025-06-11 | Stop reason: HOSPADM

## 2025-06-08 RX ORDER — NORTRIPTYLINE HYDROCHLORIDE 25 MG/1
25 CAPSULE ORAL AT BEDTIME
Status: DISCONTINUED | OUTPATIENT
Start: 2025-06-08 | End: 2025-06-11 | Stop reason: HOSPADM

## 2025-06-08 RX ORDER — HYDROMORPHONE HCL IN WATER/PF 6 MG/30 ML
0.4 PATIENT CONTROLLED ANALGESIA SYRINGE INTRAVENOUS
Status: DISCONTINUED | OUTPATIENT
Start: 2025-06-08 | End: 2025-06-09

## 2025-06-08 RX ORDER — LIDOCAINE 40 MG/G
CREAM TOPICAL
Status: DISCONTINUED | OUTPATIENT
Start: 2025-06-08 | End: 2025-06-11 | Stop reason: HOSPADM

## 2025-06-08 RX ORDER — PANTOPRAZOLE SODIUM 40 MG/1
40 TABLET, DELAYED RELEASE ORAL DAILY
Status: DISCONTINUED | OUTPATIENT
Start: 2025-06-09 | End: 2025-06-11 | Stop reason: HOSPADM

## 2025-06-08 RX ORDER — HYDROMORPHONE HYDROCHLORIDE 1 MG/ML
0.5 INJECTION, SOLUTION INTRAMUSCULAR; INTRAVENOUS; SUBCUTANEOUS ONCE
Refills: 0 | Status: COMPLETED | OUTPATIENT
Start: 2025-06-08 | End: 2025-06-08

## 2025-06-08 RX ORDER — ACETAMINOPHEN 325 MG/1
325-650 TABLET ORAL EVERY 4 HOURS PRN
Status: DISCONTINUED | OUTPATIENT
Start: 2025-06-08 | End: 2025-06-11 | Stop reason: HOSPADM

## 2025-06-08 RX ORDER — HYDROMORPHONE HCL IN WATER/PF 6 MG/30 ML
0.2 PATIENT CONTROLLED ANALGESIA SYRINGE INTRAVENOUS
Status: DISCONTINUED | OUTPATIENT
Start: 2025-06-08 | End: 2025-06-09

## 2025-06-08 RX ADMIN — TACROLIMUS 5 MG: 5 CAPSULE ORAL at 22:01

## 2025-06-08 RX ADMIN — SODIUM CHLORIDE 500 MG: 9 INJECTION, SOLUTION INTRAVENOUS at 22:26

## 2025-06-08 RX ADMIN — HYDROMORPHONE HYDROCHLORIDE 0.5 MG: 1 INJECTION, SOLUTION INTRAMUSCULAR; INTRAVENOUS; SUBCUTANEOUS at 18:36

## 2025-06-08 RX ADMIN — MYCOPHENOLATE MOFETIL 750 MG: 250 CAPSULE ORAL at 21:15

## 2025-06-08 RX ADMIN — HYDROMORPHONE HYDROCHLORIDE 0.4 MG: 0.2 INJECTION, SOLUTION INTRAMUSCULAR; INTRAVENOUS; SUBCUTANEOUS at 21:15

## 2025-06-08 RX ADMIN — NORTRIPTYLINE HYDROCHLORIDE 25 MG: 25 CAPSULE ORAL at 22:26

## 2025-06-08 RX ADMIN — OXYCODONE HYDROCHLORIDE 10 MG: 10 TABLET ORAL at 23:03

## 2025-06-08 ASSESSMENT — ACTIVITIES OF DAILY LIVING (ADL)
ADLS_ACUITY_SCORE: 54
ADLS_ACUITY_SCORE: 28
ADLS_ACUITY_SCORE: 54

## 2025-06-08 ASSESSMENT — COLUMBIA-SUICIDE SEVERITY RATING SCALE - C-SSRS
2. HAVE YOU ACTUALLY HAD ANY THOUGHTS OF KILLING YOURSELF IN THE PAST MONTH?: NO
6. HAVE YOU EVER DONE ANYTHING, STARTED TO DO ANYTHING, OR PREPARED TO DO ANYTHING TO END YOUR LIFE?: NO
1. IN THE PAST MONTH, HAVE YOU WISHED YOU WERE DEAD OR WISHED YOU COULD GO TO SLEEP AND NOT WAKE UP?: NO

## 2025-06-08 NOTE — TELEPHONE ENCOUNTER
terri Manzanares that she had bruising on her abdomen after biopsy.    Pt admitted at [facility].    Drained placed. 120 ml drained. Minimal drainage out of drain. Stomach was enlarging.    Hgb dropped.  Reviewed with dr Tiffany oconnor and patient should transfer to [facility].

## 2025-06-08 NOTE — ED TRIAGE NOTES
Ambulatory to triage. Came in for referral. Had kidney transplant here last 5/17/2025, with swelling and pain on right side. Had biopsy and shows some rejection.

## 2025-06-08 NOTE — LETTER
Transition Communication Hand-off for Care Transitions to Next Level of Care Provider    Name: Glenna Spears  : 1990  MRN #: [id number]  Primary Care Provider: Neela Irene     Primary Clinic: [address] [facility] [address]     Reason for Hospitalization:  Kidney replaced by transplant [Z94.0]  Abdominal pain, unspecified abdominal location [R10.9]  Admit Date/Time: 2025  4:16 PM  Discharge Date: 2025  Payor Source: Payor: BLUE PLUS / Plan: BLUE PLUS ADVANTAGE MA / Product Type: HMO /     Reason for Communication Hand-off Referral: Other continuity of care    Discharge Plan: Patient discharging, plans to stay locally until transplant team clears patient to return home.        Concern for non-adherence with plan of care:   No  Discharge Needs Assessment:  Needs      Flowsheet Row Most Recent Value   Equipment Currently Used at Home none   # of Referrals Placed by CM External Care Coordination, Outpatient Infusion            Follow-up specialty is recommended: Yes    Follow-up plan:    Future Appointments   Date Time Provider Department Center   2025 10:45 AM BK LAB BK[facility]   2025  8:00 AM UC APHERESIS RN1 [facility]   2025 11:00 AM UC SIPC INFUSION NURSE [facility]   2025  8:00 AM UC APHERESIS RN1 [facility]   2025 11:00 AM UC SIPC INFUSION NURSE INPR [facility]   2025 10:00 AM Nataliya Barragan APRN CNP [facility]   2025  8:00 AM UC APHERESIS RN7 [facility]   2025 11:00 AM UC SIPC INFUSION NURSE INPR [facility]   2025  7:15 AM UC LAB UCLABR [facility]   2025  8:00 AM Sabrina Lezama MD [facility]   2025  8:00 AM Carlos Castro, [facility]   2025 12:30 PM Marine Mariano LICSW [facility]   2025  7:00 AM  LAB [facility]   2025  8:00 AM Kevin Aleman MD [facility]   2025  7:00 AM  LAB [facility]   2025  8:00 AM Kevin Aleman MD [facility]  "[facility]   11/25/2025  7:15 AM [facility]   11/25/2025  8:00 AM Kevin Aleman MD [facility]   2/24/2026  9:15 AM [facility]   2/24/2026 10:00 AM Kevin Aleman MD [facility]   5/26/2026  9:00 AM [facility]   5/26/2026 10:00 AM Kevin Aleman MD [facility]       Any outstanding tests or procedures:        Referrals       Future Labs/Procedures    Physical Therapy  Referral     Process Instructions:    If ordering DME, please utilize the DME ordering process. If ordering services for Hand Therapy, please utiize order: Occupational Therapy  Referral [9047.005] and select \"Hand Therapy\" under Specialty Services.    Comments:    Please be aware that coverage of these services is subject to the terms and limitations of your health insurance plan.  Call member services at your health plan with any benefit or coverage questions.  [facility] will call you to coordinate your care as prescribed by your provider. If you don't hear from a representative within 2 business days, please call [phone].              Key Recommendations:  Please see attached AVS.  Plan for outpatient infusions and OP Physical Therapy    Maggi Wolfe RN    AVS/Discharge Summary is the source of truth; this is a helpful guide for improved communication of patient story            "

## 2025-06-08 NOTE — TELEPHONE ENCOUNTER
Spoke with jyothi in admissions.    Spoke with vaibhav Alexander, nurse.    She will give MD number and fax for admissions and request that they call for transfer.    Updated Glenna to stay.        Per dr Dylan oconnor patient is showing antibodies on her  recent biopsy and having abdominal pain and should transfer to [facility] for review and treatment.   Spoke with provider, sunitha, and they are discharging patient for her to drive to ER at [facility].      Updated patient and she will get a ride.  Patient stated she talked to dr dylan oconnor

## 2025-06-08 NOTE — H&P
Transplant Surgery H&P  2025    Glenna Spears  : 1990    Date of Service: 2025 5:57 PM    Assessment and Plan:  Glenna Spears is a 34 year old female with recent PMHx significant for ESRD s/p  donor kidney transplantation on 2025 complicated by leak requiring ultrasound-guided perinephric drain placement as well as kidney biopsy by IR on , now complicated by anterior abdominal wall hematoma. Her kidney biopsy on  has resulted with moderate to high-level donor-specific antibodies (DSA) against HLA-A1, B8, B60, DR17, and DR52. 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 She requires admission for transplant nephrology consultation as well as transfusion medicine consultation for plasmapheresis. She does not have a urine leak, as her creatinine is the same in her abdominal fluid as the plasma.    - Admit to transplant surgery  - Consultation to transplant nephrology, appreciate recs   - 500 mg solu-medrol ordered today   - Defer starting Eliquis for new internal jugular clot given new abdominal hematoma   - Repeat kidney U/S  to ensure hematoma stability   - IVIG 500 mg/kg after plasmapheresis treatment #1-4   - IIG 1 gm/kg after plasmapheresis treatment #5   - Give solumedrol 100 mg IV prior to each IVIG dose  - Consultation to transfusion medicine, appreciate recs   - Plasmapheresis for 5 treatments, every other day   - Per transfusion medicine, will not start or continue ace-inhibitors during PLEX  - Regular diet  - PTA medications ordered      Discussed with fellow who will discuss with staff.     Cari Cardona MD  PGY-2 General Surgery Resident    History of Present Illness:    Glenna Spears is a 34 year old female with recent PMHx significant for end-stage renal disease s/p  donor kidney transplantation on 2025 complicated by leak requiring ultrasound-guided perinephric drain placement as well as kidney biopsy by IR on , now complicated  by anterior abdominal wall hematoma. Her kidney biopsy on  has resulted with moderate to high-level donor-specific antibodies (DSA) against HLA-A1, B8, B60, DR17, and DR52. 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 She presents from [facility] ED for treatment.    Since the drain was placed on , this has had about 60 ml output total, which is serosanguinous. She has worsening pain in her right lower abdomen at her abdominal wall, near her drain. She has also had headaches and nausea/vomiting. No fevers/chills, diarrhea or melena. She has had regular appetite. ROS otherwise negative. Takes no blood thinners.       Past Medical History:  Past Medical History:   Diagnosis Date    ESRD (end stage renal disease) (H)     GERD (gastroesophageal reflux disease)     Hypertension     Kidney replaced by transplant 2025    DDKT. Induction w/ Thymoglobulin.    Latent tuberculosis     Completed 4 months of rifampin    Obesity     Secondary hyperparathyroidism     Vitamin D deficiency        Past Surgical History  Past Surgical History:   Procedure Laterality Date    AV FISTULA REPAIR       SECTION      IR FINE NEEDLE ASPIRATION W ULTRASOUND  2025    IR RENAL BIOPSY RIGHT  2025    IR SKIN SUBQ/SEROMA ABSCESS DRAIN  2025    LAPAROSCOPIC GASTRIC SLEEVE N/A 2024    Procedure: GASTRECTOMY, SLEEVE, LAPAROSCOPIC;  Surgeon: John Smith MD;  Location: [facility] OR    TRANSPLANT KIDNEY RECIPIENT  DONOR Right 2025    Procedure: Transplant kidney recipient  donor with Vein and Artery reconstruction;  Surgeon: Manjeet Barreto MD;  Location: [facility] OR       Family History:  Family History   Problem Relation Age of Onset    Kidney Disease Maternal Grandmother     Leukemia Paternal Grandmother 47    Leukemia Maternal Aunt 37    Anesthesia Reaction No family hx of     Deep Vein Thrombosis (DVT) No family hx of     Heart Disease No family hx of         Social History:  Social History     Socioeconomic History    Marital status: Single     Spouse name: Not on file    Number of children: Not on file    Years of education: Not on file    Highest education level: Not on file   Occupational History    Not on file   Tobacco Use    Smoking status: Former     Current packs/day: 0.00     Types: Cigarettes     Quit date: 2023     Years since quittin.3     Passive exposure: Past    Smokeless tobacco: Never    Tobacco comments:     quit   Vaping Use    Vaping status: Never Used   Substance and Sexual Activity    Alcohol use: Not Currently    Drug use: Not Currently     Types: Marijuana    Sexual activity: Not on file   Other Topics Concern    Not on file   Social History Narrative    Not on file     Social Drivers of Health     Financial Resource Strain: Low Risk  (2025)    Financial Resource Strain     Within the past 12 months, have you or your family members you live with been unable to get utilities (heat, electricity) when it was really needed?: No   Food Insecurity: Low Risk  (2025)    Food Insecurity     Within the past 12 months, did you worry that your food would run out before you got money to buy more?: No     Within the past 12 months, did the food you bought just not last and you didn t have money to get more?: No   Transportation Needs: Low Risk  (2025)    Transportation Needs     Within the past 12 months, has lack of transportation kept you from medical appointments, getting your medicines, non-medical meetings or appointments, work, or from getting things that you need?: No   Physical Activity: Insufficiently Active (2025)    Received from [employer] and [facility]    Exercise Vital Sign     Days of Exercise per Week: 3 days     Minutes of Exercise per Session: 20 min   Stress: No Stress Concern Present (2025)    Received from [employer] and [facility] [place] [facility] -  "Occupational Stress Questionnaire     Feeling of Stress : Only a little   Social Connections: Socially Isolated (2/7/2023)    Received from [facility]    Social Connection and Isolation Panel [NHANES]     Frequency of Communication with Friends and Family: Never     Frequency of Social Gatherings with Friends and Family: Never     Attends Sikh Services: Never     Active Member of Clubs or Organizations: No     Attends Club or Organization Meetings: Never     Marital Status: Living with partner   Interpersonal Safety: Low Risk  (6/8/2025)    Interpersonal Safety     Do you feel physically and emotionally safe where you currently live?: Yes     Within the past 12 months, have you been hit, slapped, kicked or otherwise physically hurt by someone?: No     Within the past 12 months, have you been humiliated or emotionally abused in other ways by your partner or ex-partner?: No   Housing Stability: Low Risk  (6/8/2025)    Housing Stability     Do you have housing? : Yes     Are you worried about losing your housing?: No       Medications:  No current outpatient medications on file.       Allergies:     Allergies   Allergen Reactions    Sulfa Antibiotics Other (See Comments)     Reaction as an infant- unsure of what happened       Review of Symptoms:  A 10 point review of symptoms has been conducted and is negative except for that mentioned in the above HPI.    Physical Exam:    Blood pressure 139/86, pulse 79, temperature 98.5  F (36.9  C), temperature source Oral, resp. rate 16, height 1.727 m (5' 8\"), weight 75.8 kg (167 lb), SpO2 99%, not currently breastfeeding.  Gen:    Lying in bed in NAD, A&OX3, appears uncomfortable  HEENT: Normocephalic and atraumatic  CV:  Regular rate, well perfused extremities   Pulm:  Non-labored breathing on room air  Abd:  Soft, tender over right lower quadrant, mildly distended. Swelling at right lower abdominal wall. Incision well approximated with staples in " place. Minimal ecchymosis near incisions. Drain in place with serosanguinous output.   Ext:  Warm and well perfused, no obvious deformities    Labs:      CBC RESULTS:   Recent Labs   Lab Test 06/08/25 1855   WBC 6.0   RBC 2.87*   HGB 8.6*   HCT 28.1*   MCV 98   MCH 30.0   MCHC 30.6*   RDW 13.1          BMP RESULTS:   Recent Labs   Lab 06/08/25 1855 06/06/25  1013 06/05/25  0846 06/02/25  0839    140 140 139   POTASSIUM 4.9 4.2 4.4 4.5   CHLORIDE 111* 111* 109* 106   CO2 21* 19* 20* 23   BUN 21.0* 19.7 20.5* 23.8*   CR 2.10* 2.07* 2.41* 3.53*   GLC 96 81 95 109*     LFT RESULTS:   Recent Labs   Lab 06/08/25 1855 06/06/25  1013   AST 12  --    ALT 7  --    ALKPHOS 106  --    BILITOTAL 0.2  --    ALBUMIN 3.7  --    INR  --  1.11       Imaging:    CT ABD AND PELVIS WITHOUT IV CONTRAST  Result Date: 6/7/2025  EXAM: CT ABD AND PELVIS WITHOUT IV CONTRAST INDICATION: Recent kidney transplant (5/17) intraabdominal fluid collection with drain that is getting larger, eval for size, appropriate positioning for drain TECHNIQUE: Sequential axial images are obtained from the diaphragms through the ischial rami without contrast. Coronal reconstructions obtained. While obtaining CT images, dose reduction techniques were utilized including: Automated exposure control, adjustment of mA and/or kV according to patient size, and/or use of iterative reconstruction technique. RADIATION DOSE: 449 DLP (mGy cm) COMPARISON: 02/17/2025 FINDINGS: Lung bases: Unremarkable. Liver:  Unremarkable. No masses. Gallbladder:  No calcified gallstones. Normal caliber wall. Biliary Ducts:  No intra- or extrahepatic biliary ductal dilation. Pancreas: Unremarkable. Spleen:  Unremarkable. No masses. Adrenals:  Unremarkable. No masses. Kidneys and ureters:  Bilateral renal cortical atrophy.  Nonobstructing right renal stone.  Apparent left renal cysts.  Transplanted right lower quadrant kidney.  No mass or hydronephrosis is appreciated in the  transplanted kidney. Bladder:  Unremarkable. Reproductive organs:  No pelvic masses.  Intrauterine device. Bowel: Changes of prior gastric bypass.  Normal caliber, no wall thickening. Lymph nodes: No pathologic nodes by size criteria. Peritoneum:  No ascites or free air. No other fluid collection. Vessels:  Normal in size. Retroperitoneum:  Unremarkable. No masses. Abdominal wall:  Diffuse body wall edema.  Percutaneous drain is noted in the soft tissues of the right lower abdomen.  This drain is in the inferior most aspect of an air and fluid collection which measures 4.2 x 2.6 cm.  Superior and medial to the air and fluid collection is a hyperdense fluid collection measuring 9.4 x 3.9 x 8.4 cm.  This appearance is consistent with hematoma. More inferiorly in the lower pelvis, high attenuation is noted on series 7, images 157 through 172.  This appearance may represent active extravasation of blood or postsurgical material. Bones: Degenerative disc changes at the L4/L5 level. IMPRESSION: 1. An air and fluid collection containing a percutaneous drain is noted within the soft tissues of the right lower anterior abdominal wall. 2. Medial and superior to the air and fluid collection which contains the percutaneous drain, a hyperdense fluid collection is noted within the soft tissues of the anterior abdominal wall measuring up to 9.4 cm in diameter. This is consistent with a hematoma.  More inferiorly to this fluid collection, high attenuation areas are noted as detailed above.  Differential  considerations include active hemorrhage or postsurgical material. 3. No intra-abdominal fluid collection is appreciated on this unenhanced CT scan. 4. Diffuse body wall edema. 5. Additional stable chronic and postsurgical changes, as detailed above. Attempt was made to reach the emergency department to discuss these findings at 12:05 p.m..      IR Renal Biopsy Right  Result Date: 6/6/2025  PRE-PROCEDURE DIAGNOSIS: Transplant  kidney POST-PROCEDURE DIAGNOSIS: Same PROCEDURE: Ultrasound-guided right lower quadrant transplant kidney biopsy, lower quadrant drain placement     IMPRESSION: Completed ultrasound-guided right lower quadrant drain placement. 10 locking pigtail drain placed into right lower quadrant fluid collection. A total of 120 mL clear light pink fluid aspirated and sent to lab for analysis. No immediate complication.  Completed ultrasound-guided right lower quadrant transplant kidney biopsy. A total of 2 kidney cores obtained. Samples handed to renal pathology to confirm adequacy. No immediate complication. ---------- CLINICAL HISTORY: Patient with right lower quadrant transplant kidney with overlying fluid collection. Drain placement and transplant kidney biopsy requested. PERFORMED BY:  Roberto Epstein PA-C CONSENT: Written informed consent was obtained and is documented in the patient record. SEDATION CONSENT: It was explained to the patient that medications used for sedation and pain relief may be administered, if needed, to reduce anxiety and discomfort that may be associated with the procedure. Serious side effects from the medications are rare but may include prolonged drowsiness and difficulty breathing, possibly requiring placement of a breathing tube to ventilate the lungs. MEDICATIONS: Intravenous sedation was administered with 4 mg midazolam and 200 mcg fentanyl. A 10:1 volume mixture of 1% lidocaine without epinephrine buffered with 8.4% bicarbonate solution was available for local anesthesia. NURSING: The patient was placed on continuous vital signs monitoring. Vital signs were monitored by nursing staff under IR staff supervision. SEDATION TIME: 24 minutes face-to-face DESCRIPTION: Drain Placement: Fluid collection was identified on limited ultrasound exam and picture is documented in the patient's record. The right lower quadrant was prepped and draped in the usual sterile fashion. Color ultrasound was used to  assess the subcutaneous tissues. No vessels were identified in the region of planned puncture. Local anesthesia was achieved. Under ultrasound guidance, a 5-Slovak straight centesis needle/catheter was advanced into the fluid collection. Needle was removed. A 0.035 percent Amplatzwire was advanced through the catheter and catheter was exchanged over wire for a locking pigtail drain after tissue dilation. Fluid aspirated. The drain was secured to the skin with a 2-0 nylon suture and a sterile bandage was applied. The drain was connected to gravity drainage. Ultrasound images were stored in the patient's record. A sample of fluid sent for diagnostic testing. Transplant Kidney Biopsy: The patient's low abdomen was prepped and draped in the usual sterile fashion. Color ultrasound was used to assess the subcutaneous tissues. No vessels were identified in the region of planned puncture. Under ultrasound guidance, the patient's skin, subcutaneous tissue, and kidney capsule were anesthetized. With ultrasound guidance, using a 17/18 gauge coaxial needle system, kidney tissue specimens were obtained. Needle was removed and the tract was sealed with Gelfoam pledgets. Pressure was held the site, and then cleansed and sterile dressing applied. Images were saved throughout the procedure. COMPLICATIONS: No immediate concerns, the patient remained stable throughout the procedure and tolerated it well. ESTIMATED BLOOD LOSS: Minimal SPECIMENS: 2 cores handed to renal pathology to confirm adequacy, fluid sent for diagnostic testing. GIGI MARKHAM PA-C   SYSTEM ID:  [id number]      IR Skin Subq/Seroma Abscess Drain  Result Date: 6/6/2025  PRE-PROCEDURE DIAGNOSIS: Transplant kidney POST-PROCEDURE DIAGNOSIS: Same PROCEDURE: Ultrasound-guided right lower quadrant transplant kidney biopsy, lower quadrant drain placement     IMPRESSION: Completed ultrasound-guided right lower quadrant drain placement. 10 locking pigtail drain placed into  right lower quadrant fluid collection. A total of 120 mL clear light pink fluid aspirated and sent to lab for analysis. No immediate complication.  Completed ultrasound-guided right lower quadrant transplant kidney biopsy. A total of 2 kidney cores obtained. Samples handed to renal pathology to confirm adequacy. No immediate complication. ---------- CLINICAL HISTORY: Patient with right lower quadrant transplant kidney with overlying fluid collection. Drain placement and transplant kidney biopsy requested. PERFORMED BY:  Roberto Epstein PA-C CONSENT: Written informed consent was obtained and is documented in the patient record. SEDATION CONSENT: It was explained to the patient that medications used for sedation and pain relief may be administered, if needed, to reduce anxiety and discomfort that may be associated with the procedure. Serious side effects from the medications are rare but may include prolonged drowsiness and difficulty breathing, possibly requiring placement of a breathing tube to ventilate the lungs. MEDICATIONS: Intravenous sedation was administered with 4 mg midazolam and 200 mcg fentanyl. A 10:1 volume mixture of 1% lidocaine without epinephrine buffered with 8.4% bicarbonate solution was available for local anesthesia. NURSING: The patient was placed on continuous vital signs monitoring. Vital signs were monitored by nursing staff under IR staff supervision. SEDATION TIME: 24 minutes face-to-face DESCRIPTION: Drain Placement: Fluid collection was identified on limited ultrasound exam and picture is documented in the patient's record. The right lower quadrant was prepped and draped in the usual sterile fashion. Color ultrasound was used to assess the subcutaneous tissues. No vessels were identified in the region of planned puncture. Local anesthesia was achieved. Under ultrasound guidance, a 5-Scottish straight centesis needle/catheter was advanced into the fluid collection. Needle was removed. A 0.035  percent Amplatzwire was advanced through the catheter and catheter was exchanged over wire for a locking pigtail drain after tissue dilation. Fluid aspirated. The drain was secured to the skin with a 2-0 nylon suture and a sterile bandage was applied. The drain was connected to gravity drainage. Ultrasound images were stored in the patient's record. A sample of fluid sent for diagnostic testing. Transplant Kidney Biopsy: The patient's low abdomen was prepped and draped in the usual sterile fashion. Color ultrasound was used to assess the subcutaneous tissues. No vessels were identified in the region of planned puncture. Under ultrasound guidance, the patient's skin, subcutaneous tissue, and kidney capsule were anesthetized. With ultrasound guidance, using a 17/18 gauge coaxial needle system, kidney tissue specimens were obtained. Needle was removed and the tract was sealed with Gelfoam pledgets. Pressure was held the site, and then cleansed and sterile dressing applied. Images were saved throughout the procedure. COMPLICATIONS: No immediate concerns, the patient remained stable throughout the procedure and tolerated it well. ESTIMATED BLOOD LOSS: Minimal SPECIMENS: 2 cores handed to renal pathology to confirm adequacy, fluid sent for diagnostic testing. GIGI MARKHAM PA-C   SYSTEM ID:  [id number]

## 2025-06-08 NOTE — ED PROVIDER NOTES
ED PROVIDER NOTE  2025  History     Chief Complaint   Patient presents with    Referral     HPI  Glenna Spears is a 34 year old female who has a complicated recent past medical history significant for end-stage renal disease of unknown etiology status post recent  donor kidney transplantation on May 17 complicated by leak requiring ultrasound-guided perinephric drain placement on  complicated by anterior abdominal wall hematoma.  She was recently admitted to [facility] emergency department.  That team discussed her case with the primary operative team at the [facility] favoring observation locally.  The patient states they were unable to transfer her to the [facility] for evaluation by her specialists.  She states she had to drive herself here.  The patient at this time reports decrease in drain from her perinephric drain over the past 24 hours with minimal output.  She reports ongoing 7 of 10 anterior abdominal wall pain predominantly on the right.  No new nausea, vomiting, fever, chills, diarrhea or melena.  Patient states she is here to be seen by her specialist for plasmapheresis due to concern for acute rejection.      Past Medical History  Past Medical History:   Diagnosis Date    ESRD (end stage renal disease) (H)     GERD (gastroesophageal reflux disease)     Hypertension     Kidney replaced by transplant 2025    DDKT. Induction w/ Thymoglobulin.    Latent tuberculosis     Completed 4 months of rifampin    Obesity     Secondary hyperparathyroidism     Vitamin D deficiency      Past Surgical History:   Procedure Laterality Date    AV FISTULA REPAIR       SECTION      IR FINE NEEDLE ASPIRATION W ULTRASOUND  2025    IR RENAL BIOPSY RIGHT  2025    IR SKIN SUBQ/SEROMA ABSCESS DRAIN  2025    LAPAROSCOPIC GASTRIC SLEEVE N/A 2024    Procedure: GASTRECTOMY, SLEEVE, LAPAROSCOPIC;  Surgeon: John Smith MD;  Location:   OR    TRANSPLANT KIDNEY RECIPIENT  DONOR Right 2025    Procedure: Transplant kidney recipient  donor with Vein and Artery reconstruction;  Surgeon: Manjeet Barreto MD;  Location: [facility] OR     acetaminophen (TYLENOL) 325 MG tablet  apixaban ANTICOAGULANT (ELIQUIS) 5 MG tablet  atorvastatin (LIPITOR) 10 MG tablet  ciprofloxacin (CIPRO) 500 MG tablet  ciprofloxacin (CIPRO) 500 MG tablet  famotidine (PEPCID) 20 MG tablet  levonorgestrel (KYLEENA) 19.5 MG IUD  methocarbamol (ROBAXIN) 500 MG tablet  mycophenolate (GENERIC EQUIVALENT) 250 MG capsule  nortriptyline (PAMELOR) 25 MG capsule  ondansetron (ZOFRAN ODT) 4 MG ODT tab  oxyCODONE (ROXICODONE) 5 MG tablet  pantoprazole (PROTONIX) 40 MG EC tablet  senna-docusate (SENOKOT-S/PERICOLACE) 8.6-50 MG tablet  tacrolimus (GENERIC EQUIVALENT) 0.5 MG capsule  tacrolimus (GENERIC EQUIVALENT) 1 MG capsule  tacrolimus (GENERIC EQUIVALENT) 5 MG capsule  valGANciclovir (VALCYTE) 450 MG tablet  varenicline (CHANTIX SANCHEZ) 0.5 MG X 11 & 1 MG X 42 tablet  VENTOLIN  (90 Base) MCG/ACT inhaler      Allergies   Allergen Reactions    Sulfa Antibiotics Other (See Comments)     Reaction as an infant- unsure of what happened     Family History  Family History   Problem Relation Age of Onset    Kidney Disease Maternal Grandmother     Leukemia Paternal Grandmother 47    Leukemia Maternal Aunt 37    Anesthesia Reaction No family hx of     Deep Vein Thrombosis (DVT) No family hx of     Heart Disease No family hx of      Social History   Social History     Tobacco Use    Smoking status: Former     Current packs/day: 0.00     Types: Cigarettes     Quit date: 2023     Years since quittin.3     Passive exposure: Past    Smokeless tobacco: Never    Tobacco comments:     quit   Vaping Use    Vaping status: Never Used   Substance Use Topics    Alcohol use: Not Currently    Drug use: Not Currently     Types: Marijuana         A medically appropriate review  "of systems was performed with pertinent positives and negatives noted in the HPI, and all other systems negative.      Physical Exam   BP: 138/75  Pulse: 92  Temp: 98.7  F (37.1  C)  Resp: 18  Height: 172.7 cm (5' 8\")  Weight: 75.8 kg (167 lb)  SpO2: 100 %      Physical Exam  Vitals and nursing note reviewed.   Constitutional:       Appearance: Normal appearance. She is not ill-appearing, toxic-appearing or diaphoretic.   HENT:      Head: Normocephalic and atraumatic.      Right Ear: External ear normal.      Left Ear: External ear normal.      Nose: Nose normal. No congestion.      Mouth/Throat:      Mouth: Mucous membranes are moist.      Pharynx: Oropharynx is clear. No oropharyngeal exudate.   Eyes:      Extraocular Movements: Extraocular movements intact.      Conjunctiva/sclera: Conjunctivae normal.      Pupils: Pupils are equal, round, and reactive to light.   Cardiovascular:      Rate and Rhythm: Normal rate.      Pulses: Normal pulses.      Heart sounds: Normal heart sounds. No murmur heard.     No friction rub.   Pulmonary:      Effort: Pulmonary effort is normal. No respiratory distress.      Breath sounds: No stridor. No wheezing, rhonchi or rales.   Abdominal:      General: Abdomen is flat. There is no distension.      Tenderness: There is abdominal tenderness. There is no guarding or rebound.          Comments: Firm and tender to palpation.  No erythema, fluctuance or induration.   Musculoskeletal:         General: No deformity or signs of injury. Normal range of motion.      Cervical back: Normal range of motion.   Skin:     General: Skin is warm.      Capillary Refill: Capillary refill takes less than 2 seconds.      Coloration: Skin is not pale.      Findings: No bruising or erythema.   Neurological:      General: No focal deficit present.      Mental Status: She is alert.      Cranial Nerves: No cranial nerve deficit.      Motor: No weakness.   Psychiatric:         Mood and Affect: Mood normal.    "      Behavior: Behavior normal.         ED Course        Procedures         No results found for this or any previous visit (from the past 24 hours).  Medications   HYDROmorphone (PF) (DILAUDID) injection 0.5 mg (0.5 mg Intravenous $Given 25 297)             Critical care was not performed.     Medical Decision Making  The patient's presentation was of moderate complexity (an acute complicated injury).    The patient's evaluation involved:  review of external note(s) from 3+ sources (see separate area of note for details)  review of 3+ test result(s) ordered prior to this encounter (see separate area of note for details)  ordering and/or review of 3+ test(s) in this encounter (see separate area of note for details)    The patient's management necessitated high risk (a decision regarding hospitalization).    Assessments & Plan (with Medical Decision Making)     Glenna Spears is a 34 year old female who has a complicated recent past medical history significant for end-stage renal disease of unknown etiology status post recent  donor kidney transplantation on May 17 complicated by leak requiring ultrasound-guided perinephric drain placement on  complicated by anterior abdominal wall hematoma.  She was recently admitted to [facility] emergency department.  That team discussed her case with the primary operative team at the [facility] favoring observation locally.  The patient states they were unable to transfer her to the [facility] for evaluation by her specialists.  She states she had to drive herself here.  The patient at this time reports decrease in drain from her perinephric drain over the past 24 hours with minimal output.  She reports ongoing 7 of 10 anterior abdominal wall pain predominantly on the right.  No new nausea, vomiting, fever, chills, diarrhea or melena.  Patient states she is here to be seen by her specialist for plasmapheresis due to concern for  acute rejection.    Upon arrival patient noted alert.  She is presently afebrile and hemodynamically stable.  Evaluation the exterior intra-abdominal wall demonstrates sterile dry dressings in place without erythema.  Firm to palpation consistent with hematoma with low suspicion for infection, or abscess.  I did evaluate her external drain with minimal output.  Recent CT scan demonstrates minimal intra-abdominal fluid.  I suspect drain in the right place with decrease in leak.  I would plan to recheck hemoglobin however this has been stable.  I do not see notes indicating transfer or plasmapheresis as patient indicates but would plan to contact her primary operative team for neck step in care.    Case discussed with transplant surgery and general surgery with plan for acute hospitalization for initiation of plasmapheresis due to concern of acute rejection.        I have reviewed the nursing notes.    I have reviewed the findings, diagnosis, plan and need for follow up with the patient.    New Prescriptions    No medications on file       Final diagnoses:   Abdominal pain, unspecified abdominal location   Kidney replaced by transplant       MICHAEL ALBERTS MD    6/8/2025   [facility] EMERGENCY DEPARTMENT     Michael Alberts MD  06/08/25 [id number]

## 2025-06-09 ENCOUNTER — APPOINTMENT (OUTPATIENT)
Dept: LAB | Facility: CLINIC | Age: 35
End: 2025-06-09
Payer: COMMERCIAL

## 2025-06-09 ENCOUNTER — APPOINTMENT (OUTPATIENT)
Dept: ULTRASOUND IMAGING | Facility: CLINIC | Age: 35
End: 2025-06-09
Attending: PHYSICIAN ASSISTANT
Payer: COMMERCIAL

## 2025-06-09 PROBLEM — T86.11 ANTIBODY MEDIATED REJECTION OF KIDNEY TRANSPLANT: Status: ACTIVE | Noted: 2025-06-09

## 2025-06-09 LAB
A1: 4248
ALBUMIN SERPL BCG-MCNC: 3.8 G/DL (ref 3.5–5.2)
ALBUMIN UR-MCNC: NEGATIVE MG/DL
ALP SERPL-CCNC: 109 U/L (ref 40–150)
ALT SERPL W P-5'-P-CCNC: 6 U/L (ref 0–50)
ANION GAP SERPL CALCULATED.3IONS-SCNC: 7 MMOL/L (ref 7–15)
APPEARANCE UR: CLEAR
AST SERPL W P-5'-P-CCNC: 13 U/L (ref 0–45)
B60: NORMAL
B8: 5726
BACTERIA #/AREA URNS HPF: ABNORMAL /HPF
BILIRUB SERPL-MCNC: 0.2 MG/DL
BILIRUB UR QL STRIP: NEGATIVE
BLD PROD TYP BPU: NORMAL
BLOOD COMPONENT TYPE: NORMAL
BUN SERPL-MCNC: 20.5 MG/DL (ref 6–20)
CALCIUM SERPL-MCNC: 9.9 MG/DL (ref 8.8–10.4)
CHLORIDE SERPL-SCNC: 110 MMOL/L (ref 98–107)
CODING SYSTEM: NORMAL
COLOR UR AUTO: ABNORMAL
CREAT SERPL-MCNC: 1.95 MG/DL (ref 0.51–0.95)
DONOR IDENTIFICATION: NORMAL
DR17: 2376
DR52: 1847
DSA COMMENTS: NORMAL
DSA PRESENT: YES
DSA TEST METHOD: NORMAL
EGFRCR SERPLBLD CKD-EPI 2021: 34 ML/MIN/1.73M2
ERYTHROCYTE [DISTWIDTH] IN BLOOD BY AUTOMATED COUNT: 12.9 % (ref 10–15)
GLUCOSE BLDC GLUCOMTR-MCNC: 101 MG/DL (ref 70–99)
GLUCOSE BLDC GLUCOMTR-MCNC: 108 MG/DL (ref 70–99)
GLUCOSE BLDC GLUCOMTR-MCNC: 147 MG/DL (ref 70–99)
GLUCOSE BLDC GLUCOMTR-MCNC: 147 MG/DL (ref 70–99)
GLUCOSE SERPL-MCNC: 159 MG/DL (ref 70–99)
GLUCOSE UR STRIP-MCNC: NEGATIVE MG/DL
HCO3 SERPL-SCNC: 20 MMOL/L (ref 22–29)
HCT VFR BLD AUTO: 28.6 % (ref 35–47)
HGB BLD-MCNC: 8.9 G/DL (ref 11.7–15.7)
HGB UR QL STRIP: NEGATIVE
INT SUB COMMENTS: NORMAL
INT SUB RESULT: NORMAL
INTERF SUBSTANCE: NORMAL
INTSUB TEST METHOD: NORMAL
ISSUE DATE AND TIME: NORMAL
KETONES UR STRIP-MCNC: NEGATIVE MG/DL
LEUKOCYTE ESTERASE UR QL STRIP: NEGATIVE
MAGNESIUM SERPL-MCNC: 1.8 MG/DL (ref 1.7–2.3)
MCH RBC QN AUTO: 30.5 PG (ref 26.5–33)
MCHC RBC AUTO-ENTMCNC: 31.1 G/DL (ref 31.5–36.5)
MCV RBC AUTO: 98 FL (ref 78–100)
MUCOUS THREADS #/AREA URNS LPF: PRESENT /LPF
NITRATE UR QL: NEGATIVE
ORGAN: NORMAL
PATH REPORT.COMMENTS IMP SPEC: NORMAL
PATH REPORT.COMMENTS IMP SPEC: NORMAL
PATH REPORT.FINAL DX SPEC: NORMAL
PATH REPORT.GROSS SPEC: NORMAL
PATH REPORT.MICROSCOPIC SPEC OTHER STN: NORMAL
PATH REPORT.RELEVANT HX SPEC: NORMAL
PH UR STRIP: 6 [PH] (ref 5–7)
PHOSPHATE SERPL-MCNC: 1.7 MG/DL (ref 2.5–4.5)
PHOTO IMAGE: NORMAL
PLATELET # BLD AUTO: 272 10E3/UL (ref 150–450)
POTASSIUM SERPL-SCNC: 4.7 MMOL/L (ref 3.4–5.3)
PROT SERPL-MCNC: 6.3 G/DL (ref 6.4–8.3)
RBC # BLD AUTO: 2.92 10E6/UL (ref 3.8–5.2)
RBC URINE: 3 /HPF
SA 1  COMMENTS: NORMAL
SA 1 CELL: NORMAL
SA 1 TEST METHOD: NORMAL
SA 2 CELL: NORMAL
SA 2 COMMENTS: NORMAL
SA 2 TEST METHOD: NORMAL
SA1 HI RISK ABY: NORMAL
SA1 MOD RISK ABY: NORMAL
SA2 HI RISK ABY: NORMAL
SA2 MOD RISK ABY: NORMAL
SODIUM SERPL-SCNC: 137 MMOL/L (ref 135–145)
SP GR UR STRIP: 1.02 (ref 1–1.03)
SQUAMOUS EPITHELIAL: 2 /HPF
UNACCEPTABLE ANTIGENS: NORMAL
UNIT ABO/RH: NORMAL
UNIT NUMBER: NORMAL
UNIT STATUS: NORMAL
UNIT TYPE ISBT: 600
UNIT TYPE ISBT: 6200
UNOS CPRA: 100
UROBILINOGEN UR STRIP-MCNC: NORMAL MG/DL
WBC # BLD AUTO: 5.9 10E3/UL (ref 4–11)
WBC URINE: 2 /HPF

## 2025-06-09 PROCEDURE — 81001 URINALYSIS AUTO W/SCOPE: CPT

## 2025-06-09 PROCEDURE — 6A550Z3 PHERESIS OF PLASMA, SINGLE: ICD-10-PCS | Performed by: PATHOLOGY

## 2025-06-09 PROCEDURE — 36514 APHERESIS PLASMA: CPT

## 2025-06-09 PROCEDURE — 76776 US EXAM K TRANSPL W/DOPPLER: CPT

## 2025-06-09 PROCEDURE — 84100 ASSAY OF PHOSPHORUS: CPT

## 2025-06-09 PROCEDURE — 93971 EXTREMITY STUDY: CPT | Mod: 26 | Performed by: STUDENT IN AN ORGANIZED HEALTH CARE EDUCATION/TRAINING PROGRAM

## 2025-06-09 PROCEDURE — 250N000012 HC RX MED GY IP 250 OP 636 PS 637: Performed by: PHYSICIAN ASSISTANT

## 2025-06-09 PROCEDURE — 250N000013 HC RX MED GY IP 250 OP 250 PS 637

## 2025-06-09 PROCEDURE — 90832 PSYTX W PT 30 MINUTES: CPT | Performed by: COUNSELOR

## 2025-06-09 PROCEDURE — 250N000012 HC RX MED GY IP 250 OP 636 PS 637

## 2025-06-09 PROCEDURE — P9059 PLASMA, FRZ BETWEEN 8-24HOUR: HCPCS | Performed by: PATHOLOGY

## 2025-06-09 PROCEDURE — 85018 HEMOGLOBIN: CPT

## 2025-06-09 PROCEDURE — 87086 URINE CULTURE/COLONY COUNT: CPT | Performed by: PHYSICIAN ASSISTANT

## 2025-06-09 PROCEDURE — 76776 US EXAM K TRANSPL W/DOPPLER: CPT | Mod: 26 | Performed by: RADIOLOGY

## 2025-06-09 PROCEDURE — 30233S1 TRANSFUSION OF NONAUTOLOGOUS GLOBULIN INTO PERIPHERAL VEIN, PERCUTANEOUS APPROACH: ICD-10-PCS | Performed by: TRANSPLANT SURGERY

## 2025-06-09 PROCEDURE — 99255 IP/OBS CONSLTJ NEW/EST HI 80: CPT | Mod: 24 | Performed by: PHYSICIAN ASSISTANT

## 2025-06-09 PROCEDURE — P9059 PLASMA, FRZ BETWEEN 8-24HOUR: HCPCS

## 2025-06-09 PROCEDURE — 83735 ASSAY OF MAGNESIUM: CPT

## 2025-06-09 PROCEDURE — 250N000009 HC RX 250: Performed by: PATHOLOGY

## 2025-06-09 PROCEDURE — 250N000013 HC RX MED GY IP 250 OP 250 PS 637: Performed by: PHYSICIAN ASSISTANT

## 2025-06-09 PROCEDURE — 250N000011 HC RX IP 250 OP 636: Mod: JZ | Performed by: PATHOLOGY

## 2025-06-09 PROCEDURE — 84155 ASSAY OF PROTEIN SERUM: CPT

## 2025-06-09 PROCEDURE — 250N000011 HC RX IP 250 OP 636: Mod: JZ

## 2025-06-09 PROCEDURE — 99232 SBSQ HOSP IP/OBS MODERATE 35: CPT | Mod: 24 | Performed by: PHYSICIAN ASSISTANT

## 2025-06-09 PROCEDURE — 93971 EXTREMITY STUDY: CPT | Mod: RT

## 2025-06-09 PROCEDURE — 250N000011 HC RX IP 250 OP 636: Mod: JW | Performed by: PHYSICIAN ASSISTANT

## 2025-06-09 PROCEDURE — 36415 COLL VENOUS BLD VENIPUNCTURE: CPT

## 2025-06-09 PROCEDURE — 120N000011 HC R&B TRANSPLANT UMMC

## 2025-06-09 RX ORDER — DIPHENHYDRAMINE HYDROCHLORIDE 50 MG/ML
25 INJECTION, SOLUTION INTRAMUSCULAR; INTRAVENOUS
Status: DISCONTINUED | OUTPATIENT
Start: 2025-06-09 | End: 2025-06-10

## 2025-06-09 RX ORDER — HUMAN IMMUNOGLOBULIN G 20 G/200ML
0.5 LIQUID INTRAVENOUS ONCE
Status: CANCELLED | OUTPATIENT
Start: 2025-06-13 | End: 2025-06-13

## 2025-06-09 RX ORDER — SENNOSIDES 8.6 MG
325 CAPSULE ORAL DAILY
Status: ON HOLD | COMMUNITY
End: 2025-06-11

## 2025-06-09 RX ORDER — FUROSEMIDE 40 MG/1
40 TABLET ORAL ONCE
Status: COMPLETED | OUTPATIENT
Start: 2025-06-09 | End: 2025-06-09

## 2025-06-09 RX ORDER — HYDROCORTISONE SODIUM SUCCINATE 100 MG/2ML
100 INJECTION INTRAMUSCULAR; INTRAVENOUS
Status: CANCELLED | OUTPATIENT
Start: 2025-06-09

## 2025-06-09 RX ORDER — LIDOCAINE 4 G/G
1-2 PATCH TOPICAL
Status: DISCONTINUED | OUTPATIENT
Start: 2025-06-09 | End: 2025-06-11 | Stop reason: HOSPADM

## 2025-06-09 RX ORDER — DIPHENHYDRAMINE HYDROCHLORIDE 50 MG/ML
50 INJECTION, SOLUTION INTRAMUSCULAR; INTRAVENOUS
Status: CANCELLED
Start: 2025-06-13

## 2025-06-09 RX ORDER — HUMAN IMMUNOGLOBULIN G 20 G/200ML
1 LIQUID INTRAVENOUS ONCE
Status: CANCELLED | OUTPATIENT
Start: 2025-06-13 | End: 2025-06-13

## 2025-06-09 RX ORDER — HYDROXYZINE HYDROCHLORIDE 25 MG/1
25 TABLET, FILM COATED ORAL EVERY 6 HOURS PRN
Status: DISCONTINUED | OUTPATIENT
Start: 2025-06-09 | End: 2025-06-11 | Stop reason: HOSPADM

## 2025-06-09 RX ORDER — CALCIUM CARBONATE 500 MG/1
1 TABLET, CHEWABLE ORAL 2 TIMES DAILY PRN
Status: ON HOLD | COMMUNITY
End: 2025-06-11

## 2025-06-09 RX ORDER — DEXTROSE MONOHYDRATE 25 G/50ML
25-50 INJECTION, SOLUTION INTRAVENOUS
Status: DISCONTINUED | OUTPATIENT
Start: 2025-06-09 | End: 2025-06-11

## 2025-06-09 RX ORDER — ALBUTEROL SULFATE 90 UG/1
1-2 INHALANT RESPIRATORY (INHALATION)
Status: CANCELLED
Start: 2025-06-13

## 2025-06-09 RX ORDER — DIPHENHYDRAMINE HYDROCHLORIDE 50 MG/ML
50 INJECTION, SOLUTION INTRAMUSCULAR; INTRAVENOUS
Status: CANCELLED | OUTPATIENT
Start: 2025-06-09

## 2025-06-09 RX ORDER — ALBUTEROL SULFATE 0.83 MG/ML
2.5 SOLUTION RESPIRATORY (INHALATION)
Status: CANCELLED | OUTPATIENT
Start: 2025-06-13

## 2025-06-09 RX ORDER — DIPHENHYDRAMINE HYDROCHLORIDE 50 MG/ML
50 INJECTION, SOLUTION INTRAMUSCULAR; INTRAVENOUS
Status: DISCONTINUED | OUTPATIENT
Start: 2025-06-09 | End: 2025-06-10

## 2025-06-09 RX ORDER — METHYLPREDNISOLONE SODIUM SUCCINATE 125 MG/2ML
100 INJECTION INTRAMUSCULAR; INTRAVENOUS ONCE
Status: COMPLETED | OUTPATIENT
Start: 2025-06-09 | End: 2025-06-09

## 2025-06-09 RX ORDER — EPINEPHRINE 1 MG/ML
0.3 INJECTION, SOLUTION, CONCENTRATE INTRAVENOUS EVERY 5 MIN PRN
Status: CANCELLED | OUTPATIENT
Start: 2025-06-13

## 2025-06-09 RX ORDER — ALBUTEROL SULFATE 90 UG/1
1-2 INHALANT RESPIRATORY (INHALATION)
Status: DISCONTINUED | OUTPATIENT
Start: 2025-06-09 | End: 2025-06-11 | Stop reason: HOSPADM

## 2025-06-09 RX ORDER — METHYLPREDNISOLONE SODIUM SUCCINATE 40 MG/ML
40 INJECTION INTRAMUSCULAR; INTRAVENOUS
Status: DISCONTINUED | OUTPATIENT
Start: 2025-06-09 | End: 2025-06-10

## 2025-06-09 RX ORDER — NALOXONE HYDROCHLORIDE 0.4 MG/ML
0.4 INJECTION, SOLUTION INTRAMUSCULAR; INTRAVENOUS; SUBCUTANEOUS
Status: DISCONTINUED | OUTPATIENT
Start: 2025-06-09 | End: 2025-06-11 | Stop reason: HOSPADM

## 2025-06-09 RX ORDER — HEPARIN SODIUM 1000 [USP'U]/ML
1-3 INJECTION, SOLUTION INTRAVENOUS; SUBCUTANEOUS ONCE
Status: CANCELLED
Start: 2025-06-13 | End: 2025-06-13

## 2025-06-09 RX ORDER — DIPHENHYDRAMINE HYDROCHLORIDE 50 MG/ML
25 INJECTION, SOLUTION INTRAMUSCULAR; INTRAVENOUS
Status: CANCELLED
Start: 2025-06-13

## 2025-06-09 RX ORDER — NALOXONE HYDROCHLORIDE 0.4 MG/ML
0.2 INJECTION, SOLUTION INTRAMUSCULAR; INTRAVENOUS; SUBCUTANEOUS
Status: DISCONTINUED | OUTPATIENT
Start: 2025-06-09 | End: 2025-06-11 | Stop reason: HOSPADM

## 2025-06-09 RX ORDER — METHYLPREDNISOLONE SODIUM SUCCINATE 40 MG/ML
40 INJECTION INTRAMUSCULAR; INTRAVENOUS
Status: CANCELLED
Start: 2025-06-13

## 2025-06-09 RX ORDER — ALBUTEROL SULFATE 0.83 MG/ML
2.5 SOLUTION RESPIRATORY (INHALATION)
Status: DISCONTINUED | OUTPATIENT
Start: 2025-06-09 | End: 2025-06-10

## 2025-06-09 RX ORDER — MEPERIDINE HYDROCHLORIDE 25 MG/ML
25 INJECTION INTRAMUSCULAR; INTRAVENOUS; SUBCUTANEOUS
Refills: 0 | Status: DISCONTINUED | OUTPATIENT
Start: 2025-06-09 | End: 2025-06-09

## 2025-06-09 RX ORDER — NICOTINE POLACRILEX 4 MG
15-30 LOZENGE BUCCAL
Status: DISCONTINUED | OUTPATIENT
Start: 2025-06-09 | End: 2025-06-11

## 2025-06-09 RX ORDER — HUMAN IMMUNOGLOBULIN G 20 G/200ML
30 LIQUID INTRAVENOUS ONCE
Status: COMPLETED | OUTPATIENT
Start: 2025-06-09 | End: 2025-06-09

## 2025-06-09 RX ORDER — CALCIUM GLUCONATE 100 MG/ML
AMPUL (ML) INTRAVENOUS
Status: COMPLETED | OUTPATIENT
Start: 2025-06-09 | End: 2025-06-09

## 2025-06-09 RX ORDER — MEPERIDINE HYDROCHLORIDE 25 MG/ML
25 INJECTION INTRAMUSCULAR; INTRAVENOUS; SUBCUTANEOUS
Status: CANCELLED | OUTPATIENT
Start: 2025-06-13

## 2025-06-09 RX ADMIN — MYCOPHENOLATE MOFETIL 750 MG: 250 CAPSULE ORAL at 17:15

## 2025-06-09 RX ADMIN — TACROLIMUS 5 MG: 5 CAPSULE ORAL at 08:39

## 2025-06-09 RX ADMIN — ATORVASTATIN CALCIUM 10 MG: 10 TABLET, FILM COATED ORAL at 08:39

## 2025-06-09 RX ADMIN — METHYLPREDNISOLONE SODIUM SUCCINATE 100 MG: 125 INJECTION, POWDER, FOR SOLUTION INTRAMUSCULAR; INTRAVENOUS at 18:32

## 2025-06-09 RX ADMIN — HUMAN IMMUNOGLOBULIN G 30 G: 20 LIQUID INTRAVENOUS at 19:16

## 2025-06-09 RX ADMIN — ACETAMINOPHEN 650 MG: 325 TABLET, FILM COATED ORAL at 22:28

## 2025-06-09 RX ADMIN — SENNOSIDES AND DOCUSATE SODIUM 2 TABLET: 50; 8.6 TABLET ORAL at 19:27

## 2025-06-09 RX ADMIN — OXYCODONE HYDROCHLORIDE 10 MG: 10 TABLET ORAL at 08:38

## 2025-06-09 RX ADMIN — INSULIN ASPART 1 UNITS: 100 INJECTION, SOLUTION INTRAVENOUS; SUBCUTANEOUS at 12:52

## 2025-06-09 RX ADMIN — FUROSEMIDE 40 MG: 40 TABLET ORAL at 17:15

## 2025-06-09 RX ADMIN — LIDOCAINE 2 PATCH: 560 PATCH PERCUTANEOUS; TOPICAL; TRANSDERMAL at 19:27

## 2025-06-09 RX ADMIN — SODIUM PHOSPHATE, DIBASIC, ANHYDROUS, POTASSIUM PHOSPHATE, MONOBASIC, AND SODIUM PHOSPHATE, MONOBASIC, MONOHYDRATE 500 MG: 852; 155; 130 TABLET, COATED ORAL at 08:39

## 2025-06-09 RX ADMIN — CALCIUM GLUCONATE 5 G: 98 INJECTION, SOLUTION INTRAVENOUS at 13:50

## 2025-06-09 RX ADMIN — ACETAMINOPHEN 650 MG: 325 TABLET, FILM COATED ORAL at 05:47

## 2025-06-09 RX ADMIN — SODIUM PHOSPHATE, DIBASIC, ANHYDROUS, POTASSIUM PHOSPHATE, MONOBASIC, AND SODIUM PHOSPHATE, MONOBASIC, MONOHYDRATE 500 MG: 852; 155; 130 TABLET, COATED ORAL at 19:27

## 2025-06-09 RX ADMIN — ACETAMINOPHEN 650 MG: 325 TABLET, FILM COATED ORAL at 12:58

## 2025-06-09 RX ADMIN — HYDROMORPHONE HYDROCHLORIDE 0.4 MG: 0.2 INJECTION, SOLUTION INTRAMUSCULAR; INTRAVENOUS; SUBCUTANEOUS at 05:39

## 2025-06-09 RX ADMIN — ANTICOAGULANT CITRATE DEXTROSE SOLUTION FORMULA A 564 ML: 12.25; 11; 3.65 SOLUTION INTRAVENOUS at 13:50

## 2025-06-09 RX ADMIN — MYCOPHENOLATE MOFETIL 750 MG: 250 CAPSULE ORAL at 08:39

## 2025-06-09 RX ADMIN — NORTRIPTYLINE HYDROCHLORIDE 25 MG: 25 CAPSULE ORAL at 22:28

## 2025-06-09 RX ADMIN — TACROLIMUS 5 MG: 5 CAPSULE ORAL at 17:15

## 2025-06-09 RX ADMIN — PANTOPRAZOLE SODIUM 40 MG: 40 TABLET, DELAYED RELEASE ORAL at 08:39

## 2025-06-09 RX ADMIN — HYDROMORPHONE HYDROCHLORIDE 0.4 MG: 0.2 INJECTION, SOLUTION INTRAMUSCULAR; INTRAVENOUS; SUBCUTANEOUS at 01:17

## 2025-06-09 RX ADMIN — OXYCODONE HYDROCHLORIDE 10 MG: 10 TABLET ORAL at 17:26

## 2025-06-09 RX ADMIN — METHOCARBAMOL 500 MG: 500 TABLET ORAL at 12:58

## 2025-06-09 ASSESSMENT — ACTIVITIES OF DAILY LIVING (ADL)
ADLS_ACUITY_SCORE: 28
DEPENDENT_IADLS:: INDEPENDENT
ADLS_ACUITY_SCORE: 28

## 2025-06-09 NOTE — PROGRESS NOTES
Ms. Glenna Spears is a 34-year-old female with end-stage kidney disease of unknown etiology who underwent a DCD DDKT on May 17, 2025. 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    On Saba 3, 2025, she was also found to have a non-occlusive right internal jugular thrombosis and an occlusive right cephalic vein superficial thrombosis, likely related to a previously placed (and now removed) catheter.    Given the high-level DSAs, a kidney allograft biopsy was performed on June 6, 2025. Today, she presented to an outside hospital with significant abdominal pain localized to the transplant site. A CT scan revealed a ~10 cm perinephric fluid collection, likely representing a post-biopsy hematoma.    Preliminary pathology findings shared this morning show diffuse C4d positivity without evidence (so far) of glomerulitis or peritubular capillaritis. There was no evidence of cellular mediated rejection. A re-review of the biopsy is planned for tomorrow to confirm the findings. 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     Recommendation:  If unable to start plasmapheresis today, would give solu-medrol 500 mg once.  Plasmapheresis for 5 treatments, every other day.  Given new 10 cm hematoma post biopsy, consider FFP instead of albumin, for first plasmapheresis.   Defer starting Eliquis for new internal jugular clot given new hematoma.   Would repeat kidney US tomorrow to ensure hematoma stability.  IVIG 500 mg/kg after plasmapheresis treatment # 1-4.  IVIG 1 gm/kg after plasmapheresis. treatment # 5.  Give Solumedrol 100 mg IV prior to each IVIG dose.    Gianni Turner MD  Transplant Nephrology  Contact information via Vocera Web Console or via [facility] Paging/Directory

## 2025-06-09 NOTE — CONSULTS
"Health Psychology Consultation Note  [facility]     Patient: Glenna Spears  MRN: [id number]    : 1990  Date of Admission: 2025  Date of Encounter: 2025     Consult Requested by: Milena Michael PA-C   Reason for Consult: Assistance with anxiety symptoms    Diagnosis:  Adjustment disorder with anxious mood (ICD-10: F43.20)    Impression/Plan:  Ms. Spears is a 34 year old y/o female currently inpatient for 1 days, starting on 2025 for Kidney replaced by transplant [Z94.0]  Abdominal pain, unspecified abdominal location [R10.9] who presented today in the hospital room. Patient was engaged in the visit and expressed understanding of the information provided. Pt presented with symptoms of anxiety. Pt symptoms are likely maintained by current medical illness. Symptom reduction would likely be facilitated by CBT. Pt was agreeable to follow up with the Health Psychology Team. Plan for health psychology to follow this patient approximately once per week for the duration of their hospitalization. Please feel free to call in urgent concerns that arise prior to the next follow-up session.    Recommendations for Care Team:  > Continue to monitor changes in patient's mood. While not an exhaustive list, examples of symptoms of note include increases in: sadness/hopelessness, time spent worrying, physiological responses to stress, and decreases in: sleep, appetite, socialization, engagement in care.   > Encourage pt to engage in processing with trusted social supports.  > Encourage pt to practice discussed coping skills during moments of high symptomology.    Background (per chart):  \" Glenna Spears is a 34 year old female with recent PMHx significant for ESRD s/p  donor kidney transplantation on 2025 complicated by leak requiring ultrasound-guided perinephric drain placement as well as kidney biopsy by IR on , now complicated by anterior " "abdominal wall hematoma. Kidney biopsy done on 6/6 due to patient having moderate to high-level donor-specific antibodies (DSA) and concern for AMR. 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 She required admission for transplant nephrology consultation as well as transfusion medicine consultation for plasmapheresis. She does not have a urine leak, as her creatinine is the same in her abdominal fluid as the plasma. Cr trended down to 2.4 post transplant then increased to 3.5 (6/2/25). On admit Cr 2.1, trending down.  \"    Patient Demographics (per chart):   Age: 34 year old  Biological Sex: female  Race: White  Ethnicity: Not  or     Hospital Admission (per chart):  Admission Date: 6/8/2025  Admission Diagnosis: Kidney replaced by transplant [Z94.0]  Abdominal pain, unspecified abdominal location [R10.9]  Length of Stay: 1    Subjective:  Engaged pt in psychotherapy for anxiety symptoms in the context of medical illness. Pt was agreeable to check-in at this time. Pt reported mood as \"anxious\" and described her hospital course as including bouts of intense panic regarding her overall health.  Engage patient in cognitive processing and supportive listening of ongoing symptoms and stressors.  Additionally engaged patient in psychoeducation and skills training in basic anxiety management skills including utilizing a worry log and incorporating grounding skills into her daily routine.  Additionally recommended follow-up with psychiatry to discuss medication management of symptoms at request of patient.  Patient was engaged in the visit and expressed understanding of information provided. Pt was agreeable to follow up with Health Psychology team.    Objective/Assessment:   Mental Status/Interview:  Appearance:   Appropriate   Eye Contact:   Good   Psychomotor Behavior:  Normal   Attitude:   Cooperative   Orientation:   All  Speech   Rate / Production: Normal    Volume:  Normal "   Mood:    Euthymic  Affect:    Appropriate   Thought Content:   Clear  Thought Form:   Coherent  Logical     Insight:    Did not formally assess at this time.     Suicidal ideation: Pt denied active suicidal ideation.   Homicidal ideation: Pt denied active homicidal ideation.    Session Length:  Start Time: 4:00pm  End Time: 4:30pm    Date of Service:  06/09/25    Brody Guerra, PhD,   Clinical Health Psychologist  Phone: [phone]    *This note was completed using Dragon voice recognition software. Although reviewed after completion, some word and grammatical errors may occur.

## 2025-06-09 NOTE — PHARMACY-ADMISSION MEDICATION HISTORY
Pharmacy Intern Admission Medication History    Admission medication history is complete. The information provided in this note is only as accurate as the sources available at the time of the update.    Information Source(s): Patient via in-person    Pertinent Information:   For GERD, the patient prefers to take famotidine 20 mg by mouth 2 times daily as needed in addition to Tums 500 mg by mouth twice daily as needed. She does not like to take pantoprazole because she finds that the relief is adequate.   For smoking cessation/relief of cravings associated with smoking, patient takes varenicline 0.5 mg tablet as needed. She has not needed to take it in several weeks, and finds that it makes her somewhat nauseas, but prefers to have it on hand in case of the return of cravings.  For DVT prophylaxis, apixaban loading dose and maintenance regimen not yet dispensed to patient and is ready for  at her [facility].  For asthma, patient reports only needing her Ventolin inhaler when she is sick.    Changes made to PTA medication list:  Added:   Aspirin 325 EC take 1 tablet by mouth once daily  Tums 500 mg chew 1 tablet by mouth twice daily as needed for heartburn  Deleted:   Ciprofloxacin 500 mg take 1 capsule by mouth twice daily #6. Patient finished course  Ciprofloxacin 500 mg take 1 capsule by mouth daily # 3. Patient finished course  Oxycodone 5 mg tablet, take 1 to 2 tablets by mouth every 8 hours as needed for moderate to severe pain. Patient finished post-operative course  Pantoprazole 40 mg tablet, take 1 tablet by mouth once daily. Patient does not take this medication, prefers Tums and famotidine.  Changed:   Tacrolimus 5 mg capsule changed from profile to take 1 capsule by mouth 2 times daily  Tacrolimus 1 mg capsule changed from take 3 capsules (3 mg) by mouth 2 times daily to profile Rx: patient will contact pharmacy when needed.  Senna-docusate 8.6-50 mg 2 tablets by mouth 2 times daily changed to 2  tablets by mouth 2 times daily as needed for constipation.    Allergies reviewed with patient and updates made in EHR: yes    Medication History Completed By: Lukas Ledesma 6/9/2025 9:14 AM      Thank you,   Lukas Ledesma   4th year Pharmacy Student     PTA Med List   Medication Sig Last Dose/Taking    acetaminophen (TYLENOL) 325 MG tablet Take 325-650 mg by mouth every 4 hours as needed 6/8/2025    aspirin (ASA) 325 MG EC tablet Take 325 mg by mouth daily. Held for biopsy, last dose ~ 1 week ago Past Week    atorvastatin (LIPITOR) 10 MG tablet Take 1 tablet (10 mg) by mouth daily. 6/9/2025 Morning    calcium carbonate (TUMS) 500 MG chewable tablet Take 1 chew tab by mouth 2 times daily as needed for heartburn. Past Week    famotidine (PEPCID) 20 MG tablet Take 20 mg by mouth 2 times daily as needed. 6/8/2025    mycophenolate (GENERIC EQUIVALENT) 250 MG capsule Take 3 capsules (750 mg) by mouth 2 times daily. 6/9/2025 Morning    nortriptyline (PAMELOR) 25 MG capsule Take 25 mg by mouth at bedtime. 6/8/2025    ondansetron (ZOFRAN ODT) 4 MG ODT tab Take 1 tablet (4 mg) by mouth every 8 hours as needed for nausea. Past Week    senna-docusate (SENOKOT-S/PERICOLACE) 8.6-50 MG tablet Take 2 tablets by mouth 2 times daily as needed for constipation Past Week    tacrolimus (GENERIC EQUIVALENT) 0.5 MG capsule Profile Rx: patient will contact pharmacy when needed Past Month    tacrolimus (GENERIC EQUIVALENT) 1 MG capsule Profile Rx: patient will contact pharmacy when needed Past Month    tacrolimus (GENERIC EQUIVALENT) 5 MG capsule Take 5 mg by mouth 2 times daily. 5 mg tablet by mouth two times daily 6/9/2025 Morning    valGANciclovir (VALCYTE) 450 MG tablet Take 1 tablet (450 mg) by mouth every other day. 6/8/2025    varenicline (CHANTIX SANCHEZ) 0.5 MG X 11 & 1 MG X 42 tablet Take 0.5 mg by mouth daily. Patient is taking 0.5 mg as needed for cravings associated with smoking Past Month    VENTOLIN  (90 Base) MCG/ACT  inhaler Inhale 2 puffs into the lungs every 4 hours as needed for shortness of breath or wheezing. Past Month

## 2025-06-09 NOTE — PLAN OF CARE
Goal Outcome Evaluation:      Plan of Care Reviewed With: patient    Overall Patient Progress: no changeOverall Patient Progress: no change    Outcome Evaluation: Patient anticipares remaining inpatient for course of IVIG/PLEX treatments.

## 2025-06-09 NOTE — DISCHARGE INSTRUCTIONS
Plasma exchange:  If you received blood products (plasma or red blood cells) as part of your treatment, you need to be aware that transfusion reactions can occur up to several hours after they have been given to you.  Call your physician if you experience any symptoms in the next 48 hours, including: breathing problems, rash, itching, hives, nausea or vomiting, fever or chills, blood in your urine or stools, or joint pain.  Please inform the Transfusion Medicine Physician by calling [phone] and asking for the physician on call.  If you received albumin as part of your treatment (this is the most common), some of your clotting factors have been removed.  You body will replace these factors, but you could be at a slight risk for bleeding.  Please inform us if you have had any bleeding or a recent invasive procedure, such as a biopsy or surgery.    Certain medications that lower your blood pressure (ace inhibitors) such as Lisinopril are contraindicated while you are receiving plasma exchange.  Please inform us if you have started taking this medication during your plasma exchange series.     Incidental Actinic Keratosis Histology Text: Actinic keratosis was noted on frozen sections.  The specimen contains foci of disordered epidermal cells confined to the epidermis.  The cells have anaplastic nuclei with parakeratosis and a thickened granular cell layer.  There is papillomatosis and some hyperkeratosis.

## 2025-06-09 NOTE — PROGRESS NOTES
Admitted/transferred from: ED  Time of arrival on unit 1910  2 RN full  skin assessment completed by Latoya LINDO And Cesia OROZCO  Skin assessment finding: generalized bruising, old tunnel line site on rt. upper chest, small scab on rt. Abdomen, RLQ incision stapled, rt. Abdominal drain.  Interventions/actions: dressing over incision and drain.     Will continue to monitor.

## 2025-06-09 NOTE — CONSULTS
Care Management Initial Consult    General Information  Assessment completed with: Patient, VM-chart review,    Type of CM/SW Visit: Initial Assessment    Primary Care Provider verified and updated as needed: Yes (Dr. Maria Victoria Lopes)   Readmission within the last 30 days:        Reason for Consult: discharge planning  Advance Care Planning: Advance Care Planning Reviewed: no concerns identified          Communication Assessment  Patient's communication style: spoken language (English or Bilingual)    Hearing Difficulty or Deaf: no   Wear Glasses or Blind: no    Cognitive  Cognitive/Neuro/Behavioral: WDL                      Living Environment:   People in home: sibling(s), grandparent(s), parent(s) (Pt staying locally with her Dad, grandmother, cousin, brother, aunt and uncle)     Current living Arrangements: house      Able to return to prior arrangements: yes       Family/Social Support:  Care provided by: self  Provides care for: child(marcel), parent(s)     Support system:            Description of Support System:           Current Resources:   Patient receiving home care services: No        Community Resources: None  Equipment currently used at home: none  Supplies currently used at home: None    Employment/Financial:  Employment Status:          Financial Concerns: none           Does the patient's insurance plan have a 3 day qualifying hospital stay waiver?  No    Lifestyle & Psychosocial Needs:  Social Drivers of Health     Food Insecurity: Low Risk  (6/8/2025)    Food Insecurity     Within the past 12 months, did you worry that your food would run out before you got money to buy more?: No     Within the past 12 months, did the food you bought just not last and you didn t have money to get more?: No   Depression: At risk (6/3/2025)    PHQ-2     PHQ-2 Score: 4   Housing Stability: Low Risk  (6/8/2025)    Housing Stability     Do you have housing? : Yes     Are you worried about losing your housing?: No    Tobacco Use: Medium Risk (6/7/2025)    Received from [facility]    Patient History     Smoking Tobacco Use: Former     Smokeless Tobacco Use: Never     Passive Exposure: Past   Financial Resource Strain: Low Risk  (6/8/2025)    Financial Resource Strain     Within the past 12 months, have you or your family members you live with been unable to get utilities (heat, electricity) when it was really needed?: No   Alcohol Use: Not At Risk (1/12/2025)    Received from [facility]    AUDIT-C     Frequency of Alcohol Consumption: Never     Average Number of Drinks: Patient does not drink     Frequency of Binge Drinking: Never   Transportation Needs: Low Risk  (6/8/2025)    Transportation Needs     Within the past 12 months, has lack of transportation kept you from medical appointments, getting your medicines, non-medical meetings or appointments, work, or from getting things that you need?: No   Physical Activity: Insufficiently Active (1/12/2025)    Received from [facility]    Exercise Vital Sign     Days of Exercise per Week: 3 days     Minutes of Exercise per Session: 20 min   Interpersonal Safety: Low Risk  (6/8/2025)    Interpersonal Safety     Do you feel physically and emotionally safe where you currently live?: Yes     Within the past 12 months, have you been hit, slapped, kicked or otherwise physically hurt by someone?: No     Within the past 12 months, have you been humiliated or emotionally abused in other ways by your partner or ex-partner?: No   Stress: No Stress Concern Present (1/12/2025)    Received from [facility]    [employer] [place] [employer] - Occupational Stress Questionnaire     Feeling of Stress : Only a little   Social Connections: Socially Isolated (2/7/2023)    Received from [facility]    Social Connection and Isolation Panel [NHANES]     Frequency of Communication with  Friends and Family: Never     Frequency of Social Gatherings with Friends and Family: Never     Attends Jain Services: Never     Active Member of Clubs or Organizations: No     Attends Club or Organization Meetings: Never     Marital Status: Living with partner   Health Literacy: Adequate Health Literacy (1/12/2025)    Received from [facility]     Health Literacy     Frequency of need for help with medical instructions: Never       Functional Status:  Prior to admission patient needed assistance:   Dependent ADLs:: Independent  Dependent IADLs:: Independent       Mental Health Status:  Mental Health Status: No Current Concerns       Chemical Dependency Status:  Chemical Dependency Status: No Current Concerns             Values/Beliefs:  Spiritual, Cultural Beliefs, Jain Practices, Values that affect care:                 Discussed  Partnership in Safe Discharge Planning  document with patient/family: No    Additional Information:  Pt with a medical history significant for kidney transplant on 5/17/2025 s/p kidney biopsy on 6/6/25 concern for AMR admitted for IVIG and Plasmapheresis treatments.  Team inquiring about OP coverage for IVIG and plasmapheresis.  Initiated benefit check for IVIG and plasmapheresis.    Met with pt.  Pt confirmed she continues to stay locally at her [address] [address]    Pt Dad, uncle, aunt, cousins and brother are also in the home.  Pt notes feeling overwhelmed with not being in her own home environment and having to be rehospitalization.  Pt anxious about any plan for discharge.  Pt aware that final plan for discharge pending medical clearance and conformation of OP IVIG/Plasmapheresis coverage.   Discussed health psychology (provider team has placed consult).  Pt notes her anxiety level remains lucy and is asking for medication options, agreed to follow up with transplant team.  Offered transplant SW  support as well however pt declined.  Agreed to follow up with pt when closer to discharge.    Reviewed above with [facility].    Next Steps:   Follow for discharge planning  Medical clearance  Confirmation of OP IVIG/Plasmapheresis coverage.    Maggi Wolfe, RN BSN, PHN, ACM-RN  7A Nurse Care Coordinator    [facility] Acute Care Management  Phone: [phone]  Available on Index  7A SOT RNCC  Available weekend/holiday's on Index 7A SOT RNCC    6/9/2025

## 2025-06-09 NOTE — PLAN OF CARE
Goal Outcome Evaluation:    Plan of Care Reviewed With: patient    Overall Patient Progress: no change         Outcome Evaluation:       Shift: [id number]  VS: Stable on RA, afebrile  Neuro: A&Ox4  Pain/Nausea: Denies nausea, Abd pain  PRN: Dilaudid x2  Diet: NPO order this morning  IV Access: RPIV  Lines/Drains: RLQ Drain  GI/: Voids spontaneously, LBM 06/08  Skin: R Abd incision  Mobility: up ad reyna  Plan: Renal US this today, Continue POC

## 2025-06-09 NOTE — PROGRESS NOTES
Transplant Surgery  Inpatient Daily Progress Note  2025    Assessment & Plan: Glenna Spears is a 34 year old female with recent PMHx significant for ESRD s/p  donor kidney transplantation on 2025 complicated by leak requiring ultrasound-guided perinephric drain placement as well as kidney biopsy by IR on , now complicated by anterior abdominal wall hematoma. Kidney biopsy done on  due to patient having moderate to high-level donor-specific antibodies (DSA) and concern for AMR. 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 She required admission for transplant nephrology consultation as well as transfusion medicine consultation for plasmapheresis. She does not have a urine leak, as her creatinine is the same in her abdominal fluid as the plasma. Cr trended down to 2.4 post transplant then increased to 3.5 (25). On admit Cr 2.1, trending down.    Graft Function:  s/p DCD DDKT +stent 25 (stent removed 25): Creatinine 2 (2.1). B/l Cr TBD.  ml + 1 missed void since admit. Last US 6/3 with patent vasculature, perinephric collection.     Perinephric collection: Drain placed on . Cr fluid equivalent to serum. Triglyceride fluid 67. Minimal output from drain.  US did not show perinephric fluid collection.  - Drain removed today.     Renal artery reconstruction: Plan was for  mg x 3 months.   - Held for procedure, continue to hold    Early antibody-mediated rejection: Moderate to high-level donor-specific antibodies (DSAs) against HLA-A1, B8, B60, DR17, and DR52 post transplant.  kidney biopsy preliminary pathology findings showed diffuse C4d positivity without evidence of glomerulitis or peritubular capillaritis, no evidence of cellular mediated rejection. Review of biopsy today to confirm findings. Plan to treat for early AMR with plasmapheresis, IVIG, and solumedrol. Has LUE fistula for PLEX.   -Transfusion medicine consulted    Abdominal wall  hematoma, post kidney biopsy and RLQ drain placement: Subcutaneous fluid collection in the anterior wall decreased in size on US today. Hgb stable.   - Continue to hold ASA  - Hold start of apixaban     Immunosuppression management: PRA 76 -  high induction risk due to DCD kidney  Maintenance IS:  MMF: 750 mg BID  Tacrolimus: Goal level 8-10. 6/6 tac level 6.9. Dose increased to 5 mg BID. Check 12 hr level tomorrow.  Early antibody-mediated rejection:  -Patient received  mg on 6/8.  -Plasmapheresis for 5 treatments, every other day.  -IVIG 500 mg/kg after plasmapheresis treatment # 1-4.  -IVIG 1 gm/kg after plasmapheresis. treatment # 5.  -Give Solumedrol 100 mg IV prior to each IVIG dose.    Transplant coordinator: Tammi Cuellar [phone]  Donor type:  DCD  DSA at time of transplant:  No  Ureteral stent: YES  CMV:  Donor + / Recipient +  EBV:  Donor + / Recipient +  Thymoglobulin: 425 mg, 6 mg/kg      Hematology:  Anemia of chronic disease:   Acute blood loss anemia post procedure: Hgb b/l ~ 9 to 10. Hgb 8.9, stable. No transfusion needed.   RIJ non occlusive DVT, provoked by line: RIJ catheter since removed. Plan for DOAC x 3 months for provoked DVT. Apixaban not yet started.  - Repeat US RUE in a few days  - Hold start of apixaban due to recent bleed post procedure on 6/6/25.     Neurology:  Acute pain: Acetaminophen PRN, oxycodone PRN. Stop dilaudid IV PRN as tolerating diet. Add lidocaine patch for pain over hematoma  Anxiety 2/2 medical issues:   -Health psychology consult  -Hydroxyzine 25 mg every 6 hours PRN anxiety    Cardiorespiratory::  HLD:  -Atorvastatin 10mg QD      GI/Nutrition: History of gastric sleeve.   Diet: Regular  Bowel regimen: Senna BID, PRN MoM, Ondansetron PRN for nausea     Endocrine: No history of T2DM. Goal glucose < 160 mg/dl;   Steroid hyperglycemia: 2/2 solumedrol. Sliding scale insulin ACHS.     Fluid/Electrolytes:   Hypophosphatemia: Replace PO phos  Hypervolemia: Will  discuss with nephrology plan for diuresis.     : Monitor UO     Infectious disease: Afebrile    Recent:  Urine culture + for E.coli: ureteral stent removed and completed treatment course.      MSK:   Post-operative RLE numbness and weakness: Medial right thigh numbness and right knee weakness post-operatively.      Prophylaxis:   - DVT: hold due to bleed  - GI: PPI  - CMV (Valganciclovir x 12 weeks)  - Pneumocystis: Bactrim held by nephrology in setting of hyperkalemia. Pentamidine given 5/20, no Bactrim d/t sulfa allergy, G6PD WNL. Consider switch to dapsone as on 6/20.      Disposition: 7A     LEWIS/Fellow/Resident Provider: Milena Michael PA-C    Faculty: Kvng Agee MD, PhD    _________________________________________________________________  Transplant History: Admitted 6/8/2025 for antibody mediated rejection.  5/17/2025 (Kidney), Postoperative day: 23     Interval History: History is obtained from the patient  Overnight events: RLQ pain 2/2 drain and hematoma. Cramping pain abdomen and lower extremities post solumedrol dose. Had a HA yesterday that improved with acetaminophen. Anxious regarding kidney function.     C/o swelling abdomen and lower extremities. Up ~ 20lbs since transplant.     ROS:   A 10-point review of systems was negative except as noted above.    Meds:  Current Facility-Administered Medications   Medication Dose Route Frequency Provider Last Rate Last Admin    atorvastatin (LIPITOR) tablet 10 mg  10 mg Oral Daily Cari Cardona MD        mycophenolate (GENERIC EQUIVALENT) capsule 750 mg  750 mg Oral BID Cari Cardona MD   750 mg at 06/08/25 2115    nortriptyline (PAMELOR) capsule 25 mg  25 mg Oral At Bedtime Cari Cardona MD   25 mg at 06/08/25 2226    pantoprazole (PROTONIX) EC tablet 40 mg  40 mg Oral Daily Cari Cardona MD        senna-docusate (SENOKOT-S/PERICOLACE) 8.6-50 MG per tablet 2 tablet  2 tablet Oral BID Cari Cardona MD        sodium chloride (PF) 0.9% PF  "flush 3 mL  3 mL Intracatheter Q8H JANE Cari Cardona MD   3 mL at 06/09/25 0539    tacrolimus (GENERIC EQUIVALENT) capsule 5 mg  5 mg Oral BID IS Cari Cardona MD   5 mg at 06/08/25 2201    [START ON 6/10/2025] valGANciclovir (VALCYTE) tablet 450 mg  450 mg Oral Every Other Day Cari Cardona MD        varenicline (CHANTIX) tablet 0.5 mg  0.5 mg Oral Daily Cari Cardona MD           Physical Exam:     Admit Weight: 75.8 kg (167 lb)    Current vitals:   /76 (BP Location: Right arm)   Pulse 85   Temp 97.8  F (36.6  C) (Oral)   Resp 18   Ht 1.727 m (5' 8\")   Wt 75.8 kg (167 lb)   SpO2 100%   BMI 25.39 kg/m           Vital sign ranges:    Temp:  [97.8  F (36.6  C)-98.7  F (37.1  C)] 97.8  F (36.6  C)  Pulse:  [79-92] 85  Resp:  [16-18] 18  BP: (125-139)/(64-86) 133/76  SpO2:  [99 %-100 %] 100 %  Patient Vitals for the past 24 hrs:   BP Temp Temp src Pulse Resp SpO2 Height Weight   06/09/25 0539 133/76 97.8  F (36.6  C) Oral 85 18 100 % -- --   06/09/25 0105 139/86 98.5  F (36.9  C) Oral 79 16 99 % -- --   06/08/25 2155 134/83 98.3  F (36.8  C) Oral 79 16 100 % -- --   06/08/25 1957 125/64 98.5  F (36.9  C) Oral 82 16 100 % -- --   06/08/25 1608 138/75 98.7  F (37.1  C) Oral 92 18 100 % 1.727 m (5' 8\") 75.8 kg (167 lb)     General Appearance: in no apparent distress.   Skin: normal, warm  Heart: well perfused  Lungs: Nonlabored resps on RA  Abdomen: The abdomen is soft, tender RLQ, incision healing well no signs of infection. Drain scant serosanguinous output (drain now removed and intact)  : celaya is not present.   Extremities: edema: BLE up to thighs  Neurologic: awake, alert and oriented.   LUE fistula +thrill    Data:   CMP  Recent Labs   Lab 06/09/25  0555 06/08/25  1855 06/06/25  1205 06/06/25  1013   NA  --  139  --  140   POTASSIUM  --  4.9  --  4.2   CHLORIDE  --  111*  --  111*   CO2  --  21*  --  19*   * 96  --  81   BUN  --  21.0*  --  19.7   CR  --  2.10*  --  2.07* "   GFRESTIMATED  --  31*  --  32*   KARTIK  --  9.5  --  9.7   MAG  --  1.8  --   --    ALBUMIN  --  3.7  --   --    BILITOTAL  --  0.2  --   --    ALKPHOS  --  106  --   --    AST  --  12  --   --    ALT  --  7  --   --    FCREAT  --   --  2.1  --      CBC  Recent Labs   Lab 06/08/25  1855 06/06/25  1013   HGB 8.6* 9.1*   WBC 6.0 4.1    260     COAGS  Recent Labs   Lab 06/06/25  1013   INR 1.11      Urinalysis  Recent Labs   Lab Test 06/09/25  0205 06/06/25  0949 05/21/25  0959   COLOR Light Yellow Light Yellow Yellow   APPEARANCE Clear Clear Slightly Cloudy*   URINEGLC Negative Negative Negative   URINEBILI Negative Negative Negative   URINEKETONE Negative Negative Negative   SG 1.016 1.019 1.017   UBLD Negative Negative Large*   URINEPH 6.0 5.5 5.5   PROTEIN Negative 10* 50*   NITRITE Negative Negative Positive*   LEUKEST Negative Negative Large*   RBCU 3*  --  97*   WBCU 2  --  78*     Virology:  Hepatitis C Antibody   Date Value Ref Range Status   05/17/2025 Nonreactive Nonreactive Final     Comment:     A nonreactive screening test result does not exclude the possibility of exposure to or infection with HCV. Nonreactive screening test results in individuals with prior exposure to HCV may be due to antibody levels below the limit of detection of this assay or lack of reactivity to the HCV antigens used in this assay. Patients with recent HCV infections (<3 months from time of exposure) may have false-negative HCV antibody results due to the time needed for seroconversion (average of 8 to 9 weeks).     BK Virus DNA IU/mL   Date Value Ref Range Status   06/06/2025 Not Detected Not Detected IU/mL Final   05/17/2025 Not Detected Not Detected IU/mL Final

## 2025-06-09 NOTE — PROCEDURES
Laboratory Medicine and Pathology  Transfusion Medicine - Apheresis Procedure    Glenna Spears MRN# [id number]   YOB: 1990 Age: 34 year old        Reason for consult: Antibody mediated rejection of kidney transplant           Assessment and Plan:   The patient is a 34 year old female S/P kidney transplant with antibody mediated rejection. She underwent therapeutic plasma exchange #1 of planned 5. Procedure paused mid procedure when she had sudden sharp, brief pain on the right superficial chest. Evaluated by primary team and myself. Procedure resumed and completed, though patient had some mild citrate related symptoms. Will evaluate transition to 5% albumin replacement gradually in light of abdominal wall hematoma.     Please do not start ACE inhibitors throughout the duration of the TPE series as these have been associated with reactions during apheresis.  Please notify the Transfusion Medicine physician of any upcoming procedures, surgeries, or biopsies as TPE with albumin replacement will affect coagulation factor levels.         Chief Complaint:   Abdominal pain         Interval History:   The patient is a 34 year old female with ESRD on hemodialysis since 6/2022. She underwent kidney transplant 5/17/2025. Medical history also notable for obesity S/P gastric sleeve 1/2024, substance use disorder, positive quantiferon gold test S/P treatment, multiple right upper extremity DVT.  She underwent kidney biopsy and drain placement for removal of perinephric fluid on 6/6/2025. Course complicated by abdominal wall hematoma. Aspirin held and apixaban not yet started for DVT.  Transferred to this hospital on 6/8/2025 for further management. Biopsy consistent with antibody mediated rejection (diffuse C4d deposition) and known donor specific antibodies since 5/22/205.  She has a left arm fistula that was used for dialysis.  Transfusion Medicine consulted for TPE on 6/8/2025.  Overnight has done  well. Continues to have abdominal pain. Is having urine output, but feels that abdomen and upper thighs are more fluid up.   Headache yesterday.          Past Medical History obtained from medical record:     Past Medical History:   Diagnosis Date    ESRD (end stage renal disease) (H)     GERD (gastroesophageal reflux disease)     Hypertension     Kidney replaced by transplant 2025    DDKT. Induction w/ Thymoglobulin.    Latent tuberculosis     Completed 4 months of rifampin    Obesity     Secondary hyperparathyroidism     Vitamin D deficiency            Past Surgical History obtained from medical record:     Past Surgical History:   Procedure Laterality Date    AV FISTULA REPAIR       SECTION      IR FINE NEEDLE ASPIRATION W ULTRASOUND  2025    IR RENAL BIOPSY RIGHT  2025    IR SKIN SUBQ/SEROMA ABSCESS DRAIN  2025    LAPAROSCOPIC GASTRIC SLEEVE N/A 2024    Procedure: GASTRECTOMY, SLEEVE, LAPAROSCOPIC;  Surgeon: John Smith MD;  Location: [facility] OR    TRANSPLANT KIDNEY RECIPIENT  DONOR Right 2025    Procedure: Transplant kidney recipient  donor with Vein and Artery reconstruction;  Surgeon: Manjeet Barreto MD;  Location: [facility]          Social History:   Long time partner, has 2 sons, lives in [place] but is staying locally post transplant          Family History:   Not reviewed today         Immunizations:   Not reviewed today            Allergies:     Allergies   Allergen Reactions    Sulfa Antibiotics Other (See Comments)     Reaction as an infant- unsure of what happened           Medications:     Current Facility-Administered Medications   Medication Dose Route Frequency Provider Last Rate Last Admin    acetaminophen (TYLENOL) tablet 325-650 mg  325-650 mg Oral Q4H PRN Cari Cardona MD   650 mg at 25 1258    albuterol (PROVENTIL HFA/VENTOLIN HFA) inhaler  1-2 puff Inhalation Once PRN Milena Michael PA-C         albuterol (PROVENTIL HFA/VENTOLIN HFA) inhaler  2 puff Inhalation Q4H PRN Cari Cardona MD        albuterol (PROVENTIL) neb solution 2.5 mg  2.5 mg Nebulization Once PRN Milena Michael PA-C        Anticoagulant Citrate Dextrose Formula A at ratio of 1:10 with blood ([facility])   Apheresis During Apheresis (from stock) Tana Beltran MD        atorvastatin (LIPITOR) tablet 10 mg  10 mg Oral Daily Cari Cardona MD   10 mg at 06/09/25 0839    calcium carbonate (TUMS) chewable tablet 1,000 mg  1,000 mg Oral 4x Daily PRN Cari Cardona MD        calcium gluconate with plasma (administered by Apheresis Staff ONLY)   Intravenous During Apheresis (from stock) Tana Beltran MD        glucose gel 15-30 g  15-30 g Oral Q15 Min PRN Milena Michael PA-C        Or    dextrose 50 % injection 25-50 mL  25-50 mL Intravenous Q15 Min PRN Milena Michael PA-C        Or    glucagon injection 1 mg  1 mg Subcutaneous Q15 Min PRN Milena Michael PA-C        diphenhydrAMINE (BENADRYL) injection 25 mg  25 mg Intravenous Once PRN Milena Michael PA-C        Or    diphenhydrAMINE (BENADRYL) injection 50 mg  50 mg Intravenous Once PRN Milena Michael PA-C        EPINEPHrine (ADRENALIN) kit 0.3 mg  0.3 mg Intramuscular Q5 Min PRN Milena Michael PA-C        famotidine (PEPCID) injection 20 mg  20 mg Intravenous Once PRN Milena Michael PA-C        famotidine (PEPCID) tablet 20 mg  20 mg Oral BID PRN Cari Cardona MD        furosemide (LASIX) tablet 40 mg  40 mg Oral Once Milena Michael PA-C        hydrOXYzine HCl (ATARAX) tablet 25 mg  25 mg Oral Q6H PRN Milena Michael PA-C        immune globulin - sucrose free 10% (PRIVIGEN) infusion 30 g  30 g Intravenous Once Milena Michael PA-C        insulin aspart (NovoLOG) injection (RAPID ACTING)  1-7 Units Subcutaneous TID AC Milena Michael PA-C   1 Units at 06/09/25 1252    insulin aspart (NovoLOG) injection (RAPID ACTING)   1-5 Units Subcutaneous At Bedtime Milena Michael PA-C        Lidocaine (LIDOCARE) 4 % Patch 1-2 patch  1-2 patch Transdermal Q24h Milena Michael PA-C        lidocaine (LMX4) cream   Topical Q1H PRN Cari Cardona MD        lidocaine 1 % 0.1-1 mL  0.1-1 mL Other Q1H PRN Cari Cardona MD        magnesium hydroxide (MILK OF MAGNESIA) suspension 30 mL  30 mL Oral Daily PRN Milena Michael PA-C        methocarbamol (ROBAXIN) tablet 500 mg  500 mg Oral Q6H PRN Cari Cardona MD   500 mg at 06/09/25 1258    methylPREDNISolone Na Suc (solu-MEDROL) injection 100 mg  100 mg Intravenous Once Milena Michael PA-C        methylPREDNISolone sodium succinate (SOLU-MEDROL) injection 40 mg  40 mg Intravenous Once PRN Milena Michael PA-C        mycophenolate (GENERIC EQUIVALENT) capsule 750 mg  750 mg Oral BID Cari Cardona MD   750 mg at 06/09/25 0839    naloxone (NARCAN) injection 0.2 mg  0.2 mg Intravenous Q2 Min PRN Lukas Jean MD        Or    naloxone (NARCAN) injection 0.4 mg  0.4 mg Intravenous Q2 Min PRN Lukas Jean MD        Or    naloxone (NARCAN) injection 0.2 mg  0.2 mg Intramuscular Q2 Min PRN Lukas Jean MD        Or    naloxone (NARCAN) injection 0.4 mg  0.4 mg Intramuscular Q2 Min PRN Lukas Jean MD        nortriptyline (PAMELOR) capsule 25 mg  25 mg Oral At Bedtime Cari Cardona MD   25 mg at 06/08/25 2226    ondansetron (ZOFRAN ODT) ODT tab 4 mg  4 mg Oral Q8H PRN Cari Cardona MD        oxyCODONE (ROXICODONE) tablet 5 mg  5 mg Oral Q8H PRN Cari Cardona MD        Or    oxyCODONE IR (ROXICODONE) tablet 10 mg  10 mg Oral Q8H PRN Cari Cardona MD   10 mg at 06/09/25 0838    pantoprazole (PROTONIX) EC tablet 40 mg  40 mg Oral Daily Cari Cardona MD   40 mg at 06/09/25 0839    phosphorus tablet 250 mg (PHOSPHA 250 NEUTRAL) per tablet 500 mg  500 mg Oral BID Milena Michael PA-C   500 mg at 06/09/25 0839    senna-docusate  (SENOKOT-S/PERICOLACE) 8.6-50 MG per tablet 1 tablet  1 tablet Oral BID PRN Cari Cardona MD        Or    senna-docusate (SENOKOT-S/PERICOLACE) 8.6-50 MG per tablet 2 tablet  2 tablet Oral BID PRN Cari Cardona MD        senna-docusate (SENOKOT-S/PERICOLACE) 8.6-50 MG per tablet 2 tablet  2 tablet Oral BID Cari Cardona MD        sodium chloride (PF) 0.9% PF flush 3 mL  3 mL Intracatheter Q8H JANE Cari Cardona MD   3 mL at 06/09/25 1300    sodium chloride (PF) 0.9% PF flush 3 mL  3 mL Intracatheter q1 min prn Crai Cardona MD   3 mL at 06/09/25 0118    sodium chloride 0.9% BOLUS 1,000 mL  1,000 mL Intravenous Once PRN Milena Michael PA-C        tacrolimus (GENERIC EQUIVALENT) capsule 5 mg  5 mg Oral BID IS Cari Cardona MD   5 mg at 06/09/25 0839    [START ON 6/10/2025] valGANciclovir (VALCYTE) tablet 450 mg  450 mg Oral Every Other Day Cari Cardona MD               Review of Systems:   Denied fever, chills, cough, shortness of breath, nausea, vomiting, diarrhea  See also above         Exam:   /62 P 94 T 98.4 RR 16 O2 sat 100%  Alert, no apparent distress  Breathing appears comfortable  No jaundice or scleral icterus  Moves all extremities, no lower extremity edema  Abdomen soft  Left arm fistula with palpable thrill          Data:     Adult Type and Screen   Result Value Ref Range    ABO/RH(D) A POS     Antibody Screen Negative Negative    SPECIMEN EXPIRATION DATE 6/11/2025 11:59:00 PM CDT    UA with Microscopic reflex to Culture    Specimen: Urine, Clean Catch   Result Value Ref Range    Color Urine Light Yellow Colorless, Straw, Light Yellow, Yellow    Appearance Urine Clear Clear    Glucose Urine Negative Negative mg/dL    Bilirubin Urine Negative Negative    Ketones Urine Negative Negative mg/dL    Specific Gravity Urine 1.016 1.003 - 1.035    Blood Urine Negative Negative    pH Urine 6.0 5.0 - 7.0    Protein Albumin Urine Negative Negative mg/dL    Urobilinogen Urine  Normal Normal mg/dL    Nitrite Urine Negative Negative    Leukocyte Esterase Urine Negative Negative    Bacteria Urine Few (A) None Seen /HPF    Mucus Urine Present (A) None Seen /LPF    RBC Urine 3 (H) <=2 /HPF    WBC Urine 2 <=5 /HPF    Squamous Epithelials Urine 2 (H) <=1 /HPF    Narrative    Urine Culture not indicated   Glucose by meter   Result Value Ref Range    GLUCOSE BY METER POCT 147 (H) 70 - 99 mg/dL   Comprehensive metabolic panel   Result Value Ref Range    Sodium 137 135 - 145 mmol/L    Potassium 4.7 3.4 - 5.3 mmol/L    Carbon Dioxide (CO2) 20 (L) 22 - 29 mmol/L    Anion Gap 7 7 - 15 mmol/L    Urea Nitrogen 20.5 (H) 6.0 - 20.0 mg/dL    Creatinine 1.95 (H) 0.51 - 0.95 mg/dL    GFR Estimate 34 (L) >60 mL/min/1.73m2    Calcium 9.9 8.8 - 10.4 mg/dL    Chloride 110 (H) 98 - 107 mmol/L    Glucose 159 (H) 70 - 99 mg/dL    Alkaline Phosphatase 109 40 - 150 U/L    AST 13 0 - 45 U/L    ALT 6 0 - 50 U/L    Protein Total 6.3 (L) 6.4 - 8.3 g/dL    Albumin 3.8 3.5 - 5.2 g/dL    Bilirubin Total 0.2 <=1.2 mg/dL   CBC with platelets   Result Value Ref Range    WBC Count 5.9 4.0 - 11.0 10e3/uL    RBC Count 2.92 (L) 3.80 - 5.20 10e6/uL    Hemoglobin 8.9 (L) 11.7 - 15.7 g/dL    Hematocrit 28.6 (L) 35.0 - 47.0 %    MCV 98 78 - 100 fL    MCH 30.5 26.5 - 33.0 pg    MCHC 31.1 (L) 31.5 - 36.5 g/dL    RDW 12.9 10.0 - 15.0 %    Platelet Count 272 150 - 450 10e3/uL   Magnesium   Result Value Ref Range    Magnesium 1.8 1.7 - 2.3 mg/dL   Phosphorus   Result Value Ref Range    Phosphorus 1.7 (L) 2.5 - 4.5 mg/dL   US Renal Transplant with Doppler    Narrative    EXAMINATION: US RENAL TRANSPLANT,  6/9/2025 8:33 AM     COMPARISON: Renal ultrasound 6/3/2025.    HISTORY: evaluate abdominal wall hematoma. evaluate perinephric fluid  collection, evaluate blood flow    TECHNIQUE:  Grey-scale, color Doppler and spectral flow analysis.    FINDINGS:  The transplant kidney is located right lower quadrant, and measures  13.3 cm. Parenchyma is  of normal thickness and echogenicity. No focal  lesions. No hydronephrosis. Subcutaneous fluid collection in the  anterior abdominal wall has decreased in size compared to prior  examination, now measures 4.8 x 2.4 x 0.9 cm.    Renal artery flow:   86 cm/sec peak systolic at hilum.  152 peak systolic at anastomosis.  Segmental artery resistive indices (upper to lower): 0.71, 0.73, 0.70    Renal Vein Flow:  53 at hilum.   57 at anastomosis.    Iliac artery flow:  120 peak systolic above anastomosis.  143 peak systolic below anastomosis.    Iliac vein flow:  Patent above and below the anastomosis.      Impression    IMPRESSION:   1. Normal right lower quadrant transplanted kidney ultrasound.  2. Subcutaneous fluid collection in the anterior abdominal wall has  decreased in size. No perinephric collection.    GUIDELINES:  For native kidneys:       Diagnostic criteria suggestive of > 60% diameter stenosis             Renal artery:             Peak systolic velocity > 180 cm/s             RAR > 3.5             Turbulent flow         Arcuate artery Resistive Index Normal < 0.7    For renal transplants:       Renal transplant peak systolic velocity criteria range from > 180  cm/s to > 400 cm/s       Source suggests using:            Peak systolic velocity > or = 300 cm/s            Spectral broadening            Intraparenchymal arterial acceleration time > or = 0.1 s     Source: Pretty ROSALES, Donnie RANDALL, Haris MT, et al. Screening for  transplant renal artery stenosis: Ultrasound-based stenosis  probability stratification. American Journal of [employer]entgenology.  2017;209: 7847-9514. [id number]        I have personally reviewed the examination and initial interpretation  and I agree with the findings.    ANNA MARIE ROBBINS MD         SYSTEM ID:  [id number]   Glucose by meter   Result Value Ref Range    GLUCOSE BY METER POCT 147 (H) 70 - 99 mg/dL   US Upper Extremity Venous Duplex Right    Narrative    EXAMINATION:  DOPPLER VENOUS ULTRASOUND OF THE RIGHT UPPER EXTREMITY,  6/9/2025 4:00 PM     COMPARISON: Ultrasound 6/3/2025    HISTORY: Evaluate nodule right chest wall, along track where previous  central line placed (line removed 5/202/5). Per patient nodule new,  please include RUE DVT study - h/o of thrombus RUE.    TECHNIQUE:  Gray-scale evaluation with compression, spectral flow and  color Doppler assessment of the deep venous system of the right upper  extremity.    FINDINGS:  Right: Normal blood flow and waveforms are demonstrated in the  internal jugular, innominate, subclavian, and axillary veins. Small  nonocclusive echogenic focus along the wall of the midportion of the  right internal jugular vein, similar compared to 6/3/2025. The right  internal jugular vein is partially compressible at its midportion. The  high and low portions of the right internal jugular vein are fully  compressible. There is normal compressibility of the brachial and  basilic veins. The right cephalic vein extending from the mid upper  arm to the wrist is noncompressible.    Within the subcutaneous soft tissues inferior to the right clavicle,  there is an ovoid hypoechoic multilobulated focus measuring 1.4 x 0.9  x 0.3 cm. No internal vascularity on color Doppler.      Impression    IMPRESSION:  1.  Unchanged nonocclusive deep venous thrombosis in the midportion of  the right internal jugular vein.  2.  Occlusive superficial venous thrombosis in the right cephalic  vein, extending from the mid upper arm to the wrist.  3.  Ovoid multilobulated focus within the soft tissues inferior to the  right clavicle is indeterminant and may represent a granuloma/scar  tissue.    I have personally reviewed the examination and initial interpretation  and I agree with the findings.    GIGI MONTEMAYOR DO         SYSTEM ID:  [id number]          Procedure Summary:   A single plasma volume plasma exchange was performed with a Spectra Optia cell separator.  The  vascular access was a left arm fistula.  ACD-A was used for anticoagulation.  To offset the effects of the citrate, the patient was given calcium  gluconate IV in the return line.  The replacement fluid was plasma (11 units) due to hematoma.  Approximately 30 minutes into the procedure complained of perioral tingling and calcium gluconate was increased. Then she had a brief episode of sudden sharp pain the right side of the chest. The pain was superficially located. The procedure was paused. The patient was evaluated by a primary team provider and myself. The patient appeared comfortable and the pain was located on the surface of the chest, where she felt a small lump where a previous catheter had been located. She denied chest pressure, lightheadedness, heartburn like symptoms, shortness of breath. The procedure was resumed. Ultrasound completed during the procedure.  She had some additional perioral paresthesias that responded to further increase in calcium gluconate.  The patient's vital signs were stable throughout.  The patient tolerated the procedure well.    Attestation: During the procedure the patient was directly seen and evaluated by me, Tana Beltran MD, PhD.  I have reviewed the chart and pertinent laboratory findings, and discussed the patient and the current procedure with the Apheresis nursing staff.    Tana Beltran MD, PhD  Transfusion Medicine Attending  Laboratory Medicine & Pathology

## 2025-06-09 NOTE — PLAN OF CARE
"Goal Outcome Evaluation:      Plan of Care Reviewed With: patient    Overall Patient Progress: no change    Outcome Evaluation: PLEX and IVIG    BP (!) 146/78 (BP Location: Right arm)   Pulse 85   Temp 98.5  F (36.9  C) (Oral)   Resp 16   Ht 1.727 m (5' 8\")   Wt 77.7 kg (171 lb 4.8 oz)   SpO2 100%   BMI 26.05 kg/m      Shift: 0700 - 2300  Isolation Status: Protective Environment Maintained  VS: Hypertensive. All other VS stable. Patient on room air, afebrile  Neuro: A&O x 4  Behaviors: calm and cooperative. Intermittently expresses anxiety.  BG: ACHS  Respiratory: WDL  Cardiac: WDL  Pain/Nausea: Complains of pain. Denies nausea.  PRN: PRN Tylenol given (See MAR). PRN oxy given (See MAR). PRN Robaxin given (See MAR).  Diet: Regular diet.  IV Access: PIV - saline locked. AV fistula.  GI/: Voiding without difficulty.  Skin: RLQ abdominal incision.  Mobility: [facility]  Events/Education: PLEX and IVIG during shift.  Plan: Will continue with plan of care.       "

## 2025-06-09 NOTE — PLAN OF CARE
Goal Outcome Evaluation:      Plan of Care Reviewed With: patient    Overall Patient Progress: no changeOverall Patient Progress: no change  Reason for Admission:  Pt. Admitted from ED for swelling and abdominal pain.  Neuro: Pt. alert & Ox4  Behavior: Pt. calm & cooperative with cares.   Activity:  Pt. up ad reyna.  Vitals:  AVSS on RA.  BG: N/A  LDAs: Rt. PIV SL. Abdominal drain with 25cc serosang. drainage.    Cardiac: WNL  Respiratory: LS clear bilat. Denies SOB.  GI/:  Pt. voiding without difficulty.  Stool earlier today.  Skin: RLQ incision stapled and covered, generalized bruising, small scab on rt. Abdomen.  Pain/Nausea:  Abdominal pain managed with prn Oxy. Pt. denies nausea.   Diet: Regular  Labs/Imaging: See chart for results.  Plan:

## 2025-06-09 NOTE — CONSULTS
Transfusion Medicine Consultation Note   Glenna Spears [id number]   1990 34 year old   Reason for consult:  Request for initiation of a procedure, TPE therapy, 5 QOD  Procedure/reason for procedure:  Therapeutic plasma exchange (TPE)/high DSA?early antibody mediated rejection?      Assessment and Plan:   34 year old female with past medical history of:  Past Medical History:   Diagnosis Date    ESRD (end stage renal disease) (H)     GERD (gastroesophageal reflux disease)     Hypertension     Kidney replaced by transplant 2025    DDKT. Induction w/ Thymoglobulin.    Latent tuberculosis     Completed 4 months of rifampin    Obesity     Secondary hyperparathyroidism     Vitamin D deficiency     now presents for consultation for TPE secondary to abdominal pain with features that raise concern for early antibody mediated rejection.    The procedure, with the risks and benefits, were explained to the patient.  All questions were answered.    Please do not start or continue ace-inhibitors throughout the duration of a therapeutic plasma exchange series. Please notify the apheresis physician of any upcoming procedures, surgeries, or biopsies as therapeutic plasma exchange will affect coagulation factors. This will typically recover in 48-72 hours.  The plan is to perform 5 QOD TPE as per clinical team.    Patient voiced that she prefers no plasma, however, with post-biopsy hematoma, that might not be the first choice. Will discuss with my team during morning rounds tomorrow.     Social History:     Social History     Socioeconomic History    Marital status: Single     Spouse name: Not on file    Number of children: Not on file    Years of education: Not on file    Highest education level: Not on file   Occupational History    Not on file   Tobacco Use    Smoking status: Former     Current packs/day: 0.00     Types: Cigarettes     Quit date: 2023     Years since quittin.3     Passive exposure:  Past    Smokeless tobacco: Never    Tobacco comments:     quit   Vaping Use    Vaping status: Never Used   Substance and Sexual Activity    Alcohol use: Not Currently    Drug use: Not Currently     Types: Marijuana    Sexual activity: Not on file   Other Topics Concern    Not on file   Social History Narrative    Not on file     Social Drivers of Health     Financial Resource Strain: High Risk (2/7/2023)    Received from [facility] [employer] [facility]    Overall Financial Resource Strain (CARDIA)     Difficulty of Paying Living Expenses: Hard   Food Insecurity: No Food Insecurity (1/12/2025)    Received from [facility] [employer] [facility]    Hunger Vital Sign     Worried About Running Out of Food in the Last Year: Never true     Ran Out of Food in the Last Year: Never true   Transportation Needs: No Transportation Needs (1/12/2025)    Received from [facility] [employer] [facility]    Transportation Needs     In the past 12 months, has lack of transportation kept you from medical appointments, meetings, work, or from getting medicines or things needed for daily living?: No   Physical Activity: Insufficiently Active (1/12/2025)    Received from [facility] [employer] [facility]    Exercise Vital Sign     Days of Exercise per Week: 3 days     Minutes of Exercise per Session: 20 min   Stress: No Stress Concern Present (1/12/2025)    Received from [facility] [employer] [facility]    [employer] - Occupational Stress Questionnaire     Feeling of Stress : Only a little   Social Connections: Socially Isolated (2/7/2023)    Received from [facility] [employer] [facility]    Social Connection and Isolation Panel [NHANES]     Frequency of Communication with Friends and Family: Never     Frequency of Social Gatherings with Friends and Family: Never     Attends Zoroastrian Services: Never     Active Member of Clubs or Organizations: No     Attends Club or Organization Meetings: Never     Marital  Status: Living with partner   Interpersonal Safety: Not At Risk (6/7/2025)    Received from [facility]    Intimate Partner Violence     Are you in a relationship where you are physically hurt, threatened and/or made to feel afraid?: No   Housing Stability: High Risk (1/12/2025)    Received from [facility]    Housing Stability Vital Sign     Unable to Pay for Housing in the Last Year: Yes     Number of Times Moved in the Last Year: Not on file     Homeless in the Last Year: No        Allergies:      Allergies   Allergen Reactions    Sulfa Antibiotics Other (See Comments)     Reaction as an infant- unsure of what happened       Medications:     Current Facility-Administered Medications   Medication Dose Route Frequency Provider Last Rate Last Admin    acetaminophen (TYLENOL) tablet 325-650 mg  325-650 mg Oral Q4H PRN Cari Cardona MD        albuterol (PROVENTIL HFA/VENTOLIN HFA) inhaler  2 puff Inhalation Q4H PRN Cari Cardona MD        [START ON 6/9/2025] atorvastatin (LIPITOR) tablet 10 mg  10 mg Oral Daily Cari Cardona MD        calcium carbonate (TUMS) chewable tablet 1,000 mg  1,000 mg Oral 4x Daily PRN Cari Cardona MD        ciprofloxacin (CIPRO) tablet 500 mg  500 mg Oral BID Cari Cardona MD        famotidine (PEPCID) tablet 20 mg  20 mg Oral BID PRN Cari Cardona MD        HYDROmorphone (DILAUDID) injection 0.2 mg  0.2 mg Intravenous Q2H PRN Cari Cardona MD        HYDROmorphone (DILAUDID) injection 0.4 mg  0.4 mg Intravenous Q2H PRN Cari Cardona MD        lidocaine (LMX4) cream   Topical Q1H PRN Cari Cardona MD        lidocaine 1 % 0.1-1 mL  0.1-1 mL Other Q1H PRN Cari Cardona MD        methocarbamol (ROBAXIN) tablet 500 mg  500 mg Oral Q6H PRN Cari Cardona MD        methylPREDNISolone sodium succinate (solu-MEDROL) 500 mg in sodium chloride 0.9 % 114 mL intermittent infusion  500 mg Intravenous Once Brendan  MD Cari        mycophenolate (GENERIC EQUIVALENT) capsule 750 mg  750 mg Oral BID Cari Cardona MD        nortriptyline (PAMELOR) capsule 25 mg  25 mg Oral At Bedtime Cari Cardona MD        ondansetron (ZOFRAN ODT) ODT tab 4 mg  4 mg Oral Q8H PRN Cari Cardona MD        oxyCODONE (ROXICODONE) tablet 5 mg  5 mg Oral Q8H PRN Cari Cardona MD        Or    oxyCODONE IR (ROXICODONE) tablet 10 mg  10 mg Oral Q8H PRN Cari Cardona MD        [START ON 6/9/2025] pantoprazole (PROTONIX) EC tablet 40 mg  40 mg Oral Daily Cari Cardona MD senna-docusate (SENOKOT-S/PERICOLACE) 8.6-50 MG per tablet 1 tablet  1 tablet Oral BID PRN Cari Cardona MD        Or    senna-docusate (SENOKOT-S/PERICOLACE) 8.6-50 MG per tablet 2 tablet  2 tablet Oral BID PRN Cari Cardona MD        senabbi-docusate (SENOKOT-S/PERICOLACE) 8.6-50 MG per tablet 2 tablet  2 tablet Oral BID Cari Cardona MD        sodium chloride (PF) 0.9% PF flush 3 mL  3 mL Intracatheter Q8H JANE Cari Cardona MD        sodium chloride (PF) 0.9% PF flush 3 mL  3 mL Intracatheter q1 min prn Cari Cardona MD        tacrolimus (GENERIC EQUIVALENT) capsule 5 mg  5 mg Oral BID IS Cari Cardona MD        [START ON 6/10/2025] valGANciclovir (VALCYTE) tablet 450 mg  450 mg Oral Every Other Day Cari Cardona MD        [START ON 6/9/2025] varenicline (CHANTIX) tablet 0.5 mg  0.5 mg Oral Daily Cari Cardona MD         Current Outpatient Medications   Medication Sig Dispense Refill    acetaminophen (TYLENOL) 325 MG tablet Take 325-650 mg by mouth every 4 hours as needed      apixaban ANTICOAGULANT (ELIQUIS) 5 MG tablet Take 2 tablets (10 mg) by mouth 2 times daily for 7 days, THEN 1 tablet (5 mg) daily. 118 tablet 0    atorvastatin (LIPITOR) 10 MG tablet Take 1 tablet (10 mg) by mouth daily. 30 tablet 2    ciprofloxacin (CIPRO) 500 MG tablet Take 1 tablet (500 mg) by mouth daily. 3 tablet 0    ciprofloxacin (CIPRO) 500 MG  tablet Take 1 tablet (500 mg) by mouth 2 times daily. 6 tablet 0    famotidine (PEPCID) 20 MG tablet Take 20 mg by mouth 2 times daily as needed.      levonorgestrel (KYLEENA) 19.5 MG IUD 1 Intra Uterine Device by Intrauterine route once      methocarbamol (ROBAXIN) 500 MG tablet Take 1 tablet (500 mg) by mouth every 6 hours as needed for muscle spasms. 20 tablet 0    mycophenolate (GENERIC EQUIVALENT) 250 MG capsule Take 3 capsules (750 mg) by mouth 2 times daily. 180 capsule 11    nortriptyline (PAMELOR) 25 MG capsule Take 25 mg by mouth at bedtime.      ondansetron (ZOFRAN ODT) 4 MG ODT tab Take 1 tablet (4 mg) by mouth every 8 hours as needed for nausea. 20 tablet 0    oxyCODONE (ROXICODONE) 5 MG tablet Take 1-2 tablets (5-10 mg) by mouth every 8 hours as needed for moderate to severe pain. 12 tablet 0    pantoprazole (PROTONIX) 40 MG EC tablet Take 1 tablet (40 mg) by mouth daily. 30 tablet 1    senna-docusate (SENOKOT-S/PERICOLACE) 8.6-50 MG tablet Take 1 tablet by mouth 2 times daily. (Patient taking differently: Take 2 tablets by mouth 2 times daily.) 60 tablet 0    tacrolimus (GENERIC EQUIVALENT) 0.5 MG capsule Profile Rx: patient will contact pharmacy when needed 60 capsule 11    tacrolimus (GENERIC EQUIVALENT) 1 MG capsule Take 3 capsules (3 mg) by mouth 2 times daily. 180 capsule 11    tacrolimus (GENERIC EQUIVALENT) 5 MG capsule Profile Rx: patient will contact pharmacy when needed      valGANciclovir (VALCYTE) 450 MG tablet Take 1 tablet (450 mg) by mouth every other day. 60 tablet 2    varenicline (CHANTIX SANCHEZ) 0.5 MG X 11 & 1 MG X 42 tablet Take 0.5 mg by mouth daily.      VENTOLIN  (90 Base) MCG/ACT inhaler INHALE 2 PUFFS BY MOUTH EVERY 4 HOURS AS NEEDED FOR SHORTNESS OF BREATH OR COUGH OR EXERCISE         Immunizations:     Immunization History   Administered Date(s) Administered    COVID-19 12+ (MODERNA) 01/13/2025    COVID-19 Monovalent 18+ (Moderna) 01/29/2022    COVID-19 Vaccine  "(Alix) 07/09/2021    DTAP (<7y) 11/06/1996    Hep B, Adult (Heplisav- B) 09/03/1997, 01/13/2025    HepB, Unspecified 09/03/1997    Historical DTP/aP 02/22/1991, 05/10/1991, 07/10/1991, 08/11/1992, 11/06/1996    INFLUENZA,TRIVALENT (FLUCELVAX) 10/07/2024    Influenza Vaccine 18-64 (Flublok) 10/17/2023    Influenza Vaccine, 6+MO IM (QUADRIVALENT W/PRESERVATIVES) 09/01/2024    MMR (MMRII) 08/11/1992    Pneumo Conj 13-V (2010&after) 09/15/2022    Pneumococcal 20 valent Conjugate (Prevnar 20) 08/31/2024    Poliovirus, inactivated (IPV) 02/22/1991, 05/10/1991, 08/11/1992, 11/06/1996    TDAP (Adacel,Boostrix) 11/17/2016       Review of systems:   The patient denies any fever, chills, nausea, vomiting, diarrhea.     Abbreviated Physical Exam:   /64   Pulse 82   Temp 98.5  F (36.9  C) (Oral)   Resp 16   Ht 1.727 m (5' 8\")   Wt 75.8 kg (167 lb)   SpO2 100%   BMI 25.39 kg/m      Patient Alert & Oriented and in No Acute Distress  Laboratory Data:     ROUTINE ICU LABS (Last four results)  CMP  Recent Labs   Lab 06/08/25  1855 06/06/25  1013 06/05/25  0846 06/02/25  0839    140 140 139   POTASSIUM 4.9 4.2 4.4 4.5   CHLORIDE 111* 111* 109* 106   CO2 21* 19* 20* 23   ANIONGAP 7 10 11 10   GLC 96 81 95 109*   BUN 21.0* 19.7 20.5* 23.8*   CR 2.10* 2.07* 2.41* 3.53*   GFRESTIMATED 31* 32* 26* 17*   KARTIK 9.5 9.7 9.9 9.7   MAG 1.8  --   --   --    PROTTOTAL 6.0*  --   --   --    ALBUMIN 3.7  --   --   --    BILITOTAL 0.2  --   --   --    ALKPHOS 106  --   --   --    AST 12  --   --   --    ALT 7  --   --   --      CBC  Recent Labs   Lab 06/08/25  1855 06/06/25  1013 06/05/25  0846 06/02/25  0839   WBC 6.0 4.1 5.2 5.1   RBC 2.87* 2.94* 3.02* 3.17*   HGB 8.6* 9.1* 9.4* 10.0*   HCT 28.1* 28.5* 28.7* 30.3*   MCV 98 97 95 96   MCH 30.0 31.0 31.1 31.5   MCHC 30.6* 31.9 32.8 33.0   RDW 13.1 13.1 13.0 12.7    260 312 320     INR  Recent Labs   Lab 06/06/25  1013   INR 1.11     Attestation: The patient was directly " seen and evaluated by me, Guerda Fall M.D., and discussed with the Apheresis nursing staff, BROOKS.  I have reviewed and evaluated the pertinent clinical and laboratory data, saw and evaluated the patient for the procedure, made the decision to proceed with procedure tomorrow. I have spent a total of approximately 45 minutes in total including, chart review, meeting with patient, charting, discussing with nursing staff.       Guerda Fall M.D.  Adjunct   Department of Laboratory Medicine and Pathology   Transfusion Medicine

## 2025-06-10 DIAGNOSIS — T86.11 ANTIBODY MEDIATED REJECTION OF KIDNEY TRANSPLANT: ICD-10-CM

## 2025-06-10 DIAGNOSIS — Z48.298 AFTERCARE FOLLOWING ORGAN TRANSPLANT: Primary | ICD-10-CM

## 2025-06-10 LAB
ANION GAP SERPL CALCULATED.3IONS-SCNC: 10 MMOL/L (ref 7–15)
BACTERIA UR CULT: NORMAL
BLD PROD TYP BPU: NORMAL
BLOOD COMPONENT TYPE: NORMAL
BUN SERPL-MCNC: 27.7 MG/DL (ref 6–20)
CALCIUM SERPL-MCNC: 9.8 MG/DL (ref 8.8–10.4)
CHLORIDE SERPL-SCNC: 107 MMOL/L (ref 98–107)
CODING SYSTEM: NORMAL
CREAT SERPL-MCNC: 1.85 MG/DL (ref 0.51–0.95)
EGFRCR SERPLBLD CKD-EPI 2021: 36 ML/MIN/1.73M2
ERYTHROCYTE [DISTWIDTH] IN BLOOD BY AUTOMATED COUNT: 13 % (ref 10–15)
GLUCOSE BLDC GLUCOMTR-MCNC: 100 MG/DL (ref 70–99)
GLUCOSE BLDC GLUCOMTR-MCNC: 183 MG/DL (ref 70–99)
GLUCOSE BLDC GLUCOMTR-MCNC: 88 MG/DL (ref 70–99)
GLUCOSE BLDC GLUCOMTR-MCNC: 97 MG/DL (ref 70–99)
GLUCOSE BLDC GLUCOMTR-MCNC: 98 MG/DL (ref 70–99)
GLUCOSE SERPL-MCNC: 142 MG/DL (ref 70–99)
HCO3 SERPL-SCNC: 22 MMOL/L (ref 22–29)
HCT VFR BLD AUTO: 26.7 % (ref 35–47)
HGB BLD-MCNC: 8.6 G/DL (ref 11.7–15.7)
ISSUE DATE AND TIME: NORMAL
MAGNESIUM SERPL-MCNC: 1.6 MG/DL (ref 1.7–2.3)
MCH RBC QN AUTO: 31.2 PG (ref 26.5–33)
MCHC RBC AUTO-ENTMCNC: 32.2 G/DL (ref 31.5–36.5)
MCV RBC AUTO: 97 FL (ref 78–100)
PHOSPHATE SERPL-MCNC: 2.9 MG/DL (ref 2.5–4.5)
PLATELET # BLD AUTO: 260 10E3/UL (ref 150–450)
POTASSIUM SERPL-SCNC: 4.6 MMOL/L (ref 3.4–5.3)
RBC # BLD AUTO: 2.76 10E6/UL (ref 3.8–5.2)
SODIUM SERPL-SCNC: 139 MMOL/L (ref 135–145)
TACROLIMUS BLD-MCNC: 6.7 UG/L (ref 5–15)
TME LAST DOSE: NORMAL H
TME LAST DOSE: NORMAL H
UNIT ABO/RH: NORMAL
UNIT NUMBER: NORMAL
UNIT STATUS: NORMAL
UNIT TYPE ISBT: 6200
WBC # BLD AUTO: 9.2 10E3/UL (ref 4–11)

## 2025-06-10 PROCEDURE — 250N000013 HC RX MED GY IP 250 OP 250 PS 637

## 2025-06-10 PROCEDURE — 120N000011 HC R&B TRANSPLANT UMMC

## 2025-06-10 PROCEDURE — 36415 COLL VENOUS BLD VENIPUNCTURE: CPT | Performed by: PHYSICIAN ASSISTANT

## 2025-06-10 PROCEDURE — 250N000012 HC RX MED GY IP 250 OP 636 PS 637

## 2025-06-10 PROCEDURE — 99233 SBSQ HOSP IP/OBS HIGH 50: CPT | Mod: 24 | Performed by: PHYSICIAN ASSISTANT

## 2025-06-10 PROCEDURE — 250N000013 HC RX MED GY IP 250 OP 250 PS 637: Performed by: PHYSICIAN ASSISTANT

## 2025-06-10 PROCEDURE — 85027 COMPLETE CBC AUTOMATED: CPT | Performed by: PHYSICIAN ASSISTANT

## 2025-06-10 PROCEDURE — 80197 ASSAY OF TACROLIMUS: CPT | Performed by: PHYSICIAN ASSISTANT

## 2025-06-10 PROCEDURE — 83735 ASSAY OF MAGNESIUM: CPT

## 2025-06-10 PROCEDURE — 84100 ASSAY OF PHOSPHORUS: CPT | Performed by: PHYSICIAN ASSISTANT

## 2025-06-10 PROCEDURE — 250N000012 HC RX MED GY IP 250 OP 636 PS 637: Performed by: PHYSICIAN ASSISTANT

## 2025-06-10 PROCEDURE — 80048 BASIC METABOLIC PNL TOTAL CA: CPT | Performed by: PHYSICIAN ASSISTANT

## 2025-06-10 PROCEDURE — 99255 IP/OBS CONSLTJ NEW/EST HI 80: CPT | Performed by: PSYCHIATRY & NEUROLOGY

## 2025-06-10 RX ORDER — SERTRALINE HYDROCHLORIDE 25 MG/1
25 TABLET, FILM COATED ORAL DAILY
Status: DISCONTINUED | OUTPATIENT
Start: 2025-06-10 | End: 2025-06-11 | Stop reason: HOSPADM

## 2025-06-10 RX ORDER — MAGNESIUM OXIDE 400 MG/1
400 TABLET ORAL 2 TIMES DAILY
Status: DISCONTINUED | OUTPATIENT
Start: 2025-06-10 | End: 2025-06-11 | Stop reason: HOSPADM

## 2025-06-10 RX ORDER — PREDNISONE 5 MG/1
5 TABLET ORAL DAILY
Status: DISCONTINUED | OUTPATIENT
Start: 2025-06-19 | End: 2025-06-11 | Stop reason: HOSPADM

## 2025-06-10 RX ORDER — HUMAN IMMUNOGLOBULIN G 20 G/200ML
27 LIQUID INTRAVENOUS ONCE
Status: CANCELLED | OUTPATIENT
Start: 2025-06-13 | End: 2025-06-13

## 2025-06-10 RX ORDER — FUROSEMIDE 40 MG/1
40 TABLET ORAL DAILY
Status: DISCONTINUED | OUTPATIENT
Start: 2025-06-10 | End: 2025-06-11 | Stop reason: HOSPADM

## 2025-06-10 RX ORDER — CALCIUM GLUCONATE 100 MG/ML
AMPUL (ML) INTRAVENOUS
Status: CANCELLED | OUTPATIENT
Start: 2025-06-10

## 2025-06-10 RX ORDER — HUMAN IMMUNOGLOBULIN G 20 G/200ML
44.8 LIQUID INTRAVENOUS ONCE
Status: CANCELLED | OUTPATIENT
Start: 2025-06-13 | End: 2025-06-13

## 2025-06-10 RX ORDER — VALGANCICLOVIR 450 MG/1
450 TABLET, FILM COATED ORAL DAILY
Status: DISCONTINUED | OUTPATIENT
Start: 2025-06-11 | End: 2025-06-11 | Stop reason: HOSPADM

## 2025-06-10 RX ORDER — DIPHENHYDRAMINE HYDROCHLORIDE 50 MG/ML
50 INJECTION, SOLUTION INTRAMUSCULAR; INTRAVENOUS
Status: CANCELLED | OUTPATIENT
Start: 2025-06-10

## 2025-06-10 RX ADMIN — VALGANCICLOVIR 450 MG: 450 TABLET, FILM COATED ORAL at 08:07

## 2025-06-10 RX ADMIN — ACETAMINOPHEN 650 MG: 325 TABLET, FILM COATED ORAL at 08:18

## 2025-06-10 RX ADMIN — SERTRALINE HYDROCHLORIDE 25 MG: 25 TABLET ORAL at 11:59

## 2025-06-10 RX ADMIN — HYDROXYZINE HYDROCHLORIDE 25 MG: 25 TABLET, FILM COATED ORAL at 15:53

## 2025-06-10 RX ADMIN — MYCOPHENOLATE MOFETIL 750 MG: 250 CAPSULE ORAL at 17:42

## 2025-06-10 RX ADMIN — OXYCODONE HYDROCHLORIDE 10 MG: 10 TABLET ORAL at 19:44

## 2025-06-10 RX ADMIN — ATORVASTATIN CALCIUM 10 MG: 10 TABLET, FILM COATED ORAL at 08:07

## 2025-06-10 RX ADMIN — SENNOSIDES AND DOCUSATE SODIUM 2 TABLET: 50; 8.6 TABLET ORAL at 08:07

## 2025-06-10 RX ADMIN — OXYCODONE HYDROCHLORIDE 10 MG: 10 TABLET ORAL at 01:40

## 2025-06-10 RX ADMIN — NORTRIPTYLINE HYDROCHLORIDE 25 MG: 25 CAPSULE ORAL at 21:45

## 2025-06-10 RX ADMIN — MAGNESIUM OXIDE TAB 400 MG (241.3 MG ELEMENTAL MG) 400 MG: 400 (241.3 MG) TAB at 09:03

## 2025-06-10 RX ADMIN — TACROLIMUS 6 MG: 5 CAPSULE ORAL at 17:41

## 2025-06-10 RX ADMIN — FUROSEMIDE 40 MG: 40 TABLET ORAL at 11:59

## 2025-06-10 RX ADMIN — MAGNESIUM OXIDE TAB 400 MG (241.3 MG ELEMENTAL MG) 400 MG: 400 (241.3 MG) TAB at 19:44

## 2025-06-10 RX ADMIN — TACROLIMUS 5 MG: 5 CAPSULE ORAL at 08:07

## 2025-06-10 RX ADMIN — HYDROXYZINE HYDROCHLORIDE 25 MG: 25 TABLET, FILM COATED ORAL at 08:17

## 2025-06-10 RX ADMIN — OXYCODONE HYDROCHLORIDE 10 MG: 10 TABLET ORAL at 10:30

## 2025-06-10 RX ADMIN — LIDOCAINE 2 PATCH: 560 PATCH PERCUTANEOUS; TOPICAL; TRANSDERMAL at 19:44

## 2025-06-10 RX ADMIN — MYCOPHENOLATE MOFETIL 750 MG: 250 CAPSULE ORAL at 08:07

## 2025-06-10 RX ADMIN — PANTOPRAZOLE SODIUM 40 MG: 40 TABLET, DELAYED RELEASE ORAL at 08:07

## 2025-06-10 RX ADMIN — HYDROXYZINE HYDROCHLORIDE 25 MG: 25 TABLET, FILM COATED ORAL at 00:22

## 2025-06-10 ASSESSMENT — ACTIVITIES OF DAILY LIVING (ADL)
ADLS_ACUITY_SCORE: 28
ADLS_ACUITY_SCORE: 32
ADLS_ACUITY_SCORE: 28
ADLS_ACUITY_SCORE: 32
ADLS_ACUITY_SCORE: 28

## 2025-06-10 NOTE — PLAN OF CARE
"BP (!) 147/97 (BP Location: Right arm)   Pulse 82   Temp 98.8  F (37.1  C) (Oral)   Resp 16   Ht 1.727 m (5' 8\")   Wt 78.9 kg (173 lb 14.4 oz)   SpO2 100%   BMI 26.44 kg/m      [id number]    Goal Outcome Evaluation:      Plan of Care Reviewed With: patient    Overall Patient Progress: no change    VS: VSS ex/ HTN w/ in parameters  Neuro: A&O x 4  BG: ACHS; BG check at 0200 183  Respiratory: WDL  Cardiac: WDL  Pain/Nausea: Pain mgd w/ PRN pain meds  PRN: Oxy x1; Atarax x1  Diet: Reg diet  GI/: WDL  Skin: RLQ abd. incision  Mobility: UAL  Plan: Continue to follow POC  "

## 2025-06-10 NOTE — PLAN OF CARE
Shift Hours: 1500 - 1900    Assessment:  Body systems that were not at patient's baseline Gastrointestinal. Focused body system assessments documented in flowsheets.        Activity     Fall Risk Score: 45   Bed alarm on? No     Activity Assistance Provided: independent           Pain: Pain in Abd and R upper leg    Labs/RN Managed Protocols: NA    Lines/Drains: R PIV    Nutrition: Regular    Goal Outcome Evaluation  Plan of Care Reviewed With: patient   AxOx4 and VSS. C/O abd and R upper leg pain. Hydroxyzine given for pain and anxiety.

## 2025-06-10 NOTE — CONSULTS
"          Initial Psychiatric Consult   Consult date: Saba 10, 2025         Reason for Consult, requesting source:    Anxiety.   Requesting source: Lukas COCHRAN Finger    Labs and imaging reviewed. Discussed with Milena Michael     Total time spent in chart review, patient interview and coordination of care; 80 min          HPI:   Per transplant note from today:   \"Glenna Spears is a 34 year old female with recent PMHx significant for ESRD s/p  donor kidney transplantation on 2025 complicated by leak requiring ultrasound-guided perinephric drain placement as well as kidney biopsy by IR on , now complicated by anterior abdominal wall hematoma. Kidney biopsy done on  due to patient having moderate to high-level donor-specific antibodies (DSA) and concern for AMR. 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 She required admission for transplant nephrology consultation as well as transfusion medicine consultation for plasmapheresis. She does not have a urine leak, as her creatinine is the same in her abdominal fluid as the plasma. Cr trended down to 2.4 post transplant then increased to 3.5 (25). On admit Cr 2.1, trending down.\"    She has long-term anxiety and depression which she attributes to a difficult childhood.  With the stresses of her current health problems her anxiety has really increased, almost to the point of having panic attacks. She has taken several doses of hydroxyzine which she has found helpful, although it makes her sleepy.   She was started on nortriptyline last December for nerve pain and she finds it really has helped with her mood and sleep so she would like to continue with this.  She is open to adding a serotonin reuptake inhibitor to it to address her ongoing anxiety symptoms.          Past Psychiatric History:   Cymbalta 20 mg prescribed Aug '24. Nortriptyline 25 mg started Dec '24.   She had tried Prozac a few years ago; didn't help much.   Was on Adderall and " then Vyvanse  for ADHD in late teens, early 20s, but abused it         Substance Use and History:   She was using THC gummies for anxiety during dialysis, but had to give them up after the transplant.   Rare use of alcohol   Quit smoking cigarettes about one and half years ago; still occasionally uses Chantix for cravings.           Past Medical History:   PAST MEDICAL HISTORY:   Past Medical History:   Diagnosis Date    ESRD (end stage renal disease) (H)     GERD (gastroesophageal reflux disease)     Hypertension     Kidney replaced by transplant 2025    DDKT. Induction w/ Thymoglobulin.    Latent tuberculosis     Completed 4 months of rifampin    Obesity     Secondary hyperparathyroidism     Vitamin D deficiency        PAST SURGICAL HISTORY:   Past Surgical History:   Procedure Laterality Date    AV FISTULA REPAIR       SECTION      IR FINE NEEDLE ASPIRATION W ULTRASOUND  2025    IR RENAL BIOPSY RIGHT  2025    IR SKIN SUBQ/SEROMA ABSCESS DRAIN  2025    LAPAROSCOPIC GASTRIC SLEEVE N/A 2024    Procedure: GASTRECTOMY, SLEEVE, LAPAROSCOPIC;  Surgeon: John Smith MD;  Location: [facility] OR    TRANSPLANT KIDNEY RECIPIENT  DONOR Right 2025    Procedure: Transplant kidney recipient  donor with Vein and Artery reconstruction;  Surgeon: Manjeet Barreto MD;  Location: [facility] OR             Family History:   FAMILY HISTORY:   Family History   Problem Relation Age of Onset    Kidney Disease Maternal Grandmother     Leukemia Paternal Grandmother 47    Leukemia Maternal Aunt 37    Anesthesia Reaction No family hx of     Deep Vein Thrombosis (DVT) No family hx of     Heart Disease No family hx of            Social History:   She lives in [place] with her boyfriend of 10 years and sons ages 17 and 8. 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   [place] she has to stay with her [place]  family and this is very stressful for her due to various control issues, etc.         Physical ROS:   The 10 point Review of Systems is negative other than noted in the HPI or here.           Medications:     Current Facility-Administered Medications   Medication Dose Route Frequency Provider Last Rate Last Admin    atorvastatin (LIPITOR) tablet 10 mg  10 mg Oral Daily Cari Cardona MD   10 mg at 06/10/25 0807    insulin aspart (NovoLOG) injection (RAPID ACTING)  1-7 Units Subcutaneous TID AC Milena Michael PA-C   1 Units at 06/09/25 1252    insulin aspart (NovoLOG) injection (RAPID ACTING)  1-5 Units Subcutaneous At Bedtime Milena Michael PA-C        Lidocaine (LIDOCARE) 4 % Patch 1-2 patch  1-2 patch Transdermal Q24h Milena Michael PA-C   2 patch at 06/09/25 1927    magnesium oxide (MAG-OX) tablet 400 mg  400 mg Oral BID Milena Michael PA-C   400 mg at 06/10/25 0903    mycophenolate (GENERIC EQUIVALENT) capsule 750 mg  750 mg Oral BID Cari Cardona MD   750 mg at 06/10/25 0807    nortriptyline (PAMELOR) capsule 25 mg  25 mg Oral At Bedtime Cari Cardona MD   25 mg at 06/09/25 2228    pantoprazole (PROTONIX) EC tablet 40 mg  40 mg Oral Daily Cari Cardona MD   40 mg at 06/10/25 0807    senna-docusate (SENOKOT-S/PERICOLACE) 8.6-50 MG per tablet 2 tablet  2 tablet Oral BID Cari Cardona MD   2 tablet at 06/10/25 0807    sodium chloride (PF) 0.9% PF flush 3 mL  3 mL Intracatheter Q8H JANE Cari Cardona MD   3 mL at 06/10/25 0903    tacrolimus (GENERIC EQUIVALENT) capsule 5 mg  5 mg Oral BID IS Cari Cardona MD   5 mg at 06/10/25 0807    valGANciclovir (VALCYTE) tablet 450 mg  450 mg Oral Every Other Day Cari Cardona MD   450 mg at 06/10/25 0807              Allergies:     Allergies   Allergen Reactions    Sulfa Antibiotics Other (See Comments)     Reaction as an infant- unsure of what happened          Labs:     Recent Results (from the past 48 hours)   Comprehensive  metabolic panel    Collection Time: 06/08/25  6:55 PM   Result Value Ref Range    Sodium 139 135 - 145 mmol/L    Potassium 4.9 3.4 - 5.3 mmol/L    Carbon Dioxide (CO2) 21 (L) 22 - 29 mmol/L    Anion Gap 7 7 - 15 mmol/L    Urea Nitrogen 21.0 (H) 6.0 - 20.0 mg/dL    Creatinine 2.10 (H) 0.51 - 0.95 mg/dL    GFR Estimate 31 (L) >60 mL/min/1.73m2    Calcium 9.5 8.8 - 10.4 mg/dL    Chloride 111 (H) 98 - 107 mmol/L    Glucose 96 70 - 99 mg/dL    Alkaline Phosphatase 106 40 - 150 U/L    AST 12 0 - 45 U/L    ALT 7 0 - 50 U/L    Protein Total 6.0 (L) 6.4 - 8.3 g/dL    Albumin 3.7 3.5 - 5.2 g/dL    Bilirubin Total 0.2 <=1.2 mg/dL   Magnesium    Collection Time: 06/08/25  6:55 PM   Result Value Ref Range    Magnesium 1.8 1.7 - 2.3 mg/dL   CBC with platelets and differential    Collection Time: 06/08/25  6:55 PM   Result Value Ref Range    WBC Count 6.0 4.0 - 11.0 10e3/uL    RBC Count 2.87 (L) 3.80 - 5.20 10e6/uL    Hemoglobin 8.6 (L) 11.7 - 15.7 g/dL    Hematocrit 28.1 (L) 35.0 - 47.0 %    MCV 98 78 - 100 fL    MCH 30.0 26.5 - 33.0 pg    MCHC 30.6 (L) 31.5 - 36.5 g/dL    RDW 13.1 10.0 - 15.0 %    Platelet Count 280 150 - 450 10e3/uL    % Neutrophils 84 %    % Lymphocytes 5 %    % Monocytes 6 %    % Eosinophils 3 %    % Basophils 1 %    % Immature Granulocytes 1 %    NRBCs per 100 WBC 0 <1 /100    Absolute Neutrophils 5.0 1.6 - 8.3 10e3/uL    Absolute Lymphocytes 0.3 (L) 0.8 - 5.3 10e3/uL    Absolute Monocytes 0.4 0.0 - 1.3 10e3/uL    Absolute Eosinophils 0.2 0.0 - 0.7 10e3/uL    Absolute Basophils 0.1 0.0 - 0.2 10e3/uL    Absolute Immature Granulocytes 0.0 <=0.4 10e3/uL    Absolute NRBCs 0.0 10e3/uL   Adult Type and Screen    Collection Time: 06/08/25  9:31 PM   Result Value Ref Range    ABO/RH(D) A POS     Antibody Screen Negative Negative    SPECIMEN EXPIRATION DATE 6/11/2025 11:59:00 PM CDT    UA with Microscopic reflex to Culture    Collection Time: 06/09/25  2:05 AM    Specimen: Urine, Clean Catch   Result Value Ref Range     Color Urine Light Yellow Colorless, Straw, Light Yellow, Yellow    Appearance Urine Clear Clear    Glucose Urine Negative Negative mg/dL    Bilirubin Urine Negative Negative    Ketones Urine Negative Negative mg/dL    Specific Gravity Urine 1.016 1.003 - 1.035    Blood Urine Negative Negative    pH Urine 6.0 5.0 - 7.0    Protein Albumin Urine Negative Negative mg/dL    Urobilinogen Urine Normal Normal mg/dL    Nitrite Urine Negative Negative    Leukocyte Esterase Urine Negative Negative    Bacteria Urine Few (A) None Seen /HPF    Mucus Urine Present (A) None Seen /LPF    RBC Urine 3 (H) <=2 /HPF    WBC Urine 2 <=5 /HPF    Squamous Epithelials Urine 2 (H) <=1 /HPF   Urine Culture    Collection Time: 06/09/25  2:05 AM    Specimen: Urine, Clean Catch   Result Value Ref Range    Culture 10,000-50,000 CFU/mL Mixture of Urogenital Amy    Glucose by meter    Collection Time: 06/09/25  5:55 AM   Result Value Ref Range    GLUCOSE BY METER POCT 147 (H) 70 - 99 mg/dL   Comprehensive metabolic panel    Collection Time: 06/09/25  6:04 AM   Result Value Ref Range    Sodium 137 135 - 145 mmol/L    Potassium 4.7 3.4 - 5.3 mmol/L    Carbon Dioxide (CO2) 20 (L) 22 - 29 mmol/L    Anion Gap 7 7 - 15 mmol/L    Urea Nitrogen 20.5 (H) 6.0 - 20.0 mg/dL    Creatinine 1.95 (H) 0.51 - 0.95 mg/dL    GFR Estimate 34 (L) >60 mL/min/1.73m2    Calcium 9.9 8.8 - 10.4 mg/dL    Chloride 110 (H) 98 - 107 mmol/L    Glucose 159 (H) 70 - 99 mg/dL    Alkaline Phosphatase 109 40 - 150 U/L    AST 13 0 - 45 U/L    ALT 6 0 - 50 U/L    Protein Total 6.3 (L) 6.4 - 8.3 g/dL    Albumin 3.8 3.5 - 5.2 g/dL    Bilirubin Total 0.2 <=1.2 mg/dL   CBC with platelets    Collection Time: 06/09/25  6:04 AM   Result Value Ref Range    WBC Count 5.9 4.0 - 11.0 10e3/uL    RBC Count 2.92 (L) 3.80 - 5.20 10e6/uL    Hemoglobin 8.9 (L) 11.7 - 15.7 g/dL    Hematocrit 28.6 (L) 35.0 - 47.0 %    MCV 98 78 - 100 fL    MCH 30.5 26.5 - 33.0 pg    MCHC 31.1 (L) 31.5 - 36.5 g/dL     RDW 12.9 10.0 - 15.0 %    Platelet Count 272 150 - 450 10e3/uL   Magnesium    Collection Time: 06/09/25  6:04 AM   Result Value Ref Range    Magnesium 1.8 1.7 - 2.3 mg/dL   Phosphorus    Collection Time: 06/09/25  6:04 AM   Result Value Ref Range    Phosphorus 1.7 (L) 2.5 - 4.5 mg/dL   Prepare plasma (in mL)    Collection Time: 06/09/25  8:58 AM   Result Value Ref Range    Blood Component Type Plasma     Product Code N6251A90     Unit Status Transfused     Unit Number [id number]     CODING SYSTEM [id number]     ISSUE DATE AND TIME 6/9/2025 12:36:00 PM CDT     UNIT ABO/RH A-     UNIT TYPE ISBT 0600    Prepare plasma (unit)    Collection Time: 06/09/25  9:57 AM   Result Value Ref Range    Blood Component Type Plasma     Product Code C5765L83     Unit Status Transfused     Unit Number [id number]     CODING SYSTEM [id number]     ISSUE DATE AND TIME 6/9/2025 12:35:00 PM CDT     UNIT ABO/RH A+     UNIT TYPE ISBT 6200    Prepare plasma (unit)    Collection Time: 06/09/25  9:57 AM   Result Value Ref Range    Blood Component Type Plasma     Product Code P7199K67     Unit Status Transfused     Unit Number [id number]     CODING SYSTEM [id number]     ISSUE DATE AND TIME 6/9/2025 12:35:00 PM CDT     UNIT ABO/RH A+     UNIT TYPE ISBT 6200    Prepare plasma (unit)    Collection Time: 06/09/25  9:57 AM   Result Value Ref Range    Blood Component Type Plasma     Product Code L8472X22     Unit Status Transfused     Unit Number [id number]     CODING SYSTEM [id number]     ISSUE DATE AND TIME 6/9/2025 12:35:00 PM CDT     UNIT ABO/RH A+     UNIT TYPE ISBT 6200    Prepare plasma (unit)    Collection Time: 06/09/25  9:57 AM   Result Value Ref Range    Blood Component Type Plasma     Product Code D4679I18     Unit Status Transfused     Unit Number [id number]     CODING SYSTEM [id number]     ISSUE DATE AND TIME 6/9/2025 12:35:00 PM CDT     UNIT ABO/RH A-     UNIT TYPE ISBT 0600    Prepare plasma (unit)    Collection Time: 06/09/25  9:57 AM    Result Value Ref Range    Blood Component Type Plasma     Product Code Q4840E21     Unit Status Transfused     Unit Number [id number]     CODING SYSTEM [id number]     ISSUE DATE AND TIME 6/9/2025 12:35:00 PM CDT     UNIT ABO/RH A+     UNIT TYPE ISBT 6200    Prepare plasma (unit)    Collection Time: 06/09/25  9:57 AM   Result Value Ref Range    Blood Component Type Plasma     Product Code U9661F50     Unit Status Transfused     Unit Number [id number]     CODING SYSTEM [id number]     ISSUE DATE AND TIME 6/9/2025 12:35:00 PM CDT     UNIT ABO/RH A+     UNIT TYPE ISBT 6200    Prepare plasma (unit)    Collection Time: 06/09/25  9:57 AM   Result Value Ref Range    Blood Component Type Plasma     Product Code E2478X45     Unit Status Transfused     Unit Number [id number]     CODING SYSTEM [id number]     ISSUE DATE AND TIME 6/9/2025 12:35:00 PM CDT     UNIT ABO/RH A+     UNIT TYPE ISBT 6200    Prepare plasma (unit)    Collection Time: 06/09/25  9:57 AM   Result Value Ref Range    Blood Component Type Plasma     Product Code X7231B28     Unit Status Transfused     Unit Number [id number]     CODING SYSTEM [id number]     ISSUE DATE AND TIME 6/9/2025 12:35:00 PM CDT     UNIT ABO/RH A+     UNIT TYPE ISBT 6200    Prepare plasma (unit)    Collection Time: 06/09/25  9:57 AM   Result Value Ref Range    Blood Component Type Plasma     Product Code M6177I34     Unit Status Transfused     Unit Number [id number]     CODING SYSTEM [id number]     ISSUE DATE AND TIME 6/9/2025 12:35:00 PM CDT     UNIT ABO/RH A-     UNIT TYPE ISBT 0600    Prepare plasma (unit)    Collection Time: 06/09/25  9:57 AM   Result Value Ref Range    Blood Component Type Plasma     Product Code T0506R40     Unit Status Transfused     Unit Number [id number]     CODING SYSTEM [id number]     ISSUE DATE AND TIME 6/9/2025 12:35:00 PM CDT     UNIT ABO/RH A+     UNIT TYPE ISBT 6200    Glucose by meter    Collection Time: 06/09/25 11:43 AM   Result Value Ref Range     "GLUCOSE BY METER POCT 147 (H) 70 - 99 mg/dL   Glucose by meter    Collection Time: 06/09/25  5:17 PM   Result Value Ref Range    GLUCOSE BY METER POCT 101 (H) 70 - 99 mg/dL   Glucose by meter    Collection Time: 06/09/25 10:31 PM   Result Value Ref Range    GLUCOSE BY METER POCT 108 (H) 70 - 99 mg/dL   Glucose by meter    Collection Time: 06/10/25  2:17 AM   Result Value Ref Range    GLUCOSE BY METER POCT 183 (H) 70 - 99 mg/dL   Basic metabolic panel    Collection Time: 06/10/25  4:51 AM   Result Value Ref Range    Sodium 139 135 - 145 mmol/L    Potassium 4.6 3.4 - 5.3 mmol/L    Chloride 107 98 - 107 mmol/L    Carbon Dioxide (CO2) 22 22 - 29 mmol/L    Anion Gap 10 7 - 15 mmol/L    Urea Nitrogen 27.7 (H) 6.0 - 20.0 mg/dL    Creatinine 1.85 (H) 0.51 - 0.95 mg/dL    GFR Estimate 36 (L) >60 mL/min/1.73m2    Calcium 9.8 8.8 - 10.4 mg/dL    Glucose 142 (H) 70 - 99 mg/dL   CBC with platelets    Collection Time: 06/10/25  4:51 AM   Result Value Ref Range    WBC Count 9.2 4.0 - 11.0 10e3/uL    RBC Count 2.76 (L) 3.80 - 5.20 10e6/uL    Hemoglobin 8.6 (L) 11.7 - 15.7 g/dL    Hematocrit 26.7 (L) 35.0 - 47.0 %    MCV 97 78 - 100 fL    MCH 31.2 26.5 - 33.0 pg    MCHC 32.2 31.5 - 36.5 g/dL    RDW 13.0 10.0 - 15.0 %    Platelet Count 260 150 - 450 10e3/uL   Phosphorus    Collection Time: 06/10/25  4:51 AM   Result Value Ref Range    Phosphorus 2.9 2.5 - 4.5 mg/dL   Magnesium    Collection Time: 06/10/25  4:51 AM   Result Value Ref Range    Magnesium 1.6 (L) 1.7 - 2.3 mg/dL   Glucose by meter    Collection Time: 06/10/25  8:14 AM   Result Value Ref Range    GLUCOSE BY METER POCT 98 70 - 99 mg/dL          Physical and Psychiatric Examination:     BP (!) 142/86   Pulse 95   Temp 97.7  F (36.5  C) (Oral)   Resp 18   Ht 1.727 m (5' 8\")   Wt 78.9 kg (173 lb 14.4 oz)   SpO2 100%   BMI 26.44 kg/m    Weight is 173 lbs 14.4 oz  Body mass index is 26.44 kg/m .    Physical Exam:  I have reviewed the physical exam as documented by by the " medical team and agree with findings and assessment and have no additional findings to add at this time.         MSE:     Appearance: awake, alert and adequately groomed  Attitude:  cooperative  Eye Contact:  good  Mood:  good  Affect:  : mood congruent  Speech:  clear, coherent  Psychomotor Behavior:  intact station, gait and muscle tone  Thought Process:  logical, linear and goal oriented  Associations:  no loose associations  Thought Content:  no evidence of suicidal ideation or homicidal ideation and no evidence of psychotic thought  Insight:  good  Judgement:  intact  Oriented to:  time, person, and place  Attention Span and Concentration:  intact  Recent and Remote Memory:  intact       QTc: 442 ms 5/17          DSM-5 Diagnosis:   311 (F32.9) Unspecified Depressive Disorder   300.02 (F41.1) Generalized Anxiety Disorder  History of ADHD with stimulant abuse   Cannabis use           Assessment:   Fortunately she has found the hydroxyzine works well for her for relief of anxiety symptoms.  Also she has found the nortriptyline but he helps with her sleep and mood even with a low dose.  With her health issues, however I am reluctant to go to a more therapeutic dose of nortriptyline for anxiety; Zoloft would be preferred as an add-on.  She was not interested and ongoing psychotherapy and I think her primary care provider can probably manage her Zoloft and nortriptyline, hydroxyzine.          Summary of Recommendations:   I would continue the nortriptyline at 25 mg at bedtime  Add Zoloft at 25 mg/day linked to 50 mg in 1 week with the plan to eventually go to 100 mg as needed  May continue hydroxyzine 25 mg every 6 hours as needed for anxiety    Contact me as needed.  Dragan Ennis M.D.   Consult Liaison Psychiatry   [facility]   Contact on Vocera  If I am not available, then [facility] line ([phone]) should know who   Is covering our consult service.  "                \"This dictation was performed with voice recognition software and may contain errors,  omissions and inadvertent word substitution.\"           "

## 2025-06-10 NOTE — PROGRESS NOTES
[facility]  Transplant Nephrology Progress Note  Date of Admission:  6/8/2025  Today's Date: 06/10/2025  Requesting physician: Lukas Jean MD    Recommendations:   - Continue lasix 40 mg PO x1 today and will reassess tomorrow.   - Will need closure biopsy 4-6 weeks after initial biopsy.   - As she developed acute rejection on adequate immunosuppression levels, will need to start prednisone 5 mg daily following treatment.   - Agree with magnesium supplementation.  - Continue current immunosuppression.   - No acute indication for dialysis.     Assessment & Plan   # DDKT: Trend down. Good urine output.    - Baseline Creatinine: ~ TBD. Creatinine today of 1.95 mg/dl is lowest post-transplant.   - Proteinuria: Moderate (1-3 grams)   - DSA Hx: Significant DSA (>3000 mfi) to A1, B8, B60, DR17, DR52    - Last cPRA: 100%   - BK Viremia: No   - Kidney Tx Biopsy Hx: 6/6/25 Preliminary pathology findings show diffuse C4d positivity without evidence (so far) of glomerulitis or peritubular capillaritis. There was no evidence of cellular mediated rejection. 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 She received 500 mg solumedrol on 6/8/25. Plan is for PLEX x 5 every other day, IVIG 500 mg/kg after PLEX #1-4, IVIG 1 gm/kg after PLEX #5, solumedrol 100 mg IV prior to each IVIG dose.    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 There was no evidence of cellular mediated rejection. 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 She received 500 mg solumedrol on 6/8/25. Plan is for PLEX x 5 every other day, IVIG 500 mg/kg after PLEX #1-4, IVIG 1 gm/kg after PLEX #5, solumedrol 100 mg IV prior to each IVIG dose.    # Immunosuppression: Tacrolimus immediate release (goal 8-10) and Mycophenolate mofetil (dose  750 mg every 12 hours)   - Induction with Recent Transplant:  High Intensity Protocol   - Continue with intensive monitoring of immunosuppression for efficacy and toxicity.   - Historical Changes in Immunosuppression: None   - Changes: Not at this time. See above for PLEX/IVIG plans    # Infection Prevention:   Last CD4 Level: Not checked recently  - PJP: Inhaled pentamidine  - CMV: Valganciclovir (Valcyte)      - CMV IgG Ab High Risk Discordance (D+/R-) at time of transplant: No  Present CMV Serostatus: Positive  - EBV IgG Ab High Risk Discordance (D+/R-) at time of transplant: No  Present EBV Serostatus: Positive    # Hypertension: Controlled;  Goal BP: < 140/90 (Hospitalization goal)   - Changes: No    # Anemia in Chronic Renal Disease: Hgb: Stable      SHALOM: No   - Iron studies: Not checked recently    # Mineral Bone Disorder:    - Secondary renal hyperparathyroidism; PTH level: Markedly elevated (>1201 pg/ml) April 2024       On treatment: None  - Vitamin D; level: Normal        On supplement: No  - Calcium; level: Normal        On supplement: No  - Phosphorus; level: Normal        On supplement: Yes    # Electrolytes:  - Potassium; level: Normal        On supplement: No  - Magnesium; level: Low        On supplement: Yes  - Bicarbonate; level: Low        On supplement: No  - Sodium; level: Normal    # Other Significant PMH:   - Obesity s/p gastric sleeve 1/23/24: on semaglutide               - History of Illicit Drug use: remote LSD /cocaine use in her 20s, + marijuana.               - Positive Quant Gold: s/p treatment by ID   - Non-occlusive R internal jugular thrombosis and an occlusive right cephalic vein superficial thrombosis, likely related to a previously placed (and now removed) catheter. Anticoagulation is currently being held due to presence of anterior abdominal wall hematoma s/p drain placement and transplant kidney biopsy.     # Transplant History:  Etiology of Kidney Failure: Unknown etiology  Tx:  DDKT  Transplant: 5/17/2025 (Kidney)  Significant transplant-related complications: None    Recommendations were communicated to the primary team verbally.    Seen and discussed with Dr. Ash Kilpatrick PA-C  Transplant Nephrology  Contact information via Vocera Web Console or via [facility] Paging/Directory    Physician Attestation     I saw and evaluated Glenna Spears as part of a shared APRN/PA visit.     I personally reviewed the vital signs, medications, and labs.    I personally provided a substantive portion of care for this patient and I approve the care plan as written by the LEWIS.  I was involved with Medical Decision Making including: Please see A&P for additional details of medical decision making.  MANAGEMENT DISCUSSED with the following over the past 24 hours: No acute indications for dialysis.  Would continue on present immunosuppression with plan to continue on prednisone 5 mg daily following prednisone taper.  Continue on antibody-mediated rejection treatment.  Agree with mental health consult.     Kevin Aleman MD  Date of Service (when I saw the patient): 06/10/25      Interval History  Ms. Spears's creatinine is 1.85 (06/10 0451); Trend down.  Good urine output.  Other significant labs/tests/vitals: Mg 1.6. BP systolic 120-140.   No events overnight.  No chest pain or shortness of breath.  No leg swelling but weight up ~1kg from yesterday and she continues to note lower abdominal swelling/fullness.  No nausea and vomiting.  No fever, sweats or chills.      Review of Systems   4 point ROS was obtained and negative except as noted in the Interval History.    MEDICATIONS:  Current Facility-Administered Medications   Medication Dose Route Frequency Provider Last Rate Last Admin    atorvastatin (LIPITOR) tablet 10 mg  10 mg Oral Daily Cari Cardona MD   10 mg at 06/10/25 0807    insulin aspart (NovoLOG) injection (RAPID ACTING)  1-7 Units Subcutaneous TID Milena Griffith PA-C    "1 Units at 25 1252    insulin aspart (NovoLOG) injection (RAPID ACTING)  1-5 Units Subcutaneous At Bedtime Milena Michael PA-C        Lidocaine (LIDOCARE) 4 % Patch 1-2 patch  1-2 patch Transdermal Q24h Milena Michael PA-C   2 patch at 25 1927    magnesium oxide (MAG-OX) tablet 400 mg  400 mg Oral BID Milena Michael PA-C   400 mg at 06/10/25 0903    mycophenolate (GENERIC EQUIVALENT) capsule 750 mg  750 mg Oral BID Cari Cardona MD   750 mg at 06/10/25 0807    nortriptyline (PAMELOR) capsule 25 mg  25 mg Oral At Bedtime Cari Cardona MD   25 mg at 25 2228    pantoprazole (PROTONIX) EC tablet 40 mg  40 mg Oral Daily Cari Cardona MD   40 mg at 06/10/25 0807    senna-docusate (SENOKOT-S/PERICOLACE) 8.6-50 MG per tablet 2 tablet  2 tablet Oral BID Cari Cardona MD   2 tablet at 06/10/25 0807    sodium chloride (PF) 0.9% PF flush 3 mL  3 mL Intracatheter Q8H JANE Cari Cardona MD   3 mL at 06/10/25 0903    tacrolimus (GENERIC EQUIVALENT) capsule 5 mg  5 mg Oral BID IS Cari Cardona MD   5 mg at 06/10/25 0807    valGANciclovir (VALCYTE) tablet 450 mg  450 mg Oral Every Other Day Cari Cardona MD   450 mg at 06/10/25 0807     Current Facility-Administered Medications   Medication Dose Route Frequency Provider Last Rate Last Admin       Physical Exam   Temp  Av.4  F (36.9  C)  Min: 97.8  F (36.6  C)  Max: 98.7  F (37.1  C)      Pulse  Av.5  Min: 79  Max: 96 Resp  Av.5  Min: 16  Max: 18  SpO2  Av.8 %  Min: 99 %  Max: 100 %     BP (!) 142/86   Pulse 95   Temp 97.7  F (36.5  C) (Oral)   Resp 18   Ht 1.727 m (5' 8\")   Wt 78.9 kg (173 lb 14.4 oz)   SpO2 100%   BMI 26.44 kg/m     Date 25 07 - 06/10/25 0659   Shift 7126-9955 6349-3417 7895-1726 24 Hour Total   INTAKE   Shift Total(mL/kg)       OUTPUT   Urine 390   390   Shift Total(mL/kg) 390(5.02)   390(5.02)   Weight (kg) 77.7 77.7 77.7 77.7      Admit Weight: 75.8 kg (167 lb) "     GENERAL APPEARANCE: alert and no distress  HENT: mouth without ulcers or lesions  RESP: lungs clear to auscultation - no rales, rhonchi or wheezes  CV: regular rhythm, normal rate, no rub, no murmur  EDEMA: no LE edema bilaterally  ABDOMEN: soft, nondistended, nontender, bowel sounds normal  MS: extremities normal - no gross deformities noted, no evidence of inflammation in joints, no muscle tenderness  SKIN: no rash    Data   All labs reviewed by me.  CMP  Recent Labs   Lab 06/10/25  0814 06/10/25  0451 06/10/25  0217 06/09/25  2231 06/09/25  1143 06/09/25  0604 06/09/25  0555 06/08/25  1855 06/06/25  1013   NA  --  139  --   --   --  137  --  139 140   POTASSIUM  --  4.6  --   --   --  4.7  --  4.9 4.2   CHLORIDE  --  107  --   --   --  110*  --  111* 111*   CO2  --  22  --   --   --  20*  --  21* 19*   ANIONGAP  --  10  --   --   --  7  --  7 10   GLC 98 142* 183* 108*   < > 159*   < > 96 81   BUN  --  27.7*  --   --   --  20.5*  --  21.0* 19.7   CR  --  1.85*  --   --   --  1.95*  --  2.10* 2.07*   GFRESTIMATED  --  36*  --   --   --  34*  --  31* 32*   KARTIK  --  9.8  --   --   --  9.9  --  9.5 9.7   MAG  --  1.6*  --   --   --  1.8  --  1.8  --    PHOS  --  2.9  --   --   --  1.7*  --   --   --    PROTTOTAL  --   --   --   --   --  6.3*  --  6.0*  --    ALBUMIN  --   --   --   --   --  3.8  --  3.7  --    BILITOTAL  --   --   --   --   --  0.2  --  0.2  --    ALKPHOS  --   --   --   --   --  109  --  106  --    AST  --   --   --   --   --  13  --  12  --    ALT  --   --   --   --   --  6  --  7  --     < > = values in this interval not displayed.     CBC  Recent Labs   Lab 06/10/25  0451 06/09/25  0604 06/08/25  1855 06/06/25  1013   HGB 8.6* 8.9* 8.6* 9.1*   WBC 9.2 5.9 6.0 4.1   RBC 2.76* 2.92* 2.87* 2.94*   HCT 26.7* 28.6* 28.1* 28.5*   MCV 97 98 98 97   MCH 31.2 30.5 30.0 31.0   MCHC 32.2 31.1* 30.6* 31.9   RDW 13.0 12.9 13.1 13.1    272 280 260     INR  Recent Labs   Lab 06/06/25  1013   INR 1.11      ABGNo lab results found in last 7 days.   Urine Studies  Recent Labs   Lab Test 06/09/25  0205 06/06/25  0949 05/21/25  0959 05/17/25  0225 05/20/24  1339   COLOR Light Yellow Light Yellow Yellow Colorless Light Yellow   APPEARANCE Clear Clear Slightly Cloudy* Clear Clear   URINEGLC Negative Negative Negative Negative 200*   URINEBILI Negative Negative Negative Negative Negative   URINEKETONE Negative Negative Negative Negative Negative   SG 1.016 1.019 1.017 1.015 1.010   UBLD Negative Negative Large* Negative Moderate*   URINEPH 6.0 5.5 5.5 6.0 7.5*   PROTEIN Negative 10* 50* Negative 30*   NITRITE Negative Negative Positive* Negative Negative   LEUKEST Negative Negative Large* Negative Negative   RBCU 3*  --  97* 4* 3*   WBCU 2  --  78* 5 3     No lab results found.  PTH  Recent Labs   Lab Test 06/02/25  0839 05/22/25  0700   PTHI 220* 304*     Iron Studies  Recent Labs   Lab Test 06/02/25  0839 05/22/25  0700   IRON 67 63   * 163*   IRONSAT 32 39   ELSIE 1,136* 1,258*       IMAGING:  All imaging studies reviewed by me.

## 2025-06-10 NOTE — PROGRESS NOTES
Transplant Surgery  Inpatient Daily Progress Note  06/10/2025    Assessment & Plan: Glenna Spears is a 34 year old female with recent PMHx significant for ESRD s/p  donor kidney transplantation on 2025 complicated by leak requiring ultrasound-guided perinephric drain placement as well as kidney biopsy by IR on , now complicated by anterior abdominal wall hematoma. Kidney biopsy done on  due to patient having moderate to high-level donor-specific antibodies (DSA) and concern for AMR. 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 She required admission for transplant nephrology consultation as well as transfusion medicine consultation for plasmapheresis. She does not have a urine leak, as her creatinine is the same in her abdominal fluid as the plasma. Cr trended down to 2.4 post transplant then increased to 3.5 (25). On admit Cr 2.1, trending down.    Graft Function:  s/p DCD DDKT +stent 25 (stent removed 25): Creatinine 1.85 (1.95). B/l Cr TBD. UO 2.5L after receiving lasix yesterday. Last US 6/3 with patent vasculature, perinephric collection.     Perinephric collection: Drain placed on . Cr fluid equivalent to serum. Triglyceride fluid 67. Minimal output from drain.  US did not show perinephric fluid collection. Drain removed 25.    Renal artery reconstruction: Plan was for  mg x 3 months.   - Held due to bleed.    Early antibody-mediated rejection: Moderate to high-level donor-specific antibodies (DSAs) against HLA-A1, B8, B60, DR17, and DR52 post transplant.  kidney biopsy preliminary pathology findings showed diffuse C4d positivity without evidence of glomerulitis or peritubular capillaritis, no evidence of cellular mediated rejection. Review of biopsy today to confirm findings. Plan to treat for early AMR with plasmapheresis every other day x 5, IVIG, and solumedrol. Has LUE fistula for PLEX. Treatment started  (#1).  -Transfusion medicine  consulted  -Closure biopsy 4 week. Will need to hold apixaban prior to repeat biopsy  -Repeat DSA after treatment completed, need to wait at least 2 weeks after last IVIG    Abdominal wall hematoma, post kidney biopsy and RLQ drain placement: Subcutaneous fluid collection in the anterior wall decreased in size on US today. Hgb stable.   - Restart ASA 81 mg daily on   - Restart apixaban on      Immunosuppression management: PRA 76 -  high induction risk due to DCD kidney  Maintenance IS:  -MMF: 750 mg BID  -Tacrolimus: Goal level 8-10.  tac level 6.9. Dose increased to 5 mg BID. Check 12 hr level tomorrow.  -Start prednisone 5 mg daily after treatment completed, start .  Early antibody-mediated rejection:  -Patient received  mg on .  -Plasmapheresis for 5 treatments, every other day. (, , ,  OP,  OP)  -IVIg (dose #1), 27g (dose #2-#4), 44.8g (dose #5) to equal 157g total (dose given per insurance coverage for IVIG  -Give Solumedrol 100 mg IV prior to each IVIG dose.    Transplant coordinator: Tammi Cuellar [phone]  Donor type:  DCD  DSA at time of transplant:  No  Ureteral stent: YES  CMV:  Donor + / Recipient +  EBV:  Donor + / Recipient +  Thymoglobulin: 425 mg, 6 mg/kg      Hematology:  Anemia of chronic disease:   Acute blood loss anemia post procedure: Hgb b/l ~ 9 to 10. Hgb 8.6, stable. No transfusion needed.   RIJ non occlusive DVT, provoked by line: RIJ catheter since removed. Plan for DOAC x 3 months for provoked DVT. Apixaban not yet started.  repeat US RUE no change.  - Held start of medication d/t bleed   - Start apixaban on      Neurology:  Acute pain: Acetaminophen PRN, oxycodone PRN. Try lidocaine ointment for pain over hematoma. Heat therapy.   Generalized anxiety disorder, adjustment to medical issues:   -Health psychology consult. Recommend patient be seen OP for treatment VU.   -Psychiatry consult for start medication for VU   Start  Zoloft 25 mg daily x 7 days, then increase to 50 mg daily.   Follow up with PCP for further adjustment. Per psychiatry would eventually increase to 100 mg daily.  -Hydroxyzine 25 mg every 6 hours PRN anxiety  Neuropathy  Depression:  -continue nortriptyline    Cardiorespiratory::  HLD:  -Atorvastatin 10mg QD      GI/Nutrition: History of gastric sleeve.   Diet: Regular  Bowel regimen: Senna BID, PRN MoM, Ondansetron PRN for nausea     Endocrine: No history of T2DM. Goal glucose < 160 mg/dl;   Steroid hyperglycemia: 2/2 solumedrol. Sliding scale insulin ACHS.     Fluid/Electrolytes:   Hypophosphatemia: Replaced with PO phos. Resolved  Hypomagnesemia: Start Mg Oxide 400 mg BID  Hypervolemia: Good response to Lasix 40 mg PO x 1 yesterday. Repeat lasix 40 mg PO today. Monitor daily weight. Nephrology to decide plan for further diuretics.     : Monitor UO     Infectious disease: Afebrile    Recent:  Urine culture + for E.coli: ureteral stent removed and completed treatment course.      MSK:   Post-operative RLE numbness and weakness: Medial right thigh numbness and right knee weakness post-operatively.      Prophylaxis:   - DVT: hold due to bleed  - GI: PPI  - CMV (Valganciclovir x 12 weeks)  - Pneumocystis: Bactrim held by nephrology in setting of hyperkalemia. Pentamidine given 5/20, no Bactrim d/t sulfa allergy, G6PD WNL. Consider switch to dapsone as on 6/20.      Disposition: 7A. Pending insurance coverage for OP IVIG. Possible discharge tomorrow if treatment covered.     LEWIS/Fellow/Resident Provider: Milena Michael PA-C    Faculty: Kvng Agee MD, PhD    _________________________________________________________________  Transplant History: Admitted 6/8/2025 for antibody mediated rejection.  5/17/2025 (Kidney), Postoperative day: 24     Interval History: History is obtained from the patient  Overnight events: Tolerated PLEX/IVIG/SM yesterday. C/o swelling, pain right panus especially uncomfortable. Pain over  "hematoma improved slightly.     Patient open to following up with psychologist outpatient. Discussed plan for transplant coordinator to contact her 2 times per week on Wednesday and Friday, follow up with LEWIS next week, and plan for labs on Monday and Thursday.     ROS:   A 10-point review of systems was negative except as noted above.    Meds:  Current Facility-Administered Medications   Medication Dose Route Frequency Provider Last Rate Last Admin    atorvastatin (LIPITOR) tablet 10 mg  10 mg Oral Daily Cari Cardona MD   10 mg at 06/09/25 0839    insulin aspart (NovoLOG) injection (RAPID ACTING)  1-7 Units Subcutaneous TID AC Milena Michael PA-C   1 Units at 06/09/25 1252    insulin aspart (NovoLOG) injection (RAPID ACTING)  1-5 Units Subcutaneous At Bedtime Milena Michael PA-C        Lidocaine (LIDOCARE) 4 % Patch 1-2 patch  1-2 patch Transdermal Q24h Milena Michael PA-C   2 patch at 06/09/25 1927    mycophenolate (GENERIC EQUIVALENT) capsule 750 mg  750 mg Oral BID Cari Cardona MD   750 mg at 06/09/25 1715    nortriptyline (PAMELOR) capsule 25 mg  25 mg Oral At Bedtime Cari Cardona MD   25 mg at 06/09/25 2228    pantoprazole (PROTONIX) EC tablet 40 mg  40 mg Oral Daily Cari Cardona MD   40 mg at 06/09/25 0839    senna-docusate (SENOKOT-S/PERICOLACE) 8.6-50 MG per tablet 2 tablet  2 tablet Oral BID Cari Cardona MD   2 tablet at 06/09/25 1927    sodium chloride (PF) 0.9% PF flush 3 mL  3 mL Intracatheter Q8H JANE Cari Cardona MD   3 mL at 06/09/25 1300    tacrolimus (GENERIC EQUIVALENT) capsule 5 mg  5 mg Oral BID IS Cari Cardona MD   5 mg at 06/09/25 1715    valGANciclovir (VALCYTE) tablet 450 mg  450 mg Oral Every Other Day Cari Cardona MD           Physical Exam:     Admit Weight: 75.8 kg (167 lb)    Current vitals:   /83 (BP Location: Right arm)   Pulse 82   Temp 97.6  F (36.4  C) (Oral)   Resp 16   Ht 1.727 m (5' 8\")   Wt 78.9 kg (173 lb 14.4 oz)  "  SpO2 98%   BMI 26.44 kg/m           Vital sign ranges:    Temp:  [97.6  F (36.4  C)-98.8  F (37.1  C)] 97.6  F (36.4  C)  Pulse:  [69-96] 82  Resp:  [16] 16  BP: (108-152)/(62-97) 126/83  SpO2:  [98 %-100 %] 98 %  Patient Vitals for the past 24 hrs:   BP Temp Temp src Pulse Resp SpO2 Weight   06/10/25 0628 126/83 97.6  F (36.4  C) Oral 82 16 98 % --   06/10/25 0005 -- -- -- -- -- -- 78.9 kg (173 lb 14.4 oz)   06/09/25 2300 (!) 147/97 98.8  F (37.1  C) Oral 82 16 100 % --   06/09/25 2100 128/68 -- -- 80 16 99 % --   06/09/25 2045 (!) 144/89 -- -- 86 16 98 % --   06/09/25 2030 (!) 148/83 -- -- 88 16 100 % --   06/09/25 2015 -- -- -- -- -- 100 % --   06/09/25 1945 (!) 152/80 -- -- 87 16 100 % --   06/09/25 1930 138/79 -- -- 90 16 100 % --   06/09/25 1927 (!) 143/82 98  F (36.7  C) Oral 86 16 100 % --   06/09/25 1741 138/80 98.3  F (36.8  C) Oral 69 16 100 % --   06/09/25 1634 108/89 98.3  F (36.8  C) Oral 89 16 -- --   06/09/25 1556 135/86 97.8  F (36.6  C) Oral 91 16 -- --   06/09/25 1524 (!) 146/88 97.8  F (36.6  C) Oral 91 16 -- --   06/09/25 1458 (!) 143/80 98.2  F (36.8  C) Oral 86 16 -- --   06/09/25 1424 136/79 98  F (36.7  C) Oral 92 16 -- --   06/09/25 1400 115/62 98.4  F (36.9  C) Oral 94 16 -- --   06/09/25 1300 (!) 148/86 98.4  F (36.9  C) Oral 96 16 -- --   06/09/25 1012 (!) 146/78 98.5  F (36.9  C) Oral 85 16 100 % --     General Appearance: in no apparent distress.   Skin: normal, warm  Heart: well perfused  Lungs: Nonlabored resps on RA  Abdomen: The abdomen is soft, tender RLQ, incision healing well no signs of infection. Edema lower abdomen, right > left.  : no celaya  Extremities: edema: BLE up to thighs  Neurologic: awake, alert and oriented.   LUE fistula +thrill    Data:   CMP  Recent Labs   Lab 06/10/25  0451 06/10/25  0217 06/09/25  1143 06/09/25  0604 06/09/25  0555 06/08/25  1855 06/06/25  1205 06/06/25  1013     --   --  137  --  139  --   --    POTASSIUM 4.6  --   --  4.7  --  4.9   --   --    CHLORIDE 107  --   --  110*  --  111*  --   --    CO2 22  --   --  20*  --  21*  --   --    * 183*   < > 159*   < > 96  --   --    BUN 27.7*  --   --  20.5*  --  21.0*  --   --    CR 1.85*  --   --  1.95*  --  2.10*  --   --    GFRESTIMATED 36*  --   --  34*  --  31*  --   --    KARTIK 9.8  --   --  9.9  --  9.5  --   --    MAG 1.6*  --   --  1.8  --  1.8  --    < >   PHOS 2.9  --   --  1.7*  --   --   --   --    ALBUMIN  --   --   --  3.8  --  3.7  --   --    BILITOTAL  --   --   --  0.2  --  0.2  --   --    ALKPHOS  --   --   --  109  --  106  --   --    AST  --   --   --  13  --  12  --   --    ALT  --   --   --  6  --  7  --   --    FCREAT  --   --   --   --   --   --  2.1  --     < > = values in this interval not displayed.     CBC  Recent Labs   Lab 06/10/25  0451 06/09/25  0604   HGB 8.6* 8.9*   WBC 9.2 5.9    272     COAGS  Recent Labs   Lab 06/06/25  1013   INR 1.11      Urinalysis  Recent Labs   Lab Test 06/09/25  0205 06/06/25  0949 05/21/25  0959   COLOR Light Yellow Light Yellow Yellow   APPEARANCE Clear Clear Slightly Cloudy*   URINEGLC Negative Negative Negative   URINEBILI Negative Negative Negative   URINEKETONE Negative Negative Negative   SG 1.016 1.019 1.017   UBLD Negative Negative Large*   URINEPH 6.0 5.5 5.5   PROTEIN Negative 10* 50*   NITRITE Negative Negative Positive*   LEUKEST Negative Negative Large*   RBCU 3*  --  97*   WBCU 2  --  78*     Virology:  Hepatitis C Antibody   Date Value Ref Range Status   05/17/2025 Nonreactive Nonreactive Final     Comment:     A nonreactive screening test result does not exclude the possibility of exposure to or infection with HCV. Nonreactive screening test results in individuals with prior exposure to HCV may be due to antibody levels below the limit of detection of this assay or lack of reactivity to the HCV antigens used in this assay. Patients with recent HCV infections (<3 months from time of exposure) may have false-negative  HCV antibody results due to the time needed for seroconversion (average of 8 to 9 weeks).     BK Virus DNA IU/mL   Date Value Ref Range Status   06/06/2025 Not Detected Not Detected IU/mL Final   05/17/2025 Not Detected Not Detected IU/mL Final

## 2025-06-10 NOTE — PLAN OF CARE
"Goal Outcome Evaluation:      Plan of Care Reviewed With: patient    Overall Patient Progress: improvingOverall Patient Progress: improving    Outcome Evaluation: VSS, pt anxious    /74 (BP Location: Right arm)   Pulse 98   Temp 98.4  F (36.9  C) (Oral)   Resp 18   Ht 1.727 m (5' 8\")   Wt 78.9 kg (173 lb 14.4 oz)   SpO2 99%   BMI 26.44 kg/m      Shift: 0376-0516  Isolation Status: NA  VS: WDL on RA, afebrile  Neuro: Aox4  Behaviors: Anxious about situation and recovery  BG: AC/HS 98, 100  Labs/Imaging: K 4.6, Cr 1.85, Hgb 8.6  Respiratory: Clear lung sounds, no shortness of breath  Cardiac: regular rhythm/rate  Pain/Nausea: Moderate abdominal/R thigh pain, denied nausea  PRN: Tylenol x1, Atarax x1, Oxycodone x1  Diet: Regular diet, good appetite  LDA: PIV x1  Infusion(s): NA  GI/: BM x1, Voiding well  Skin: Healing abdominal incision, old drain site  Mobility: Independent, ambulated in hallway  Events/Education: psych consult  Plan: PLEX, IVIG tomorrow    "

## 2025-06-10 NOTE — PROGRESS NOTES
"Care Management Follow Up    Length of Stay (days): 2    Expected Discharge Date: 06/13/2025     Concerns to be Addressed: discharge planning     Patient plan of care discussed at interdisciplinary rounds: Yes    Anticipated Discharge Disposition: Home, Outpatient Infusion Services              Anticipated Discharge Services: None  Anticipated Discharge DME: None    Patient/family educated on Medicare website which has current facility and service quality ratings: yes  Education Provided on the Discharge Plan: Yes  Patient/Family in Agreement with the Plan: yes (TBD)    Referrals Placed by CM/SW: External Care Coordination, Outpatient Infusion  Private pay costs discussed: Not applicable    Discussed  Partnership in Safe Discharge Planning  document with patient/family: No     Handoff Completed: No, handoff not indicated or clinically appropriate    Additional Information:  Pt with a medical history significant for kidney transplant on 5/17/2025 s/p kidney biopsy on 6/6/25 concern for [facility] admitted for IVIG and Plasmapheresis treatments. Team inquiring about OP coverage for IVIG and plasmapheresis.     Received email from Beth Barthel:    \"Per [employer]-Availity, no PA is required. Transaction ID: [id number]\".      Please verify if patient meets medical policy: [email or web address]         I.   Initial Review    Plasma exchange may be considered MEDICALLY NECESSARY AND APPROPRIATE for the following indications, when performed by or in consultation with a specialist:    Autoimmune Diseases  Catastrophic antiphospholipid syndrome (CAPS);  Cryoglobulinemia, severe mixed.  Hematologic Conditions  Atypical hemolytic uremic syndrome (aHUS);  HELLP syndrome of pregnancy (characterized by hemolysis [H], elevated liver enzymes [EL], and low platelet [LP] counts);  Hyperviscosity syndromes associated with " monoclonal gammopathies (e.g., multiple myeloma, Waldenström s macroglobulinemia);  Myeloma with acute renal failure (myeloma cast nephropathy);  Thrombotic microangiopathy associated with ticlopidine;  Thrombotic thrombocytopenic purpura (TTP).  Neurologic Conditions  Acute inflammatory demyelinating polyneuropathy (Guillain-Barré syndrome);  Chronic inflammatory demyelinating polyradiculoneuropathy (CIDP);  Multiple sclerosis, with acute central nervous system inflammatory demyelination;  Myasthenia gravis in crisis or as part of preoperative preparation;  N-methyl-D-aspartate (NMDA) receptor antibody encephalitis;  Neuromyelitis optica spectrum disorders, acute disease (excluding maintenance therapy);  Paraproteinemia polyneuropathy; immunoglobulin A and G;  Pediatric acute-onset neuropsychiatric syndrome (PANS)/Pediatric autoimmune neuropsychiatric disorders associated with streptococcal infections (PANDAS) exacerbation;  Progressive multifocal leukoencephalopathy (PML) associated with natalizumab.  Renal Diseases  Antiglomerular basement membrane disease (Goodpasture syndrome);  Antineutrophil cytoplasmic antibody (ANCA)-associated vasculitis (e.g., granulomatosis with polyangiitis [also known as Wegener s granulomatosis], microscopic polyangiitis) with associated renal failure;  Dense deposit disease with factor H deficiency and/or elevated C3 nephritic factor.  Transplantation  ABO-incompatible hematopoietic stem cell transplantation;  ABO-incompatible solid organ transplantation:  heart (infants);  kidney;  Focal segmental glomerulosclerosis after renal transplant;  Renal transplantation: antibody-mediated rejection or human leukocyte antigen desensitization;  Liver transplantation (desensitization, living donor).  Genetic Disorders  Joselito disease (fulminant).   II.   Renewal Review  Plasma exchange may be considered MEDICALLY NECESSARY AND APPROPRIATE when ALL of the following criteria are met:    The  patient has been previously approved for therapy through the initial review process; AND  The renewal request is for the same indication previously approved; AND  The patient has shown positive clinical response from previous plasma exchange treatment (e.g., reduced number and/or severity of infections, decreased use/elimination of prophylactic antibiotics, functional improvement).  III.  Experimental/Investigative Uses    Plasma exchange is considered EXPERIMENTAL/INVESTIGATIVE for all other indications, including but not limited to the following conditions, due to the lack of clinical evidence demonstrating an impact on improved health outcomes:    ABO-incompatible solid organ transplant: liver;  Acute disseminated encephalomyelitis;  Alzheimer s disease;  Amyotrophic lateral sclerosis;  Antineutrophil cytoplasmic antibody (ANCA)-associated rapidly progressive glomerulonephritis (granulomatosis with polyangiitis, microscopic polyangiitis) without renal failure;  Aplastic anemia;  Asthma;  Autoimmune hemolytic anemia; warm autoimmune hemolytic anemia; cold agglutinin disease;  Chronic fatigue syndrome;  Coagulation factor inhibitors;  Dermatomyositis and polymyositis;  Focal segmental glomerulosclerosis (other than after renal transplant);  Heart transplant rejection treatment;  Hemolytic uremic syndrome, typical (diarrheal-related);  Idiopathic thrombocytopenic purpura (ITP), refractory or nonrefractory;  Inclusion body myositis;  Lambert-Eaton myasthenic syndrome;  Multiple sclerosis with chronic progressive or relapsing remitting course;  Overdose and poisoning (e.g., mushroom poisoning);  Paraneoplastic syndromes;  Paraproteinemia polyneuropathy IgM;  Pemphigus vulgaris;  Phytanic acid storage disease (Refsum disease);  POEMS (polyneuropathy, organomegaly, endocrinopathy, M protein, skin changes);  Psoriasis;  Red blood cell alloimmunization in pregnancy;  Rheumatoid arthritis;  Sepsis;  Scleroderma (systemic  sclerosis);  Stiff person syndrome;  Sydenham chorea;  Systemic lupus erythematosus (including systemic lupus erythematosus nephritis);  Thyrotoxicosis.     Above email was forwarded to Milena Elizabeth for review.  Milena updated RNCC that per email pt would meet criteria for OP Plasmapheresis coverage. Per Milena pending OP IVIG approval/coverage anticipate pt may be medically ready for discharge on Wednesday 6/11.      Confirmation of OP IVIG coverage/approval  is still pending.      Next Steps:   Follow up on OP IVIG Coverage  Follow for discharge planning  Coordinate OP IVIG and Plasmapheresis appointments when once OP coverage confirmed and pt is medically cleared for discharge.     Maggi Wolfe, RN BSN, PHN, ACM-RN  7A Nurse Care Coordinator    [facility] Acute Care Management  Phone: [phone]  Available on Lot18  7A SOT RN[facility]  Available weekend/holiday's on Lot18 7A SOT RNCC    6/10/2025

## 2025-06-11 ENCOUNTER — APPOINTMENT (OUTPATIENT)
Dept: LAB | Facility: CLINIC | Age: 35
End: 2025-06-11
Payer: COMMERCIAL

## 2025-06-11 VITALS
RESPIRATION RATE: 18 BRPM | TEMPERATURE: 97.8 F | BODY MASS INDEX: 25.81 KG/M2 | DIASTOLIC BLOOD PRESSURE: 70 MMHG | OXYGEN SATURATION: 100 % | SYSTOLIC BLOOD PRESSURE: 112 MMHG | HEIGHT: 68 IN | HEART RATE: 97 BPM | WEIGHT: 170.3 LBS

## 2025-06-11 LAB
ANION GAP SERPL CALCULATED.3IONS-SCNC: 5 MMOL/L (ref 7–15)
BLD PROD TYP BPU: NORMAL
BLOOD COMPONENT TYPE: NORMAL
BUN SERPL-MCNC: 27.5 MG/DL (ref 6–20)
CALCIUM SERPL-MCNC: 9.5 MG/DL (ref 8.8–10.4)
CHLORIDE SERPL-SCNC: 107 MMOL/L (ref 98–107)
CODING SYSTEM: NORMAL
CREAT SERPL-MCNC: 1.83 MG/DL (ref 0.51–0.95)
EGFRCR SERPLBLD CKD-EPI 2021: 37 ML/MIN/1.73M2
ERYTHROCYTE [DISTWIDTH] IN BLOOD BY AUTOMATED COUNT: 13.2 % (ref 10–15)
FIBRINOGEN PPP-MCNC: 255 MG/DL (ref 170–510)
GLUCOSE BLDC GLUCOMTR-MCNC: 87 MG/DL (ref 70–99)
GLUCOSE BLDC GLUCOMTR-MCNC: 88 MG/DL (ref 70–99)
GLUCOSE SERPL-MCNC: 79 MG/DL (ref 70–99)
HCO3 SERPL-SCNC: 27 MMOL/L (ref 22–29)
HCT VFR BLD AUTO: 28.2 % (ref 35–47)
HGB BLD-MCNC: 8.8 G/DL (ref 11.7–15.7)
INR PPP: 0.98 (ref 0.85–1.15)
ISSUE DATE AND TIME: NORMAL
MAGNESIUM SERPL-MCNC: 1.6 MG/DL (ref 1.7–2.3)
MCH RBC QN AUTO: 30.1 PG (ref 26.5–33)
MCHC RBC AUTO-ENTMCNC: 31.2 G/DL (ref 31.5–36.5)
MCV RBC AUTO: 97 FL (ref 78–100)
PHOSPHATE SERPL-MCNC: 2.2 MG/DL (ref 2.5–4.5)
PLATELET # BLD AUTO: 260 10E3/UL (ref 150–450)
POTASSIUM SERPL-SCNC: 4.9 MMOL/L (ref 3.4–5.3)
PROTHROMBIN TIME: 13.3 SECONDS (ref 11.8–14.8)
RBC # BLD AUTO: 2.92 10E6/UL (ref 3.8–5.2)
SODIUM SERPL-SCNC: 139 MMOL/L (ref 135–145)
UNIT ABO/RH: NORMAL
UNIT NUMBER: NORMAL
UNIT STATUS: NORMAL
UNIT TYPE ISBT: 6200
WBC # BLD AUTO: 5.4 10E3/UL (ref 4–11)

## 2025-06-11 PROCEDURE — 250N000011 HC RX IP 250 OP 636: Mod: JW | Performed by: NURSE PRACTITIONER

## 2025-06-11 PROCEDURE — P9059 PLASMA, FRZ BETWEEN 8-24HOUR: HCPCS | Performed by: PATHOLOGY

## 2025-06-11 PROCEDURE — 99233 SBSQ HOSP IP/OBS HIGH 50: CPT | Mod: 24 | Performed by: NURSE PRACTITIONER

## 2025-06-11 PROCEDURE — 250N000009 HC RX 250: Performed by: PATHOLOGY

## 2025-06-11 PROCEDURE — 83735 ASSAY OF MAGNESIUM: CPT

## 2025-06-11 PROCEDURE — 250N000011 HC RX IP 250 OP 636: Performed by: PATHOLOGY

## 2025-06-11 PROCEDURE — P9045 ALBUMIN (HUMAN), 5%, 250 ML: HCPCS | Performed by: PATHOLOGY

## 2025-06-11 PROCEDURE — 85610 PROTHROMBIN TIME: CPT | Performed by: PATHOLOGY

## 2025-06-11 PROCEDURE — P9059 PLASMA, FRZ BETWEEN 8-24HOUR: HCPCS

## 2025-06-11 PROCEDURE — 250N000013 HC RX MED GY IP 250 OP 250 PS 637: Performed by: NURSE PRACTITIONER

## 2025-06-11 PROCEDURE — 250N000013 HC RX MED GY IP 250 OP 250 PS 637

## 2025-06-11 PROCEDURE — 250N000012 HC RX MED GY IP 250 OP 636 PS 637

## 2025-06-11 PROCEDURE — 250N000013 HC RX MED GY IP 250 OP 250 PS 637: Performed by: TRANSPLANT SURGERY

## 2025-06-11 PROCEDURE — 250N000013 HC RX MED GY IP 250 OP 250 PS 637: Performed by: PHYSICIAN ASSISTANT

## 2025-06-11 PROCEDURE — 82310 ASSAY OF CALCIUM: CPT | Performed by: PHYSICIAN ASSISTANT

## 2025-06-11 PROCEDURE — 36415 COLL VENOUS BLD VENIPUNCTURE: CPT | Performed by: PHYSICIAN ASSISTANT

## 2025-06-11 PROCEDURE — 250N000012 HC RX MED GY IP 250 OP 636 PS 637: Performed by: PHYSICIAN ASSISTANT

## 2025-06-11 PROCEDURE — 85384 FIBRINOGEN ACTIVITY: CPT | Performed by: PATHOLOGY

## 2025-06-11 PROCEDURE — 36514 APHERESIS PLASMA: CPT

## 2025-06-11 PROCEDURE — 6A550Z3 PHERESIS OF PLASMA, SINGLE: ICD-10-PCS | Performed by: PATHOLOGY

## 2025-06-11 PROCEDURE — 85014 HEMATOCRIT: CPT | Performed by: PHYSICIAN ASSISTANT

## 2025-06-11 PROCEDURE — 84100 ASSAY OF PHOSPHORUS: CPT | Performed by: PHYSICIAN ASSISTANT

## 2025-06-11 RX ORDER — DIPHENHYDRAMINE HYDROCHLORIDE 50 MG/ML
25 INJECTION, SOLUTION INTRAMUSCULAR; INTRAVENOUS
Status: CANCELLED
Start: 2025-06-13

## 2025-06-11 RX ORDER — FAMOTIDINE 20 MG/1
20 TABLET, FILM COATED ORAL 2 TIMES DAILY PRN
Status: SHIPPED
Start: 2025-06-11

## 2025-06-11 RX ORDER — OXYCODONE HYDROCHLORIDE 5 MG/1
5-10 TABLET ORAL EVERY 8 HOURS PRN
Qty: 20 TABLET | Refills: 0 | Status: CANCELLED | OUTPATIENT
Start: 2025-06-11

## 2025-06-11 RX ORDER — ALBUTEROL SULFATE 90 UG/1
1-2 INHALANT RESPIRATORY (INHALATION)
Status: DISCONTINUED | OUTPATIENT
Start: 2025-06-11 | End: 2025-06-11 | Stop reason: HOSPADM

## 2025-06-11 RX ORDER — FAMOTIDINE 20 MG/1
20 TABLET, FILM COATED ORAL 2 TIMES DAILY PRN
Start: 2025-06-11

## 2025-06-11 RX ORDER — TACROLIMUS 1 MG/1
1 CAPSULE ORAL 2 TIMES DAILY
Qty: 180 CAPSULE | Refills: 11 | Status: ACTIVE | OUTPATIENT
Start: 2025-06-11 | End: 2025-06-13

## 2025-06-11 RX ORDER — HEPARIN SODIUM,PORCINE 10 UNIT/ML
5-20 VIAL (ML) INTRAVENOUS DAILY PRN
Status: CANCELLED | OUTPATIENT
Start: 2025-06-13

## 2025-06-11 RX ORDER — METHYLPREDNISOLONE SODIUM SUCCINATE 40 MG/ML
40 INJECTION INTRAMUSCULAR; INTRAVENOUS
Status: DISCONTINUED | OUTPATIENT
Start: 2025-06-11 | End: 2025-06-11 | Stop reason: HOSPADM

## 2025-06-11 RX ORDER — OXYCODONE HYDROCHLORIDE 5 MG/1
5-10 TABLET ORAL EVERY 8 HOURS PRN
Qty: 20 TABLET | Refills: 0 | Status: SHIPPED | OUTPATIENT
Start: 2025-06-11 | End: 2025-06-16

## 2025-06-11 RX ORDER — ALBUTEROL SULFATE 90 UG/1
1-2 INHALANT RESPIRATORY (INHALATION)
Status: CANCELLED
Start: 2025-06-13

## 2025-06-11 RX ORDER — MAGNESIUM SULFATE HEPTAHYDRATE 40 MG/ML
2 INJECTION, SOLUTION INTRAVENOUS ONCE
Status: COMPLETED | OUTPATIENT
Start: 2025-06-11 | End: 2025-06-11

## 2025-06-11 RX ORDER — SERTRALINE HYDROCHLORIDE 25 MG/1
TABLET, FILM COATED ORAL
Qty: 65 TABLET | Refills: 0 | Status: SHIPPED | OUTPATIENT
Start: 2025-06-12 | End: 2025-07-17

## 2025-06-11 RX ORDER — MAGNESIUM OXIDE 400 MG/1
400 TABLET ORAL 2 TIMES DAILY
Qty: 60 TABLET | Refills: 11 | Status: SHIPPED | OUTPATIENT
Start: 2025-06-11

## 2025-06-11 RX ORDER — HEPARIN SODIUM (PORCINE) LOCK FLUSH IV SOLN 100 UNIT/ML 100 UNIT/ML
5 SOLUTION INTRAVENOUS
Status: CANCELLED | OUTPATIENT
Start: 2025-06-13

## 2025-06-11 RX ORDER — ASPIRIN 81 MG/1
81 TABLET ORAL DAILY
Status: DISCONTINUED | OUTPATIENT
Start: 2025-06-12 | End: 2025-06-11 | Stop reason: HOSPADM

## 2025-06-11 RX ORDER — METHYLPREDNISOLONE SODIUM SUCCINATE 125 MG/2ML
100 INJECTION INTRAMUSCULAR; INTRAVENOUS ONCE
Status: COMPLETED | OUTPATIENT
Start: 2025-06-11 | End: 2025-06-11

## 2025-06-11 RX ORDER — DIPHENHYDRAMINE HYDROCHLORIDE 50 MG/ML
50 INJECTION, SOLUTION INTRAMUSCULAR; INTRAVENOUS
Status: CANCELLED
Start: 2025-06-13

## 2025-06-11 RX ORDER — DIPHENHYDRAMINE HYDROCHLORIDE 50 MG/ML
50 INJECTION, SOLUTION INTRAMUSCULAR; INTRAVENOUS
Status: DISCONTINUED | OUTPATIENT
Start: 2025-06-11 | End: 2025-06-11 | Stop reason: HOSPADM

## 2025-06-11 RX ORDER — CALCIUM CARBONATE 500 MG/1
2 TABLET, CHEWABLE ORAL EVERY 6 HOURS PRN
Status: SHIPPED
Start: 2025-06-11

## 2025-06-11 RX ORDER — MEPERIDINE HYDROCHLORIDE 25 MG/ML
25 INJECTION INTRAMUSCULAR; INTRAVENOUS; SUBCUTANEOUS
Refills: 0 | Status: DISCONTINUED | OUTPATIENT
Start: 2025-06-11 | End: 2025-06-11 | Stop reason: HOSPADM

## 2025-06-11 RX ORDER — CALCIUM CARBONATE 500 MG/1
2 TABLET, CHEWABLE ORAL EVERY 6 HOURS PRN
Start: 2025-06-11

## 2025-06-11 RX ORDER — HUMAN IMMUNOGLOBULIN G 20 G/200ML
25 LIQUID INTRAVENOUS ONCE
Status: COMPLETED | OUTPATIENT
Start: 2025-06-11 | End: 2025-06-11

## 2025-06-11 RX ORDER — AMOXICILLIN 250 MG
2 CAPSULE ORAL 2 TIMES DAILY PRN
Status: SHIPPED
Start: 2025-06-11

## 2025-06-11 RX ORDER — PANTOPRAZOLE SODIUM 40 MG/1
40 TABLET, DELAYED RELEASE ORAL DAILY
Qty: 30 TABLET | Refills: 11 | OUTPATIENT
Start: 2025-06-12

## 2025-06-11 RX ORDER — ASPIRIN 81 MG/1
81 TABLET, COATED ORAL DAILY
Qty: 30 TABLET | Refills: 11 | OUTPATIENT
Start: 2025-06-12

## 2025-06-11 RX ORDER — EPINEPHRINE 1 MG/ML
0.3 INJECTION, SOLUTION, CONCENTRATE INTRAVENOUS EVERY 5 MIN PRN
Status: CANCELLED | OUTPATIENT
Start: 2025-06-13

## 2025-06-11 RX ORDER — LIDOCAINE 4 G/G
2 PATCH TOPICAL EVERY 24 HOURS
Qty: 20 PATCH | Refills: 11 | Status: SHIPPED | OUTPATIENT
Start: 2025-06-11

## 2025-06-11 RX ORDER — ALBUMIN HUMAN 25 %
750 INTRAVENOUS SOLUTION INTRAVENOUS
Status: COMPLETED | OUTPATIENT
Start: 2025-06-11 | End: 2025-06-11

## 2025-06-11 RX ORDER — CALCIUM GLUCONATE 100 MG/ML
AMPUL (ML) INTRAVENOUS
Status: COMPLETED | OUTPATIENT
Start: 2025-06-11 | End: 2025-06-11

## 2025-06-11 RX ORDER — HYDROXYZINE HYDROCHLORIDE 25 MG/1
25 TABLET, FILM COATED ORAL EVERY 6 HOURS PRN
Qty: 20 TABLET | Refills: 11 | Status: SHIPPED | OUTPATIENT
Start: 2025-06-11

## 2025-06-11 RX ORDER — METHYLPREDNISOLONE SODIUM SUCCINATE 40 MG/ML
40 INJECTION INTRAMUSCULAR; INTRAVENOUS
Status: CANCELLED
Start: 2025-06-13

## 2025-06-11 RX ORDER — MEPERIDINE HYDROCHLORIDE 25 MG/ML
25 INJECTION INTRAMUSCULAR; INTRAVENOUS; SUBCUTANEOUS
Status: CANCELLED | OUTPATIENT
Start: 2025-06-13

## 2025-06-11 RX ORDER — ALBUTEROL SULFATE 0.83 MG/ML
2.5 SOLUTION RESPIRATORY (INHALATION)
Status: CANCELLED | OUTPATIENT
Start: 2025-06-13

## 2025-06-11 RX ORDER — VALGANCICLOVIR 450 MG/1
450 TABLET, FILM COATED ORAL DAILY
Status: ACTIVE
Start: 2025-06-12

## 2025-06-11 RX ORDER — METHYLPREDNISOLONE SODIUM SUCCINATE 125 MG/2ML
100 INJECTION INTRAMUSCULAR; INTRAVENOUS
Status: CANCELLED | OUTPATIENT
Start: 2025-06-15

## 2025-06-11 RX ORDER — PANTOPRAZOLE SODIUM 40 MG/1
40 TABLET, DELAYED RELEASE ORAL DAILY
Qty: 30 TABLET | Refills: 1 | Status: SHIPPED | OUTPATIENT
Start: 2025-06-11

## 2025-06-11 RX ORDER — ACETAMINOPHEN 325 MG/1
650 TABLET ORAL ONCE
Status: COMPLETED | OUTPATIENT
Start: 2025-06-11 | End: 2025-06-11

## 2025-06-11 RX ORDER — TACROLIMUS 1 MG/1
1 CAPSULE ORAL 2 TIMES DAILY
Qty: 180 CAPSULE | Refills: 11 | Status: CANCELLED | OUTPATIENT
Start: 2025-06-11

## 2025-06-11 RX ORDER — TACROLIMUS 5 MG/1
5 CAPSULE ORAL 2 TIMES DAILY
Status: ACTIVE
Start: 2025-06-11 | End: 2025-06-13

## 2025-06-11 RX ORDER — DIPHENHYDRAMINE HYDROCHLORIDE 50 MG/ML
25 INJECTION, SOLUTION INTRAMUSCULAR; INTRAVENOUS
Status: DISCONTINUED | OUTPATIENT
Start: 2025-06-11 | End: 2025-06-11 | Stop reason: HOSPADM

## 2025-06-11 RX ORDER — PREDNISONE 5 MG/1
5 TABLET ORAL DAILY
Qty: 30 TABLET | Refills: 11 | Status: SHIPPED | OUTPATIENT
Start: 2025-06-19

## 2025-06-11 RX ORDER — ALBUTEROL SULFATE 0.83 MG/ML
2.5 SOLUTION RESPIRATORY (INHALATION)
Status: DISCONTINUED | OUTPATIENT
Start: 2025-06-11 | End: 2025-06-11 | Stop reason: HOSPADM

## 2025-06-11 RX ORDER — ASPIRIN 81 MG/1
81 TABLET, COATED ORAL DAILY
Qty: 30 TABLET | Refills: 11 | Status: SHIPPED | OUTPATIENT
Start: 2025-06-12

## 2025-06-11 RX ADMIN — METHYLPREDNISOLONE SODIUM SUCCINATE 100 MG: 125 INJECTION, POWDER, FOR SOLUTION INTRAMUSCULAR; INTRAVENOUS at 14:23

## 2025-06-11 RX ADMIN — PANTOPRAZOLE SODIUM 40 MG: 40 TABLET, DELAYED RELEASE ORAL at 07:43

## 2025-06-11 RX ADMIN — TACROLIMUS 6 MG: 5 CAPSULE ORAL at 07:43

## 2025-06-11 RX ADMIN — OXYCODONE HYDROCHLORIDE 10 MG: 10 TABLET ORAL at 06:07

## 2025-06-11 RX ADMIN — HUMAN IMMUNOGLOBULIN G 25 G: 20 LIQUID INTRAVENOUS at 15:00

## 2025-06-11 RX ADMIN — MAGNESIUM SULFATE HEPTAHYDRATE 2 G: 2 INJECTION, SOLUTION INTRAVENOUS at 12:53

## 2025-06-11 RX ADMIN — VALGANCICLOVIR 450 MG: 450 TABLET, FILM COATED ORAL at 07:43

## 2025-06-11 RX ADMIN — ALBUMIN HUMAN 750 ML: 0.05 INJECTION, SOLUTION INTRAVENOUS at 09:35

## 2025-06-11 RX ADMIN — ATORVASTATIN CALCIUM 10 MG: 10 TABLET, FILM COATED ORAL at 07:43

## 2025-06-11 RX ADMIN — HYDROXYZINE HYDROCHLORIDE 25 MG: 25 TABLET, FILM COATED ORAL at 02:39

## 2025-06-11 RX ADMIN — SERTRALINE HYDROCHLORIDE 25 MG: 25 TABLET ORAL at 07:42

## 2025-06-11 RX ADMIN — ACETAMINOPHEN 650 MG: 325 TABLET ORAL at 14:22

## 2025-06-11 RX ADMIN — OXYCODONE HYDROCHLORIDE 10 MG: 10 TABLET ORAL at 14:09

## 2025-06-11 RX ADMIN — MAGNESIUM OXIDE TAB 400 MG (241.3 MG ELEMENTAL MG) 400 MG: 400 (241.3 MG) TAB at 07:42

## 2025-06-11 RX ADMIN — MYCOPHENOLATE MOFETIL 750 MG: 250 CAPSULE ORAL at 07:43

## 2025-06-11 RX ADMIN — ANTICOAGULANT CITRATE DEXTROSE SOLUTION FORMULA A 541 ML: 12.25; 11; 3.65 SOLUTION INTRAVENOUS at 09:35

## 2025-06-11 RX ADMIN — SENNOSIDES AND DOCUSATE SODIUM 2 TABLET: 50; 8.6 TABLET ORAL at 07:47

## 2025-06-11 RX ADMIN — FUROSEMIDE 40 MG: 40 TABLET ORAL at 07:42

## 2025-06-11 RX ADMIN — CALCIUM GLUCONATE 6 G: 98 INJECTION, SOLUTION INTRAVENOUS at 09:35

## 2025-06-11 RX ADMIN — HYDROXYZINE HYDROCHLORIDE 25 MG: 25 TABLET, FILM COATED ORAL at 09:27

## 2025-06-11 ASSESSMENT — ACTIVITIES OF DAILY LIVING (ADL)
ADLS_ACUITY_SCORE: 32
ADLS_ACUITY_SCORE: 28
ADLS_ACUITY_SCORE: 32
ADLS_ACUITY_SCORE: 32
ADLS_ACUITY_SCORE: 28
ADLS_ACUITY_SCORE: 32
ADLS_ACUITY_SCORE: 28
ADLS_ACUITY_SCORE: 32
ADLS_ACUITY_SCORE: 28
ADLS_ACUITY_SCORE: 28
ADLS_ACUITY_SCORE: 32

## 2025-06-11 NOTE — PLAN OF CARE
Goal Outcome Evaluation:      Plan of Care Reviewed With: patient, parent    Overall Patient Progress: improvingOverall Patient Progress: improving    Outcome Evaluation: 15:00 - 19:00 Pt had IVIG intiated prior to writer starting sabino. AVS printed and reviewed with pt and her mother. Pt plans on discharging to home this evening once IVIG is completed. Pt reiterated understnading of discharge isntructions via teach back method. IV to be removed once IVIG compelted

## 2025-06-11 NOTE — PROGRESS NOTES
Transplant Surgery  Inpatient Daily Progress Note  2025    Assessment & Plan: Glenna Spears is a 34 year old female with recent PMHx significant for ESRD s/p  donor kidney transplantation on 2025 complicated by leak requiring ultrasound-guided perinephric drain placement as well as kidney biopsy by IR on , now complicated by anterior abdominal wall hematoma. Kidney biopsy done on  due to patient having moderate to high-level donor-specific antibodies (DSA) and concern for AMR. 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 She required admission for transplant nephrology consultation as well as transfusion medicine consultation for plasmapheresis. She does not have a urine leak, as her creatinine is the same in her abdominal fluid as the plasma. Cr trended down to 2.4 post transplant then increased to 3.5 (25). On admit Cr 2.1, trending down.    Today:   - #2 PLEX/IV Ig   - Tentative plan for discharge today or tomorrow pending insurance authorization for OP PLEX coverage    - IV magnesium 2 grams x 1     Graft Function:  s/p DCD DDKT +stent 25 (stent removed 25): Creatinine 1.83 (1.85). Baseline Cr TBD. UO 1.9 L yesterday. Last US 6/3 with patent vasculature, perinephric collection.     Perinephric collection: Drain placed on . Cr fluid equivalent to serum. Triglyceride fluid 67. Minimal output from drain.  US did not show perinephric fluid collection. Drain removed 25.    Renal artery reconstruction: Plan was for  mg x 3 months.   - Held due to abdominal wall hematoma.     Early antibody-mediated rejection: Moderate to high-level donor-specific antibodies (DSAs) against HLA-A1, B8, B60, DR17, and DR52 post transplant.  kidney biopsy preliminary pathology findings showed diffuse C4d positivity without evidence of glomerulitis or peritubular capillaritis, no evidence of cellular mediated rejection.    - Treat early AbMR with PLEX/IV Ig every other  day x 5 via LUE fistula (see below)    - Transfusion medicine consulted   - Closure biopsy 4 week. Will need to hold apixaban prior to repeat biopsy   - Repeat DSA after treatment completed, need to wait at least 2 weeks after last IVIG    Abdominal wall hematoma, post kidney biopsy and RLQ drain placement: Subcutaneous fluid collection in the anterior wall decreased in size on US 2025. Hgb stable.   - Restart ASA 81 mg daily on   - Initiate apixaban on      Immunosuppression management: PRA 76 -  high induction risk due to DCD kidney  Maintenance IS:   - MMF: 750 mg BID   - Tacrolimus: Goal level 8-10    - Prednisone 5 mg daily after treatment completed, start   Early AbMR Rejection Treatment:   -  mg on    - Plasmapheresis for 5 treatments (, , ,  OP,  OP)   - timing pending OP insurance authorization    - IVIg (dose #1), 25g (dose #2-#4), 50g (dose #5) to equal 155g total (dose given per insurance coverage for IVIG)   - Solumedrol 100 mg IV prior to each IVIG dose.    Transplant coordinator: Tammi Cuellar [phone]  Donor type:  DCD  DSA at time of transplant:  No  Ureteral stent: YES  CMV:  Donor + / Recipient +  EBV:  Donor + / Recipient +  Thymoglobulin: 425 mg, 6 mg/kg      Hematology:  Anemia of chronic disease:   Acute blood loss anemia post procedure: Hgb b/l ~ 9 to 10. Hgb 8.6, stable. No transfusion needed.   RIJ non occlusive DVT, provoked by line: RIJ catheter since removed. Hematology and Oncology consulted on 6/3/2025 with recommendation for therapeutic anticoagulation DOAC (versus Warfarin based on renal function and medication interactions) x 3 months for provoked DVT. Apixaban not yet started.  repeat US RUE no change.  - Hold initiation of apixaban d/t abdominal wall hematoma   - Plan to start apixaban on      Neurology:  Acute pain:    - PRN Tylenol    - PRN oxycodone, wean as able    - Lidoderm patches   - Heat PRN  Generalized anxiety  disorder, adjustment to medical issues:   -Health psychology consult. Recommend patient be seen OP for treatment VU.   -Psychiatry consult for start medication for VU    - Zoloft 25 mg daily x 7 days initiated 6/10, increase to 50 mg daily on 6/17.    - Follow up with PCP for further adjustment; Per psychiatry would eventually increase to 100 mg daily.  -Hydroxyzine 25 mg every 6 hours PRN anxiety  Neuropathy  Depression:  - Continue nortriptyline    Cardiorespiratory:  HLD:  - Atorvastatin 10mg QD      GI/Nutrition: History of gastric sleeve.   Diet: Regular  Bowel regimen: Senna BID, PRN MoM, Ondansetron PRN for nausea     Endocrine:   Steroid hyperglycemia:    - Minimal use, discontinue sliding scale insulin.      Fluid/Electrolytes:   Hypophosphatemia: Supplement ordered.   Hypomagnesemia: Mg Oxide 400 mg BID. Magnesium 2 grams IV x 1 6/11.   Hypervolemia: Monitor daily weight. Lasix 40 mg PO daily.      : Monitor UOP.      Infectious disease: Afebrile.    Recent:  Urine culture + for E.coli: ureteral stent removed and completed treatment course.      MSK:   Post-operative RLE numbness and weakness: Medial right thigh numbness and right knee weakness post-operatively.    - Continue OP PT at discharge.      Prophylaxis:   - DVT: hold chemoprophylaxis due to abdominal wall hematoma, ambulation.   - GI: PPI  - CMV (Valganciclovir x 12 weeks)  - Pneumocystis: Bactrim held by nephrology in setting of hyperkalemia. Pentamidine given 5/20, no Bactrim d/t sulfa allergy, G6PD WNL. Consider switch to dapsone as on 6/20.      Disposition: 7A. Insurance approval for today; may discharge later today.     LEWIS/Fellow/Resident Provider:   RAFAEL Son, CNP  Adult Solid Organ Transplant   Contact: Vocera Web Console    Faculty: Kvng Agee MD, PhD    _________________________________________________________________  Transplant History: Admitted 6/8/2025 for antibody mediated rejection.  5/17/2025 (Kidney),  Postoperative day: 25     Interval History: History is obtained from the patient  Overnight events: No acute events, no specific complaints.     ROS:   A 10-point review of systems was negative except as noted above.    Meds:  Current Facility-Administered Medications   Medication Dose Route Frequency Provider Last Rate Last Admin    albumin human 5 % injection 750 mL  750 mL Apheresis During apheresis procedure Tana Beltran MD        Anticoagulant Citrate Dextrose Formula A at ratio of  1:10 with blood (Apheresis Center)   Apheresis During Apheresis (from stock) Tana Betlran MD        atorvastatin (LIPITOR) tablet 10 mg  10 mg Oral Daily Cari Cardona MD   10 mg at 06/11/25 0743    calcium gluconate with plasma (administered by Apheresis Staff ONLY)   Intravenous During Apheresis (from stock) Tana Beltran MD        furosemide (LASIX) tablet 40 mg  40 mg Oral Daily Milena Michael PA-C   40 mg at 06/11/25 0742    insulin aspart (NovoLOG) injection (RAPID ACTING)  1-7 Units Subcutaneous TID AC Milena Michael PA-C   1 Units at 06/09/25 1252    insulin aspart (NovoLOG) injection (RAPID ACTING)  1-5 Units Subcutaneous At Bedtime Milena Michael PA-C        Lidocaine (LIDOCARE) 4 % Patch 1-2 patch  1-2 patch Transdermal Q24h Milena Michael PA-C   2 patch at 06/10/25 1944    magnesium oxide (MAG-OX) tablet 400 mg  400 mg Oral BID Milena Michael PA-C   400 mg at 06/11/25 0742    mycophenolate (GENERIC EQUIVALENT) capsule 750 mg  750 mg Oral BID Cari Cardona MD   750 mg at 06/11/25 0743    nortriptyline (PAMELOR) capsule 25 mg  25 mg Oral At Bedtime Cari Cardona MD   25 mg at 06/10/25 2145    pantoprazole (PROTONIX) EC tablet 40 mg  40 mg Oral Daily Cari Cardona MD   40 mg at 06/11/25 0743    [START ON 6/19/2025] predniSONE (DELTASONE) tablet 5 mg  5 mg Oral Daily Michael, Milena Leti, PA-C        senna-docusate (SENOKOT-S/PERICOLACE) 8.6-50 MG per tablet 2 tablet  " 2 tablet Oral BID Cari Cardona MD   2 tablet at 06/11/25 0747    sertraline (ZOLOFT) tablet 25 mg  25 mg Oral Daily Milena Michael PA-C   25 mg at 06/11/25 0742    Followed by    [START ON 6/17/2025] sertraline (ZOLOFT) tablet 50 mg  50 mg Oral Daily Milena Michael PA-C        sodium chloride (PF) 0.9% PF flush 3 mL  3 mL Intracatheter Q8H JANE Cari Cardona MD   3 mL at 06/11/25 0607    tacrolimus (GENERIC EQUIVALENT) capsule 6 mg  6 mg Oral BID IS Milena Michael PA-C   6 mg at 06/11/25 0743    valGANciclovir (VALCYTE) tablet 450 mg  450 mg Oral Daily Ted, Lukas Coto MD   450 mg at 06/11/25 0743       Physical Exam:     Admit Weight: 75.8 kg (167 lb)    Current vitals:   BP (!) 147/87 (BP Location: Right arm)   Pulse 82   Temp 98.6  F (37  C) (Oral)   Resp 18   Ht 1.727 m (5' 8\")   Wt 77.2 kg (170 lb 4.8 oz)   SpO2 100%   BMI 25.89 kg/m         Vital sign ranges:    Temp:  [97.7  F (36.5  C)-98.6  F (37  C)] 98.6  F (37  C)  Pulse:  [] 82  Resp:  [16-18] 18  BP: (124-147)/(74-87) 147/87  SpO2:  [99 %-100 %] 100 %  Patient Vitals for the past 24 hrs:   BP Temp Temp src Pulse Resp SpO2 Weight   06/11/25 0552 (!) 147/87 98.6  F (37  C) Oral 82 18 100 % 77.2 kg (170 lb 4.8 oz)   06/11/25 0239 -- -- -- -- -- -- 78.1 kg (172 lb 1.6 oz)   06/10/25 2147 136/77 98.6  F (37  C) Oral 91 16 99 % --   06/10/25 1823 124/79 98.4  F (36.9  C) Oral 100 18 100 % --   06/10/25 1426 126/74 98.4  F (36.9  C) Oral 98 18 99 % --   06/10/25 1030 (!) 142/86 97.7  F (36.5  C) Oral 95 18 100 % --     General Appearance: In no apparent distress.   Skin: Normal, warm  Heart: Appears well perfused  Lungs: Nonlabored resps on RA  Abdomen: The abdomen is soft, tender RLQ, incision healing well no signs of infection. Edema lower abdomen, right > left.  : No celaya  Extremities: Edema: BLE up to thighs, improving per patient   Neurologic: Awake, alert and oriented.   LUE fistula +thrill    Data:   CMP  Recent " Labs   Lab 06/11/25  0728 06/11/25  0557 06/10/25  0814 06/10/25  0451 06/09/25  1143 06/09/25  0604 06/09/25  0555 06/08/25  1855 06/06/25  1205 06/06/25  1013   NA  --  139  --  139  --  137  --  139  --   --    POTASSIUM  --  4.9  --  4.6  --  4.7  --  4.9  --   --    CHLORIDE  --  107  --  107  --  110*  --  111*  --   --    CO2  --  27  --  22  --  20*  --  21*  --   --    GLC 88 79   < > 142*   < > 159*   < > 96  --   --    BUN  --  27.5*  --  27.7*  --  20.5*  --  21.0*  --   --    CR  --  1.83*  --  1.85*  --  1.95*  --  2.10*  --   --    GFRESTIMATED  --  37*  --  36*  --  34*  --  31*  --   --    KARTIK  --  9.5  --  9.8  --  9.9  --  9.5  --   --    MAG  --  1.6*  --  1.6*  --  1.8  --  1.8  --    < >   PHOS  --  2.2*  --  2.9  --  1.7*   < >  --   --   --    ALBUMIN  --   --   --   --   --  3.8  --  3.7  --   --    BILITOTAL  --   --   --   --   --  0.2  --  0.2  --   --    ALKPHOS  --   --   --   --   --  109  --  106  --   --    AST  --   --   --   --   --  13  --  12  --   --    ALT  --   --   --   --   --  6  --  7  --   --    FCREAT  --   --   --   --   --   --   --   --  2.1  --     < > = values in this interval not displayed.     CBC  Recent Labs   Lab 06/11/25  0557 06/10/25  0451   HGB 8.8* 8.6*   WBC 5.4 9.2    260     COAGS  Recent Labs   Lab 06/06/25  1013   INR 1.11      Urinalysis  Recent Labs   Lab Test 06/09/25  0205 06/06/25  0949 05/21/25  0959   COLOR Light Yellow Light Yellow Yellow   APPEARANCE Clear Clear Slightly Cloudy*   URINEGLC Negative Negative Negative   URINEBILI Negative Negative Negative   URINEKETONE Negative Negative Negative   SG 1.016 1.019 1.017   UBLD Negative Negative Large*   URINEPH 6.0 5.5 5.5   PROTEIN Negative 10* 50*   NITRITE Negative Negative Positive*   LEUKEST Negative Negative Large*   RBCU 3*  --  97*   WBCU 2  --  78*     Virology:  Hepatitis C Antibody   Date Value Ref Range Status   05/17/2025 Nonreactive Nonreactive Final     Comment:     A  nonreactive screening test result does not exclude the possibility of exposure to or infection with HCV. Nonreactive screening test results in individuals with prior exposure to HCV may be due to antibody levels below the limit of detection of this assay or lack of reactivity to the HCV antigens used in this assay. Patients with recent HCV infections (<3 months from time of exposure) may have false-negative HCV antibody results due to the time needed for seroconversion (average of 8 to 9 weeks).     BK Virus DNA IU/mL   Date Value Ref Range Status   06/06/2025 Not Detected Not Detected IU/mL Final   05/17/2025 Not Detected Not Detected IU/mL Final

## 2025-06-11 NOTE — PLAN OF CARE
"Goal Outcome Evaluation:      Plan of Care Reviewed With: patient    Overall Patient Progress: no changeOverall Patient Progress: no change    Outcome Evaluation: AVSS on RA, denied nausea, C/O RLQ pain. Per pt, anxiety better now that a plan is in place.    /77 (BP Location: Right arm)   Pulse 91   Temp 98.6  F (37  C) (Oral)   Resp 16   Ht 1.727 m (5' 8\")   Wt 78.1 kg (172 lb 1.6 oz)   SpO2 99%   BMI 26.17 kg/m      Shift: 2769-4120  Isolation Status: standard/cytotoxic  VS: stable on RA, afebrile  Neuro: A&O x4  Behaviors: receptive to cares, can be anxious  BG: ACHS with 0200: 88, 87  Labs/Imaging: Creatinine 1.85, Hgb 8.6; pending AM labs  Respiratory: WNL  Cardiac: rate and rhythm regular; generalized edema 1-2+  Pain/Nausea: Denied nausea. C/O RLQ pain  PRN: Oxycodone 10 mg x2 and Atarax 25 mg x1 for pain  Diet: regular  LDA: R PIV; L AVF  Infusion(s): N/A  GI/: LBM 6/10, multiple per pt but improved. Voiding spontaneously, records on board to chart  Skin: RLQ incision steri-stripped, C/D/I; RLQ scab; bilateral knee bruising; midline abdominal hematoma (team followin)  Mobility: UAL  Plan: PLEX/IVIG today, potential discharge pending OP infusion approval. Notify MD of any significant changes, continue current POC.       "

## 2025-06-11 NOTE — PLAN OF CARE
"Goal Outcome Evaluation:      Plan of Care Reviewed With: patient          Outcome Evaluation: VSS, PLEX, and IVIG #2        /70 (BP Location: Right arm)   Pulse 97   Temp 97.8  F (36.6  C) (Oral)   Resp 18   Ht 1.727 m (5' 8\")   Wt 77.2 kg (170 lb 4.8 oz)   SpO2 100%   BMI 25.89 kg/m      Shift: 8616-8568  Isolation Status: NA  VS: WDL on RA, afebrile  Neuro: Aox4  Behaviors: somewhat anxious, cooperative with cares, able to make her needs known  BG: AC/HS 88 at breakfast  Labs/Imaging: K 4.9, Cr 1.83, mg 1.6, Ph 2.2  Respiratory: Clear lung sounds, no shortness of breath  Cardiac: regular rhythm/rate  Pain/Nausea: Ongoing abdominal/upper leg pain  PRN: Atarax x1, Oxycodone x1  Diet: Regular diet, good appetite  LDA: PIV x1  Infusion(s): Mg replacement, Started IVIG  GI/: 550 mL urine output. No BM today  Skin: abdominal bruise, RLQ scabbed old RANDALL site, Healing RLQ abdominal incision with steri-strips  Mobility: Independent  Events/Education: PLEX, and started IVIG 2 of 5  Plan: Discharge following IVIG    "

## 2025-06-11 NOTE — PROCEDURES
Laboratory Medicine and Pathology  Transfusion Medicine - Apheresis Procedure    Glenna Spears MRN# [id number]   YOB: 1990 Age: 34 year old        Reason for consult: Antibody mediated rejection of kidney transplant           Assessment and Plan:   The patient is a 34 year old female S/P kidney transplant with antibody mediated rejection. She underwent therapeutic plasma exchange (TPE) #2 of planned 5. She tolerated the procedure well. Next procedure on Friday in outpatient clinic.     Please do not start ACE inhibitors throughout the duration of the TPE series as these have been associated with reactions during apheresis.  Please notify the Transfusion Medicine physician of any upcoming procedures, surgeries, or biopsies as TPE with albumin replacement will affect coagulation factor levels.         Chief Complaint:   Feels puffy         Interval History:   The patient is a 34 year old female with ESRD on hemodialysis since 6/2022. She underwent kidney transplant 5/17/2025. Medical history also notable for obesity S/P gastric sleeve 1/2024, substance use disorder, positive quantiferon gold test S/P treatment, multiple right upper extremity DVT. She underwent kidney biopsy and drain placement for removal of perinephric fluid on 6/6/2025. Course complicated by abdominal wall hematoma. Aspirin held and apixaban not yet started for DVT. Transferred to this hospital on 6/8/2025 for further management. Biopsy consistent with antibody mediated rejection (diffuse C4d deposition) and known donor specific antibodies since 5/22/205. She has a left arm fistula that was used for dialysis. Transfusion Medicine consulted for TPE on 6/8/2025.      No significant events overnight. IVIG after the last TPE. Overall feels well, but has some retained fluid. The lasix is helping.  She has not had any recurrent pain on the right chest wall.  Creatinine trending down slightly.          Allergies:     Allergies    Allergen Reactions    Sulfa Antibiotics Other (See Comments)     Reaction as an infant- unsure of what happened           Medications:     Current Facility-Administered Medications   Medication Dose Route Frequency Provider Last Rate Last Admin    acetaminophen (TYLENOL) tablet 325-650 mg  325-650 mg Oral Q4H PRN Cari Cardona MD   650 mg at 06/10/25 0818    acetaminophen (TYLENOL) tablet 650 mg  650 mg Oral Once Ashley Wells NP        albuterol (PROVENTIL HFA/VENTOLIN HFA) inhaler  1-2 puff Inhalation Once PRN Ashley Wells NP        albuterol (PROVENTIL HFA/VENTOLIN HFA) inhaler  1-2 puff Inhalation Once PRN Milena Michael PA-C        albuterol (PROVENTIL HFA/VENTOLIN HFA) inhaler  2 puff Inhalation Q4H PRN Cari Cardona MD        albuterol (PROVENTIL) neb solution 2.5 mg  2.5 mg Nebulization Once PRN Ashley Wells NP        [START ON 6/19/2025] apixaban ANTICOAGULANT (ELIQUIS) tablet 10 mg  10 mg Oral BID Ashley Wells NP        Followed by    [START ON 6/26/2025] apixaban ANTICOAGULANT (ELIQUIS) tablet 5 mg  5 mg Oral BID Ashley Wells NP        [START ON 6/12/2025] aspirin EC tablet 81 mg  81 mg Oral Daily Ashley Wells NP        atorvastatin (LIPITOR) tablet 10 mg  10 mg Oral Daily Cari Cardona MD   10 mg at 06/11/25 0743    calcium carbonate (TUMS) chewable tablet 1,000 mg  1,000 mg Oral 4x Daily PRN Cari Cardona MD        diphenhydrAMINE (BENADRYL) injection 25 mg  25 mg Intravenous Once PRN Ashley Wells NP        Or    diphenhydrAMINE (BENADRYL) injection 50 mg  50 mg Intravenous Once PRN Ashley Wells NP        EPINEPHrine (ADRENALIN) kit 0.3 mg  0.3 mg Intramuscular Q5 Min PRN Ashley Wells NP        famotidine (PEPCID) injection 20 mg  20 mg Intravenous Once PRN Ashley Wells NP        famotidine (PEPCID) tablet 20 mg  20 mg Oral BID JOSE MIGUELN Cari Cardona MD        furosemide (LASIX) tablet 40 mg  40 mg Oral Daily Milena Michael  JULIO Landeros   40 mg at 06/11/25 0742    hydrOXYzine HCl (ATARAX) tablet 25 mg  25 mg Oral Q6H PRN Milena Michael PA-C   25 mg at 06/11/25 0927    immune globulin - sucrose free 10% (PRIVIGEN) infusion 25 g  25 g Intravenous Once Ashley Wells NP        Lidocaine (LIDOCARE) 4 % Patch 1-2 patch  1-2 patch Transdermal Q24h Milena Michael PA-C   2 patch at 06/10/25 1944    lidocaine (LMX4) cream   Topical Q1H PRN Cari Cardona MD        lidocaine 1 % 0.1-1 mL  0.1-1 mL Other Q1H PRN Cari Cardona MD        magnesium hydroxide (MILK OF MAGNESIA) suspension 30 mL  30 mL Oral Daily PRN Milena Michael PA-C        magnesium oxide (MAG-OX) tablet 400 mg  400 mg Oral BID Milena Michael PA-C   400 mg at 06/11/25 0742    magnesium sulfate 2 g in 50 mL sterile water intermittent infusion  2 g Intravenous Once Ashley Wells NP        meperidine (DEMEROL) injection 25 mg  25 mg Intravenous Once PRN Ashley Wells NP        methocarbamol (ROBAXIN) tablet 500 mg  500 mg Oral Q6H PRN Cari Cardona MD   500 mg at 06/09/25 1258    methylPREDNISolone Na Suc (solu-MEDROL) injection 100 mg  100 mg Intravenous Once Ashely Wells NP        methylPREDNISolone sodium succinate (SOLU-MEDROL) injection 40 mg  40 mg Intravenous Once PRN Ashley Wells NP        mycophenolate (GENERIC EQUIVALENT) capsule 750 mg  750 mg Oral BID Cari Cardona MD   750 mg at 06/11/25 0743    naloxone (NARCAN) injection 0.2 mg  0.2 mg Intravenous Q2 Min PRN Lukas Jean MD        Or    naloxone (NARCAN) injection 0.4 mg  0.4 mg Intravenous Q2 Min PRN Lukas Jean MD        Or    naloxone (NARCAN) injection 0.2 mg  0.2 mg Intramuscular Q2 Min PRN Lukas Jean MD        Or    naloxone (NARCAN) injection 0.4 mg  0.4 mg Intramuscular Q2 Min PRN Lukas Jean MD        nortriptyline (PAMELOR) capsule 25 mg  25 mg Oral At Bedtime Cari Cardona MD   25 mg at 06/10/25 0826    ondansetron  (ZOFRAN ODT) ODT tab 4 mg  4 mg Oral Q8H PRN Cari Cardona MD        oxyCODONE (ROXICODONE) tablet 5 mg  5 mg Oral Q8H PRN Cari Cardona MD        Or    oxyCODONE IR (ROXICODONE) tablet 10 mg  10 mg Oral Q8H PRN Cari Cardona MD   10 mg at 06/11/25 0607    pantoprazole (PROTONIX) EC tablet 40 mg  40 mg Oral Daily Cari Cardona MD   40 mg at 06/11/25 0743    [START ON 6/19/2025] predniSONE (DELTASONE) tablet 5 mg  5 mg Oral Daily Milena Michael PA-C        senna-docusate (SENOKOT-S/PERICOLACE) 8.6-50 MG per tablet 1 tablet  1 tablet Oral BID PRN Cari Cardona MD        Or    senna-docusate (SENOKOT-S/PERICOLACE) 8.6-50 MG per tablet 2 tablet  2 tablet Oral BID PRN Cari Cardona MD        senabbi-docusate (SENOKOT-S/PERICOLACE) 8.6-50 MG per tablet 2 tablet  2 tablet Oral BID Cari Cardona MD   2 tablet at 06/11/25 0747    sertraline (ZOLOFT) tablet 25 mg  25 mg Oral Daily Milena Michael PA-C   25 mg at 06/11/25 0742    Followed by    [START ON 6/17/2025] sertraline (ZOLOFT) tablet 50 mg  50 mg Oral Daily Milena Michael PA-C        sodium chloride (PF) 0.9% PF flush 3 mL  3 mL Intracatheter Q8H JANE Cari Cardona MD   3 mL at 06/11/25 0607    sodium chloride (PF) 0.9% PF flush 3 mL  3 mL Intracatheter q1 min prn Cari Cardona MD   3 mL at 06/09/25 0118    sodium chloride 0.9% BOLUS 1,000 mL  1,000 mL Intravenous Once PRN Ashley Wells NP        tacrolimus (GENERIC EQUIVALENT) capsule 6 mg  6 mg Oral BID IS Milena Michael PA-C   6 mg at 06/11/25 0743    valGANciclovir (VALCYTE) tablet 450 mg  450 mg Oral Daily Ted, Lukas Coto MD   450 mg at 06/11/25 0743          Review of Systems:   Denied fever, chills, cough, shortness of breath, nausea, vomiting, diarrhea  +pain in legs         Exam:   /71 P 86 T 98.5 RR 18 O2 sat 100%  Alert, no apparent distress  Breathing appears comfortable  No jaundice or scleral icterus  Moves all extremities  Left arm fistula           Data:     Results for orders placed or performed during the hospital encounter of 06/08/25 (from the past 24 hours)   Glucose by meter   Result Value Ref Range    GLUCOSE BY METER POCT 100 (H) 70 - 99 mg/dL   Glucose by meter   Result Value Ref Range    GLUCOSE BY METER POCT 97 70 - 99 mg/dL   Glucose by meter   Result Value Ref Range    GLUCOSE BY METER POCT 88 70 - 99 mg/dL   Glucose by meter   Result Value Ref Range    GLUCOSE BY METER POCT 87 70 - 99 mg/dL   Basic metabolic panel   Result Value Ref Range    Sodium 139 135 - 145 mmol/L    Potassium 4.9 3.4 - 5.3 mmol/L    Chloride 107 98 - 107 mmol/L    Carbon Dioxide (CO2) 27 22 - 29 mmol/L    Anion Gap 5 (L) 7 - 15 mmol/L    Urea Nitrogen 27.5 (H) 6.0 - 20.0 mg/dL    Creatinine 1.83 (H) 0.51 - 0.95 mg/dL    GFR Estimate 37 (L) >60 mL/min/1.73m2    Calcium 9.5 8.8 - 10.4 mg/dL    Glucose 79 70 - 99 mg/dL   CBC with platelets   Result Value Ref Range    WBC Count 5.4 4.0 - 11.0 10e3/uL    RBC Count 2.92 (L) 3.80 - 5.20 10e6/uL    Hemoglobin 8.8 (L) 11.7 - 15.7 g/dL    Hematocrit 28.2 (L) 35.0 - 47.0 %    MCV 97 78 - 100 fL    MCH 30.1 26.5 - 33.0 pg    MCHC 31.2 (L) 31.5 - 36.5 g/dL    RDW 13.2 10.0 - 15.0 %    Platelet Count 260 150 - 450 10e3/uL   Phosphorus   Result Value Ref Range    Phosphorus 2.2 (L) 2.5 - 4.5 mg/dL   Magnesium   Result Value Ref Range    Magnesium 1.6 (L) 1.7 - 2.3 mg/dL   Glucose by meter   Result Value Ref Range    GLUCOSE BY METER POCT 88 70 - 99 mg/dL   INR   Result Value Ref Range    INR 0.98 0.85 - 1.15    PT 13.3 11.8 - 14.8 Seconds   Fibrinogen activity   Result Value Ref Range    Fibrinogen Activity 255 170 - 510 mg/dL          Procedure Summary:   A single plasma volume plasma exchange was performed with a Spectra Optia cell separator.  The vascular access was a left arm fistula.  ACD-A was used for anticoagulation.  To offset the effects of the citrate, the patient was given calcium  gluconate IV in the return line.  The  replacement fluid was 750mL 5% albumin and 2500mL plasma due to bleeding and hematoma after biopsy.  The patient experienced mild paresthesias consistent with citrate that improved with increased calcium gluconate. The patient's vital signs were stable throughout.  The patient tolerated the procedure well.    Attestation: During the procedure the patient was directly seen and evaluated by me, Tana Beltran MD, PhD.  I have reviewed the chart and pertinent laboratory findings, and discussed the patient and the current procedure with the Apheresis nursing staff.    Tana Beltran MD, PhD  Transfusion Medicine Attending  Laboratory Medicine & Pathology

## 2025-06-11 NOTE — PROGRESS NOTES
Care Management Follow Up    Length of Stay (days): 3    Expected Discharge Date: 06/11/2025     Concerns to be Addressed: discharge planning     Patient plan of care discussed at interdisciplinary rounds: Yes    Anticipated Discharge Disposition: Home, Outpatient Infusion Services              Anticipated Discharge Services: None  Anticipated Discharge DME: None    Patient/family educated on Medicare website which has current facility and service quality ratings: yes  Education Provided on the Discharge Plan: Yes  Patient/Family in Agreement with the Plan: yes (TBD)    Referrals Placed by CM/SW: External Care Coordination, Outpatient Infusion  Private pay costs discussed: Not applicable    Discussed  Partnership in Safe Discharge Planning  document with patient/family: No     Handoff Completed: No, handoff not indicated or clinically appropriate    Additional Information:  Pt with a medical history significant for kidney transplant on 5/17/2025 s/p kidney biopsy on 6/6/25 concern for [facility] admitted for IVIG and Plasmapheresis treatments. Team inquiring about OP coverage for IVIG and plasmapheresis.     OP Plasmapheresis coverage confirmed 6/10.    Spoke with TREVA Hernandez [facility] regarding IVIG status.  OP IVIG approval pending insurance.      Anticipate pt will be able to dsicharge once OP IVIG approval has been confirmed.    Pt has tentative OP Plasmapheresis appointments scheduled for Friday 6/13, Monday 6/16 and Wednesday 6/17.     [facility]  Prior to hospitalization pt was open to agency for home RN, PT, OT services.  Agency will need resumption of home care orders.  Met with pt.  Pt noted no concerns with anticipated plan for OP IVIG and Plasmapheresis.  Pt aware discharge is pending confirmation of OP IVIG coverage.  Pt declines need for home care RN, PT, OT services.   Per pt she started having her labs drawn at [facility] as it is close to pt grandmothers  home.  Patient would be open to going into clinic for OP PT.   Pt confirmed she is able to arrange transportation for OP infusion appointments through her insurance.  Pt is hopeful that she can discharge today as her mother is coming to visit and would be able transport her home.   Reviewed above with [facility].       1025:Received inbasket notification from [facility] Prior Authorization OP IVIG approved.  Spoke with [facility] Charge Nurse appointments for OP IVIG will be scheduled for Friday 6/13, Monday 6/16 and Wednesday 6/18.  Reviewed OP Plasmapheresis appointment/plan with [facility] with [facility].    1300: Met with pt.  Pt notes no concerns or questions.  Aware of OP infusion appointments.     Next Steps:   Follow for discharge planning  Follow up on OP IVIG coverage  Coordinate OP IVIG and Plasmapheresis appointments once OP IVIG coverage has been confirmed and pt is medically ready for discharge.    Maggi Wolfe RN BSN, PHN, ACM-RN  7A Nurse Care Coordinator    [facility] Acute Care Management  Phone: [phone]  Available on [place]  7A SOT RNCC

## 2025-06-11 NOTE — PROGRESS NOTES
[facility]  Transplant Nephrology Progress Note  Date of Admission:  6/8/2025  Today's Date: 06/11/2025  Requesting physician: Lukas Jean MD    Recommendations:   - Okay to continue lasix 40 mg PO for today.   - Agree with Mag 2g IV x 1 today.   - Continue current immunosuppression.   - No acute indication for dialysis.   - Will need closure biopsy 4-6 weeks after initial biopsy.   - As she developed acute rejection on adequate immunosuppression levels, will need to start prednisone 5 mg daily following treatment.  - Repeat DSA after treatment completed, need to wait at least 2 weeks after last IVIG.    Assessment & Plan   # DDKT: Trend down. Good urine output. PLEX / IVIG #2 today.    - Baseline Creatinine: ~ TBD. Creatinine today of 1.95 mg/dl is lowest post-transplant.   - Proteinuria: Moderate (1-3 grams)   - DSA Hx: Significant DSA (>3000 mfi) to A1, B8, B60, DR17, DR52    - Last cPRA: 100%   - BK Viremia: No   - Kidney Tx Biopsy Hx: 6/6/25 Preliminary pathology findings show diffuse C4d positivity without evidence (so far) of glomerulitis or peritubular capillaritis. There was no evidence of cellular mediated rejection. 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 She received 500 mg solumedrol on 6/8/25. Plan is for PLEX x 5 every other day, IVIG 500 mg/kg after PLEX #1-4, IVIG 1 gm/kg after PLEX #5, solumedrol 100 mg IV prior to each IVIG dose.    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 There was no evidence of cellular mediated rejection. 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 She received 500 mg solumedrol on 6/8/25. Plan is for PLEX x 5 every other day, IVIG 500 mg/kg after PLEX #1-4, IVIG 1 gm/kg after PLEX #5, solumedrol 100 mg IV prior to each IVIG dose.    #  Immunosuppression: Tacrolimus immediate release (goal 8-10) and Mycophenolate mofetil (dose 750 mg every 12 hours)   - Induction with Recent Transplant:  High Intensity Protocol   - Continue with intensive monitoring of immunosuppression for efficacy and toxicity.   - Historical Changes in Immunosuppression: None   - Changes: Not at this time. See above for PLEX/IVIG plans    # Infection Prevention:   Last CD4 Level: Not checked recently  - PJP: Inhaled pentamidine  - CMV: Valganciclovir (Valcyte)      - CMV IgG Ab High Risk Discordance (D+/R-) at time of transplant: No  Present CMV Serostatus: Positive  - EBV IgG Ab High Risk Discordance (D+/R-) at time of transplant: No  Present EBV Serostatus: Positive    # Hypertension: Controlled;  Goal BP: < 140/90 (Hospitalization goal)   - Changes: No    # Anemia in Chronic Renal Disease: Hgb: Stable      SHALOM: No   - Iron studies: Not checked recently    # Mineral Bone Disorder:    - Secondary renal hyperparathyroidism; PTH level: Markedly elevated (>1201 pg/ml) April 2024       On treatment: None  - Vitamin D; level: Normal        On supplement: No  - Calcium; level: Normal        On supplement: No  - Phosphorus; level: Normal        On supplement: Yes    # Electrolytes:  - Potassium; level: Normal        On supplement: No  - Magnesium; level: Low        On supplement: Yes  - Bicarbonate; level: Low        On supplement: No  - Sodium; level: Normal    # Other Significant PMH:   - Obesity s/p gastric sleeve 1/23/24: on semaglutide               - History of Illicit Drug use: remote LSD /cocaine use in her 20s, + marijuana.               - Positive Quant Gold: s/p treatment by ID   - Non-occlusive R internal jugular thrombosis and an occlusive right cephalic vein superficial thrombosis, likely related to a previously placed (and now removed) catheter. Anticoagulation is currently being held due to presence of anterior abdominal wall hematoma s/p drain placement and transplant  kidney biopsy. Will restart apixaban 06/19.     # Transplant History:  Etiology of Kidney Failure: Unknown etiology  Tx: DDKT  Transplant: 5/17/2025 (Kidney)  Significant transplant-related complications: None    Recommendations were communicated to the primary team verbally.    Seen and discussed with RAFAEL Madsen [facility]  Transplant Nephrology  Contact information via Vocera Web Console     Physician Attestation     I saw and evaluated Glenna Spears as part of a shared APRN/PA visit.     I personally reviewed the vital signs, medications, and labs.    I personally provided a substantive portion of care for this patient and I approve the care plan as written by the LEWIS.  I was involved with Medical Decision Making including: Please see A&P for additional details of medical decision making.  MANAGEMENT DISCUSSED with the following over the past 24 hours: No acute indications for dialysis.  Would continue on present immunosuppression.  Continue to finish out PLEX/IVIg treatment.  Okay to continue furosemide today, but would hold on discharge and reassess.     Kevin Aleman MD  Date of Service (when I saw the patient): 06/11/25      Interval History  Ms. Spears's creatinine is 1.83 (06/11 0557); Trend down.  Good urine output.  Other significant labs/tests/vitals: VSS.  no events overnight.  no chest pain or shortness of breath.  no leg swelling. Feels swelling in abdomen and thighs. Improved from yesterday with diuresis.   no nausea and vomiting.  Bowel movements are normal. Eating and drinking okay.   no fever, sweats or chills.     Review of Systems   4 point ROS was obtained and negative except as noted in the Interval History.    MEDICATIONS:  Current Facility-Administered Medications   Medication Dose Route Frequency Provider Last Rate Last Admin    albumin human 5 % injection 750 mL  750 mL Apheresis During apheresis procedure Tana Beltran MD        Anticoagulant Citrate  Dextrose Formula A at ratio of  1:10 with blood (Apheresis Center)   Apheresis During Apheresis (from stock) Taan Beltran MD        atorvastatin (LIPITOR) tablet 10 mg  10 mg Oral Daily Cari Cardona MD   10 mg at 06/10/25 0807    calcium gluconate with plasma (administered by Apheresis Staff ONLY)   Intravenous During Apheresis (from stock) Tana Beltran MD        furosemide (LASIX) tablet 40 mg  40 mg Oral Daily Milena Michael PA-C   40 mg at 06/10/25 1159    insulin aspart (NovoLOG) injection (RAPID ACTING)  1-7 Units Subcutaneous TID AC Milena Michael PA-C   1 Units at 06/09/25 1252    insulin aspart (NovoLOG) injection (RAPID ACTING)  1-5 Units Subcutaneous At Bedtime Milena Michael PA-C        Lidocaine (LIDOCARE) 4 % Patch 1-2 patch  1-2 patch Transdermal Q24h Milena Michael PA-C   2 patch at 06/10/25 1944    magnesium oxide (MAG-OX) tablet 400 mg  400 mg Oral BID Milena Michael PA-C   400 mg at 06/10/25 1944    mycophenolate (GENERIC EQUIVALENT) capsule 750 mg  750 mg Oral BID Cari Cardona MD   750 mg at 06/10/25 1742    nortriptyline (PAMELOR) capsule 25 mg  25 mg Oral At Bedtime Cari Cardona MD   25 mg at 06/10/25 2145    pantoprazole (PROTONIX) EC tablet 40 mg  40 mg Oral Daily Cari Cardona MD   40 mg at 06/10/25 0807    [START ON 6/19/2025] predniSONE (DELTASONE) tablet 5 mg  5 mg Oral Daily Milena Michael PA-C        senna-docusate (SENOKOT-S/PERICOLACE) 8.6-50 MG per tablet 2 tablet  2 tablet Oral BID Cari Cardona MD   2 tablet at 06/10/25 0807    sertraline (ZOLOFT) tablet 25 mg  25 mg Oral Daily Milena Michael PA-C   25 mg at 06/10/25 1159    Followed by    [START ON 6/17/2025] sertraline (ZOLOFT) tablet 50 mg  50 mg Oral Daily Milena Michael PA-C        sodium chloride (PF) 0.9% PF flush 3 mL  3 mL Intracatheter Q8H Cari Godinez MD   3 mL at 06/11/25 0607    tacrolimus (GENERIC EQUIVALENT) capsule 6 mg  6 mg Oral BID  "IS Milena Michael PA-C   6 mg at 06/10/25 1741    valGANciclovir (VALCYTE) tablet 450 mg  450 mg Oral Daily Lukas Jean MD         Current Facility-Administered Medications   Medication Dose Route Frequency Provider Last Rate Last Admin       Physical Exam   Temp  Av.4  F (36.9  C)  Min: 97.8  F (36.6  C)  Max: 98.7  F (37.1  C)      Pulse  Av.5  Min: 79  Max: 96 Resp  Av.5  Min: 16  Max: 18  SpO2  Av.8 %  Min: 99 %  Max: 100 %     BP (!) 147/87 (BP Location: Right arm)   Pulse 82   Temp 98.6  F (37  C) (Oral)   Resp 18   Ht 1.727 m (5' 8\")   Wt 77.2 kg (170 lb 4.8 oz)   SpO2 100%   BMI 25.89 kg/m     Date 25 0700 - 06/10/25 0659   Shift 4983-5799 2586-6992 0653-7971 24 Hour Total   INTAKE   Shift Total(mL/kg)       OUTPUT   Urine 390   390   Shift Total(mL/kg) 390(5.02)   390(5.02)   Weight (kg) 77.7 77.7 77.7 77.7      Admit Weight: 75.8 kg (167 lb)     GENERAL APPEARANCE: alert and no distress  HENT: mouth without ulcers or lesions  RESP: lungs clear to auscultation - no rales, rhonchi or wheezes  CV: regular rhythm, normal rate, no rub, no murmur  EDEMA: no LE edema bilaterally  ABDOMEN: soft, nondistended, nontender, bowel sounds normal  MS: extremities normal - no gross deformities noted, no evidence of inflammation in joints, no muscle tenderness  SKIN: no rash    Data   All labs reviewed by me.  CMP  Recent Labs   Lab 25  0728 25  0557 25  0216 06/10/25  2131 06/10/25  0814 06/10/25  0451 25  1143 25  0604 25  0555 25  1855   NA  --  139  --   --   --  139  --  137  --  139   POTASSIUM  --  4.9  --   --   --  4.6  --  4.7  --  4.9   CHLORIDE  --  107  --   --   --  107  --  110*  --  111*   CO2  --  27  --   --   --  22  --  20*  --  21*   ANIONGAP  --  5*  --   --   --  10  --  7  --  7   GLC 88 79 87 88   < > 142*   < > 159*   < > 96   BUN  --  27.5*  --   --   --  27.7*  --  20.5*  --  21.0*   CR  --  1.83*  --   --   " --  1.85*  --  1.95*  --  2.10*   GFRESTIMATED  --  37*  --   --   --  36*  --  34*  --  31*   KARTIK  --  9.5  --   --   --  9.8  --  9.9  --  9.5   MAG  --  1.6*  --   --   --  1.6*  --  1.8  --  1.8   PHOS  --  2.2*  --   --   --  2.9  --  1.7*  --   --    PROTTOTAL  --   --   --   --   --   --   --  6.3*  --  6.0*   ALBUMIN  --   --   --   --   --   --   --  3.8  --  3.7   BILITOTAL  --   --   --   --   --   --   --  0.2  --  0.2   ALKPHOS  --   --   --   --   --   --   --  109  --  106   AST  --   --   --   --   --   --   --  13  --  12   ALT  --   --   --   --   --   --   --  6  --  7    < > = values in this interval not displayed.     CBC  Recent Labs   Lab 06/11/25  0557 06/10/25  0451 06/09/25  0604 06/08/25  1855   HGB 8.8* 8.6* 8.9* 8.6*   WBC 5.4 9.2 5.9 6.0   RBC 2.92* 2.76* 2.92* 2.87*   HCT 28.2* 26.7* 28.6* 28.1*   MCV 97 97 98 98   MCH 30.1 31.2 30.5 30.0   MCHC 31.2* 32.2 31.1* 30.6*   RDW 13.2 13.0 12.9 13.1    260 272 280     INR  Recent Labs   Lab 06/06/25  1013   INR 1.11     ABGNo lab results found in last 7 days.   Urine Studies  Recent Labs   Lab Test 06/09/25  0205 06/06/25  0949 05/21/25  0959 05/17/25  0225 05/20/24  1339   COLOR Light Yellow Light Yellow Yellow Colorless Light Yellow   APPEARANCE Clear Clear Slightly Cloudy* Clear Clear   URINEGLC Negative Negative Negative Negative 200*   URINEBILI Negative Negative Negative Negative Negative   URINEKETONE Negative Negative Negative Negative Negative   SG 1.016 1.019 1.017 1.015 1.010   UBLD Negative Negative Large* Negative Moderate*   URINEPH 6.0 5.5 5.5 6.0 7.5*   PROTEIN Negative 10* 50* Negative 30*   NITRITE Negative Negative Positive* Negative Negative   LEUKEST Negative Negative Large* Negative Negative   RBCU 3*  --  97* 4* 3*   WBCU 2  --  78* 5 3     No lab results found.  PTH  Recent Labs   Lab Test 06/02/25  0839 05/22/25  0700   PTHI 220* 304*     Iron Studies  Recent Labs   Lab Test 06/02/25  0839 05/22/25  0700    IRON 67 63   * 163*   IRONSAT 32 39   ELSIE 1,136* 1,258*       IMAGING:  All imaging studies reviewed by me.

## 2025-06-11 NOTE — DISCHARGE SUMMARY
[facility]    Discharge Summary  Transplant Surgery    Date of Admission:  2025  Date of Discharge:  2025  Discharging Provider: RAFAEL Son, JOYCE/Dr. Kvng Agee     Discharge Diagnoses   Active Problems:    Kidney replaced by transplant    Immunosuppressed status    Abdominal pain, unspecified abdominal location    Antibody mediated rejection of kidney transplant     History of Present Illness   Glenna Spears is a 34 year old female with recent PMHx significant for ESRD s/p  donor kidney transplantation on 2025 complicated by leak requiring ultrasound-guided perinephric drain placement as well as kidney biopsy by IR on , complicated by anterior abdominal wall hematoma. Kidney biopsy done on  due to moderate to high-level donor-specific antibodies (DSA) and concern for AMR; biopsy concerning for early antibody-mediated rejection and she was admitted to [facility] on 2025 for initiation of PLEX, IV Ig.     Hospital Course   Graft Function:  s/p DCD DDKT +stent 25 (stent removed 25): Creatinine post- transplant down to 2.4, then peaked at 3.5 on 2025. Creatinine on admission 2.1, creatinine today 1.83 (1.85). Baseline Cr TBD. UOP 1.9 L yesterday. Last US 6/3 with patent vasculature, perinephric collection.      Perinephric collection: Drain placed on . Cr fluid equivalent to serum which is not concerning for urine leak. Triglyceride fluid 67. Minimal output from drain.  US did not show perinephric fluid collection. Drain removed 25.     Renal artery reconstruction: Plan was for  mg x 3 months.   - Held due to abdominal wall hematoma.    - ASA 81 mg to begin 6/12/2025 x 3 months.      Early antibody-mediated rejection: Moderate to high-level donor-specific antibodies (DSAs) against HLA-A1, B8, B60, DR17, and DR52 post transplant.  kidney biopsy preliminary pathology findings showed diffuse C4d positivity  without evidence of glomerulitis or peritubular capillaritis, no evidence of cellular mediated rejection.    - Transfusion medicine consulted   - Treat early AbMR with PLEX/IV Ig every other day x 5 via LUE fistula (see below)    - Repeat DSA after treatment completed, need to wait at least 2 weeks after last IVIG   - Closure biopsy 4 week. Will need to hold apixaban prior to repeat biopsy     Abdominal wall hematoma, post kidney biopsy and RLQ drain placement: Subcutaneous fluid collection in the anterior wall decreased in size on US 2025. Hgb stable.   - Restart ASA 81 mg daily on   - Initiate apixaban on      Immunosuppression management: PRA 76 -  high induction risk due to DCD kidney  Maintenance IS:   - MMF: 750 mg BID   - Tacrolimus: Goal level 8-10    - Prednisone 5 mg daily after treatment completed, start   Early AbMR Rejection Treatment:   - Noted on 2026 biopsy   -  mg on    - Plasmapheresis for 5 treatments (, ,  OP,  OP,  OP)   - IVIg (dose #1), 25g (dose #2-#4), 50g (dose #5) to equal 155g total (dose given per insurance coverage for IVIG)   - Solumedrol 100 mg IV prior to each IVIG dose.  Infection Prophylaxis:    - CMV (Valganciclovir x 12 weeks)   - Pneumocystis: Bactrim not given due to Sulfa allergy. Pentamidine given . G6PD WNL. Consider switch to dapsone as on .      Transplant coordinator: Tammi Cuellar [phone]  Donor type:  DCD  DSA at time of transplant:  No  Ureteral stent: YES, removed 2025   CMV:  Donor + / Recipient +  EBV:  Donor + / Recipient +  Thymoglobulin: 425 mg, 6 mg/kg      Hematology:  Anemia of chronic disease:   Acute blood loss anemia post procedure: Hgb b/l ~ 9 to 10. Hgb 8.6, stable. No transfusion indicated.  RIJ non occlusive DVT, provoked by line: RIJ catheter since removed. Hematology and Oncology consulted on 6/3/2025 with recommendation for therapeutic anticoagulation DOAC (versus Warfarin based on  renal function and medication interactions) x 3 months for provoked DVT. Apixaban not yet started. 6/9 repeat US RUE no change.  - Hold initiation of apixaban d/t abdominal wall hematoma   - Plan to start apixaban on 6/19     Neurology:  Acute pain:    - PRN Tylenol    - PRN oxycodone, wean as able    - Lidoderm patches   - Heat PRN  Generalized anxiety disorder, adjustment to medical issues:   -Health psychology consult. Recommend patient be seen OP for treatment VU.   -Psychiatry consult for start medication for VU               - Zoloft 25 mg daily x 7 days initiated 6/10, increase to 50 mg daily on 6/17.    - Follow up with PCP for further adjustment; Per psychiatry would eventually increase to 100 mg daily.  -Hydroxyzine 25 mg every 6 hours PRN anxiety  Neuropathy  Depression:  - Continue nortriptyline     Cardiorespiratory:  HLD:  - Atorvastatin 10mg QD      GI/Nutrition: History of gastric sleeve.   Diet: Regular  Bowel regimen: Senna BID, PRN MoM, Ondansetron PRN for nausea     Endocrine:   Steroid hyperglycemia:    - Minimal use, discontinue sliding scale insulin.      Fluid/Electrolytes:   Hypophosphatemia: Supplement ordered.   Hypomagnesemia: Mg Oxide 400 mg BID. Magnesium 2 grams IV x 1 6/11.   Hypervolemia: Monitor daily weight. Lasix 40 mg PO daily.      : Monitor UOP.      Infectious disease: Afebrile.     Recent:  Urine culture + for E.coli: ureteral stent removed and completed treatment course.      MSK:   Post-operative RLE numbness and weakness: Medial right thigh numbness and right knee weakness post-operatively.    - Continue OP PT at discharge.      Pending Results   These results will be followed up by SOT.   Unresulted Labs Ordered in the Past 30 Days of this Admission       Date and Time Order Name Status Description    6/11/2025  7:42 AM Prepare plasma (in mL) Preliminary     6/10/2025 11:20 PM Prepare plasma (unit) Preliminary     6/10/2025 11:12 PM Prepare plasma (unit) Preliminary      6/10/2025 11:12 PM Prepare plasma (unit) Preliminary     6/10/2025 11:12 PM Prepare plasma (unit) Preliminary     6/10/2025 11:12 PM Prepare plasma (unit) Preliminary     6/10/2025 11:12 PM Prepare plasma (unit) Preliminary     6/10/2025 11:12 PM Prepare plasma (unit) Preliminary     6/10/2025 11:12 PM Prepare plasma (unit) Preliminary     6/10/2025 11:12 PM Prepare plasma (unit) Preliminary     6/10/2025 11:12 PM Prepare plasma (unit) Preliminary     5/30/2025  8:03 AM Fungus Culture, non-blood Preliminary     5/17/2025  3:19 AM Prepare red blood cells (unit) Preliminary     5/17/2025  3:19 AM Prepare red blood cells (unit) Preliminary             Code Status   Full    Primary Care Physician   Neela Irene    General Appearance: In no apparent distress.   Skin: Normal, warm  Heart: Appears well perfused  Lungs: Nonlabored resps on RA  Abdomen: The abdomen is soft, tender RLQ, incision healing well no signs of infection. Edema lower abdomen, right > left.  : No celaya  Extremities: Edema: BLE up to thighs, improving per patient   Neurologic: Awake, alert and oriented.   LUE fistula +thrill.     Time Spent on this Encounter   I, Ashley Wells NP, personally saw the patient today and spent greater than 30 minutes discharging this patient.    Discharge Disposition   Discharged to home  Condition at discharge: Stable    Consultations This Hospital Stay   NURSING TO CONSULT FOR VASCULAR ACCESS CARE IP CONSULT  NEPHROLOGY KIDNEY/PANCREAS TRANSPLANT ADULT IP CONSULT  APHERESIS TRANSFUSION ADULT/PEDS IP CONSULT  MEDICATION HISTORY IP PHARMACY CONSULT  PSYCHOLOGY ADULT IP CONSULT  CARE MANAGEMENT / SOCIAL WORK IP CONSULT  CARE MANAGEMENT / SOCIAL WORK IP CONSULT  PSYCHIATRY IP CONSULT    Discharge Orders      Physical Therapy  Referral      Reason for your hospital stay    You were in the hospital for treatment of kidney transplant rejection.     Activity    Your activity upon discharge:  activity as tolerated     Tubes and Drains    Current Tubes and Drains:     Line  Duration           Hemodialysis Vascular Access Arteriovenous fistula Left 505 days              ADULT [facility] Specialty Follow-up and recommended labs and tests    - You will have labs drawn at the [facility] tomorrow: Tacrolimus level, BMP, magnesium, phosphorous, CBC   - You will have #3 PLEX, IV Ig, SoluMedrol at the [facility] on Friday 6/13    - You will have #4 PLEX, IV Ig, SoluMedrol at the [facility] on Monday 6/16   - If you cannot have [place] [facility] at [facility] near where you are staying    - You will have #5 PLEX, IV Ig, SoluMedrol at the [facility] on Wednesday 6/18   - Resume lab schedule on Thursday 6/19    - Follow-up with Health Psychology as an outpatient    - Follow-up with your PCP for management of sertraline (Zoloft)   - Follow-up with a SOT NP in the clinic next week to evaluate need for Lasix and plan of care, questions   - DSA to be drawn in ~ 2 weeks     - You will need a repeat kidney biopsy in about 4 weeks    Appointments on [facility] and/or [facility] (with [facility] or [facility] provider or service). Call [phone] if you haven't heard regarding these appointments within 7 days of discharge.     Monitor and record    Blood pressure: daily.  Fluid intake and output daily:  Drink plenty of fluids, estimate number of times per day you are passing adequate amounts of urine.  Weight: every day.     When to contact your care team    WHEN TO CONTACT YOUR  COORDINATOR:     Transplant Coordinator [phone]     Notify your coordinator if you have pain over your kidney, increased redness or drainage from your incision, fever greater than 100F, decreased urine output or new or increased amount of blood in urine.     Notify your coordinator immediately if you are ever unable to take your immunosuppressive medications for  any reason.     If you have URGENT concerns after office hours, please call the hospital switchboard at [phone] and ask to have the organ transplant nurse on-call paged. If you have a life-threatening emergency, go to the nearest emergency room.     Wound care and dressings    Wash incision daily with soap and water. Do not soak or scrub.     Diet    Follow this diet upon discharge: Regular Diet Adult     Discharge Medications   Current Discharge Medication List        START taking these medications    Details   hydrOXYzine HCl (ATARAX) 25 MG tablet Take 1 tablet (25 mg) by mouth every 6 hours as needed for anxiety.  Qty: 20 tablet, Refills: 11    Associated Diagnoses: Kidney replaced by transplant      Lidocaine (LIDOCARE) 4 % Patch Place 2 patches over 12 hours onto the skin every 24 hours. To prevent lidocaine toxicity, patient should be patch free for 12 hrs daily.  Qty: 20 patch, Refills: 11    Associated Diagnoses: Kidney replaced by transplant      magnesium hydroxide (MILK OF MAGNESIA) 400 MG/5ML suspension Take 30 mLs by mouth daily as needed for constipation.  Qty: 118 mL, Refills: 11    Associated Diagnoses: Kidney replaced by transplant      magnesium oxide (MAG-OX) 400 MG tablet Take 1 tablet (400 mg) by mouth 2 times daily.  Qty: 60 tablet, Refills: 11    Associated Diagnoses: Kidney replaced by transplant      predniSONE (DELTASONE) 5 MG tablet Take 1 tablet (5 mg) by mouth daily.  Qty: 30 tablet, Refills: 11    Associated Diagnoses: Kidney replaced by transplant      sertraline (ZOLOFT) 25 MG tablet Take 1 tablet (25 mg) by mouth daily for 5 days, THEN 2 tablets (50 mg) daily. Follow-up with your PCP to consider raising the dose to a maximum of 100 mg daily.  Qty: 65 tablet, Refills: 0    Associated Diagnoses: Kidney replaced by transplant           CONTINUE these medications which have CHANGED    Details   apixaban ANTICOAGULANT (ELIQUIS) 5 MG tablet Take 2 tablets (10 mg) by mouth 2 times  daily for 7 days, THEN 1 tablet (5 mg) 2 times daily.  Qty: 194 tablet, Refills: 0    Associated Diagnoses: Kidney replaced by transplant      aspirin (ASA) 81 MG EC tablet Take 1 tablet (81 mg) by mouth daily.  Qty: 30 tablet, Refills: 11    Associated Diagnoses: Kidney replaced by transplant      calcium carbonate (TUMS) 500 MG chewable tablet Take 2 tablets (1,000 mg) by mouth every 6 hours as needed for heartburn.    Associated Diagnoses: Kidney replaced by transplant      famotidine (PEPCID) 20 MG tablet Take 1 tablet (20 mg) by mouth 2 times daily as needed (heartburn).    Associated Diagnoses: Kidney replaced by transplant      oxyCODONE (ROXICODONE) 5 MG tablet Take 1-2 tablets (5-10 mg) by mouth every 8 hours as needed for moderate pain. Take 5 mg to pain 4 to 6/10, take 10 mg for pain 6 to 10/10.  Qty: 20 tablet, Refills: 0    Associated Diagnoses: Kidney replaced by transplant      pantoprazole (PROTONIX) 40 MG EC tablet Take 1 tablet (40 mg) by mouth daily.  Qty: 30 tablet, Refills: 1    Associated Diagnoses: Gastroesophageal reflux disease, unspecified whether esophagitis present; Nausea      senna-docusate (SENOKOT-S/PERICOLACE) 8.6-50 MG tablet Take 2 tablets by mouth 2 times daily as needed for constipation.    Associated Diagnoses: Kidney replaced by transplant      !! tacrolimus (GENERIC EQUIVALENT) 1 MG capsule Take 1 capsule (1 mg) by mouth 2 times daily. Take with a 5 mg capsule twice daily to equal 6 mg dose twice daily.  Qty: 180 capsule, Refills: 11    Associated Diagnoses: Kidney replaced by transplant      !! tacrolimus (GENERIC EQUIVALENT) 5 MG capsule Take 1 capsule (5 mg) by mouth 2 times daily. 5 mg tablet by mouth two times daily. Take with 1 mg capsule twice daily to equal 6 mg dose twice daily.    Associated Diagnoses: Kidney replaced by transplant      valGANciclovir (VALCYTE) 450 MG tablet Take 1 tablet (450 mg) by mouth daily.    Associated Diagnoses: Kidney replaced by transplant        !! - Potential duplicate medications found. Please discuss with provider.        CONTINUE these medications which have NOT CHANGED    Details   acetaminophen (TYLENOL) 325 MG tablet Take 325-650 mg by mouth every 4 hours as needed      atorvastatin (LIPITOR) 10 MG tablet Take 1 tablet (10 mg) by mouth daily.  Qty: 30 tablet, Refills: 2    Associated Diagnoses: Kidney replaced by transplant      mycophenolate (GENERIC EQUIVALENT) 250 MG capsule Take 3 capsules (750 mg) by mouth 2 times daily.  Qty: 180 capsule, Refills: 11    Associated Diagnoses: Kidney replaced by transplant      nortriptyline (PAMELOR) 25 MG capsule Take 25 mg by mouth at bedtime.      ondansetron (ZOFRAN ODT) 4 MG ODT tab Take 1 tablet (4 mg) by mouth every 8 hours as needed for nausea.  Qty: 20 tablet, Refills: 0    Associated Diagnoses: Kidney replaced by transplant      !! tacrolimus (GENERIC EQUIVALENT) 0.5 MG capsule Profile Rx: patient will contact pharmacy when needed  Qty: 60 capsule, Refills: 11    Comments: TXP DT 5/17/2025 (Kidney) TXP Dischg DT 5/20/2025 DX Kidney replaced by transplant Z94.0 TX [facility] ([place], [place])  Associated Diagnoses: Kidney replaced by transplant      varenicline (CHANTIX SANCHEZ) 0.5 MG X 11 & 1 MG X 42 tablet Take 0.5 mg by mouth daily. Patient is taking 0.5 mg as needed for cravings associated with smoking      VENTOLIN  (90 Base) MCG/ACT inhaler Inhale 2 puffs into the lungs every 4 hours as needed for shortness of breath or wheezing.      levonorgestrel (KYLEENA) 19.5 MG IUD 1 Intra Uterine Device by Intrauterine route once      methocarbamol (ROBAXIN) 500 MG tablet Take 1 tablet (500 mg) by mouth every 6 hours as needed for muscle spasms.  Qty: 20 tablet, Refills: 0    Associated Diagnoses: Acute post-operative pain       !! - Potential duplicate medications found. Please discuss with provider.        Allergies   Allergies   Allergen Reactions     Sulfa Antibiotics Other (See Comments)     Reaction as an infant- unsure of what happened     Data   Most Recent 3 CBC's:  Recent Labs   Lab Test 06/11/25  0557 06/10/25  0451 06/09/25  0604   WBC 5.4 9.2 5.9   HGB 8.8* 8.6* 8.9*   MCV 97 97 98    260 272      Most Recent 3 BMP's:  Recent Labs   Lab Test 06/11/25  0728 06/11/25  0557 06/11/25  0216 06/10/25  0814 06/10/25  0451 06/09/25  1143 06/09/25  0604   NA  --  139  --   --  139  --  137   POTASSIUM  --  4.9  --   --  4.6  --  4.7   CHLORIDE  --  107  --   --  107  --  110*   CO2  --  27  --   --  22  --  20*   BUN  --  27.5*  --   --  27.7*  --  20.5*   CR  --  1.83*  --   --  1.85*  --  1.95*   ANIONGAP  --  5*  --   --  10  --  7   KARTIK  --  9.5  --   --  9.8  --  9.9   GLC 88 79 87   < > 142*   < > 159*    < > = values in this interval not displayed.     Most Recent 2 LFT's:  Recent Labs   Lab Test 06/09/25  0604 06/08/25  1855   AST 13 12   ALT 6 7   ALKPHOS 109 106   BILITOTAL 0.2 0.2     Most Recent INR's and Anticoagulation Dosing History:  Anticoagulation Dose History          Latest Ref Rng & Units 5/20/2024 5/17/2025 6/6/2025 6/11/2025   Recent Dosing and Labs   INR 0.85 - 1.15 1.12  0.98  1.11  0.98      Most Recent 3 Troponin's:No lab results found.  Most Recent Cholesterol Panel:  Recent Labs   Lab Test 05/17/25  0203   CHOL 153   LDL 75   HDL 58   TRIG 100     Most Recent 6 Bacteria Isolates From Any Culture (See EPIC Reports for Culture Details):No lab results found.  Most Recent TSH, T4 and A1c Labs:  Recent Labs   Lab Test 05/22/25  0700   A1C 4.9

## 2025-06-12 ENCOUNTER — MEDICAL CORRESPONDENCE (OUTPATIENT)
Dept: HEALTH INFORMATION MANAGEMENT | Facility: CLINIC | Age: 35
End: 2025-06-12

## 2025-06-12 ENCOUNTER — LAB (OUTPATIENT)
Dept: LAB | Facility: CLINIC | Age: 35
End: 2025-06-12
Payer: COMMERCIAL

## 2025-06-12 ENCOUNTER — TELEPHONE (OUTPATIENT)
Dept: TRANSPLANT | Facility: CLINIC | Age: 35
End: 2025-06-12

## 2025-06-12 DIAGNOSIS — Z79.899 ENCOUNTER FOR LONG-TERM CURRENT USE OF MEDICATION: ICD-10-CM

## 2025-06-12 DIAGNOSIS — Z48.298 AFTERCARE FOLLOWING ORGAN TRANSPLANT: ICD-10-CM

## 2025-06-12 DIAGNOSIS — Z94.0 KIDNEY REPLACED BY TRANSPLANT: ICD-10-CM

## 2025-06-12 DIAGNOSIS — T86.11 ANTIBODY MEDIATED REJECTION OF KIDNEY TRANSPLANT: ICD-10-CM

## 2025-06-12 DIAGNOSIS — Z20.828 CONTACT WITH AND (SUSPECTED) EXPOSURE TO OTHER VIRAL COMMUNICABLE DISEASES: ICD-10-CM

## 2025-06-12 DIAGNOSIS — Z98.890 OTHER SPECIFIED POSTPROCEDURAL STATES: ICD-10-CM

## 2025-06-12 LAB
ANION GAP SERPL CALCULATED.3IONS-SCNC: 7 MMOL/L (ref 7–15)
BLD PROD TYP BPU: NORMAL
BLOOD COMPONENT TYPE: NORMAL
BUN SERPL-MCNC: 28.4 MG/DL (ref 6–20)
CALCIUM SERPL-MCNC: 10.4 MG/DL (ref 8.8–10.4)
CHLORIDE SERPL-SCNC: 105 MMOL/L (ref 98–107)
CODING SYSTEM: NORMAL
CREAT SERPL-MCNC: 1.76 MG/DL (ref 0.51–0.95)
EGFRCR SERPLBLD CKD-EPI 2021: 38 ML/MIN/1.73M2
ERYTHROCYTE [DISTWIDTH] IN BLOOD BY AUTOMATED COUNT: 12.9 % (ref 10–15)
GLUCOSE SERPL-MCNC: 73 MG/DL (ref 70–99)
HCO3 SERPL-SCNC: 26 MMOL/L (ref 22–29)
HCT VFR BLD AUTO: 26.6 % (ref 35–47)
HGB BLD-MCNC: 8.6 G/DL (ref 11.7–15.7)
ISSUE DATE AND TIME: NORMAL
MCH RBC QN AUTO: 31.2 PG (ref 26.5–33)
MCHC RBC AUTO-ENTMCNC: 32.3 G/DL (ref 31.5–36.5)
MCV RBC AUTO: 96 FL (ref 78–100)
PLATELET # BLD AUTO: 255 10E3/UL (ref 150–450)
POTASSIUM SERPL-SCNC: 4.4 MMOL/L (ref 3.4–5.3)
RBC # BLD AUTO: 2.76 10E6/UL (ref 3.8–5.2)
SODIUM SERPL-SCNC: 138 MMOL/L (ref 135–145)
TACROLIMUS BLD-MCNC: 7.4 UG/L (ref 5–15)
TME LAST DOSE: NORMAL H
TME LAST DOSE: NORMAL H
UNIT ABO/RH: NORMAL
UNIT NUMBER: NORMAL
UNIT STATUS: NORMAL
UNIT TYPE ISBT: 6200
UNIT TYPE ISBT: 8400
UNIT TYPE ISBT: 8400
WBC # BLD AUTO: 8 10E3/UL (ref 4–11)

## 2025-06-12 RX ORDER — CALCIUM GLUCONATE 100 MG/ML
AMPUL (ML) INTRAVENOUS
OUTPATIENT
Start: 2025-06-12

## 2025-06-12 RX ORDER — ALBUMIN HUMAN 25 %
1000 INTRAVENOUS SOLUTION INTRAVENOUS
OUTPATIENT
Start: 2025-06-12

## 2025-06-12 RX ORDER — DIPHENHYDRAMINE HYDROCHLORIDE 50 MG/ML
50 INJECTION, SOLUTION INTRAMUSCULAR; INTRAVENOUS
OUTPATIENT
Start: 2025-06-12

## 2025-06-13 ENCOUNTER — HOSPITAL ENCOUNTER (OUTPATIENT)
Dept: LAB | Facility: CLINIC | Age: 35
Discharge: HOME | End: 2025-06-13
Attending: PHYSICIAN ASSISTANT
Payer: COMMERCIAL

## 2025-06-13 ENCOUNTER — RESULTS FOLLOW-UP (OUTPATIENT)
Dept: TRANSPLANT | Facility: CLINIC | Age: 35
End: 2025-06-13

## 2025-06-13 VITALS
SYSTOLIC BLOOD PRESSURE: 133 MMHG | HEART RATE: 99 BPM | WEIGHT: 175.27 LBS | RESPIRATION RATE: 16 BRPM | DIASTOLIC BLOOD PRESSURE: 70 MMHG | TEMPERATURE: 98.2 F | BODY MASS INDEX: 26.65 KG/M2

## 2025-06-13 DIAGNOSIS — Z79.899 ENCOUNTER FOR LONG-TERM CURRENT USE OF MEDICATION: ICD-10-CM

## 2025-06-13 DIAGNOSIS — Z94.0 KIDNEY REPLACED BY TRANSPLANT: ICD-10-CM

## 2025-06-13 DIAGNOSIS — Z98.890 OTHER SPECIFIED POSTPROCEDURAL STATES: ICD-10-CM

## 2025-06-13 DIAGNOSIS — Z20.828 CONTACT WITH AND (SUSPECTED) EXPOSURE TO OTHER VIRAL COMMUNICABLE DISEASES: ICD-10-CM

## 2025-06-13 DIAGNOSIS — Z48.298 AFTERCARE FOLLOWING ORGAN TRANSPLANT: ICD-10-CM

## 2025-06-13 DIAGNOSIS — T86.11 ANTIBODY MEDIATED REJECTION OF KIDNEY TRANSPLANT: ICD-10-CM

## 2025-06-13 LAB
ANION GAP SERPL CALCULATED.3IONS-SCNC: 9 MMOL/L (ref 7–15)
BUN SERPL-MCNC: 25 MG/DL (ref 6–20)
CALCIUM SERPL-MCNC: 9.5 MG/DL (ref 8.8–10.4)
CHLORIDE SERPL-SCNC: 108 MMOL/L (ref 98–107)
CREAT SERPL-MCNC: 1.72 MG/DL (ref 0.51–0.95)
EGFRCR SERPLBLD CKD-EPI 2021: 39 ML/MIN/1.73M2
ERYTHROCYTE [DISTWIDTH] IN BLOOD BY AUTOMATED COUNT: 13 % (ref 10–15)
FIBRINOGEN PPP-MCNC: 233 MG/DL (ref 170–510)
GLUCOSE SERPL-MCNC: 62 MG/DL (ref 70–99)
HCO3 SERPL-SCNC: 21 MMOL/L (ref 22–29)
HCT VFR BLD AUTO: 26.7 % (ref 35–47)
HGB BLD-MCNC: 8.6 G/DL (ref 11.7–15.7)
INR PPP: 0.93 (ref 0.85–1.15)
MAGNESIUM SERPL-MCNC: 1.7 MG/DL (ref 1.7–2.3)
MCH RBC QN AUTO: 30.6 PG (ref 26.5–33)
MCHC RBC AUTO-ENTMCNC: 32.2 G/DL (ref 31.5–36.5)
MCV RBC AUTO: 95 FL (ref 78–100)
PHOSPHATE SERPL-MCNC: 2.1 MG/DL (ref 2.5–4.5)
PLATELET # BLD AUTO: 244 10E3/UL (ref 150–450)
POTASSIUM SERPL-SCNC: 4.2 MMOL/L (ref 3.4–5.3)
PROTHROMBIN TIME: 12.6 SECONDS (ref 11.8–14.8)
RBC # BLD AUTO: 2.81 10E6/UL (ref 3.8–5.2)
SODIUM SERPL-SCNC: 138 MMOL/L (ref 135–145)
TACROLIMUS BLD-MCNC: 7.5 UG/L (ref 5–15)
TME LAST DOSE: NORMAL H
TME LAST DOSE: NORMAL H
WBC # BLD AUTO: 4.9 10E3/UL (ref 4–11)

## 2025-06-13 PROCEDURE — 85610 PROTHROMBIN TIME: CPT | Performed by: PATHOLOGY

## 2025-06-13 PROCEDURE — P9059 PLASMA, FRZ BETWEEN 8-24HOUR: HCPCS | Performed by: PATHOLOGY

## 2025-06-13 PROCEDURE — 36514 APHERESIS PLASMA: CPT

## 2025-06-13 PROCEDURE — 85014 HEMATOCRIT: CPT | Performed by: INTERNAL MEDICINE

## 2025-06-13 PROCEDURE — 250N000011 HC RX IP 250 OP 636: Performed by: PATHOLOGY

## 2025-06-13 PROCEDURE — 272N000004 HC RX 272: Performed by: PATHOLOGY

## 2025-06-13 PROCEDURE — 82310 ASSAY OF CALCIUM: CPT | Performed by: INTERNAL MEDICINE

## 2025-06-13 PROCEDURE — 83735 ASSAY OF MAGNESIUM: CPT | Performed by: INTERNAL MEDICINE

## 2025-06-13 PROCEDURE — P9045 ALBUMIN (HUMAN), 5%, 250 ML: HCPCS | Performed by: PATHOLOGY

## 2025-06-13 PROCEDURE — 85384 FIBRINOGEN ACTIVITY: CPT | Performed by: PATHOLOGY

## 2025-06-13 PROCEDURE — 80197 ASSAY OF TACROLIMUS: CPT | Performed by: INTERNAL MEDICINE

## 2025-06-13 PROCEDURE — 84100 ASSAY OF PHOSPHORUS: CPT | Performed by: INTERNAL MEDICINE

## 2025-06-13 PROCEDURE — 250N000009 HC RX 250: Performed by: PATHOLOGY

## 2025-06-13 PROCEDURE — 36415 COLL VENOUS BLD VENIPUNCTURE: CPT | Performed by: INTERNAL MEDICINE

## 2025-06-13 RX ORDER — CALCIUM GLUCONATE 100 MG/ML
AMPUL (ML) INTRAVENOUS
Status: COMPLETED | OUTPATIENT
Start: 2025-06-13 | End: 2025-06-13

## 2025-06-13 RX ORDER — DIPHENHYDRAMINE HYDROCHLORIDE 50 MG/ML
50 INJECTION, SOLUTION INTRAMUSCULAR; INTRAVENOUS
Status: DISCONTINUED | OUTPATIENT
Start: 2025-06-13 | End: 2025-06-14 | Stop reason: HOSPADM

## 2025-06-13 RX ORDER — ALBUMIN HUMAN 25 %
1000 INTRAVENOUS SOLUTION INTRAVENOUS
Status: COMPLETED | OUTPATIENT
Start: 2025-06-13 | End: 2025-06-13

## 2025-06-13 RX ADMIN — ANTICOAGULANT CITRATE DEXTROSE SOLUTION FORMULA A 551 ML: 12.25; 11; 3.65 SOLUTION INTRAVENOUS at 08:20

## 2025-06-13 RX ADMIN — Medication 1000 ML: at 08:25

## 2025-06-13 RX ADMIN — CALCIUM GLUCONATE 4 G: 98 INJECTION, SOLUTION INTRAVENOUS at 08:20

## 2025-06-13 RX ADMIN — GELATIN ABSORBABLE SPONGE 12-7 MM 1 EACH: 12-7 MISC at 10:23

## 2025-06-13 NOTE — DISCHARGE INSTRUCTIONS
Plasma exchange:  If you received blood products (plasma or red blood cells) as part of your treatment, you need to be aware that transfusion reactions can occur up to several hours after they have been given to you.  Call your physician if you experience any symptoms in the next 48 hours, including: breathing problems, rash, itching, hives, nausea or vomiting, fever or chills, blood in your urine or stools, or joint pain.  Please inform the Transfusion Medicine Physician by calling [phone] and asking for the physician on call.  If you received albumin as part of your treatment (this is the most common), some of your clotting factors have been removed.  You body will replace these factors, but you could be at a slight risk for bleeding.  Please inform us if you have had any bleeding or a recent invasive procedure, such as a biopsy or surgery.    Certain medications that lower your blood pressure (ace inhibitors) such as Lisinopril are contraindicated while you are receiving plasma exchange.  Please inform us if you have started taking this medication during your plasma exchange series.

## 2025-06-13 NOTE — PROCEDURES
Laboratory Medicine and Pathology  Transfusion Medicine - Apheresis Procedure    Glenna Spears MRN# [id number]   YOB: 1990 Age: 34 year old        Reason for consult: Antibody mediated rejection of kidney transplant           Assessment and Plan:   The patient is a 34 year old female S/P kidney transplant with antibody mediated rejection. She underwent therapeutic plasma exchange (TPE) #3 of planned 5. She tolerated the procedure well.    Please do not start ACE inhibitors throughout the duration of the TPE series as these have been associated with reactions during apheresis.  Please notify the Transfusion Medicine physician of any upcoming procedures, surgeries, or biopsies as TPE with albumin replacement will affect coagulation factor levels.         Chief Complaint:   Edema         History of Present Illness:   The patient is a 34 year old female with ESRD on hemodialysis since 6/2022. She underwent kidney transplant 5/17/2025. Medical history also notable for obesity S/P gastric sleeve 1/2024, substance use disorder, positive quantiferon gold test S/P treatment, multiple right upper extremity DVT. She underwent kidney biopsy and drain placement for removal of perinephric fluid on 6/6/2025. Course complicated by abdominal wall hematoma. Biopsy consistent with antibody mediated rejection (diffuse C4d deposition) and known donor specific antibodies since 5/22/205. She has a left arm fistula that was used for dialysis. Transfusion Medicine consulted for TPE on 6/8/2025 and underwent first TPE on 6/9/2025.  She was discharged from the hospital on 6/11/2025.  She was not prescribed a diuretic on discharge and has had some increasing edema since discharge. She mostly feels it in her abdomen and upper thighs. Continues to have goo urine output.  Otherwise has been feeling well. No new bleeding.  Creatinine continuing to trend vladimir.          Past Medical History:     Past Medical History:    Diagnosis Date    ESRD (end stage renal disease) (H)     GERD (gastroesophageal reflux disease)     Hypertension     Kidney replaced by transplant 2025    DDKT. Induction w/ Thymoglobulin.    Latent tuberculosis     Completed 4 months of rifampin    Obesity     Secondary hyperparathyroidism     Vitamin D deficiency           Past Surgical History:     Past Surgical History:   Procedure Laterality Date    AV FISTULA REPAIR       SECTION      IR FINE NEEDLE ASPIRATION W ULTRASOUND  2025    IR RENAL BIOPSY RIGHT  2025    IR SKIN SUBQ/SEROMA ABSCESS DRAIN  2025    LAPAROSCOPIC GASTRIC SLEEVE N/A 2024    Procedure: GASTRECTOMY, SLEEVE, LAPAROSCOPIC;  Surgeon: John Smith MD;  Location: [facility] OR    TRANSPLANT KIDNEY RECIPIENT  DONOR Right 2025    Procedure: Transplant kidney recipient  donor with Vein and Artery reconstruction;  Surgeon: Manjeet Barreto MD;  Location:  OR          Social History:   Has a partner and 2 sons, lives in [place]          Family History:   Not reviewed today          Immunizations:   Not reviewed today          Allergies:     Allergies   Allergen Reactions    Sulfa Antibiotics Other (See Comments)     Reaction as an infant- unsure of what happened           Medications:     Current Outpatient Medications   Medication Sig    acetaminophen (TYLENOL) 325 MG tablet Take 325-650 mg by mouth every 4 hours as needed    [START ON 2025] apixaban ANTICOAGULANT (ELIQUIS) 5 MG tablet Take 2 tablets (10 mg) by mouth 2 times daily for 7 days, THEN 1 tablet (5 mg) 2 times daily.    aspirin (ASA) 81 MG EC tablet Take 1 tablet (81 mg) by mouth daily.    atorvastatin (LIPITOR) 10 MG tablet Take 1 tablet (10 mg) by mouth daily.    calcium carbonate (TUMS) 500 MG chewable tablet Take 2 tablets (1,000 mg) by mouth every 6 hours as needed for heartburn.    famotidine (PEPCID) 20 MG tablet Take 1 tablet (20 mg) by mouth 2  times daily as needed (heartburn).    furosemide (LASIX) 40 MG tablet Take 1 tablet (40 mg) by mouth daily.    hydrOXYzine HCl (ATARAX) 25 MG tablet Take 1 tablet (25 mg) by mouth every 6 hours as needed for anxiety.    levonorgestrel (KYLEENA) 19.5 MG IUD 1 Intra Uterine Device by Intrauterine route once    Lidocaine (LIDOCARE) 4 % Patch Place 2 patches over 12 hours onto the skin every 24 hours. To prevent lidocaine toxicity, patient should be patch free for 12 hrs daily.    magnesium hydroxide (MILK OF MAGNESIA) 400 MG/5ML suspension Take 30 mLs by mouth daily as needed for constipation.    magnesium oxide (MAG-OX) 400 MG tablet Take 1 tablet (400 mg) by mouth 2 times daily.    methocarbamol (ROBAXIN) 500 MG tablet Take 1 tablet (500 mg) by mouth every 6 hours as needed for muscle spasms.    mycophenolate (GENERIC EQUIVALENT) 250 MG capsule Take 3 capsules (750 mg) by mouth 2 times daily.    nortriptyline (PAMELOR) 25 MG capsule Take 25 mg by mouth at bedtime.    ondansetron (ZOFRAN ODT) 4 MG ODT tab Take 1 tablet (4 mg) by mouth every 8 hours as needed for nausea.    oxyCODONE (ROXICODONE) 5 MG tablet Take 1-2 tablets (5-10 mg) by mouth every 8 hours as needed for moderate pain. Take 5 mg to pain 4 to 6/10, take 10 mg for pain 6 to 10/10.    pantoprazole (PROTONIX) 40 MG EC tablet Take 1 tablet (40 mg) by mouth daily.    [START ON 6/19/2025] predniSONE (DELTASONE) 5 MG tablet Take 1 tablet (5 mg) by mouth daily.    senna-docusate (SENOKOT-S/PERICOLACE) 8.6-50 MG tablet Take 2 tablets by mouth 2 times daily as needed for constipation.    sertraline (ZOLOFT) 25 MG tablet Take 1 tablet (25 mg) by mouth daily for 5 days, THEN 2 tablets (50 mg) daily. Follow-up with your PCP to consider raising the dose to a maximum of 100 mg daily.    [Paused] tacrolimus (GENERIC EQUIVALENT) 0.5 MG capsule Profile Rx: patient will contact pharmacy when needed    tacrolimus (GENERIC EQUIVALENT) 1 MG capsule Take 1 capsule (1 mg) by  mouth 2 times daily. Take with a 5 mg capsule twice daily to equal 6 mg dose twice daily.    tacrolimus (GENERIC EQUIVALENT) 5 MG capsule Take 1 capsule (5 mg) by mouth 2 times daily. 5 mg tablet by mouth two times daily. Take with 1 mg capsule twice daily to equal 6 mg dose twice daily.    valGANciclovir (VALCYTE) 450 MG tablet Take 1 tablet (450 mg) by mouth daily.    varenicline (CHANTIX SANCHEZ) 0.5 MG X 11 & 1 MG X 42 tablet Take 0.5 mg by mouth daily. Patient is taking 0.5 mg as needed for cravings associated with smoking    VENTOLIN  (90 Base) MCG/ACT inhaler Inhale 2 puffs into the lungs every 4 hours as needed for shortness of breath or wheezing.           Review of Systems:   Denied fever, chills, cough, shortness of breath, nausea, vomiting, diarrhea, problems with fistula         Exam:   /72 P 87 T 97.7 RR 16 O2 sat 100%  Alert, no apparent distress  Breathing appears comfortable  No jaundice or scleral icterus  Moves all extremities, traced lower extremity edema  Left arm fistula          Data:     Results for orders placed or performed during the hospital encounter of 06/13/25 (from the past 24 hours)   INR   Result Value Ref Range    INR 0.93 0.85 - 1.15    PT 12.6 11.8 - 14.8 Seconds   Fibrinogen activity   Result Value Ref Range    Fibrinogen Activity 233 170 - 510 mg/dL   Basic metabolic panel   Result Value Ref Range    Sodium 138 135 - 145 mmol/L    Potassium 4.2 3.4 - 5.3 mmol/L    Chloride 108 (H) 98 - 107 mmol/L    Carbon Dioxide (CO2) 21 (L) 22 - 29 mmol/L    Anion Gap 9 7 - 15 mmol/L    Urea Nitrogen 25.0 (H) 6.0 - 20.0 mg/dL    Creatinine 1.72 (H) 0.51 - 0.95 mg/dL    GFR Estimate 39 (L) >60 mL/min/1.73m2    Calcium 9.5 8.8 - 10.4 mg/dL    Glucose 62 (L) 70 - 99 mg/dL   CBC with platelets   Result Value Ref Range    WBC Count 4.9 4.0 - 11.0 10e3/uL    RBC Count 2.81 (L) 3.80 - 5.20 10e6/uL    Hemoglobin 8.6 (L) 11.7 - 15.7 g/dL    Hematocrit 26.7 (L) 35.0 - 47.0 %    MCV 95 78 -  100 fL    MCH 30.6 26.5 - 33.0 pg    MCHC 32.2 31.5 - 36.5 g/dL    RDW 13.0 10.0 - 15.0 %    Platelet Count 244 150 - 450 10e3/uL   Phosphorus   Result Value Ref Range    Phosphorus 2.1 (L) 2.5 - 4.5 mg/dL   Magnesium   Result Value Ref Range    Magnesium 1.7 1.7 - 2.3 mg/dL          Procedure Summary:   A single plasma volume plasma exchange was performed with a Spectra Optia cell separator.  The vascular access was a left arm fistula.  ACD-A was used for anticoagulation.  To offset the effects of the citrate, the patient was given calcium  gluconate IV in the return line.  The replacement fluid was 1000 mL 5 % albumin and 2250 mL plasma due to recent hematoma after biopsy.  The patient's vital signs were stable throughout.  The patient tolerated the procedure well.    Attestation: During the procedure the patient was directly seen and evaluated by me, Tana Beltran MD, PhD.  I have reviewed the chart and pertinent laboratory findings, and discussed the patient and the current procedure with the Apheresis nursing staff.    Tana Beltran MD, PhD  Transfusion Medicine Attending  Laboratory Medicine & Pathology

## 2025-06-16 ENCOUNTER — INFUSION THERAPY VISIT (OUTPATIENT)
Dept: INFUSION THERAPY | Facility: CLINIC | Age: 35
End: 2025-06-16
Attending: INTERNAL MEDICINE
Payer: COMMERCIAL

## 2025-06-16 ENCOUNTER — TELEPHONE (OUTPATIENT)
Dept: TRANSPLANT | Facility: CLINIC | Age: 35
End: 2025-06-16

## 2025-06-16 ENCOUNTER — RESULTS FOLLOW-UP (OUTPATIENT)
Dept: TRANSPLANT | Facility: CLINIC | Age: 35
End: 2025-06-16

## 2025-06-16 ENCOUNTER — MYC MEDICAL ADVICE (OUTPATIENT)
Dept: OTHER | Age: 35
End: 2025-06-16

## 2025-06-16 ENCOUNTER — HOSPITAL ENCOUNTER (OUTPATIENT)
Dept: LAB | Facility: CLINIC | Age: 35
Discharge: HOME | End: 2025-06-16
Attending: PHYSICIAN ASSISTANT
Payer: COMMERCIAL

## 2025-06-16 VITALS
DIASTOLIC BLOOD PRESSURE: 50 MMHG | RESPIRATION RATE: 16 BRPM | TEMPERATURE: 98 F | HEIGHT: 68 IN | WEIGHT: 175.27 LBS | BODY MASS INDEX: 26.56 KG/M2 | HEART RATE: 93 BPM | SYSTOLIC BLOOD PRESSURE: 121 MMHG

## 2025-06-16 VITALS
SYSTOLIC BLOOD PRESSURE: 134 MMHG | TEMPERATURE: 98 F | HEART RATE: 78 BPM | DIASTOLIC BLOOD PRESSURE: 71 MMHG | OXYGEN SATURATION: 99 % | RESPIRATION RATE: 16 BRPM

## 2025-06-16 DIAGNOSIS — Z94.0 KIDNEY REPLACED BY TRANSPLANT: ICD-10-CM

## 2025-06-16 DIAGNOSIS — Z48.298 AFTERCARE FOLLOWING ORGAN TRANSPLANT: Primary | ICD-10-CM

## 2025-06-16 DIAGNOSIS — T86.11 ANTIBODY MEDIATED REJECTION OF KIDNEY TRANSPLANT: ICD-10-CM

## 2025-06-16 DIAGNOSIS — T86.11 ANTIBODY MEDIATED REJECTION OF KIDNEY TRANSPLANT: Primary | ICD-10-CM

## 2025-06-16 DIAGNOSIS — Z20.828 CONTACT WITH AND (SUSPECTED) EXPOSURE TO OTHER VIRAL COMMUNICABLE DISEASES: ICD-10-CM

## 2025-06-16 DIAGNOSIS — Z48.298 AFTERCARE FOLLOWING ORGAN TRANSPLANT: ICD-10-CM

## 2025-06-16 DIAGNOSIS — Z79.899 ENCOUNTER FOR LONG-TERM CURRENT USE OF MEDICATION: ICD-10-CM

## 2025-06-16 DIAGNOSIS — Z98.890 OTHER SPECIFIED POSTPROCEDURAL STATES: ICD-10-CM

## 2025-06-16 LAB
ANION GAP SERPL CALCULATED.3IONS-SCNC: 11 MMOL/L (ref 7–15)
BASOPHILS # BLD AUTO: 0 10E3/UL (ref 0–0.2)
BASOPHILS NFR BLD AUTO: 1 %
BUN SERPL-MCNC: 19.7 MG/DL (ref 6–20)
CALCIUM SERPL-MCNC: 8.2 MG/DL (ref 8.8–10.4)
CHLORIDE SERPL-SCNC: 117 MMOL/L (ref 98–107)
CREAT SERPL-MCNC: 1.21 MG/DL (ref 0.51–0.95)
EGFRCR SERPLBLD CKD-EPI 2021: 60 ML/MIN/1.73M2
EOSINOPHIL # BLD AUTO: 0.1 10E3/UL (ref 0–0.7)
EOSINOPHIL NFR BLD AUTO: 2 %
ERYTHROCYTE [DISTWIDTH] IN BLOOD BY AUTOMATED COUNT: 13 % (ref 10–15)
FIBRINOGEN PPP-MCNC: 247 MG/DL (ref 170–510)
GLUCOSE SERPL-MCNC: 101 MG/DL (ref 70–99)
HCO3 SERPL-SCNC: 15 MMOL/L (ref 22–29)
HCT VFR BLD AUTO: 27.8 % (ref 35–47)
HGB BLD-MCNC: 8.9 G/DL (ref 11.7–15.7)
IMM GRANULOCYTES # BLD: 0.1 10E3/UL
IMM GRANULOCYTES NFR BLD: 1 %
INR PPP: 0.99 (ref 0.85–1.15)
LYMPHOCYTES # BLD AUTO: 0.4 10E3/UL (ref 0.8–5.3)
LYMPHOCYTES NFR BLD AUTO: 7 %
MCH RBC QN AUTO: 30.3 PG (ref 26.5–33)
MCHC RBC AUTO-ENTMCNC: 32 G/DL (ref 31.5–36.5)
MCV RBC AUTO: 95 FL (ref 78–100)
MONOCYTES # BLD AUTO: 0.4 10E3/UL (ref 0–1.3)
MONOCYTES NFR BLD AUTO: 7 %
NEUTROPHILS # BLD AUTO: 4.9 10E3/UL (ref 1.6–8.3)
NEUTROPHILS NFR BLD AUTO: 82 %
NRBC # BLD AUTO: 0 10E3/UL
NRBC BLD AUTO-RTO: 0 /100
PLATELET # BLD AUTO: 234 10E3/UL (ref 150–450)
POTASSIUM SERPL-SCNC: 3.4 MMOL/L (ref 3.4–5.3)
PROTHROMBIN TIME: 13.3 SECONDS (ref 11.8–14.8)
RBC # BLD AUTO: 2.94 10E6/UL (ref 3.8–5.2)
SODIUM SERPL-SCNC: 143 MMOL/L (ref 135–145)
TACROLIMUS BLD-MCNC: 8.1 UG/L (ref 5–15)
TME LAST DOSE: NORMAL H
TME LAST DOSE: NORMAL H
WBC # BLD AUTO: 6 10E3/UL (ref 4–11)

## 2025-06-16 PROCEDURE — 96375 TX/PRO/DX INJ NEW DRUG ADDON: CPT

## 2025-06-16 PROCEDURE — 272N000004 HC RX 272: Performed by: PATHOLOGY

## 2025-06-16 PROCEDURE — 36415 COLL VENOUS BLD VENIPUNCTURE: CPT | Performed by: INTERNAL MEDICINE

## 2025-06-16 PROCEDURE — 250N000009 HC RX 250: Performed by: PATHOLOGY

## 2025-06-16 PROCEDURE — 80197 ASSAY OF TACROLIMUS: CPT | Performed by: INTERNAL MEDICINE

## 2025-06-16 PROCEDURE — P9045 ALBUMIN (HUMAN), 5%, 250 ML: HCPCS | Performed by: PATHOLOGY

## 2025-06-16 PROCEDURE — 36514 APHERESIS PLASMA: CPT

## 2025-06-16 PROCEDURE — 80048 BASIC METABOLIC PNL TOTAL CA: CPT | Performed by: INTERNAL MEDICINE

## 2025-06-16 PROCEDURE — 250N000011 HC RX IP 250 OP 636: Performed by: PATHOLOGY

## 2025-06-16 PROCEDURE — 36415 COLL VENOUS BLD VENIPUNCTURE: CPT | Performed by: PATHOLOGY

## 2025-06-16 PROCEDURE — 85025 COMPLETE CBC W/AUTO DIFF WBC: CPT | Performed by: PATHOLOGY

## 2025-06-16 PROCEDURE — 96366 THER/PROPH/DIAG IV INF ADDON: CPT | Mod: XS

## 2025-06-16 PROCEDURE — 250N000011 HC RX IP 250 OP 636: Performed by: NURSE PRACTITIONER

## 2025-06-16 PROCEDURE — 96365 THER/PROPH/DIAG IV INF INIT: CPT | Mod: XS

## 2025-06-16 PROCEDURE — 85610 PROTHROMBIN TIME: CPT | Performed by: PATHOLOGY

## 2025-06-16 PROCEDURE — 250N000011 HC RX IP 250 OP 636: Mod: JZ | Performed by: PHYSICIAN ASSISTANT

## 2025-06-16 PROCEDURE — 250N000013 HC RX MED GY IP 250 OP 250 PS 637: Performed by: INTERNAL MEDICINE

## 2025-06-16 PROCEDURE — P9059 PLASMA, FRZ BETWEEN 8-24HOUR: HCPCS

## 2025-06-16 PROCEDURE — 85384 FIBRINOGEN ACTIVITY: CPT | Performed by: PATHOLOGY

## 2025-06-16 RX ORDER — DIPHENHYDRAMINE HYDROCHLORIDE 50 MG/ML
50 INJECTION, SOLUTION INTRAMUSCULAR; INTRAVENOUS
Status: CANCELLED
Start: 2025-06-17

## 2025-06-16 RX ORDER — DIPHENHYDRAMINE HYDROCHLORIDE 50 MG/ML
50 INJECTION, SOLUTION INTRAMUSCULAR; INTRAVENOUS
Status: CANCELLED
Start: 2025-06-16

## 2025-06-16 RX ORDER — METHYLPREDNISOLONE SODIUM SUCCINATE 125 MG/2ML
100 INJECTION INTRAMUSCULAR; INTRAVENOUS
Status: CANCELLED | OUTPATIENT
Start: 2025-06-19

## 2025-06-16 RX ORDER — HEPARIN SODIUM 1000 [USP'U]/ML
1-3 INJECTION, SOLUTION INTRAVENOUS; SUBCUTANEOUS ONCE
Status: COMPLETED | OUTPATIENT
Start: 2025-06-16 | End: 2025-06-16

## 2025-06-16 RX ORDER — HEPARIN SODIUM (PORCINE) LOCK FLUSH IV SOLN 100 UNIT/ML 100 UNIT/ML
5 SOLUTION INTRAVENOUS
Status: CANCELLED | OUTPATIENT
Start: 2025-06-17

## 2025-06-16 RX ORDER — HUMAN IMMUNOGLOBULIN G 20 G/200ML
25 LIQUID INTRAVENOUS ONCE
Status: COMPLETED | OUTPATIENT
Start: 2025-06-16 | End: 2025-06-16

## 2025-06-16 RX ORDER — ACETAMINOPHEN 325 MG/1
650 TABLET ORAL DAILY PRN
Status: CANCELLED
Start: 2025-06-16

## 2025-06-16 RX ORDER — OXYCODONE HYDROCHLORIDE 5 MG/1
5 TABLET ORAL EVERY 8 HOURS PRN
Qty: 10 TABLET | Refills: 0 | Status: SHIPPED | OUTPATIENT
Start: 2025-06-16 | End: 2025-06-17

## 2025-06-16 RX ORDER — HUMAN IMMUNOGLOBULIN G 20 G/200ML
44.8 LIQUID INTRAVENOUS ONCE
Status: CANCELLED | OUTPATIENT
Start: 2025-06-16 | End: 2025-06-16

## 2025-06-16 RX ORDER — MEPERIDINE HYDROCHLORIDE 25 MG/ML
25 INJECTION INTRAMUSCULAR; INTRAVENOUS; SUBCUTANEOUS
Status: CANCELLED | OUTPATIENT
Start: 2025-06-16

## 2025-06-16 RX ORDER — HEPARIN SODIUM,PORCINE 10 UNIT/ML
5-20 VIAL (ML) INTRAVENOUS DAILY PRN
Status: CANCELLED | OUTPATIENT
Start: 2025-06-17

## 2025-06-16 RX ORDER — ACETAMINOPHEN 325 MG/1
650 TABLET ORAL DAILY PRN
Status: DISCONTINUED | OUTPATIENT
Start: 2025-06-16 | End: 2025-06-16 | Stop reason: HOSPADM

## 2025-06-16 RX ORDER — SODIUM BICARBONATE 650 MG/1
1300 TABLET ORAL 2 TIMES DAILY
Qty: 120 TABLET | Refills: 11 | Status: SHIPPED | OUTPATIENT
Start: 2025-06-16 | End: 2025-06-17

## 2025-06-16 RX ORDER — CALCIUM GLUCONATE 100 MG/ML
AMPUL (ML) INTRAVENOUS
Status: COMPLETED | OUTPATIENT
Start: 2025-06-16 | End: 2025-06-16

## 2025-06-16 RX ORDER — ALBUTEROL SULFATE 0.83 MG/ML
2.5 SOLUTION RESPIRATORY (INHALATION)
Status: CANCELLED | OUTPATIENT
Start: 2025-06-16

## 2025-06-16 RX ORDER — HEPARIN SODIUM 1000 [USP'U]/ML
1-3 INJECTION, SOLUTION INTRAVENOUS; SUBCUTANEOUS ONCE
Status: CANCELLED
Start: 2025-06-16 | End: 2025-06-16

## 2025-06-16 RX ORDER — METHYLPREDNISOLONE SODIUM SUCCINATE 125 MG/2ML
100 INJECTION INTRAMUSCULAR; INTRAVENOUS
Status: DISCONTINUED | OUTPATIENT
Start: 2025-06-16 | End: 2025-06-16 | Stop reason: HOSPADM

## 2025-06-16 RX ORDER — METHYLPREDNISOLONE SODIUM SUCCINATE 40 MG/ML
40 INJECTION INTRAMUSCULAR; INTRAVENOUS
Status: CANCELLED
Start: 2025-06-17

## 2025-06-16 RX ORDER — ALBUMIN HUMAN 25 %
1750 INTRAVENOUS SOLUTION INTRAVENOUS
Status: COMPLETED | OUTPATIENT
Start: 2025-06-16 | End: 2025-06-16

## 2025-06-16 RX ORDER — ALBUTEROL SULFATE 90 UG/1
1-2 INHALANT RESPIRATORY (INHALATION)
Status: CANCELLED
Start: 2025-06-17

## 2025-06-16 RX ORDER — MEPERIDINE HYDROCHLORIDE 25 MG/ML
25 INJECTION INTRAMUSCULAR; INTRAVENOUS; SUBCUTANEOUS
Status: CANCELLED | OUTPATIENT
Start: 2025-06-17

## 2025-06-16 RX ORDER — HUMAN IMMUNOGLOBULIN G 20 G/200ML
27 LIQUID INTRAVENOUS ONCE
Status: CANCELLED | OUTPATIENT
Start: 2025-06-16 | End: 2025-06-16

## 2025-06-16 RX ORDER — METHYLPREDNISOLONE SODIUM SUCCINATE 40 MG/ML
40 INJECTION INTRAMUSCULAR; INTRAVENOUS
Status: CANCELLED
Start: 2025-06-16

## 2025-06-16 RX ORDER — ALBUTEROL SULFATE 0.83 MG/ML
2.5 SOLUTION RESPIRATORY (INHALATION)
Status: CANCELLED | OUTPATIENT
Start: 2025-06-17

## 2025-06-16 RX ORDER — EPINEPHRINE 1 MG/ML
0.3 INJECTION, SOLUTION INTRAMUSCULAR; SUBCUTANEOUS EVERY 5 MIN PRN
Status: CANCELLED | OUTPATIENT
Start: 2025-06-17

## 2025-06-16 RX ORDER — EPINEPHRINE 1 MG/ML
0.3 INJECTION, SOLUTION INTRAMUSCULAR; SUBCUTANEOUS EVERY 5 MIN PRN
Status: CANCELLED | OUTPATIENT
Start: 2025-06-16

## 2025-06-16 RX ORDER — DIPHENHYDRAMINE HYDROCHLORIDE 50 MG/ML
25 INJECTION, SOLUTION INTRAMUSCULAR; INTRAVENOUS
Status: CANCELLED
Start: 2025-06-17

## 2025-06-16 RX ORDER — ALBUTEROL SULFATE 90 UG/1
1-2 INHALANT RESPIRATORY (INHALATION)
Status: CANCELLED
Start: 2025-06-16

## 2025-06-16 RX ORDER — DIPHENHYDRAMINE HYDROCHLORIDE 50 MG/ML
25 INJECTION, SOLUTION INTRAMUSCULAR; INTRAVENOUS
Status: CANCELLED
Start: 2025-06-16

## 2025-06-16 RX ADMIN — CALCIUM GLUCONATE 3.4 G: 98 INJECTION, SOLUTION INTRAVENOUS at 08:45

## 2025-06-16 RX ADMIN — GELATIN ABSORBABLE SPONGE 12-7 MM 1 EACH: 12-7 MISC at 10:45

## 2025-06-16 RX ADMIN — HUMAN IMMUNOGLOBULIN G 25 G: 20 LIQUID INTRAVENOUS at 11:17

## 2025-06-16 RX ADMIN — ANTICOAGULANT CITRATE DEXTROSE SOLUTION FORMULA A 579 ML: 12.25; 11; 3.65 SOLUTION INTRAVENOUS at 08:45

## 2025-06-16 RX ADMIN — METHYLPREDNISOLONE SODIUM SUCCINATE 100 MG: 125 INJECTION, POWDER, FOR SOLUTION INTRAMUSCULAR; INTRAVENOUS at 11:13

## 2025-06-16 RX ADMIN — ACETAMINOPHEN 650 MG: 325 TABLET ORAL at 11:09

## 2025-06-16 RX ADMIN — ALBUMIN (HUMAN) 1750 ML: 12.5 INJECTION, SOLUTION INTRAVENOUS at 08:45

## 2025-06-16 NOTE — PROCEDURES
Laboratory Medicine and Pathology  Transfusion Medicine - Apheresis Procedure    Glenna Spears MRN# [id number]   YOB: 1990 Age: 34 year old        Reason for consult: Antibody mediated rejection of kidney transplant           Assessment and Plan:   The patient is a 34 year old female S/P kidney transplant with antibody mediated rejection. She underwent therapeutic plasma exchange (TPE) #4 of planned 5. She tolerated the procedure well.    She overall is feeling well.  Denies nausea, vomiting, fevers, chills.     Please do not start ACE inhibitors throughout the duration of the TPE series as these have been associated with reactions during apheresis.  Please notify the Transfusion Medicine physician of any upcoming procedures, surgeries, or biopsies as TPE with albumin replacement will affect coagulation factor levels.           History of Present Illness:   The patient is a 34 year old female with ESRD on hemodialysis since 6/2022. She underwent kidney transplant 5/17/2025. Medical history also notable for obesity S/P gastric sleeve 1/2024, substance use disorder, positive quantiferon gold test S/P treatment, multiple right upper extremity DVT. She underwent kidney biopsy and drain placement for removal of perinephric fluid on 6/6/2025. Course complicated by abdominal wall hematoma. Biopsy consistent with antibody mediated rejection (diffuse C4d deposition) and known donor specific antibodies since 5/22/205. She has a left arm fistula that was used for dialysis. Transfusion Medicine consulted for TPE on 6/8/2025 and underwent first TPE on 6/9/2025.  She was discharged from the hospital on 6/11/2025.           Past Medical History:     Past Medical History:   Diagnosis Date    ESRD (end stage renal disease) (H)     GERD (gastroesophageal reflux disease)     Hypertension     Kidney replaced by transplant 05/17/2025    DDKT. Induction w/ Thymoglobulin.    Latent tuberculosis 2024     Completed 4 months of rifampin    Obesity     Secondary hyperparathyroidism     Vitamin D deficiency           Past Surgical History:     Past Surgical History:   Procedure Laterality Date    AV FISTULA REPAIR       SECTION      IR FINE NEEDLE ASPIRATION W ULTRASOUND  2025    IR RENAL BIOPSY RIGHT  2025    IR SKIN SUBQ/SEROMA ABSCESS DRAIN  2025    LAPAROSCOPIC GASTRIC SLEEVE N/A 2024    Procedure: GASTRECTOMY, SLEEVE, LAPAROSCOPIC;  Surgeon: John Smith MD;  Location: [facility] OR    TRANSPLANT KIDNEY RECIPIENT  DONOR Right 2025    Procedure: Transplant kidney recipient  donor with Vein and Artery reconstruction;  Surgeon: Manjeet Barreto MD;  Location: [facility] OR          Social History:   Has a partner and 2 sons, lives in [place]            Allergies:     Allergies   Allergen Reactions    Sulfa Antibiotics Other (See Comments)     Reaction as an infant- unsure of what happened           Medications:     Current Outpatient Medications   Medication Sig Dispense Refill    acetaminophen (TYLENOL) 325 MG tablet Take 325-650 mg by mouth every 4 hours as needed      [START ON 2025] apixaban ANTICOAGULANT (ELIQUIS) 5 MG tablet Take 2 tablets (10 mg) by mouth 2 times daily for 7 days, THEN 1 tablet (5 mg) 2 times daily. 194 tablet 0    aspirin (ASA) 81 MG EC tablet Take 1 tablet (81 mg) by mouth daily. 30 tablet 11    atorvastatin (LIPITOR) 10 MG tablet Take 1 tablet (10 mg) by mouth daily. 30 tablet 2    calcium carbonate (TUMS) 500 MG chewable tablet Take 2 tablets (1,000 mg) by mouth every 6 hours as needed for heartburn.      famotidine (PEPCID) 20 MG tablet Take 1 tablet (20 mg) by mouth 2 times daily as needed (heartburn).      furosemide (LASIX) 40 MG tablet Take 1 tablet (40 mg) by mouth daily. 30 tablet 0    hydrOXYzine HCl (ATARAX) 25 MG tablet Take 1 tablet (25 mg) by mouth every 6 hours as needed for anxiety. 20 tablet 11    levonorgestrel  (KYLEENA) 19.5 MG IUD 1 Intra Uterine Device by Intrauterine route once      Lidocaine (LIDOCARE) 4 % Patch Place 2 patches over 12 hours onto the skin every 24 hours. To prevent lidocaine toxicity, patient should be patch free for 12 hrs daily. 20 patch 11    magnesium hydroxide (MILK OF MAGNESIA) 400 MG/5ML suspension Take 30 mLs by mouth daily as needed for constipation. 118 mL 11    magnesium oxide (MAG-OX) 400 MG tablet Take 1 tablet (400 mg) by mouth 2 times daily. 60 tablet 11    methocarbamol (ROBAXIN) 500 MG tablet Take 1 tablet (500 mg) by mouth every 6 hours as needed for muscle spasms. 20 tablet 0    mycophenolate (GENERIC EQUIVALENT) 250 MG capsule Take 3 capsules (750 mg) by mouth 2 times daily. 180 capsule 11    nortriptyline (PAMELOR) 25 MG capsule Take 25 mg by mouth at bedtime.      ondansetron (ZOFRAN ODT) 4 MG ODT tab Take 1 tablet (4 mg) by mouth every 8 hours as needed for nausea. 20 tablet 0    oxyCODONE (ROXICODONE) 5 MG tablet Take 1-2 tablets (5-10 mg) by mouth every 8 hours as needed for moderate pain. Take 5 mg to pain 4 to 6/10, take 10 mg for pain 6 to 10/10. 20 tablet 0    pantoprazole (PROTONIX) 40 MG EC tablet Take 1 tablet (40 mg) by mouth daily. 30 tablet 1    [START ON 6/19/2025] predniSONE (DELTASONE) 5 MG tablet Take 1 tablet (5 mg) by mouth daily. 30 tablet 11    senna-docusate (SENOKOT-S/PERICOLACE) 8.6-50 MG tablet Take 2 tablets by mouth 2 times daily as needed for constipation.      sertraline (ZOLOFT) 25 MG tablet Take 1 tablet (25 mg) by mouth daily for 5 days, THEN 2 tablets (50 mg) daily. Follow-up with your PCP to consider raising the dose to a maximum of 100 mg daily. 65 tablet 0    [Paused] tacrolimus (GENERIC EQUIVALENT) 0.5 MG capsule Profile Rx: patient will contact pharmacy when needed 60 capsule 11    tacrolimus (GENERIC EQUIVALENT) 1 MG capsule Take 2 capsules (2 mg) by mouth 2 times daily. Total dose: 7mg twice daily 120 capsule 11    tacrolimus (GENERIC  "EQUIVALENT) 5 MG capsule Take 1 capsule (5 mg) by mouth 2 times daily. Total dose: 7mg twice daily 60 capsule 11    valGANciclovir (VALCYTE) 450 MG tablet Take 1 tablet (450 mg) by mouth daily.      varenicline (CHANTIX SANCHEZ) 0.5 MG X 11 & 1 MG X 42 tablet Take 0.5 mg by mouth daily. Patient is taking 0.5 mg as needed for cravings associated with smoking      VENTOLIN  (90 Base) MCG/ACT inhaler Inhale 2 puffs into the lungs every 4 hours as needed for shortness of breath or wheezing.       Current Facility-Administered Medications   Medication Dose Route Frequency Provider Last Rate Last Admin    gelatin absorbable (GELFOAM) sponge 1 each  1 each Topical During Apheresis (from stock) Tana Beltran MD         Facility-Administered Medications Ordered in Other Encounters   Medication Dose Route Frequency Provider Last Rate Last Admin    heparin Lock (1000 units/mL High concentration) 1,000-3,000 Units  1-3 mL Intracatheter Once Milena Michael PA-C        immune globulin - sucrose free 10% (PRIVIGEN) infusion 25 g  25 g Intravenous Once Milena Michael PA-C        [START ON 6/18/2025] methylPREDNISolone Na Suc (solu-MEDROL) injection 100 mg  100 mg Intravenous Once per day on Monday Wednesday Friday Ashley Wells, NP        sodium chloride 0.9% BOLUS 250 mL  250 mL Intravenous Once Milena Michael PA-C                   Review of Systems:   See above         Exam:   /57   Pulse 90   Temp 98  F (36.7  C) (Oral)   Resp 16   Ht 1.73 m (5' 8.11\")   Wt 79.5 kg (175 lb 4.3 oz)   BMI 26.56 kg/m      Alert, no apparent distress  Breathing appears comfortable on room air  Left arm fistula accessed for the procedure.           Data:     Results for orders placed or performed during the hospital encounter of 06/16/25 (from the past 24 hours)   CBC with platelets differential    Narrative    The following orders were created for panel order CBC with platelets differential.  Procedure         "                       Abnormality         Status                     ---------                               -----------         ------                     CBC with platelets and ...[[id number]]  Abnormal            Final result                 Please view results for these tests on the individual orders.   INR   Result Value Ref Range    INR 0.99 0.85 - 1.15    PT 13.3 11.8 - 14.8 Seconds   Fibrinogen activity   Result Value Ref Range    Fibrinogen Activity 247 170 - 510 mg/dL   CBC with platelets and differential   Result Value Ref Range    WBC Count 6.0 4.0 - 11.0 10e3/uL    RBC Count 2.94 (L) 3.80 - 5.20 10e6/uL    Hemoglobin 8.9 (L) 11.7 - 15.7 g/dL    Hematocrit 27.8 (L) 35.0 - 47.0 %    MCV 95 78 - 100 fL    MCH 30.3 26.5 - 33.0 pg    MCHC 32.0 31.5 - 36.5 g/dL    RDW 13.0 10.0 - 15.0 %    Platelet Count 234 150 - 450 10e3/uL    % Neutrophils 82 %    % Lymphocytes 7 %    % Monocytes 7 %    % Eosinophils 2 %    % Basophils 1 %    % Immature Granulocytes 1 %    NRBCs per 100 WBC 0 <1 /100    Absolute Neutrophils 4.9 1.6 - 8.3 10e3/uL    Absolute Lymphocytes 0.4 (L) 0.8 - 5.3 10e3/uL    Absolute Monocytes 0.4 0.0 - 1.3 10e3/uL    Absolute Eosinophils 0.1 0.0 - 0.7 10e3/uL    Absolute Basophils 0.0 0.0 - 0.2 10e3/uL    Absolute Immature Granulocytes 0.1 <=0.4 10e3/uL    Absolute NRBCs 0.0 10e3/uL       [facility]  Recent Labs   Lab 06/13/25  0812 06/12/25  0923 06/11/25  0728 06/11/25  0557 06/10/25  0814 06/10/25  0451    138  --  139  --  139   POTASSIUM 4.2 4.4  --  4.9  --  4.6   CHLORIDE 108* 105  --  107  --  107   KARTIK 9.5 10.4  --  9.5  --  9.8   CO2 21* 26  --  27  --  22   BUN 25.0* 28.4*  --  27.5*  --  27.7*   CR 1.72* 1.76*  --  1.83*  --  1.85*   GLC 62* 73 88 79   < > 142*    < > = values in this interval not displayed.     CBC  Recent Labs   Lab 06/16/25  0907 06/13/25  0812 06/12/25  0923 06/11/25  0557   WBC 6.0 4.9 8.0 5.4   RBC 2.94* 2.81* 2.76* 2.92*   HGB 8.9* 8.6* 8.6* 8.8*   HCT 27.8* 26.7*  26.6* 28.2*   MCV 95 95 96 97   MCH 30.3 30.6 31.2 30.1   MCHC 32.0 32.2 32.3 31.2*   RDW 13.0 13.0 12.9 13.2    244 255 260     INR  Recent Labs   Lab 06/16/25  0907 06/13/25  0812 06/11/25  0930   INR 0.99 0.93 0.98       Fibrinogen Activity   Date Value Ref Range Status   06/16/2025 247 170 - 510 mg/dL Final                Procedure Summary:   A single plasma volume plasma exchange was performed with a Spectra Optia cell separator.  The vascular access was a left arm fistula.  ACD-A was used for anticoagulation.  To offset the effects of the citrate, the patient was given calcium  gluconate IV in the return line.  The replacement fluid was 1750 mL 5 % albumin and 1500 mL plasma due to recent hematoma after biopsy.  The patient's vital signs were stable throughout.  The patient tolerated the procedure well.        Attestation: During the procedure the patient was directly seen and evaluated by me, Kvng Ceja MD.  I have reviewed the chart and pertinent laboratory findings, and discussed the patient and the current procedure with the Apheresis nursing staff.    Kvng Ceja MD  Transfusion Medicine Attending  Laboratory Medicine & Pathology

## 2025-06-16 NOTE — PROGRESS NOTES
Nursing Note  Glenna Spears presents today to [facility] for:   Chief Complaint   Patient presents with    Infusion     IVIG, dose 2 of 3 outpatient after apheresis     During today's [facility] appointment, orders from Dr. Aleman were completed.  Frequency: today is dose 2 of 3 total    Progress note: Patient identification verified by name and date of birth.  Assessment completed.  Vitals recorded in Doc Flowsheets.  Patient was provided with education regarding medication/procedure and possible side effects.  Patient verbalized understanding.    Treatment Conditions: IVIG after apheresis  Premedications: administered per order.  Drug Waste Record: Yes, documented in pysix  Infusion length and rate:  infusion starts at 39 ml/hr, then increased by 39 ml/hr every 15 minutes to final rate of 236 ml/hr., about 2 hours total time  Labs: were drawn prior to appointment in apheresis.  Vascular access: peripheral IV was placed by vascular access nurse.  Is the next appt scheduled? Yes, patient aware    Post Infusion Assessment: Patient tolerated infusion without incident.  Site patent and intact, free from redness, edema or discomfort.  No evidence of extravasations.  Access discontinued per protocol.     Discharge Plan: Follow up plan of care with: ongoing infusions at [facility].  Discharge instructions were reviewed with patient.  Patient/representative verbalized understanding of discharge instructions and all questions answered.  Patient discharged from [facility] in stable condition.    Mahogany Bateman RN    Administrations This Visit       acetaminophen (TYLENOL) tablet 650 mg       Admin Date  06/16/2025 Action  $Given Dose  650 mg Route  Oral Documented By  Mahogany Bateman RN              immune globulin - sucrose free 10% (PRIVIGEN) infusion 25 g       Admin Date  06/16/2025 Action  $New Bag  Dose  25 g Route  Intravenous Documented By  Mahogany Bateman, RN              methylPREDNISolone Na Suc (solu-MEDROL) injection 100 mg       Admin Date  06/16/2025 Action  $Given Dose  100 mg Route  Intravenous Documented By  Mahogany Bateman, TREVA                    There were no vitals taken for this visit.

## 2025-06-16 NOTE — DISCHARGE INSTRUCTIONS
Apheresis Blood Donor Center Post Instructions  You may feel tired after your procedure today.   Please call your doctor if you have:  bleeding that doesn t stop, fever, pain where a needle or tube (catheter) was placed, seizures, trouble breathing, red urine, nausea or vomiting, other health concerns.     If your symptoms are severe, call [phone].  If we used your fistula or graft, watch for signs of bleeding.  Please remove the bandages after 4 hours.  The Apheresis/Blood Donor Center is open Monday-Friday 7:30 a.m. to 5 p.m.  The phone number is [phone].  A Transfusion Medicine physician can be reached after 5:00 p.m. weekdays and on weekends /Holidays by calling [phone], and asking for the physician on call.      Plasma exchange:  If you received blood products (plasma or red blood cells) as part of your treatment, you need to be aware that transfusion reactions can occur up to several hours after they have been given to you.  Call your physician if you experience any symptoms in the next 48 hours, including: breathing problems, rash, itching, hives, nausea or vomiting, fever or chills, blood in your urine or stools, or joint pain.  Please inform the Transfusion Medicine Physician by calling [phone] and asking for the physician on call.  If you received albumin as part of your treatment (this is the most common), some of your clotting factors have been removed.  You body will replace these factors, but you could be at a slight risk for bleeding.  Please inform us if you have had any bleeding or a recent invasive procedure, such as a biopsy or surgery.    Certain medications that lower your blood pressure (ace inhibitors) such as Lisinopril are contraindicated while you are receiving plasma exchange.  Please inform us if you have started taking this medication during your plasma exchange series.

## 2025-06-16 NOTE — LETTER
OUTPATIENT LABORATORY TEST ORDER     Patient Name: Glenna Spears   YOB: 1990     [facility] MR# [if applicable]: [id number]   Date & Time: May 22, 2025  3:35 PM  Expiration Date: 1 year after date issued      Diagnosis: Kidney Transplant (ICD-10 Z94.0)    Aftercare following organ transplant (ICD-10 Z48.288)    Long term use of medications (ICD-10 Z79.899)    Contact with and (suspected) exposure to other viral communicable   diseases (Z20.828)     We ask your assistance in obtaining the following laboratory tests, which are part of our routine surveillance program for Solid Organ Transplant patients.     Please fax each result to [phone], same day as resulted/available    Critical lab results page [phone]  Monday - Friday 8 am to 5 pm  Evening/Weekend/Holiday communicate Critical labs results [phone])    First 8 weeks post-transplant (5/17/25-7/17/25)  Labs 2x/week (Monday and Thursday)  CBC with platelets  Basic Metabolic Panel (Sodium, Potassium, Chloride, Creatinine, CO2, Urea Nitrogen, glucose, Calcium)  Tacrolimus/Prograf/ drug level    Labs weekly (Monday)  Phosphorus  Magnesium  Labs every 2 weeks  Mycophenolic Acid Level    Months 2-4 post-transplant (7/17/25-9/17/25)  Labs 1x weekly (Monday or Tuesday)  CBC with platelets  Basic Metabolic Panel (Sodium, Potassium, Chloride, Creatinine, CO2, Urea Nitrogen, glucose, Calcium)  Tacrolimus/Prograf/ drug level  Labs monthly   Phosphorus  Magnesium    Months 4-7 post-transplant (9/17/25-12/17/25)  Labs every other week  CBC with platelets  Basic Metabolic Panel (Sodium, Potassium, Chloride, Creatinine, CO2, Urea Nitrogen, glucose, Calcium)  Tacrolimus/Prograf/ drug level  Monthly  Phosphorus  Magnesium  BK (Polyoma Virus) PCR Quantitative/Plasma    Months 7-12 post-transplant (12/17/25-5/17/26)  Monthly  CBC with platelets  Basic Metabolic Panel (Sodium, Potassium, Chloride, Creatinine, CO2, Urea Nitrogen,  glucose, Calcium)  Tacrolimus/Prograf/ drug level  BK (Polyoma Virus) PCR Quantitative/Plasma    At 1-month post-transplant (6/17/25)  DSA PRA (patient to supply )   BK Virus PCR Quantitative/Plasma  Urine protein/creatinine  Iron Panel  Vitamin D Deficiency Screening  PTH Intact  HBV, HCV, HIV by JAYASHREE testing  If unable to conduct JAYASHREE testing, please substitute the following:   Hepatitis B DNA Quantitative, Real-Time PCR   Hepatitis C RNA, Quantitation   HIV 1 RNA, Quantitation   HIV 2 RNA, Quantitation     At 2 months post-transplant (7/17/25)  DSA PRA (patient to supply  kit)  BK Virus PCR Quantitative/Plasma  Urine protein/creatinine    At month 3 only (8/17/25)  BK (Polyoma virus) PCR Quantitative/Plasma    At 4 months post-transplant (9/17/25)  DSA PRA (patient to supply )  PTH  Urine protein/creatinine      At 6 months post-transplant (Fasting Labs) (11/17/25)  Hepatic panel  Hemoglobin A1c  Uric Acid  Lipid panel  Urine protein/creatinine    At 7 months post-transplant (12/17/25)   PRA/DSA (patient to supply )    At 9 months post-transplant (2/17/26)   Urine protein/creatinine  T cell subset (CD4)     At 12 months post-transplant (Fasting labs) 5/17/26  Hepatic panel  Hemoglobin A1c  Uric Acid  Lipid panel  Urine protein/creatinine  PTH  Vitamin D      If you have any questions please call the Transplant Center at [phone]. All lab results should be faxed to [phone]    .

## 2025-06-16 NOTE — TELEPHONE ENCOUNTER
ISSUE:   CO2 15    PLAN:   Yes, would start with 1300 mg bid. (Sodium bicarbonate)    OUTCOME:   RNCC left vm for pt regarding today's labs.   Voiced understanding of sodium bicarb - sent to [place] pharmacy for tomorrow's appts.     Will see Nataliya in clinic tomorrow to address pain and possibility of returning home this weekend to [place] as labs are improving greatly.     Lab orders re-sent to crow and pt

## 2025-06-17 ENCOUNTER — OFFICE VISIT (OUTPATIENT)
Dept: TRANSPLANT | Facility: CLINIC | Age: 35
End: 2025-06-17
Attending: NURSE PRACTITIONER
Payer: COMMERCIAL

## 2025-06-17 ENCOUNTER — MYC MEDICAL ADVICE (OUTPATIENT)
Dept: OTHER | Age: 35
End: 2025-06-17

## 2025-06-17 VITALS
SYSTOLIC BLOOD PRESSURE: 139 MMHG | BODY MASS INDEX: 26.31 KG/M2 | HEART RATE: 109 BPM | RESPIRATION RATE: 16 BRPM | DIASTOLIC BLOOD PRESSURE: 78 MMHG | OXYGEN SATURATION: 100 % | WEIGHT: 173.6 LBS

## 2025-06-17 DIAGNOSIS — Z48.298 AFTERCARE FOLLOWING ORGAN TRANSPLANT: ICD-10-CM

## 2025-06-17 DIAGNOSIS — T86.11 ANTIBODY MEDIATED REJECTION OF KIDNEY TRANSPLANT: ICD-10-CM

## 2025-06-17 DIAGNOSIS — Z94.0 KIDNEY REPLACED BY TRANSPLANT: ICD-10-CM

## 2025-06-17 DIAGNOSIS — Z48.298 AFTERCARE FOLLOWING ORGAN TRANSPLANT: Primary | ICD-10-CM

## 2025-06-17 PROCEDURE — G0463 HOSPITAL OUTPT CLINIC VISIT: HCPCS | Performed by: NURSE PRACTITIONER

## 2025-06-17 PROCEDURE — 99213 OFFICE O/P EST LOW 20 MIN: CPT | Mod: 24 | Performed by: PHYSICIAN ASSISTANT

## 2025-06-17 RX ORDER — CALCIUM GLUCONATE 100 MG/ML
AMPUL (ML) INTRAVENOUS
OUTPATIENT
Start: 2025-06-17

## 2025-06-17 RX ORDER — DIPHENHYDRAMINE HYDROCHLORIDE 50 MG/ML
50 INJECTION, SOLUTION INTRAMUSCULAR; INTRAVENOUS
OUTPATIENT
Start: 2025-06-17

## 2025-06-17 RX ORDER — MYCOPHENOLATE MOFETIL 250 MG/1
750 CAPSULE ORAL 2 TIMES DAILY
Qty: 180 CAPSULE | Refills: 11 | Status: SHIPPED | OUTPATIENT
Start: 2025-06-17

## 2025-06-17 RX ORDER — OXYCODONE HYDROCHLORIDE 5 MG/1
5 TABLET ORAL EVERY 8 HOURS PRN
Qty: 10 TABLET | Refills: 0 | Status: SHIPPED | OUTPATIENT
Start: 2025-06-17

## 2025-06-17 RX ORDER — DAPSONE 25 MG/1
50 TABLET ORAL DAILY
Qty: 60 TABLET | Refills: 11 | Status: SHIPPED | OUTPATIENT
Start: 2025-06-17

## 2025-06-17 RX ORDER — SODIUM BICARBONATE 650 MG/1
1300 TABLET ORAL 2 TIMES DAILY
Qty: 120 TABLET | Refills: 11 | Status: SHIPPED | OUTPATIENT
Start: 2025-06-17

## 2025-06-17 NOTE — PROGRESS NOTES
Transplant Surgery Progress Note    Transplants:  2025 (Kidney); Postoperative day:  31  S: Glenna Spears is a 34 year old female with recent PMHx significant for ESRD s/p  donor kidney transplantation on 2025 complicated by leak requiring ultrasound-guided perinephric drain placement as well as kidney biopsy by IR on , complicated by anterior abdominal wall hematoma. 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     Feeling overall well. Pain lateral to kidney graft. Right thigh shooting pains continue but improve.     Denies fever, dyspnea, CP, diarrhea, constipation.       Transplant Coordinator: Tammi Cuellar     Transplant Office Phone Number: [phone]     Immunosuppressant Medications       Immunosuppressive Agents Disp Start End     mycophenolate (GENERIC EQUIVALENT) 250 MG capsule 180 capsule 2025 --    Sig - Route: Take 3 capsules (750 mg) by mouth 2 times daily. - Oral    Class: E-Prescribe     tacrolimus (GENERIC EQUIVALENT) 0.5 MG capsule ([Paused] since 2025 11:32 AM) 60 capsule 2025 --    Sig: Profile Rx: patient will contact pharmacy when needed    Class: E-Prescribe    Notes to Pharmacy: TXP DT 2025 (Kidney) TXP Dischg DT 2025 DX Kidney replaced by transplant Z94.0 TX [facility] ([place], [place])     tacrolimus (GENERIC EQUIVALENT) 1 MG capsule 120 capsule 2025 --    Sig - Route: Take 2 capsules (2 mg) by mouth 2 times daily. Total dose: 7mg twice daily - Oral    Class: E-Prescribe    Notes to Pharmacy: TXP DT 2025 (Kidney) TXP Dischg DT 2025 DX Kidney replaced by transplant Z94.0 TX [facility] ([place], [place])     tacrolimus (GENERIC EQUIVALENT) 5 MG capsule 60  capsule 6/13/2025 --    Sig - Route: Take 1 capsule (5 mg) by mouth 2 times daily. Total dose: 7mg twice daily - Oral    Class: E-Prescribe    Notes to Pharmacy: TXP DT 5/17/2025 (Kidney) TXP Dischg DT 5/20/2025 DX Kidney replaced by transplant Z94.0 TX [facility] ([place], [place])            Possible Immunosuppression-related side effects:   [x]             headache  []             vivid dreams  []             irritability  []             cognitive difficuties  [x]             fine tremor  []             nausea  []             diarrhea  [x]             neuropathy      []             edema  []             renal calcineurin toxicity  []             hyperkalemia  []             post-transplant diabetes  []             decreased appetite  []             increased appetite  []             other:  []             none    Prescription Medications as of 6/17/2025         Rx Number Disp Refills Start End Last Dispensed Date Next Fill Date Owning Pharmacy    acetaminophen (TYLENOL) 325 MG tablet  -- --  --       Sig: Take 325-650 mg by mouth every 4 hours as needed    Class: Historical    Route: Oral    apixaban ANTICOAGULANT (ELIQUIS) 5 MG tablet  194 tablet 0 6/19/2025 9/17/2025   [place], [place] - [address] [place] [address]    Sig: Take 2 tablets (10 mg) by mouth 2 times daily for 7 days, THEN 1 tablet (5 mg) 2 times daily.    Class: E-Prescribe    Route: Oral    Renewals       Renewal requests to authorizing provider (Ashley Wells NP) <b>prohibited</b>            aspirin (ASA) 81 MG EC tablet  30 tablet 11 6/12/2025 --   [address]    Sig: Take 1 tablet (81 mg) by mouth daily.    Class: E-Prescribe    Route: Oral    Renewals       Renewal requests to authorizing provider (Ashley Welsl NP) <b>prohibited</b>            atorvastatin (LIPITOR) 10 MG tablet  30 tablet 2 5/21/2025 --    [address]    Sig: Take 1 tablet (10 mg) by mouth daily.    Class: E-Prescribe    Route: Oral    calcium carbonate (TUMS) 500 MG chewable tablet  -- -- 2025 --       Sig: Take 2 tablets (1,000 mg) by mouth every 6 hours as needed for heartburn.    Class: No Print Out    Route: Oral    famotidine (PEPCID) 20 MG tablet  -- -- 2025 --       Sig: Take 1 tablet (20 mg) by mouth 2 times daily as needed (heartburn).    Class: No Print Out    Route: Oral    furosemide (LASIX) 40 MG tablet  30 tablet 0 2025 --   [phone]    Sig: Take 1 tablet (40 mg) by mouth daily.    Class: E-Prescribe    Route: Oral    hydrOXYzine HCl (ATARAX) 25 MG tablet  20 tablet 11 2025 --   [address]    Sig: Take 1 tablet (25 mg) by mouth every 6 hours as needed for anxiety.    Class: E-Prescribe    Route: Oral    Renewals       Renewal requests to authorizing provider (Ashley Wells NP) <b>prohibited</b>            levonorgestrel (KYLEENA) 19.5 MG IUD  -- -- 3/31/2022 3/30/2027       Si Intra Uterine Device by Intrauterine route once    Class: Historical    Route: Intrauterine    Lidocaine (LIDOCARE) 4 % Patch  20 patch 11 2025 --   [address]    Sig: Place 2 patches over 12 hours onto the skin every 24 hours. To prevent lidocaine toxicity, patient should be patch free for 12 hrs daily.    Class: E-Prescribe    Route: Transdermal    Renewals       Renewal requests to authorizing provider (Ashley Wells NP) <b>prohibited</b>            magnesium hydroxide (MILK OF MAGNESIA) 400 MG/5ML suspension  118 mL 11 2025 --   [address]    Sig: Take 30 mLs by mouth daily as needed for constipation.     Class: E-Prescribe    Route: Oral    Renewals       Renewal requests to authorizing provider (Ashley Wells NP) <b>prohibited</b>            magnesium oxide (MAG-OX) 400 MG tablet  60 tablet 11 6/11/2025 --   [address]    Sig: Take 1 tablet (400 mg) by mouth 2 times daily.    Class: E-Prescribe    Route: Oral    Renewals       Renewal requests to authorizing provider (Ashley Wells NP) <b>prohibited</b>            methocarbamol (ROBAXIN) 500 MG tablet  20 tablet 0 6/3/2025 --   [facility] [address] [facility] [id number]    Sig: Take 1 tablet (500 mg) by mouth every 6 hours as needed for muscle spasms.    Class: E-Prescribe    Route: Oral    mycophenolate (GENERIC EQUIVALENT) 250 MG capsule  180 capsule 11 5/20/2025 --   [address]    Sig: Take 3 capsules (750 mg) by mouth 2 times daily.    Class: E-Prescribe    Route: Oral    nortriptyline (PAMELOR) 25 MG capsule  -- -- 12/30/2024 --       Sig: Take 25 mg by mouth at bedtime.    Class: Historical    Route: Oral    ondansetron (ZOFRAN ODT) 4 MG ODT tab  20 tablet 0 5/20/2025 --   [address]    Sig: Take 1 tablet (4 mg) by mouth every 8 hours as needed for nausea.    Class: E-Prescribe    Route: Oral    oxyCODONE (ROXICODONE) 5 MG tablet  10 tablet 0 6/16/2025 --   [id number] [place], [place] - [address] [facility] [address]    Sig: Take 1 tablet (5 mg) by mouth every 8 hours as needed for moderate pain. Take 5 mg to pain 4 to 6/10, take 10 mg for pain 6 to 10/10.    Class: E-Prescribe    Earliest Fill Date: 6/16/2025    Route: Oral    pantoprazole (PROTONIX) 40 MG EC tablet  30 tablet 1 6/11/2025 --   [place] [address] [facility]    Sig: Take 1 tablet (40 mg) by  mouth daily.    Class: E-Prescribe    Route: Oral    predniSONE (DELTASONE) 5 MG tablet  30 tablet 11 6/19/2025 --   [facility] - [address]    Sig: Take 1 tablet (5 mg) by mouth daily.    Class: E-Prescribe    Route: Oral    Renewals       Renewal requests to authorizing provider (Ashley Wells NP) <b>prohibited</b>            senna-docusate (SENOKOT-S/PERICOLACE) 8.6-50 MG tablet  -- -- 6/11/2025 --       Sig: Take 2 tablets by mouth 2 times daily as needed for constipation.    Class: No Print Out    Route: Oral    sertraline (ZOLOFT) 25 MG tablet  65 tablet 0 6/12/2025 7/17/2025   [facility] - [place], [place] - [address] [place] [address]    Sig: Take 1 tablet (25 mg) by mouth daily for 5 days, THEN 2 tablets (50 mg) daily. Follow-up with your PCP to consider raising the dose to a maximum of 100 mg daily.    Class: E-Prescribe    Route: Oral    sodium bicarbonate 650 MG tablet  120 tablet 11 6/16/2025 --   [place], [place] - [address] [place] [address] [phone]    Sig: Take 2 tablets (1,300 mg) by mouth 2 times daily.    Class: E-Prescribe    Route: Oral    tacrolimus (GENERIC EQUIVALENT) 0.5 MG capsule ([Paused] since 6/11/2025 11:32 AM)  60 capsule 11 5/28/2025 --   [facility] - [address]    Sig: Profile Rx: patient will contact pharmacy when needed    Class: E-Prescribe    Notes to Pharmacy: TXP DT 5/17/2025 (Kidney) TXP Dischg DT 5/20/2025 DX Kidney replaced by transplant Z94.0 TX [facility] ([place], [place])    tacrolimus (GENERIC EQUIVALENT) 1 MG capsule  120 capsule 11 6/13/2025 --   [facility] - [address]     Sig: Take 2 capsules (2 mg) by mouth 2 times daily. Total dose: 7mg twice daily    Class: E-Prescribe    Notes to Pharmacy: TXP DT 5/17/2025 (Kidney) TXP Dischg DT  5/20/2025 DX Kidney replaced by transplant Z94.0 TX [facility] ([place], [place])    Route: Oral    tacrolimus (GENERIC EQUIVALENT) 5 MG capsule  60 capsule 11 6/13/2025 --   [facility] - [place], [place] - [address]    Sig: Take 1 capsule (5 mg) by mouth 2 times daily. Total dose: 7mg twice daily    Class: E-Prescribe    Notes to Pharmacy: TXP DT 5/17/2025 (Kidney) TXP Dischg DT 5/20/2025 DX Kidney replaced by transplant Z94.0 TX [facility] ([place], [place])    Route: Oral    valGANciclovir (VALCYTE) 450 MG tablet  -- -- 6/12/2025 --       Sig: Take 1 tablet (450 mg) by mouth daily.    Class: No Print Out    Route: Oral    varenicline (CHANTIX SANCHEZ) 0.5 MG X 11 & 1 MG X 42 tablet  -- -- 1/13/2025 --       Sig: Take 0.5 mg by mouth daily. Patient is taking 0.5 mg as needed for cravings associated with smoking    Class: Historical    Route: Oral    VENTOLIN  (90 Base) MCG/ACT inhaler  -- --  --       Sig: Inhale 2 puffs into the lungs every 4 hours as needed for shortness of breath or wheezing.    Class: Historical    Route: Inhalation          Clinic-Administered Medications as of 6/17/2025         Dose Frequency Start End    heparin Lock (1000 units/mL High concentration) 1,000-3,000 Units (Completed) 1-3 mL ONCE 6/16/2025 6/16/2025    Admin Instructions: Lock each lumen of the Tunneled Dialysis Line. Heparin volume = the volume of the catheter.    Class: E-Prescribe    Route: Intracatheter    immune globulin - sucrose free 10% (PRIVIGEN) infusion 25 g (Completed) 25 g ONCE 6/16/2025 6/16/2025    Admin Instructions: Dose 3, and 4 - Give after plasma exchange.     Weight used for dose calculation and titration: 63.9 kg  Dose based on ideal body weight (IBW) - if patient is pregnant or underweight, use actual body weight.     This patient qualifies for rapid infusion based on their history of  tolerating at least two IVIG infusions in the past. Start at 1/2 maximum rate = 2 mL/kg/hr for 30 minutes and complete the remainder of the infusion at the maximum rate = 4 mL/kg/hr.      Use vented tubing if administering from a vial.  Use vented tubing if administering from a vial.    Notes to Pharmacy: Pharmacist to add IBW weight (or weight used to calculate dose) to 'Admin Instructions' and delete unnecessary steps in high risk patients where max rate is limited.  After patient has tolerated two IVIG infusions in their lifetime, pharmacist may change to rapid infusion.    Route: Intravenous          Hospital Medications as of 6/17/2025         Dose Frequency Start End    calcium gluconate with 5% albumin (administered by Apheresis Staff ONLY) (Completed)  DURING APHERESIS (FROM [facility]) 6/16/2025 6/16/2025    Admin Instructions: Administer IV calcium gluconate 800 mg/liter of albumin ( = 8 mL of 10% calcium gluconate) over duration of procedure.  If symptoms of citrate toxicity (paresthesias, tingling, muscle cramps, nausea, etc.) develop, increase dose to 1200 mg/liter (=12 mL/liter). If no improvement after 15 minutes, increase dose to 1600 mg/liter (= 16 mL/liter).  If symptoms persist after 30 minutes of the higher dose, notify Blood Bank physician and increase to 2400 mg/liter (= 24 mL/liter). If symptoms persist after 30 minutes of the higher dose, pause procedure and consult Blood Bank physician for further orders.      Class: E-Prescribe    Route: Intravenous    calcium gluconate with plasma (administered by Apheresis Staff ONLY) (Completed)  DURING APHERESIS (FROM STOCK) 6/16/2025 6/16/2025    Admin Instructions: Administer IV calcium gluconate 1000 mg/liter ( =10mL of 10% calcium gluconate) of plasma over duration of procedure.    If symptoms of citrate toxicity (paresthesias, tingling, muscle cramps, nausea, etc.) develop, increase dose to 1500 mg/liter ( =15mL/liter).    If no improvement after 15  minutes, increase dose to 2000 mg/liter (= 20 mL/liter).    If symptoms persist after 30 minutes of the higher dose, notify physician and increase to 3000 mg/liter (=30 mL/liter) pause procedure and consult Blood Bank physician for further orders.    Class: E-Prescribe    Route: Intravenous    gelatin absorbable (GELFOAM) sponge 1 each (Completed) 1 each DURING APHERESIS (FROM STOCK) 6/16/2025 6/16/2025    Admin Instructions: Apply to site of bleeding.  For apheresis access bleeding    Class: E-Prescribe    Route: Topical            O:   [unfilled]        Latest Ref Rng & Units 6/16/2025    10:46 AM 6/16/2025     9:07 AM 6/13/2025     8:12 AM 6/12/2025     9:23 AM 6/11/2025     5:57 AM   Transplant Immunosuppression Labs   Creat 0.51 - 0.95 mg/dL 1.21   1.72  1.76  1.83    Urea Nitrogen 6.0 - 20.0 mg/dL 19.7   25.0  28.4  27.5    WBC 4.0 - 11.0 10e3/uL  6.0  4.9  8.0  5.4    Neutrophil %  82       ANEU 1.6 - 8.3 10e3/uL  4.9           Chemistries:   Recent Labs   Lab Test 06/16/25  1046   BUN 19.7   CR 1.21*   GFRESTIMATED 60*   *     Lab Results   Component Value Date    A1C 4.9 05/22/2025     Recent Labs   Lab Test 06/09/25  0604   ALBUMIN 3.8   BILITOTAL 0.2   ALKPHOS 109   AST 13   ALT 6     Urine Studies:  Recent Labs   Lab Test 06/09/25  0205   COLOR Light Yellow   APPEARANCE Clear   URINEGLC Negative   URINEBILI Negative   URINEKETONE Negative   SG 1.016   UBLD Negative   URINEPH 6.0   PROTEIN Negative   NITRITE Negative   LEUKEST Negative   RBCU 3*   WBCU 2     No lab results found.  Hematology:   Recent Labs   Lab Test 06/16/25  0907 06/13/25  0812 06/12/25  0923   HGB 8.9* 8.6* 8.6*    244 255   WBC 6.0 4.9 8.0     Coags:   Recent Labs   Lab Test 06/16/25  0907 06/13/25  0812   INR 0.99 0.93     HLA antibodies:   SA1 HI RISK SADIQ   Date Value Ref Range Status   06/06/2025   Final    A:1 B:7 8 13 18 27 39 41 42 44 45 47 48 49 50 51 52 54 55 56 57 59 60 61 63 67 76 78 81 82 Cw:15     SA1 MOD  RISK SADIQ   Date Value Ref Range Status   06/06/2025 A:2B:35 38 46 58 62 71 73  Final     SA2 HI RISK SADIQ   Date Value Ref Range Status   06/06/2025 DR:11 13 14  Final     SA2 MOD RISK SADIQ   Date Value Ref Range Status   06/06/2025 DR:8 9 12 17 18DRw:52  Final       Assessment: Glenna Spears is doing fairly well s/p DDKT:  Issues we addressed during her visit include:    Plan:    Graft Function:  s/p DCD DDKT +stent 5/17/25 (stent removed 5/28/25): Creatinine post- transplant down to 2.4, then peaked at 3.5 on 6/2/2025. Biopsy on 6/6 as noted below, Cr now 1.2 following rejection treatment. Baseline Cr TBD.   -Last US 6/3 with patent vasculature, perinephric collection.      Perinephric collection: Drain placed on 6/6. Cr fluid equivalent to serum which is not concerning for urine leak. Triglyceride fluid 67. Minimal output from drain. 6/9 US did not show perinephric fluid collection. Drain removed 6/9/25.     Renal artery reconstruction: Plan was for  mg x 3 months.   - Held due to abdominal wall hematoma.    - ASA 81 mg to begin 6/12/2025 x 3 months.      Early antibody-mediated rejection: Moderate to high-level donor-specific antibodies (DSAs) against HLA-A1, B8, B60, DR17, and DR52 post transplant. 6/6 kidney biopsy pathology findings showed diffuse C4d positivity without evidence of glomerulitis or peritubular capillaritis, no evidence of cellular mediated rejection.    - Treated early AbMR with PLEX/IV Ig every other day x 5 via LUE fistula (see below), last PLEX 6/18.    - Repeat DSA after treatment completed, need to wait at least 2 weeks after last IVIG   - Closure biopsy 4 week. Will need to hold apixaban prior to repeat biopsy     Abdominal wall hematoma, post kidney biopsy and RLQ drain placement: Subcutaneous fluid collection in the anterior wall decreased in size on US 6/9/2025. Hgb stable.   - Restart ASA 81 mg daily on 6/12  - Initiate apixaban on 6/19     Immunosuppression management: PRA 76 -   high induction risk due to DCD kidney  Maintenance IS:   - MMF: 750 mg BID   - Tacrolimus: Goal level 8-10. Last level 6/15 8.1.    - Prednisone 5 mg daily after treatment completed, started   Early AbMR Rejection Treatment:   - Noted on 2026 biopsy   -  mg on    - Plasmapheresis for 5 treatments (, ,  OP,  OP,  OP)   - IVIg (dose #1), 25g (dose #2-#4), 50g (dose #5) to equal 155g total (dose given per insurance coverage for IVIG)   - Solumedrol 100 mg IV prior to each IVIG dose.  Infection Prophylaxis:    - CMV (Valganciclovir x 12 weeks)   - Pneumocystis: Bactrim not given due to Sulfa allergy. Pentamidine given . G6PD WNL. Start Dapsone on .      Transplant coordinator: Tammi Cuellar [phone]  Donor type:  DCD  DSA at time of transplant:  No  Ureteral stent: YES, removed 2025   CMV:  Donor + / Recipient +  EBV:  Donor + / Recipient +  Thymoglobulin: 425 mg, 6 mg/kg      Hematology:  Anemia of chronic disease:   Acute blood loss anemia post procedure: Hgb b/l ~ 9 to 10.   RIJ non occlusive DVT, provoked by line: RIJ catheter since removed. Hematology and Oncology consulted on 6/3/2025 with recommendation for therapeutic anticoagulation DOAC (versus Warfarin based on renal function and medication interactions) x 3 months for provoked DVT. Apixaban not yet started.  repeat US RUE no change.  - Hold initiation of apixaban d/t abdominal wall hematoma   - Plan to start apixaban on      Neurology:  Acute pain:    - PRN Tylenol    - PRN oxycodone, wean as able    - Lidoderm patches   - Heat PRN  Generalized anxiety disorder, adjustment to medical issues:   -Health psychology consult. Recommend patient be seen OP for treatment VU.   -Psychiatry consult for start medication for VU               - Zoloft 25 mg daily x 7 days initiated 6/10, increase to 50 mg daily on .    - Follow up with PCP for further adjustment; Per psychiatry would eventually  increase to 100 mg daily.  -Hydroxyzine 25 mg every 6 hours PRN anxiety  Neuropathy: Continue nortriptyline, side effects with neurontin in the past so declines starting taht   Depression:  - Continue nortriptyline     Cardiorespiratory:  HLD:  - Atorvastatin 10mg QD      GI/Nutrition: History of gastric sleeve.   Diet: Regular  Bowel regimen: Senna BID, PRN MoM, Ondansetron PRN for nausea     Endocrine:   Steroid hyperglycemia:    - Minimal use, discontinue sliding scale insulin.      Fluid/Electrolytes:   Hypophosphatemia: Supplement inpatient. Last phos 2.1, will recheck on next lab draw and start supplement if needed.   Hypomagnesemia: Mg Oxide 400 mg BID.   Low bicarb: CO2 6/16 15, continue Na bicarb 1300 mg BID.   Hypervolemia: Monitor daily weight. Lasix 40 mg PO daily.      Infectious disease: Afebrile.     Recent:  Urine culture + for E.coli: ureteral stent removed and completed treatment course.      MSK:   Post-operative RLE numbness and weakness: Medial right thigh numbness and right knee weakness post-operatively.    - Continue OP PT at discharge.     Followup: Obtain Mg and Phos on Thursday lab check.     Total Time: 40 min,   Counselling Time: 10 min.    Joaquina Tapia PA-C

## 2025-06-17 NOTE — LETTER
2025      Glenna Spears  [address]  [place] [address]      Dear Colleague,    Thank you for referring your patient, Glenna Spears, to the [facility]. Please see a copy of my visit note below.    Transplant Surgery Progress Note    Transplants:  2025 (Kidney); Postoperative day:  31  S: Glenna Spears is a 34 year old female with recent PMHx significant for ESRD s/p  donor kidney transplantation on 2025 complicated by leak requiring ultrasound-guided perinephric drain placement as well as kidney biopsy by IR on , complicated by anterior abdominal wall hematoma. 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     Feeling overall well. Pain lateral to kidney graft. Right thigh shooting pains continue but improve.     Denies fever, dyspnea, CP, diarrhea, constipation.       Transplant Coordinator: Tammi Cuellar     Transplant Office Phone Number: [phone]     Immunosuppressant Medications       Immunosuppressive Agents Disp Start End     mycophenolate (GENERIC EQUIVALENT) 250 MG capsule 180 capsule 2025 --    Sig - Route: Take 3 capsules (750 mg) by mouth 2 times daily. - Oral    Class: E-Prescribe     tacrolimus (GENERIC EQUIVALENT) 0.5 MG capsule ([Paused] since 2025 11:32 AM) 60 capsule 2025 --    Sig: Profile Rx: patient will contact pharmacy when needed    Class: E-Prescribe    Notes to Pharmacy: TXP DT 2025 (Kidney) TXP Dischg DT 2025 DX Kidney replaced by transplant Z94.0 [facility] ([place], [place])     tacrolimus (GENERIC EQUIVALENT) 1 MG capsule 120 capsule 2025 --    Sig - Route: Take 2 capsules (2 mg) by mouth 2 times daily. Total dose: 7mg twice daily - Oral    Class: E-Prescribe     Notes to Pharmacy: TXP DT 5/17/2025 (Kidney) TXP Dischg DT 5/20/2025 DX Kidney replaced by transplant Z94.0 TX [facility] ([place], [place])     tacrolimus (GENERIC EQUIVALENT) 5 MG capsule 60 capsule 6/13/2025 --    Sig - Route: Take 1 capsule (5 mg) by mouth 2 times daily. Total dose: 7mg twice daily - Oral    Class: E-Prescribe    Notes to Pharmacy: TXP DT 5/17/2025 (Kidney) TXP Dischg DT 5/20/2025 DX Kidney replaced by transplant Z94.0 TX [facility] ([place], [place])            Possible Immunosuppression-related side effects:   [x]             headache  []             vivid dreams  []             irritability  []             cognitive difficuties  [x]             fine tremor  []             nausea  []             diarrhea  [x]             neuropathy      []             edema  []             renal calcineurin toxicity  []             hyperkalemia  []             post-transplant diabetes  []             decreased appetite  []             increased appetite  []             other:  []             none    Prescription Medications as of 6/17/2025         Rx Number Disp Refills Start End Last Dispensed Date Next Fill Date Owning Pharmacy    acetaminophen (TYLENOL) 325 MG tablet  -- --  --       Sig: Take 325-650 mg by mouth every 4 hours as needed    Class: Historical    Route: Oral    apixaban ANTICOAGULANT (ELIQUIS) 5 MG tablet  194 tablet 0 6/19/2025 9/17/2025   [facility] - [place], [place] - [address] [place] [address]    Sig: Take 2 tablets (10 mg) by mouth 2 times daily for 7 days, THEN 1 tablet (5 mg) 2 times daily.    Class: E-Prescribe    Route: Oral    Renewals       Renewal requests to authorizing provider (Ashley Wells NP) <b>prohibited</b>            aspirin (ASA) 81 MG EC tablet  30 tablet 11 6/12/2025 --   [address]    Sig: Take 1 tablet  (81 mg) by mouth daily.    Class: E-Prescribe    Route: Oral    Renewals       Renewal requests to authorizing provider (Ashley Wells NP) <b>prohibited</b>            atorvastatin (LIPITOR) 10 MG tablet  30 tablet 2 2025 --   [address]    Sig: Take 1 tablet (10 mg) by mouth daily.    Class: E-Prescribe    Route: Oral    calcium carbonate (TUMS) 500 MG chewable tablet  -- -- 2025 --       Sig: Take 2 tablets (1,000 mg) by mouth every 6 hours as needed for heartburn.    Class: No Print Out    Route: Oral    famotidine (PEPCID) 20 MG tablet  -- -- 2025 --       Sig: Take 1 tablet (20 mg) by mouth 2 times daily as needed (heartburn).    Class: No Print Out    Route: Oral    furosemide (LASIX) 40 MG tablet  30 tablet 0 2025 --   [address]    Sig: Take 1 tablet (40 mg) by mouth daily.    Class: E-Prescribe    Route: Oral    hydrOXYzine HCl (ATARAX) 25 MG tablet  20 tablet 11 2025 --   [address]    Sig: Take 1 tablet (25 mg) by mouth every 6 hours as needed for anxiety.    Class: E-Prescribe    Route: Oral    Renewals       Renewal requests to authorizing provider (Ashley Wells NP) <b>prohibited</b>            levonorgestrel (KYLEENA) 19.5 MG IUD  -- -- 3/31/2022 3/30/2027       Si Intra Uterine Device by Intrauterine route once    Class: Historical    Route: Intrauterine    Lidocaine (LIDOCARE) 4 % Patch  20 patch 11 2025 --   [address]    Sig: Place 2 patches over 12 hours onto the skin every 24 hours. To prevent lidocaine toxicity, patient should be patch free for 12 hrs daily.    Class: E-Prescribe    Route: Transdermal    Renewals       Renewal requests to authorizing provider (Ashley Wells NP) <b>prohibited</b>             magnesium hydroxide (MILK OF MAGNESIA) 400 MG/5ML suspension  118 mL 11 6/11/2025 --   [address]    Sig: Take 30 mLs by mouth daily as needed for constipation.    Class: E-Prescribe    Route: Oral    Renewals       Renewal requests to authorizing provider (Ashley Wells NP) <b>prohibited</b>            magnesium oxide (MAG-OX) 400 MG tablet  60 tablet 11 6/11/2025 --   [address]    Sig: Take 1 tablet (400 mg) by mouth 2 times daily.    Class: E-Prescribe    Route: Oral    Renewals       Renewal requests to authorizing provider (Ashley Wells NP) <b>prohibited</b>            methocarbamol (ROBAXIN) 500 MG tablet  20 tablet 0 6/3/2025 --   [facility] [address] [place] [address] [address]    Sig: Take 1 tablet (500 mg) by mouth every 6 hours as needed for muscle spasms.    Class: E-Prescribe    Route: Oral    mycophenolate (GENERIC EQUIVALENT) 250 MG capsule  180 capsule 11 5/20/2025 --   [address]    Sig: Take 3 capsules (750 mg) by mouth 2 times daily.    Class: E-Prescribe    Route: Oral    nortriptyline (PAMELOR) 25 MG capsule  -- -- 12/30/2024 --       Sig: Take 25 mg by mouth at bedtime.    Class: Historical    Route: Oral    ondansetron (ZOFRAN ODT) 4 MG ODT tab  20 tablet 0 5/20/2025 --   [address]    Sig: Take 1 tablet (4 mg) by mouth every 8 hours as needed for nausea.    Class: E-Prescribe    Route: Oral    oxyCODONE (ROXICODONE) 5 MG tablet  10 tablet 0 6/16/2025 --   [id number] - [place], [place] - [address] [facility] [facility]    Sig: Take 1 tablet (5 mg) by mouth every 8 hours as needed for moderate pain. Take 5 mg to pain 4 to 6/10, take 10 mg for pain 6 to 10/10.    Class:  E-Prescribe    Earliest Fill Date: 6/16/2025    Route: Oral    pantoprazole (PROTONIX) 40 MG EC tablet  30 tablet 1 6/11/2025 --   [facility] - [address]    Sig: Take 1 tablet (40 mg) by mouth daily.    Class: E-Prescribe    Route: Oral    predniSONE (DELTASONE) 5 MG tablet  30 tablet 11 6/19/2025 --   [facility] - [address]    Sig: Take 1 tablet (5 mg) by mouth daily.    Class: E-Prescribe    Route: Oral    Renewals       Renewal requests to authorizing provider (Ashley Wells NP) <b>prohibited</b>            senna-docusate (SENOKOT-S/PERICOLACE) 8.6-50 MG tablet  -- -- 6/11/2025 --       Sig: Take 2 tablets by mouth 2 times daily as needed for constipation.    Class: No Print Out    Route: Oral    sertraline (ZOLOFT) 25 MG tablet  65 tablet 0 6/12/2025 7/17/2025   [facility] - [address]    Sig: Take 1 tablet (25 mg) by mouth daily for 5 days, THEN 2 tablets (50 mg) daily. Follow-up with your PCP to consider raising the dose to a maximum of 100 mg daily.    Class: E-Prescribe    Route: Oral    sodium bicarbonate 650 MG tablet  120 tablet 11 6/16/2025 --   [facility] [place], [place] - [address] [place] [address] [phone]    Sig: Take 2 tablets (1,300 mg) by mouth 2 times daily.    Class: E-Prescribe    Route: Oral    tacrolimus (GENERIC EQUIVALENT) 0.5 MG capsule ([Paused] since 6/11/2025 11:32 AM)  60 capsule 11 5/28/2025 --   [facility] - [place], [place] - [address] [place] [address] [facility]    Sig: Profile Rx: patient will contact pharmacy when needed    Class: E-Prescribe    Notes to Pharmacy: TXP DT 5/17/2025 (Kidney) TXP Dischg DT 5/20/2025 DX Kidney replaced by transplant Z94.0 [facility] ([place], [place])    tacrolimus (GENERIC EQUIVALENT) 1 MG capsule  120 capsule 11 6/13/2025 --   [facility] - [place], [place] - [address]    Sig: Take 2 capsules (2 mg) by mouth 2 times daily. Total dose: 7mg twice daily    Class: E-Prescribe    Notes to Pharmacy: TXP DT 5/17/2025 (Kidney) TXP Dischg DT 5/20/2025 DX Kidney replaced by transplant Z94.0 TX [facility] ([place], [place])    Route: Oral    tacrolimus (GENERIC EQUIVALENT) 5 MG capsule  60 capsule 11 6/13/2025 --   [facility] - [place] [address]     Sig: Take 1 capsule (5 mg) by mouth 2 times daily. Total dose: 7mg twice daily    Class: E-Prescribe    Notes to Pharmacy: TXP DT 5/17/2025 (Kidney) TXP Dischg DT 5/20/2025 DX Kidney replaced by transplant Z94.0 TX [facility] ([place], [place])    Route: Oral    valGANciclovir (VALCYTE) 450 MG tablet  -- -- 6/12/2025 --       Sig: Take 1 tablet (450 mg) by mouth daily.    Class: No Print Out    Route: Oral    varenicline (CHANTIX SANCHEZ) 0.5 MG X 11 & 1 MG X 42 tablet  -- -- 1/13/2025 --       Sig: Take 0.5 mg by mouth daily. Patient is taking 0.5 mg as needed for cravings associated with smoking    Class: Historical    Route: Oral    VENTOLIN  (90 Base) MCG/ACT inhaler  -- --  --       Sig: Inhale 2 puffs into the lungs every 4 hours as needed for shortness of breath or wheezing.    Class: Historical    Route: Inhalation          Clinic-Administered Medications as of 6/17/2025         Dose Frequency Start End    heparin Lock (1000 units/mL High concentration) 1,000-3,000 Units (Completed) 1-3 mL ONCE 6/16/2025 6/16/2025    Admin Instructions: Lock each lumen of the Tunneled Dialysis Line. Heparin volume = the volume of the catheter.    Class: E-Prescribe    Route: Intracatheter    immune globulin - sucrose free 10% (PRIVIGEN) infusion 25 g (Completed) 25 g ONCE 6/16/2025 6/16/2025    Admin Instructions: Dose 3, and 4 - Give after plasma exchange.      Weight used for dose calculation and titration: 63.9 kg  Dose based on ideal body weight (IBW) - if patient is pregnant or underweight, use actual body weight.     This patient qualifies for rapid infusion based on their history of tolerating at least two IVIG infusions in the past. Start at 1/2 maximum rate = 2 mL/kg/hr for 30 minutes and complete the remainder of the infusion at the maximum rate = 4 mL/kg/hr.      Use vented tubing if administering from a vial.  Use vented tubing if administering from a vial.    Notes to Pharmacy: Pharmacist to add IBW weight (or weight used to calculate dose) to 'Admin Instructions' and delete unnecessary steps in high risk patients where max rate is limited.  After patient has tolerated two IVIG infusions in their lifetime, pharmacist may change to rapid infusion.    Route: Intravenous          Hospital Medications as of 6/17/2025         Dose Frequency Start End    calcium gluconate with 5% albumin (administered by Apheresis Staff ONLY) (Completed)  DURING APHERESIS (FROM STOCK) 6/16/2025 6/16/2025    Admin Instructions: Administer IV calcium gluconate 800 mg/liter of albumin ( = 8 mL of 10% calcium gluconate) over duration of procedure.  If symptoms of citrate toxicity (paresthesias, tingling, muscle cramps, nausea, etc.) develop, increase dose to 1200 mg/liter (=12 mL/liter). If no improvement after 15 minutes, increase dose to 1600 mg/liter (= 16 mL/liter).  If symptoms persist after 30 minutes of the higher dose, notify Blood Bank physician and increase to 2400 mg/liter (= 24 mL/liter). If symptoms persist after 30 minutes of the higher dose, pause procedure and consult Blood Bank physician for further orders.      Class: E-Prescribe    Route: Intravenous    calcium gluconate with plasma (administered by Apheresis Staff ONLY) (Completed)  DURING APHERESIS (FROM STOCK) 6/16/2025 6/16/2025    Admin Instructions: Administer IV calcium gluconate 1000 mg/liter ( =10mL of 10%  calcium gluconate) of plasma over duration of procedure.    If symptoms of citrate toxicity (paresthesias, tingling, muscle cramps, nausea, etc.) develop, increase dose to 1500 mg/liter ( =15mL/liter).    If no improvement after 15 minutes, increase dose to 2000 mg/liter (= 20 mL/liter).    If symptoms persist after 30 minutes of the higher dose, notify physician and increase to 3000 mg/liter (=30 mL/liter) pause procedure and consult Blood Bank physician for further orders.    Class: E-Prescribe    Route: Intravenous    gelatin absorbable (GELFOAM) sponge 1 each (Completed) 1 each DURING APHERESIS (FROM STOCK) 6/16/2025 6/16/2025    Admin Instructions: Apply to site of bleeding.  For apheresis access bleeding    Class: E-Prescribe    Route: Topical            O:   [unfilled]        Latest Ref Rng & Units 6/16/2025    10:46 AM 6/16/2025     9:07 AM 6/13/2025     8:12 AM 6/12/2025     9:23 AM 6/11/2025     5:57 AM   Transplant Immunosuppression Labs   Creat 0.51 - 0.95 mg/dL 1.21   1.72  1.76  1.83    Urea Nitrogen 6.0 - 20.0 mg/dL 19.7   25.0  28.4  27.5    WBC 4.0 - 11.0 10e3/uL  6.0  4.9  8.0  5.4    Neutrophil %  82       ANEU 1.6 - 8.3 10e3/uL  4.9           Chemistries:   Recent Labs   Lab Test 06/16/25  1046   BUN 19.7   CR 1.21*   GFRESTIMATED 60*   *     Lab Results   Component Value Date    A1C 4.9 05/22/2025     Recent Labs   Lab Test 06/09/25  0604   ALBUMIN 3.8   BILITOTAL 0.2   ALKPHOS 109   AST 13   ALT 6     Urine Studies:  Recent Labs   Lab Test 06/09/25  0205   COLOR Light Yellow   APPEARANCE Clear   URINEGLC Negative   URINEBILI Negative   URINEKETONE Negative   SG 1.016   UBLD Negative   URINEPH 6.0   PROTEIN Negative   NITRITE Negative   LEUKEST Negative   RBCU 3*   WBCU 2     No lab results found.  Hematology:   Recent Labs   Lab Test 06/16/25  0907 06/13/25  0812 06/12/25  0923   HGB 8.9* 8.6* 8.6*    244 255   WBC 6.0 4.9 8.0     Coags:   Recent Labs   Lab Test 06/16/25  0907  06/13/25  0812   INR 0.99 0.93     HLA antibodies:   SA1 HI RISK SADIQ   Date Value Ref Range Status   06/06/2025   Final    A:1 B:7 8 13 18 27 39 41 42 44 45 47 48 49 50 51 52 54 55 56 57 59 60 61 63 67 76 78 81 82 Cw:15     SA1 MOD RISK SADIQ   Date Value Ref Range Status   06/06/2025 A:2B:35 38 46 58 62 71 73  Final     SA2 HI RISK SADIQ   Date Value Ref Range Status   06/06/2025 DR:11 13 14  Final     SA2 MOD RISK SADIQ   Date Value Ref Range Status   06/06/2025 DR:8 9 12 17 18DRw:52  Final       Assessment: Glenna Spears is doing fairly well s/p DDKT:  Issues we addressed during her visit include:    Plan:    Graft Function:  s/p DCD DDKT +stent 5/17/25 (stent removed 5/28/25): Creatinine post- transplant down to 2.4, then peaked at 3.5 on 6/2/2025. Biopsy on 6/6 as noted below, Cr now 1.2 following rejection treatment. Baseline Cr TBD.   -Last US 6/3 with patent vasculature, perinephric collection.      Perinephric collection: Drain placed on 6/6. Cr fluid equivalent to serum which is not concerning for urine leak. Triglyceride fluid 67. Minimal output from drain. 6/9 US did not show perinephric fluid collection. Drain removed 6/9/25.     Renal artery reconstruction: Plan was for  mg x 3 months.   - Held due to abdominal wall hematoma.    - ASA 81 mg to begin 6/12/2025 x 3 months.      Early antibody-mediated rejection: Moderate to high-level donor-specific antibodies (DSAs) against HLA-A1, B8, B60, DR17, and DR52 post transplant. 6/6 kidney biopsy pathology findings showed diffuse C4d positivity without evidence of glomerulitis or peritubular capillaritis, no evidence of cellular mediated rejection.    - Treated early AbMR with PLEX/IV Ig every other day x 5 via LUE fistula (see below), last PLEX 6/18.    - Repeat DSA after treatment completed, need to wait at least 2 weeks after last IVIG   - Closure biopsy 4 week. Will need to hold apixaban prior to repeat biopsy     Abdominal wall hematoma, post kidney  biopsy and RLQ drain placement: Subcutaneous fluid collection in the anterior wall decreased in size on US 2025. Hgb stable.   - Restart ASA 81 mg daily on   - Initiate apixaban on      Immunosuppression management: PRA 76 -  high induction risk due to DCD kidney  Maintenance IS:   - MMF: 750 mg BID   - Tacrolimus: Goal level 8-10. Last level 6/15 8.1.    - Prednisone 5 mg daily after treatment completed, started   Early AbMR Rejection Treatment:   - Noted on 2026 biopsy   -  mg on    - Plasmapheresis for 5 treatments (, ,  OP,  OP,  OP)   - IVIg (dose #1), 25g (dose #2-#4), 50g (dose #5) to equal 155g total (dose given per insurance coverage for IVIG)   - Solumedrol 100 mg IV prior to each IVIG dose.  Infection Prophylaxis:    - CMV (Valganciclovir x 12 weeks)   - Pneumocystis: Bactrim not given due to Sulfa allergy. Pentamidine given . G6PD WNL. Start Dapsone on .      Transplant coordinator: Tammi Cuellar [phone]  Donor type:  DCD  DSA at time of transplant:  No  Ureteral stent: YES, removed 2025   CMV:  Donor + / Recipient +  EBV:  Donor + / Recipient +  Thymoglobulin: 425 mg, 6 mg/kg      Hematology:  Anemia of chronic disease:   Acute blood loss anemia post procedure: Hgb b/l ~ 9 to 10.   RIJ non occlusive DVT, provoked by line: RIJ catheter since removed. Hematology and Oncology consulted on 6/3/2025 with recommendation for therapeutic anticoagulation DOAC (versus Warfarin based on renal function and medication interactions) x 3 months for provoked DVT. Apixaban not yet started.  repeat US RUE no change.  - Hold initiation of apixaban d/t abdominal wall hematoma   - Plan to start apixaban on      Neurology:  Acute pain:    - PRN Tylenol    - PRN oxycodone, wean as able    - Lidoderm patches   - Heat PRN  Generalized anxiety disorder, adjustment to medical issues:   -Health psychology consult. Recommend patient be seen OP for  treatment VU.   -Psychiatry consult for start medication for VU               - Zoloft 25 mg daily x 7 days initiated 6/10, increase to 50 mg daily on 6/17.    - Follow up with PCP for further adjustment; Per psychiatry would eventually increase to 100 mg daily.  -Hydroxyzine 25 mg every 6 hours PRN anxiety  Neuropathy: Continue nortriptyline, side effects with neurontin in the past so declines starting taht   Depression:  - Continue nortriptyline     Cardiorespiratory:  HLD:  - Atorvastatin 10mg QD      GI/Nutrition: History of gastric sleeve.   Diet: Regular  Bowel regimen: Senna BID, PRN MoM, Ondansetron PRN for nausea     Endocrine:   Steroid hyperglycemia:    - Minimal use, discontinue sliding scale insulin.      Fluid/Electrolytes:   Hypophosphatemia: Supplement inpatient. Last phos 2.1, will recheck on next lab draw and start supplement if needed.   Hypomagnesemia: Mg Oxide 400 mg BID.   Low bicarb: CO2 6/16 15, continue Na bicarb 1300 mg BID.   Hypervolemia: Monitor daily weight. Lasix 40 mg PO daily.      Infectious disease: Afebrile.     Recent:  Urine culture + for E.coli: ureteral stent removed and completed treatment course.      MSK:   Post-operative RLE numbness and weakness: Medial right thigh numbness and right knee weakness post-operatively.    - Continue OP PT at discharge.     Followup: Obtain Mg and Phos on Thursday lab check.     Total Time: 40 min,   Counselling Time: 10 min.    Joaquina Tapia PA-C      Again, thank you for allowing me to participate in the care of your patient.        Sincerely,        RAFAEL Cordova CNP    Electronically signed

## 2025-06-17 NOTE — NURSING NOTE
"Chief Complaint   Patient presents with    Follow Up     Kidney transplant follow-up       Vital signs:      BP: 139/78 Pulse: 109   Resp: 16 SpO2: 100 %       Weight: 78.7 kg (173 lb 9.6 oz)  Estimated body mass index is 26.31 kg/m  as calculated from the following:    Height as of 6/16/25: 1.73 m (5' 8.11\").    Weight as of this encounter: 78.7 kg (173 lb 9.6 oz).      Rio Becerra RN on 6/17/2025 at 9:40 AM    "

## 2025-06-18 ENCOUNTER — RESULTS FOLLOW-UP (OUTPATIENT)
Dept: TRANSPLANT | Facility: CLINIC | Age: 35
End: 2025-06-18

## 2025-06-18 ENCOUNTER — TELEPHONE (OUTPATIENT)
Dept: TRANSPLANT | Facility: CLINIC | Age: 35
End: 2025-06-18

## 2025-06-18 ENCOUNTER — HOSPITAL ENCOUNTER (OUTPATIENT)
Dept: LAB | Facility: CLINIC | Age: 35
Discharge: HOME | End: 2025-06-18
Attending: PHYSICIAN ASSISTANT
Payer: COMMERCIAL

## 2025-06-18 ENCOUNTER — INFUSION THERAPY VISIT (OUTPATIENT)
Dept: INFUSION THERAPY | Facility: CLINIC | Age: 35
End: 2025-06-18
Attending: INTERNAL MEDICINE
Payer: COMMERCIAL

## 2025-06-18 VITALS
HEART RATE: 92 BPM | OXYGEN SATURATION: 100 % | SYSTOLIC BLOOD PRESSURE: 138 MMHG | DIASTOLIC BLOOD PRESSURE: 74 MMHG | RESPIRATION RATE: 16 BRPM

## 2025-06-18 VITALS
HEART RATE: 94 BPM | WEIGHT: 173.5 LBS | RESPIRATION RATE: 16 BRPM | BODY MASS INDEX: 26.3 KG/M2 | DIASTOLIC BLOOD PRESSURE: 70 MMHG | TEMPERATURE: 97.9 F | SYSTOLIC BLOOD PRESSURE: 123 MMHG | HEIGHT: 68 IN

## 2025-06-18 DIAGNOSIS — T86.11 ANTIBODY MEDIATED REJECTION OF KIDNEY TRANSPLANT: ICD-10-CM

## 2025-06-18 DIAGNOSIS — Z48.298 AFTERCARE FOLLOWING ORGAN TRANSPLANT: Primary | ICD-10-CM

## 2025-06-18 DIAGNOSIS — Z94.0 KIDNEY REPLACED BY TRANSPLANT: ICD-10-CM

## 2025-06-18 DIAGNOSIS — Z79.899 ENCOUNTER FOR LONG-TERM CURRENT USE OF MEDICATION: ICD-10-CM

## 2025-06-18 DIAGNOSIS — Z98.890 OTHER SPECIFIED POSTPROCEDURAL STATES: ICD-10-CM

## 2025-06-18 DIAGNOSIS — E87.70 HYPERVOLEMIA: ICD-10-CM

## 2025-06-18 DIAGNOSIS — Z48.298 AFTERCARE FOLLOWING ORGAN TRANSPLANT: ICD-10-CM

## 2025-06-18 DIAGNOSIS — T86.11 ANTIBODY MEDIATED REJECTION OF KIDNEY TRANSPLANT: Primary | ICD-10-CM

## 2025-06-18 DIAGNOSIS — Z20.828 CONTACT WITH AND (SUSPECTED) EXPOSURE TO OTHER VIRAL COMMUNICABLE DISEASES: ICD-10-CM

## 2025-06-18 LAB
ALBUMIN MFR UR ELPH: 14 MG/DL
ALBUMIN SERPL BCG-MCNC: 4.1 G/DL (ref 3.5–5.2)
ALP SERPL-CCNC: 53 U/L (ref 40–150)
ALT SERPL W P-5'-P-CCNC: 10 U/L (ref 0–50)
ANION GAP SERPL CALCULATED.3IONS-SCNC: 9 MMOL/L (ref 7–15)
AST SERPL W P-5'-P-CCNC: 11 U/L (ref 0–45)
BASOPHILS # BLD AUTO: 0 10E3/UL (ref 0–0.2)
BASOPHILS NFR BLD AUTO: 1 %
BILIRUB SERPL-MCNC: 0.3 MG/DL
BILIRUBIN DIRECT (ROCHE PRO & PURE): 0.14 MG/DL (ref 0–0.45)
BLD PROD TYP BPU: NORMAL
BLOOD COMPONENT TYPE: NORMAL
BUN SERPL-MCNC: 25.6 MG/DL (ref 6–20)
CALCIUM SERPL-MCNC: 9.9 MG/DL (ref 8.8–10.4)
CHLORIDE SERPL-SCNC: 109 MMOL/L (ref 98–107)
CODING SYSTEM: NORMAL
CREAT SERPL-MCNC: 1.56 MG/DL (ref 0.51–0.95)
CREAT UR-MCNC: 109 MG/DL
EGFRCR SERPLBLD CKD-EPI 2021: 44 ML/MIN/1.73M2
EOSINOPHIL # BLD AUTO: 0.1 10E3/UL (ref 0–0.7)
EOSINOPHIL NFR BLD AUTO: 1 %
ERYTHROCYTE [DISTWIDTH] IN BLOOD BY AUTOMATED COUNT: 13.4 % (ref 10–15)
FIBRINOGEN PPP-MCNC: 206 MG/DL (ref 170–510)
GLUCOSE SERPL-MCNC: 81 MG/DL (ref 70–99)
HCO3 SERPL-SCNC: 20 MMOL/L (ref 22–29)
HCT VFR BLD AUTO: 27.3 % (ref 35–47)
HGB BLD-MCNC: 8.8 G/DL (ref 11.7–15.7)
IMM GRANULOCYTES # BLD: 0.1 10E3/UL
IMM GRANULOCYTES NFR BLD: 1 %
INR PPP: 1 (ref 0.85–1.15)
IRON BINDING CAPACITY (ROCHE): 199 UG/DL (ref 240–430)
IRON SATN MFR SERPL: 30 % (ref 15–46)
IRON SERPL-MCNC: 60 UG/DL (ref 37–145)
ISSUE DATE AND TIME: NORMAL
LYMPHOCYTES # BLD AUTO: 0.5 10E3/UL (ref 0.8–5.3)
LYMPHOCYTES NFR BLD AUTO: 10 %
MCH RBC QN AUTO: 30.6 PG (ref 26.5–33)
MCHC RBC AUTO-ENTMCNC: 32.2 G/DL (ref 31.5–36.5)
MCV RBC AUTO: 95 FL (ref 78–100)
MONOCYTES # BLD AUTO: 0.5 10E3/UL (ref 0–1.3)
MONOCYTES NFR BLD AUTO: 11 %
NEUTROPHILS # BLD AUTO: 3.5 10E3/UL (ref 1.6–8.3)
NEUTROPHILS NFR BLD AUTO: 76 %
NRBC # BLD AUTO: 0 10E3/UL
NRBC BLD AUTO-RTO: 0 /100
PLATELET # BLD AUTO: 208 10E3/UL (ref 150–450)
POTASSIUM SERPL-SCNC: 4.3 MMOL/L (ref 3.4–5.3)
PROT SERPL-MCNC: 5.7 G/DL (ref 6.4–8.3)
PROT/CREAT 24H UR: 0.13 MG/MG CR (ref 0–0.2)
PROTHROMBIN TIME: 13.4 SECONDS (ref 11.8–14.8)
PTH-INTACT SERPL-MCNC: 350 PG/ML (ref 15–65)
RBC # BLD AUTO: 2.88 10E6/UL (ref 3.8–5.2)
SODIUM SERPL-SCNC: 138 MMOL/L (ref 135–145)
TACROLIMUS BLD-MCNC: 8.4 UG/L (ref 5–15)
TME LAST DOSE: NORMAL H
TME LAST DOSE: NORMAL H
UNIT ABO/RH: NORMAL
UNIT NUMBER: NORMAL
UNIT STATUS: NORMAL
UNIT TYPE ISBT: 6200
UNIT TYPE ISBT: 6200
UNIT TYPE ISBT: 8400
VIT D+METAB SERPL-MCNC: 24 NG/ML (ref 20–50)
WBC # BLD AUTO: 4.6 10E3/UL (ref 4–11)

## 2025-06-18 PROCEDURE — 250N000011 HC RX IP 250 OP 636

## 2025-06-18 PROCEDURE — 83970 ASSAY OF PARATHORMONE: CPT | Performed by: INTERNAL MEDICINE

## 2025-06-18 PROCEDURE — P9059 PLASMA, FRZ BETWEEN 8-24HOUR: HCPCS

## 2025-06-18 PROCEDURE — 84156 ASSAY OF PROTEIN URINE: CPT | Performed by: INTERNAL MEDICINE

## 2025-06-18 PROCEDURE — 82248 BILIRUBIN DIRECT: CPT | Performed by: NURSE PRACTITIONER

## 2025-06-18 PROCEDURE — 80197 ASSAY OF TACROLIMUS: CPT | Performed by: INTERNAL MEDICINE

## 2025-06-18 PROCEDURE — 85610 PROTHROMBIN TIME: CPT

## 2025-06-18 PROCEDURE — 36415 COLL VENOUS BLD VENIPUNCTURE: CPT

## 2025-06-18 PROCEDURE — 85004 AUTOMATED DIFF WBC COUNT: CPT

## 2025-06-18 PROCEDURE — 36415 COLL VENOUS BLD VENIPUNCTURE: CPT | Performed by: NURSE PRACTITIONER

## 2025-06-18 PROCEDURE — 250N000011 HC RX IP 250 OP 636: Performed by: INTERNAL MEDICINE

## 2025-06-18 PROCEDURE — 36514 APHERESIS PLASMA: CPT

## 2025-06-18 PROCEDURE — 250N000009 HC RX 250

## 2025-06-18 PROCEDURE — 83550 IRON BINDING TEST: CPT | Performed by: INTERNAL MEDICINE

## 2025-06-18 PROCEDURE — 87521 HEPATITIS C PROBE&RVRS TRNSC: CPT | Performed by: INTERNAL MEDICINE

## 2025-06-18 PROCEDURE — 82306 VITAMIN D 25 HYDROXY: CPT | Performed by: INTERNAL MEDICINE

## 2025-06-18 PROCEDURE — P9045 ALBUMIN (HUMAN), 5%, 250 ML: HCPCS

## 2025-06-18 PROCEDURE — 80048 BASIC METABOLIC PNL TOTAL CA: CPT | Performed by: INTERNAL MEDICINE

## 2025-06-18 PROCEDURE — 250N000011 HC RX IP 250 OP 636: Mod: JZ | Performed by: PHYSICIAN ASSISTANT

## 2025-06-18 PROCEDURE — 250N000013 HC RX MED GY IP 250 OP 250 PS 637: Performed by: INTERNAL MEDICINE

## 2025-06-18 PROCEDURE — 87799 DETECT AGENT NOS DNA QUANT: CPT | Performed by: INTERNAL MEDICINE

## 2025-06-18 PROCEDURE — 85384 FIBRINOGEN ACTIVITY: CPT

## 2025-06-18 RX ORDER — HUMAN IMMUNOGLOBULIN G 20 G/200ML
45 LIQUID INTRAVENOUS ONCE
Status: COMPLETED | OUTPATIENT
Start: 2025-06-18 | End: 2025-06-18

## 2025-06-18 RX ORDER — CINACALCET 30 MG/1
30 TABLET, FILM COATED ORAL DAILY
Qty: 30 TABLET | Refills: 11 | OUTPATIENT
Start: 2025-06-18

## 2025-06-18 RX ORDER — HEPARIN SODIUM 1000 [USP'U]/ML
1-3 INJECTION, SOLUTION INTRAVENOUS; SUBCUTANEOUS ONCE
Status: CANCELLED
Start: 2025-06-18 | End: 2025-06-18

## 2025-06-18 RX ORDER — METHYLPREDNISOLONE SODIUM SUCCINATE 125 MG/2ML
100 INJECTION INTRAMUSCULAR; INTRAVENOUS
Status: CANCELLED | OUTPATIENT
Start: 2025-06-21

## 2025-06-18 RX ORDER — CALCIUM GLUCONATE 100 MG/ML
AMPUL (ML) INTRAVENOUS
Status: COMPLETED | OUTPATIENT
Start: 2025-06-18 | End: 2025-06-18

## 2025-06-18 RX ORDER — EPINEPHRINE 1 MG/ML
0.3 INJECTION, SOLUTION INTRAMUSCULAR; SUBCUTANEOUS EVERY 5 MIN PRN
Status: CANCELLED | OUTPATIENT
Start: 2025-06-18

## 2025-06-18 RX ORDER — HEPARIN SODIUM,PORCINE 10 UNIT/ML
5-20 VIAL (ML) INTRAVENOUS DAILY PRN
Status: CANCELLED | OUTPATIENT
Start: 2025-06-19

## 2025-06-18 RX ORDER — DIPHENHYDRAMINE HYDROCHLORIDE 50 MG/ML
50 INJECTION, SOLUTION INTRAMUSCULAR; INTRAVENOUS
Status: CANCELLED
Start: 2025-06-18

## 2025-06-18 RX ORDER — DIPHENHYDRAMINE HYDROCHLORIDE 50 MG/ML
50 INJECTION, SOLUTION INTRAMUSCULAR; INTRAVENOUS
Status: CANCELLED
Start: 2025-06-19

## 2025-06-18 RX ORDER — METHYLPREDNISOLONE SODIUM SUCCINATE 125 MG/2ML
100 INJECTION INTRAMUSCULAR; INTRAVENOUS
Status: DISCONTINUED | OUTPATIENT
Start: 2025-06-18 | End: 2025-06-18

## 2025-06-18 RX ORDER — METHYLPREDNISOLONE SODIUM SUCCINATE 125 MG/2ML
100 INJECTION INTRAMUSCULAR; INTRAVENOUS
Status: DISCONTINUED | OUTPATIENT
Start: 2025-06-20 | End: 2025-06-18

## 2025-06-18 RX ORDER — ACETAMINOPHEN 325 MG/1
650 TABLET ORAL DAILY PRN
Status: DISCONTINUED | OUTPATIENT
Start: 2025-06-18 | End: 2025-06-18

## 2025-06-18 RX ORDER — POTASSIUM CHLORIDE 750 MG/1
10 TABLET, EXTENDED RELEASE ORAL DAILY
Qty: 30 TABLET | Refills: 1 | Status: SHIPPED | OUTPATIENT
Start: 2025-06-18

## 2025-06-18 RX ORDER — FUROSEMIDE 40 MG/1
20 TABLET ORAL DAILY
Qty: 15 TABLET | Refills: 0 | Status: SHIPPED | OUTPATIENT
Start: 2025-06-18

## 2025-06-18 RX ORDER — ALBUMIN HUMAN 25 %
2250 INTRAVENOUS SOLUTION INTRAVENOUS
Status: COMPLETED | OUTPATIENT
Start: 2025-06-18 | End: 2025-06-18

## 2025-06-18 RX ORDER — DIPHENHYDRAMINE HYDROCHLORIDE 50 MG/ML
25 INJECTION, SOLUTION INTRAMUSCULAR; INTRAVENOUS
Status: CANCELLED
Start: 2025-06-19

## 2025-06-18 RX ORDER — ALBUTEROL SULFATE 90 UG/1
1-2 INHALANT RESPIRATORY (INHALATION)
Status: CANCELLED
Start: 2025-06-19

## 2025-06-18 RX ORDER — ALBUTEROL SULFATE 90 UG/1
1-2 INHALANT RESPIRATORY (INHALATION)
Status: CANCELLED
Start: 2025-06-18

## 2025-06-18 RX ORDER — MEPERIDINE HYDROCHLORIDE 25 MG/ML
25 INJECTION INTRAMUSCULAR; INTRAVENOUS; SUBCUTANEOUS
Status: CANCELLED | OUTPATIENT
Start: 2025-06-18

## 2025-06-18 RX ORDER — ACETAMINOPHEN 325 MG/1
650 TABLET ORAL DAILY PRN
Status: CANCELLED
Start: 2025-06-18

## 2025-06-18 RX ORDER — EPINEPHRINE 1 MG/ML
0.3 INJECTION, SOLUTION INTRAMUSCULAR; SUBCUTANEOUS EVERY 5 MIN PRN
Status: CANCELLED | OUTPATIENT
Start: 2025-06-19

## 2025-06-18 RX ORDER — METHYLPREDNISOLONE SODIUM SUCCINATE 40 MG/ML
40 INJECTION INTRAMUSCULAR; INTRAVENOUS
Status: CANCELLED
Start: 2025-06-18

## 2025-06-18 RX ORDER — HUMAN IMMUNOGLOBULIN G 20 G/200ML
44.8 LIQUID INTRAVENOUS ONCE
Status: CANCELLED | OUTPATIENT
Start: 2025-06-18 | End: 2025-06-18

## 2025-06-18 RX ORDER — ALBUTEROL SULFATE 0.83 MG/ML
2.5 SOLUTION RESPIRATORY (INHALATION)
Status: CANCELLED | OUTPATIENT
Start: 2025-06-19

## 2025-06-18 RX ORDER — METHYLPREDNISOLONE SODIUM SUCCINATE 40 MG/ML
40 INJECTION INTRAMUSCULAR; INTRAVENOUS
Status: CANCELLED
Start: 2025-06-19

## 2025-06-18 RX ORDER — HEPARIN SODIUM (PORCINE) LOCK FLUSH IV SOLN 100 UNIT/ML 100 UNIT/ML
5 SOLUTION INTRAVENOUS
Status: CANCELLED | OUTPATIENT
Start: 2025-06-19

## 2025-06-18 RX ORDER — MEPERIDINE HYDROCHLORIDE 25 MG/ML
25 INJECTION INTRAMUSCULAR; INTRAVENOUS; SUBCUTANEOUS
Status: CANCELLED | OUTPATIENT
Start: 2025-06-19

## 2025-06-18 RX ORDER — DIPHENHYDRAMINE HYDROCHLORIDE 50 MG/ML
25 INJECTION, SOLUTION INTRAMUSCULAR; INTRAVENOUS
Status: CANCELLED
Start: 2025-06-18

## 2025-06-18 RX ORDER — ALBUTEROL SULFATE 0.83 MG/ML
2.5 SOLUTION RESPIRATORY (INHALATION)
Status: CANCELLED | OUTPATIENT
Start: 2025-06-18

## 2025-06-18 RX ADMIN — ANTICOAGULANT CITRATE DEXTROSE SOLUTION FORMULA A 571 ML: 12.25; 11; 3.65 SOLUTION INTRAVENOUS at 08:44

## 2025-06-18 RX ADMIN — METHYLPREDNISOLONE SODIUM SUCCINATE 100 MG: 125 INJECTION, POWDER, FOR SOLUTION INTRAMUSCULAR; INTRAVENOUS at 10:58

## 2025-06-18 RX ADMIN — ALBUMIN (HUMAN) 2250 ML: 12.5 INJECTION, SOLUTION INTRAVENOUS at 08:44

## 2025-06-18 RX ADMIN — HUMAN IMMUNOGLOBULIN G 45 G: 40 LIQUID INTRAVENOUS at 11:20

## 2025-06-18 RX ADMIN — ACETAMINOPHEN 650 MG: 325 TABLET ORAL at 10:58

## 2025-06-18 RX ADMIN — CALCIUM GLUCONATE 3 G: 98 INJECTION, SOLUTION INTRAVENOUS at 08:44

## 2025-06-18 NOTE — PROCEDURES
Laboratory Medicine and Pathology  Transfusion Medicine - Apheresis Procedure    Glenna Spears MRN# [id number]   YOB: 1990 Age: 34 year old        Reason for consult: Antibody mediated rejection of kidney transplant           Assessment and Plan:   The patient is a 34 year old female S/P kidney transplant with antibody mediated rejection. She underwent therapeutic plasma exchange (TPE) #5 of planned 5. She tolerated the procedure well.    She overall is feeling well.  Denies nausea, vomiting, fevers, chills.     Please do not start ACE inhibitors throughout the duration of the TPE series as these have been associated with reactions during apheresis.  Please notify the Transfusion Medicine physician of any upcoming procedures, surgeries, or biopsies as TPE with albumin replacement will affect coagulation factor levels.           History of Present Illness:   The patient is a 34 year old female with ESRD on hemodialysis since 6/2022. She underwent kidney transplant 5/17/2025. Medical history also notable for obesity S/P gastric sleeve 1/2024, substance use disorder, positive quantiferon gold test S/P treatment, multiple right upper extremity DVT. She underwent kidney biopsy and drain placement for removal of perinephric fluid on 6/6/2025. Course complicated by abdominal wall hematoma. Biopsy consistent with antibody mediated rejection (diffuse C4d deposition) and known donor specific antibodies since 5/22/205. She has a left arm fistula that was used for dialysis. Transfusion Medicine consulted for TPE on 6/8/2025 and underwent first TPE on 6/9/2025.  She was discharged from the hospital on 6/11/2025.           Past Medical History:     Past Medical History:   Diagnosis Date    ESRD (end stage renal disease) (H)     GERD (gastroesophageal reflux disease)     Hypertension     Kidney replaced by transplant 05/17/2025    DDKT. Induction w/ Thymoglobulin.    Latent tuberculosis 2024     Completed 4 months of rifampin    Obesity     Secondary hyperparathyroidism     Vitamin D deficiency           Past Surgical History:     Past Surgical History:   Procedure Laterality Date    AV FISTULA REPAIR       SECTION      IR FINE NEEDLE ASPIRATION W ULTRASOUND  2025    IR RENAL BIOPSY RIGHT  2025    IR SKIN SUBQ/SEROMA ABSCESS DRAIN  2025    LAPAROSCOPIC GASTRIC SLEEVE N/A 2024    Procedure: GASTRECTOMY, SLEEVE, LAPAROSCOPIC;  Surgeon: John Smith MD;  Location: [facility] OR    TRANSPLANT KIDNEY RECIPIENT  DONOR Right 2025    Procedure: Transplant kidney recipient  donor with Vein and Artery reconstruction;  Surgeon: Manjeet Barreto MD;  Location: [facility] OR          Social History:   Has a partner and 2 sons, lives in [place]            Allergies:     Allergies   Allergen Reactions    Sulfa Antibiotics Other (See Comments)     Reaction as an infant- unsure of what happened           Medications:     Current Outpatient Medications   Medication Sig Dispense Refill    tacrolimus (GENERIC EQUIVALENT) 1 MG capsule Take 2 capsules (2 mg) by mouth 2 times daily. Total dose: 7mg twice daily 120 capsule 11    acetaminophen (TYLENOL) 325 MG tablet Take 325-650 mg by mouth every 4 hours as needed      [START ON 2025] apixaban ANTICOAGULANT (ELIQUIS) 5 MG tablet Take 2 tablets (10 mg) by mouth 2 times daily for 7 days, THEN 1 tablet (5 mg) 2 times daily. 194 tablet 0    aspirin (ASA) 81 MG EC tablet Take 1 tablet (81 mg) by mouth daily. 30 tablet 11    atorvastatin (LIPITOR) 10 MG tablet Take 1 tablet (10 mg) by mouth daily. 30 tablet 2    calcium carbonate (TUMS) 500 MG chewable tablet Take 2 tablets (1,000 mg) by mouth every 6 hours as needed for heartburn.      cinacalcet (SENSIPAR) 30 MG tablet Take 1 tablet (30 mg) by mouth daily. 30 tablet 11    dapsone (ACZONE) 25 MG tablet Take 2 tablets (50 mg) by mouth daily. 60 tablet 11    famotidine  (PEPCID) 20 MG tablet Take 1 tablet (20 mg) by mouth 2 times daily as needed (heartburn).      furosemide (LASIX) 40 MG tablet Take 1 tablet (40 mg) by mouth daily. 30 tablet 0    hydrOXYzine HCl (ATARAX) 25 MG tablet Take 1 tablet (25 mg) by mouth every 6 hours as needed for anxiety. 20 tablet 11    levonorgestrel (KYLEENA) 19.5 MG IUD 1 Intra Uterine Device by Intrauterine route once      Lidocaine (LIDOCARE) 4 % Patch Place 2 patches over 12 hours onto the skin every 24 hours. To prevent lidocaine toxicity, patient should be patch free for 12 hrs daily. 20 patch 11    magnesium hydroxide (MILK OF MAGNESIA) 400 MG/5ML suspension Take 30 mLs by mouth daily as needed for constipation. 118 mL 11    magnesium oxide (MAG-OX) 400 MG tablet Take 1 tablet (400 mg) by mouth 2 times daily. 60 tablet 11    methocarbamol (ROBAXIN) 500 MG tablet Take 1 tablet (500 mg) by mouth every 6 hours as needed for muscle spasms. 20 tablet 0    mycophenolate (GENERIC EQUIVALENT) 250 MG capsule Take 3 capsules (750 mg) by mouth 2 times daily. 180 capsule 11    nortriptyline (PAMELOR) 25 MG capsule Take 25 mg by mouth at bedtime.      oxyCODONE (ROXICODONE) 5 MG tablet Take 1 tablet (5 mg) by mouth every 8 hours as needed for moderate pain. Take 5 mg to pain 4 to 6/10, take 10 mg for pain 6 to 10/10. 10 tablet 0    pantoprazole (PROTONIX) 40 MG EC tablet Take 1 tablet (40 mg) by mouth daily. (Patient not taking: Reported on 6/17/2025) 30 tablet 1    potassium chloride jack ER (KLOR-CON M10) 10 MEQ CR tablet Take 1 tablet (10 mEq) by mouth daily. 30 tablet 1    [START ON 6/19/2025] predniSONE (DELTASONE) 5 MG tablet Take 1 tablet (5 mg) by mouth daily. 30 tablet 11    senna-docusate (SENOKOT-S/PERICOLACE) 8.6-50 MG tablet Take 2 tablets by mouth 2 times daily as needed for constipation.      sertraline (ZOLOFT) 25 MG tablet Take 1 tablet (25 mg) by mouth daily for 5 days, THEN 2 tablets (50 mg) daily. Follow-up with your PCP to consider  "raising the dose to a maximum of 100 mg daily. 65 tablet 0    sodium bicarbonate 650 MG tablet Take 2 tablets (1,300 mg) by mouth 2 times daily. 120 tablet 11    [Paused] tacrolimus (GENERIC EQUIVALENT) 0.5 MG capsule Profile Rx: patient will contact pharmacy when needed 60 capsule 11    tacrolimus (GENERIC EQUIVALENT) 5 MG capsule Take 1 capsule (5 mg) by mouth 2 times daily. Total dose: 7mg twice daily 60 capsule 11    valGANciclovir (VALCYTE) 450 MG tablet Take 1 tablet (450 mg) by mouth daily.      varenicline (CHANTIX SANCHEZ) 0.5 MG X 11 & 1 MG X 42 tablet Take 0.5 mg by mouth daily. Patient is taking 0.5 mg as needed for cravings associated with smoking      VENTOLIN  (90 Base) MCG/ACT inhaler Inhale 2 puffs into the lungs every 4 hours as needed for shortness of breath or wheezing.       No current facility-administered medications for this encounter.     Facility-Administered Medications Ordered in Other Encounters   Medication Dose Route Frequency Provider Last Rate Last Admin    acetaminophen (TYLENOL) tablet 650 mg  650 mg Oral Daily PRN Kevin Aleman MD   650 mg at 06/18/25 1058    methylPREDNISolone Na Suc (solu-MEDROL) injection 100 mg  100 mg Intravenous Once per day on Monday Wednesday Friday Kevin Aleman MD   100 mg at 06/18/25 1058               Review of Systems:   See above         Exam:   /70   Pulse 94   Temp 97.9  F (36.6  C) (Oral)   Resp 16   Ht 1.73 m (5' 8.11\")   Wt 78.7 kg (173 lb 8 oz)   BMI 26.30 kg/m      Alert, no apparent distress  Breathing appears comfortable on room air  Left arm fistula accessed for the procedure.           Data:     Results for orders placed or performed during the hospital encounter of 06/18/25 (from the past 24 hours)   CBC with platelets differential    Narrative    The following orders were created for panel order CBC with platelets differential.  Procedure                               Abnormality         Status            "          ---------                               -----------         ------                     CBC with platelets and ...[[id number]]  Abnormal            Final result                 Please view results for these tests on the individual orders.   INR   Result Value Ref Range    INR 1.00 0.85 - 1.15    PT 13.4 11.8 - 14.8 Seconds   Fibrinogen activity   Result Value Ref Range    Fibrinogen Activity 206 170 - 510 mg/dL   Basic metabolic panel   Result Value Ref Range    Sodium 138 135 - 145 mmol/L    Potassium 4.3 3.4 - 5.3 mmol/L    Chloride 109 (H) 98 - 107 mmol/L    Carbon Dioxide (CO2) 20 (L) 22 - 29 mmol/L    Anion Gap 9 7 - 15 mmol/L    Urea Nitrogen 25.6 (H) 6.0 - 20.0 mg/dL    Creatinine 1.56 (H) 0.51 - 0.95 mg/dL    GFR Estimate 44 (L) >60 mL/min/1.73m2    Calcium 9.9 8.8 - 10.4 mg/dL    Glucose 81 70 - 99 mg/dL   CBC with platelets and differential   Result Value Ref Range    WBC Count 4.6 4.0 - 11.0 10e3/uL    RBC Count 2.88 (L) 3.80 - 5.20 10e6/uL    Hemoglobin 8.8 (L) 11.7 - 15.7 g/dL    Hematocrit 27.3 (L) 35.0 - 47.0 %    MCV 95 78 - 100 fL    MCH 30.6 26.5 - 33.0 pg    MCHC 32.2 31.5 - 36.5 g/dL    RDW 13.4 10.0 - 15.0 %    Platelet Count 208 150 - 450 10e3/uL    % Neutrophils 76 %    % Lymphocytes 10 %    % Monocytes 11 %    % Eosinophils 1 %    % Basophils 1 %    % Immature Granulocytes 1 %    NRBCs per 100 WBC 0 <1 /100    Absolute Neutrophils 3.5 1.6 - 8.3 10e3/uL    Absolute Lymphocytes 0.5 (L) 0.8 - 5.3 10e3/uL    Absolute Monocytes 0.5 0.0 - 1.3 10e3/uL    Absolute Eosinophils 0.1 0.0 - 0.7 10e3/uL    Absolute Basophils 0.0 0.0 - 0.2 10e3/uL    Absolute Immature Granulocytes 0.1 <=0.4 10e3/uL    Absolute NRBCs 0.0 10e3/uL   Parathyroid Hormone Intact   Result Value Ref Range    Parathyroid Hormone Intact 350 (H) 15 - 65 pg/mL    Narrative    This result was obtained with the Roche Elecsys PTH STAT assay.   This reference range differs from PTH assays used in other [facility]  laboratories.   PRA Donor Specific Antibody    Narrative    The following orders were created for panel order PRA Donor Specific Antibody.  Procedure                               Abnormality         Status                     ---------                               -----------         ------                     PRA Donor Specific Anti...[[id number]]                      In process                   Please view results for these tests on the individual orders.   Protein  random urine   Result Value Ref Range    Total Protein Urine mg/dL 14.0   mg/dL    Total Protein Urine mg/mg Creat 0.13 0.00 - 0.20 mg/mg Cr    Creatinine Urine mg/dL 109.0 mg/dL   Iron and iron binding capacity   Result Value Ref Range    Iron 60 37 - 145 ug/dL    Iron Binding Capacity 199 (L) 240 - 430 ug/dL    Iron Sat Index 30 15 - 46 %       BMP  Recent Labs   Lab 06/18/25  0855 06/16/25  1046 06/13/25  0812 06/12/25  0923    143 138 138   POTASSIUM 4.3 3.4 4.2 4.4   CHLORIDE 109* 117* 108* 105   KARTIK 9.9 8.2* 9.5 10.4   CO2 20* 15* 21* 26   BUN 25.6* 19.7 25.0* 28.4*   CR 1.56* 1.21* 1.72* 1.76*   GLC 81 101* 62* 73     CBC  Recent Labs   Lab 06/18/25  0855 06/16/25  0907 06/13/25  0812 06/12/25  0923   WBC 4.6 6.0 4.9 8.0   RBC 2.88* 2.94* 2.81* 2.76*   HGB 8.8* 8.9* 8.6* 8.6*   HCT 27.3* 27.8* 26.7* 26.6*   MCV 95 95 95 96   MCH 30.6 30.3 30.6 31.2   MCHC 32.2 32.0 32.2 32.3   RDW 13.4 13.0 13.0 12.9    234 244 255     INR  Recent Labs   Lab 06/18/25  0855 06/16/25  0907 06/13/25  0812   INR 1.00 0.99 0.93       Fibrinogen Activity   Date Value Ref Range Status   06/18/2025 206 170 - 510 mg/dL Final                Procedure Summary:   A single plasma volume plasma exchange was performed with a Spectra Optia cell separator.  The vascular access was a left arm fistula.  ACD-A was used for anticoagulation.  To offset the effects of the citrate, the patient was given calcium  gluconate IV in the return line.  The replacement fluid  was 2250 mL 5 % albumin and 1000 mL plasma due to recent hematoma after biopsy.  The patient's vital signs were stable throughout.  The patient tolerated the procedure well.          Attestation: During the procedure the patient was directly seen and evaluated by me, Kvng Ceja MD.  I have reviewed the chart and pertinent laboratory findings, and discussed the patient and the current procedure with the Apheresis nursing staff.    Kvng Ceja MD  Transfusion Medicine Attending  Laboratory Medicine & Pathology

## 2025-06-18 NOTE — TELEPHONE ENCOUNTER
Provider Call: General  Route to LPN    Reason for call: Call from lab- they need a different IDC code for the Iron panel- either call or fax to [phone]    Call back needed? Yes pt has lab appt for tomorrow     Return Call Needed  Same as documented in contacts section  When to return call?: Same day: Route High Priority

## 2025-06-18 NOTE — TELEPHONE ENCOUNTER
PA Initiation    Medication: CINACALCET HCL 30 MG PO TABS  Insurance Company: [employer] [place] - Phone [phone] Fax [phone]  Pharmacy Filling the Rx: [facility] - [place], [place] - [address]  Filling Pharmacy Phone:    Filling Pharmacy Fax:    Start Date: 6/18/2025

## 2025-06-18 NOTE — PROGRESS NOTES
Infusion Nursing Note:  Glenna Spears presents today for IVIG dose 5 of 5, Hailey-medrol, dose 3 of 3.    Patient seen by provider today: No   present during visit today: Not Applicable.    Note:   IVP solumedrol given over 2 minutes.  Premedicated with tylenol.  Dosing weight used: IBW: 64 kg. Infusion started at 32 mL/hr (0.5 mL/kg/hr) and increased by 32 mL/hr every 15 minutes for a final rate of 256 mL/hr (4.0 mL/kg/hr). Total infusion time ~2.5 hours.    Intravenous Access:  Labs drawn without difficulty.  Peripheral IV placed by [facility].    Treatment Conditions:  Patient denies recent fever, illness, recent or major local infection, on antibiotics, productive cough, recent surgery, or new blood clots.     Post Infusion Assessment:  Patient tolerated infusion without incident.  Blood return noted pre and post infusion.  Site patent and intact, free from redness, edema or discomfort.  No evidence of extravasations.  Access discontinued per protocol.     Discharge Plan:   Discharge instructions reviewed with: Patient.  Patient and/or family verbalized understanding of discharge instructions and all questions answered.  AVS to patient via [employer].    Patient discharged in stable condition accompanied by: self.  Departure Mode: Ambulatory.    Administrations This Visit       acetaminophen (TYLENOL) tablet 650 mg       Admin Date  06/18/2025 Action  $Given Dose  650 mg Route  Oral Documented By  Jailene Stanley RN              immune globulin - sucrose free 10% (PRIVIGEN) infusion 45 g       Admin Date  06/18/2025 Action  $New Bag Dose  45 g Route  Intravenous Documented By  Jailene Stanley RN              methylPREDNISolone Na Suc (solu-MEDROL) injection 100 mg       Admin Date  06/18/2025 Action  $Given Dose  100 mg Route  Intravenous Documented By  Jailene Stanley RN                  There were no vitals taken for this visit.    Drug Name: Solumedrol  Dose: 100 mg  Route administered: IV  NDC #:  7762-3918-46  Amount of waste(mL): 0.4 mL  Reason for waste: Single use vial    Jailene Stanley, RN

## 2025-06-18 NOTE — LETTER
Glenna Spears  [facility] [facility]   [place] [address]            June 20, 2025    I am writing to request approval for the use of cinacalcet in the treatment of post-kidney transplant secondary hyperparathyroidism (SHPT) and hypercalcemia. As you are aware, SHPT and hypercalcemia are common complications after kidney transplantation, affecting up to 30-60% of patients within the first year post-transplantation (1,2). These conditions are associated with significant morbidity and mortality, including cardiovascular disease, bone loss, and reduced graft survival (3,4).     Cinacalcet is an oral calcimimetic agent that acts on the parathyroid gland to lower parathyroid hormone (PTH) levels and serum calcium levels in patients with SHPT and hypercalcemia. Cinacalcet has been approved by the FDA for the treatment of SHPT in patients with chronic kidney disease on dialysis (5). Several clinical trials have also demonstrated the efficacy and safety of cinacalcet in the treatment of post-kidney transplant SHPT and hypercalcemia (6-8).     In a randomized, double-blind, placebo-controlled study of cinacalcet in post-kidney transplant patients with SHPT and hypercalcemia, cinacalcet was shown to significantly reduce PTH levels and serum calcium levels compared to placebo (6). Another randomized, open-label study found that cinacalcet improved bone mineral density and reduced bone turnover markers in post-kidney transplant patients with SHPT (7). Additionally, a case series of five post-kidney transplant patients with severe hypercalcemia refractory to conventional treatment demonstrated the efficacy of cinacalcet in rapidly lowering serum calcium levels and improving clinical symptoms (8).     Given the significant morbidity and mortality associated with post-kidney transplant SHPT and hypercalcemia, and the demonstrated efficacy and safety of cinacalcet in treating these conditions, I strongly recommend approval for the  use of cinacalcet in post-kidney transplant patients with SHPT and hypercalcemia.     Thank you for your consideration.       Sincerely,   .  Transplant Nephrology         References: Irena ROUSE, et al. Posttransplant hypophosphatemia: a systematic review and meta-analysis. Am J Transplant. 2015;15(4):896-904. Carmen MJ, et al. The prevalence of post-transplantation hyperparathyroidism in kidney transplant recipients. Clin Transplant. 2005;19(3):367-371. Giselle J, et al. Cardiovascular morbidity and mortality after kidney transplantation. Transpl Int. 2011;24(7):716-732. Ernesto M, et al. Diagnosis and management of bone disease in kidney transplant recipients. Am J Transplant. 2013;13(4):769-778. Sensipar (cinacalcet hydrochloride) [package insert]. [place], [place]: [employer] 2019. Mervat P, et al. A randomized study evaluating cinacalcet to treat hypercalcemia in renal transplant recipients with persistent hyperparathyroidism. Am J Transplant. 2014;14(11):7230-4751. Bib A, et al. Cinacalcet for the treatment of hyperparathyroidism in kidney transplant recipients: a randomized, placebo-controlled study. Transplantation. 2016;100(6):1330-0850. Broderick S, et al. Rapid control of severe hypercalcemia with cinacalcet in post-kidney transplant patients. Transplantation. 2008;86(12

## 2025-06-18 NOTE — DISCHARGE INSTRUCTIONS
Apheresis Blood Donor Center Post Instructions  You may feel tired after your procedure today.   Please call your doctor if you have:  bleeding that doesn t stop, fever, pain where a needle or tube (catheter) was placed, seizures, trouble breathing, red urine, nausea or vomiting, other health concerns.     If your symptoms are severe, call 401.  If we used your fistula or graft, watch for signs of bleeding.  Please remove the bandages after 4 hours.  The Apheresis/Blood Donor Center is open Monday-Friday 7:30 a.m. to 5 p.m.  The phone number is [phone].  A Transfusion Medicine physician can be reached after 5:00 p.m. weekdays and on weekends /Holidays by calling [phone], and asking for the physician on call.      Plasma exchange:  If you received blood products (plasma or red blood cells) as part of your treatment, you need to be aware that transfusion reactions can occur up to several hours after they have been given to you.  Call your physician if you experience any symptoms in the next 48 hours, including: breathing problems, rash, itching, hives, nausea or vomiting, fever or chills, blood in your urine or stools, or joint pain.  Please inform the Transfusion Medicine Physician by calling [phone] and asking for the physician on call.  If you received albumin as part of your treatment (this is the most common), some of your clotting factors have been removed.  You body will replace these factors, but you could be at a slight risk for bleeding.  Please inform us if you have had any bleeding or a recent invasive procedure, such as a biopsy or surgery.    Certain medications that lower your blood pressure (ace inhibitors) such as Lisinopril are contraindicated while you are receiving plasma exchange.  Please inform us if you have started taking this medication during your plasma exchange series.

## 2025-06-18 NOTE — TELEPHONE ENCOUNTER
Spoke with lab to clarify codes and needed labs.   Removed one month labs from [facility] order, as they've been completed.

## 2025-06-18 NOTE — PATIENT INSTRUCTIONS
Dear Glenna Spears    Thank you for choosing [facility] ([facility]) for your infusion.  The following information is a summary of our appointment as well as important reminders.      We look forward in seeing you on your next appointment here at [facility] ([facility]).  Please don t hesitate to call us at [phone] to reschedule any of your appointments or to speak with one of the [facility] registered nurses.  It was a pleasure taking care of you today.    Sincerely,    [facility]  [facility]  [address] [place] [address]  [address]  Phone:  [phone]

## 2025-06-19 LAB
BK VIRUS SPECIMEN TYPE: NORMAL
BKV DNA # SPEC NAA+PROBE: NOT DETECTED IU/ML
HBV DNA SERPL QL NAA+PROBE: NORMAL
HCV RNA SERPL QL NAA+PROBE: NORMAL
HIV1+2 RNA SERPL QL NAA+PROBE: NORMAL

## 2025-06-19 NOTE — TELEPHONE ENCOUNTER
PRIOR AUTHORIZATION DENIED    Medication: CINACALCET HCL 30 MG PO TABS  Insurance Company: [employer] [place] - Phone [phone] Fax [phone]  Denial Date: 6/19/2025  Denial Reason(s):       Appeal Information:       Patient Notified: no     no

## 2025-06-19 NOTE — TELEPHONE ENCOUNTER
Medication Appeal Initiation    Medication: CINACALCET HCL 30 MG PO TABS  Appeal Start Date:  6/19/2025  Insurance Company: [employer]  Insurance Phone: [phone]  Insurance Fax: [phone]  Comments:

## 2025-06-20 NOTE — TELEPHONE ENCOUNTER
Medication Appeal Initiation    Medication: CINACALCET HCL 30 MG PO TABS  Appeal Start Date:  6/19/2025  Insurance Company: [employer]  Insurance Phone: [phone]  Insurance Fax: [phone]  Comments:

## 2025-06-20 NOTE — TELEPHONE ENCOUNTER
MEDICATION APPEAL DENIED    Medication: CINACALCET HCL 30 MG PO TABS  Insurance Company: [employer]  Denial Date: 6/19/2025  Denial Reason(s):         Second Level Appeal Information:         Patient Notified: NO  Central Prior Authorization Team ONLY: Second level appeals will be managed by the clinic staff and provider. Please contact the [employer] Prior Authorization Team if additional information about the denial is needed.

## 2025-06-21 ENCOUNTER — MYC MEDICAL ADVICE (OUTPATIENT)
Dept: OTHER | Age: 35
End: 2025-06-21

## 2025-06-21 DIAGNOSIS — Z48.298 AFTERCARE FOLLOWING ORGAN TRANSPLANT: Primary | ICD-10-CM

## 2025-06-23 ENCOUNTER — LAB (OUTPATIENT)
Dept: LAB | Facility: CLINIC | Age: 35
End: 2025-06-23
Payer: COMMERCIAL

## 2025-06-23 DIAGNOSIS — Z79.899 ENCOUNTER FOR LONG-TERM CURRENT USE OF MEDICATION: ICD-10-CM

## 2025-06-23 DIAGNOSIS — Z98.890 OTHER SPECIFIED POSTPROCEDURAL STATES: ICD-10-CM

## 2025-06-23 DIAGNOSIS — Z20.828 CONTACT WITH AND (SUSPECTED) EXPOSURE TO OTHER VIRAL COMMUNICABLE DISEASES: ICD-10-CM

## 2025-06-23 DIAGNOSIS — Z48.298 AFTERCARE FOLLOWING ORGAN TRANSPLANT: ICD-10-CM

## 2025-06-23 DIAGNOSIS — Z94.0 KIDNEY REPLACED BY TRANSPLANT: ICD-10-CM

## 2025-06-23 PROCEDURE — 80197 ASSAY OF TACROLIMUS: CPT

## 2025-06-23 NOTE — TELEPHONE ENCOUNTER
MEDICATION SECOND LEVEL APPEAL APPROVED    Medication: CINACALCET HCL 30 MG PO TABS  Authorization Effective Date: 3/22/2025  Authorization Expiration Date: 6/20/2026  Approved Dose/Quantity: UD  Reference #:     Insurance Company: [employer]  Expected CoPay: $ 0     CoPay Card Available: No    Foundation Assistance Needed: N/A  Which Pharmacy is filling the prescription: [facility] - [place], [place] - [address]  Patient Notified: NO

## 2025-06-24 LAB
TACROLIMUS BLD-MCNC: 11.2 UG/L (ref 5–15)
TME LAST DOSE: NORMAL H
TME LAST DOSE: NORMAL H

## 2025-06-25 DIAGNOSIS — T86.11 ANTIBODY MEDIATED REJECTION OF KIDNEY TRANSPLANT: ICD-10-CM

## 2025-06-25 DIAGNOSIS — Z94.0 KIDNEY REPLACED BY TRANSPLANT: ICD-10-CM

## 2025-06-25 RX ORDER — VALGANCICLOVIR 450 MG/1
450 TABLET, FILM COATED ORAL DAILY
Qty: 30 TABLET | Refills: 1 | Status: SHIPPED | OUTPATIENT
Start: 2025-06-25

## 2025-06-26 ENCOUNTER — TELEPHONE (OUTPATIENT)
Dept: TRANSPLANT | Facility: CLINIC | Age: 35
End: 2025-06-26
Payer: COMMERCIAL

## 2025-06-26 ENCOUNTER — TELEPHONE (OUTPATIENT)
Dept: ENDOCRINOLOGY | Facility: CLINIC | Age: 35
End: 2025-06-26
Payer: COMMERCIAL

## 2025-06-26 ENCOUNTER — LAB (OUTPATIENT)
Dept: LAB | Facility: CLINIC | Age: 35
End: 2025-06-26
Payer: COMMERCIAL

## 2025-06-26 DIAGNOSIS — T86.11 ANTIBODY MEDIATED REJECTION OF KIDNEY TRANSPLANT: ICD-10-CM

## 2025-06-26 DIAGNOSIS — Z98.890 OTHER SPECIFIED POSTPROCEDURAL STATES: ICD-10-CM

## 2025-06-26 DIAGNOSIS — Z48.298 AFTERCARE FOLLOWING ORGAN TRANSPLANT: ICD-10-CM

## 2025-06-26 DIAGNOSIS — Z20.828 CONTACT WITH AND (SUSPECTED) EXPOSURE TO OTHER VIRAL COMMUNICABLE DISEASES: ICD-10-CM

## 2025-06-26 DIAGNOSIS — Z79.899 ENCOUNTER FOR LONG-TERM CURRENT USE OF MEDICATION: ICD-10-CM

## 2025-06-26 DIAGNOSIS — Z94.0 KIDNEY REPLACED BY TRANSPLANT: ICD-10-CM

## 2025-06-26 PROCEDURE — 80197 ASSAY OF TACROLIMUS: CPT

## 2025-06-26 NOTE — TELEPHONE ENCOUNTER
Patient confirmed scheduled appointment:     Date: 10/15/25 & wait list  Time: 12:30 PM  Visit type: Return Bariatric  Visit mode: Virtual Visit  Provider:  Kelli Joshi PA-C  Location: [facility]    Additional Notes:

## 2025-06-27 ENCOUNTER — RESULTS FOLLOW-UP (OUTPATIENT)
Dept: TRANSPLANT | Facility: CLINIC | Age: 35
End: 2025-06-27

## 2025-06-27 ENCOUNTER — OFFICE VISIT (OUTPATIENT)
Dept: TRANSPLANT | Facility: CLINIC | Age: 35
End: 2025-06-27
Attending: INTERNAL MEDICINE
Payer: COMMERCIAL

## 2025-06-27 VITALS
SYSTOLIC BLOOD PRESSURE: 135 MMHG | HEART RATE: 87 BPM | BODY MASS INDEX: 26.86 KG/M2 | DIASTOLIC BLOOD PRESSURE: 79 MMHG | WEIGHT: 177.2 LBS | HEIGHT: 68 IN

## 2025-06-27 DIAGNOSIS — D64.9 NORMOCYTIC ANEMIA: ICD-10-CM

## 2025-06-27 DIAGNOSIS — N25.81 SECONDARY HYPERPARATHYROIDISM: ICD-10-CM

## 2025-06-27 DIAGNOSIS — F41.9 ANXIETY: ICD-10-CM

## 2025-06-27 DIAGNOSIS — G89.18 ACUTE POST-OPERATIVE PAIN: ICD-10-CM

## 2025-06-27 DIAGNOSIS — E83.42 HYPOMAGNESEMIA: ICD-10-CM

## 2025-06-27 DIAGNOSIS — R10.9 ABDOMINAL PAIN, UNSPECIFIED ABDOMINAL LOCATION: ICD-10-CM

## 2025-06-27 DIAGNOSIS — T86.11 ANTIBODY MEDIATED REJECTION OF KIDNEY TRANSPLANT: ICD-10-CM

## 2025-06-27 DIAGNOSIS — R80.9 NEPHROTIC RANGE PROTEINURIA: Chronic | ICD-10-CM

## 2025-06-27 DIAGNOSIS — J45.909 MODERATE ASTHMA WITHOUT COMPLICATION, UNSPECIFIED WHETHER PERSISTENT: ICD-10-CM

## 2025-06-27 DIAGNOSIS — I10 ESSENTIAL HYPERTENSION: Primary | ICD-10-CM

## 2025-06-27 DIAGNOSIS — Z94.0 KIDNEY TRANSPLANTED: ICD-10-CM

## 2025-06-27 DIAGNOSIS — Z94.0 KIDNEY REPLACED BY TRANSPLANT: Chronic | ICD-10-CM

## 2025-06-27 DIAGNOSIS — E66.9 OBESITY WITHOUT SERIOUS COMORBIDITY, UNSPECIFIED CLASS, UNSPECIFIED OBESITY TYPE: ICD-10-CM

## 2025-06-27 DIAGNOSIS — D84.9 IMMUNOSUPPRESSED STATUS: Chronic | ICD-10-CM

## 2025-06-27 DIAGNOSIS — F32.A DEPRESSIVE DISORDER: Chronic | ICD-10-CM

## 2025-06-27 DIAGNOSIS — E55.9 VITAMIN D DEFICIENCY: ICD-10-CM

## 2025-06-27 LAB
TACROLIMUS BLD-MCNC: 10.2 UG/L (ref 5–15)
TME LAST DOSE: NORMAL H
TME LAST DOSE: NORMAL H

## 2025-06-27 PROCEDURE — 86828 HLA CLASS I&II ANTIBODY QUAL: CPT | Performed by: INTERNAL MEDICINE

## 2025-06-27 PROCEDURE — 87799 DETECT AGENT NOS DNA QUANT: CPT | Performed by: INTERNAL MEDICINE

## 2025-06-27 PROCEDURE — 86833 HLA CLASS II HIGH DEFIN QUAL: CPT | Performed by: INTERNAL MEDICINE

## 2025-06-27 PROCEDURE — 99213 OFFICE O/P EST LOW 20 MIN: CPT | Performed by: INTERNAL MEDICINE

## 2025-06-27 PROCEDURE — 80180 DRUG SCRN QUAN MYCOPHENOLATE: CPT | Performed by: INTERNAL MEDICINE

## 2025-06-27 PROCEDURE — 99000 SPECIMEN HANDLING OFFICE-LAB: CPT | Performed by: PATHOLOGY

## 2025-06-27 PROCEDURE — G2211 COMPLEX E/M VISIT ADD ON: HCPCS | Performed by: INTERNAL MEDICINE

## 2025-06-27 PROCEDURE — 99215 OFFICE O/P EST HI 40 MIN: CPT | Mod: 24 | Performed by: INTERNAL MEDICINE

## 2025-06-27 PROCEDURE — 80197 ASSAY OF TACROLIMUS: CPT | Performed by: INTERNAL MEDICINE

## 2025-06-27 PROCEDURE — 86832 HLA CLASS I HIGH DEFIN QUAL: CPT | Performed by: INTERNAL MEDICINE

## 2025-06-27 RX ORDER — GABAPENTIN 100 MG/1
100 CAPSULE ORAL DAILY
Qty: 30 CAPSULE | Refills: 0 | Status: SHIPPED | OUTPATIENT
Start: 2025-06-27 | End: 2025-06-27

## 2025-06-27 RX ORDER — GABAPENTIN 100 MG/1
100 CAPSULE ORAL DAILY
Qty: 30 CAPSULE | Refills: 0 | Status: SHIPPED | OUTPATIENT
Start: 2025-06-27

## 2025-06-27 NOTE — PATIENT INSTRUCTIONS
Lasix 20 mg MWF and see if swelling is under control   Can try cinacalcet TTSS, to improve your symptoms

## 2025-06-27 NOTE — PROGRESS NOTES
TRANSPLANT NEPHROLOGY CLINIC VISIT     Assessment & Plan   # DDKT: CKD Stage 3a - Stable   - Baseline Creatinine: ~ TBD, but wendie was 1.2 while inpt, now hovering over 1.4-1.5 range.   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   - Primary Nephrologist: Dr. Cruz Mar.    # Acute on chronic pain: pt c/o pain over her right> left side. No fevers/chills noted. With ongoing pain and not able to tolerate tylenol with GERD symptoms, gave her oxycodone (prescribed by surgery) for ongoing pain. Also gave her gabapentin for chronic neuropathic pain.    # Acute Antibody Mediated Rejection:   6/6/25 pathology findings show diffuse C4d positivity without evidence (so far) of glomerulitis or peritubular capillaritis. There was no evidence of cellular mediated rejection. 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 She received 500 mg solumedrol on 6/8/25. Plan is for PLEX x 5 every other day, IVIG 500 mg/kg after PLEX #1-4, IVIG 1 gm/kg after PLEX #5, solumedrol 100 mg IV prior to each IVIG dose.     # Immunosuppression: Tacrolimus immediate release (goal 8-10), Mycophenolate mofetil (dose 750 mg every 12 hours), and Prednisone (dose 5 mg daily)   - Induction with Recent Transplant:  High Intensity Protocol   - Continue with intensive monitoring of immunosuppression for efficacy and toxicity.   - Historical Changes in Immunosuppression: None   - Changes: Not at this time    # Infection Prevention:   Last CD4 Level: Not checked  - PJP: Inhaled pentamidine  - CMV: Valganciclovir (Valcyte)      - CMV IgG Ab High Risk Discordance (D+/R-) at time of transplant: No  Present CMV Serostatus: Positive  - EBV IgG Ab High Risk Discordance (D+/R-) at time of transplant: No  Present EBV Serostatus: Positive    # Hypertension: Controlled;  Goal BP: <  140/90   - Changes: Not at this time    # Anemia in Chronic Renal Disease: Hgb: Stable      SHALOM: No   - Iron studies: Replete    # Mineral Bone Disorder:    - Secondary renal hyperparathyroidism; PTH level: Moderately elevated (301-600 pg/ml)        On treatment: Cinacalcet  - Vitamin D; level: Normal        On supplement: No  - Calcium; level: Normal        On supplement: No  - Phosphorus; level: Low        On supplement: No, encouraged to eat foods with phos, if still low, will start phos supplementation    # Electrolytes:  - Potassium; level: Normal        On supplement: No  - Magnesium; level: Low        On supplement: Yes  - Bicarbonate; level: Low        On supplement: Yes  - Sodium; level: Normal    # Other Significant PMH:   - Facial acne : noted acne all over her face with out redness. Per pt, alcohol swabs helping not to erupt. Felt started after starting on prednisone. So will monitor for now.   - Obesity s/p gastric sleeve 1/23/24: was on semaglutide               - History of Illicit Drug use: remote LSD /cocaine use in her 20s, + marijuana.               - Positive Quant Gold: s/p treatment by ID              - Non-occlusive R internal jugular thrombosis and an occlusive right cephalic vein superficial thrombosis, likely related to a previously placed (and now removed) catheter. Anticoagulation is currently being held due to presence of anterior abdominal wall hematoma s/p drain placement and transplant kidney biopsy. Will restart apixaban 06/19.      # Skin Cancer Risk:    - Discussed sun protection and recommend regular follow up with Dermatology.    # Transplant History:  Etiology of Kidney Failure: Unknown etiology  Tx: DDKT  Transplant: 5/17/2025 (Kidney)  Significant transplant-related complications: Acute antibody-mediated rejection    Transplant Office Phone Number: [phone]    Assessment and plan was discussed with the patient and she voiced her understanding and agreement.    Return  visit: Return in about 2 months (around 8/27/2025).    Sabrina Lezama MD    The longitudinal plan of care for the diagnosis(es)/condition(s) as documented were addressed during this visit. Due to the added complexity in care, I will continue to support Glenna in the subsequent management and with ongoing continuity of care.      Chief Complaint   Ms. Spears is a 34 year old here for kidney transplant and immunosuppression management.     History of Present Illness     Ms. Spears reports not feeling well with pain issues. Felt her pain is more on her right side on the side of transplant, but started to note intermittently on left. No associated fevers/chills or diarrhea.   Since last clinic visit:   Hospitalizations: No   New Medical Issues: Yes, acute aches and pains, pimples on her face since starting on prednisone  Chest pain or shortness of breath: No  Lower extremity swelling: No  Weight change: No  Nausea and vomiting: No  Diarrhea: No  Heartburn symptoms: No, on Pepcid  Fever, sweats or chills: No, but noted significant sweating noted since starting on prednisone  Urinary complaints: No    Home BP: 150's at home but clinic BP were in 130's instructed to continue home BP checks    Problem List   Patient Active Problem List   Diagnosis    Moderate asthma    Vitamin D deficiency    Obesity    Anxiety    Depressive disorder    Dialysis AV fistula malfunction    Secondary hyperparathyroidism    Nephrotic range proteinuria    Normocytic anemia    Essential hypertension    Kidney replaced by transplant    Immunosuppressed status    Acute post-operative pain    Hypomagnesemia    Right leg weakness    Abdominal pain, unspecified abdominal location    Antibody mediated rejection of kidney transplant       Allergies   Allergies   Allergen Reactions    Sulfa Antibiotics Other (See Comments)     Reaction as an infant- unsure of what happened       Medications   Current Outpatient Medications   Medication Sig Dispense  Refill    acetaminophen (TYLENOL) 325 MG tablet Take 325-650 mg by mouth every 4 hours as needed      apixaban ANTICOAGULANT (ELIQUIS) 5 MG tablet Take 2 tablets (10 mg) by mouth 2 times daily for 7 days, THEN 1 tablet (5 mg) 2 times daily. 194 tablet 0    aspirin (ASA) 81 MG EC tablet Take 1 tablet (81 mg) by mouth daily. 30 tablet 11    atorvastatin (LIPITOR) 10 MG tablet Take 1 tablet (10 mg) by mouth daily. 30 tablet 2    calcium carbonate (TUMS) 500 MG chewable tablet Take 2 tablets (1,000 mg) by mouth every 6 hours as needed for heartburn.      cinacalcet (SENSIPAR) 30 MG tablet Take 1 tablet (30 mg) by mouth daily. 30 tablet 11    dapsone (ACZONE) 25 MG tablet Take 2 tablets (50 mg) by mouth daily. 60 tablet 11    famotidine (PEPCID) 20 MG tablet Take 1 tablet (20 mg) by mouth 2 times daily as needed (heartburn).      furosemide (LASIX) 40 MG tablet Take 0.5 tablets (20 mg) by mouth daily. 15 tablet 0    hydrOXYzine HCl (ATARAX) 25 MG tablet Take 1 tablet (25 mg) by mouth every 6 hours as needed for anxiety. 20 tablet 11    levonorgestrel (KYLEENA) 19.5 MG IUD 1 Intra Uterine Device by Intrauterine route once      Lidocaine (LIDOCARE) 4 % Patch Place 2 patches over 12 hours onto the skin every 24 hours. To prevent lidocaine toxicity, patient should be patch free for 12 hrs daily. 20 patch 11    magnesium hydroxide (MILK OF MAGNESIA) 400 MG/5ML suspension Take 30 mLs by mouth daily as needed for constipation. 118 mL 11    magnesium oxide (MAG-OX) 400 MG tablet Take 1 tablet (400 mg) by mouth 2 times daily. 60 tablet 11    methocarbamol (ROBAXIN) 500 MG tablet Take 1 tablet (500 mg) by mouth every 6 hours as needed for muscle spasms. 20 tablet 0    mycophenolate (GENERIC EQUIVALENT) 250 MG capsule Take 3 capsules (750 mg) by mouth 2 times daily. 180 capsule 11    nortriptyline (PAMELOR) 25 MG capsule Take 25 mg by mouth at bedtime.      oxyCODONE (ROXICODONE) 5 MG tablet Take 1 tablet (5 mg) by mouth every 8  "hours as needed for moderate pain. Take 5 mg to pain 4 to 6/10, take 10 mg for pain 6 to 10/10. 10 tablet 0    pantoprazole (PROTONIX) 40 MG EC tablet Take 1 tablet (40 mg) by mouth daily. (Patient not taking: Reported on 6/17/2025) 30 tablet 1    potassium chloride jack ER (KLOR-CON M10) 10 MEQ CR tablet Take 1 tablet (10 mEq) by mouth daily. 30 tablet 1    predniSONE (DELTASONE) 5 MG tablet Take 1 tablet (5 mg) by mouth daily. 30 tablet 11    senna-docusate (SENOKOT-S/PERICOLACE) 8.6-50 MG tablet Take 2 tablets by mouth 2 times daily as needed for constipation.      sertraline (ZOLOFT) 25 MG tablet Take 1 tablet (25 mg) by mouth daily for 5 days, THEN 2 tablets (50 mg) daily. Follow-up with your PCP to consider raising the dose to a maximum of 100 mg daily. 65 tablet 0    sodium bicarbonate 650 MG tablet Take 2 tablets (1,300 mg) by mouth 2 times daily. 120 tablet 11    [Paused] tacrolimus (GENERIC EQUIVALENT) 0.5 MG capsule Profile Rx: patient will contact pharmacy when needed 60 capsule 11    tacrolimus (GENERIC EQUIVALENT) 1 MG capsule Take 2 capsules (2 mg) by mouth 2 times daily. Total dose: 7mg twice daily 120 capsule 11    tacrolimus (GENERIC EQUIVALENT) 5 MG capsule Take 1 capsule (5 mg) by mouth 2 times daily. Total dose: 7mg twice daily 60 capsule 11    valGANciclovir (VALCYTE) 450 MG tablet Take 1 tablet (450 mg) by mouth daily. 30 tablet 1    varenicline (CHANTIX SANCHEZ) 0.5 MG X 11 & 1 MG X 42 tablet Take 0.5 mg by mouth daily. Patient is taking 0.5 mg as needed for cravings associated with smoking      VENTOLIN  (90 Base) MCG/ACT inhaler Inhale 2 puffs into the lungs every 4 hours as needed for shortness of breath or wheezing.       No current facility-administered medications for this visit.     There are no discontinued medications.    Physical Exam   Vital Signs: /79   Pulse 87   Ht 1.727 m (5' 8\")   Wt 80.4 kg (177 lb 3.2 oz)   BMI 26.94 kg/m      GENERAL APPEARANCE: alert and no " distress  EYES: eyes grossly normal to inspection  HENT: normal cephalic/atraumatic  RESP: lungs clear to auscultation - no rales, rhonchi or wheezes  CV: regular rhythm, normal rate, no rub, no murmur  EDEMA: no LE edema bilaterally  ABDOMEN: soft, nondistended, non tender  MS: extremities normal - no gross deformities noted  SKIN : face with multiple facial acne  NEURO: mentation intact and speech normal  PSYCH: mentation appears normal and affect normal/bright  TX KIDNEY: normal    Data         Latest Ref Rng & Units 6/27/2025     6:53 AM 6/23/2025     8:32 AM 6/18/2025     8:55 AM   Renal   Sodium 135 - 145 mmol/L 138   138    Na (external) 136 - 146 mmol/L  138     K 3.4 - 5.3 mmol/L 4.0   4.3    K (external) 3.5 - 5.1 mmol/L  4.6     Cl 98 - 107 mmol/L 110  113  109    Cl (external) 98 - 107 mmol/L 110  113  109    CO2 22 - 29 mmol/L 19   20    CO2 (external) 22 - 29 mmol/L  22     Urea Nitrogen 6.0 - 20.0 mg/dL 24.6   25.6    BUN (external) 10.0 - 20.0 mg/dL  24.3     Creatinine 0.51 - 0.95 mg/dL 1.56   1.56    Cr (external) 0.57 - 1.11 mg/dL  1.45     Glucose 70 - 99 mg/dL 76   81    Glucose (external) 70 - 100 mg/dL  109     Calcium 8.8 - 10.4 mg/dL 9.1   9.9    Ca (external) 8.6 - 10.5 mg/dL  9.3     Magnesium 1.7 - 2.3 mg/dL 1.6      Mg (external) 1.8 - 2.6 mg/dL  1.5           Latest Ref Rng & Units 6/27/2025     6:53 AM 6/23/2025     8:32 AM 6/18/2025     8:55 AM   Bone Health   Phosphorus 2.5 - 4.5 mg/dL 2.1      Phos (external) 2.9 - 5.2 mg/dL  2.4     Parathyroid Hormone Intact 15 - 65 pg/mL   350    Vit D Def 20 - 50 ng/mL   24          Latest Ref Rng & Units 6/27/2025     6:53 AM 6/23/2025     8:32 AM 6/18/2025     8:55 AM   Heme   WBC 4.0 - 11.0 10e3/uL 5.8   4.6    WBC (external) 3.7 - 12.1 10(3)/uL  6.1     Hgb 11.7 - 15.7 g/dL 10.5   8.8    Hgb (external) 11.2 - 15.8 g/dL  10.5     Plt 150 - 450 10e3/uL 255   208    Plt (external) 179 - 450 10(3)/uL  218     ABSOLUTE NEUTROPHIL 1.6 - 8.3  10e3/uL   3.5    ABSOLUTE NEUTROPHILS (EXTERNAL) 1.8 - 9.2 10(3)/uL  5.3     ABSOLUTE LYMPHOCYTES 0.8 - 5.3 10e3/uL   0.5    ABSOLUTE LYMPHOCYTES (EXTERNAL) 1.2 - 3.9 10(3)/uL  0.4     ABSOLUTE MONOCYTES 0.0 - 1.3 10e3/uL   0.5    ABSOLUTE MONOCYTES (EXTERNAL) 0.0 - 1.1 10(3)/uL  0.3     ABSOLUTE EOSINOPHILS 0.0 - 0.7 10e3/uL   0.1    ABSOLUTE EOSINOPHILS (EXTERNAL) 0.0 - 0.8 10(3)/uL  0.1     ABSOLUTE BASOPHILS 0.0 - 0.2 10e3/uL   0.0    ABSOLUTE BASOPHILS (EXTERNAL) 0.0 - 0.2 10(3)/uL  0.0           Latest Ref Rng & Units 6/18/2025    11:04 AM 6/9/2025     6:04 AM 6/8/2025     6:55 PM   Liver   AP 40 - 150 U/L 53  109  106    TBili <=1.2 mg/dL 0.3  0.2  0.2    ALT 0 - 50 U/L 10  6  7    AST 0 - 45 U/L 11  13  12    Tot Protein 6.4 - 8.3 g/dL 5.7  6.3  6.0    Albumin 3.5 - 5.2 g/dL 4.1  3.8  3.7          Latest Ref Rng & Units 5/22/2025     7:00 AM 5/17/2025     2:03 AM 4/30/2024    11:37 AM   Pancreas   A1C <5.7 % 4.9  4.9     A1C (external) <5.7 %   4.6          Latest Ref Rng & Units 6/18/2025     8:55 AM 6/2/2025     8:39 AM 5/22/2025     7:00 AM   Iron studies   Iron 37 - 145 ug/dL 60  67  63    Iron Sat Index 15 - 46 % 30  32  39    Ferritin 6 - 175 ng/mL  1,136  1,258          Latest Ref Rng & Units 5/17/2025     2:03 AM 5/20/2024     1:48 PM   [facility] Virology   EBV CAPSID ANTIBODY IGG No detectable antibody. Positive  Positive      Failed to redirect to the Timeline version of the REVFS SmartLink.  Recent Labs   Lab Test 06/16/25  0907 06/18/25  0855 06/23/25  0830   DOSTAC 6/15/2025 6/17/2025 6/22/2025   TACROL 8.1 8.4 11.2   Prescription drug management  43 minutes spent by me on the date of the encounter doing chart review, history and exam, documentation and further activities per the note

## 2025-06-27 NOTE — NURSING NOTE
"Chief Complaint   Patient presents with    RECHECK     Follow up with kidney transplant     /79   Pulse 87   Ht 1.727 m (5' 8\")   Wt 80.4 kg (177 lb 3.2 oz)   BMI 26.94 kg/m    Stefani Craig, Lead CMA  6/27/2025 8:55 AM    "

## 2025-06-27 NOTE — LETTER
6/27/2025      Glenna Spears  [address]  [place] [address]      Dear Colleague,    Thank you for referring your patient, Glenna Spears, to the [facility] TRANSPLANT [facility]. Please see a copy of my visit note below.    TRANSPLANT NEPHROLOGY CLINIC VISIT     Assessment & Plan  # DDKT: CKD Stage 3a - Stable   - Baseline Creatinine: ~ TBD, but wendie was 1.2 while inpt, now hovering over 1.4-1.5 range.   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   - Primary Nephrologist: Dr. Cruz Mar.    # Acute on chronic pain: pt c/o pain over her right> left side. No fevers/chills noted. With ongoing pain and not able to tolerate tylenol with GERD symptoms, gave her oxycodone (prescribed by surgery) for ongoing pain. Also gave her gabapentin for chronic neuropathic pain.    # Acute Antibody Mediated Rejection:   6/6/25 pathology findings show diffuse C4d positivity without evidence (so far) of glomerulitis or peritubular capillaritis. There was no evidence of cellular mediated rejection. 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 She received 500 mg solumedrol on 6/8/25. Plan is for PLEX x 5 every other day, IVIG 500 mg/kg after PLEX #1-4, IVIG 1 gm/kg after PLEX #5, solumedrol 100 mg IV prior to each IVIG dose.     # Immunosuppression: Tacrolimus immediate release (goal 8-10), Mycophenolate mofetil (dose 750 mg every 12 hours), and Prednisone (dose 5 mg daily)   - Induction with Recent Transplant:  High Intensity Protocol   - Continue with intensive monitoring of immunosuppression for efficacy and toxicity.   - Historical Changes in Immunosuppression: None   - Changes: Not at this time    # Infection Prevention:   Last CD4 Level: Not checked  - PJP: Inhaled pentamidine  - CMV: Valganciclovir (Valcyte)      - CMV IgG Ab High Risk  Discordance (D+/R-) at time of transplant: No  Present CMV Serostatus: Positive  - EBV IgG Ab High Risk Discordance (D+/R-) at time of transplant: No  Present EBV Serostatus: Positive    # Hypertension: Controlled;  Goal BP: < 140/90   - Changes: Not at this time    # Anemia in Chronic Renal Disease: Hgb: Stable      SHALOM: No   - Iron studies: Replete    # Mineral Bone Disorder:    - Secondary renal hyperparathyroidism; PTH level: Moderately elevated (301-600 pg/ml)        On treatment: Cinacalcet  - Vitamin D; level: Normal        On supplement: No  - Calcium; level: Normal        On supplement: No  - Phosphorus; level: Low        On supplement: No, encouraged to eat foods with phos, if still low, will start phos supplementation    # Electrolytes:  - Potassium; level: Normal        On supplement: No  - Magnesium; level: Low        On supplement: Yes  - Bicarbonate; level: Low        On supplement: Yes  - Sodium; level: Normal    # Other Significant PMH:   - Facial acne : noted acne all over her face with out redness. Per pt, alcohol swabs helping not to erupt. Felt started after starting on prednisone. So will monitor for now.   - Obesity s/p gastric sleeve 1/23/24: was on semaglutide               - History of Illicit Drug use: remote LSD /cocaine use in her 20s, + marijuana.               - Positive Quant Gold: s/p treatment by ID              - Non-occlusive R internal jugular thrombosis and an occlusive right cephalic vein superficial thrombosis, likely related to a previously placed (and now removed) catheter. Anticoagulation is currently being held due to presence of anterior abdominal wall hematoma s/p drain placement and transplant kidney biopsy. Will restart apixaban 06/19.      # Skin Cancer Risk:    - Discussed sun protection and recommend regular follow up with Dermatology.    # Transplant History:  Etiology of Kidney Failure: Unknown etiology  Tx: DDKT  Transplant: 5/17/2025 (Kidney)  Significant  transplant-related complications: Acute antibody-mediated rejection    Transplant Office Phone Number: [phone]    Assessment and plan was discussed with the patient and she voiced her understanding and agreement.    Return visit: Return in about 2 months (around 8/27/2025).    Sabrina Lezama MD    The longitudinal plan of care for the diagnosis(es)/condition(s) as documented were addressed during this visit. Due to the added complexity in care, I will continue to support Glenna in the subsequent management and with ongoing continuity of care.      Chief Complaint  Ms. Spears is a 34 year old here for kidney transplant and immunosuppression management.     History of Present Illness    Ms. Spears reports not feeling well with pain issues. Felt her pain is more on her right side on the side of transplant, but started to note intermittently on left. No associated fevers/chills or diarrhea.   Since last clinic visit:   Hospitalizations: No   New Medical Issues: Yes, acute aches and pains, pimples on her face since starting on prednisone  Chest pain or shortness of breath: No  Lower extremity swelling: No  Weight change: No  Nausea and vomiting: No  Diarrhea: No  Heartburn symptoms: No, on Pepcid  Fever, sweats or chills: No, but noted significant sweating noted since starting on prednisone  Urinary complaints: No    Home BP: 150's at home but clinic BP were in 130's instructed to continue home BP checks    Problem List  Patient Active Problem List   Diagnosis     Moderate asthma     Vitamin D deficiency     Obesity     Anxiety     Depressive disorder     Dialysis AV fistula malfunction     Secondary hyperparathyroidism     Nephrotic range proteinuria     Normocytic anemia     Essential hypertension     Kidney replaced by transplant     Immunosuppressed status     Acute post-operative pain     Hypomagnesemia     Right leg weakness     Abdominal pain, unspecified abdominal location     Antibody mediated rejection of  kidney transplant       Allergies  Allergies   Allergen Reactions     Sulfa Antibiotics Other (See Comments)     Reaction as an infant- unsure of what happened       Medications  Current Outpatient Medications   Medication Sig Dispense Refill     acetaminophen (TYLENOL) 325 MG tablet Take 325-650 mg by mouth every 4 hours as needed       apixaban ANTICOAGULANT (ELIQUIS) 5 MG tablet Take 2 tablets (10 mg) by mouth 2 times daily for 7 days, THEN 1 tablet (5 mg) 2 times daily. 194 tablet 0     aspirin (ASA) 81 MG EC tablet Take 1 tablet (81 mg) by mouth daily. 30 tablet 11     atorvastatin (LIPITOR) 10 MG tablet Take 1 tablet (10 mg) by mouth daily. 30 tablet 2     calcium carbonate (TUMS) 500 MG chewable tablet Take 2 tablets (1,000 mg) by mouth every 6 hours as needed for heartburn.       cinacalcet (SENSIPAR) 30 MG tablet Take 1 tablet (30 mg) by mouth daily. 30 tablet 11     dapsone (ACZONE) 25 MG tablet Take 2 tablets (50 mg) by mouth daily. 60 tablet 11     famotidine (PEPCID) 20 MG tablet Take 1 tablet (20 mg) by mouth 2 times daily as needed (heartburn).       furosemide (LASIX) 40 MG tablet Take 0.5 tablets (20 mg) by mouth daily. 15 tablet 0     hydrOXYzine HCl (ATARAX) 25 MG tablet Take 1 tablet (25 mg) by mouth every 6 hours as needed for anxiety. 20 tablet 11     levonorgestrel (KYLEENA) 19.5 MG IUD 1 Intra Uterine Device by Intrauterine route once       Lidocaine (LIDOCARE) 4 % Patch Place 2 patches over 12 hours onto the skin every 24 hours. To prevent lidocaine toxicity, patient should be patch free for 12 hrs daily. 20 patch 11     magnesium hydroxide (MILK OF MAGNESIA) 400 MG/5ML suspension Take 30 mLs by mouth daily as needed for constipation. 118 mL 11     magnesium oxide (MAG-OX) 400 MG tablet Take 1 tablet (400 mg) by mouth 2 times daily. 60 tablet 11     methocarbamol (ROBAXIN) 500 MG tablet Take 1 tablet (500 mg) by mouth every 6 hours as needed for muscle spasms. 20 tablet 0     mycophenolate  (GENERIC EQUIVALENT) 250 MG capsule Take 3 capsules (750 mg) by mouth 2 times daily. 180 capsule 11     nortriptyline (PAMELOR) 25 MG capsule Take 25 mg by mouth at bedtime.       oxyCODONE (ROXICODONE) 5 MG tablet Take 1 tablet (5 mg) by mouth every 8 hours as needed for moderate pain. Take 5 mg to pain 4 to 6/10, take 10 mg for pain 6 to 10/10. 10 tablet 0     pantoprazole (PROTONIX) 40 MG EC tablet Take 1 tablet (40 mg) by mouth daily. (Patient not taking: Reported on 6/17/2025) 30 tablet 1     potassium chloride jack ER (KLOR-CON M10) 10 MEQ CR tablet Take 1 tablet (10 mEq) by mouth daily. 30 tablet 1     predniSONE (DELTASONE) 5 MG tablet Take 1 tablet (5 mg) by mouth daily. 30 tablet 11     senna-docusate (SENOKOT-S/PERICOLACE) 8.6-50 MG tablet Take 2 tablets by mouth 2 times daily as needed for constipation.       sertraline (ZOLOFT) 25 MG tablet Take 1 tablet (25 mg) by mouth daily for 5 days, THEN 2 tablets (50 mg) daily. Follow-up with your PCP to consider raising the dose to a maximum of 100 mg daily. 65 tablet 0     sodium bicarbonate 650 MG tablet Take 2 tablets (1,300 mg) by mouth 2 times daily. 120 tablet 11     [Paused] tacrolimus (GENERIC EQUIVALENT) 0.5 MG capsule Profile Rx: patient will contact pharmacy when needed 60 capsule 11     tacrolimus (GENERIC EQUIVALENT) 1 MG capsule Take 2 capsules (2 mg) by mouth 2 times daily. Total dose: 7mg twice daily 120 capsule 11     tacrolimus (GENERIC EQUIVALENT) 5 MG capsule Take 1 capsule (5 mg) by mouth 2 times daily. Total dose: 7mg twice daily 60 capsule 11     valGANciclovir (VALCYTE) 450 MG tablet Take 1 tablet (450 mg) by mouth daily. 30 tablet 1     varenicline (CHANTIX SANCHEZ) 0.5 MG X 11 & 1 MG X 42 tablet Take 0.5 mg by mouth daily. Patient is taking 0.5 mg as needed for cravings associated with smoking       VENTOLIN  (90 Base) MCG/ACT inhaler Inhale 2 puffs into the lungs every 4 hours as needed for shortness of breath or wheezing.       No  "current facility-administered medications for this visit.     There are no discontinued medications.    Physical Exam  Vital Signs: /79   Pulse 87   Ht 1.727 m (5' 8\")   Wt 80.4 kg (177 lb 3.2 oz)   BMI 26.94 kg/m      GENERAL APPEARANCE: alert and no distress  EYES: eyes grossly normal to inspection  HENT: normal cephalic/atraumatic  RESP: lungs clear to auscultation - no rales, rhonchi or wheezes  CV: regular rhythm, normal rate, no rub, no murmur  EDEMA: no LE edema bilaterally  ABDOMEN: soft, nondistended, non tender  MS: extremities normal - no gross deformities noted  SKIN : face with multiple facial acne  NEURO: mentation intact and speech normal  PSYCH: mentation appears normal and affect normal/bright  TX KIDNEY: normal    Data        Latest Ref Rng & Units 6/27/2025     6:53 AM 6/23/2025     8:32 AM 6/18/2025     8:55 AM   Renal   Sodium 135 - 145 mmol/L 138   138    Na (external) 136 - 146 mmol/L  138     K 3.4 - 5.3 mmol/L 4.0   4.3    K (external) 3.5 - 5.1 mmol/L  4.6     Cl 98 - 107 mmol/L 110  113  109    Cl (external) 98 - 107 mmol/L 110  113  109    CO2 22 - 29 mmol/L 19   20    CO2 (external) 22 - 29 mmol/L  22     Urea Nitrogen 6.0 - 20.0 mg/dL 24.6   25.6    BUN (external) 10.0 - 20.0 mg/dL  24.3     Creatinine 0.51 - 0.95 mg/dL 1.56   1.56    Cr (external) 0.57 - 1.11 mg/dL  1.45     Glucose 70 - 99 mg/dL 76   81    Glucose (external) 70 - 100 mg/dL  109     Calcium 8.8 - 10.4 mg/dL 9.1   9.9    Ca (external) 8.6 - 10.5 mg/dL  9.3     Magnesium 1.7 - 2.3 mg/dL 1.6      Mg (external) 1.8 - 2.6 mg/dL  1.5           Latest Ref Rng & Units 6/27/2025     6:53 AM 6/23/2025     8:32 AM 6/18/2025     8:55 AM   Bone Health   Phosphorus 2.5 - 4.5 mg/dL 2.1      Phos (external) 2.9 - 5.2 mg/dL  2.4     Parathyroid Hormone Intact 15 - 65 pg/mL   350    Vit D Def 20 - 50 ng/mL   24          Latest Ref Rng & Units 6/27/2025     6:53 AM 6/23/2025     8:32 AM 6/18/2025     8:55 AM   Heme   WBC 4.0 - " 11.0 10e3/uL 5.8   4.6    WBC (external) 3.7 - 12.1 10(3)/uL  6.1     Hgb 11.7 - 15.7 g/dL 10.5   8.8    Hgb (external) 11.2 - 15.8 g/dL  10.5     Plt 150 - 450 10e3/uL 255   208    Plt (external) 179 - 450 10(3)/uL  218     ABSOLUTE NEUTROPHIL 1.6 - 8.3 10e3/uL   3.5    ABSOLUTE NEUTROPHILS (EXTERNAL) 1.8 - 9.2 10(3)/uL  5.3     ABSOLUTE LYMPHOCYTES 0.8 - 5.3 10e3/uL   0.5    ABSOLUTE LYMPHOCYTES (EXTERNAL) 1.2 - 3.9 10(3)/uL  0.4     ABSOLUTE MONOCYTES 0.0 - 1.3 10e3/uL   0.5    ABSOLUTE MONOCYTES (EXTERNAL) 0.0 - 1.1 10(3)/uL  0.3     ABSOLUTE EOSINOPHILS 0.0 - 0.7 10e3/uL   0.1    ABSOLUTE EOSINOPHILS (EXTERNAL) 0.0 - 0.8 10(3)/uL  0.1     ABSOLUTE BASOPHILS 0.0 - 0.2 10e3/uL   0.0    ABSOLUTE BASOPHILS (EXTERNAL) 0.0 - 0.2 10(3)/uL  0.0           Latest Ref Rng & Units 6/18/2025    11:04 AM 6/9/2025     6:04 AM 6/8/2025     6:55 PM   Liver   AP 40 - 150 U/L 53  109  106    TBili <=1.2 mg/dL 0.3  0.2  0.2    ALT 0 - 50 U/L 10  6  7    AST 0 - 45 U/L 11  13  12    Tot Protein 6.4 - 8.3 g/dL 5.7  6.3  6.0    Albumin 3.5 - 5.2 g/dL 4.1  3.8  3.7          Latest Ref Rng & Units 5/22/2025     7:00 AM 5/17/2025     2:03 AM 4/30/2024    11:37 AM   Pancreas   A1C <5.7 % 4.9  4.9     A1C (external) <5.7 %   4.6          Latest Ref Rng & Units 6/18/2025     8:55 AM 6/2/2025     8:39 AM 5/22/2025     7:00 AM   Iron studies   Iron 37 - 145 ug/dL 60  67  63    Iron Sat Index 15 - 46 % 30  32  39    Ferritin 6 - 175 ng/mL  1,136  1,258          Latest Ref Rng & Units 5/17/2025     2:03 AM 5/20/2024     1:48 PM   [facility] Txp Virology   EBV CAPSID ANTIBODY IGG No detectable antibody. Positive  Positive      Failed to redirect to the Timeline version of the [facility].  Recent Labs   Lab Test 06/16/25  0907 06/18/25  0855 06/23/25  0830   DOSTAC 6/15/2025 6/17/2025 6/22/2025   TACROL 8.1 8.4 11.2   Prescription drug management  43 minutes spent by me on the date of the encounter doing chart review, history and exam,  documentation and further activities per the note         Again, thank you for allowing me to participate in the care of your patient.        Sincerely,        Sabirna Lezama MD    Electronically signed

## 2025-06-28 DIAGNOSIS — Z94.0 KIDNEY REPLACED BY TRANSPLANT: Primary | ICD-10-CM

## 2025-06-28 DIAGNOSIS — G89.18 ACUTE POST-OPERATIVE PAIN: ICD-10-CM

## 2025-06-30 ENCOUNTER — LAB (OUTPATIENT)
Dept: LAB | Facility: CLINIC | Age: 35
End: 2025-06-30
Payer: COMMERCIAL

## 2025-06-30 DIAGNOSIS — Z48.298 AFTERCARE FOLLOWING ORGAN TRANSPLANT: ICD-10-CM

## 2025-06-30 DIAGNOSIS — Z79.899 ENCOUNTER FOR LONG-TERM CURRENT USE OF MEDICATION: ICD-10-CM

## 2025-06-30 DIAGNOSIS — Z98.890 OTHER SPECIFIED POSTPROCEDURAL STATES: ICD-10-CM

## 2025-06-30 DIAGNOSIS — Z20.828 CONTACT WITH AND (SUSPECTED) EXPOSURE TO OTHER VIRAL COMMUNICABLE DISEASES: ICD-10-CM

## 2025-06-30 DIAGNOSIS — Z94.0 KIDNEY REPLACED BY TRANSPLANT: ICD-10-CM

## 2025-06-30 PROCEDURE — 80197 ASSAY OF TACROLIMUS: CPT

## 2025-07-01 ENCOUNTER — RESULTS FOLLOW-UP (OUTPATIENT)
Dept: TRANSPLANT | Facility: CLINIC | Age: 35
End: 2025-07-01

## 2025-07-01 DIAGNOSIS — T86.11 ANTIBODY MEDIATED REJECTION OF KIDNEY TRANSPLANT: ICD-10-CM

## 2025-07-01 LAB
% BASOPHILS (EXTERNAL): 0.5 % (ref 0–2)
% EOSINOPHILS (EXTERNAL): 1 % (ref 0–7)
% LYMPHOCYTES (EXTERNAL): 7.5 % (ref 16–49)
% MONOCYTES (EXTERNAL): 10.2 % (ref 0–10)
% NEUTROPHILS (EXTERNAL): 80.8 % (ref 43–80)
ABSOLUTE BASOPHILS (EXTERNAL): 0 10(3)/UL (ref 0–0.2)
ABSOLUTE EOSINOPHILS (EXTERNAL): 0 10(3)/UL (ref 0–0.8)
ABSOLUTE LYMPHOCYTES (EXTERNAL): 0.3 10(3)/UL (ref 1.2–3.9)
ABSOLUTE MONOCYTES (EXTERNAL): 0.5 10(3)/UL (ref 0–1.1)
ABSOLUTE NEUTROPHILS (EXTERNAL): 3.7 10(3)/UL (ref 1.8–9.2)
TACROLIMUS BLD-MCNC: 10.6 UG/L (ref 5–15)
TME LAST DOSE: NORMAL H
TME LAST DOSE: NORMAL H

## 2025-07-02 RX ORDER — METHOCARBAMOL 500 MG/1
TABLET, FILM COATED ORAL
Qty: 20 TABLET | Refills: 0 | OUTPATIENT
Start: 2025-07-02

## 2025-07-02 NOTE — TELEPHONE ENCOUNTER
Should she still be on this? I don't believe so according to smith's last note. She has been on melissa.

## 2025-07-03 ENCOUNTER — LAB (OUTPATIENT)
Dept: LAB | Facility: CLINIC | Age: 35
End: 2025-07-03
Payer: COMMERCIAL

## 2025-07-03 ENCOUNTER — MYC MEDICAL ADVICE (OUTPATIENT)
Dept: OTHER | Age: 35
End: 2025-07-03

## 2025-07-03 DIAGNOSIS — Z20.828 CONTACT WITH AND (SUSPECTED) EXPOSURE TO OTHER VIRAL COMMUNICABLE DISEASES: ICD-10-CM

## 2025-07-03 DIAGNOSIS — G89.18 ACUTE POST-OPERATIVE PAIN: ICD-10-CM

## 2025-07-03 DIAGNOSIS — Z79.899 ENCOUNTER FOR LONG-TERM CURRENT USE OF MEDICATION: ICD-10-CM

## 2025-07-03 DIAGNOSIS — Z98.890 OTHER SPECIFIED POSTPROCEDURAL STATES: ICD-10-CM

## 2025-07-03 DIAGNOSIS — Z94.0 KIDNEY REPLACED BY TRANSPLANT: ICD-10-CM

## 2025-07-03 DIAGNOSIS — T86.11 ANTIBODY MEDIATED REJECTION OF KIDNEY TRANSPLANT: ICD-10-CM

## 2025-07-03 DIAGNOSIS — Z48.298 AFTERCARE FOLLOWING ORGAN TRANSPLANT: ICD-10-CM

## 2025-07-03 PROCEDURE — 36415 COLL VENOUS BLD VENIPUNCTURE: CPT

## 2025-07-03 RX ORDER — METHOCARBAMOL 500 MG/1
500 TABLET, FILM COATED ORAL EVERY 6 HOURS PRN
Qty: 20 TABLET | Refills: 0 | Status: SHIPPED | OUTPATIENT
Start: 2025-07-03

## 2025-07-07 ENCOUNTER — LAB (OUTPATIENT)
Dept: LAB | Facility: CLINIC | Age: 35
End: 2025-07-07
Payer: COMMERCIAL

## 2025-07-07 ENCOUNTER — TELEPHONE (OUTPATIENT)
Dept: TRANSPLANT | Facility: CLINIC | Age: 35
End: 2025-07-07
Payer: COMMERCIAL

## 2025-07-07 DIAGNOSIS — T86.11 ANTIBODY MEDIATED REJECTION OF KIDNEY TRANSPLANT: ICD-10-CM

## 2025-07-07 DIAGNOSIS — Z20.828 CONTACT WITH AND (SUSPECTED) EXPOSURE TO OTHER VIRAL COMMUNICABLE DISEASES: ICD-10-CM

## 2025-07-07 DIAGNOSIS — Z94.0 KIDNEY REPLACED BY TRANSPLANT: ICD-10-CM

## 2025-07-07 DIAGNOSIS — Z98.890 OTHER SPECIFIED POSTPROCEDURAL STATES: ICD-10-CM

## 2025-07-07 DIAGNOSIS — Z48.298 AFTERCARE FOLLOWING ORGAN TRANSPLANT: ICD-10-CM

## 2025-07-07 DIAGNOSIS — Z79.899 ENCOUNTER FOR LONG-TERM CURRENT USE OF MEDICATION: ICD-10-CM

## 2025-07-07 LAB
TACROLIMUS BLD-MCNC: 8.8 UG/L (ref 5–15)
TME LAST DOSE: NORMAL H
TME LAST DOSE: NORMAL H

## 2025-07-07 PROCEDURE — 80197 ASSAY OF TACROLIMUS: CPT

## 2025-07-07 NOTE — TELEPHONE ENCOUNTER
Lesly from [facility] lab stated they received orders for UA/UC and needing better diagnosis code, they have the specimen new order with new diagnosis code can be faxed to [phone]    Orders updated/refaxed.

## 2025-07-07 NOTE — LETTER
PHYSICIAN ORDERS      DATE & TIME ISSUED: 2025 1242  PATIENT NAME: Glenna Spears   : 1990     [facility] MR# [if applicable]: [id number]     DIAGNOSIS:  kidney transplant, dysuria  ICD-10 CODE: z94.0, R30.3    Please draw the following ONE TIME, in addition to standing transplant labs this week:    Urinalysis   Urine culture    Any questions please call: [phone]    Please fax each result same day as resulted/available    Critical lab results page [phone]    Please fax these results to [phone]    .

## 2025-07-11 PROCEDURE — 80197 ASSAY OF TACROLIMUS: CPT | Performed by: INTERNAL MEDICINE

## 2025-07-12 ENCOUNTER — LAB (OUTPATIENT)
Dept: LAB | Facility: CLINIC | Age: 35
End: 2025-07-12
Payer: COMMERCIAL

## 2025-07-12 LAB
TACROLIMUS BLD-MCNC: 8.7 UG/L (ref 5–15)
TME LAST DOSE: NORMAL H
TME LAST DOSE: NORMAL H

## 2025-07-14 ENCOUNTER — LAB (OUTPATIENT)
Dept: LAB | Facility: CLINIC | Age: 35
End: 2025-07-14
Payer: COMMERCIAL

## 2025-07-14 DIAGNOSIS — Z48.298 AFTERCARE FOLLOWING ORGAN TRANSPLANT: ICD-10-CM

## 2025-07-14 DIAGNOSIS — Z98.890 OTHER SPECIFIED POSTPROCEDURAL STATES: ICD-10-CM

## 2025-07-14 DIAGNOSIS — Z94.0 KIDNEY REPLACED BY TRANSPLANT: ICD-10-CM

## 2025-07-14 DIAGNOSIS — Z20.828 CONTACT WITH AND (SUSPECTED) EXPOSURE TO OTHER VIRAL COMMUNICABLE DISEASES: ICD-10-CM

## 2025-07-14 DIAGNOSIS — Z79.899 ENCOUNTER FOR LONG-TERM CURRENT USE OF MEDICATION: ICD-10-CM

## 2025-07-14 PROCEDURE — 80197 ASSAY OF TACROLIMUS: CPT

## 2025-07-15 ENCOUNTER — APPOINTMENT (OUTPATIENT)
Dept: MEDSURG UNIT | Facility: CLINIC | Age: 35
End: 2025-07-15
Attending: STUDENT IN AN ORGANIZED HEALTH CARE EDUCATION/TRAINING PROGRAM
Payer: COMMERCIAL

## 2025-07-15 ENCOUNTER — APPOINTMENT (OUTPATIENT)
Dept: INTERVENTIONAL RADIOLOGY/VASCULAR | Facility: CLINIC | Age: 35
End: 2025-07-15
Attending: INTERNAL MEDICINE
Payer: COMMERCIAL

## 2025-07-15 ENCOUNTER — HOSPITAL ENCOUNTER (OUTPATIENT)
Facility: CLINIC | Age: 35
Discharge: HOME | End: 2025-07-15
Attending: STUDENT IN AN ORGANIZED HEALTH CARE EDUCATION/TRAINING PROGRAM | Admitting: STUDENT IN AN ORGANIZED HEALTH CARE EDUCATION/TRAINING PROGRAM
Payer: COMMERCIAL

## 2025-07-15 VITALS
HEIGHT: 68 IN | HEART RATE: 83 BPM | OXYGEN SATURATION: 99 % | SYSTOLIC BLOOD PRESSURE: 143 MMHG | TEMPERATURE: 98 F | DIASTOLIC BLOOD PRESSURE: 81 MMHG | RESPIRATION RATE: 16 BRPM | BODY MASS INDEX: 26.26 KG/M2 | WEIGHT: 173.28 LBS

## 2025-07-15 DIAGNOSIS — T86.11 ANTIBODY MEDIATED REJECTION OF KIDNEY TRANSPLANT: ICD-10-CM

## 2025-07-15 DIAGNOSIS — Z48.298 AFTERCARE FOLLOWING ORGAN TRANSPLANT: ICD-10-CM

## 2025-07-15 LAB
ALBUMIN MFR UR ELPH: 10.7 MG/DL
ALBUMIN UR-MCNC: NEGATIVE MG/DL
ANION GAP SERPL CALCULATED.3IONS-SCNC: 10 MMOL/L (ref 7–15)
APPEARANCE UR: CLEAR
BASOPHILS # BLD AUTO: 0 10E3/UL (ref 0–0.2)
BASOPHILS NFR BLD AUTO: 0 %
BILIRUB UR QL STRIP: NEGATIVE
BUN SERPL-MCNC: 25.7 MG/DL (ref 6–20)
CALCIUM SERPL-MCNC: 10.1 MG/DL (ref 8.8–10.4)
CHLORIDE SERPL-SCNC: 107 MMOL/L (ref 98–107)
COLOR UR AUTO: YELLOW
CREAT SERPL-MCNC: 1.33 MG/DL (ref 0.51–0.95)
CREAT UR-MCNC: 111 MG/DL
EGFRCR SERPLBLD CKD-EPI 2021: 54 ML/MIN/1.73M2
EOSINOPHIL # BLD AUTO: 0.1 10E3/UL (ref 0–0.7)
EOSINOPHIL NFR BLD AUTO: 1 %
ERYTHROCYTE [DISTWIDTH] IN BLOOD BY AUTOMATED COUNT: 13.2 % (ref 10–15)
GLUCOSE SERPL-MCNC: 82 MG/DL (ref 70–99)
GLUCOSE UR STRIP-MCNC: NEGATIVE MG/DL
HCO3 SERPL-SCNC: 22 MMOL/L (ref 22–29)
HCT VFR BLD AUTO: 42.7 % (ref 35–47)
HGB BLD-MCNC: 13.5 G/DL (ref 11.7–15.7)
HGB UR QL STRIP: NEGATIVE
IMM GRANULOCYTES # BLD: 0 10E3/UL
IMM GRANULOCYTES NFR BLD: 1 %
INR PPP: 1.05 (ref 0.85–1.15)
KETONES UR STRIP-MCNC: NEGATIVE MG/DL
LEUKOCYTE ESTERASE UR QL STRIP: NEGATIVE
LYMPHOCYTES # BLD AUTO: 0.4 10E3/UL (ref 0.8–5.3)
LYMPHOCYTES NFR BLD AUTO: 7 %
MCH RBC QN AUTO: 29.7 PG (ref 26.5–33)
MCHC RBC AUTO-ENTMCNC: 31.6 G/DL (ref 31.5–36.5)
MCV RBC AUTO: 94 FL (ref 78–100)
MONOCYTES # BLD AUTO: 0.3 10E3/UL (ref 0–1.3)
MONOCYTES NFR BLD AUTO: 5 %
NEUTROPHILS # BLD AUTO: 4.5 10E3/UL (ref 1.6–8.3)
NEUTROPHILS NFR BLD AUTO: 86 %
NITRATE UR QL: NEGATIVE
NRBC # BLD AUTO: 0 10E3/UL
NRBC BLD AUTO-RTO: 0 /100
PH UR STRIP: 5.5 [PH] (ref 5–7)
PLATELET # BLD AUTO: 250 10E3/UL (ref 150–450)
POTASSIUM SERPL-SCNC: 4.6 MMOL/L (ref 3.4–5.3)
PROT/CREAT 24H UR: 0.1 MG/MG CR (ref 0–0.2)
PROTHROMBIN TIME: 13.7 SECONDS (ref 11.8–14.8)
RBC # BLD AUTO: 4.55 10E6/UL (ref 3.8–5.2)
SODIUM SERPL-SCNC: 139 MMOL/L (ref 135–145)
SP GR UR STRIP: 1.02 (ref 1–1.03)
TACROLIMUS BLD-MCNC: 12.1 UG/L (ref 5–15)
TME LAST DOSE: NORMAL H
TME LAST DOSE: NORMAL H
UROBILINOGEN UR STRIP-MCNC: NORMAL MG/DL
WBC # BLD AUTO: 5.2 10E3/UL (ref 4–11)

## 2025-07-15 PROCEDURE — 87799 DETECT AGENT NOS DNA QUANT: CPT | Performed by: INTERNAL MEDICINE

## 2025-07-15 PROCEDURE — 81003 URINALYSIS AUTO W/O SCOPE: CPT | Performed by: INTERNAL MEDICINE

## 2025-07-15 PROCEDURE — 86833 HLA CLASS II HIGH DEFIN QUAL: CPT | Performed by: INTERNAL MEDICINE

## 2025-07-15 PROCEDURE — 76942 ECHO GUIDE FOR BIOPSY: CPT

## 2025-07-15 PROCEDURE — 86828 HLA CLASS I&II ANTIBODY QUAL: CPT | Performed by: INTERNAL MEDICINE

## 2025-07-15 PROCEDURE — 88346 IMFLUOR 1ST 1ANTB STAIN PX: CPT | Mod: TC | Performed by: INTERNAL MEDICINE

## 2025-07-15 PROCEDURE — 88350 IMFLUOR EA ADDL 1ANTB STN PX: CPT | Mod: 26 | Performed by: PATHOLOGY

## 2025-07-15 PROCEDURE — 99152 MOD SED SAME PHYS/QHP 5/>YRS: CPT | Mod: GC | Performed by: RADIOLOGY

## 2025-07-15 PROCEDURE — C1889 IMPLANT/INSERT DEVICE, NOC: HCPCS

## 2025-07-15 PROCEDURE — 80048 BASIC METABOLIC PNL TOTAL CA: CPT | Performed by: INTERNAL MEDICINE

## 2025-07-15 PROCEDURE — 84156 ASSAY OF PROTEIN URINE: CPT | Performed by: INTERNAL MEDICINE

## 2025-07-15 PROCEDURE — 85610 PROTHROMBIN TIME: CPT | Performed by: PHYSICIAN ASSISTANT

## 2025-07-15 PROCEDURE — 250N000013 HC RX MED GY IP 250 OP 250 PS 637

## 2025-07-15 PROCEDURE — 86832 HLA CLASS I HIGH DEFIN QUAL: CPT | Performed by: INTERNAL MEDICINE

## 2025-07-15 PROCEDURE — 88313 SPECIAL STAINS GROUP 2: CPT | Mod: 26 | Performed by: PATHOLOGY

## 2025-07-15 PROCEDURE — 250N000011 HC RX IP 250 OP 636: Performed by: PHYSICIAN ASSISTANT

## 2025-07-15 PROCEDURE — 88348 ELECTRON MICROSCOPY DX: CPT | Mod: 26 | Performed by: PATHOLOGY

## 2025-07-15 PROCEDURE — 250N000009 HC RX 250: Performed by: PHYSICIAN ASSISTANT

## 2025-07-15 PROCEDURE — 50200 RENAL BIOPSY PERQ: CPT | Mod: RT | Performed by: RADIOLOGY

## 2025-07-15 PROCEDURE — 88346 IMFLUOR 1ST 1ANTB STAIN PX: CPT | Mod: 26 | Performed by: PATHOLOGY

## 2025-07-15 PROCEDURE — 999N000142 HC STATISTIC PROCEDURE PREP ONLY

## 2025-07-15 PROCEDURE — 85004 AUTOMATED DIFF WBC COUNT: CPT | Performed by: INTERNAL MEDICINE

## 2025-07-15 PROCEDURE — 36415 COLL VENOUS BLD VENIPUNCTURE: CPT | Performed by: INTERNAL MEDICINE

## 2025-07-15 PROCEDURE — 87086 URINE CULTURE/COLONY COUNT: CPT | Performed by: INTERNAL MEDICINE

## 2025-07-15 PROCEDURE — 88305 TISSUE EXAM BY PATHOLOGIST: CPT | Mod: 26 | Performed by: PATHOLOGY

## 2025-07-15 PROCEDURE — 999N000133 HC STATISTIC PP CARE STAGE 2

## 2025-07-15 PROCEDURE — 76942 ECHO GUIDE FOR BIOPSY: CPT | Mod: 26 | Performed by: RADIOLOGY

## 2025-07-15 RX ORDER — FENTANYL CITRATE 50 UG/ML
25-50 INJECTION, SOLUTION INTRAMUSCULAR; INTRAVENOUS EVERY 5 MIN PRN
Refills: 0 | Status: DISCONTINUED | OUTPATIENT
Start: 2025-07-15 | End: 2025-07-15 | Stop reason: HOSPADM

## 2025-07-15 RX ORDER — LIDOCAINE 40 MG/G
CREAM TOPICAL
Status: DISCONTINUED | OUTPATIENT
Start: 2025-07-15 | End: 2025-07-15 | Stop reason: HOSPADM

## 2025-07-15 RX ORDER — NALOXONE HYDROCHLORIDE 0.4 MG/ML
0.4 INJECTION, SOLUTION INTRAMUSCULAR; INTRAVENOUS; SUBCUTANEOUS
Status: DISCONTINUED | OUTPATIENT
Start: 2025-07-15 | End: 2025-07-15 | Stop reason: HOSPADM

## 2025-07-15 RX ORDER — NALOXONE HYDROCHLORIDE 0.4 MG/ML
0.2 INJECTION, SOLUTION INTRAMUSCULAR; INTRAVENOUS; SUBCUTANEOUS
Status: DISCONTINUED | OUTPATIENT
Start: 2025-07-15 | End: 2025-07-15 | Stop reason: HOSPADM

## 2025-07-15 RX ORDER — ACETAMINOPHEN 325 MG/1
325 TABLET ORAL ONCE
Status: COMPLETED | OUTPATIENT
Start: 2025-07-15 | End: 2025-07-15

## 2025-07-15 RX ORDER — FLUMAZENIL 0.1 MG/ML
0.2 INJECTION, SOLUTION INTRAVENOUS
Status: DISCONTINUED | OUTPATIENT
Start: 2025-07-15 | End: 2025-07-15 | Stop reason: HOSPADM

## 2025-07-15 RX ADMIN — FENTANYL CITRATE 25 MCG: 50 INJECTION, SOLUTION INTRAMUSCULAR; INTRAVENOUS at 10:54

## 2025-07-15 RX ADMIN — MIDAZOLAM 0.5 MG: 1 INJECTION INTRAMUSCULAR; INTRAVENOUS at 10:40

## 2025-07-15 RX ADMIN — MIDAZOLAM 0.5 MG: 1 INJECTION INTRAMUSCULAR; INTRAVENOUS at 10:35

## 2025-07-15 RX ADMIN — ACETAMINOPHEN 325 MG: 325 TABLET ORAL at 11:35

## 2025-07-15 RX ADMIN — FENTANYL CITRATE 25 MCG: 50 INJECTION, SOLUTION INTRAMUSCULAR; INTRAVENOUS at 11:05

## 2025-07-15 RX ADMIN — MIDAZOLAM 0.5 MG: 1 INJECTION INTRAMUSCULAR; INTRAVENOUS at 11:02

## 2025-07-15 RX ADMIN — FENTANYL CITRATE 25 MCG: 50 INJECTION, SOLUTION INTRAMUSCULAR; INTRAVENOUS at 11:02

## 2025-07-15 RX ADMIN — FENTANYL CITRATE 25 MCG: 50 INJECTION, SOLUTION INTRAMUSCULAR; INTRAVENOUS at 10:40

## 2025-07-15 RX ADMIN — MIDAZOLAM 0.5 MG: 1 INJECTION INTRAMUSCULAR; INTRAVENOUS at 10:54

## 2025-07-15 RX ADMIN — MIDAZOLAM 1 MG: 1 INJECTION INTRAMUSCULAR; INTRAVENOUS at 10:46

## 2025-07-15 RX ADMIN — FENTANYL CITRATE 25 MCG: 50 INJECTION, SOLUTION INTRAMUSCULAR; INTRAVENOUS at 10:35

## 2025-07-15 RX ADMIN — MIDAZOLAM 0.5 MG: 1 INJECTION INTRAMUSCULAR; INTRAVENOUS at 11:05

## 2025-07-15 RX ADMIN — LIDOCAINE HYDROCHLORIDE 6 ML: 10 INJECTION, SOLUTION EPIDURAL; INFILTRATION; INTRACAUDAL; PERINEURAL at 11:02

## 2025-07-15 RX ADMIN — FENTANYL CITRATE 50 MCG: 50 INJECTION, SOLUTION INTRAMUSCULAR; INTRAVENOUS at 10:46

## 2025-07-15 ASSESSMENT — ACTIVITIES OF DAILY LIVING (ADL)
ADLS_ACUITY_SCORE: 54

## 2025-07-15 NOTE — PROGRESS NOTES
Pt has met discharge criteria. VSS, denies pain, headache improved with sleep and medications. RLQ site covered with primapore, CDI, soft, flat, no hematoma. Ambulated to bathroom and voided, urine clear yellow. Tolerated food and drink. Discharge education completed with pt, verbalized understanding and denies questions. PIV removed. Pt brought by wheelchair to car, discharged home with s/oKevin.

## 2025-07-15 NOTE — PROGRESS NOTES
Pt returned from IR s/p kidney transplant biopsy. Pt alert and oriented. VSS. Sats >92% on RA. Pt denies any pain at biopsy site. Right lower abdomen WNL. No bleeding or hematoma. Primapore in place. Bedrest for 2 hours. Continue to monitor pt status and notify MD with any changes or concerns.

## 2025-07-15 NOTE — DISCHARGE INSTRUCTIONS
[facility]    Interventional Radiology  Patient Instructions Following Transplant Kidney Biopsy     Restart Eliquis on 7/17/2025    AFTER YOU GO HOME  If you were given sedation, for the first 24 hours: we recommend that an adult stay with you, DO NOT drive or operate machinery at home or at work, DO NOT smoke, DO NOT make any important or legal decisions.  DO NOT drink alcoholic beverages the day of your procedure  Drink plenty of fluids   Resume your regular diet, unless otherwise instructed by your Primary Physician  Relax and take it easy for 48 hours  DO NOT do any strenuous exercise or lifting (> 10 lbs) for at least 3 days following your procedure  Keep the dressing dry and in place for 24 hours. Replace with Band aid for 2 days.  Never leave a wet dressing in place.  Do not take a shower for at least 12 hours following your procedure. No tub bath, hot tub, or swimming for 5 days.  There should be minimum drainage from the biopsy site    CALL THE PHYSICIAN IF:  You start bleeding from the procedure site.  If you do start to bleed from that site, lie down flat and hold pressure on the site for a minimum of 10 minutes.  Your physician will tell you if you need to return to the hospital  You develop nausea or vomiting  You have excessive swelling, redness, or tenderness at the site  You have drainage that looks like it is infected.  You experience severe pain  You develop hives or a rash or unexplained itching  You develop shortness of breath  You develop a temperature of 101 degrees F or greater  You develop bloody clots or red urine after you are discharged      [facility] INTERVENTIONAL RADIOLOGY DEPARTMENT  Procedure Physician:    Dr. Silverio, Dr. Joiner                                   Date of procedure: July 15, 2025  Telephone Numbers: [phone]      Monday-Friday 7:30 am to 4:00 pm  [phone] After 4:00 pm Monday-Friday, Weekends & Holidays. Option #4 for the , and then ask  for the Interventional Radiologist on call.  Someone is on call 24 hrs/day  [facility] toll free number: [phone] Monday-Friday 8:00 am to 4:30 pm  [facility] Emergency Dept: [phone]

## 2025-07-15 NOTE — PROGRESS NOTES
Pt arrived to  for renal biopsy. CHERI, c/o 6/10 headache. Labs sent. Awaiting consent. Pt prepped.  Kevin s/o, at bedside.

## 2025-07-15 NOTE — PROCEDURES
[facility]    Procedure: IR Procedure Note    Date/Time: 7/15/2025 11:18 AM    Performed by: Mary Sheldon MD  Authorized by: Melvin Rivas MD  IR Fellow Physician:  Radiology Resident Physician: Melvin Rivas    Pre Procedure Diagnosis: A kidney transplant biopsy was performed due to concern for rejection.  Post Procedure Diagnosis: A kidney transplant biopsy was performed due to concern for rejection.    UNIVERSAL PROTOCOL   Site Marked: Yes  Prior Images Obtained and Reviewed:  Yes  Required items: Required blood products, implants, devices and special equipment available    Patient identity confirmed:  Verbally with patient  Patient was reevaluated immediately before administering moderate or deep sedation or anesthesia  Confirmation Checklist:  Patient's identity using two indicators  Time out: Immediately prior to the procedure a time out was called    Universal Protocol: the Joint Commission Universal Protocol was followed    Preparation: Patient was prepped and draped in usual sterile fashion       ANESTHESIA    Anesthesia:  Local infiltration  Local Anesthetic:  Lidocaine 1% without epinephrine      SEDATION  Patient Sedated: Yes    Sedation:  Fentanyl and midazolam  Vital signs: Vital signs monitored during sedation    Findings: Right lower quadrant renal transplant biopsy under Ultrasound guidance    Specimens: core needle biopsy specimens sent for pathological analysis    Procedural Complications: None    Condition: Stable    Plan: Right lower quadrant renal transplant biopsy under Ultrasound guidance. Pending pathology lab results      PROCEDURE  Describe Procedure: Right lower quadrant renal transplant biopsy under Ultrasound guidance. Two samples obtained and sent for pathology. No complication.   Length of time physician/provider present for 1:1 monitoring during sedation:  0-22 min

## 2025-07-15 NOTE — IR NOTE
Patient Name: Glenna Spears  Medical Record Number: [id number]  Today's Date: 7/15/2025    Procedure: Transplant kidney biopsy  Proceduralist: Dr. Rhys Alexis  Pathology present: yes    Procedure Start: 1051  Procedure end: 1113  Sedation medications administered: 175 mcg fentanyl, 3.5 mg versed     Report given to: Sheila TILLEY   : JACLYN    Other Notes: Pt arrived to IR room 6 from . Consent reviewed. Pt denies any questions or concerns regarding procedure. Pt positioned supine and monitored per protocol. Pt tolerated procedure without any noted complications. Pt transferred back to .

## 2025-07-16 ENCOUNTER — TELEPHONE (OUTPATIENT)
Dept: TRANSPLANT | Facility: CLINIC | Age: 35
End: 2025-07-16
Payer: COMMERCIAL

## 2025-07-16 DIAGNOSIS — T86.11 ANTIBODY MEDIATED REJECTION OF KIDNEY TRANSPLANT: ICD-10-CM

## 2025-07-16 DIAGNOSIS — Z48.298 AFTERCARE FOLLOWING ORGAN TRANSPLANT: Primary | ICD-10-CM

## 2025-07-16 RX ORDER — CARVEDILOL 6.25 MG/1
6.25 TABLET ORAL 2 TIMES DAILY WITH MEALS
Qty: 60 TABLET | Refills: 11 | Status: SHIPPED | OUTPATIENT
Start: 2025-07-16

## 2025-07-16 NOTE — TELEPHONE ENCOUNTER
Post Kidney and Pancreas Transplant Team Conference  Date: 7/16/2025  Transplant Coordinator: Tammi     Attendees:  [x]  Dr. Aleman [] Katey Santiago, RN [x] Jodi Hodges LPN     []  Dr. Maddox [x] Nunu Goldberg, RN    [x] Dr. Allen [x] Naty Zelaya, RN    [x] Dr. Lezama [x] Tammi Cuellar, RN [] Amrita Clemente RN   [] Dr. Sexton [] Meena Alford, RN    [] Dr. Jean [] Latoya Armstrong, RN [x] Carlos Castro, PharmD   []  Dr. Barreto [] Danya Martel, RN    [x] Dr. Garcia [] Pino Guerra RN     [] Johana Avila RN    [x] Nataliya Barragan, CHAPARRO [] Ashley Early RN        Verbal Plan Read Back:   Start 6.25 mg of carvedilol.      Routed to RN Coordinator   Jodi Hodges LPN   31 y/o  at 32w6d presenting with regular CTX in the setting of known previa. s/p antepartum admission for vaginal #1 with BMZ administration -. GBS positive. VSS. No vaginal bleeding. Fetal status reassuring presently.   - IV hydration  - CBC, coags  - T&S, COVID swab, RPR should patient need OR  - EFM/toco  - Continue to monitor closely  - Amp if CTX do not space for GBS positive status  - Deferring rescue BMZ    SAEID Alejandro PGY3  d/w Dr. Stewart

## 2025-07-16 NOTE — LETTER
PHYSICIAN ORDERS      DATE & TIME ISSUED: 2025 1141  PATIENT NAME: Glenna Spears   : 1990     [facility] MR# [if applicable]: [id number]       DIAGNOSIS:  Kidney transplant rejection    ICD-10 CODE: T86.11     Labs, due daily:  BMP  CBC  Labs, due once:  Glucose (fingerstick, infusion day 2)        Notify transplant at  if >200  IV Hydration:   Sodium chloride 0.9% bolus, 250mL intravenous, daily, x3   VAD Management:   Per infusing facility protocol  Emergency Medications:   Hypersensitivity Reaction Management per infusing facility protocol    Infusion:  Methylprdnisolone sodium succinate (solu-MEDROL) 500mg in sodium chloride 0.9% intermittent infusion, 500mg, intravenous. Daily x3. Admisnister over 30min,     Please call pt to schedule: [phone]       Any questions please call: [phone]    Please fax each result same day as resulted/available    Critical lab results page [phone]    Please fax these results to [phone]    .

## 2025-07-16 NOTE — TELEPHONE ENCOUNTER
Kevin Aleman MD Ding, Yanli, MD; Sabrina Lezama MD; Gianni Osborne MD; Tammi Cuellar RN  Thanks.    Tammi, let's give her methylprednisolone 500 mg IV daily x 3 days.  No prednisone taper, but stays on prednisone 5 mg daily.      Previous Messages       ----- Message -----  From: Ruben Iverson MD  Sent: 7/16/2025  10:14 AM CDT  To: Gianni Turner MD; Kevin Cazares Sp*  Subject: preliminary kidney biopsy result                ATI    t2 i1 v0, borderline TCMR    g1 ptc1, C4d pending    cv2 ah2 ct1 ci1    OUTCOME:   RNCC spoke with pt. Will set up     Pt had some reservatons regarding starting betablocker. States that she read online it can cause weight gain. RNCC encouraged pt to try and also to discuss with provider at visit on Friday this week.     She is also still very anxious to re-start wegovy. Has a lot of anxiety around weight gain.     Discussed again, waiting until at leasst 3 months post txp, especially in light of this rejection that we want creatinine to be stable before starting.

## 2025-07-17 ENCOUNTER — LAB (OUTPATIENT)
Dept: LAB | Facility: CLINIC | Age: 35
End: 2025-07-17
Payer: COMMERCIAL

## 2025-07-17 DIAGNOSIS — Z98.890 OTHER SPECIFIED POSTPROCEDURAL STATES: ICD-10-CM

## 2025-07-17 DIAGNOSIS — Z79.899 ENCOUNTER FOR LONG-TERM CURRENT USE OF MEDICATION: ICD-10-CM

## 2025-07-17 DIAGNOSIS — Z94.0 KIDNEY TRANSPLANTED: Primary | ICD-10-CM

## 2025-07-17 DIAGNOSIS — Z94.0 KIDNEY REPLACED BY TRANSPLANT: ICD-10-CM

## 2025-07-17 DIAGNOSIS — Z48.298 AFTERCARE FOLLOWING ORGAN TRANSPLANT: ICD-10-CM

## 2025-07-17 DIAGNOSIS — Z20.828 CONTACT WITH AND (SUSPECTED) EXPOSURE TO OTHER VIRAL COMMUNICABLE DISEASES: ICD-10-CM

## 2025-07-17 LAB
A1: 633
B60: 2555
B8: 1027
DONOR IDENTIFICATION: NORMAL
DSA COMMENTS: NORMAL
DSA PRESENT: YES
DSA TEST METHOD: NORMAL
INT SUB COMMENTS: NORMAL
INT SUB RESULT: NORMAL
INTERF SUBSTANCE: NORMAL
INTSUB TEST METHOD: NORMAL
ORGAN: NORMAL
PATH REPORT.COMMENTS IMP SPEC: NORMAL
PATH REPORT.FINAL DX SPEC: NORMAL
PATH REPORT.GROSS SPEC: NORMAL
PATH REPORT.MICROSCOPIC SPEC OTHER STN: NORMAL
PATH REPORT.RELEVANT HX SPEC: NORMAL
PHOTO IMAGE: NORMAL
SA 1  COMMENTS: NORMAL
SA 1 CELL: NORMAL
SA 1 TEST METHOD: NORMAL
SA 2 CELL: NORMAL
SA 2 COMMENTS: NORMAL
SA 2 TEST METHOD: NORMAL
SA1 HI RISK ABY: NORMAL
SA1 MOD RISK ABY: NORMAL
SA2 HI RISK ABY: NORMAL
SA2 MOD RISK ABY: NORMAL
UNACCEPTABLE ANTIGENS: NORMAL
UNOS CPRA: 100

## 2025-07-17 PROCEDURE — 86832 HLA CLASS I HIGH DEFIN QUAL: CPT

## 2025-07-17 PROCEDURE — 86833 HLA CLASS II HIGH DEFIN QUAL: CPT

## 2025-07-17 PROCEDURE — 86828 HLA CLASS I&II ANTIBODY QUAL: CPT | Mod: XU

## 2025-07-17 PROCEDURE — 80197 ASSAY OF TACROLIMUS: CPT | Performed by: INTERNAL MEDICINE

## 2025-07-18 ENCOUNTER — OFFICE VISIT (OUTPATIENT)
Dept: NEPHROLOGY | Facility: CLINIC | Age: 35
End: 2025-07-18
Attending: INTERNAL MEDICINE
Payer: COMMERCIAL

## 2025-07-18 VITALS
SYSTOLIC BLOOD PRESSURE: 136 MMHG | DIASTOLIC BLOOD PRESSURE: 76 MMHG | HEART RATE: 84 BPM | BODY MASS INDEX: 26.75 KG/M2 | WEIGHT: 175.9 LBS

## 2025-07-18 DIAGNOSIS — Z29.89 NEED FOR PNEUMOCYSTIS PROPHYLAXIS: ICD-10-CM

## 2025-07-18 DIAGNOSIS — I15.1 HTN, KIDNEY TRANSPLANT RELATED: ICD-10-CM

## 2025-07-18 DIAGNOSIS — Z94.0 KIDNEY TRANSPLANTED: ICD-10-CM

## 2025-07-18 DIAGNOSIS — Z94.0 HTN, KIDNEY TRANSPLANT RELATED: ICD-10-CM

## 2025-07-18 DIAGNOSIS — N18.31 STAGE 3A CHRONIC KIDNEY DISEASE (H): ICD-10-CM

## 2025-07-18 DIAGNOSIS — E83.42 HYPOMAGNESEMIA: ICD-10-CM

## 2025-07-18 DIAGNOSIS — E87.20 METABOLIC ACIDOSIS: ICD-10-CM

## 2025-07-18 DIAGNOSIS — D84.9 IMMUNOSUPPRESSED STATUS: Chronic | ICD-10-CM

## 2025-07-18 DIAGNOSIS — N25.81 SECONDARY RENAL HYPERPARATHYROIDISM: ICD-10-CM

## 2025-07-18 DIAGNOSIS — Z94.0 KIDNEY REPLACED BY TRANSPLANT: Primary | ICD-10-CM

## 2025-07-18 DIAGNOSIS — E87.6 HYPOKALEMIA: ICD-10-CM

## 2025-07-18 DIAGNOSIS — Z48.298 AFTERCARE FOLLOWING ORGAN TRANSPLANT: ICD-10-CM

## 2025-07-18 LAB
A1: 606
B60: 2531
B8: 956
DONOR IDENTIFICATION: NORMAL
DSA COMMENTS: NORMAL
DSA PRESENT: YES
DSA TEST METHOD: NORMAL
INT SUB COMMENTS: NORMAL
INT SUB RESULT: NORMAL
INTERF SUBSTANCE: NORMAL
INTSUB TEST METHOD: NORMAL
ORGAN: NORMAL
SA 1  COMMENTS: NORMAL
SA 1 CELL: NORMAL
SA 1 TEST METHOD: NORMAL
SA 2 CELL: NORMAL
SA 2 COMMENTS: NORMAL
SA 2 TEST METHOD: NORMAL
SA1 HI RISK ABY: NORMAL
SA1 MOD RISK ABY: NORMAL
SA2 HI RISK ABY: NORMAL
SA2 MOD RISK ABY: NORMAL
TACROLIMUS BLD-MCNC: 10.2 UG/L (ref 5–15)
TME LAST DOSE: NORMAL H
TME LAST DOSE: NORMAL H
UNACCEPTABLE ANTIGENS: NORMAL
UNOS CPRA: 100

## 2025-07-18 PROCEDURE — G2211 COMPLEX E/M VISIT ADD ON: HCPCS | Performed by: INTERNAL MEDICINE

## 2025-07-18 PROCEDURE — 99215 OFFICE O/P EST HI 40 MIN: CPT | Mod: 24 | Performed by: INTERNAL MEDICINE

## 2025-07-18 NOTE — LETTER
7/18/2025       RE: Glenna Spears  [address]  [place] [address]     Dear Colleague,    Thank you for referring your patient, Glenna Spears, to the [facility] at [facility]. Please see a copy of my visit note below.    TRANSPLANT NEPHROLOGY CLINIC VISIT     Assessment & Plan  # DDKT: CKD Stage 3a - Stable creatinine lately.  She did have high DSA early post transplant and kidney transplant biopsy 6/6/25 showed diffuse C4d, although no ptc or glomerulitis.  With new DSA, it was presumed early acute antibody-mediated rejection and patient was treated with PLEX/IVIg with trend down in DSA.  Closure kidney transplant biopsy 7/15/25 showed no evidence of ABMR, but did have borderline acute cellular-mediated rejection with plan to treat with methylprednisolone.   - Baseline Creatinine: ~ 1.2-1.4   - Proteinuria: Normal (<0.2 grams)   - DSA Hx: Moderate DSA (2219-5480 mfi) to A1, B8 and B60.    - Last cPRA: 100%   - BK Viremia: No   - Kidney Tx Biopsy Hx: Acute cellular-mediated rejection, Acute antibody-mediated rejection, and Mild interstitial fibrosis and tubular atrophy.   - Primary Nephrologist: Dr. Mar.    # Immunosuppression: Tacrolimus immediate release (goal 8-10), Mycophenolate mofetil (dose 750 mg every 12 hours), and Prednisone (dose 5 mg daily)   - Induction with Recent Transplant:  High Intensity Protocol   - Continue with intensive monitoring of immunosuppression for efficacy and toxicity.   - Historical Changes in Immunosuppression: None   - Changes: Not at this time    # Infection Prevention:   Last CD4 Level: Not checked  - PJP: Dapsone  - CMV: Valganciclovir (Valcyte)      - CMV IgG Ab High Risk Discordance (D+/R-) at time of transplant: No  Present CMV Serostatus: Positive  - EBV IgG Ab High Risk Discordance (D+/R-) at time of transplant: No  Present EBV Serostatus: Positive    # Hypertension: Borderline control;   Goal BP: < 140/90   - Changes: Yes - Recommend starting carvedilol as recently prescribed at 6.25 mg bid.    # Hemoglobin: Stable, normal       Iron studies: Replete    # Mineral Bone Disorder:    - Secondary renal hyperparathyroidism; PTH level: Moderately elevated (301-600 pg/ml)        On treatment: Cinacalcet  - Vitamin D; level: Normal        On supplement: No  - Calcium; level: Normal        On supplement: No    # Electrolytes:  - Potassium; level: Normal        On supplement: Yes  - Magnesium; level: Stable low        On supplement: Yes  - Bicarbonate; level: Normal        On supplement: Yes    # H/o Obesity, s/p Sleeve Gastrectomy (BMI = 26.7): Patient's weight is up ~ 20 lbs since transplant.  She had been on semaglutide prior to transplant.   - Recommend weight loss for overall health by increasing exercise and watching caloric intake.   - Okay to restart semaglutide injections in August.  Ensure good hydration after starting.    # Other Significant PMH:   - GERD: Controlled in H2 blocker.   - Right Internal Jugular DVT: Felt provoked due to previous catheter.  No new issues and remains on anticoagulation with apixaban.   - H/o Polysubstance Abuse: Remote LSD/cocaine use in her 20s, plus some marijuana use.  Patient has remains sober.   - Anxiety Disorder: Patient with ongoing anxiety, especially concerning her medical issues.       # Skin Cancer Risk:    - Discussed sun protection and recommend regular follow up with Dermatology.    # Transplant History:  Etiology of Kidney Failure: Unknown etiology (no kidney biopsy)  Tx: DDKT  Transplant: 5/17/2025 (Kidney)  Significant transplant-related complications: Acute cellular-mediated rejection, Acute antibody-mediated rejection, and DSA    Transplant Office Phone Number: [phone]    Assessment and plan was discussed with the patient and she voiced her understanding and agreement.    Return visit: Return for previously scheduled visit.    Kevin Cazares  "MD Ash    The longitudinal plan of care for the diagnosis(es)/condition(s) as documented were addressed during this visit. Due to the added complexity in care, I will continue to support Glenna in the subsequent management and with ongoing continuity of care.      Chief Complaint  Ms. Spears is a 34 year old here for kidney transplant and immunosuppression management.     History of Present Illness   Ms. Spears reports feeling okay overall.  Since last clinic visit:   Hospitalizations: No   New Medical Issues: Yes; Patient had closure kidney transplant biopsy which just came back showing borderline acute cellular-mediated rejection.  She is getting set up for Solumedrol infusions.   - Patient is bothered by new acne since transplant, which she feels is secondary to steroids.  Active/Exercise: Yes  Chest pain or shortness of breath: No  Lower extremity swelling: No; However, will get \"puffy\" in her hands and face at times.  Weight change: Yes; Weight is more stable lately, but overall she is up ~ 20 lbs since surgery.  Patient has a h/o obesity, s/p gastric sleeve and was on semaglutide injections prior to transplant.  She would like to restart the semaglutide injections   Appetite: Too good   Nausea and vomiting: No  Diarrhea: Yes; A little loose at times  Heartburn symptoms: Yes; Controlled on famotidine  Fever, sweats or chills: No  Night sweats: No  Urinary complaints: No    Home BP: 140-150/80-90s.  Patient was recently prescribed carvedilol, but hasn't started it yet.    Problem List  Patient Active Problem List   Diagnosis     Moderate asthma     Vitamin D deficiency     Obesity     Anxiety     Depressive disorder     Secondary renal hyperparathyroidism     HTN, kidney transplant related     Kidney replaced by transplant     Immunosuppressed status     Hypomagnesemia     Right leg weakness     Antibody mediated rejection of kidney transplant     Aftercare following organ transplant     Stage 3a chronic kidney " disease (H)     Need for pneumocystis prophylaxis     GERD (gastroesophageal reflux disease)     Metabolic acidosis     Deep vein thrombosis (DVT) of right upper extremity (H)     Hypokalemia       Allergies  Allergies   Allergen Reactions     Sulfa Antibiotics Other (See Comments)     Reaction as an infant- unsure of what happened       Medications  Current Outpatient Medications   Medication Sig Dispense Refill     acetaminophen (TYLENOL) 325 MG tablet Take 325-650 mg by mouth every 4 hours as needed       aspirin (ASA) 81 MG EC tablet Take 1 tablet (81 mg) by mouth daily. 30 tablet 11     atorvastatin (LIPITOR) 10 MG tablet Take 1 tablet (10 mg) by mouth daily. 30 tablet 2     cinacalcet (SENSIPAR) 30 MG tablet Take 1 tablet (30 mg) by mouth daily. 30 tablet 11     dapsone (ACZONE) 25 MG tablet Take 2 tablets (50 mg) by mouth daily. 60 tablet 11     famotidine (PEPCID) 20 MG tablet Take 1 tablet (20 mg) by mouth 2 times daily as needed (heartburn). 60 tablet 2     furosemide (LASIX) 40 MG tablet Take 0.5 tablets (20 mg) by mouth daily. (Patient taking differently: Take 10 mg by mouth every other day.) 15 tablet 0     hydrOXYzine HCl (ATARAX) 25 MG tablet Take 1 tablet (25 mg) by mouth every 6 hours as needed for anxiety. 20 tablet 11     levonorgestrel (KYLEENA) 19.5 MG IUD 1 Intra Uterine Device by Intrauterine route once       Lidocaine (LIDOCARE) 4 % Patch Place 2 patches over 12 hours onto the skin every 24 hours. To prevent lidocaine toxicity, patient should be patch free for 12 hrs daily. 20 patch 11     magnesium oxide (MAG-OX) 400 MG tablet Take 1 tablet (400 mg) by mouth 2 times daily. 60 tablet 11     methocarbamol (ROBAXIN) 500 MG tablet Take 1 tablet (500 mg) by mouth every 6 hours as needed for muscle spasms. 20 tablet 0     mycophenolate (GENERIC EQUIVALENT) 250 MG capsule Take 3 capsules (750 mg) by mouth 2 times daily. 180 capsule 11     nortriptyline (PAMELOR) 25 MG capsule Take 25 mg by mouth at  bedtime.       potassium chloride jack ER (KLOR-CON M10) 10 MEQ CR tablet Take 1 tablet (10 mEq) by mouth daily. 30 tablet 1     predniSONE (DELTASONE) 5 MG tablet Take 1 tablet (5 mg) by mouth daily. 30 tablet 11     sodium bicarbonate 650 MG tablet Take 2 tablets (1,300 mg) by mouth 2 times daily. 120 tablet 11     tacrolimus (GENERIC EQUIVALENT) 1 MG capsule Take 2 capsules (2 mg) by mouth 2 times daily. Total dose: 7mg twice daily 120 capsule 11     tacrolimus (GENERIC EQUIVALENT) 5 MG capsule Take 1 capsule (5 mg) by mouth 2 times daily. Total dose: 7mg twice daily 60 capsule 11     valGANciclovir (VALCYTE) 450 MG tablet Take 1 tablet (450 mg) by mouth daily. 30 tablet 1     VENTOLIN  (90 Base) MCG/ACT inhaler Inhale 2 puffs into the lungs every 4 hours as needed for shortness of breath or wheezing.       apixaban ANTICOAGULANT (ELIQUIS) 5 MG tablet Take 2 tablets (10 mg) by mouth 2 times daily for 7 days, THEN 1 tablet (5 mg) 2 times daily. (Patient not taking: No sig reported) 194 tablet 0     carvedilol (COREG) 6.25 MG tablet Take 1 tablet (6.25 mg) by mouth 2 times daily (with meals). (Patient not taking: Reported on 7/18/2025) 60 tablet 11     oxyCODONE (ROXICODONE) 5 MG tablet Take 1 tablet (5 mg) by mouth 2 times daily as needed for pain. 5 tablet 0     [Paused] tacrolimus (GENERIC EQUIVALENT) 0.5 MG capsule Profile Rx: patient will contact pharmacy when needed (Patient not taking: Reported on 7/18/2025) 60 capsule 11     varenicline (CHANTIX SANCHEZ) 0.5 MG X 11 & 1 MG X 42 tablet Take 0.5 mg by mouth daily. Patient is taking 0.5 mg as needed for cravings associated with smoking (Patient not taking: Reported on 7/18/2025)       No current facility-administered medications for this visit.     Medications Discontinued During This Encounter   Medication Reason     gabapentin (NEURONTIN) 100 MG capsule Stopped by Patient (No AVS)       Physical Exam  Vital Signs: /76 (BP Location: Right arm,  Patient Position: Sitting, Cuff Size: Adult Regular)   Pulse 84   Wt 79.8 kg (175 lb 14.4 oz)   BMI 26.75 kg/m      GENERAL APPEARANCE: alert and no distress  HENT: mouth without ulcers or lesions  RESP: lungs clear to auscultation - no rales, rhonchi or wheezes  CV: regular rhythm, normal rate, no rub, no murmur  EDEMA: no LE edema bilaterally  ABDOMEN: soft, nondistended, nontender, bowel sounds normal  MS: extremities normal - no gross deformities noted, no evidence of inflammation in joints, no muscle tenderness  SKIN: no rash, facial acne  TX KIDNEY: normal  DIALYSIS ACCESS:  LUE AV fistula with good thrill    Data        Latest Ref Rng & Units 7/17/2025     8:27 AM 7/15/2025     9:23 AM 7/14/2025     8:19 AM   Renal   Sodium 135 - 145 mmol/L  139     Na (external) 136 - 146 mmol/L 139   137    K 3.4 - 5.3 mmol/L  4.6     K (external) 3.5 - 5.1 mmol/L 4.2   4.3    Cl 98 - 107 mmol/L 111  107  109    Cl (external) 98 - 107 mmol/L 111  107  109    CO2 22 - 29 mmol/L  22     CO2 (external) 22 - 29 mmol/L 24   23    Urea Nitrogen 6.0 - 20.0 mg/dL  25.7     BUN (external) 10.0 - 20.0 mg/dL 24.7   26.6    Creatinine 0.51 - 0.95 mg/dL  1.33     Cr (external) 0.57 - 1.11 mg/dL 1.32   1.18    Glucose 70 - 99 mg/dL  82     Glucose (external) 70 - 100 mg/dL 88   63    Calcium 8.8 - 10.4 mg/dL  10.1     Ca (external) 8.6 - 10.5 mg/dL 9.6   9.9    Mg (external) 1.8 - 2.6 mg/dL   1.7          Latest Ref Rng & Units 7/14/2025     8:19 AM 7/7/2025     8:02 AM 6/30/2025     7:59 AM   Bone Health   Phos (external) 2.9 - 5.2 mg/dL 2.4  2.5  2.2          Latest Ref Rng & Units 7/17/2025     8:27 AM 7/15/2025     9:23 AM 7/14/2025     8:19 AM   Heme   WBC 4.0 - 11.0 10e3/uL  5.2     WBC (external) 3.7 - 12.1 10(3)/uL 4.2   4.2    Hgb 11.7 - 15.7 g/dL  13.5     Hgb (external) 11.2 - 15.8 g/dL 13.0   13.5    Plt 150 - 450 10e3/uL  250     Plt (external) 179 - 450 10(3)/uL 219   238    ABSOLUTE NEUTROPHIL 1.6 - 8.3 10e3/uL  4.5      ABSOLUTE NEUTROPHILS (EXTERNAL) 1.8 - 9.2 10(3)/uL 3.5   3.3    ABSOLUTE LYMPHOCYTES 0.8 - 5.3 10e3/uL  0.4     ABSOLUTE LYMPHOCYTES (EXTERNAL) 1.2 - 3.9 10(3)/uL 0.3   0.4    ABSOLUTE MONOCYTES 0.0 - 1.3 10e3/uL  0.3     ABSOLUTE MONOCYTES (EXTERNAL) 0.0 - 1.1 10(3)/uL 0.3   0.3    ABSOLUTE EOSINOPHILS 0.0 - 0.7 10e3/uL  0.1     ABSOLUTE EOSINOPHILS (EXTERNAL) 0.0 - 0.8 10(3)/uL 0.1   0.1    ABSOLUTE BASOPHILS 0.0 - 0.2 10e3/uL  0.0     ABSOLUTE BASOPHILS (EXTERNAL) 0.0 - 0.2 10(3)/uL 0.0   0.0          Latest Ref Rng & Units 6/18/2025    11:04 AM 6/9/2025     6:04 AM 6/8/2025     6:55 PM   Liver   AP 40 - 150 U/L 53  109  106    TBili <=1.2 mg/dL 0.3  0.2  0.2    ALT 0 - 50 U/L 10  6  7    AST 0 - 45 U/L 11  13  12    Tot Protein 6.4 - 8.3 g/dL 5.7  6.3  6.0    Albumin 3.5 - 5.2 g/dL 4.1  3.8  3.7          Latest Ref Rng & Units 5/22/2025     7:00 AM 5/17/2025     2:03 AM 4/30/2024    11:37 AM   Pancreas   A1C <5.7 % 4.9  4.9     A1C (external) <5.7 %   4.6          Latest Ref Rng & Units 6/18/2025     8:55 AM 6/2/2025     8:39 AM 5/22/2025     7:00 AM   Iron studies   Iron 37 - 145 ug/dL 60  67  63    Iron Sat Index 15 - 46 % 30  32  39    Ferritin 6 - 175 ng/mL  1,136  1,258          Latest Ref Rng & Units 5/17/2025     2:03 AM 5/20/2024     1:48 PM   [facility] Virology   EBV CAPSID ANTIBODY IGG No detectable antibody. Positive  Positive      Failed to redirect to the Timeline version of the REVFS SmartLink.  Recent Labs   Lab Test 07/11/25  0848 07/14/25  0818 07/17/25  0825   DOSTAC 7/10/2025 7/13/2025 7/16/2025   TACROL 8.7 12.1 10.2     Recent Labs   Lab Test 06/27/25  0653   DOSMPA 6/26/2025   8:30 AM   MPACID 0.62*   MPAG 65.1        Again, thank you for allowing me to participate in the care of your patient.      Sincerely,    Kevin Aleman MD

## 2025-07-18 NOTE — LETTER
7/18/2025      RE: Glenna Spears  [facility]   [place] [address]       TRANSPLANT NEPHROLOGY CLINIC VISIT     Assessment & Plan  # DDKT: CKD Stage 3a - Stable creatinine lately.  She did have high DSA early post transplant and kidney transplant biopsy 6/6/25 showed diffuse C4d, although no ptc or glomerulitis.  With new DSA, it was presumed early acute antibody-mediated rejection and patient was treated with PLEX/IVIg with trend down in DSA.  Closure kidney transplant biopsy 7/15/25 showed no evidence of ABMR, but did have borderline acute cellular-mediated rejection with plan to treat with methylprednisolone.   - Baseline Creatinine: ~ 1.2-1.4   - Proteinuria: Normal (<0.2 grams)   - DSA Hx: Moderate DSA (4767-6162 mfi) to A1, B8 and B60.    - Last cPRA: 100%   - BK Viremia: No   - Kidney Tx Biopsy Hx: Acute cellular-mediated rejection, Acute antibody-mediated rejection, and Mild interstitial fibrosis and tubular atrophy.   - Primary Nephrologist: Dr. Mar.    # Immunosuppression: Tacrolimus immediate release (goal 8-10), Mycophenolate mofetil (dose 750 mg every 12 hours), and Prednisone (dose 5 mg daily)   - Induction with Recent Transplant:  High Intensity Protocol   - Continue with intensive monitoring of immunosuppression for efficacy and toxicity.   - Historical Changes in Immunosuppression: None   - Changes: Not at this time    # Infection Prevention:   Last CD4 Level: Not checked  - PJP: Dapsone  - CMV: Valganciclovir (Valcyte)      - CMV IgG Ab High Risk Discordance (D+/R-) at time of transplant: No  Present CMV Serostatus: Positive  - EBV IgG Ab High Risk Discordance (D+/R-) at time of transplant: No  Present EBV Serostatus: Positive    # Hypertension: Borderline control;  Goal BP: < 140/90   - Changes: Yes - Recommend starting carvedilol as recently prescribed at 6.25 mg bid.    # Hemoglobin: Stable, normal       Iron studies: Replete    # Mineral Bone Disorder:    - Secondary renal  Spoke with BETTY Her at [facility] via phone for follow up anticoagulation visit.   Last INR on 3/29 was 1.9.  Dose maintained.   Today's INR is 1.6 and is below goal range.    Current warfarin total weekly dose of 17.5 mg verified.  Informed the INR result is below therapeutic range and instructed to increase current dose per protocol. Discussed dose and date of 4/11 for next INR. See Anticoagulation flowsheet.    Dr. Vu is in the office today supervising the treatment.    Call your physician immediately if you notice any of the following symptoms of a blood clot:   · Sudden weakness in any limb  · Numbness or tingling anywhere  · Visual changes or loss of sight in either eye  · Sudden onset of slurred speech or inability to speak  · Dizziness or faintness  · New pain, swelling, redness or heat in any extremity  · New SOB or chest pain  Symptoms associated with blood clotting/low INR reviewed and verbalizes understanding.    Instructed to contact the clinic with any unusual bleeding or bruising, any changes in medications, diet, health status, lifestyle, or any other changes, questions or concerns. Verbalized understanding of all discussed.     [facility] will [facility] HF Lab Outreach regarding next INR 4/11/22   hyperparathyroidism; PTH level: Moderately elevated (301-600 pg/ml)        On treatment: Cinacalcet  - Vitamin D; level: Normal        On supplement: No  - Calcium; level: Normal        On supplement: No    # Electrolytes:  - Potassium; level: Normal        On supplement: Yes  - Magnesium; level: Stable low        On supplement: Yes  - Bicarbonate; level: Normal        On supplement: Yes    # H/o Obesity, s/p Sleeve Gastrectomy (BMI = 26.7): Patient's weight is up ~ 20 lbs since transplant.  She had been on semaglutide prior to transplant.   - Recommend weight loss for overall health by increasing exercise and watching caloric intake.   - Okay to restart semaglutide injections in August.  Ensure good hydration after starting.    # Other Significant PMH:   - GERD: Controlled in H2 blocker.   - Right Internal Jugular DVT: Felt provoked due to previous catheter.  No new issues and remains on anticoagulation with apixaban.   - H/o Polysubstance Abuse: Remote LSD/cocaine use in her 20s, plus some marijuana use.  Patient has remains sober.   - Anxiety Disorder: Patient with ongoing anxiety, especially concerning her medical issues.       # Skin Cancer Risk:    - Discussed sun protection and recommend regular follow up with Dermatology.    # Transplant History:  Etiology of Kidney Failure: Unknown etiology (no kidney biopsy)  Tx: DDKT  Transplant: 5/17/2025 (Kidney)  Significant transplant-related complications: Acute cellular-mediated rejection, Acute antibody-mediated rejection, and DSA    Transplant Office Phone Number: [phone]    Assessment and plan was discussed with the patient and she voiced her understanding and agreement.    Return visit: Return for previously scheduled visit.    Kevin Aleman MD    The longitudinal plan of care for the diagnosis(es)/condition(s) as documented were addressed during this visit. Due to the added complexity in care, I will continue to support Glenna in the subsequent  "management and with ongoing continuity of care.      Chief Complaint  Ms. Spears is a 34 year old here for kidney transplant and immunosuppression management.     History of Present Illness   Ms. Spears reports feeling okay overall.  Since last clinic visit:   Hospitalizations: No   New Medical Issues: Yes; Patient had closure kidney transplant biopsy which just came back showing borderline acute cellular-mediated rejection.  She is getting set up for Solumedrol infusions.   - Patient is bothered by new acne since transplant, which she feels is secondary to steroids.  Active/Exercise: Yes  Chest pain or shortness of breath: No  Lower extremity swelling: No; However, will get \"puffy\" in her hands and face at times.  Weight change: Yes; Weight is more stable lately, but overall she is up ~ 20 lbs since surgery.  Patient has a h/o obesity, s/p gastric sleeve and was on semaglutide injections prior to transplant.  She would like to restart the semaglutide injections   Appetite: Too good   Nausea and vomiting: No  Diarrhea: Yes; A little loose at times  Heartburn symptoms: Yes; Controlled on famotidine  Fever, sweats or chills: No  Night sweats: No  Urinary complaints: No    Home BP: 140-150/80-90s.  Patient was recently prescribed carvedilol, but hasn't started it yet.    Problem List  Patient Active Problem List   Diagnosis     Moderate asthma     Vitamin D deficiency     Obesity     Anxiety     Depressive disorder     Secondary renal hyperparathyroidism     HTN, kidney transplant related     Kidney replaced by transplant     Immunosuppressed status     Hypomagnesemia     Right leg weakness     Antibody mediated rejection of kidney transplant     Aftercare following organ transplant     Stage 3a chronic kidney disease (H)     Need for pneumocystis prophylaxis     GERD (gastroesophageal reflux disease)     Metabolic acidosis     Deep vein thrombosis (DVT) of right upper extremity (H)     Hypokalemia "       Allergies  Allergies   Allergen Reactions     Sulfa Antibiotics Other (See Comments)     Reaction as an infant- unsure of what happened       Medications  Current Outpatient Medications   Medication Sig Dispense Refill     acetaminophen (TYLENOL) 325 MG tablet Take 325-650 mg by mouth every 4 hours as needed       aspirin (ASA) 81 MG EC tablet Take 1 tablet (81 mg) by mouth daily. 30 tablet 11     atorvastatin (LIPITOR) 10 MG tablet Take 1 tablet (10 mg) by mouth daily. 30 tablet 2     cinacalcet (SENSIPAR) 30 MG tablet Take 1 tablet (30 mg) by mouth daily. 30 tablet 11     dapsone (ACZONE) 25 MG tablet Take 2 tablets (50 mg) by mouth daily. 60 tablet 11     famotidine (PEPCID) 20 MG tablet Take 1 tablet (20 mg) by mouth 2 times daily as needed (heartburn). 60 tablet 2     furosemide (LASIX) 40 MG tablet Take 0.5 tablets (20 mg) by mouth daily. (Patient taking differently: Take 10 mg by mouth every other day.) 15 tablet 0     hydrOXYzine HCl (ATARAX) 25 MG tablet Take 1 tablet (25 mg) by mouth every 6 hours as needed for anxiety. 20 tablet 11     levonorgestrel (KYLEENA) 19.5 MG IUD 1 Intra Uterine Device by Intrauterine route once       Lidocaine (LIDOCARE) 4 % Patch Place 2 patches over 12 hours onto the skin every 24 hours. To prevent lidocaine toxicity, patient should be patch free for 12 hrs daily. 20 patch 11     magnesium oxide (MAG-OX) 400 MG tablet Take 1 tablet (400 mg) by mouth 2 times daily. 60 tablet 11     methocarbamol (ROBAXIN) 500 MG tablet Take 1 tablet (500 mg) by mouth every 6 hours as needed for muscle spasms. 20 tablet 0     mycophenolate (GENERIC EQUIVALENT) 250 MG capsule Take 3 capsules (750 mg) by mouth 2 times daily. 180 capsule 11     nortriptyline (PAMELOR) 25 MG capsule Take 25 mg by mouth at bedtime.       potassium chloride jack ER (KLOR-CON M10) 10 MEQ CR tablet Take 1 tablet (10 mEq) by mouth daily. 30 tablet 1     predniSONE (DELTASONE) 5 MG tablet Take 1 tablet (5 mg) by  mouth daily. 30 tablet 11     sodium bicarbonate 650 MG tablet Take 2 tablets (1,300 mg) by mouth 2 times daily. 120 tablet 11     tacrolimus (GENERIC EQUIVALENT) 1 MG capsule Take 2 capsules (2 mg) by mouth 2 times daily. Total dose: 7mg twice daily 120 capsule 11     tacrolimus (GENERIC EQUIVALENT) 5 MG capsule Take 1 capsule (5 mg) by mouth 2 times daily. Total dose: 7mg twice daily 60 capsule 11     valGANciclovir (VALCYTE) 450 MG tablet Take 1 tablet (450 mg) by mouth daily. 30 tablet 1     VENTOLIN  (90 Base) MCG/ACT inhaler Inhale 2 puffs into the lungs every 4 hours as needed for shortness of breath or wheezing.       apixaban ANTICOAGULANT (ELIQUIS) 5 MG tablet Take 2 tablets (10 mg) by mouth 2 times daily for 7 days, THEN 1 tablet (5 mg) 2 times daily. (Patient not taking: No sig reported) 194 tablet 0     carvedilol (COREG) 6.25 MG tablet Take 1 tablet (6.25 mg) by mouth 2 times daily (with meals). (Patient not taking: Reported on 7/18/2025) 60 tablet 11     oxyCODONE (ROXICODONE) 5 MG tablet Take 1 tablet (5 mg) by mouth 2 times daily as needed for pain. 5 tablet 0     [Paused] tacrolimus (GENERIC EQUIVALENT) 0.5 MG capsule Profile Rx: patient will contact pharmacy when needed (Patient not taking: Reported on 7/18/2025) 60 capsule 11     varenicline (CHANTIX SANCHEZ) 0.5 MG X 11 & 1 MG X 42 tablet Take 0.5 mg by mouth daily. Patient is taking 0.5 mg as needed for cravings associated with smoking (Patient not taking: Reported on 7/18/2025)       No current facility-administered medications for this visit.     Medications Discontinued During This Encounter   Medication Reason     gabapentin (NEURONTIN) 100 MG capsule Stopped by Patient (No AVS)       Physical Exam  Vital Signs: /76 (BP Location: Right arm, Patient Position: Sitting, Cuff Size: Adult Regular)   Pulse 84   Wt 79.8 kg (175 lb 14.4 oz)   BMI 26.75 kg/m      GENERAL APPEARANCE: alert and no distress  HENT: mouth without ulcers or  lesions  RESP: lungs clear to auscultation - no rales, rhonchi or wheezes  CV: regular rhythm, normal rate, no rub, no murmur  EDEMA: no LE edema bilaterally  ABDOMEN: soft, nondistended, nontender, bowel sounds normal  MS: extremities normal - no gross deformities noted, no evidence of inflammation in joints, no muscle tenderness  SKIN: no rash, facial acne  TX KIDNEY: normal  DIALYSIS ACCESS:  LUE AV fistula with good thrill    Data        Latest Ref Rng & Units 7/17/2025     8:27 AM 7/15/2025     9:23 AM 7/14/2025     8:19 AM   Renal   Sodium 135 - 145 mmol/L  139     Na (external) 136 - 146 mmol/L 139   137    K 3.4 - 5.3 mmol/L  4.6     K (external) 3.5 - 5.1 mmol/L 4.2   4.3    Cl 98 - 107 mmol/L 111  107  109    Cl (external) 98 - 107 mmol/L 111  107  109    CO2 22 - 29 mmol/L  22     CO2 (external) 22 - 29 mmol/L 24   23    Urea Nitrogen 6.0 - 20.0 mg/dL  25.7     BUN (external) 10.0 - 20.0 mg/dL 24.7   26.6    Creatinine 0.51 - 0.95 mg/dL  1.33     Cr (external) 0.57 - 1.11 mg/dL 1.32   1.18    Glucose 70 - 99 mg/dL  82     Glucose (external) 70 - 100 mg/dL 88   63    Calcium 8.8 - 10.4 mg/dL  10.1     Ca (external) 8.6 - 10.5 mg/dL 9.6   9.9    Mg (external) 1.8 - 2.6 mg/dL   1.7          Latest Ref Rng & Units 7/14/2025     8:19 AM 7/7/2025     8:02 AM 6/30/2025     7:59 AM   Bone Health   Phos (external) 2.9 - 5.2 mg/dL 2.4  2.5  2.2          Latest Ref Rng & Units 7/17/2025     8:27 AM 7/15/2025     9:23 AM 7/14/2025     8:19 AM   Heme   WBC 4.0 - 11.0 10e3/uL  5.2     WBC (external) 3.7 - 12.1 10(3)/uL 4.2   4.2    Hgb 11.7 - 15.7 g/dL  13.5     Hgb (external) 11.2 - 15.8 g/dL 13.0   13.5    Plt 150 - 450 10e3/uL  250     Plt (external) 179 - 450 10(3)/uL 219   238    ABSOLUTE NEUTROPHIL 1.6 - 8.3 10e3/uL  4.5     ABSOLUTE NEUTROPHILS (EXTERNAL) 1.8 - 9.2 10(3)/uL 3.5   3.3    ABSOLUTE LYMPHOCYTES 0.8 - 5.3 10e3/uL  0.4     ABSOLUTE LYMPHOCYTES (EXTERNAL) 1.2 - 3.9 10(3)/uL 0.3   0.4    ABSOLUTE  MONOCYTES 0.0 - 1.3 10e3/uL  0.3     ABSOLUTE MONOCYTES (EXTERNAL) 0.0 - 1.1 10(3)/uL 0.3   0.3    ABSOLUTE EOSINOPHILS 0.0 - 0.7 10e3/uL  0.1     ABSOLUTE EOSINOPHILS (EXTERNAL) 0.0 - 0.8 10(3)/uL 0.1   0.1    ABSOLUTE BASOPHILS 0.0 - 0.2 10e3/uL  0.0     ABSOLUTE BASOPHILS (EXTERNAL) 0.0 - 0.2 10(3)/uL 0.0   0.0          Latest Ref Rng & Units 6/18/2025    11:04 AM 6/9/2025     6:04 AM 6/8/2025     6:55 PM   Liver   AP 40 - 150 U/L 53  109  106    TBili <=1.2 mg/dL 0.3  0.2  0.2    ALT 0 - 50 U/L 10  6  7    AST 0 - 45 U/L 11  13  12    Tot Protein 6.4 - 8.3 g/dL 5.7  6.3  6.0    Albumin 3.5 - 5.2 g/dL 4.1  3.8  3.7          Latest Ref Rng & Units 5/22/2025     7:00 AM 5/17/2025     2:03 AM 4/30/2024    11:37 AM   Pancreas   A1C <5.7 % 4.9  4.9     A1C (external) <5.7 %   4.6          Latest Ref Rng & Units 6/18/2025     8:55 AM 6/2/2025     8:39 AM 5/22/2025     7:00 AM   Iron studies   Iron 37 - 145 ug/dL 60  67  63    Iron Sat Index 15 - 46 % 30  32  39    Ferritin 6 - 175 ng/mL  1,136  1,258          Latest Ref Rng & Units 5/17/2025     2:03 AM 5/20/2024     1:48 PM   [facility] Txp Virology   EBV CAPSID ANTIBODY IGG No detectable antibody. Positive  Positive      Failed to redirect to the Timeline version of the REVFS SmartLink.  Recent Labs   Lab Test 07/11/25  0848 07/14/25  0818 07/17/25  0825   DOSTAC 7/10/2025 7/13/2025 7/16/2025   TACROL 8.7 12.1 10.2     Recent Labs   Lab Test 06/27/25  0653   DOSMPA 6/26/2025   8:30 AM   MPACID 0.62*   MPAG 65.1        Kevin Aleman MD

## 2025-07-19 ENCOUNTER — TELEPHONE (OUTPATIENT)
Dept: TRANSPLANT | Facility: CLINIC | Age: 35
End: 2025-07-19
Payer: COMMERCIAL

## 2025-07-19 ENCOUNTER — DOCUMENTATION ONLY (OUTPATIENT)
Dept: TRANSPLANT | Facility: CLINIC | Age: 35
End: 2025-07-19
Payer: COMMERCIAL

## 2025-07-19 DIAGNOSIS — T86.11 ANTIBODY MEDIATED REJECTION OF KIDNEY TRANSPLANT: ICD-10-CM

## 2025-07-19 RX ORDER — OXYCODONE HYDROCHLORIDE 5 MG/1
5 TABLET ORAL 2 TIMES DAILY PRN
Qty: 5 TABLET | Refills: 0 | Status: SHIPPED | OUTPATIENT
Start: 2025-07-19 | End: 2025-07-22

## 2025-07-19 NOTE — PROGRESS NOTES
Received msg about pt experiencing significant pain after solumedrol infusions for borderline rejection, likely medication related  Asked surgery provider to order oxycodone 5 mg tabs to be used BID PRN for total of 5 tabs to tolerate the infusions.    Sabrina Lezama MD

## 2025-07-19 NOTE — TELEPHONE ENCOUNTER
Glenna paged on call RNCC. Solumedrol started yesterday, pain 9/10 from abdomen down, starting about 5 hours after first infusion. Took all of her PRN pain meds without any relief. WBC elevated, no urinary symptoms, no fevers. Pain has gotten better today, but worried that it will come back this afternoon/evening.     Discussed with Dr. Lezama. She is not concerned about WBC, known side effect of steroids. She is having LEWIS order some oxycodone to get Glenna through the weekend.     Called Glenna back to inform her that oxy or another stronger pain med is being ordered.

## 2025-07-20 PROBLEM — Z29.89 NEED FOR PNEUMOCYSTIS PROPHYLAXIS: Status: ACTIVE | Noted: 2025-07-20

## 2025-07-20 PROBLEM — T82.590A DIALYSIS AV FISTULA MALFUNCTION: Status: RESOLVED | Noted: 2022-06-14 | Resolved: 2025-07-20

## 2025-07-20 PROBLEM — D64.9 NORMOCYTIC ANEMIA: Status: RESOLVED | Noted: 2021-12-25 | Resolved: 2025-07-20

## 2025-07-20 PROBLEM — E87.20 METABOLIC ACIDOSIS: Status: ACTIVE | Noted: 2025-07-20

## 2025-07-20 PROBLEM — G89.18 ACUTE POST-OPERATIVE PAIN: Status: RESOLVED | Noted: 2025-05-20 | Resolved: 2025-07-20

## 2025-07-20 PROBLEM — Z48.298 AFTERCARE FOLLOWING ORGAN TRANSPLANT: Status: ACTIVE | Noted: 2025-07-20

## 2025-07-20 PROBLEM — R10.9 ABDOMINAL PAIN, UNSPECIFIED ABDOMINAL LOCATION: Status: RESOLVED | Noted: 2025-06-08 | Resolved: 2025-07-20

## 2025-07-20 PROBLEM — E87.6 HYPOKALEMIA: Status: ACTIVE | Noted: 2025-07-20

## 2025-07-20 PROBLEM — N18.31 STAGE 3A CHRONIC KIDNEY DISEASE (H): Status: ACTIVE | Noted: 2025-07-20

## 2025-07-20 PROBLEM — I82.621 DEEP VEIN THROMBOSIS (DVT) OF RIGHT UPPER EXTREMITY (H): Status: ACTIVE | Noted: 2025-07-20

## 2025-07-20 PROBLEM — K21.9 GERD (GASTROESOPHAGEAL REFLUX DISEASE): Status: ACTIVE | Noted: 2025-07-20

## 2025-07-20 NOTE — PATIENT INSTRUCTIONS
Patient Recommendations:  - Will get set up with methylprednisolone 500 mg IV daily x 3 days to treat very mild acute cellular-mediated rejection.  - Okay to restart semaglutide (Wegovy) in August.  Ensure good hydration after starting.    Transplant Patient Information  Your Post Transplant Coordinator is: Tammi Cuellar  For non urgent items, we encourage you to contact your coordinator/care team online via [employer]  You and your care team can also contact your transplant coordinator Monday - Friday, 8am - 5pm at [phone] (Option 2 to reach the coordinator or Option 4 to schedule an appointment).  After hours for urgent matters, please call [facility] at [phone].

## 2025-07-20 NOTE — PROGRESS NOTES
TRANSPLANT NEPHROLOGY CLINIC VISIT     Assessment & Plan   # DDKT: CKD Stage 3a - Stable creatinine lately.  She did have high DSA early post transplant and kidney transplant biopsy 6/6/25 showed diffuse C4d, although no ptc or glomerulitis.  With new DSA, it was presumed early acute antibody-mediated rejection and patient was treated with PLEX/IVIg with trend down in DSA.  Closure kidney transplant biopsy 7/15/25 showed no evidence of ABMR, but did have borderline acute cellular-mediated rejection with plan to treat with methylprednisolone.   - Baseline Creatinine: ~ 1.2-1.4   - Proteinuria: Normal (<0.2 grams)   - DSA Hx: Moderate DSA (6447-9510 mfi) to A1, B8 and B60.    - Last cPRA: 100%   - BK Viremia: No   - Kidney Tx Biopsy Hx: Acute cellular-mediated rejection, Acute antibody-mediated rejection, and Mild interstitial fibrosis and tubular atrophy.   - Primary Nephrologist: Dr. Mar.    # Immunosuppression: Tacrolimus immediate release (goal 8-10), Mycophenolate mofetil (dose 750 mg every 12 hours), and Prednisone (dose 5 mg daily)   - Induction with Recent Transplant:  High Intensity Protocol   - Continue with intensive monitoring of immunosuppression for efficacy and toxicity.   - Historical Changes in Immunosuppression: None   - Changes: Not at this time    # Infection Prevention:   Last CD4 Level: Not checked  - PJP: Dapsone  - CMV: Valganciclovir (Valcyte)      - CMV IgG Ab High Risk Discordance (D+/R-) at time of transplant: No  Present CMV Serostatus: Positive  - EBV IgG Ab High Risk Discordance (D+/R-) at time of transplant: No  Present EBV Serostatus: Positive    # Hypertension: Borderline control;  Goal BP: < 140/90   - Changes: Yes - Recommend starting carvedilol as recently prescribed at 6.25 mg bid.    # Hemoglobin: Stable, normal       Iron studies: Replete    # Mineral Bone Disorder:    - Secondary renal hyperparathyroidism; PTH level: Moderately elevated (301-600 pg/ml)        On  treatment: Cinacalcet  - Vitamin D; level: Normal        On supplement: No  - Calcium; level: Normal        On supplement: No    # Electrolytes:  - Potassium; level: Normal        On supplement: Yes  - Magnesium; level: Stable low        On supplement: Yes  - Bicarbonate; level: Normal        On supplement: Yes    # H/o Obesity, s/p Sleeve Gastrectomy (BMI = 26.7): Patient's weight is up ~ 20 lbs since transplant.  She had been on semaglutide prior to transplant.   - Recommend weight loss for overall health by increasing exercise and watching caloric intake.   - Okay to restart semaglutide injections in August.  Ensure good hydration after starting.    # Other Significant PMH:   - GERD: Controlled in H2 blocker.   - Right Internal Jugular DVT: Felt provoked due to previous catheter.  No new issues and remains on anticoagulation with apixaban.   - H/o Polysubstance Abuse: Remote LSD/cocaine use in her 20s, plus some marijuana use.  Patient has remains sober.   - Anxiety Disorder: Patient with ongoing anxiety, especially concerning her medical issues.       # Skin Cancer Risk:    - Discussed sun protection and recommend regular follow up with Dermatology.    # Transplant History:  Etiology of Kidney Failure: Unknown etiology (no kidney biopsy)  Tx: DDKT  Transplant: 5/17/2025 (Kidney)  Significant transplant-related complications: Acute cellular-mediated rejection, Acute antibody-mediated rejection, and DSA    Transplant Office Phone Number: [phone]    Assessment and plan was discussed with the patient and she voiced her understanding and agreement.    Return visit: Return for previously scheduled visit.    Kevin Aleman MD    The longitudinal plan of care for the diagnosis(es)/condition(s) as documented were addressed during this visit. Due to the added complexity in care, I will continue to support Glenna in the subsequent management and with ongoing continuity of care.      Chief Complaint   Ms. Spears  "is a 34 year old here for kidney transplant and immunosuppression management.     History of Present Illness    Ms. Spears reports feeling okay overall.  Since last clinic visit:   Hospitalizations: No   New Medical Issues: Yes; Patient had closure kidney transplant biopsy which just came back showing borderline acute cellular-mediated rejection.  She is getting set up for Solumedrol infusions.   - Patient is bothered by new acne since transplant, which she feels is secondary to steroids.  Active/Exercise: Yes  Chest pain or shortness of breath: No  Lower extremity swelling: No; However, will get \"puffy\" in her hands and face at times.  Weight change: Yes; Weight is more stable lately, but overall she is up ~ 20 lbs since surgery.  Patient has a h/o obesity, s/p gastric sleeve and was on semaglutide injections prior to transplant.  She would like to restart the semaglutide injections   Appetite: Too good   Nausea and vomiting: No  Diarrhea: Yes; A little loose at times  Heartburn symptoms: Yes; Controlled on famotidine  Fever, sweats or chills: No  Night sweats: No  Urinary complaints: No    Home BP: 140-150/80-90s.  Patient was recently prescribed carvedilol, but hasn't started it yet.    Problem List   Patient Active Problem List   Diagnosis    Moderate asthma    Vitamin D deficiency    Obesity    Anxiety    Depressive disorder    Secondary renal hyperparathyroidism    HTN, kidney transplant related    Kidney replaced by transplant    Immunosuppressed status    Hypomagnesemia    Right leg weakness    Antibody mediated rejection of kidney transplant    Aftercare following organ transplant    Stage 3a chronic kidney disease (H)    Need for pneumocystis prophylaxis    GERD (gastroesophageal reflux disease)    Metabolic acidosis    Deep vein thrombosis (DVT) of right upper extremity (H)    Hypokalemia       Allergies   Allergies   Allergen Reactions    Sulfa Antibiotics Other (See Comments)     Reaction as an infant- " unsure of what happened       Medications   Current Outpatient Medications   Medication Sig Dispense Refill    acetaminophen (TYLENOL) 325 MG tablet Take 325-650 mg by mouth every 4 hours as needed      aspirin (ASA) 81 MG EC tablet Take 1 tablet (81 mg) by mouth daily. 30 tablet 11    atorvastatin (LIPITOR) 10 MG tablet Take 1 tablet (10 mg) by mouth daily. 30 tablet 2    cinacalcet (SENSIPAR) 30 MG tablet Take 1 tablet (30 mg) by mouth daily. 30 tablet 11    dapsone (ACZONE) 25 MG tablet Take 2 tablets (50 mg) by mouth daily. 60 tablet 11    famotidine (PEPCID) 20 MG tablet Take 1 tablet (20 mg) by mouth 2 times daily as needed (heartburn). 60 tablet 2    furosemide (LASIX) 40 MG tablet Take 0.5 tablets (20 mg) by mouth daily. (Patient taking differently: Take 10 mg by mouth every other day.) 15 tablet 0    hydrOXYzine HCl (ATARAX) 25 MG tablet Take 1 tablet (25 mg) by mouth every 6 hours as needed for anxiety. 20 tablet 11    levonorgestrel (KYLEENA) 19.5 MG IUD 1 Intra Uterine Device by Intrauterine route once      Lidocaine (LIDOCARE) 4 % Patch Place 2 patches over 12 hours onto the skin every 24 hours. To prevent lidocaine toxicity, patient should be patch free for 12 hrs daily. 20 patch 11    magnesium oxide (MAG-OX) 400 MG tablet Take 1 tablet (400 mg) by mouth 2 times daily. 60 tablet 11    methocarbamol (ROBAXIN) 500 MG tablet Take 1 tablet (500 mg) by mouth every 6 hours as needed for muscle spasms. 20 tablet 0    mycophenolate (GENERIC EQUIVALENT) 250 MG capsule Take 3 capsules (750 mg) by mouth 2 times daily. 180 capsule 11    nortriptyline (PAMELOR) 25 MG capsule Take 25 mg by mouth at bedtime.      potassium chloride jack ER (KLOR-CON M10) 10 MEQ CR tablet Take 1 tablet (10 mEq) by mouth daily. 30 tablet 1    predniSONE (DELTASONE) 5 MG tablet Take 1 tablet (5 mg) by mouth daily. 30 tablet 11    sodium bicarbonate 650 MG tablet Take 2 tablets (1,300 mg) by mouth 2 times daily. 120 tablet 11     tacrolimus (GENERIC EQUIVALENT) 1 MG capsule Take 2 capsules (2 mg) by mouth 2 times daily. Total dose: 7mg twice daily 120 capsule 11    tacrolimus (GENERIC EQUIVALENT) 5 MG capsule Take 1 capsule (5 mg) by mouth 2 times daily. Total dose: 7mg twice daily 60 capsule 11    valGANciclovir (VALCYTE) 450 MG tablet Take 1 tablet (450 mg) by mouth daily. 30 tablet 1    VENTOLIN  (90 Base) MCG/ACT inhaler Inhale 2 puffs into the lungs every 4 hours as needed for shortness of breath or wheezing.      apixaban ANTICOAGULANT (ELIQUIS) 5 MG tablet Take 2 tablets (10 mg) by mouth 2 times daily for 7 days, THEN 1 tablet (5 mg) 2 times daily. (Patient not taking: No sig reported) 194 tablet 0    carvedilol (COREG) 6.25 MG tablet Take 1 tablet (6.25 mg) by mouth 2 times daily (with meals). (Patient not taking: Reported on 7/18/2025) 60 tablet 11    oxyCODONE (ROXICODONE) 5 MG tablet Take 1 tablet (5 mg) by mouth 2 times daily as needed for pain. 5 tablet 0    [Paused] tacrolimus (GENERIC EQUIVALENT) 0.5 MG capsule Profile Rx: patient will contact pharmacy when needed (Patient not taking: Reported on 7/18/2025) 60 capsule 11    varenicline (CHANTIX SANCHEZ) 0.5 MG X 11 & 1 MG X 42 tablet Take 0.5 mg by mouth daily. Patient is taking 0.5 mg as needed for cravings associated with smoking (Patient not taking: Reported on 7/18/2025)       No current facility-administered medications for this visit.     Medications Discontinued During This Encounter   Medication Reason    gabapentin (NEURONTIN) 100 MG capsule Stopped by Patient (No AVS)       Physical Exam   Vital Signs: /76 (BP Location: Right arm, Patient Position: Sitting, Cuff Size: Adult Regular)   Pulse 84   Wt 79.8 kg (175 lb 14.4 oz)   BMI 26.75 kg/m      GENERAL APPEARANCE: alert and no distress  HENT: mouth without ulcers or lesions  RESP: lungs clear to auscultation - no rales, rhonchi or wheezes  CV: regular rhythm, normal rate, no rub, no murmur  EDEMA: no LE  edema bilaterally  ABDOMEN: soft, nondistended, nontender, bowel sounds normal  MS: extremities normal - no gross deformities noted, no evidence of inflammation in joints, no muscle tenderness  SKIN: no rash, facial acne  TX KIDNEY: normal  DIALYSIS ACCESS:  LUE AV fistula with good thrill    Data         Latest Ref Rng & Units 7/17/2025     8:27 AM 7/15/2025     9:23 AM 7/14/2025     8:19 AM   Renal   Sodium 135 - 145 mmol/L  139     Na (external) 136 - 146 mmol/L 139   137    K 3.4 - 5.3 mmol/L  4.6     K (external) 3.5 - 5.1 mmol/L 4.2   4.3    Cl 98 - 107 mmol/L 111  107  109    Cl (external) 98 - 107 mmol/L 111  107  109    CO2 22 - 29 mmol/L  22     CO2 (external) 22 - 29 mmol/L 24   23    Urea Nitrogen 6.0 - 20.0 mg/dL  25.7     BUN (external) 10.0 - 20.0 mg/dL 24.7   26.6    Creatinine 0.51 - 0.95 mg/dL  1.33     Cr (external) 0.57 - 1.11 mg/dL 1.32   1.18    Glucose 70 - 99 mg/dL  82     Glucose (external) 70 - 100 mg/dL 88   63    Calcium 8.8 - 10.4 mg/dL  10.1     Ca (external) 8.6 - 10.5 mg/dL 9.6   9.9    Mg (external) 1.8 - 2.6 mg/dL   1.7          Latest Ref Rng & Units 7/14/2025     8:19 AM 7/7/2025     8:02 AM 6/30/2025     7:59 AM   Bone Health   Phos (external) 2.9 - 5.2 mg/dL 2.4  2.5  2.2          Latest Ref Rng & Units 7/17/2025     8:27 AM 7/15/2025     9:23 AM 7/14/2025     8:19 AM   Heme   WBC 4.0 - 11.0 10e3/uL  5.2     WBC (external) 3.7 - 12.1 10(3)/uL 4.2   4.2    Hgb 11.7 - 15.7 g/dL  13.5     Hgb (external) 11.2 - 15.8 g/dL 13.0   13.5    Plt 150 - 450 10e3/uL  250     Plt (external) 179 - 450 10(3)/uL 219   238    ABSOLUTE NEUTROPHIL 1.6 - 8.3 10e3/uL  4.5     ABSOLUTE NEUTROPHILS (EXTERNAL) 1.8 - 9.2 10(3)/uL 3.5   3.3    ABSOLUTE LYMPHOCYTES 0.8 - 5.3 10e3/uL  0.4     ABSOLUTE LYMPHOCYTES (EXTERNAL) 1.2 - 3.9 10(3)/uL 0.3   0.4    ABSOLUTE MONOCYTES 0.0 - 1.3 10e3/uL  0.3     ABSOLUTE MONOCYTES (EXTERNAL) 0.0 - 1.1 10(3)/uL 0.3   0.3    ABSOLUTE EOSINOPHILS 0.0 - 0.7 10e3/uL  0.1      ABSOLUTE EOSINOPHILS (EXTERNAL) 0.0 - 0.8 10(3)/uL 0.1   0.1    ABSOLUTE BASOPHILS 0.0 - 0.2 10e3/uL  0.0     ABSOLUTE BASOPHILS (EXTERNAL) 0.0 - 0.2 10(3)/uL 0.0   0.0          Latest Ref Rng & Units 6/18/2025    11:04 AM 6/9/2025     6:04 AM 6/8/2025     6:55 PM   Liver   AP 40 - 150 U/L 53  109  106    TBili <=1.2 mg/dL 0.3  0.2  0.2    ALT 0 - 50 U/L 10  6  7    AST 0 - 45 U/L 11  13  12    Tot Protein 6.4 - 8.3 g/dL 5.7  6.3  6.0    Albumin 3.5 - 5.2 g/dL 4.1  3.8  3.7          Latest Ref Rng & Units 5/22/2025     7:00 AM 5/17/2025     2:03 AM 4/30/2024    11:37 AM   Pancreas   A1C <5.7 % 4.9  4.9     A1C (external) <5.7 %   4.6          Latest Ref Rng & Units 6/18/2025     8:55 AM 6/2/2025     8:39 AM 5/22/2025     7:00 AM   Iron studies   Iron 37 - 145 ug/dL 60  67  63    Iron Sat Index 15 - 46 % 30  32  39    Ferritin 6 - 175 ng/mL  1,136  1,258          Latest Ref Rng & Units 5/17/2025     2:03 AM 5/20/2024     1:48 PM   [facility] Txp Virology   EBV CAPSID ANTIBODY IGG No detectable antibody. Positive  Positive      Failed to redirect to the Timeline version of the REVFS SmartLink.  Recent Labs   Lab Test 07/11/25  0848 07/14/25  0818 07/17/25  0825   DOSTAC 7/10/2025 7/13/2025 7/16/2025   TACROL 8.7 12.1 10.2     Recent Labs   Lab Test 06/27/25  0653   DOSMPA 6/26/2025   8:30 AM   MPACID 0.62*   MPAG 65.1

## 2025-07-21 ENCOUNTER — VIRTUAL VISIT (OUTPATIENT)
Dept: PHARMACY | Facility: CLINIC | Age: 35
End: 2025-07-21
Payer: COMMERCIAL

## 2025-07-21 ENCOUNTER — VIRTUAL VISIT (OUTPATIENT)
Dept: TRANSPLANT | Facility: CLINIC | Age: 35
End: 2025-07-21
Attending: INTERNAL MEDICINE

## 2025-07-21 ENCOUNTER — TELEPHONE (OUTPATIENT)
Dept: TRANSPLANT | Facility: CLINIC | Age: 35
End: 2025-07-21
Payer: COMMERCIAL

## 2025-07-21 ENCOUNTER — LAB (OUTPATIENT)
Dept: LAB | Facility: CLINIC | Age: 35
End: 2025-07-21
Payer: COMMERCIAL

## 2025-07-21 DIAGNOSIS — Z98.890 OTHER SPECIFIED POSTPROCEDURAL STATES: ICD-10-CM

## 2025-07-21 DIAGNOSIS — R52 PAIN: ICD-10-CM

## 2025-07-21 DIAGNOSIS — Z94.0 KIDNEY TRANSPLANT RECIPIENT: Primary | ICD-10-CM

## 2025-07-21 DIAGNOSIS — E78.5 DYSLIPIDEMIA: ICD-10-CM

## 2025-07-21 DIAGNOSIS — I10 ESSENTIAL HYPERTENSION: ICD-10-CM

## 2025-07-21 DIAGNOSIS — Z94.0 KIDNEY REPLACED BY TRANSPLANT: ICD-10-CM

## 2025-07-21 DIAGNOSIS — T86.11 ANTIBODY MEDIATED REJECTION OF KIDNEY TRANSPLANT: ICD-10-CM

## 2025-07-21 DIAGNOSIS — Z20.828 CONTACT WITH AND (SUSPECTED) EXPOSURE TO OTHER VIRAL COMMUNICABLE DISEASES: ICD-10-CM

## 2025-07-21 DIAGNOSIS — K21.9 GASTROESOPHAGEAL REFLUX DISEASE, UNSPECIFIED WHETHER ESOPHAGITIS PRESENT: ICD-10-CM

## 2025-07-21 DIAGNOSIS — Z94.0 KIDNEY TRANSPLANTED: ICD-10-CM

## 2025-07-21 DIAGNOSIS — Z78.9 TAKES DIETARY SUPPLEMENTS: ICD-10-CM

## 2025-07-21 DIAGNOSIS — F41.9 ANXIETY: ICD-10-CM

## 2025-07-21 DIAGNOSIS — Z48.298 AFTERCARE FOLLOWING ORGAN TRANSPLANT: ICD-10-CM

## 2025-07-21 DIAGNOSIS — Z79.899 ENCOUNTER FOR LONG-TERM CURRENT USE OF MEDICATION: ICD-10-CM

## 2025-07-21 PROCEDURE — 80197 ASSAY OF TACROLIMUS: CPT

## 2025-07-21 PROCEDURE — 99606 MTMS BY PHARM EST 15 MIN: CPT | Mod: 93 | Performed by: PHARMACIST

## 2025-07-21 PROCEDURE — 99607 MTMS BY PHARM ADDL 15 MIN: CPT | Mod: 93 | Performed by: PHARMACIST

## 2025-07-21 RX ORDER — VALGANCICLOVIR 450 MG/1
900 TABLET, FILM COATED ORAL DAILY
Qty: 60 TABLET | Refills: 0 | Status: SHIPPED | OUTPATIENT
Start: 2025-07-21

## 2025-07-21 NOTE — PROGRESS NOTES
Medication Therapy Management (MTM) Encounter    ASSESSMENT:                            Medication Adherence/Access: No issues identified.    Kidney Transplant:    Patient unsure of duration of Eliquis treatment, told her to refill this medication and continue for 3 months post diagnosis of DVT.  This should put her around mid September.  Creatinine clearance has improved beyond 60 mL/min, recommend bumping Valcyte up to 900 mg daily.  Will discuss with transplant team    Hyperlipidemia   Stable.     GERD    / Nausea  Stable.      Supplements   Stable.      Pain   Improving.      Mental Health   Anxiety  Patient has been using her as needed anxiety med, hydroxyzine, 3 times daily.  Likely needs adjustment on her controlled med, or adding another medication.  This should be managed by primary care.     Hypertension   BP at goal <140/90    PLAN:                            Txp team...  1. Pt has one more month of Valcyte prophylaxis- crcl over 60. Recommend increasing to 900mg once daily. Moderate CMV risk.     Pt to...  You'll continue Eliquis 5mg twice daily for 3 months from the start (mid September)  Recommend follow-up with PCP to discuss anxiety treatment.     Follow-up: 2 months    SUBJECTIVE/OBJECTIVE:                          Glenna Spears is a 34 year old female seen for a follow-up visit.       Reason for visit: 2 months.    Allergies/ADRs: Reviewed in chart  Past Medical History: Reviewed in chart  Tobacco: She reports that she quit smoking about 2 years ago. Her smoking use included cigarettes. She has been exposed to tobacco smoke. She has never used smokeless tobacco.  Alcohol: not currently using    Medication Adherence/Access: no issues reported.    Kidney Transplant:    Tacrolimus 7 mg twice daily  Mycophenolate Mofetil 750 mg twice daily.   Prednisone 5mg daily.   Very mild rejection treated over the weekend with Medrol 500mg VI x3.   2 forms of Birth control if female is 50 yo or younger discussed:  Yes   Pt reports nausea from bariatric surgery, not from the meds. As long as she doesn't eat too much she doesn't have nausea.   Transplant date: 5/17/25  Vascular Prophylaxis: Aspirin 81mg daily. Denies gastrointestinal bleed sx. Has several DVTs, treating with Eliquis 5mg twice daily. Planning on stopping after this   CMV prophylaxis: Valcyte 450mg every day Treat 3 months post tx   PJP prophylaxis: Dapsone 50mg daily  Tx Coordinator: Tammi Cuellar Tx MD: Dr. Aleman, Using Med Card: Yes  Immunizations: annual flu shot 2024; Pneumovax 23:  NA; Prevnar 20: 2024; TDaP:  2016; Shingrix: unknown, HBV: immune per last titers, COVID: 24-25 x1   Estimated Creatinine Clearance: 66.1 mL/min (A) (based on SCr of 1.33 mg/dL (H)).    Hyperlipidemia   Atorvastatin 10 mg daily  Patient reports the following side effects: arms have been sore like she worked out. Muscle weakness in both legs.     GERD    / [place]  Famotidine 20mg twice daily  Has tried PPIs, didn't seem to help per paitent.   Hx of bariatric surgery, gets nauseous just when she eats too much. Has had some nausea. Takes meds with few spoonfuls of yogurt.   Patient reports heartburn all the time.   Patient feels that current regimen is effective.     Supplements   Magnesium 400mg twice daily   Sodium bicarbonate 1300mg twice daily   Has been holding potassium 10mEq daily     Pain   Methocarbamol 500mg prn  Acetaminophen 650mg prn  Oxycodone 5mg prn- has 1 pill left.   Lidocaine patches 4% applying to both legs around knees. At night.   Nortriptyline 25mg at bedtime - for neuropathy, has been helping with mood.   Has worse neuropathy/ RLS symptoms when getting IV steroids. Now improving.      Mental Health   Anxiety  Prednisone makes her a little hyper.  Hydroxyzine 25mg 3 times daily   Nortriptyline 25 mg at bedtime.    Patient reports no current medication side effects.  Current symptoms include: anxiety / hyper from prednisone.     Hypertension   Carvedilol 6.25mg  twice daily   Furosemide 10mg every other day   Gets swelling in hands and face, not usually ankles. Has been a little more since IV medrol.   Patient reports no current medication side effects  Patient self monitors blood pressure.  Home BP monitoring 130/70s, down from 150s since starting Carvedilol. .       Today's Vitals: There were no vitals taken for this visit.  ----------------    I spent 20 minutes with this patient today. All changes were made via collaborative practice agreement with Dr. Aleman.     A summary of these recommendations was sent via [employer] [employer].    Carlos Castro, PharmD  MTM Pharmacist    Phone: [phone]     Telemedicine Visit Details  The patient's medications can be safely assessed via a telemedicine encounter.  Type of service:  Telephone visit  Originating Location (pt. Location): Home    Distant Location (provider location):  Off-site  Start Time: 8:01 AM  End Time: 8:22 AM     Medication Therapy Recommendations  Aftercare following organ transplant   1 Current Medication: apixaban ANTICOAGULANT (ELIQUIS) 5 MG tablet   Current Medication Sig: Take 2 tablets (10 mg) by mouth 2 times daily for 7 days, THEN 1 tablet (5 mg) 2 times daily.   Rationale: Does not understand instructions - Adherence - Adherence   Recommendation: Provide Education   Status: Patient Agreed - Adherence/Education   Identified Date: 7/21/2025 Completed Date: 7/21/2025         2 Current Medication: valGANciclovir (VALCYTE) 450 MG tablet   Current Medication Sig: Take 1 tablet (450 mg) by mouth daily.   Rationale: Dose too low - Dosage too low - Effectiveness   Recommendation: Increase Dose   Status: Contact Provider - Awaiting Response   Identified Date: 7/21/2025         Anxiety   1 Current Medication: hydrOXYzine HCl (ATARAX) 25 MG tablet   Current Medication Sig: Take 1 tablet (25 mg) by mouth every 6 hours as needed for anxiety.   Rationale: More effective medication available - Ineffective medication -  Effectiveness   Recommendation: Make Appt with PCP   Status: Accepted - no CPA Needed   Identified Date: 7/21/2025 Completed Date: 7/21/2025

## 2025-07-21 NOTE — Clinical Note
Txp team... 1. Pt has one more month of Valcyte prophylaxis- crcl over 60. Recommend increasing to 900mg once daily. Moderate CMV risk.

## 2025-07-21 NOTE — LETTER
"2025      Glenna Spears  [address]   [place] [address]      Dear Colleague,    Thank you for referring your patient, Glenna Spears, to the [facility] TRANSPLANT [facility]. Please see a copy of my visit note below.    Post-Transplant Patient Social Work Assessment:    Patient Name: Glenna Spears  : 1990  Age: 34 year old  MRN: [id number]  Date of Transplant: 2025    Patient is known to this writer from follow-up in the Kidney transplant program. Spoke with Glenna today to update their psychosocial assessment.      Presenting Information:  Current Living Situation: Patient resides in [place], [place] in a home with her boyfriend, her 2 minor children, and her mom (any) and sister (Juan David).   Functional Status: Patient is independent with ADL\"s and IADL's and no reported DME.  Cultural/Language/Spiritual Considerations: White, single, female    Post-Transplant Support System:  Primary Support Person(s): Patient identified her boyfriend and her mother as primary supports.   Other Supports:  Patient stayed with her dad, uncle, and cousin post transplant and they have provided additional support as needed.     Health Care Directive Information:  Primary Decision Maker: Patient   Alternate Decision Maker: NOK- Patients mother   Health Care Directive: Provided education- no HCD on file    Mental & AODA Concerns/History:  History/Changes to Mental Health: Per chart review patient has endorsed ADHD, depression, and anxiety. During today's check-in patient identified being on Hydroxyzine for anxiety and Nortriptyline for depression. Patient notes that these medications are not managed by PCP but notes that she would like to have them managed by the same person and pt confirms that she does have a PC that she can coordinate with. Patient reports that her mental health is \"well managed.\" Patient denies the need for any additional support at this time.    History/Changes to Chemical Health: Patient " "notes that she is not using any substances including alcohol, marijuana, or tobacco. Pt notes that she used to use edibles but has stopped post transplant.   Current Status: Appropriate  Coping: Patient notes that she is adjusting to her transplant and that she has had some complications \"bouts of rejection.\" Patient had reported that her coping is watching TV, taking a nap, and listening to music.   Services Needed/Recommended: None at this time  Compliance with Medications, Appointments, Etc:  Patient identified no barriers to navigating healthcare system, specific to the transplant processes. Patient reports appropriate follow up with drs visits, taking medications as prescribed, and feels well informed when it comes to managing his health.       Work/Financial Concerns:  Current Work Status: Patient notes that she is currently on STD from work but plans to return potentially next month pending her health   Primary Source of Income: STD   Impact of Transplant on Income: Patient notes that she did apply for SSDI and was approved but that she is aware she may lose it 1 year post transplant   Insurance and Medication Coverage: Patient reports adequate insurance (Medicaid) and access to medications   Financial Concerns: Patient reported that finances have been \"tighter\" due to only having partial income   Resources Needed: None reported at this time     Education Provided by : Social Work role in the outpatient setting, post-transplant expectations, and information on ESRD Medicare.     Assessment, Recommendations, and Plan: Kidney/Pancreas/Auto-Islet SOT SW Team to continue to follow indefinitely for outpatient concerns/questions and for one year post-transplant for hospital readmissions.  provided patient with SW's contact information for any questions/concerns.     KASSIE Grimes, [facility] Island HospitalSW  Kidney/Pancreas/Auto Islet Transplant Program ([facility])  [facility] Acute Care Management  Phone: " [phone]  Available on One On One: Kidney, Pancreas, Auto-Islet       Again, thank you for allowing me to participate in the care of your patient.        Sincerely,        TATA Grimes    Electronically signed

## 2025-07-21 NOTE — PATIENT INSTRUCTIONS
"Recommendations from today's MTM visit:                                                      You'll continue Eliquis 5mg twice daily for 3 months from the start (mid September)  Recommend follow-up with PCP to discuss anxiety treatment.     Follow-up: 2 months     It was great speaking with you today.  I value your experience and would be very thankful for your time in providing feedback in our clinic survey. In the next few days, you may receive an email or text message from [employer] with a link to a survey related to your  clinical pharmacist.\"     To schedule another MTM appointment, please call the clinic directly or you may call the MTM scheduling line at [phone] or toll-free at [phone].     My Clinical Pharmacist's contact information:                                                      Please feel free to contact me with any questions or concerns you have.      Carlos Castro, PharmD  MTM Pharmacist    Phone: [phone]     "

## 2025-07-21 NOTE — PROGRESS NOTES
"Post-Transplant Patient Social Work Assessment:    Patient Name: Glenna Spears  : 1990  Age: 34 year old  MRN: [id number]  Date of Transplant: 2025    Patient is known to this writer from follow-up in the Kidney transplant program. Spoke with Glenna today to update their psychosocial assessment.      Presenting Information:  Current Living Situation: Patient resides in [place], [place] in a home with her boyfriend, her 2 minor children, and her mom (any) and sister (Juan David).   Functional Status: Patient is independent with ADL\"s and IADL's and no reported DME.  Cultural/Language/Spiritual Considerations: White, single, female    Post-Transplant Support System:  Primary Support Person(s): Patient identified her boyfriend and her mother as primary supports.   Other Supports:  Patient stayed with her dad, uncle, and cousin post transplant and they have provided additional support as needed.     Health Care Directive Information:  Primary Decision Maker: Patient   Alternate Decision Maker: NOK- Patients mother   Health Care Directive: Provided education- no HCD on file    Mental & AODA Concerns/History:  History/Changes to Mental Health: Per chart review patient has endorsed ADHD, depression, and anxiety. During today's check-in patient identified being on Hydroxyzine for anxiety and Nortriptyline for depression. Patient notes that these medications are not managed by PCP but notes that she would like to have them managed by the same person and pt confirms that she does have a PC that she can coordinate with. Patient reports that her mental health is \"well managed.\" Patient denies the need for any additional support at this time.    History/Changes to Chemical Health: Patient notes that she is not using any substances including alcohol, marijuana, or tobacco. Pt notes that she used to use edibles but has stopped post transplant.   Current Status: Appropriate  Coping: Patient notes that she is adjusting to " "her transplant and that she has had some complications \"bouts of rejection.\" Patient had reported that her coping is watching TV, taking a nap, and listening to music.   Services Needed/Recommended: None at this time  Compliance with Medications, Appointments, Etc:  Patient identified no barriers to navigating healthcare system, specific to the transplant processes. Patient reports appropriate follow up with drs visits, taking medications as prescribed, and feels well informed when it comes to managing his health.       Work/Financial Concerns:  Current Work Status: Patient notes that she is currently on STD from work but plans to return potentially next month pending her health   Primary Source of Income: STD   Impact of Transplant on Income: Patient notes that she did apply for SSDI and was approved but that she is aware she may lose it 1 year post transplant   Insurance and Medication Coverage: Patient reports adequate insurance (Medicaid) and access to medications   Financial Concerns: Patient reported that finances have been \"tighter\" due to only having partial income   Resources Needed: None reported at this time     Education Provided by : Social Work role in the outpatient setting, post-transplant expectations, and information on ESRD Medicare.     Assessment, Recommendations, and Plan: Kidney/Pancreas/Auto-Islet SOT SW Team to continue to follow indefinitely for outpatient concerns/questions and for one year post-transplant for hospital readmissions.  provided patient with SW's contact information for any questions/concerns.     KASSIE Grimes, [facility] Inland HospitalSW  Kidney/Pancreas/Auto Islet Transplant Program ([facility])  [facility] Acute Care Management  Phone: [phone]  Available on Vocera: Kidney, Pancreas, Auto-Islet     "

## 2025-07-21 NOTE — TELEPHONE ENCOUNTER
----- Message -----   From: Carlos Castro, [facility]   Sent: 7/21/2025   8:35 AM CDT   To: Kevin Aleman MD; Tammi Cuellar RN       Txp team...   1. Pt has one more month of Valcyte prophylaxis- crcl over 60. Recommend increasing to 900mg once daily. Moderate CMV risk.

## 2025-07-22 LAB
TACROLIMUS BLD-MCNC: 8.2 UG/L (ref 5–15)
TME LAST DOSE: NORMAL H
TME LAST DOSE: NORMAL H

## 2025-07-28 ENCOUNTER — LAB (OUTPATIENT)
Dept: LAB | Facility: CLINIC | Age: 35
End: 2025-07-28
Payer: COMMERCIAL

## 2025-07-28 DIAGNOSIS — Z48.298 AFTERCARE FOLLOWING ORGAN TRANSPLANT: ICD-10-CM

## 2025-07-28 DIAGNOSIS — Z79.899 ENCOUNTER FOR LONG-TERM CURRENT USE OF MEDICATION: ICD-10-CM

## 2025-07-28 DIAGNOSIS — Z98.890 OTHER SPECIFIED POSTPROCEDURAL STATES: ICD-10-CM

## 2025-07-28 DIAGNOSIS — Z94.0 KIDNEY REPLACED BY TRANSPLANT: ICD-10-CM

## 2025-07-28 DIAGNOSIS — Z20.828 CONTACT WITH AND (SUSPECTED) EXPOSURE TO OTHER VIRAL COMMUNICABLE DISEASES: ICD-10-CM

## 2025-07-28 PROCEDURE — 80197 ASSAY OF TACROLIMUS: CPT

## 2025-07-29 LAB
TACROLIMUS BLD-MCNC: 12.5 UG/L (ref 5–15)
TME LAST DOSE: NORMAL H
TME LAST DOSE: NORMAL H

## 2025-07-30 ENCOUNTER — MYC MEDICAL ADVICE (OUTPATIENT)
Dept: TRANSPLANT | Facility: CLINIC | Age: 35
End: 2025-07-30
Payer: COMMERCIAL

## 2025-07-30 ENCOUNTER — TELEPHONE (OUTPATIENT)
Dept: TRANSPLANT | Facility: CLINIC | Age: 35
End: 2025-07-30
Payer: COMMERCIAL

## 2025-07-30 DIAGNOSIS — Z94.0 KIDNEY REPLACED BY TRANSPLANT: ICD-10-CM

## 2025-07-30 DIAGNOSIS — T86.11 ANTIBODY MEDIATED REJECTION OF KIDNEY TRANSPLANT: ICD-10-CM

## 2025-07-30 RX ORDER — TACROLIMUS 5 MG/1
5 CAPSULE ORAL 2 TIMES DAILY
Qty: 60 CAPSULE | Refills: 11 | Status: SHIPPED | OUTPATIENT
Start: 2025-07-30

## 2025-07-30 RX ORDER — TACROLIMUS 1 MG/1
1 CAPSULE ORAL 2 TIMES DAILY
Qty: 60 CAPSULE | Refills: 11 | Status: SHIPPED | OUTPATIENT
Start: 2025-07-30

## 2025-07-30 RX ORDER — TACROLIMUS 0.5 MG/1
0.5 CAPSULE ORAL 2 TIMES DAILY
Qty: 60 CAPSULE | Refills: 11 | Status: SHIPPED | OUTPATIENT
Start: 2025-07-30

## 2025-08-04 ENCOUNTER — LAB (OUTPATIENT)
Dept: LAB | Facility: CLINIC | Age: 35
End: 2025-08-04
Payer: COMMERCIAL

## 2025-08-04 DIAGNOSIS — Z94.0 KIDNEY REPLACED BY TRANSPLANT: Primary | ICD-10-CM

## 2025-08-04 PROCEDURE — 80197 ASSAY OF TACROLIMUS: CPT | Performed by: INTERNAL MEDICINE

## 2025-08-05 LAB
TACROLIMUS BLD-MCNC: 11.7 UG/L (ref 5–15)
TME LAST DOSE: NORMAL H
TME LAST DOSE: NORMAL H

## 2025-08-06 DIAGNOSIS — T86.11 ANTIBODY MEDIATED REJECTION OF KIDNEY TRANSPLANT: ICD-10-CM

## 2025-08-06 DIAGNOSIS — Z94.0 KIDNEY REPLACED BY TRANSPLANT: ICD-10-CM

## 2025-08-06 RX ORDER — TACROLIMUS 5 MG/1
5 CAPSULE ORAL 2 TIMES DAILY
Qty: 60 CAPSULE | Refills: 11 | Status: SHIPPED | OUTPATIENT
Start: 2025-08-06

## 2025-08-06 RX ORDER — TACROLIMUS 1 MG/1
1 CAPSULE ORAL 2 TIMES DAILY
Qty: 60 CAPSULE | Refills: 11 | Status: SHIPPED | OUTPATIENT
Start: 2025-08-06

## 2025-08-06 RX ORDER — TACROLIMUS 0.5 MG/1
CAPSULE ORAL
Qty: 60 CAPSULE | Refills: 11 | Status: SHIPPED | OUTPATIENT
Start: 2025-08-06

## 2025-08-07 DIAGNOSIS — Z94.0 KIDNEY REPLACED BY TRANSPLANT: Chronic | ICD-10-CM

## 2025-08-07 DIAGNOSIS — T86.11 ANTIBODY MEDIATED REJECTION OF KIDNEY TRANSPLANT: ICD-10-CM

## 2025-08-07 RX ORDER — ATORVASTATIN CALCIUM 10 MG/1
10 TABLET, FILM COATED ORAL DAILY
Qty: 30 TABLET | Refills: 2 | Status: SHIPPED | OUTPATIENT
Start: 2025-08-07

## 2025-08-11 ENCOUNTER — TELEPHONE (OUTPATIENT)
Dept: TRANSPLANT | Facility: CLINIC | Age: 35
End: 2025-08-11
Payer: COMMERCIAL

## 2025-08-11 ENCOUNTER — LAB (OUTPATIENT)
Dept: LAB | Facility: CLINIC | Age: 35
End: 2025-08-11
Payer: COMMERCIAL

## 2025-08-11 DIAGNOSIS — E86.0 DEHYDRATION: ICD-10-CM

## 2025-08-11 DIAGNOSIS — T86.11 ANTIBODY MEDIATED REJECTION OF KIDNEY TRANSPLANT: ICD-10-CM

## 2025-08-11 DIAGNOSIS — Z94.0 KIDNEY REPLACED BY TRANSPLANT: Primary | ICD-10-CM

## 2025-08-11 DIAGNOSIS — Z48.298 AFTERCARE FOLLOWING ORGAN TRANSPLANT: Primary | ICD-10-CM

## 2025-08-11 PROCEDURE — 80197 ASSAY OF TACROLIMUS: CPT | Performed by: INTERNAL MEDICINE

## 2025-08-12 ENCOUNTER — TELEPHONE (OUTPATIENT)
Dept: TRANSPLANT | Facility: CLINIC | Age: 35
End: 2025-08-12
Payer: COMMERCIAL

## 2025-08-12 DIAGNOSIS — T86.11 ANTIBODY MEDIATED REJECTION OF KIDNEY TRANSPLANT: ICD-10-CM

## 2025-08-12 DIAGNOSIS — Z94.0 KIDNEY REPLACED BY TRANSPLANT: ICD-10-CM

## 2025-08-12 LAB
TACROLIMUS BLD-MCNC: 13 UG/L (ref 5–15)
TME LAST DOSE: NORMAL H
TME LAST DOSE: NORMAL H

## 2025-08-12 RX ORDER — TACROLIMUS 0.5 MG/1
0.5 CAPSULE ORAL 2 TIMES DAILY
Qty: 60 CAPSULE | Refills: 11 | Status: SHIPPED | OUTPATIENT
Start: 2025-08-12

## 2025-08-12 RX ORDER — TACROLIMUS 1 MG/1
4 CAPSULE ORAL 2 TIMES DAILY
Qty: 240 CAPSULE | Refills: 11 | Status: SHIPPED | OUTPATIENT
Start: 2025-08-12

## 2025-08-18 ENCOUNTER — LAB (OUTPATIENT)
Dept: LAB | Facility: CLINIC | Age: 35
End: 2025-08-18
Payer: COMMERCIAL

## 2025-08-18 DIAGNOSIS — Z94.0 KIDNEY REPLACED BY TRANSPLANT: ICD-10-CM

## 2025-08-18 DIAGNOSIS — Z98.890 OTHER SPECIFIED POSTPROCEDURAL STATES: ICD-10-CM

## 2025-08-18 DIAGNOSIS — Z20.828 CONTACT WITH AND (SUSPECTED) EXPOSURE TO OTHER VIRAL COMMUNICABLE DISEASES: ICD-10-CM

## 2025-08-18 DIAGNOSIS — Z48.298 AFTERCARE FOLLOWING ORGAN TRANSPLANT: ICD-10-CM

## 2025-08-18 DIAGNOSIS — Z79.899 ENCOUNTER FOR LONG-TERM CURRENT USE OF MEDICATION: ICD-10-CM

## 2025-08-18 PROCEDURE — 80197 ASSAY OF TACROLIMUS: CPT

## 2025-08-19 LAB
TACROLIMUS BLD-MCNC: 12.3 UG/L (ref 5–15)
TME LAST DOSE: NORMAL H
TME LAST DOSE: NORMAL H

## 2025-08-20 ENCOUNTER — TELEPHONE (OUTPATIENT)
Dept: TRANSPLANT | Facility: CLINIC | Age: 35
End: 2025-08-20
Payer: COMMERCIAL

## 2025-08-20 ENCOUNTER — TELEPHONE (OUTPATIENT)
Dept: INFUSION THERAPY | Facility: CLINIC | Age: 35
End: 2025-08-20
Payer: COMMERCIAL

## 2025-08-20 ENCOUNTER — TELEPHONE (OUTPATIENT)
Dept: PHARMACY | Facility: CLINIC | Age: 35
End: 2025-08-20
Payer: COMMERCIAL

## 2025-08-20 DIAGNOSIS — T86.11 ANTIBODY MEDIATED REJECTION OF KIDNEY TRANSPLANT: ICD-10-CM

## 2025-08-20 DIAGNOSIS — Z48.298 AFTERCARE FOLLOWING ORGAN TRANSPLANT: Primary | ICD-10-CM

## 2025-08-20 RX ORDER — MYCOPHENOLIC ACID 180 MG/1
540 TABLET, DELAYED RELEASE ORAL 2 TIMES DAILY
Qty: 180 TABLET | Refills: 11 | Status: SHIPPED | OUTPATIENT
Start: 2025-08-20

## 2025-08-21 DIAGNOSIS — Z94.0 KIDNEY REPLACED BY TRANSPLANT: ICD-10-CM

## 2025-08-21 DIAGNOSIS — T86.11 ANTIBODY MEDIATED REJECTION OF KIDNEY TRANSPLANT: ICD-10-CM

## 2025-08-21 RX ORDER — VALGANCICLOVIR 450 MG/1
900 TABLET, FILM COATED ORAL DAILY
Qty: 60 TABLET | Refills: 0 | Status: SHIPPED | OUTPATIENT
Start: 2025-08-21

## 2025-08-25 ENCOUNTER — LAB (OUTPATIENT)
Dept: LAB | Facility: CLINIC | Age: 35
End: 2025-08-25
Payer: COMMERCIAL

## 2025-08-29 ENCOUNTER — MYC MEDICAL ADVICE (OUTPATIENT)
Dept: OTHER | Age: 35
End: 2025-08-29

## 2025-09-02 ENCOUNTER — LAB (OUTPATIENT)
Dept: LAB | Facility: CLINIC | Age: 35
End: 2025-09-02
Payer: COMMERCIAL

## 2025-09-02 DIAGNOSIS — Z94.0 KIDNEY REPLACED BY TRANSPLANT: ICD-10-CM

## 2025-09-02 DIAGNOSIS — Z48.298 AFTERCARE FOLLOWING ORGAN TRANSPLANT: ICD-10-CM

## 2025-09-02 DIAGNOSIS — Z79.899 ENCOUNTER FOR LONG-TERM CURRENT USE OF MEDICATION: ICD-10-CM

## 2025-09-02 DIAGNOSIS — Z20.828 CONTACT WITH AND (SUSPECTED) EXPOSURE TO OTHER VIRAL COMMUNICABLE DISEASES: ICD-10-CM

## 2025-09-02 DIAGNOSIS — Z98.890 OTHER SPECIFIED POSTPROCEDURAL STATES: ICD-10-CM

## 2025-09-02 PROBLEM — T86.11 KIDNEY TRANSPLANT REJECTION: Status: ACTIVE | Noted: 2025-07-15

## 2025-09-02 PROCEDURE — 80197 ASSAY OF TACROLIMUS: CPT

## 2025-09-03 ENCOUNTER — TELEPHONE (OUTPATIENT)
Dept: TRANSPLANT | Facility: CLINIC | Age: 35
End: 2025-09-03
Payer: COMMERCIAL

## 2025-09-03 DIAGNOSIS — Z48.298 AFTERCARE FOLLOWING ORGAN TRANSPLANT: Primary | ICD-10-CM

## 2025-09-03 DIAGNOSIS — T86.11 ANTIBODY MEDIATED REJECTION OF KIDNEY TRANSPLANT: ICD-10-CM

## 2025-09-03 LAB
TACROLIMUS BLD-MCNC: 7.3 UG/L (ref 5–15)
TME LAST DOSE: NORMAL H
TME LAST DOSE: NORMAL H

## 2025-09-04 ENCOUNTER — MYC MEDICAL ADVICE (OUTPATIENT)
Dept: INTERVENTIONAL RADIOLOGY/VASCULAR | Facility: CLINIC | Age: 35
End: 2025-09-04
Payer: COMMERCIAL

## 2025-09-04 DIAGNOSIS — T86.11 ANTIBODY MEDIATED REJECTION OF KIDNEY TRANSPLANT: ICD-10-CM

## (undated) DEVICE — SOL NACL 0.9% INJ 1000ML BAG 2B1324X

## (undated) DEVICE — PACK GOWN 3/PK DISP XL SBA32GPFCB

## (undated) DEVICE — DRAPE NEW SLUSH/WARMER HUSHSLUSH 2.0 ESD340 ESD340

## (undated) DEVICE — SURGICEL ABSORBABLE HEMOSTAT SNOW 4"X4" 2083

## (undated) DEVICE — Device

## (undated) DEVICE — FILTER HEPA FLUID TRAP NEPTUNE 0703-040-001

## (undated) DEVICE — NDL COUNTER 20CT 31142493

## (undated) DEVICE — DRAIN JACKSON PRATT ROUND SIL 19FR W/TROCAR LF JP-2232

## (undated) DEVICE — LINEN TOWEL PACK X6 WHITE 5487

## (undated) DEVICE — STPL POWERED ECHELON LONG 60MM PLEE60A

## (undated) DEVICE — SOL NACL 0.9% IRRIG 1000ML BOTTLE 2F7124

## (undated) DEVICE — GLOVE BIOGEL PI ULTRATOUCH SZ 7.5 41175

## (undated) DEVICE — ENDO TROCAR SLEEVE KII ADV FIXATION 05X100MM CFS02

## (undated) DEVICE — SYR 01ML 27GA 0.5" NDL TBC 309623

## (undated) DEVICE — PLUG CATH AND DRAIN TUBE PROTECTOR DYND12200

## (undated) DEVICE — SYR 30ML LL W/O NDL 302832

## (undated) DEVICE — ESU LIGASURE MARYLAND VESSEL LAP 44CM XLONG LF1944

## (undated) DEVICE — ENDO POUCH UNIVERSAL RETRIEVAL SYSTEM INZII 12/15MM CD004

## (undated) DEVICE — SYSTEM CALIBRATION GASTRECTOMY SLEEVE VISIGI 3D 40FR 5240

## (undated) DEVICE — STPL SKIN 35W ROTATING HEAD PRW35

## (undated) DEVICE — SUCTION MANIFOLD NEPTUNE 2 SYS 4 PORT 0702-020-000

## (undated) DEVICE — CLIP APPLIER 09 3/8" SM LIGACLIP MCS20

## (undated) DEVICE — DEVICE CATH STABILIZATION STATLOCK FOLEY 3-WAY FOL0105

## (undated) DEVICE — DRAPE SHEET REV FOLD 3/4 9349

## (undated) DEVICE — ENDO TROCAR FIRST ENTRY KII FIOS ADV FIX 05X100MM CFF03

## (undated) DEVICE — STPL RELOAD REG TISSUE ECHELON 60 X 3.6MM BLUE GST60B

## (undated) DEVICE — SU VICRYL 0 TIE 54" J608H

## (undated) DEVICE — DRSG PRIMAPORE 02X3" 7133

## (undated) DEVICE — CLIP APPLIER 11" MED LIGACLIP MCM30

## (undated) DEVICE — PREP CHLORAPREP 26ML TINTED HI-LITE ORANGE 930815

## (undated) DEVICE — INSERT FOGARTY 33MM TRACTION HYDRAJAW HYDRA33

## (undated) DEVICE — LINEN TOWEL PACK X5 5464

## (undated) DEVICE — CLIP APPLIER ENDO 5MM M/L LIGAMAX EL5ML

## (undated) DEVICE — DRSG TEGADERM 4X4 3/4" 1626W

## (undated) DEVICE — NDL SPINAL 22GA 3.5" QUINCKE 405181

## (undated) DEVICE — ESU GROUND PAD ADULT W/CORD E7507

## (undated) DEVICE — SU VICRYL+ 3-0 27IN SH UND VCP416H

## (undated) DEVICE — NDL INSUFFLATION 13GA 120MM C2201

## (undated) DEVICE — SU SILK 4-0 TIE 12X30" A303H

## (undated) DEVICE — DRAPE IOBAN INCISE 23X17" 6650EZ

## (undated) DEVICE — SU PROLENE 7-0 BV-1DA 4X30" M8703

## (undated) DEVICE — NDL BLUNT 18GA 1" W/O FILTER 305181

## (undated) DEVICE — TUBING IRRIG CYSTO/BLADDER SET 81" LF 2C4040

## (undated) DEVICE — SU SILK 1 TIE 6X30" A307H

## (undated) DEVICE — SU PDS II 5-0 RB-1 27" Z303H

## (undated) DEVICE — STPL LINEAR 30X2.5MM VASC TX30V

## (undated) DEVICE — SU PROLENE 5-0 RB-1DA 36"  8556H

## (undated) DEVICE — LINEN TOWEL PACK X30 5481

## (undated) DEVICE — STPL RELOAD LINEAR 30X2.5MM VASC XR30V

## (undated) DEVICE — SYR 10ML SLIP TIP W/O NDL 303134

## (undated) DEVICE — SOL WATER 10ML VIAL 6332318510

## (undated) DEVICE — COVER CAMERA IN-LIGHT LENS LT-C02-P

## (undated) DEVICE — DRSG MEDIPORE 3 1/2X13 3/4" 3573

## (undated) DEVICE — DRAIN RESERVOIR 100ML JP 0070740

## (undated) DEVICE — SU SILK 0 TIE 6X30" A306H

## (undated) DEVICE — SUCTION TIP POOLE K770

## (undated) DEVICE — SU PROLENE 6-0 RB-2DA 30" 8711H

## (undated) DEVICE — COVER CAMERA IN-LIGHT DISP LT-C02

## (undated) DEVICE — CATH FOLEY 3WAY 16FR 30ML LATEX 0167SI16

## (undated) DEVICE — TUBING SMOKE EVAC PNEUMOCLEAR HIGH FLOW 0620050250

## (undated) DEVICE — SU PDS II 4-0 SH 27" Z315H

## (undated) DEVICE — GLOVE BIOGEL PI MICRO INDICATOR UNDERGLOVE SZ 7.5 48975

## (undated) DEVICE — SU PDS II 1 TP-1 48" Z880G

## (undated) DEVICE — WIPES FOLEY CARE SURESTEP PROVON DFC100

## (undated) DEVICE — SU SILK 2-0 TIE 12X30" A305H

## (undated) DEVICE — ANTIFOG SOLUTION W/FOAM PAD 31142527

## (undated) DEVICE — GLOVE BIOGEL PI MICRO INDICATOR UNDERGLOVE SZ 8.0 48980

## (undated) DEVICE — SU SILK 3-0 SH CR 8X18" C013D

## (undated) DEVICE — RX SURGIFLO HEMOSTATIC MATRIX W/THROMBIN 8ML 2994

## (undated) DEVICE — DEVICE SUTURE PASSER 14GA WECK EFX EFXSP2

## (undated) DEVICE — SU SILK 3-0 TIE 12X30" A304H

## (undated) DEVICE — SU ETHILON 3-0 PS-1 18" 1663H

## (undated) DEVICE — CLIP APPLIER 13" LG LIGACLIP MCL20

## (undated) DEVICE — NDL INSUFFLATION 13GA 150MM C2202

## (undated) DEVICE — SU PDS II 6-0 RB-2DA 30" Z149H

## (undated) DEVICE — ENDO TROCAR OPTICAL ACCESS KII Z-THRD 15X100MM C0R37

## (undated) RX ORDER — HYDROMORPHONE HCL IN WATER/PF 6 MG/30 ML
PATIENT CONTROLLED ANALGESIA SYRINGE INTRAVENOUS
Status: DISPENSED
Start: 2024-01-23

## (undated) RX ORDER — FENTANYL CITRATE 50 UG/ML
INJECTION, SOLUTION INTRAMUSCULAR; INTRAVENOUS
Status: DISPENSED
Start: 2025-05-17

## (undated) RX ORDER — ACETAMINOPHEN 325 MG/1
TABLET ORAL
Status: DISPENSED
Start: 2025-07-15

## (undated) RX ORDER — PROPOFOL 10 MG/ML
INJECTION, EMULSION INTRAVENOUS
Status: DISPENSED
Start: 2025-05-17

## (undated) RX ORDER — DEXTROSE MONOHYDRATE 25 G/50ML
INJECTION, SOLUTION INTRAVENOUS
Status: DISPENSED
Start: 2025-05-17

## (undated) RX ORDER — ONDANSETRON 2 MG/ML
INJECTION INTRAMUSCULAR; INTRAVENOUS
Status: DISPENSED
Start: 2024-01-23

## (undated) RX ORDER — SODIUM CHLORIDE 450 MG/100ML
INJECTION, SOLUTION INTRAVENOUS
Status: DISPENSED
Start: 2025-05-17

## (undated) RX ORDER — ALBUMIN HUMAN 25 %
INTRAVENOUS SOLUTION INTRAVENOUS
Status: DISPENSED
Start: 2025-06-13

## (undated) RX ORDER — FENTANYL CITRATE-0.9 % NACL/PF 10 MCG/ML
PLASTIC BAG, INJECTION (ML) INTRAVENOUS
Status: DISPENSED
Start: 2025-05-17

## (undated) RX ORDER — LIDOCAINE HYDROCHLORIDE 10 MG/ML
INJECTION, SOLUTION EPIDURAL; INFILTRATION; INTRACAUDAL; PERINEURAL
Status: DISPENSED
Start: 2025-06-06

## (undated) RX ORDER — FENTANYL CITRATE-0.9 % NACL/PF 10 MCG/ML
PLASTIC BAG, INJECTION (ML) INTRAVENOUS
Status: DISPENSED
Start: 2024-01-23

## (undated) RX ORDER — ENOXAPARIN SODIUM 100 MG/ML
INJECTION SUBCUTANEOUS
Status: DISPENSED
Start: 2024-01-23

## (undated) RX ORDER — APREPITANT 40 MG/1
CAPSULE ORAL
Status: DISPENSED
Start: 2024-01-23

## (undated) RX ORDER — FENTANYL CITRATE 50 UG/ML
INJECTION, SOLUTION INTRAMUSCULAR; INTRAVENOUS
Status: DISPENSED
Start: 2024-01-23

## (undated) RX ORDER — ONDANSETRON 2 MG/ML
INJECTION INTRAMUSCULAR; INTRAVENOUS
Status: DISPENSED
Start: 2025-05-17

## (undated) RX ORDER — ACETAMINOPHEN 325 MG/1
TABLET ORAL
Status: DISPENSED
Start: 2025-05-17

## (undated) RX ORDER — CEFAZOLIN SODIUM 1 G/3ML
INJECTION, POWDER, FOR SOLUTION INTRAMUSCULAR; INTRAVENOUS
Status: DISPENSED
Start: 2025-05-17

## (undated) RX ORDER — ACETAMINOPHEN 325 MG/1
TABLET ORAL
Status: DISPENSED
Start: 2024-01-23

## (undated) RX ORDER — FENTANYL CITRATE 50 UG/ML
INJECTION, SOLUTION INTRAMUSCULAR; INTRAVENOUS
Status: DISPENSED
Start: 2025-07-15

## (undated) RX ORDER — DEXAMETHASONE SODIUM PHOSPHATE 4 MG/ML
INJECTION, SOLUTION INTRA-ARTICULAR; INTRALESIONAL; INTRAMUSCULAR; INTRAVENOUS; SOFT TISSUE
Status: DISPENSED
Start: 2024-01-23

## (undated) RX ORDER — HEPARIN SODIUM 1000 [USP'U]/ML
INJECTION, SOLUTION INTRAVENOUS; SUBCUTANEOUS
Status: DISPENSED
Start: 2025-05-17

## (undated) RX ORDER — DEXAMETHASONE SODIUM PHOSPHATE 4 MG/ML
INJECTION, SOLUTION INTRA-ARTICULAR; INTRALESIONAL; INTRAMUSCULAR; INTRAVENOUS; SOFT TISSUE
Status: DISPENSED
Start: 2025-05-17

## (undated) RX ORDER — DROPERIDOL 2.5 MG/ML
INJECTION, SOLUTION INTRAMUSCULAR; INTRAVENOUS
Status: DISPENSED
Start: 2024-01-23

## (undated) RX ORDER — CALCIUM GLUCONATE 98 MG/ML
INJECTION, SOLUTION INTRAVENOUS
Status: DISPENSED
Start: 2025-06-13

## (undated) RX ORDER — HYDROMORPHONE HCL IN WATER/PF 6 MG/30 ML
PATIENT CONTROLLED ANALGESIA SYRINGE INTRAVENOUS
Status: DISPENSED
Start: 2025-05-17

## (undated) RX ORDER — BUPIVACAINE HYDROCHLORIDE 2.5 MG/ML
INJECTION, SOLUTION EPIDURAL; INFILTRATION; INTRACAUDAL
Status: DISPENSED
Start: 2024-01-23

## (undated) RX ORDER — HYDROMORPHONE HYDROCHLORIDE 1 MG/ML
INJECTION, SOLUTION INTRAMUSCULAR; INTRAVENOUS; SUBCUTANEOUS
Status: DISPENSED
Start: 2024-01-23

## (undated) RX ORDER — ALBUMIN HUMAN 25 %
INTRAVENOUS SOLUTION INTRAVENOUS
Status: DISPENSED
Start: 2025-06-18

## (undated) RX ORDER — CALCIUM GLUCONATE 98 MG/ML
INJECTION, SOLUTION INTRAVENOUS
Status: DISPENSED
Start: 2025-06-16

## (undated) RX ORDER — CEFAZOLIN SODIUM/WATER 3 G/30 ML
SYRINGE (ML) INTRAVENOUS
Status: DISPENSED
Start: 2024-01-23

## (undated) RX ORDER — HYDROMORPHONE HYDROCHLORIDE 1 MG/ML
INJECTION, SOLUTION INTRAMUSCULAR; INTRAVENOUS; SUBCUTANEOUS
Status: DISPENSED
Start: 2025-05-17

## (undated) RX ORDER — FENTANYL CITRATE 50 UG/ML
INJECTION, SOLUTION INTRAMUSCULAR; INTRAVENOUS
Status: DISPENSED
Start: 2025-06-06

## (undated) RX ORDER — LIDOCAINE HYDROCHLORIDE 10 MG/ML
INJECTION, SOLUTION EPIDURAL; INFILTRATION; INTRACAUDAL; PERINEURAL
Status: DISPENSED
Start: 2025-07-15

## (undated) RX ORDER — FUROSEMIDE 10 MG/ML
INJECTION INTRAMUSCULAR; INTRAVENOUS
Status: DISPENSED
Start: 2025-05-17

## (undated) RX ORDER — PROPOFOL 10 MG/ML
INJECTION, EMULSION INTRAVENOUS
Status: DISPENSED
Start: 2024-01-23

## (undated) RX ORDER — CALCIUM GLUCONATE 98 MG/ML
INJECTION, SOLUTION INTRAVENOUS
Status: DISPENSED
Start: 2025-06-18

## (undated) RX ORDER — CEFUROXIME SODIUM 1.5 G/16ML
INJECTION, POWDER, FOR SOLUTION INTRAVENOUS
Status: DISPENSED
Start: 2025-05-17

## (undated) RX ORDER — NICOTINE POLACRILEX 4 MG
LOZENGE BUCCAL
Status: DISPENSED
Start: 2025-05-17

## (undated) RX ORDER — ALBUMIN HUMAN 25 %
INTRAVENOUS SOLUTION INTRAVENOUS
Status: DISPENSED
Start: 2025-06-16

## (undated) RX ORDER — SODIUM CHLORIDE 9 MG/ML
INJECTION, SOLUTION INTRAVENOUS
Status: DISPENSED
Start: 2025-05-17

## (undated) RX ORDER — ALBUTEROL SULFATE 90 UG/1
INHALANT RESPIRATORY (INHALATION)
Status: DISPENSED
Start: 2025-05-17

## (undated) RX ORDER — PAPAVERINE HYDROCHLORIDE 30 MG/ML
INJECTION INTRAMUSCULAR; INTRAVENOUS
Status: DISPENSED
Start: 2025-05-17

## (undated) RX ORDER — DEXTROSE, SODIUM CHLORIDE, SODIUM LACTATE, POTASSIUM CHLORIDE, AND CALCIUM CHLORIDE 5; .6; .31; .03; .02 G/100ML; G/100ML; G/100ML; G/100ML; G/100ML
INJECTION, SOLUTION INTRAVENOUS
Status: DISPENSED
Start: 2025-05-17

## (undated) RX ORDER — OXYCODONE HYDROCHLORIDE 10 MG/1
TABLET ORAL
Status: DISPENSED
Start: 2024-01-23

## (undated) RX ORDER — ACETAMINOPHEN 325 MG/1
TABLET ORAL
Status: DISPENSED
Start: 2025-06-06

## (undated) RX ORDER — VERAPAMIL HYDROCHLORIDE 2.5 MG/ML
INJECTION INTRAVENOUS
Status: DISPENSED
Start: 2025-05-17